# Patient Record
Sex: FEMALE | Race: WHITE | NOT HISPANIC OR LATINO | Employment: OTHER | URBAN - METROPOLITAN AREA
[De-identification: names, ages, dates, MRNs, and addresses within clinical notes are randomized per-mention and may not be internally consistent; named-entity substitution may affect disease eponyms.]

---

## 2017-01-04 DIAGNOSIS — F41.9 CHRONIC ANXIETY: ICD-10-CM

## 2017-01-04 RX ORDER — DIAZEPAM 10 MG/1
TABLET ORAL
Qty: 90 TAB | Refills: 1 | Status: SHIPPED
Start: 2017-01-04 | End: 2017-05-12 | Stop reason: SDUPTHER

## 2017-01-13 DIAGNOSIS — I10 ESSENTIAL HYPERTENSION: ICD-10-CM

## 2017-01-13 RX ORDER — METOPROLOL SUCCINATE 100 MG/1
TABLET, EXTENDED RELEASE ORAL
Qty: 60 TAB | Refills: 6 | Status: SHIPPED | OUTPATIENT
Start: 2017-01-13 | End: 2017-04-26 | Stop reason: SDUPTHER

## 2017-01-19 ENCOUNTER — HOSPITAL ENCOUNTER (OUTPATIENT)
Dept: LAB | Facility: MEDICAL CENTER | Age: 77
End: 2017-01-19
Attending: INTERNAL MEDICINE
Payer: MEDICARE

## 2017-01-19 DIAGNOSIS — E78.5 DYSLIPIDEMIA: ICD-10-CM

## 2017-01-19 DIAGNOSIS — I10 ESSENTIAL HYPERTENSION: ICD-10-CM

## 2017-01-19 DIAGNOSIS — I48.19 PERSISTENT ATRIAL FIBRILLATION (HCC): ICD-10-CM

## 2017-01-19 LAB
ALBUMIN SERPL BCP-MCNC: 4.4 G/DL (ref 3.2–4.9)
ALBUMIN/GLOB SERPL: 1.4 G/DL
ALP SERPL-CCNC: 72 U/L (ref 30–99)
ALT SERPL-CCNC: 15 U/L (ref 2–50)
ANION GAP SERPL CALC-SCNC: 8 MMOL/L (ref 0–11.9)
AST SERPL-CCNC: 19 U/L (ref 12–45)
BILIRUB SERPL-MCNC: 0.8 MG/DL (ref 0.1–1.5)
BUN SERPL-MCNC: 26 MG/DL (ref 8–22)
CALCIUM SERPL-MCNC: 9.6 MG/DL (ref 8.5–10.5)
CHLORIDE SERPL-SCNC: 104 MMOL/L (ref 96–112)
CHOLEST SERPL-MCNC: 168 MG/DL (ref 100–199)
CO2 SERPL-SCNC: 28 MMOL/L (ref 20–33)
CREAT SERPL-MCNC: 1.16 MG/DL (ref 0.5–1.4)
GLOBULIN SER CALC-MCNC: 3.1 G/DL (ref 1.9–3.5)
GLUCOSE SERPL-MCNC: 126 MG/DL (ref 65–99)
HDLC SERPL-MCNC: 49 MG/DL
LDLC SERPL CALC-MCNC: 96 MG/DL
POTASSIUM SERPL-SCNC: 4.2 MMOL/L (ref 3.6–5.5)
PROT SERPL-MCNC: 7.5 G/DL (ref 6–8.2)
SODIUM SERPL-SCNC: 140 MMOL/L (ref 135–145)
TRIGL SERPL-MCNC: 113 MG/DL (ref 0–149)

## 2017-01-19 PROCEDURE — 80061 LIPID PANEL: CPT

## 2017-01-19 PROCEDURE — 36415 COLL VENOUS BLD VENIPUNCTURE: CPT

## 2017-01-19 PROCEDURE — 80053 COMPREHEN METABOLIC PANEL: CPT

## 2017-03-08 ENCOUNTER — HOSPITAL ENCOUNTER (OUTPATIENT)
Dept: RADIOLOGY | Facility: MEDICAL CENTER | Age: 77
End: 2017-03-08
Attending: FAMILY MEDICINE
Payer: MEDICARE

## 2017-03-08 DIAGNOSIS — M85.80 OSTEOPENIA: ICD-10-CM

## 2017-03-08 DIAGNOSIS — Z78.0 POSTMENOPAUSAL: ICD-10-CM

## 2017-03-08 DIAGNOSIS — Z12.31 SCREENING MAMMOGRAM, ENCOUNTER FOR: ICD-10-CM

## 2017-03-08 PROCEDURE — 77063 BREAST TOMOSYNTHESIS BI: CPT

## 2017-03-08 PROCEDURE — 77080 DXA BONE DENSITY AXIAL: CPT

## 2017-03-21 ENCOUNTER — TELEPHONE (OUTPATIENT)
Dept: CARDIOLOGY | Facility: MEDICAL CENTER | Age: 77
End: 2017-03-21

## 2017-03-21 NOTE — TELEPHONE ENCOUNTER
Pt notified clortrimetrine plain, Coicidine HPN.  Nothing with pseudoephrine.  She has cold and congestions.

## 2017-03-21 NOTE — TELEPHONE ENCOUNTER
----- Message from Thais Villanueva sent at 3/21/2017 11:34 AM PDT -----  Regarding: Question about what OTC medication to take for cold or flu  JAMIE/Mariana    Patient has a cold or the flu and wants to find out what OTC medication she can take. She can be reached at [phone].

## 2017-03-31 ENCOUNTER — PATIENT OUTREACH (OUTPATIENT)
Dept: HEALTH INFORMATION MANAGEMENT | Facility: OTHER | Age: 77
End: 2017-03-31

## 2017-03-31 NOTE — PROGRESS NOTES
3/31/17  -   Attempt #:1    Verify PCP: yes    Communication Preference Obtained: yes     Review Care Team: yes    Annual Wellness Visit Scheduling  1. Scheduling Status:Not Scheduled. Patient states they have too many other visits        Care Gap Scheduling (Attempt to Schedule EACH Overdue Care Gap!)     Health Maintenance Due   Topic Date Due   • DIABETES MONOFILAMENT / LE EXAM  Unable to discuss care gaps with pt. She states that she will not schedule more appts for now   • IMM DTaP/Tdap/Td Vaccine (1 - Tdap)    • URINE ACR / MICROALBUMIN  Pt has labs pending         MyChart Activation: declined

## 2017-04-20 ENCOUNTER — HOSPITAL ENCOUNTER (OUTPATIENT)
Dept: LAB | Facility: MEDICAL CENTER | Age: 77
End: 2017-04-20
Attending: FAMILY MEDICINE
Payer: MEDICARE

## 2017-04-20 DIAGNOSIS — I10 ESSENTIAL HYPERTENSION: Chronic | ICD-10-CM

## 2017-04-20 DIAGNOSIS — E11.9 DIABETES MELLITUS TYPE II, NON INSULIN DEPENDENT (HCC): ICD-10-CM

## 2017-04-20 DIAGNOSIS — I50.32 CHRONIC DIASTOLIC HEART FAILURE (HCC): ICD-10-CM

## 2017-04-20 DIAGNOSIS — E78.5 DYSLIPIDEMIA: Chronic | ICD-10-CM

## 2017-04-20 DIAGNOSIS — I48.91 ATRIAL FIBRILLATION WITH RVR (HCC): ICD-10-CM

## 2017-04-20 LAB
ALBUMIN SERPL BCP-MCNC: 4.5 G/DL (ref 3.2–4.9)
ALBUMIN/GLOB SERPL: 1.5 G/DL
ALP SERPL-CCNC: 88 U/L (ref 30–99)
ALT SERPL-CCNC: 20 U/L (ref 2–50)
ANION GAP SERPL CALC-SCNC: 10 MMOL/L (ref 0–11.9)
AST SERPL-CCNC: 21 U/L (ref 12–45)
BILIRUB SERPL-MCNC: 1 MG/DL (ref 0.1–1.5)
BUN SERPL-MCNC: 25 MG/DL (ref 8–22)
CALCIUM SERPL-MCNC: 10.2 MG/DL (ref 8.5–10.5)
CHLORIDE SERPL-SCNC: 100 MMOL/L (ref 96–112)
CHOLEST SERPL-MCNC: 192 MG/DL (ref 100–199)
CO2 SERPL-SCNC: 30 MMOL/L (ref 20–33)
CREAT SERPL-MCNC: 1.2 MG/DL (ref 0.5–1.4)
CREAT UR-MCNC: 109.5 MG/DL
ERYTHROCYTE [DISTWIDTH] IN BLOOD BY AUTOMATED COUNT: 45.1 FL (ref 35.9–50)
EST. AVERAGE GLUCOSE BLD GHB EST-MCNC: 140 MG/DL
GFR SERPL CREATININE-BSD FRML MDRD: 44 ML/MIN/1.73 M 2
GLOBULIN SER CALC-MCNC: 3.1 G/DL (ref 1.9–3.5)
GLUCOSE SERPL-MCNC: 124 MG/DL (ref 65–99)
HBA1C MFR BLD: 6.5 % (ref 0–5.6)
HCT VFR BLD AUTO: 47.3 % (ref 37–47)
HDLC SERPL-MCNC: 52 MG/DL
HGB BLD-MCNC: 15.2 G/DL (ref 12–16)
LDLC SERPL CALC-MCNC: 115 MG/DL
MCH RBC QN AUTO: 29.5 PG (ref 27–33)
MCHC RBC AUTO-ENTMCNC: 32.1 G/DL (ref 33.6–35)
MCV RBC AUTO: 91.8 FL (ref 81.4–97.8)
MICROALBUMIN UR-MCNC: 1.5 MG/DL
MICROALBUMIN/CREAT UR: 14 MG/G (ref 0–30)
PLATELET # BLD AUTO: 162 K/UL (ref 164–446)
PMV BLD AUTO: 10.3 FL (ref 9–12.9)
POTASSIUM SERPL-SCNC: 4.3 MMOL/L (ref 3.6–5.5)
PROT SERPL-MCNC: 7.6 G/DL (ref 6–8.2)
RBC # BLD AUTO: 5.15 M/UL (ref 4.2–5.4)
SODIUM SERPL-SCNC: 140 MMOL/L (ref 135–145)
TRIGL SERPL-MCNC: 127 MG/DL (ref 0–149)
WBC # BLD AUTO: 6.7 K/UL (ref 4.8–10.8)

## 2017-04-20 PROCEDURE — 82570 ASSAY OF URINE CREATININE: CPT

## 2017-04-20 PROCEDURE — 83036 HEMOGLOBIN GLYCOSYLATED A1C: CPT

## 2017-04-20 PROCEDURE — 85027 COMPLETE CBC AUTOMATED: CPT

## 2017-04-20 PROCEDURE — 82043 UR ALBUMIN QUANTITATIVE: CPT

## 2017-04-20 PROCEDURE — 36415 COLL VENOUS BLD VENIPUNCTURE: CPT

## 2017-04-20 PROCEDURE — 80053 COMPREHEN METABOLIC PANEL: CPT

## 2017-04-20 PROCEDURE — 80061 LIPID PANEL: CPT

## 2017-04-25 DIAGNOSIS — K21.9 GASTROESOPHAGEAL REFLUX DISEASE WITHOUT ESOPHAGITIS: Chronic | ICD-10-CM

## 2017-04-25 DIAGNOSIS — E78.5 DYSLIPIDEMIA: Chronic | ICD-10-CM

## 2017-04-25 RX ORDER — OMEPRAZOLE 20 MG/1
CAPSULE, DELAYED RELEASE ORAL
Qty: 90 CAP | Refills: 6 | Status: SHIPPED | OUTPATIENT
Start: 2017-04-25 | End: 2018-05-11 | Stop reason: SDUPTHER

## 2017-04-25 RX ORDER — EZETIMIBE 10 MG/1
TABLET ORAL
Qty: 30 TAB | Refills: 6 | Status: SHIPPED | OUTPATIENT
Start: 2017-04-25 | End: 2017-08-29 | Stop reason: SDUPTHER

## 2017-04-26 DIAGNOSIS — I48.91 ATRIAL FIBRILLATION WITH RVR (HCC): ICD-10-CM

## 2017-04-26 DIAGNOSIS — I10 ESSENTIAL HYPERTENSION: ICD-10-CM

## 2017-04-26 RX ORDER — METOPROLOL SUCCINATE 100 MG/1
100 TABLET, EXTENDED RELEASE ORAL 2 TIMES DAILY
Qty: 180 TAB | Refills: 3 | OUTPATIENT
Start: 2017-04-26 | End: 2018-05-11 | Stop reason: SDUPTHER

## 2017-04-27 RX ORDER — POTASSIUM CHLORIDE 20 MEQ/1
20 TABLET, EXTENDED RELEASE ORAL DAILY
Qty: 30 TAB | Refills: 11 | OUTPATIENT
Start: 2017-04-27 | End: 2017-09-05 | Stop reason: SDUPTHER

## 2017-05-12 DIAGNOSIS — F41.9 CHRONIC ANXIETY: ICD-10-CM

## 2017-05-12 RX ORDER — DIAZEPAM 10 MG/1
TABLET ORAL
Qty: 90 TAB | Refills: 3 | Status: SHIPPED
Start: 2017-05-12 | End: 2018-03-17 | Stop reason: SDUPTHER

## 2017-06-02 ENCOUNTER — OFFICE VISIT (OUTPATIENT)
Dept: CARDIOLOGY | Facility: MEDICAL CENTER | Age: 77
End: 2017-06-02
Payer: MEDICARE

## 2017-06-02 VITALS
DIASTOLIC BLOOD PRESSURE: 78 MMHG | HEIGHT: 62 IN | BODY MASS INDEX: 27.9 KG/M2 | HEART RATE: 80 BPM | OXYGEN SATURATION: 95 % | WEIGHT: 151.6 LBS | SYSTOLIC BLOOD PRESSURE: 126 MMHG

## 2017-06-02 DIAGNOSIS — E78.5 DYSLIPIDEMIA: Chronic | ICD-10-CM

## 2017-06-02 DIAGNOSIS — Z79.01 CHRONIC ANTICOAGULATION: ICD-10-CM

## 2017-06-02 DIAGNOSIS — I48.19 PERSISTENT ATRIAL FIBRILLATION (HCC): ICD-10-CM

## 2017-06-02 DIAGNOSIS — I10 ESSENTIAL HYPERTENSION: Chronic | ICD-10-CM

## 2017-06-02 PROCEDURE — 4040F PNEUMOC VAC/ADMIN/RCVD: CPT | Performed by: INTERNAL MEDICINE

## 2017-06-02 PROCEDURE — 3288F FALL RISK ASSESSMENT DOCD: CPT | Mod: 8P | Performed by: INTERNAL MEDICINE

## 2017-06-02 PROCEDURE — 0518F FALL PLAN OF CARE DOCD: CPT | Mod: 8P | Performed by: INTERNAL MEDICINE

## 2017-06-02 PROCEDURE — 99214 OFFICE O/P EST MOD 30 MIN: CPT | Performed by: INTERNAL MEDICINE

## 2017-06-02 PROCEDURE — 1036F TOBACCO NON-USER: CPT | Performed by: INTERNAL MEDICINE

## 2017-06-02 PROCEDURE — 1100F PTFALLS ASSESS-DOCD GE2>/YR: CPT | Performed by: INTERNAL MEDICINE

## 2017-06-02 PROCEDURE — G8419 CALC BMI OUT NRM PARAM NOF/U: HCPCS | Performed by: INTERNAL MEDICINE

## 2017-06-02 PROCEDURE — G8432 DEP SCR NOT DOC, RNG: HCPCS | Performed by: INTERNAL MEDICINE

## 2017-06-02 ASSESSMENT — ENCOUNTER SYMPTOMS
COUGH: 0
NAUSEA: 0
FEVER: 0
CLAUDICATION: 0
DIZZINESS: 0
ORTHOPNEA: 0
WEIGHT LOSS: 0
SENSORY CHANGE: 0
DEPRESSION: 0
BLOOD IN STOOL: 0
EYE DISCHARGE: 0
BRUISES/BLEEDS EASILY: 0
CHILLS: 0
LOSS OF CONSCIOUSNESS: 0
DOUBLE VISION: 0
SHORTNESS OF BREATH: 0
PALPITATIONS: 0
BLURRED VISION: 0
PND: 0
HEADACHES: 0
SPEECH CHANGE: 0
MYALGIAS: 0
EYE PAIN: 0
VOMITING: 0
HALLUCINATIONS: 0
ABDOMINAL PAIN: 0
FALLS: 0

## 2017-06-02 NOTE — MR AVS SNAPSHOT
"        Ivory Cantu Aunm   2017 9:45 AM   Office Visit   MRN: [id number]    Department:  [facility]   Dept Phone:  [phone]    Description:  Female : 1940   Provider:  Shen Washington M.D.           Reason for Visit     Follow-Up           Allergies as of 2017     Allergen Noted Reactions    Atorvastatin 2015       myalgias    Simvastatin 2010       myalgias      You were diagnosed with     Chronic atrial fibrillation (CMS-[facility])   [182650]       Essential hypertension   [7253585]       Dyslipidemia   [928112]       Chronic anticoagulation   [432076]         Vital Signs     Blood Pressure Pulse Height Weight Body Mass Index Oxygen Saturation    126/78 mmHg 80 1.575 m (5' 2.01\") 68.765 kg (151 lb 9.6 oz) 27.72 kg/m2 95%    Last Menstrual Period Smoking Status                1991 Former Smoker          Basic Information     Date Of Birth Sex Race Ethnicity Preferred Language    1940 Female White Non- English      Your appointments     2017 10:00 AM   Established Patient with Cecelia Tesfaye M.D.   [address]  [facility] [address] [phone]           You will be receiving a confirmation call a few days before your appointment from our automated call confirmation system.              Problem List              ICD-10-CM Priority Class Noted - Resolved    Sinusitis, chronic J32.9 Low  Unknown - Present    Bilateral cataracts H26.9 Low  Unknown - Present    Dyslipidemia (Chronic) E78.5 Medium  2009 - Present    Osteopenia M85.80 Low  2009 - Present    Essential hypertension (Chronic) I10 High  2010 - Present    GERD (gastroesophageal reflux disease) (Chronic) K21.9 Low  2010 - Present    Vitamin D deficiency (Chronic) E55.9 Low  2011 - Present    Vertigo R42 Low  Unknown - Present    IBS (irritable bowel syndrome) (Chronic) K58.9 Low  Unknown - Present    Basal cell carcinoma of " skin of nose C44.311 Low  Unknown - Present    Perennial allergic rhinitis with seasonal variation (Chronic) J30.89, J30.2 Low  Unknown - Present    History of adenomatous polyp of colon Z86.010 Low  6/2/2015 - Present    SBE (subacute bacterial endocarditis) prophylaxis candidate Z29.8 Low  5/3/2016 - Present    Noncompliance with medication regimen Z91.14 Low  5/21/2016 - Present    Atrial fibrillation with RVR (CMS-HCC) I48.91 High  5/21/2016 - Present    Diastolic heart failure (CMS-HCC) I50.30 High  5/22/2016 - Present    Diabetes mellitus type II, non insulin dependent (CMS-HCC) E11.9   11/29/2016 - Present    Mitral valve regurgitation I34.0   Unknown - Present    Financial difficulties Z59.8   11/29/2016 - Present    Chronic anxiety F41.9   1/4/2017 - Present    Type 2 diabetes mellitus, controlled (CMS-HCC) E11.9   Unknown - Present      Health Maintenance        Date Due Completion Dates    DIABETES MONOFILAMENT / LE EXAM 1940 ---    IMM DTaP/Tdap/Td Vaccine (1 - Tdap) 4/23/1960 ---    RETINAL SCREENING 8/1/2017 8/1/2016 (Prv Comp), 2/20/2013 (Prv Comp), 12/20/2011 (Done)    Override on 8/1/2016: Previously completed (Dr. Corey)    Override on 2/20/2013: Previously completed (Dr. Mims, normal)    Override on 12/20/2011: Done (Dr. Aaron)    A1C SCREENING 10/20/2017 4/20/2017, 11/2/2016, 4/3/2013, 8/8/2012, 2/9/2012, 12/7/2011, 3/9/2011, 11/30/2010, 8/5/2010, 4/19/2010, 1/14/2010, 6/29/2009    FASTING LIPID PROFILE 4/20/2018 4/20/2017, 1/19/2017, 11/2/2016, 10/23/2015, 3/21/2015, 10/21/2014, 5/14/2014, 11/1/2013, 4/3/2013, 8/8/2012, 2/9/2012, 12/7/2011, 3/9/2011, 11/30/2010, 8/5/2010, 4/19/2010, 1/14/2010, 6/29/2009    URINE ACR / MICROALBUMIN 4/20/2018 4/20/2017, 12/11/2012 (Postponed), 12/7/2011, 8/5/2010, 6/29/2009    Override on 12/11/2012: Postponed    SERUM CREATININE 4/20/2018 4/20/2017, 1/19/2017, 11/2/2016, 7/8/2016, 6/6/2016, 5/27/2016, 5/22/2016, 5/21/2016, 4/30/2016, 10/23/2015,  3/21/2015, 10/21/2014, 5/14/2014, 11/1/2013, 4/3/2013, 8/8/2012, 2/9/2012, 12/7/2011, 4/11/2011, 3/9/2011, 11/30/2010, 8/5/2010, 4/19/2010, 1/14/2010, 6/29/2009    COLONOSCOPY 8/1/2019 8/1/2009 (Done)    Override on 8/1/2009: Done    BONE DENSITY 3/8/2022 3/8/2017, 6/8/2011, 6/29/2009            Current Immunizations     13-VALENT PCV PREVNAR 11/29/2016    Influenza TIV (IM) 12/11/2012    Pneumococcal polysaccharide vaccine (PPSV-23) 1/18/2010    SHINGLES VACCINE 11/23/2011      Below and/or attached are the medications your provider expects you to take. Review all of your home medications and newly ordered medications with your provider and/or pharmacist. Follow medication instructions as directed by your provider and/or pharmacist. Please keep your medication list with you and share with your provider. Update the information when medications are discontinued, doses are changed, or new medications (including over-the-counter products) are added; and carry medication information at all times in the event of emergency situations     Allergies:  ATORVASTATIN - (reactions not documented)     SIMVASTATIN - (reactions not documented)               Medications  Valid as of: June 02, 2017 - 10:20 AM    Generic Name Brand Name Tablet Size Instructions for use    Apixaban (Tab) ELIQUIS 5 MG TAKE 1 TAB BY MOUTH 2 TIMES A DAY.        DiazePAM (Tab) VALIUM 10 MG TAKE 1 TABLET BY MOUTH EVERY 8 HOURS AS NEEDED FOR ANXIETY        Digoxin (Tab) LANOXIN 125 MCG Take 125 mcg by mouth. AT 6 PM        Ezetimibe (Tab) ZETIA 10 MG TAKE 1 TABLET BY MOUTH EVERY DAY        Furosemide (Tab) LASIX 20 MG Take 1 Tab by mouth every day.        Metoprolol Succinate (TABLET SR 24 HR) TOPROL  MG Take 1 Tab by mouth 2 Times a Day.        Omeprazole (CAPSULE DELAYED RELEASE) PRILOSEC 20 MG TAKE ONE CAPSULE BY MOUTH EVERY DAY        Potassium Chloride Rose Mary CR (Tab CR) Kdur 20 MEQ Take 1 Tab by mouth every day.        .                 Medicines  prescribed today were sent to:     [id number] - [place], [place] - [address]    [address] [place] [address]    Phone: [phone] Fax: [phone]    Open 24 Hours?: No      Medication refill instructions:       If your prescription bottle indicates you have medication refills left, it is not necessary to call your provider’s office. Please contact your pharmacy and they will refill your medication.    If your prescription bottle indicates you do not have any refills left, you may request refills at any time through one of the following ways: The online Xceedium system (except Urgent Care), by calling your provider’s office, or by asking your pharmacy to contact your provider’s office with a refill request. Medication refills are processed only during regular business hours and may not be available until the next business day. Your provider may request additional information or to have a follow-up visit with you prior to refilling your medication.   *Please Note: Medication refills are assigned a new Rx number when refilled electronically. Your pharmacy may indicate that no refills were authorized even though a new prescription for the same medication is available at the pharmacy. Please request the medicine by name with the pharmacy before contacting your provider for a refill.        Your To Do List     Future Labs/Procedures Complete By Expires    COMP METABOLIC PANEL  As directed 6/3/2018         Xceedium Access Code: [email or web address]  Expires: 7/2/2017 10:20 AM    Xceedium  A secure, online tool to manage your health information     [email or web address] Xceedium® is a secure, online tool that connects you to your personalized health information from the privacy of your home -- day or night - making it very easy for you to manage your healthcare. Once the activation process is completed, you can even access your medical information using the Xceedium christa, which is available for free in the Apple Christa store  or Google Play store.     [employer] provides the following levels of access (as shown below):   My Chart Features   [facility] Doctor [facility]  Specialists [facility]  Urgent  Care Non-[facility]  Primary Care  Doctor   Email your healthcare team securely and privately 24/7 X X X    Manage appointments: schedule your next appointment; view details of past/upcoming appointments X      Request prescription refills. X      View recent personal medical records, including lab and immunizations X X X X   View health record, including health history, allergies, medications X X X X   Read reports about your outpatient visits, procedures, consult and ER notes X X X X   See your discharge summary, which is a recap of your hospital and/or ER visit that includes your diagnosis, lab results, and care plan. X X       How to register for [employer]:  1. Go to  [email or web address].  2. Click on the Sign Up Now box, which takes you to the New Member Sign Up page. You will need to provide the following information:  a. Enter your ETARGET Access Code exactly as it appears at the top of this page. (You will not need to use this code after you’ve completed the sign-up process. If you do not sign up before the expiration date, you must request a new code.)   b. Enter your date of birth.   c. Enter your home email address.   d. Click Submit, and follow the next screen’s instructions.  3. Create a ETARGET ID. This will be your ETARGET login ID and cannot be changed, so think of one that is secure and easy to remember.  4. Create a ETARGET password. You can change your password at any time.  5. Enter your Password Reset Question and Answer. This can be used at a later time if you forget your password.   6. Enter your e-mail address. This allows you to receive e-mail notifications when new information is available in ETARGET.  7. Click Sign Up. You can now view your health information.    For assistance activating your [employer] account, call  [phone]

## 2017-06-02 NOTE — PROGRESS NOTES
"Subjective:   Ivory Rowan is a 77 y.o. female who presents today for cardiac care for chronic atrial fibrillation now has progressed into persistent after prior failed cardioversion. She was in sinus for brief amount of time. Patient was diagnosed with atrial fibrillation earlier in 2016. Patient has had multiple hospitalizations due to A. fib with rapid ventricular rate leading to heart failure exacerbation. Her left ventricular systolic function is documented at about 45-50% on her most recent transthoracic echocardiogram. Patient is anticoagulated. Patient is taken Toprol- mg by mouth twice a day.    Patient cannot afford Tikosyn therapy and she did not go into the hospital for that.    She is feeling fine. No palpitations. No dyspnea.    Past Medical History   Diagnosis Date   • Hypertension    • Vertigo    • Colon polyp      tubular adenomas   • Perennial allergic rhinitis with seasonal variation    • Basal cell carcinoma of skin of nose    • Osteopenia 6/09   • Dyslipidemia    • IBS (irritable bowel syndrome)    • MEDICAL HOME 10/23/12   • Type 2 diabetes mellitus, controlled (CMS-[facility])    • Atrial fibrillation (CMS-[facility])    • CHF (congestive heart failure) (CMS-[facility])    • Valvular heart disease      mitral insufficiency   • CATARACT      Dr. Aaron, Dr. Orellana   • Mitral valve regurgitation      Past Surgical History   Procedure Laterality Date   • Appendectomy     • Breast biopsy       left, reports wire report broke off during procedure   • Cholecystectomy     • Colonoscopy  8/2009     Dr. Lopez, 9 polyps   • Recovery  7/8/2016     Procedure: CATH LAB-ZAFAR GUIDED CV-TO-LARGE GROUP;  Surgeon: [facility];  [place] [facility]  Service:    • Us-needle core bx-breast panel       Family History   Problem Relation Age of Onset   • Diabetes Mother    • Hypertension Mother    • Stroke Mother    • Cancer Father      lung/larynx   • Cancer Sister      \"female\"   • Cancer " Paternal Aunt      breast   • Genetic Sister      osteoporosis   • Cancer Maternal Aunt    • Heart Disease Brother      CABG x 3     History   Smoking status   • Former Smoker -- 0.50 packs/day for 40 years   • Types: Cigarettes   • Quit date: 03/01/1996   Smokeless tobacco   • Never Used     Allergies   Allergen Reactions   • Atorvastatin      myalgias   • Simvastatin      myalgias     Outpatient Encounter Prescriptions as of 6/2/2017   Medication Sig Dispense Refill   • diazepam (VALIUM) 10 MG tablet TAKE 1 TABLET BY MOUTH EVERY 8 HOURS AS NEEDED FOR ANXIETY 90 Tab 3   • potassium chloride SA (KDUR) 20 MEQ Tab CR Take 1 Tab by mouth every day. 30 Tab 11   • metoprolol SR (TOPROL XL) 100 MG TABLET SR 24 HR Take 1 Tab by mouth 2 Times a Day. 180 Tab 3   • ezetimibe (ZETIA) 10 MG Tab TAKE 1 TABLET BY MOUTH EVERY DAY 30 Tab 6   • omeprazole (PRILOSEC) 20 MG delayed-release capsule TAKE ONE CAPSULE BY MOUTH EVERY DAY 90 Cap 6   • digoxin (LANOXIN) 125 MCG Tab Take 125 mcg by mouth. AT 6 PM  3   • ELIQUIS 5 MG Tab TAKE 1 TAB BY MOUTH 2 TIMES A DAY. 180 Tab 3   • furosemide (LASIX) 20 MG Tab Take 1 Tab by mouth every day. 90 Tab 3     No facility-administered encounter medications on file as of 6/2/2017.     Review of Systems   Constitutional: Negative for fever, chills, weight loss and malaise/fatigue.   HENT: Negative for ear discharge, ear pain, hearing loss and nosebleeds.    Eyes: Negative for blurred vision, double vision, pain and discharge.   Respiratory: Negative for cough and shortness of breath.    Cardiovascular: Negative for chest pain, palpitations, orthopnea, claudication, leg swelling and PND.   Gastrointestinal: Negative for nausea, vomiting, abdominal pain, blood in stool and melena.   Genitourinary: Negative for dysuria and hematuria.   Musculoskeletal: Negative for myalgias, joint pain and falls.   Skin: Negative for itching and rash.   Neurological: Negative for dizziness, sensory change, speech  "change, loss of consciousness and headaches.   Endo/Heme/Allergies: Negative for environmental allergies. Does not bruise/bleed easily.   Psychiatric/Behavioral: Negative for depression, suicidal ideas and hallucinations.        Objective:   /78 mmHg  Pulse 80  Ht 1.575 m (5' 2.01\")  Wt 68.765 kg (151 lb 9.6 oz)  BMI 27.72 kg/m2  SpO2 95%  LMP 05/01/1991    Physical Exam   Constitutional: She is oriented to person, place, and time.   HENT:   Head: Normocephalic and atraumatic.   Eyes: EOM are normal.   Neck: No JVD present.   Cardiovascular: Normal rate, normal heart sounds and intact distal pulses.  Exam reveals no gallop and no friction rub.    No murmur heard.  There is presence of an irregularly irregular heartbeats.     Pulmonary/Chest: No respiratory distress. She has no wheezes. She has no rales. She exhibits no tenderness.   Abdominal: She exhibits no distension. There is no tenderness. There is no rebound and no guarding.   Musculoskeletal: She exhibits no edema or tenderness.   Lymphadenopathy:     She has no cervical adenopathy.   Neurological: She is alert and oriented to person, place, and time.   Skin: Skin is dry.   Psychiatric: She has a normal mood and affect.   Nursing note and vitals reviewed.      Assessment:     1. Essential hypertension  COMP METABOLIC PANEL    LIPID PANEL   2. Dyslipidemia  COMP METABOLIC PANEL    LIPID PANEL   3. Chronic anticoagulation  COMP METABOLIC PANEL    LIPID PANEL   4. Persistent atrial fibrillation (CMS-[facility])         Medical Decision Making:  Today's Assessment / Status / Plan:     At this time patient is clinically stable in terms of her cardiac standpoint.  Blood pressure is well controlled.  Cont current medications at current dose.   Her LDL increased again but she did not take her Zetia for a while. She just restarted the medicine now. She was not able to tolerate multiple statins in the past.    I will see patient back in clinic with lab tests and " studies results in 12 months.    I thank you Dr. Tesfaye for referring patient to our Cardiology Clinic today.

## 2017-06-02 NOTE — Clinical Note
[facility]   [address]  [place] [address]  Phone: [phone]  Fax: [phone]              Ivory Rowan  1940    Encounter Date: 6/2/2017    Shen Washington M.D.          PROGRESS NOTE:  Subjective:   Ivory Rowan is a 77 y.o. female who presents today for cardiac care for chronic atrial fibrillation now has progressed into persistent after prior failed cardioversion. She was in sinus for brief amount of time. Patient was diagnosed with atrial fibrillation earlier in 2016. Patient has had multiple hospitalizations due to A. fib with rapid ventricular rate leading to heart failure exacerbation. Her left ventricular systolic function is documented at about 45-50% on her most recent transthoracic echocardiogram. Patient is anticoagulated. Patient is taken Toprol- mg by mouth twice a day.    Patient cannot afford Tikosyn therapy and she did not go into the hospital for that.    She is feeling fine. No palpitations. No dyspnea.    Past Medical History   Diagnosis Date   • Hypertension    • Vertigo    • Colon polyp      tubular adenomas   • Perennial allergic rhinitis with seasonal variation    • Basal cell carcinoma of skin of nose    • Osteopenia 6/09   • Dyslipidemia    • IBS (irritable bowel syndrome)    • MEDICAL HOME 10/23/12   • Type 2 diabetes mellitus, controlled (CMS-[facility])    • Atrial fibrillation (CMS-[facility])    • CHF (congestive heart failure) (CMS-[facility])    • Valvular heart disease      mitral insufficiency   • CATARACT      Dr. Aaron,  Orellana   • Mitral valve regurgitation      Past Surgical History   Procedure Laterality Date   • Appendectomy     • Breast biopsy       left, reports wire report broke off during procedure   • Cholecystectomy     • Colonoscopy  8/2009     Dr. Lopez, 9 polyps   • Recovery  7/8/2016     Procedure: CATH LAB-ZAFAR GUIDED CV-TO-LARGE GROUP;  Surgeon: [facility];  [place]  [place] [facility] [place]  Service:    • Us-needle core bx-breast panel       Family History   Problem Relation Age of Onset   • Diabetes Mother    • Hypertension Mother    • Stroke Mother    • Cancer Father      lung/larynx   • Cancer Sister      \"female\"   • Cancer Paternal Aunt      breast   • Genetic Sister      osteoporosis   • Cancer Maternal Aunt    • Heart Disease Brother      CABG x 3     History   Smoking status   • Former Smoker -- 0.50 packs/day for 40 years   • Types: Cigarettes   • Quit date: 03/01/1996   Smokeless tobacco   • Never Used     Allergies   Allergen Reactions   • Atorvastatin      myalgias   • Simvastatin      myalgias     Outpatient Encounter Prescriptions as of 6/2/2017   Medication Sig Dispense Refill   • diazepam (VALIUM) 10 MG tablet TAKE 1 TABLET BY MOUTH EVERY 8 HOURS AS NEEDED FOR ANXIETY 90 Tab 3   • potassium chloride SA (KDUR) 20 MEQ Tab CR Take 1 Tab by mouth every day. 30 Tab 11   • metoprolol SR (TOPROL XL) 100 MG TABLET SR 24 HR Take 1 Tab by mouth 2 Times a Day. 180 Tab 3   • ezetimibe (ZETIA) 10 MG Tab TAKE 1 TABLET BY MOUTH EVERY DAY 30 Tab 6   • omeprazole (PRILOSEC) 20 MG delayed-release capsule TAKE ONE CAPSULE BY MOUTH EVERY DAY 90 Cap 6   • digoxin (LANOXIN) 125 MCG Tab Take 125 mcg by mouth. AT 6 PM  3   • ELIQUIS 5 MG Tab TAKE 1 TAB BY MOUTH 2 TIMES A DAY. 180 Tab 3   • furosemide (LASIX) 20 MG Tab Take 1 Tab by mouth every day. 90 Tab 3     No facility-administered encounter medications on file as of 6/2/2017.     Review of Systems   Constitutional: Negative for fever, chills, weight loss and malaise/fatigue.   HENT: Negative for ear discharge, ear pain, hearing loss and nosebleeds.    Eyes: Negative for blurred vision, double vision, pain and discharge.   Respiratory: Negative for cough and shortness of breath.    Cardiovascular: Negative for chest pain, palpitations, orthopnea, claudication, leg swelling and PND.   Gastrointestinal: Negative for nausea, vomiting, " "abdominal pain, blood in stool and melena.   Genitourinary: Negative for dysuria and hematuria.   Musculoskeletal: Negative for myalgias, joint pain and falls.   Skin: Negative for itching and rash.   Neurological: Negative for dizziness, sensory change, speech change, loss of consciousness and headaches.   Endo/Heme/Allergies: Negative for environmental allergies. Does not bruise/bleed easily.   Psychiatric/Behavioral: Negative for depression, suicidal ideas and hallucinations.        Objective:   /78 mmHg  Pulse 80  Ht 1.575 m (5' 2.01\")  Wt 68.765 kg (151 lb 9.6 oz)  BMI 27.72 kg/m2  SpO2 95%  LMP 05/01/1991    Physical Exam   Constitutional: She is oriented to person, place, and time.   HENT:   Head: Normocephalic and atraumatic.   Eyes: EOM are normal.   Neck: No JVD present.   Cardiovascular: Normal rate, normal heart sounds and intact distal pulses.  Exam reveals no gallop and no friction rub.    No murmur heard.  There is presence of an irregularly irregular heartbeats.     Pulmonary/Chest: No respiratory distress. She has no wheezes. She has no rales. She exhibits no tenderness.   Abdominal: She exhibits no distension. There is no tenderness. There is no rebound and no guarding.   Musculoskeletal: She exhibits no edema or tenderness.   Lymphadenopathy:     She has no cervical adenopathy.   Neurological: She is alert and oriented to person, place, and time.   Skin: Skin is dry.   Psychiatric: She has a normal mood and affect.   Nursing note and vitals reviewed.      Assessment:     1. Essential hypertension  COMP METABOLIC PANEL    LIPID PANEL   2. Dyslipidemia  COMP METABOLIC PANEL    LIPID PANEL   3. Chronic anticoagulation  COMP METABOLIC PANEL    LIPID PANEL   4. Persistent atrial fibrillation (CMS-[facility])         Medical Decision Making:  Today's Assessment / Status / Plan:     At this time patient is clinically stable in terms of her cardiac standpoint.  Blood pressure is well " controlled.  Cont current medications at current dose.   Her LDL increased again but she did not take her Zetia for a while. She just restarted the medicine now. She was not able to tolerate multiple statins in the past.    I will see patient back in clinic with lab tests and studies results in 12 months.    I thank you Dr. Tesfaye for referring patient to our Cardiology Clinic today.        Cecelia Tesfaye M.D.  [address] [address]  [address] [address]  VIA In Basket

## 2017-06-22 ENCOUNTER — OFFICE VISIT (OUTPATIENT)
Dept: MEDICAL GROUP | Facility: CLINIC | Age: 77
End: 2017-06-22
Payer: MEDICARE

## 2017-06-22 VITALS
TEMPERATURE: 97.2 F | BODY MASS INDEX: 27.79 KG/M2 | OXYGEN SATURATION: 97 % | SYSTOLIC BLOOD PRESSURE: 120 MMHG | WEIGHT: 151 LBS | RESPIRATION RATE: 16 BRPM | HEART RATE: 80 BPM | DIASTOLIC BLOOD PRESSURE: 70 MMHG | HEIGHT: 62 IN

## 2017-06-22 DIAGNOSIS — I10 ESSENTIAL HYPERTENSION: Chronic | ICD-10-CM

## 2017-06-22 DIAGNOSIS — E11.9 DIABETES MELLITUS TYPE II, NON INSULIN DEPENDENT (HCC): ICD-10-CM

## 2017-06-22 DIAGNOSIS — R10.2 HYPOGASTRIC PAIN: ICD-10-CM

## 2017-06-22 DIAGNOSIS — I50.32 CHRONIC DIASTOLIC HEART FAILURE (HCC): ICD-10-CM

## 2017-06-22 DIAGNOSIS — N18.30 CKD (CHRONIC KIDNEY DISEASE) STAGE 3, GFR 30-59 ML/MIN (HCC): ICD-10-CM

## 2017-06-22 DIAGNOSIS — E11.21 CONTROLLED TYPE 2 DIABETES MELLITUS WITH DIABETIC NEPHROPATHY, WITHOUT LONG-TERM CURRENT USE OF INSULIN (HCC): ICD-10-CM

## 2017-06-22 PROCEDURE — 99214 OFFICE O/P EST MOD 30 MIN: CPT | Performed by: FAMILY MEDICINE

## 2017-06-22 NOTE — PROGRESS NOTES
Diabetes Focused Exam:    Chief Complaint   Patient presents with   • Diabetes Mellitus      Subjective:   HPI  Ivory Rowan is a 77 y.o. female who presents for follow up of chronic conditions of diabetes mellitus, hypertension and hyperlipidemia. She indicates that she is feeling well and denies any symptoms referable to the above diagnoses. Specifically denies chest pain, palpitations, dyspnea, orthopnea, PND or peripheral edema. Also denies polyuria, polydipsia, urinary complaints, abdominal complaints, myalgias, numbness, weakness or other related symptoms.  Pains in low stomach in the middle of the night.   Getting some hand numbness bilaterally when sits in certain positions.    The patient is taking Eliquis 5 mg twice daily and taking all other medications as prescribed.  She cannot add aspirin. Patient denies any side effects of medication.  DM: A1c goal <7  Glucose monitoring frequency: not checking  Hypoglycemic episodes none noted  Diabetic complications: nephropathy  ACR Albumin/Creatinine Ratio goal <30   HTN: Blood pressure goal <140/<80. Currently Rx ACE/ARB: Not Indicated  Hyperlipidemia:Cholesterol goal LDL <100, total/HDL <5. Currently Rx Statin: No  Zetia  Last eye exam 8/2016  Denies visual blurring, double vision, eye pain and floaters  Last monofilament foot exam: today   Denies foot pain, numbness, calluses, ulcers    See medications and orders placed in encounter report.  Past medical history, family history, social history reviewed and updated as documented in medical record.  Current medications including changes today:  Current Outpatient Prescriptions   Medication Sig Dispense Refill   • diazepam (VALIUM) 10 MG tablet TAKE 1 TABLET BY MOUTH EVERY 8 HOURS AS NEEDED FOR ANXIETY 90 Tab 3   • potassium chloride SA (KDUR) 20 MEQ Tab CR Take 1 Tab by mouth every day. 30 Tab 11   • metoprolol SR (TOPROL XL) 100 MG TABLET SR 24 HR Take 1 Tab by mouth 2 Times a Day. 180 Tab 3   • ezetimibe  "(ZETIA) 10 MG Tab TAKE 1 TABLET BY MOUTH EVERY DAY 30 Tab 6   • omeprazole (PRILOSEC) 20 MG delayed-release capsule TAKE ONE CAPSULE BY MOUTH EVERY DAY 90 Cap 6   • digoxin (LANOXIN) 125 MCG Tab Take 125 mcg by mouth. AT 6 PM  3   • ELIQUIS 5 MG Tab TAKE 1 TAB BY MOUTH 2 TIMES A DAY. 180 Tab 3   • furosemide (LASIX) 20 MG Tab Take 1 Tab by mouth every day. 90 Tab 3     No current facility-administered medications for this visit.     Allergies:   Allergies   Allergen Reactions   • Atorvastatin      myalgias   • Simvastatin      myalgias      Social History   Substance Use Topics   • Smoking status: Former Smoker -- 0.50 packs/day for 40 years     Types: Cigarettes     Quit date: 03/01/1996   • Smokeless tobacco: Never Used   • Alcohol Use: No      Comment: 1-2 drinks once a month     Exercise: she is trying to walk more  Health Maintenance/Immunizations: discussed    ROS  Pertinent  ROS findings as above. All other systems reviewed and are negative.      Objective:     OBJECTIVE:  /70 mmHg  Pulse 80  Temp(Src) 36.2 °C (97.2 °F)  Resp 16  Ht 1.575 m (5' 2.01\")  Wt 68.493 kg (151 lb)  BMI 27.61 kg/m2  SpO2 97%  LMP 05/01/1991 Body mass index is 27.61 kg/(m^2). BMI: not obese  General: No apparent distress, conversant, cooperative and pleasant with the examination.  Psych: Alert and oriented x4, judgment and insight normal  Neck: No JVD or bruits, no adenopathy, supple  Thyroid: normal to inspection and palpation  Lungs: negative findings: normal respiratory rate and rhythm, lungs clear to auscultation  Heart: negative findings: irregularly irregular rate and rhythm, S1 normal, S2 normal, no murmurs, clicks, or gallops  Abdomen: Soft, nontender, no hepatosplenomegaly or masses, normal bowel sounds.  non-tender to examination.  Skin: No rashes, no cyanosis, no lesions or ulcers  Extremities: No cyanosis clubbing or edema.   Feet are examined no ulcerations or fissures, sensation normal.  DP pulses are " normal    POC labs today: No results found for: POCGLUCOSE       Last labs    Lab Results   Component Value Date/Time    CHOLESTEROL, 04/20/2017 08:13 AM    * 04/20/2017 08:13 AM    HDL 52 04/20/2017 08:13 AM    TRIGLYCERIDES 127 04/20/2017 08:13 AM       Lab Results   Component Value Date/Time    SODIUM 140 04/20/2017 08:13 AM    POTASSIUM 4.3 04/20/2017 08:13 AM    GLUCOSE 124* 04/20/2017 08:13 AM    BUN-CREATININE RATIO 26 04/11/2011 12:00 AM    GLOM FILT RATE, EST 56* 03/09/2011 07:55 AM     Lab Results   Component Value Date/Time    GLYCOHEMOGLOBIN 6.5* 04/20/2017 08:13 AM    GLYCOHEMOGLOBIN 6.7* 11/02/2016 08:15 AM    GLYCOHEMOGLOBIN 5.9 04/03/2013 08:04 AM     Lab Results   Component Value Date/Time    MICRO ALB CREAT RATIO 14 04/20/2017 08:13 AM    MICROALBUMIN, URINE RANDOM 1.5 04/20/2017 08:13 AM          Assessment/Plan:   Medications, refills, and referrals per orders.   1. Controlled type 2 diabetes mellitus with diabetic nephropathy, without long-term current use of insulin (CMS-HCC)     2. Diabetes mellitus type II, non insulin dependent (CMS-HCC)     3. Essential hypertension     4. CKD (chronic kidney disease) stage 3, GFR 30-59 ml/min       DM2 A1c is at goal   Patient to monitor sugars: not necessary    Discussed diet, exercise, disease management goals.   Education and advise provided today:All medications, side effects and compliance (discussed carefully)  Annual eye examinations at Ophthalmology  Diabetic Sick Day rules reviewed, handout given  Glycohemoglobin and other lab monitoring  Labs immediately prior to next visit  Long term diabetic complications  Low cholesterol diet, weight control and daily exercise    Followup:   Do labs and RTC 5 months, sooner should new symptoms or problems arise.

## 2017-06-22 NOTE — MR AVS SNAPSHOT
"        Ivory Cantu Anum   2017 10:00 AM   Office Visit   MRN: [id number]    Department:  [facility]   Dept Phone:  [phone]    Description:  Female : 1940   Provider:  Cecelia Tesfaye M.D.           Reason for Visit     Diabetes Mellitus           Allergies as of 2017     Allergen Noted Reactions    Atorvastatin 2015       myalgias    Simvastatin 2010       myalgias      You were diagnosed with     Controlled type 2 diabetes mellitus with diabetic nephropathy, without long-term current use of insulin (CMS-HCC)   [9578303]       Diabetes mellitus type II, non insulin dependent (CMS-HCC)   [326236]       Essential hypertension   [4091254]       CKD (chronic kidney disease) stage 3, GFR 30-59 ml/min   [329519]       Chronic diastolic heart failure (CMS-HCC)   [428.32.ICD-9-CM]       Hypogastric pain   [687406]         Vital Signs     Blood Pressure Pulse Temperature Respirations Height Weight    120/70 mmHg 80 36.2 °C (97.2 °F) 16 1.575 m (5' 2.01\") 68.493 kg (151 lb)    Body Mass Index Oxygen Saturation Last Menstrual Period Smoking Status          27.61 kg/m2 97% 1991 Former Smoker        Basic Information     Date Of Birth Sex Race Ethnicity Preferred Language    1940 Female White Non- English      Problem List              ICD-10-CM Priority Class Noted - Resolved    Sinusitis, chronic J32.9 Low  Unknown - Present    Bilateral cataracts H26.9 Low  Unknown - Present    Dyslipidemia (Chronic) E78.5 Medium  2009 - Present    Osteopenia M85.80 Low  2009 - Present    Essential hypertension (Chronic) I10 High  2010 - Present    GERD (gastroesophageal reflux disease) (Chronic) K21.9 Low  2010 - Present    Vitamin D deficiency (Chronic) E55.9 Low  2011 - Present    Vertigo R42 Low  Unknown - Present    IBS (irritable bowel syndrome) (Chronic) K58.9 Low  Unknown - Present    Basal cell carcinoma of skin of nose C44.311 Low  Unknown - " Present    Perennial allergic rhinitis with seasonal variation (Chronic) J30.89, J30.2 Low  Unknown - Present    History of adenomatous polyp of colon Z86.010 Low  6/2/2015 - Present    SBE (subacute bacterial endocarditis) prophylaxis candidate Z29.8 Low  5/3/2016 - Present    Atrial fibrillation with RVR (CMS-HCC) I48.91 High  5/21/2016 - Present    Diastolic heart failure (CMS-HCC) I50.30 High  5/22/2016 - Present    Diabetes mellitus type II, non insulin dependent (CMS-HCC) E11.9   11/29/2016 - Present    Mitral valve regurgitation I34.0   Unknown - Present    Financial difficulties Z59.8   11/29/2016 - Present    Chronic anxiety F41.9   1/4/2017 - Present    Type 2 diabetes mellitus, controlled (CMS-HCC) E11.9   Unknown - Present    CKD (chronic kidney disease) stage 3, GFR 30-59 ml/min N18.3   6/22/2017 - Present    Chronic diastolic heart failure (CMS-HCC) I50.32   6/22/2017 - Present      Health Maintenance        Date Due Completion Dates    IMM DTaP/Tdap/Td Vaccine (1 - Tdap) 7/1/2018 (Originally 4/23/1959) ---    RETINAL SCREENING 8/1/2017 8/1/2016 (Prv Comp), 2/20/2013 (Prv Comp), 12/20/2011 (Done)    Override on 8/1/2016: Previously completed (Dr. Corey)    Override on 2/20/2013: Previously completed (Dr. Mims, normal)    Override on 12/20/2011: Done (Dr. Aaron)    A1C SCREENING 10/20/2017 4/20/2017, 11/2/2016, 4/3/2013, 8/8/2012, 2/9/2012, 12/7/2011, 3/9/2011, 11/30/2010, 8/5/2010, 4/19/2010, 1/14/2010, 6/29/2009    FASTING LIPID PROFILE 4/20/2018 4/20/2017, 1/19/2017, 11/2/2016, 10/23/2015, 3/21/2015, 10/21/2014, 5/14/2014, 11/1/2013, 4/3/2013, 8/8/2012, 2/9/2012, 12/7/2011, 3/9/2011, 11/30/2010, 8/5/2010, 4/19/2010, 1/14/2010, 6/29/2009    URINE ACR / MICROALBUMIN 4/20/2018 4/20/2017, 12/11/2012 (Postponed), 12/7/2011, 8/5/2010, 6/29/2009    Override on 12/11/2012: Postponed    SERUM CREATININE 4/20/2018 4/20/2017, 1/19/2017, 11/2/2016, 7/8/2016, 6/6/2016, 5/27/2016, 5/22/2016, 5/21/2016,  4/30/2016, 10/23/2015, 3/21/2015, 10/21/2014, 5/14/2014, 11/1/2013, 4/3/2013, 8/8/2012, 2/9/2012, 12/7/2011, 4/11/2011, 3/9/2011, 11/30/2010, 8/5/2010, 4/19/2010, 1/14/2010, 6/29/2009    DIABETES MONOFILAMENT / LE EXAM 6/22/2018 6/22/2017    COLONOSCOPY 8/1/2019 8/1/2009 (Done)    Override on 8/1/2009: Done    BONE DENSITY 3/8/2022 3/8/2017, 6/8/2011, 6/29/2009            Current Immunizations     13-VALENT PCV PREVNAR 11/29/2016    Influenza TIV (IM) 12/11/2012    Pneumococcal polysaccharide vaccine (PPSV-23) 1/18/2010    SHINGLES VACCINE 11/23/2011      Below and/or attached are the medications your provider expects you to take. Review all of your home medications and newly ordered medications with your provider and/or pharmacist. Follow medication instructions as directed by your provider and/or pharmacist. Please keep your medication list with you and share with your provider. Update the information when medications are discontinued, doses are changed, or new medications (including over-the-counter products) are added; and carry medication information at all times in the event of emergency situations     Allergies:  ATORVASTATIN - (reactions not documented)     SIMVASTATIN - (reactions not documented)               Medications  Valid as of: June 22, 2017 - 12:59 PM    Generic Name Brand Name Tablet Size Instructions for use    Apixaban (Tab) ELIQUIS 5 MG TAKE 1 TAB BY MOUTH 2 TIMES A DAY.        DiazePAM (Tab) VALIUM 10 MG TAKE 1 TABLET BY MOUTH EVERY 8 HOURS AS NEEDED FOR ANXIETY        Digoxin (Tab) LANOXIN 125 MCG Take 125 mcg by mouth. AT 6 PM        Ezetimibe (Tab) ZETIA 10 MG TAKE 1 TABLET BY MOUTH EVERY DAY        Furosemide (Tab) LASIX 20 MG Take 1 Tab by mouth every day.        Metoprolol Succinate (TABLET SR 24 HR) TOPROL  MG Take 1 Tab by mouth 2 Times a Day.        Omeprazole (CAPSULE DELAYED RELEASE) PRILOSEC 20 MG TAKE ONE CAPSULE BY MOUTH EVERY DAY        Potassium Chloride Rose Mary CR (Tab CR)  Kdur 20 MEQ Take 1 Tab by mouth every day.        .                 Medicines prescribed today were sent to:     [id number] - [place], [place] - [address]    [address] [place] [address]    Phone: [phone] Fax: [phone]    Open 24 Hours?: No      Medication refill instructions:       If your prescription bottle indicates you have medication refills left, it is not necessary to call your provider’s office. Please contact your pharmacy and they will refill your medication.    If your prescription bottle indicates you do not have any refills left, you may request refills at any time through one of the following ways: The online [employer] system (except Urgent Care), by calling your provider’s office, or by asking your pharmacy to contact your provider’s office with a refill request. Medication refills are processed only during regular business hours and may not be available until the next business day. Your provider may request additional information or to have a follow-up visit with you prior to refilling your medication.   *Please Note: Medication refills are assigned a new Rx number when refilled electronically. Your pharmacy may indicate that no refills were authorized even though a new prescription for the same medication is available at the pharmacy. Please request the medicine by name with the pharmacy before contacting your provider for a refill.        Your To Do List     Future Labs/Procedures Complete By Expires    COMP METABOLIC PANEL  As directed 6/23/2018    HEMOGLOBIN A1C  As directed 6/23/2018    LIPID PROFILE  As directed 6/23/2018    TSH  As directed 6/23/2018    US-GYN-PELVIS TRANSVAGINAL  As directed 6/22/2018         K[employer]a Access Code: [email or web address]  Expires: 7/2/2017 10:20 AM    [employer]a  A secure, online tool to manage your health information     [employer]’s [employer]a® is a secure, online tool that connects you to your personalized health information from the privacy of  your home -- day or night - making it very easy for you to manage your healthcare. Once the activation process is completed, you can even access your medical information using the [employer] christa, which is available for free in the Apple Christa store or Google Play store.     We Cluster provides the following levels of access (as shown below):   My Chart Features   [facility] Doctor [facility]  Specialists [facility]  Urgent  Care Non-[facility]  Primary Care  Doctor   Email your healthcare team securely and privately 24/7 X X X    Manage appointments: schedule your next appointment; view details of past/upcoming appointments X      Request prescription refills. X      View recent personal medical records, including lab and immunizations X X X X   View health record, including health history, allergies, medications X X X X   Read reports about your outpatient visits, procedures, consult and ER notes X X X X   See your discharge summary, which is a recap of your hospital and/or ER visit that includes your diagnosis, lab results, and care plan. X X       How to register for We [employer]:  1. Go to  [email or web address].  2. Click on the Sign Up Now box, which takes you to the New Member Sign Up page. You will need to provide the following information:  a. Enter your We [employer] Access Code exactly as it appears at the top of this page. (You will not need to use this code after you’ve completed the sign-up process. If you do not sign up before the expiration date, you must request a new code.)   b. Enter your date of birth.   c. Enter your home email address.   d. Click Submit, and follow the next screen’s instructions.  3. Create a We Cluster ID. This will be your We [employer] login ID and cannot be changed, so think of one that is secure and easy to remember.  4. Create a [employer] password. You can change your password at any time.  5. Enter your Password Reset Question and Answer. This can be used at a later time if you forget your  password.   6. Enter your e-mail address. This allows you to receive e-mail notifications when new information is available in [facility].  7. Click Sign Up. You can now view your health information.    For assistance activating your [facility] account, call [phone]

## 2017-08-19 DIAGNOSIS — I48.91 ATRIAL FIBRILLATION WITH RVR (HCC): ICD-10-CM

## 2017-08-22 RX ORDER — APIXABAN 5 MG/1
TABLET, FILM COATED ORAL
Qty: 180 TAB | Refills: 3 | Status: SHIPPED | OUTPATIENT
Start: 2017-08-22 | End: 2018-06-20 | Stop reason: SDUPTHER

## 2017-08-25 DIAGNOSIS — I48.19 PERSISTENT ATRIAL FIBRILLATION (HCC): ICD-10-CM

## 2017-08-28 RX ORDER — DIGOXIN 125 MCG
TABLET ORAL
Qty: 90 TAB | Refills: 3 | Status: SHIPPED | OUTPATIENT
Start: 2017-08-28 | End: 2018-05-11 | Stop reason: SDUPTHER

## 2017-08-29 DIAGNOSIS — E78.5 DYSLIPIDEMIA: Chronic | ICD-10-CM

## 2017-08-29 RX ORDER — EZETIMIBE 10 MG/1
10 TABLET ORAL
Qty: 30 TAB | Refills: 0 | Status: SHIPPED | OUTPATIENT
Start: 2017-08-29 | End: 2017-12-23 | Stop reason: SDUPTHER

## 2017-09-05 DIAGNOSIS — I48.91 ATRIAL FIBRILLATION WITH RVR (HCC): ICD-10-CM

## 2017-09-06 RX ORDER — POTASSIUM CHLORIDE 20 MEQ/1
20 TABLET, EXTENDED RELEASE ORAL DAILY
Qty: 90 TAB | Refills: 3 | Status: SHIPPED | OUTPATIENT
Start: 2017-09-06 | End: 2018-06-20 | Stop reason: SDUPTHER

## 2017-09-12 DIAGNOSIS — I48.91 ATRIAL FIBRILLATION WITH RVR (HCC): ICD-10-CM

## 2017-09-12 DIAGNOSIS — I50.32 CHRONIC DIASTOLIC HEART FAILURE (HCC): ICD-10-CM

## 2017-09-12 RX ORDER — FUROSEMIDE 20 MG/1
20 TABLET ORAL DAILY
Qty: 90 TAB | Refills: 3 | Status: SHIPPED | OUTPATIENT
Start: 2017-09-12 | End: 2018-06-20 | Stop reason: SDUPTHER

## 2017-09-21 ENCOUNTER — TELEPHONE (OUTPATIENT)
Dept: CARDIOLOGY | Facility: MEDICAL CENTER | Age: 77
End: 2017-09-21

## 2017-09-21 NOTE — TELEPHONE ENCOUNTER
wants to talk to you about cut on her finger   Received: Today   Message Contents   Haven Conklin R.N.             TT/Breana     Patient says she is in A-fib and cut her finger and wants to know what to do. She can be reached at [phone].      Returned patient call. Pt cut her finger while cooking and bleeding is controlled at this time. Pt just wanted to know if there is anything else she should know or do. Emphasized cleanliness and bleeding controlled. Pt states she has been in Afib for 8 months and that is not new. Pt will call back if bleeding continues.

## 2017-10-02 ENCOUNTER — TELEPHONE (OUTPATIENT)
Dept: CARDIOLOGY | Facility: MEDICAL CENTER | Age: 77
End: 2017-10-02

## 2017-10-02 NOTE — TELEPHONE ENCOUNTER
"Question about taking antibiotics   Received: Today   Message Contents   Thais Conklin R.N.             TT/Breana     Patient wants to find out if she can take antibiotics when she's \"A Fib\" and can be reached at [phone].      Returned patient call. Pt is fearful she has a sinus infection. Pt feels right sided nasal congestion. Pt has right eye pressure as well. Pt encouraged to try to see PCP and or UC. Pt encouraged to make sure provider is aware of other medications she takes. Pt states understanding.   "

## 2017-11-01 ENCOUNTER — HOSPITAL ENCOUNTER (OUTPATIENT)
Dept: LAB | Facility: MEDICAL CENTER | Age: 77
End: 2017-11-01
Attending: FAMILY MEDICINE
Payer: MEDICARE

## 2017-11-01 DIAGNOSIS — E11.21 CONTROLLED TYPE 2 DIABETES MELLITUS WITH DIABETIC NEPHROPATHY, WITHOUT LONG-TERM CURRENT USE OF INSULIN (HCC): ICD-10-CM

## 2017-11-01 DIAGNOSIS — E11.9 DIABETES MELLITUS TYPE II, NON INSULIN DEPENDENT (HCC): ICD-10-CM

## 2017-11-01 DIAGNOSIS — N18.30 CKD (CHRONIC KIDNEY DISEASE) STAGE 3, GFR 30-59 ML/MIN (HCC): ICD-10-CM

## 2017-11-01 DIAGNOSIS — I10 ESSENTIAL HYPERTENSION: Chronic | ICD-10-CM

## 2017-11-01 DIAGNOSIS — I50.32 CHRONIC DIASTOLIC HEART FAILURE (HCC): ICD-10-CM

## 2017-11-01 LAB
ALBUMIN SERPL BCP-MCNC: 4.1 G/DL (ref 3.2–4.9)
ALBUMIN/GLOB SERPL: 1.4 G/DL
ALP SERPL-CCNC: 70 U/L (ref 30–99)
ALT SERPL-CCNC: 20 U/L (ref 2–50)
ANION GAP SERPL CALC-SCNC: 9 MMOL/L (ref 0–11.9)
AST SERPL-CCNC: 21 U/L (ref 12–45)
BILIRUB SERPL-MCNC: 0.7 MG/DL (ref 0.1–1.5)
BUN SERPL-MCNC: 21 MG/DL (ref 8–22)
CALCIUM SERPL-MCNC: 9.8 MG/DL (ref 8.5–10.5)
CHLORIDE SERPL-SCNC: 104 MMOL/L (ref 96–112)
CHOLEST SERPL-MCNC: 169 MG/DL (ref 100–199)
CO2 SERPL-SCNC: 28 MMOL/L (ref 20–33)
CREAT SERPL-MCNC: 1.08 MG/DL (ref 0.5–1.4)
EST. AVERAGE GLUCOSE BLD GHB EST-MCNC: 131 MG/DL
GFR SERPL CREATININE-BSD FRML MDRD: 49 ML/MIN/1.73 M 2
GLOBULIN SER CALC-MCNC: 3 G/DL (ref 1.9–3.5)
GLUCOSE SERPL-MCNC: 120 MG/DL (ref 65–99)
HBA1C MFR BLD: 6.2 % (ref 0–5.6)
HDLC SERPL-MCNC: 54 MG/DL
LDLC SERPL CALC-MCNC: 91 MG/DL
POTASSIUM SERPL-SCNC: 4 MMOL/L (ref 3.6–5.5)
PROT SERPL-MCNC: 7.1 G/DL (ref 6–8.2)
SODIUM SERPL-SCNC: 141 MMOL/L (ref 135–145)
TRIGL SERPL-MCNC: 122 MG/DL (ref 0–149)
TSH SERPL DL<=0.005 MIU/L-ACNC: 3.63 UIU/ML (ref 0.3–3.7)

## 2017-11-01 PROCEDURE — 36415 COLL VENOUS BLD VENIPUNCTURE: CPT

## 2017-11-01 PROCEDURE — 80061 LIPID PANEL: CPT

## 2017-11-01 PROCEDURE — 80053 COMPREHEN METABOLIC PANEL: CPT

## 2017-11-01 PROCEDURE — 83036 HEMOGLOBIN GLYCOSYLATED A1C: CPT

## 2017-11-01 PROCEDURE — 84443 ASSAY THYROID STIM HORMONE: CPT

## 2017-11-09 ENCOUNTER — OFFICE VISIT (OUTPATIENT)
Dept: MEDICAL GROUP | Facility: CLINIC | Age: 77
End: 2017-11-09
Payer: MEDICARE

## 2017-11-09 VITALS
HEART RATE: 70 BPM | DIASTOLIC BLOOD PRESSURE: 70 MMHG | WEIGHT: 146.5 LBS | SYSTOLIC BLOOD PRESSURE: 112 MMHG | HEIGHT: 62 IN | TEMPERATURE: 98.6 F | RESPIRATION RATE: 14 BRPM | OXYGEN SATURATION: 95 % | BODY MASS INDEX: 26.96 KG/M2

## 2017-11-09 DIAGNOSIS — N18.30 CKD (CHRONIC KIDNEY DISEASE) STAGE 3, GFR 30-59 ML/MIN (HCC): ICD-10-CM

## 2017-11-09 DIAGNOSIS — C44.311 BASAL CELL CARCINOMA OF SKIN OF NOSE: ICD-10-CM

## 2017-11-09 DIAGNOSIS — E11.9 DIABETES MELLITUS TYPE II, NON INSULIN DEPENDENT (HCC): ICD-10-CM

## 2017-11-09 DIAGNOSIS — I10 ESSENTIAL HYPERTENSION: Chronic | ICD-10-CM

## 2017-11-09 DIAGNOSIS — J34.89 NASAL LESION: ICD-10-CM

## 2017-11-09 DIAGNOSIS — E78.5 DYSLIPIDEMIA: Chronic | ICD-10-CM

## 2017-11-09 DIAGNOSIS — I34.0 NON-RHEUMATIC MITRAL REGURGITATION: ICD-10-CM

## 2017-11-09 PROCEDURE — 99214 OFFICE O/P EST MOD 30 MIN: CPT | Performed by: FAMILY MEDICINE

## 2017-11-09 ASSESSMENT — PATIENT HEALTH QUESTIONNAIRE - PHQ9
5. POOR APPETITE OR OVEREATING: 0 - NOT AT ALL
SUM OF ALL RESPONSES TO PHQ QUESTIONS 1-9: 3
CLINICAL INTERPRETATION OF PHQ2 SCORE: 1

## 2017-11-09 NOTE — PROGRESS NOTES
Diabetes Focused Exam:    Chief Complaint   Patient presents with   • Diabetes Mellitus   • Tremors   • Hypertension      Subjective:   HPI  Ivory Rowan is a 77 y.o. female who presents for follow up of chronic conditions of diabetes mellitus, hypertension and hyperlipidemia. She indicates that she is feeling well and denies any symptoms referable to the above diagnoses. Specifically denies chest pain, palpitations, dyspnea, orthopnea, PND or peripheral edema. Also denies polyuria, polydipsia, urinary complaints, abdominal complaints, myalgias, numbness, weakness or other related symptoms.  She has heart disease and sees her cardiologist.      The patient is not taking ASA every day she is taking eliquis daily and taking all other medications as prescribed. Patient denies any side effects of medication.  DM: A1c goal <7  Glucose monitoring frequency: does not and will not  Hypoglycemic episodes none  Diabetic complications: nephropathy and cardiovascular disease  ACR Albumin/Creatinine Ratio goal <30   HTN: Blood pressure goal <140/<80. Currently Rx ACE/ARB: Not Indicated due to her heart disease  Hyperlipidemia:Cholesterol goal LDL <100, total/HDL <5. Currently Rx Statin: Not Indicated, intolerant, she is on zetia instead  Last eye exam 8/2017  Denies visual blurring, double vision, eye pain and floaters  Last monofilament foot exam: 6/2017 Denies foot pain, numbness, calluses, ulcers      See medications and orders placed in encounter report.  Past medical history, family history, social history reviewed and updated as documented in medical record.  Current medications including changes today:  Current Outpatient Prescriptions   Medication Sig Dispense Refill   • furosemide (LASIX) 20 MG Tab Take 1 Tab by mouth every day. 90 Tab 3   • potassium chloride SA (KDUR) 20 MEQ Tab CR Take 1 Tab by mouth every day. 90 Tab 3   • ezetimibe (ZETIA) 10 MG Tab Take 1 Tab by mouth every day. 30 Tab 0   • digoxin (LANOXIN)  "125 MCG Tab TAKE 1 TAB BY MOUTH EVERY DAY AT 6 PM. 90 Tab 3   • ELIQUIS 5 MG Tab TAKE 1 TABLET BY MOUTH TWICE A  Tab 3   • metoprolol SR (TOPROL XL) 100 MG TABLET SR 24 HR Take 1 Tab by mouth 2 Times a Day. 180 Tab 3   • omeprazole (PRILOSEC) 20 MG delayed-release capsule TAKE ONE CAPSULE BY MOUTH EVERY DAY 90 Cap 6   • diazepam (VALIUM) 10 MG tablet TAKE 1 TABLET BY MOUTH EVERY 8 HOURS AS NEEDED FOR ANXIETY 90 Tab 3     No current facility-administered medications for this visit.      Allergies:   Allergies   Allergen Reactions   • Atorvastatin      myalgias   • Simvastatin      myalgias      Social History   Substance Use Topics   • Smoking status: Former Smoker     Packs/day: 0.50     Years: 40.00     Types: Cigarettes     Quit date: 3/1/1996   • Smokeless tobacco: Never Used   • Alcohol use No      Comment: 1-2 drinks once a month     Exercise: walking   Health Maintenance/Immunizations: up to date,     ROS  Pertinent  ROS findings as above. All other systems reviewed and are negative.      Objective:     OBJECTIVE:  /70   Pulse 70   Temp 37 °C (98.6 °F)   Resp 14   Ht 1.575 m (5' 2\")   Wt 66.5 kg (146 lb 8 oz)   LMP 05/01/1991   SpO2 95%   BMI 26.80 kg/m²  Body mass index is 26.8 kg/m². BMI: not obese  General: No apparent distress, conversant, cooperative and pleasant with the examination.  Psych: Alert and oriented x4, judgment and insight normal  Neck: No JVD or bruits, no adenopathy, supple  Thyroid: normal to inspection and palpation  Lungs: negative findings: normal respiratory rate and rhythm, lungs clear to auscultation  Heart: negative findings: no lift, heave, or thrill, S1 normal, S2 normal, no murmurs, clicks, or gallops, positive findings: irregular rhythm  Abdomen: Soft, nontender, no hepatosplenomegaly or masses, normal bowel sounds  Skin: No rashes, no cyanosis, no lesions or ulcers  Extremities: No cyanosis clubbing or edema.   Feet are examined     POC labs today: No " results found for: POCGLUCOSE       Last labs    Lab Results   Component Value Date/Time    CHOLSTRLTOT 169 11/01/2017 06:40 AM    LDL 91 11/01/2017 06:40 AM    HDL 54 11/01/2017 06:40 AM    TRIGLYCERIDE 122 11/01/2017 06:40 AM       Lab Results   Component Value Date/Time    SODIUM 141 11/01/2017 06:40 AM    POTASSIUM 4.0 11/01/2017 06:40 AM    GLUCOSE 120 (H) 11/01/2017 06:40 AM    BUNCREATRAT 26 04/11/2011 12:00 AM    GLOMRATE 56 (L) 03/09/2011 07:55 AM     Lab Results   Component Value Date/Time    HBA1C 6.2 (H) 11/01/2017 06:40 AM    HBA1C 6.5 (H) 04/20/2017 08:13 AM    HBA1C 6.7 (H) 11/02/2016 08:15 AM     Lab Results   Component Value Date/Time    MALBCRT 14 04/20/2017 08:13 AM    MICROALBUR 1.5 04/20/2017 08:13 AM          Assessment/Plan:   Medications, refills, and referrals per orders.   1. Diabetes mellitus type II, non insulin dependent (CMS-[facility])  HEMOGLOBIN A1C    COMP METABOLIC PANEL   2. Dyslipidemia  COMP METABOLIC PANEL    LIPID PROFILE   3. CKD (chronic kidney disease) stage 3, GFR 30-59 ml/min  COMP METABOLIC PANEL    CBC WITHOUT DIFFERENTIAL   4. Essential hypertension  COMP METABOLIC PANEL   5. Non-rheumatic mitral regurgitation     6. Nasal lesion  REFERRAL TO DERMATOLOGY   7. Basal cell carcinoma of skin of nose  REFERRAL TO DERMATOLOGY     DM2 A1c is at goal   Patient to monitor sugars: declines   Discussed diet, exercise, disease management discussed.   Education and advise provided today:All medications, side effects and compliance (discussed carefully)  Annual eye examinations at Ophthalmology  Diabetic diet discussed in detail, written exchange diet given  Foot care discussed and Podiatry visits  Labs immediately prior to next visit  Low cholesterol diet, weight control and daily exercise    Followup:   Do labs and RTC 6 months, sooner should new symptoms or problems arise.

## 2017-12-08 ENCOUNTER — TELEPHONE (OUTPATIENT)
Dept: CARDIOLOGY | Facility: MEDICAL CENTER | Age: 77
End: 2017-12-08

## 2017-12-08 NOTE — TELEPHONE ENCOUNTER
question about taking fish oil   Received: Yesterday   Message Contents   LUIS ANTONIO Subramanian/Breana       Patient needs to take fish oil because of her dry eyes. She wants to know if it is safe for her to take the fish oil. She can be reached at [phone].      Returned patient call.

## 2017-12-08 NOTE — TELEPHONE ENCOUNTER
"question about taking fish oil   Received: Yesterday   Message Contents   Haven Conklin R.N.              TT/Breana       Patient needs to take fish oil because of her dry eyes. She wants to know if it is safe for her to take the fish oil. She can be reached at [phone].      Discussion with TT.    Returned patient call. Pt informed there is no restrictions on her taking this medication. Pt states she is very stressed about this because her eye doctor told her there is a contraindication with one of her medications and she is afraid to start taking it.     Pt advised fish oils are OTC products. Pt wants to see Dr. Washington in office to discuss, 12/14 appt made at 10 am for patient per her request.     Pt also advised to call her pharmacist to seek advisement. She states she did and they told her to call her insurance company.   Pt states she is \"almost blind\" and needs this addressed immediately.  "

## 2017-12-23 DIAGNOSIS — E78.5 DYSLIPIDEMIA: Chronic | ICD-10-CM

## 2017-12-24 RX ORDER — EZETIMIBE 10 MG/1
10 TABLET ORAL
Qty: 90 TAB | Refills: 0 | Status: SHIPPED | OUTPATIENT
Start: 2017-12-24 | End: 2018-01-12 | Stop reason: SDUPTHER

## 2017-12-29 ENCOUNTER — HOSPITAL ENCOUNTER (EMERGENCY)
Facility: MEDICAL CENTER | Age: 77
End: 2017-12-29
Payer: MEDICARE

## 2017-12-29 VITALS
BODY MASS INDEX: 26.17 KG/M2 | DIASTOLIC BLOOD PRESSURE: 97 MMHG | OXYGEN SATURATION: 95 % | TEMPERATURE: 100.2 F | RESPIRATION RATE: 18 BRPM | WEIGHT: 142.2 LBS | SYSTOLIC BLOOD PRESSURE: 138 MMHG | HEIGHT: 62 IN | HEART RATE: 90 BPM

## 2017-12-29 PROCEDURE — 302449 STATCHG TRIAGE ONLY (STATISTIC)

## 2017-12-29 NOTE — ED NOTES
"Triage Notes    Pt to triage via w/c from EMS, states she has had shaking X3 weeks that worsens when she lays down and a headache for \"many months\". Pt states she was worried and called EMS because her mother had a stroke; pt currently showing/reporting no symptoms of stroke. NAD observed in triage. Pt to senior [facility]; advised to notify staff of worsening symptoms.     .  Chief Complaint   Patient presents with   • Shaking     worsens when pt lays down, has had symptom X3 weeks   • Headache     \"for many months\"     ./97   Pulse 90   Temp 37.9 °C (100.2 °F)   Resp 18   Ht 1.575 m (5' 2\")   Wt 64.5 kg (142 lb 3.2 oz)   LMP 05/01/1991   SpO2 95%   BMI 26.01 kg/m²     "

## 2018-01-03 ENCOUNTER — TELEPHONE (OUTPATIENT)
Dept: CARDIOLOGY | Facility: MEDICAL CENTER | Age: 78
End: 2018-01-03

## 2018-01-03 NOTE — TELEPHONE ENCOUNTER
"Question about what to take for fever   Received: Today   Message Contents   Thais Garsiaise QUIRINO Conklin R.N.             TT/Breana     Patient has the flu and a fever and wants to find out what she can take for the fever. She can be reached at [phone].      Returned patient call. Pt states she called \"the truck\" but not an ambulance a week ago because she feels so lousy, when clarified she states she called 911. Pt states she sat in the ER for 4 hours and then called a cab and left. Pt states she just wants to know what to take for her fever. Pt refuses to be seen. Pt refuses all recommendation to be seen and suggestions of that nature.  Pt states she has no idea what her fever is because she does not have a thermometer. Pt denies flu shot secondary to previous reaction but states she has had a pneumovax. Pt just wants advice on what to take for a fever and repeats this. I desperately try to explain to patient that if she has been sick at that extended length and is so sick she doesn't want to leave her bed she needs to be seen. Pt ends call with she will have her daughter \"maybe\" take her to  to be seen.  "

## 2018-01-04 ENCOUNTER — HOSPITAL ENCOUNTER (OUTPATIENT)
Dept: RADIOLOGY | Facility: MEDICAL CENTER | Age: 78
End: 2018-01-04
Attending: PHYSICIAN ASSISTANT
Payer: MEDICARE

## 2018-01-04 ENCOUNTER — OFFICE VISIT (OUTPATIENT)
Dept: URGENT CARE | Facility: PHYSICIAN GROUP | Age: 78
End: 2018-01-04
Payer: MEDICARE

## 2018-01-04 VITALS
TEMPERATURE: 98.2 F | HEIGHT: 62 IN | DIASTOLIC BLOOD PRESSURE: 70 MMHG | WEIGHT: 150 LBS | BODY MASS INDEX: 27.6 KG/M2 | RESPIRATION RATE: 16 BRPM | SYSTOLIC BLOOD PRESSURE: 138 MMHG | OXYGEN SATURATION: 95 % | HEART RATE: 82 BPM

## 2018-01-04 DIAGNOSIS — J22 LOWER RESP. TRACT INFECTION: ICD-10-CM

## 2018-01-04 DIAGNOSIS — R05.9 COUGH: ICD-10-CM

## 2018-01-04 DIAGNOSIS — T36.95XA ANTIBIOTIC-INDUCED YEAST INFECTION: ICD-10-CM

## 2018-01-04 DIAGNOSIS — B37.9 ANTIBIOTIC-INDUCED YEAST INFECTION: ICD-10-CM

## 2018-01-04 PROCEDURE — 71046 X-RAY EXAM CHEST 2 VIEWS: CPT

## 2018-01-04 PROCEDURE — 99214 OFFICE O/P EST MOD 30 MIN: CPT | Performed by: PHYSICIAN ASSISTANT

## 2018-01-04 RX ORDER — FLUCONAZOLE 100 MG/1
100 TABLET ORAL DAILY
Qty: 1 TAB | Refills: 0 | Status: SHIPPED | OUTPATIENT
Start: 2018-01-04 | End: 2018-05-11

## 2018-01-04 RX ORDER — DOXYCYCLINE HYCLATE 100 MG
100 TABLET ORAL 2 TIMES DAILY
Qty: 10 TAB | Refills: 0 | Status: SHIPPED | OUTPATIENT
Start: 2018-01-04 | End: 2018-01-09

## 2018-01-06 ASSESSMENT — ENCOUNTER SYMPTOMS
RHINORRHEA: 1
DIARRHEA: 0
MYALGIAS: 1
ABDOMINAL PAIN: 0
TINGLING: 0
SHORTNESS OF BREATH: 0
SORE THROAT: 0
DIZZINESS: 0
EYE REDNESS: 0
WHEEZING: 0
NECK PAIN: 0
VOMITING: 0
COUGH: 1
HEADACHES: 0
SPUTUM PRODUCTION: 1
EYE DISCHARGE: 0
CHILLS: 1

## 2018-01-06 NOTE — PROGRESS NOTES
"Subjective:      Ivory Rowan is a 77 y.o. female who presents with Cough (fever, cold,  since aleta )            Cough   This is a new problem. The current episode started 1 to 4 weeks ago. The problem has been waxing and waning. The problem occurs every few minutes. The cough is non-productive (Rare sputum production). Associated symptoms include chills, ear congestion, myalgias, nasal congestion and rhinorrhea. Pertinent negatives include no chest pain, ear pain, eye redness, headaches, rash, sore throat, shortness of breath or wheezing. Associated symptoms comments: Subjective fevers  . Nothing aggravates the symptoms. She has tried OTC cough suppressant for the symptoms. The treatment provided mild relief. Her past medical history is significant for bronchitis. There is no history of asthma or pneumonia.       Review of Systems   Constitutional: Positive for chills and malaise/fatigue.        Subjective fevers     HENT: Positive for congestion and rhinorrhea. Negative for ear discharge, ear pain and sore throat.    Eyes: Negative for discharge and redness.   Respiratory: Positive for cough and sputum production. Negative for shortness of breath and wheezing.    Cardiovascular: Negative for chest pain and leg swelling.   Gastrointestinal: Negative for abdominal pain, diarrhea and vomiting.   Genitourinary: Negative for dysuria and urgency.   Musculoskeletal: Positive for myalgias. Negative for neck pain.   Skin: Negative for itching and rash.   Neurological: Negative for dizziness, tingling and headaches.          Objective:     /70   Pulse 82   Temp 36.8 °C (98.2 °F)   Resp 16   Ht 1.575 m (5' 2\")   Wt 68 kg (150 lb)   LMP 05/01/1991   SpO2 95%   BMI 27.44 kg/m²    PMH:  has a past medical history of Atrial fibrillation (CMS-HCC); Basal cell carcinoma of skin of nose; CATARACT; CHF (congestive heart failure) (CMS-HCC); Colon polyp; Dyslipidemia; Hypertension; IBS (irritable bowel " syndrome); MEDICAL HOME (10/23/12); Mitral valve regurgitation; Osteopenia (6/09); Perennial allergic rhinitis with seasonal variation; Type 2 diabetes mellitus, controlled (CMS-[facility]); Valvular heart disease; and Vertigo.  MEDS:   Current Outpatient Prescriptions:   •  doxycycline (VIBRAMYCIN) 100 MG Tab, Take 1 Tab by mouth 2 times a day for 5 days., Disp: 10 Tab, Rfl: 0  •  fluconazole (DIFLUCAN) 100 MG Tab, Take 1 Tab by mouth every day., Disp: 1 Tab, Rfl: 0  •  furosemide (LASIX) 20 MG Tab, Take 1 Tab by mouth every day., Disp: 90 Tab, Rfl: 3  •  potassium chloride SA (KDUR) 20 MEQ Tab CR, Take 1 Tab by mouth every day., Disp: 90 Tab, Rfl: 3  •  digoxin (LANOXIN) 125 MCG Tab, TAKE 1 TAB BY MOUTH EVERY DAY AT 6 PM., Disp: 90 Tab, Rfl: 3  •  ELIQUIS 5 MG Tab, TAKE 1 TABLET BY MOUTH TWICE A DAY, Disp: 180 Tab, Rfl: 3  •  metoprolol SR (TOPROL XL) 100 MG TABLET SR 24 HR, Take 1 Tab by mouth 2 Times a Day., Disp: 180 Tab, Rfl: 3  •  omeprazole (PRILOSEC) 20 MG delayed-release capsule, TAKE ONE CAPSULE BY MOUTH EVERY DAY, Disp: 90 Cap, Rfl: 6  •  ezetimibe (ZETIA) 10 MG Tab, TAKE 1 TAB BY MOUTH EVERY DAY., Disp: 90 Tab, Rfl: 0  •  diazepam (VALIUM) 10 MG tablet, TAKE 1 TABLET BY MOUTH EVERY 8 HOURS AS NEEDED FOR ANXIETY, Disp: 90 Tab, Rfl: 3  ALLERGIES:   Allergies   Allergen Reactions   • Atorvastatin      myalgias   • Simvastatin      myalgias     SURGHX:   Past Surgical History:   Procedure Laterality Date   • RECOVERY  7/8/2016    Procedure: CATH LAB-ZAFAR GUIDED CV-TO-LARGE GROUP;  Surgeon: Recoveryonly Surgery;  Location: SURGERY PRE-POST PROC UNIT [facility] [place];  Service:    • COLONOSCOPY  8/2009    Dr. Lopez, 9 polyps   • APPENDECTOMY     • BREAST BIOPSY      left, reports wire report broke off during procedure   • CHOLECYSTECTOMY     • US-NEEDLE CORE BX-BREAST PANEL       SOCHX:  reports that she quit smoking about 21 years ago. Her smoking use included Cigarettes. She has a 20.00 pack-year smoking history. She has  never used smokeless tobacco. She reports that she does not drink alcohol or use drugs.  FH: Family history was reviewed, no pertinent findings to report    Physical Exam   Constitutional: She is oriented to person, place, and time. She appears well-developed and well-nourished.   HENT:   Head: Normocephalic and atraumatic.   Mouth/Throat: No oropharyngeal exudate.   Ears- Canals clear- TM- with clear fluid effusions bilaterally.   Pos. PND, with slight erythema- without tonsillar edema or exudate.   Mild discharge noted bilaterally- to nares.      Eyes: EOM are normal. Pupils are equal, round, and reactive to light.   Neck: Normal range of motion. Neck supple.   Cardiovascular: Normal rate.  An irregularly irregular rhythm present.   No murmur heard.  Pulmonary/Chest: Effort normal.   Diminished at the based, without egophony. Slight upper lung field wheezing with forced exp.      Musculoskeletal: Normal range of motion. She exhibits no tenderness.   Lymphadenopathy:     She has no cervical adenopathy.   Neurological: She is alert and oriented to person, place, and time.   Skin: Skin is warm. No rash noted.   Psychiatric: She has a normal mood and affect. Her behavior is normal.   Vitals reviewed.            Influenza negative.   CXR:  No acute cardiopulmonary abnormality identified.  I have reviewed the xray and agree with the official read.     Assessment/Plan:     1. Lower resp. tract infection  - doxycycline (VIBRAMYCIN) 100 MG Tab; Take 1 Tab by mouth 2 times a day for 5 days.  Dispense: 10 Tab; Refill: 0    2. Cough  - DX-CHEST-2 VIEWS; Future  - doxycycline (VIBRAMYCIN) 100 MG Tab; Take 1 Tab by mouth 2 times a day for 5 days.  Dispense: 10 Tab; Refill: 0    3. Antibiotic-induced yeast infection  - fluconazole (DIFLUCAN) 100 MG Tab; Take 1 Tab by mouth every day.  Dispense: 1 Tab; Refill: 0    Neg. Flu, and neg. For PNE on CXR- will tx. For bacterial bronchitis at this time. Discussed side effects of ABX  therapy.    Encouraged OTC supportive meds PRN. Humidification, increase fluids, avoid night time dairy.   Patient given precautionary s/sx that mandate immediate follow up and evaluation in the ED. Advised of risks of not doing so.    DDX, Supportive care, and indications for immediate follow-up discussed with patient.    Instructed to return to clinic or nearest emergency department if we are not available for any change in condition, further concerns, or worsening of symptoms.    The patient demonstrated a good understanding and agreed with the treatment plan.

## 2018-01-12 DIAGNOSIS — E78.5 DYSLIPIDEMIA: Chronic | ICD-10-CM

## 2018-01-12 RX ORDER — EZETIMIBE 10 MG/1
10 TABLET ORAL
Qty: 90 TAB | Refills: 0 | Status: SHIPPED | OUTPATIENT
Start: 2018-01-12 | End: 2018-05-11 | Stop reason: SDUPTHER

## 2018-01-19 ENCOUNTER — HOSPITAL ENCOUNTER (OUTPATIENT)
Dept: RADIOLOGY | Facility: MEDICAL CENTER | Age: 78
End: 2018-01-19
Attending: FAMILY MEDICINE
Payer: MEDICARE

## 2018-01-19 ENCOUNTER — OFFICE VISIT (OUTPATIENT)
Dept: URGENT CARE | Facility: PHYSICIAN GROUP | Age: 78
End: 2018-01-19
Payer: MEDICARE

## 2018-01-19 VITALS
HEART RATE: 70 BPM | HEIGHT: 62 IN | BODY MASS INDEX: 27.6 KG/M2 | WEIGHT: 150 LBS | DIASTOLIC BLOOD PRESSURE: 72 MMHG | OXYGEN SATURATION: 98 % | SYSTOLIC BLOOD PRESSURE: 132 MMHG | TEMPERATURE: 98.4 F

## 2018-01-19 DIAGNOSIS — S49.92XA INJURY OF LEFT UPPER EXTREMITY, INITIAL ENCOUNTER: ICD-10-CM

## 2018-01-19 PROCEDURE — 73110 X-RAY EXAM OF WRIST: CPT | Mod: LT

## 2018-01-19 PROCEDURE — 99213 OFFICE O/P EST LOW 20 MIN: CPT | Performed by: FAMILY MEDICINE

## 2018-01-19 PROCEDURE — 73090 X-RAY EXAM OF FOREARM: CPT | Mod: LT

## 2018-01-19 RX ORDER — TRAMADOL HYDROCHLORIDE 50 MG/1
50 TABLET ORAL EVERY 4 HOURS PRN
Qty: 10 TAB | Refills: 0 | Status: SHIPPED | OUTPATIENT
Start: 2018-01-19 | End: 2018-01-22

## 2018-01-19 ASSESSMENT — PAIN SCALES - GENERAL: PAINLEVEL: 10=SEVERE PAIN

## 2018-01-19 ASSESSMENT — ENCOUNTER SYMPTOMS
NUMBNESS: 0
NAUSEA: 0
JOINT SWELLING: 0
FEVER: 0
WEAKNESS: 0

## 2018-01-19 NOTE — PROGRESS NOTES
"Subjective:   Ivory Rowan is a 77 y.o. female who presents for Arm Injury (L Arm Injury severe swelling. Pt has Heart Disease No NSAIDS)        Arm Injury   This is a new problem. The current episode started yesterday. The problem occurs constantly. The problem has been unchanged. Pertinent negatives include no fever, joint swelling, nausea, numbness or weakness.     Review of Systems   Constitutional: Negative for fever.   Gastrointestinal: Negative for nausea.   Musculoskeletal: Negative for joint swelling.   Neurological: Negative for weakness and numbness.     Allergies   Allergen Reactions   • Atorvastatin      myalgias   • Simvastatin      myalgias      Objective:   /72   Pulse 70   Temp 36.9 °C (98.4 °F)   Ht 1.575 m (5' 2\")   Wt 68 kg (150 lb)   LMP 05/01/1991   SpO2 98%   Breastfeeding? No   BMI 27.44 kg/m²   Physical Exam   Constitutional: She is oriented to person, place, and time. She appears well-developed and well-nourished. No distress.   HENT:   Head: Normocephalic and atraumatic.   Eyes: Conjunctivae and EOM are normal. Pupils are equal, round, and reactive to light.   Cardiovascular: Normal rate and regular rhythm.    No murmur heard.  Pulmonary/Chest: Effort normal and breath sounds normal. No respiratory distress.   Abdominal: Soft. She exhibits no distension. There is no tenderness.   Musculoskeletal:        Left forearm: She exhibits tenderness, bony tenderness and swelling.   Neurological: She is alert and oriented to person, place, and time. She has normal reflexes. No sensory deficit.   Skin: Skin is warm and dry.   Psychiatric: She has a normal mood and affect.         Assessment/Plan:   Assessment    1. Injury of left upper extremity, initial encounter  - DX-WRIST-COMPLETE 3+ LEFT; Future  - DX-FOREARM LEFT; Future  - REFERRAL TO SPORTS MEDICINE  - tramadol (ULTRAM) 50 MG Tab; Take 1 Tab by mouth every four hours as needed for up to 3 days.  Dispense: 10 Tab; Refill: " 0  - CONSENT FOR OPIATE PRESCRIPTION  Differential diagnosis, natural history, supportive care, and indications for immediate follow-up discussed.

## 2018-01-24 ENCOUNTER — APPOINTMENT (RX ONLY)
Dept: URBAN - METROPOLITAN AREA CLINIC 4 | Facility: CLINIC | Age: 78
Setting detail: DERMATOLOGY
End: 2018-01-24

## 2018-01-24 DIAGNOSIS — L90.5 SCAR CONDITIONS AND FIBROSIS OF SKIN: ICD-10-CM

## 2018-01-24 PROBLEM — D23.39 OTHER BENIGN NEOPLASM OF SKIN OF OTHER PARTS OF FACE: Status: ACTIVE | Noted: 2018-01-24

## 2018-01-24 PROBLEM — I48.91 UNSPECIFIED ATRIAL FIBRILLATION: Status: ACTIVE | Noted: 2018-01-24

## 2018-01-24 PROBLEM — Z85.828 PERSONAL HISTORY OF OTHER MALIGNANT NEOPLASM OF SKIN: Status: ACTIVE | Noted: 2018-01-24

## 2018-01-24 PROBLEM — E13.9 OTHER SPECIFIED DIABETES MELLITUS WITHOUT COMPLICATIONS: Status: ACTIVE | Noted: 2018-01-24

## 2018-01-24 PROCEDURE — ? COUNSELING

## 2018-01-24 PROCEDURE — 99202 OFFICE O/P NEW SF 15 MIN: CPT

## 2018-01-24 PROCEDURE — ? ADDITIONAL NOTES

## 2018-01-24 PROCEDURE — ? DIAGNOSIS COMMENT

## 2018-01-24 ASSESSMENT — LOCATION SIMPLE DESCRIPTION DERM: LOCATION SIMPLE: NOSE

## 2018-01-24 ASSESSMENT — LOCATION ZONE DERM: LOCATION ZONE: NOSE

## 2018-01-24 ASSESSMENT — LOCATION DETAILED DESCRIPTION DERM: LOCATION DETAILED: NASAL DORSUM

## 2018-01-24 NOTE — HPI: SKIN LESION
Is This A New Presentation, Or A Follow-Up?: Growth
Has Your Skin Lesion Been Treated?: not been treated
Additional History: The patient states that the lesion is like a blackhead and she has had a mohs surgery in the same area around 2002 with Dr. Bradley.

## 2018-01-26 ENCOUNTER — OFFICE VISIT (OUTPATIENT)
Dept: MEDICAL GROUP | Facility: CLINIC | Age: 78
End: 2018-01-26
Payer: MEDICARE

## 2018-01-26 VITALS
HEIGHT: 62 IN | RESPIRATION RATE: 16 BRPM | TEMPERATURE: 97.2 F | HEART RATE: 61 BPM | OXYGEN SATURATION: 96 % | WEIGHT: 150 LBS | SYSTOLIC BLOOD PRESSURE: 116 MMHG | DIASTOLIC BLOOD PRESSURE: 72 MMHG | BODY MASS INDEX: 27.6 KG/M2

## 2018-01-26 DIAGNOSIS — M25.522 LEFT ELBOW PAIN: ICD-10-CM

## 2018-01-26 DIAGNOSIS — S52.552A OTHER CLOSED EXTRA-ARTICULAR FRACTURE OF DISTAL END OF LEFT RADIUS, INITIAL ENCOUNTER: ICD-10-CM

## 2018-01-26 PROCEDURE — 25600 CLTX DST RDL FX/EPHYS SEP WO: CPT | Mod: LT | Performed by: FAMILY MEDICINE

## 2018-01-26 PROCEDURE — 99999 PR NO CHARGE: CPT | Performed by: FAMILY MEDICINE

## 2018-01-26 ASSESSMENT — ENCOUNTER SYMPTOMS
VOMITING: 0
NAUSEA: 0
DIZZINESS: 1
SHORTNESS OF BREATH: 0
FEVER: 0
CHILLS: 1

## 2018-01-26 NOTE — PROGRESS NOTES
Subjective:      Ivory Rowan is a 77 y.o. female who presents with Arm Injury (Referral from / L arm injury )      Referred by Magnus Carbajal M.D. for evaluation of LEFT wrist pain    HPI   LEFT wrist pain  Status post acute injury  DATE of injury January 18, 2018  Getting out of bed, stumbled on her sheets and fell on an outstretched LEFT hand  Sudden sharp pain in the wrist of the forearm and the arm  Pain is worse at the radial side of the distal third of the LEFT wrist  Worse with movement  Improved with immobilization  Not currently taking medication  Was prescribed medication which was not helping  POSITIVE night symptoms  Denies any prior known injuries to the wrist  Seen at urgent care, had x-rays, placed in a long-arm immobilization device    POSITIVE history of recurrent falls (6 falls in the past 6 yrs)    Review of Systems   Constitutional: Positive for chills. Negative for fever.   Respiratory: Negative for shortness of breath.    Cardiovascular: Negative for chest pain.   Gastrointestinal: Negative for nausea and vomiting.   Neurological: Positive for dizziness.     PMH:  has a past medical history of Atrial fibrillation (CMS-HCC); Basal cell carcinoma of skin of nose; CATARACT; CHF (congestive heart failure) (CMS-HCC); Colon polyp; Dyslipidemia; Hypertension; IBS (irritable bowel syndrome); MEDICAL HOME (10/23/12); Mitral valve regurgitation; Osteopenia (6/09); Perennial allergic rhinitis with seasonal variation; Type 2 diabetes mellitus, controlled (CMS-HCC); Valvular heart disease; and Vertigo.  MEDS:   Current Outpatient Prescriptions:   •  ezetimibe (ZETIA) 10 MG Tab, Take 1 Tab by mouth every day., Disp: 90 Tab, Rfl: 0  •  fluconazole (DIFLUCAN) 100 MG Tab, Take 1 Tab by mouth every day., Disp: 1 Tab, Rfl: 0  •  furosemide (LASIX) 20 MG Tab, Take 1 Tab by mouth every day., Disp: 90 Tab, Rfl: 3  •  potassium chloride SA (KDUR) 20 MEQ Tab CR, Take 1 Tab by mouth every day., Disp: 90 Tab,  "Rfl: 3  •  digoxin (LANOXIN) 125 MCG Tab, TAKE 1 TAB BY MOUTH EVERY DAY AT 6 PM., Disp: 90 Tab, Rfl: 3  •  ELIQUIS 5 MG Tab, TAKE 1 TABLET BY MOUTH TWICE A DAY, Disp: 180 Tab, Rfl: 3  •  diazepam (VALIUM) 10 MG tablet, TAKE 1 TABLET BY MOUTH EVERY 8 HOURS AS NEEDED FOR ANXIETY, Disp: 90 Tab, Rfl: 3  •  metoprolol SR (TOPROL XL) 100 MG TABLET SR 24 HR, Take 1 Tab by mouth 2 Times a Day., Disp: 180 Tab, Rfl: 3  •  omeprazole (PRILOSEC) 20 MG delayed-release capsule, TAKE ONE CAPSULE BY MOUTH EVERY DAY, Disp: 90 Cap, Rfl: 6  ALLERGIES:   Allergies   Allergen Reactions   • Atorvastatin      myalgias   • Simvastatin      myalgias     SURGHX:   Past Surgical History:   Procedure Laterality Date   • RECOVERY  7/8/2016    Procedure: CATH LAB-ZAFAR GUIDED CV-TO-LARGE GROUP;  Surgeon: Recoveryonly Surgery;  Location: SURGERY PRE-POST PROC UNIT [facility] [place];  Service:    • COLONOSCOPY  8/2009    Dr. Lopez, 9 polyps   • APPENDECTOMY     • BREAST BIOPSY      left, reports wire report broke off during procedure   • CHOLECYSTECTOMY     • US-NEEDLE CORE BX-BREAST PANEL       SOCHX:  reports that she quit smoking about 21 years ago. Her smoking use included Cigarettes. She has a 20.00 pack-year smoking history. She has never used smokeless tobacco. She reports that she does not drink alcohol or use drugs.  FH: Family history was reviewed, no pertinent findings to report       Objective:     /72   Pulse 61   Temp 36.2 °C (97.2 °F)   Resp 16   Ht 1.575 m (5' 2\")   Wt 68 kg (150 lb)   LMP 05/01/1991   SpO2 96%   BMI 27.44 kg/m²       Physical Exam      No acute distress  Alert and oriented ×3    BILATERAL ELBOW exam  Range of motion intact  No swelling  POSITIVE mild tenderness at the radial head on the LEFT compared to the right without tenderness of the medial or lateral epicondyle  Rao test NEGATIVE    BILATERAL WRIST exam  Range of motion intact  POSITIVE tenderness along the LEFT scaphoid, and LEFT distal radius without " tenderness of the TFCC insertion or distal ulna  The hand is otherwise neurovascularly intact    BILATERAL hand exam  POSITIVE mild swelling of the LEFT hand compared to the right  NO deformity  Range of motion of all MCP, DIP and PIP joints NORMAL  Pinky opposition NORMAL       Assessment/Plan:     1. Other closed extra-articular fracture of distal end of left radius, initial encounter     2. Left elbow pain       Fracture stabilized in a long-arm thumb spica cast in the office today  Long-arm thumb spica immobilization was chosen since she has POSITIVE snuffbox tenderness and POSITIVE radial head tenderness    The plan is to continue long-arm cast for a total of 2-3 weeks (between February 9, 2018 and February 16, 2018)  Then, transfer to short arm cast for an additional 2 weeks (between February 23, 2018 and March 3, 2018)    Return in about 1 week (around 2/2/2018). FOR CAST CHECK    Results for orders placed during the hospital encounter of 01/19/18   DX-FOREARM LEFT    Impression 1.  Impacted acute fracture of the distal radial metaphysis.    2.  Small fracture fragment adjacent to distal ulna could represent new versus old ulnar styloid fracture.                                                  Results for orders placed in visit on 09/29/14   DX-SHOULDER 2+    Impression Negative shoulder series.              Results for orders placed during the hospital encounter of 01/19/18   DX-WRIST-COMPLETE 3+ LEFT    Impression 1.  Mildly impacted acute fracture of the distal radial metaphysis.    2.  Possible new versus old fracture of the distal ulna.    3.  Soft tissue swelling is noted.        Interpreted in the office today with the patient    Thank you Magnus Carbajal M.D. for allowing me to participate in caring for your patient.

## 2018-02-02 ENCOUNTER — OFFICE VISIT (OUTPATIENT)
Dept: MEDICAL GROUP | Facility: CLINIC | Age: 78
End: 2018-02-02
Payer: MEDICARE

## 2018-02-02 VITALS
TEMPERATURE: 97.6 F | OXYGEN SATURATION: 96 % | HEART RATE: 76 BPM | SYSTOLIC BLOOD PRESSURE: 128 MMHG | BODY MASS INDEX: 27.6 KG/M2 | DIASTOLIC BLOOD PRESSURE: 88 MMHG | HEIGHT: 62 IN | WEIGHT: 150 LBS | RESPIRATION RATE: 18 BRPM

## 2018-02-02 DIAGNOSIS — S52.552D OTHER CLOSED EXTRA-ARTICULAR FRACTURE OF DISTAL END OF LEFT RADIUS WITH ROUTINE HEALING, SUBSEQUENT ENCOUNTER: ICD-10-CM

## 2018-02-02 PROCEDURE — 29065 APPL CST SHO TO HAND LNG ARM: CPT | Performed by: FAMILY MEDICINE

## 2018-02-02 PROCEDURE — 99024 POSTOP FOLLOW-UP VISIT: CPT | Mod: 25,24 | Performed by: FAMILY MEDICINE

## 2018-02-02 ASSESSMENT — ENCOUNTER SYMPTOMS
CHILLS: 1
SHORTNESS OF BREATH: 0
VOMITING: 0
NAUSEA: 0
FEVER: 0
DIZZINESS: 1

## 2018-02-02 NOTE — PROGRESS NOTES
Subjective:      Ivory Rowan is a 77 y.o. female who presents with Arm Injury (F/V Cast check )      Follow up for LEFT wrist distal radius fracture     Arm Injury   Associated symptoms include chills. Pertinent negatives include no chest pain, fever, nausea or vomiting.     LEFT wrist distal radius fracture  Status post acute injury  DATE of injury January 18, 2018  Getting out of bed, stumbled on her sheets and fell on an outstretched LEFT hand  Pain is improved, still present at LEFT radial wrist  Cast loose fitting    POSITIVE history of recurrent falls (6 falls in the past 6 yrs)    Review of Systems   Constitutional: Positive for chills. Negative for fever.   Respiratory: Negative for shortness of breath.    Cardiovascular: Negative for chest pain.   Gastrointestinal: Negative for nausea and vomiting.   Neurological: Positive for dizziness.     PMH:  has a past medical history of Atrial fibrillation (CMS-HCC); Basal cell carcinoma of skin of nose; CATARACT; CHF (congestive heart failure) (CMS-HCC); Colon polyp; Dyslipidemia; Hypertension; IBS (irritable bowel syndrome); MEDICAL HOME (10/23/12); Mitral valve regurgitation; Osteopenia (6/09); Perennial allergic rhinitis with seasonal variation; Type 2 diabetes mellitus, controlled (CMS-HCC); Valvular heart disease; and Vertigo.  MEDS:   Current Outpatient Prescriptions:   •  ezetimibe (ZETIA) 10 MG Tab, Take 1 Tab by mouth every day., Disp: 90 Tab, Rfl: 0  •  fluconazole (DIFLUCAN) 100 MG Tab, Take 1 Tab by mouth every day., Disp: 1 Tab, Rfl: 0  •  furosemide (LASIX) 20 MG Tab, Take 1 Tab by mouth every day., Disp: 90 Tab, Rfl: 3  •  potassium chloride SA (KDUR) 20 MEQ Tab CR, Take 1 Tab by mouth every day., Disp: 90 Tab, Rfl: 3  •  digoxin (LANOXIN) 125 MCG Tab, TAKE 1 TAB BY MOUTH EVERY DAY AT 6 PM., Disp: 90 Tab, Rfl: 3  •  ELIQUIS 5 MG Tab, TAKE 1 TABLET BY MOUTH TWICE A DAY, Disp: 180 Tab, Rfl: 3  •  diazepam (VALIUM) 10 MG tablet, TAKE 1 TABLET BY  "MOUTH EVERY 8 HOURS AS NEEDED FOR ANXIETY, Disp: 90 Tab, Rfl: 3  •  metoprolol SR (TOPROL XL) 100 MG TABLET SR 24 HR, Take 1 Tab by mouth 2 Times a Day., Disp: 180 Tab, Rfl: 3  •  omeprazole (PRILOSEC) 20 MG delayed-release capsule, TAKE ONE CAPSULE BY MOUTH EVERY DAY, Disp: 90 Cap, Rfl: 6  ALLERGIES:   Allergies   Allergen Reactions   • Atorvastatin      myalgias   • Simvastatin      myalgias     SURGHX:   Past Surgical History:   Procedure Laterality Date   • RECOVERY  7/8/2016    Procedure: CATH LAB-ZAFAR GUIDED CV-TO-LARGE GROUP;  Surgeon: Recoveryonly Surgery;  Location: SURGERY PRE-POST PROC UNIT [facility] [place];  Service:    • COLONOSCOPY  8/2009    Dr. Lopez, 9 polyps   • APPENDECTOMY     • BREAST BIOPSY      left, reports wire report broke off during procedure   • CHOLECYSTECTOMY     • US-NEEDLE CORE BX-BREAST PANEL       SOCHX:  reports that she quit smoking about 21 years ago. Her smoking use included Cigarettes. She has a 20.00 pack-year smoking history. She has never used smokeless tobacco. She reports that she does not drink alcohol or use drugs.  FH: Family history was reviewed, no pertinent findings to report       Objective:     /88   Pulse 76   Temp 36.4 °C (97.6 °F)   Resp 18   Ht 1.575 m (5' 2\")   Wt 68 kg (150 lb)   LMP 05/01/1991   SpO2 96%   BMI 27.44 kg/m²      Physical Exam      No acute distress  Alert and oriented ×3    RIGHT WRIST exam  Range of motion intact  POSITIVE tenderness along the LEFT scaphoid, and LEFT distal radius without tenderness of the TFCC insertion or distal ulna  The hand is otherwise neurovascularly intact    BILATERAL hand exam  NO swelling of the LEFT hand compared to the right       Assessment/Plan:     1. Other closed extra-articular fracture of distal end of left radius with routine healing, subsequent encounter       Fracture stabilized in a NEW long-arm thumb spica cast in the office today  Long-arm thumb spica immobilization was continued since she has MILD " snuffbox tenderness and POSITIVE radial head tenderness    The plan is to continue long-arm cast for a total of 2-3 weeks (between February 9, 2018 and February 16, 2018)  Then, transfer to short arm cast for an additional 2 weeks (between February 23, 2018 and March 3, 2018)    No Follow-up on file. FOR CAST REMOVAL and change to short arm cast (re-check scaphoid tenderness)    Results for orders placed during the hospital encounter of 01/19/18   DX-FOREARM LEFT    Impression 1.  Impacted acute fracture of the distal radial metaphysis.    2.  Small fracture fragment adjacent to distal ulna could represent new versus old ulnar styloid fracture.                                                  Results for orders placed in visit on 09/29/14   DX-SHOULDER 2+    Impression Negative shoulder series.              Results for orders placed during the hospital encounter of 01/19/18   DX-WRIST-COMPLETE 3+ LEFT    Impression 1.  Mildly impacted acute fracture of the distal radial metaphysis.    2.  Possible new versus old fracture of the distal ulna.    3.  Soft tissue swelling is noted.        Thank you Magnus Carbajal M.D. for allowing me to participate in caring for your patient.

## 2018-02-09 ENCOUNTER — OFFICE VISIT (OUTPATIENT)
Dept: MEDICAL GROUP | Facility: CLINIC | Age: 78
End: 2018-02-09
Payer: MEDICARE

## 2018-02-09 VITALS
RESPIRATION RATE: 18 BRPM | OXYGEN SATURATION: 95 % | TEMPERATURE: 97.8 F | DIASTOLIC BLOOD PRESSURE: 76 MMHG | HEART RATE: 80 BPM | WEIGHT: 150 LBS | HEIGHT: 62 IN | BODY MASS INDEX: 27.6 KG/M2 | SYSTOLIC BLOOD PRESSURE: 116 MMHG

## 2018-02-09 DIAGNOSIS — S52.552D OTHER CLOSED EXTRA-ARTICULAR FRACTURE OF DISTAL END OF LEFT RADIUS WITH ROUTINE HEALING, SUBSEQUENT ENCOUNTER: ICD-10-CM

## 2018-02-09 PROCEDURE — 99024 POSTOP FOLLOW-UP VISIT: CPT | Mod: 24 | Performed by: FAMILY MEDICINE

## 2018-02-09 PROCEDURE — 29075 APPL CST ELBW FNGR SHORT ARM: CPT | Performed by: FAMILY MEDICINE

## 2018-02-09 ASSESSMENT — ENCOUNTER SYMPTOMS
NAUSEA: 0
CHILLS: 1
VOMITING: 0
DIZZINESS: 1
FEVER: 0
SHORTNESS OF BREATH: 0

## 2018-02-09 NOTE — PROGRESS NOTES
Subjective:      Ivory Rowan is a 77 y.o. female who presents with Arm Injury (F/V Cast removal )      Follow up for LEFT wrist distal radius fracture     Arm Injury   Associated symptoms include chills. Pertinent negatives include no chest pain, fever, nausea or vomiting.     LEFT wrist distal radius fracture  Status post acute injury  DATE of injury January 18, 2018  Getting out of bed, stumbled on her sheets and fell on an outstretched LEFT hand  NO Pain   POSITIVE history of recurrent falls (6 falls in the past 6 yrs)    Review of Systems   Constitutional: Positive for chills. Negative for fever.   Respiratory: Negative for shortness of breath.    Cardiovascular: Negative for chest pain.   Gastrointestinal: Negative for nausea and vomiting.   Neurological: Positive for dizziness.     PMH:  has a past medical history of Atrial fibrillation (CMS-HCC); Basal cell carcinoma of skin of nose; CATARACT; CHF (congestive heart failure) (CMS-HCC); Colon polyp; Dyslipidemia; Hypertension; IBS (irritable bowel syndrome); MEDICAL HOME (10/23/12); Mitral valve regurgitation; Osteopenia (6/09); Perennial allergic rhinitis with seasonal variation; Type 2 diabetes mellitus, controlled (CMS-HCC); Valvular heart disease; and Vertigo.  MEDS:   Current Outpatient Prescriptions:   •  ezetimibe (ZETIA) 10 MG Tab, Take 1 Tab by mouth every day., Disp: 90 Tab, Rfl: 0  •  fluconazole (DIFLUCAN) 100 MG Tab, Take 1 Tab by mouth every day., Disp: 1 Tab, Rfl: 0  •  furosemide (LASIX) 20 MG Tab, Take 1 Tab by mouth every day., Disp: 90 Tab, Rfl: 3  •  potassium chloride SA (KDUR) 20 MEQ Tab CR, Take 1 Tab by mouth every day., Disp: 90 Tab, Rfl: 3  •  digoxin (LANOXIN) 125 MCG Tab, TAKE 1 TAB BY MOUTH EVERY DAY AT 6 PM., Disp: 90 Tab, Rfl: 3  •  ELIQUIS 5 MG Tab, TAKE 1 TABLET BY MOUTH TWICE A DAY, Disp: 180 Tab, Rfl: 3  •  diazepam (VALIUM) 10 MG tablet, TAKE 1 TABLET BY MOUTH EVERY 8 HOURS AS NEEDED FOR ANXIETY, Disp: 90 Tab, Rfl: 3  •  " metoprolol SR (TOPROL XL) 100 MG TABLET SR 24 HR, Take 1 Tab by mouth 2 Times a Day., Disp: 180 Tab, Rfl: 3  •  omeprazole (PRILOSEC) 20 MG delayed-release capsule, TAKE ONE CAPSULE BY MOUTH EVERY DAY, Disp: 90 Cap, Rfl: 6  ALLERGIES:   Allergies   Allergen Reactions   • Atorvastatin      myalgias   • Simvastatin      myalgias     SURGHX:   Past Surgical History:   Procedure Laterality Date   • RECOVERY  7/8/2016    Procedure: CATH LAB-ZAFAR GUIDED CV-TO-LARGE GROUP;  Surgeon: Recoveryonly Surgery;  Location[place] SURG[place] PRE-[place] PROC [place] [facility] [place];  Service:    • COLONOSCOPY  8/2009    Dr. Lopez, 9 polyps   • APPENDECTOMY     • BREAST BIOPSY      left, reports wire report broke off during procedure   • CHOLECYSTECTOMY     • US-NEEDLE CORE BX-BREAST PANEL       SOCHX:  reports that she quit smoking about 21 years ago. Her smoking use included Cigarettes. She has a 20.00 pack-year smoking history. She has never used smokeless tobacco. She reports that she does not drink alcohol or use drugs.  FH: Family history was reviewed, no pertinent findings to report       Objective:     /76   Pulse 80   Temp 36.6 °C (97.8 °F)   Resp 18   Ht 1.575 m (5' 2\")   Wt 68 kg (150 lb)   LMP 05/01/1991   SpO2 95%   BMI 27.44 kg/m²      Physical Exam      No acute distress  Alert and oriented ×3    RIGHT WRIST exam  Range of motion intact  MINIMAL tenderness along the LEFT scaphoid and LEFT distal radius without tenderness of the TFCC insertion or distal ulna  The hand is otherwise neurovascularly intact    BILATERAL hand exam  NO swelling of the LEFT hand compared to the right       Assessment/Plan:     1. Other closed extra-articular fracture of distal end of left radius with routine healing, subsequent encounter       Fracture stabilized in a NEW SHORT-arm thumb spica cast in the office today    Continue short arm cast for an additional 2 weeks (between February 23, 2018 and March 3, 2018)    Return in about 2 weeks (around " 2/23/2018). FOR CAST REMOVAL at that time    Results for orders placed during the hospital encounter of 01/19/18   DX-FOREARM LEFT    Impression 1.  Impacted acute fracture of the distal radial metaphysis.    2.  Small fracture fragment adjacent to distal ulna could represent new versus old ulnar styloid fracture.                                                  Results for orders placed in visit on 09/29/14   DX-SHOULDER 2+    Impression Negative shoulder series.              Results for orders placed during the hospital encounter of 01/19/18   DX-WRIST-COMPLETE 3+ LEFT    Impression 1.  Mildly impacted acute fracture of the distal radial metaphysis.    2.  Possible new versus old fracture of the distal ulna.    3.  Soft tissue swelling is noted.        Thank you Magnus Carbajal M.D. for allowing me to participate in caring for your patient.

## 2018-02-23 ENCOUNTER — OFFICE VISIT (OUTPATIENT)
Dept: MEDICAL GROUP | Facility: CLINIC | Age: 78
End: 2018-02-23

## 2018-02-23 ENCOUNTER — APPOINTMENT (OUTPATIENT)
Dept: RADIOLOGY | Facility: IMAGING CENTER | Age: 78
End: 2018-02-23
Attending: FAMILY MEDICINE
Payer: MEDICARE

## 2018-02-23 VITALS
RESPIRATION RATE: 18 BRPM | HEIGHT: 62 IN | WEIGHT: 150 LBS | DIASTOLIC BLOOD PRESSURE: 80 MMHG | SYSTOLIC BLOOD PRESSURE: 120 MMHG | HEART RATE: 78 BPM | OXYGEN SATURATION: 96 % | BODY MASS INDEX: 27.6 KG/M2 | TEMPERATURE: 98 F

## 2018-02-23 DIAGNOSIS — S52.552D OTHER CLOSED EXTRA-ARTICULAR FRACTURE OF DISTAL END OF LEFT RADIUS WITH ROUTINE HEALING, SUBSEQUENT ENCOUNTER: ICD-10-CM

## 2018-02-23 PROCEDURE — 99024 POSTOP FOLLOW-UP VISIT: CPT | Performed by: FAMILY MEDICINE

## 2018-02-23 PROCEDURE — 73110 X-RAY EXAM OF WRIST: CPT | Mod: TC,LT | Performed by: FAMILY MEDICINE

## 2018-02-23 ASSESSMENT — ENCOUNTER SYMPTOMS
NAUSEA: 0
CHILLS: 0
SHORTNESS OF BREATH: 0
FEVER: 0
DIZZINESS: 1
VOMITING: 0

## 2018-02-23 NOTE — PROGRESS NOTES
Subjective:      Ivory Rowan is a 77 y.o. female who presents with Wrist Injury (F/V Cast removal )      Follow up for LEFT wrist distal radius fracture     Arm Injury   Pertinent negatives include no chest pain, chills, fever, nausea or vomiting.   Wrist Injury    Pertinent negatives include no chest pain.     LEFT wrist distal radius fracture  Status post acute injury  DATE of injury January 18, 2018  Getting out of bed, stumbled on her sheets and fell on an outstretched LEFT hand    Repeat fall in cast 1 week ago with some pain since    Review of Systems   Constitutional: Negative for chills and fever.   Respiratory: Negative for shortness of breath.    Cardiovascular: Negative for chest pain.   Gastrointestinal: Negative for nausea and vomiting.   Neurological: Positive for dizziness.     PMH:  has a past medical history of Atrial fibrillation (CMS-HCC); Basal cell carcinoma of skin of nose; CATARACT; CHF (congestive heart failure) (CMS-HCC); Colon polyp; Dyslipidemia; Hypertension; IBS (irritable bowel syndrome); MEDICAL HOME (10/23/12); Mitral valve regurgitation; Osteopenia (6/09); Perennial allergic rhinitis with seasonal variation; Type 2 diabetes mellitus, controlled (CMS-HCC); Valvular heart disease; and Vertigo.  MEDS:   Current Outpatient Prescriptions:   •  ezetimibe (ZETIA) 10 MG Tab, Take 1 Tab by mouth every day., Disp: 90 Tab, Rfl: 0  •  fluconazole (DIFLUCAN) 100 MG Tab, Take 1 Tab by mouth every day., Disp: 1 Tab, Rfl: 0  •  furosemide (LASIX) 20 MG Tab, Take 1 Tab by mouth every day., Disp: 90 Tab, Rfl: 3  •  potassium chloride SA (KDUR) 20 MEQ Tab CR, Take 1 Tab by mouth every day., Disp: 90 Tab, Rfl: 3  •  digoxin (LANOXIN) 125 MCG Tab, TAKE 1 TAB BY MOUTH EVERY DAY AT 6 PM., Disp: 90 Tab, Rfl: 3  •  ELIQUIS 5 MG Tab, TAKE 1 TABLET BY MOUTH TWICE A DAY, Disp: 180 Tab, Rfl: 3  •  diazepam (VALIUM) 10 MG tablet, TAKE 1 TABLET BY MOUTH EVERY 8 HOURS AS NEEDED FOR ANXIETY, Disp: 90 Tab, Rfl:  "3  •  metoprolol SR (TOPROL XL) 100 MG TABLET SR 24 HR, Take 1 Tab by mouth 2 Times a Day., Disp: 180 Tab, Rfl: 3  •  omeprazole (PRILOSEC) 20 MG delayed-release capsule, TAKE ONE CAPSULE BY MOUTH EVERY DAY, Disp: 90 Cap, Rfl: 6  ALLERGIES:   Allergies   Allergen Reactions   • Atorvastatin      myalgias   • Simvastatin      myalgias     SURGHX:   Past Surgical History:   Procedure Laterality Date   • RECOVERY  7/8/2016    Procedure: CATH LAB-ZAFAR GUIDED CV-TO-LARGE GROUP;  Surgeon: Recoveryonly Surgery;  [place] [facility] [place]:    • COLONOSCOPY  8/2009    Dr. Lopez, 9 polyps   • APPENDECTOMY     • BREAST BIOPSY      left, reports wire report broke off during procedure   • CHOLECYSTECTOMY     • US-NEEDLE CORE BX-BREAST PANEL       SOCHX:  reports that she quit smoking about 21 years ago. Her smoking use included Cigarettes. She has a 20.00 pack-year smoking history. She has never used smokeless tobacco. She reports that she does not drink alcohol or use drugs.  FH: Family history was reviewed, no pertinent findings to report       Objective:     /80   Pulse 78   Temp 36.7 °C (98 °F)   Resp 18   Ht 1.575 m (5' 2\")   Wt 68 kg (150 lb)   LMP 05/01/1991   SpO2 96%   BMI 27.44 kg/m²      Physical Exam      No acute distress  Alert and oriented ×3    Range of motion of LEFT wrist about 60% of normal compared to her right  NO tenderness along the LEFT scaphoid and MILD tenderness along LEFT distal radius without tenderness of the TFCC insertion or distal ulna  The hand is otherwise neurovascularly intact    BILATERAL hand exam  NO swelling of the LEFT hand compared to the right       Assessment/Plan:     1. Other closed extra-articular fracture of distal end of left radius with routine healing, subsequent encounter  DX-WRIST-COMPLETE 3+ LEFT     D/c short arm cast TODAY and switch to wrist splint for comfort for the next 1-2 weeks    No Follow-up on file.    Results for orders " placed during the hospital encounter of 01/19/18   DX-FOREARM LEFT    Impression 1.  Impacted acute fracture of the distal radial metaphysis.    2.  Small fracture fragment adjacent to distal ulna could represent new versus old ulnar styloid fracture.                                                  Results for orders placed in visit on 09/29/14   DX-SHOULDER 2+    Impression Negative shoulder series.              Results for orders placed during the hospital encounter of 01/19/18   DX-WRIST-COMPLETE 3+ LEFT    Impression 1.  Mildly impacted acute fracture of the distal radial metaphysis.    2.  Possible new versus old fracture of the distal ulna.    3.  Soft tissue swelling is noted.        Thank you Magnus Carbajal M.D. for allowing me to participate in caring for your patient.

## 2018-03-09 ENCOUNTER — OFFICE VISIT (OUTPATIENT)
Dept: MEDICAL GROUP | Facility: CLINIC | Age: 78
End: 2018-03-09

## 2018-03-09 VITALS
TEMPERATURE: 98.2 F | SYSTOLIC BLOOD PRESSURE: 118 MMHG | BODY MASS INDEX: 27.6 KG/M2 | HEIGHT: 62 IN | WEIGHT: 150 LBS | OXYGEN SATURATION: 97 % | RESPIRATION RATE: 18 BRPM | DIASTOLIC BLOOD PRESSURE: 76 MMHG | HEART RATE: 78 BPM

## 2018-03-09 DIAGNOSIS — S52.552D OTHER CLOSED EXTRA-ARTICULAR FRACTURE OF DISTAL END OF LEFT RADIUS WITH ROUTINE HEALING, SUBSEQUENT ENCOUNTER: ICD-10-CM

## 2018-03-09 DIAGNOSIS — M25.512 ACUTE PAIN OF LEFT SHOULDER: ICD-10-CM

## 2018-03-09 PROCEDURE — 99212 OFFICE O/P EST SF 10 MIN: CPT | Mod: 24 | Performed by: FAMILY MEDICINE

## 2018-03-09 ASSESSMENT — ENCOUNTER SYMPTOMS
NAUSEA: 0
DIZZINESS: 1
VOMITING: 0
SHORTNESS OF BREATH: 0
CHILLS: 0
FEVER: 0

## 2018-03-09 NOTE — PROGRESS NOTES
Subjective:      Ivory Rowan is a 77 y.o. female who presents with Wrist Injury (F/V L wrist injury )      Follow up for LEFT wrist distal radius fracture     Wrist Injury    Pertinent negatives include no chest pain.   Arm Injury   Pertinent negatives include no chest pain, chills, fever, nausea or vomiting.     LEFT wrist distal radius fracture  Status post acute injury  DATE of injury January 18, 2018  Getting out of bed, stumbled on her sheets and fell on an outstretched LEFT hand    Repeat fall in cast 1 week ago with some pain since    Review of Systems   Constitutional: Negative for chills and fever.   Respiratory: Negative for shortness of breath.    Cardiovascular: Negative for chest pain.   Gastrointestinal: Negative for nausea and vomiting.   Neurological: Positive for dizziness.     PMH:  has a past medical history of Atrial fibrillation (CMS-HCC); Basal cell carcinoma of skin of nose; CATARACT; CHF (congestive heart failure) (CMS-HCC); Colon polyp; Dyslipidemia; Hypertension; IBS (irritable bowel syndrome); MEDICAL HOME (10/23/12); Mitral valve regurgitation; Osteopenia (6/09); Perennial allergic rhinitis with seasonal variation; Type 2 diabetes mellitus, controlled (CMS-HCC); Valvular heart disease; and Vertigo.  MEDS:   Current Outpatient Prescriptions:   •  ezetimibe (ZETIA) 10 MG Tab, Take 1 Tab by mouth every day., Disp: 90 Tab, Rfl: 0  •  fluconazole (DIFLUCAN) 100 MG Tab, Take 1 Tab by mouth every day., Disp: 1 Tab, Rfl: 0  •  furosemide (LASIX) 20 MG Tab, Take 1 Tab by mouth every day., Disp: 90 Tab, Rfl: 3  •  potassium chloride SA (KDUR) 20 MEQ Tab CR, Take 1 Tab by mouth every day., Disp: 90 Tab, Rfl: 3  •  digoxin (LANOXIN) 125 MCG Tab, TAKE 1 TAB BY MOUTH EVERY DAY AT 6 PM., Disp: 90 Tab, Rfl: 3  •  ELIQUIS 5 MG Tab, TAKE 1 TABLET BY MOUTH TWICE A DAY, Disp: 180 Tab, Rfl: 3  •  diazepam (VALIUM) 10 MG tablet, TAKE 1 TABLET BY MOUTH EVERY 8 HOURS AS NEEDED FOR ANXIETY, Disp: 90 Tab,  "Rfl: 3  •  metoprolol SR (TOPROL XL) 100 MG TABLET SR 24 HR, Take 1 Tab by mouth 2 Times a Day., Disp: 180 Tab, Rfl: 3  •  omeprazole (PRILOSEC) 20 MG delayed-release capsule, TAKE ONE CAPSULE BY MOUTH EVERY DAY, Disp: 90 Cap, Rfl: 6  ALLERGIES:   Allergies   Allergen Reactions   • Atorvastatin      myalgias   • Simvastatin      myalgias     SURGHX:   Past Surgical History:   Procedure Laterality Date   • RECOVERY  7/8/2016    Procedure: CATH LAB-ZAFAR GUIDED CV-TO-LARGE GROUP;  Surgeon: Recoveryonly Surgery;  [place] [facility] [place]:    • COLONOSCOPY  8/2009    Dr. Lopez, 9 polyps   • APPENDECTOMY     • BREAST BIOPSY      left, reports wire report broke off during procedure   • CHOLECYSTECTOMY     • US-NEEDLE CORE BX-BREAST PANEL       SOCHX:  reports that she quit smoking about 22 years ago. Her smoking use included Cigarettes. She has a 20.00 pack-year smoking history. She has never used smokeless tobacco. She reports that she does not drink alcohol or use drugs.  FH: Family history was reviewed, no pertinent findings to report       Objective:     /76   Pulse 78   Temp 36.8 °C (98.2 °F)   Resp 18   Ht 1.575 m (5' 2\")   Wt 68 kg (150 lb)   LMP 05/01/1991   SpO2 97%   BMI 27.44 kg/m²      Physical Exam      No acute distress  Alert and oriented ×3    Range of motion of LEFT wrist about 60% of normal compared to her right  NO tenderness along the LEFT scaphoid and MILD tenderness along LEFT distal radius without tenderness of the TFCC insertion or distal ulna  The hand is otherwise neurovascularly intact    BILATERAL hand exam  NO swelling of the LEFT hand compared to the right    BILATERAL shoulder   Rotator cuff strength testing was 5 out of 5 BILATERALLY  Range of motion was INTACT BILATERALLY       Assessment/Plan:     1. Other closed extra-articular fracture of distal end of left radius with routine healing, subsequent encounter     2. Acute pain of left shoulder      "     Still wearing wrist splint  Advised to d/c  Recommended OT, but patient adamantly denied  Provided and demonstrated ROM stretches and hand exercises    Also complaining of LEFT shoulder pain since the injury  Rotator cuff strength testing in the office today was NORMAL (5 out of 5) without any notable impingement signs    Return in about 3 weeks (around 3/30/2018). to see how she is doing with her home wrist stretching program and see how her LEFT shoulder is doing  She DOES NOT want to attend PT or OT    Results for orders placed during the hospital encounter of 01/19/18   DX-FOREARM LEFT    Impression 1.  Impacted acute fracture of the distal radial metaphysis.    2.  Small fracture fragment adjacent to distal ulna could represent new versus old ulnar styloid fracture.                                                  Results for orders placed in visit on 09/29/14   DX-SHOULDER 2+    Impression Negative shoulder series.              Results for orders placed during the hospital encounter of 01/19/18   DX-WRIST-COMPLETE 3+ LEFT    Impression 1.  Mildly impacted acute fracture of the distal radial metaphysis.    2.  Possible new versus old fracture of the distal ulna.    3.  Soft tissue swelling is noted.        Thank you Magnus Carbajal M.D. for allowing me to participate in caring for your patient.

## 2018-03-17 DIAGNOSIS — F41.9 CHRONIC ANXIETY: ICD-10-CM

## 2018-03-17 RX ORDER — DIAZEPAM 10 MG/1
10 TABLET ORAL EVERY 8 HOURS PRN
Qty: 90 TAB | Refills: 2 | Status: SHIPPED
Start: 2018-03-17 | End: 2018-05-11 | Stop reason: SDUPTHER

## 2018-03-30 ENCOUNTER — OFFICE VISIT (OUTPATIENT)
Dept: MEDICAL GROUP | Facility: CLINIC | Age: 78
End: 2018-03-30
Payer: MEDICARE

## 2018-03-30 VITALS
TEMPERATURE: 97.9 F | DIASTOLIC BLOOD PRESSURE: 80 MMHG | WEIGHT: 150 LBS | BODY MASS INDEX: 27.6 KG/M2 | OXYGEN SATURATION: 96 % | SYSTOLIC BLOOD PRESSURE: 118 MMHG | RESPIRATION RATE: 16 BRPM | HEART RATE: 76 BPM | HEIGHT: 62 IN

## 2018-03-30 DIAGNOSIS — S52.552D OTHER CLOSED EXTRA-ARTICULAR FRACTURE OF DISTAL END OF LEFT RADIUS WITH ROUTINE HEALING, SUBSEQUENT ENCOUNTER: ICD-10-CM

## 2018-03-30 PROCEDURE — 99024 POSTOP FOLLOW-UP VISIT: CPT | Performed by: FAMILY MEDICINE

## 2018-03-30 ASSESSMENT — ENCOUNTER SYMPTOMS
VOMITING: 0
SHORTNESS OF BREATH: 0
FEVER: 0
DIZZINESS: 1
NAUSEA: 0
CHILLS: 0

## 2018-03-31 NOTE — PROGRESS NOTES
Subjective:      Ivory Rowan is a 77 y.o. female who presents with Wrist Injury (F/V L wrist injury )      Follow up for LEFT wrist distal radius fracture     Wrist Injury    Pertinent negatives include no chest pain.   Arm Injury   Pertinent negatives include no chest pain, chills, fever, nausea or vomiting.     LEFT wrist distal radius fracture  Status post acute injury  DATE of injury January 18, 2018  Getting out of bed, stumbled on her sheets and fell on an outstretched LEFT hand    Repeat fall in cast 1 week ago with some pain since    Review of Systems   Constitutional: Negative for chills and fever.   Respiratory: Negative for shortness of breath.    Cardiovascular: Negative for chest pain.   Gastrointestinal: Negative for nausea and vomiting.   Neurological: Positive for dizziness.     PMH:  has a past medical history of Atrial fibrillation (CMS-HCC); Basal cell carcinoma of skin of nose; CATARACT; CHF (congestive heart failure) (CMS-HCC); Colon polyp; Dyslipidemia; Hypertension; IBS (irritable bowel syndrome); MEDICAL HOME (10/23/12); Mitral valve regurgitation; Osteopenia (6/09); Perennial allergic rhinitis with seasonal variation; Type 2 diabetes mellitus, controlled (CMS-HCC); Valvular heart disease; and Vertigo.  MEDS:   Current Outpatient Prescriptions:   •  diazepam (VALIUM) 10 MG tablet, Take 1 Tab by mouth every 8 hours as needed for Anxiety for up to 90 days., Disp: 90 Tab, Rfl: 2  •  ezetimibe (ZETIA) 10 MG Tab, Take 1 Tab by mouth every day., Disp: 90 Tab, Rfl: 0  •  fluconazole (DIFLUCAN) 100 MG Tab, Take 1 Tab by mouth every day., Disp: 1 Tab, Rfl: 0  •  furosemide (LASIX) 20 MG Tab, Take 1 Tab by mouth every day., Disp: 90 Tab, Rfl: 3  •  potassium chloride SA (KDUR) 20 MEQ Tab CR, Take 1 Tab by mouth every day., Disp: 90 Tab, Rfl: 3  •  digoxin (LANOXIN) 125 MCG Tab, TAKE 1 TAB BY MOUTH EVERY DAY AT 6 PM., Disp: 90 Tab, Rfl: 3  •  ELIQUIS 5 MG Tab, TAKE 1 TABLET BY MOUTH TWICE A DAY,  "Disp: 180 Tab, Rfl: 3  •  metoprolol SR (TOPROL XL) 100 MG TABLET SR 24 HR, Take 1 Tab by mouth 2 Times a Day., Disp: 180 Tab, Rfl: 3  •  omeprazole (PRILOSEC) 20 MG delayed-release capsule, TAKE ONE CAPSULE BY MOUTH EVERY DAY, Disp: 90 Cap, Rfl: 6  ALLERGIES:   Allergies   Allergen Reactions   • Atorvastatin      myalgias   • Simvastatin      myalgias     SURGHX:   Past Surgical History:   Procedure Laterality Date   • RECOVERY  7/8/2016    Procedure: CATH LAB-ZAFAR GUIDED CV-TO-LARGE GROUP;  Surgeon: Recoveryonly Surgery;  Location: SURGERY PRE-POST PROC [place] [facility] [place];  Service:    • COLONOSCOPY  8/2009    Dr. Lopez, 9 polyps   • APPENDECTOMY     • BREAST BIOPSY      left, reports wire report broke off during procedure   • CHOLECYSTECTOMY     • US-NEEDLE CORE BX-BREAST PANEL       SOCHX:  reports that she quit smoking about 22 years ago. Her smoking use included Cigarettes. She has a 20.00 pack-year smoking history. She has never used smokeless tobacco. She reports that she does not drink alcohol or use drugs.  FH: Family history was reviewed, no pertinent findings to report       Objective:     /80   Pulse 76   Temp 36.6 °C (97.9 °F)   Resp 16   Ht 1.575 m (5' 2\")   Wt 68 kg (150 lb)   LMP 05/01/1991   SpO2 96%   BMI 27.44 kg/m²      Physical Exam      No acute distress  Alert and oriented ×3    Range of motion of LEFT wrist about 60% of normal compared to her right  NO tenderness along the LEFT scaphoid and MILD tenderness along LEFT distal radius without tenderness of the TFCC insertion or distal ulna  The hand is otherwise neurovascularly intact    BILATERAL hand exam  NO swelling of the LEFT hand compared to the right    BILATERAL shoulder   Rotator cuff strength testing was 5 out of 5 BILATERALLY  Range of motion was INTACT BILATERALLY       Assessment/Plan:     1. Other closed extra-articular fracture of distal end of left radius with routine healing, subsequent encounter        Still wearing " wrist splint  Recommended OT, but patient AGAIN adamantly denied  Provided and demonstrated ROM stretches and hand exercises     LEFT shoulder is doing better    Return if symptoms worsen or fail to improve.    She DOES NOT want to attend PT or OT    Results for orders placed during the hospital encounter of 01/19/18   DX-FOREARM LEFT    Impression 1.  Impacted acute fracture of the distal radial metaphysis.    2.  Small fracture fragment adjacent to distal ulna could represent new versus old ulnar styloid fracture.                                                  Results for orders placed in visit on 09/29/14   DX-SHOULDER 2+    Impression Negative shoulder series.              Results for orders placed during the hospital encounter of 01/19/18   DX-WRIST-COMPLETE 3+ LEFT    Impression 1.  Mildly impacted acute fracture of the distal radial metaphysis.    2.  Possible new versus old fracture of the distal ulna.    3.  Soft tissue swelling is noted.        Thank you Magnus Carbajal M.D. for allowing me to participate in caring for your patient.

## 2018-05-02 ENCOUNTER — HOSPITAL ENCOUNTER (OUTPATIENT)
Dept: LAB | Facility: MEDICAL CENTER | Age: 78
End: 2018-05-02
Attending: INTERNAL MEDICINE
Payer: MEDICARE

## 2018-05-02 ENCOUNTER — HOSPITAL ENCOUNTER (OUTPATIENT)
Dept: LAB | Facility: MEDICAL CENTER | Age: 78
End: 2018-05-02
Attending: FAMILY MEDICINE
Payer: MEDICARE

## 2018-05-02 DIAGNOSIS — Z79.01 CHRONIC ANTICOAGULATION: ICD-10-CM

## 2018-05-02 DIAGNOSIS — I10 ESSENTIAL HYPERTENSION: Chronic | ICD-10-CM

## 2018-05-02 DIAGNOSIS — E11.9 DIABETES MELLITUS TYPE II, NON INSULIN DEPENDENT (HCC): ICD-10-CM

## 2018-05-02 DIAGNOSIS — E78.5 DYSLIPIDEMIA: Chronic | ICD-10-CM

## 2018-05-02 DIAGNOSIS — N18.30 CKD (CHRONIC KIDNEY DISEASE) STAGE 3, GFR 30-59 ML/MIN (HCC): ICD-10-CM

## 2018-05-02 LAB
ALBUMIN SERPL BCP-MCNC: 4.1 G/DL (ref 3.2–4.9)
ALBUMIN SERPL BCP-MCNC: 4.1 G/DL (ref 3.2–4.9)
ALBUMIN/GLOB SERPL: 1.3 G/DL
ALBUMIN/GLOB SERPL: 1.3 G/DL
ALP SERPL-CCNC: 62 U/L (ref 30–99)
ALP SERPL-CCNC: 65 U/L (ref 30–99)
ALT SERPL-CCNC: 19 U/L (ref 2–50)
ALT SERPL-CCNC: 19 U/L (ref 2–50)
ANION GAP SERPL CALC-SCNC: 10 MMOL/L (ref 0–11.9)
ANION GAP SERPL CALC-SCNC: 11 MMOL/L (ref 0–11.9)
AST SERPL-CCNC: 20 U/L (ref 12–45)
AST SERPL-CCNC: 21 U/L (ref 12–45)
BILIRUB SERPL-MCNC: 1.1 MG/DL (ref 0.1–1.5)
BILIRUB SERPL-MCNC: 1.1 MG/DL (ref 0.1–1.5)
BUN SERPL-MCNC: 27 MG/DL (ref 8–22)
BUN SERPL-MCNC: 29 MG/DL (ref 8–22)
CALCIUM SERPL-MCNC: 9.6 MG/DL (ref 8.5–10.5)
CALCIUM SERPL-MCNC: 9.7 MG/DL (ref 8.5–10.5)
CHLORIDE SERPL-SCNC: 102 MMOL/L (ref 96–112)
CHLORIDE SERPL-SCNC: 103 MMOL/L (ref 96–112)
CHOLEST SERPL-MCNC: 152 MG/DL (ref 100–199)
CHOLEST SERPL-MCNC: 161 MG/DL (ref 100–199)
CO2 SERPL-SCNC: 27 MMOL/L (ref 20–33)
CO2 SERPL-SCNC: 27 MMOL/L (ref 20–33)
CREAT SERPL-MCNC: 0.95 MG/DL (ref 0.5–1.4)
CREAT SERPL-MCNC: 1.13 MG/DL (ref 0.5–1.4)
ERYTHROCYTE [DISTWIDTH] IN BLOOD BY AUTOMATED COUNT: 46.1 FL (ref 35.9–50)
EST. AVERAGE GLUCOSE BLD GHB EST-MCNC: 134 MG/DL
GLOBULIN SER CALC-MCNC: 3.2 G/DL (ref 1.9–3.5)
GLOBULIN SER CALC-MCNC: 3.2 G/DL (ref 1.9–3.5)
GLUCOSE SERPL-MCNC: 103 MG/DL (ref 65–99)
GLUCOSE SERPL-MCNC: 105 MG/DL (ref 65–99)
HBA1C MFR BLD: 6.3 % (ref 0–5.6)
HCT VFR BLD AUTO: 42.8 % (ref 37–47)
HDLC SERPL-MCNC: 50 MG/DL
HDLC SERPL-MCNC: 51 MG/DL
HGB BLD-MCNC: 13.8 G/DL (ref 12–16)
LDLC SERPL CALC-MCNC: 82 MG/DL
LDLC SERPL CALC-MCNC: 91 MG/DL
MCH RBC QN AUTO: 30.5 PG (ref 27–33)
MCHC RBC AUTO-ENTMCNC: 32.2 G/DL (ref 33.6–35)
MCV RBC AUTO: 94.7 FL (ref 81.4–97.8)
PLATELET # BLD AUTO: 225 K/UL (ref 164–446)
PMV BLD AUTO: 10.3 FL (ref 9–12.9)
POTASSIUM SERPL-SCNC: 3.9 MMOL/L (ref 3.6–5.5)
POTASSIUM SERPL-SCNC: 4 MMOL/L (ref 3.6–5.5)
PROT SERPL-MCNC: 7.3 G/DL (ref 6–8.2)
PROT SERPL-MCNC: 7.3 G/DL (ref 6–8.2)
RBC # BLD AUTO: 4.52 M/UL (ref 4.2–5.4)
SODIUM SERPL-SCNC: 140 MMOL/L (ref 135–145)
SODIUM SERPL-SCNC: 140 MMOL/L (ref 135–145)
TRIGL SERPL-MCNC: 94 MG/DL (ref 0–149)
TRIGL SERPL-MCNC: 98 MG/DL (ref 0–149)
WBC # BLD AUTO: 5.4 K/UL (ref 4.8–10.8)

## 2018-05-02 PROCEDURE — 80053 COMPREHEN METABOLIC PANEL: CPT | Mod: 91

## 2018-05-02 PROCEDURE — 83036 HEMOGLOBIN GLYCOSYLATED A1C: CPT

## 2018-05-02 PROCEDURE — 80061 LIPID PANEL: CPT | Mod: 91

## 2018-05-02 PROCEDURE — 80061 LIPID PANEL: CPT

## 2018-05-02 PROCEDURE — 85027 COMPLETE CBC AUTOMATED: CPT

## 2018-05-02 PROCEDURE — 36415 COLL VENOUS BLD VENIPUNCTURE: CPT

## 2018-05-02 PROCEDURE — 80053 COMPREHEN METABOLIC PANEL: CPT

## 2018-05-08 ENCOUNTER — TELEPHONE (OUTPATIENT)
Dept: MEDICAL GROUP | Facility: CLINIC | Age: 78
End: 2018-05-08

## 2018-05-11 ENCOUNTER — OFFICE VISIT (OUTPATIENT)
Dept: MEDICAL GROUP | Facility: CLINIC | Age: 78
End: 2018-05-11
Payer: MEDICARE

## 2018-05-11 VITALS
BODY MASS INDEX: 25.4 KG/M2 | SYSTOLIC BLOOD PRESSURE: 104 MMHG | TEMPERATURE: 97.9 F | OXYGEN SATURATION: 95 % | RESPIRATION RATE: 18 BRPM | HEART RATE: 82 BPM | DIASTOLIC BLOOD PRESSURE: 68 MMHG | HEIGHT: 62 IN | WEIGHT: 138 LBS

## 2018-05-11 DIAGNOSIS — E11.21 CONTROLLED TYPE 2 DIABETES MELLITUS WITH DIABETIC NEPHROPATHY, WITHOUT LONG-TERM CURRENT USE OF INSULIN (HCC): ICD-10-CM

## 2018-05-11 DIAGNOSIS — K21.9 GASTROESOPHAGEAL REFLUX DISEASE WITHOUT ESOPHAGITIS: Chronic | ICD-10-CM

## 2018-05-11 DIAGNOSIS — N18.30 CKD (CHRONIC KIDNEY DISEASE) STAGE 3, GFR 30-59 ML/MIN (HCC): ICD-10-CM

## 2018-05-11 DIAGNOSIS — I10 ESSENTIAL HYPERTENSION: ICD-10-CM

## 2018-05-11 DIAGNOSIS — F41.9 CHRONIC ANXIETY: ICD-10-CM

## 2018-05-11 DIAGNOSIS — H40.10X3 OPEN-ANGLE GLAUCOMA OF RIGHT EYE, SEVERE STAGE, UNSPECIFIED OPEN-ANGLE GLAUCOMA TYPE: ICD-10-CM

## 2018-05-11 DIAGNOSIS — Z86.010 HISTORY OF ADENOMATOUS POLYP OF COLON: ICD-10-CM

## 2018-05-11 DIAGNOSIS — Z12.11 COLON CANCER SCREENING: ICD-10-CM

## 2018-05-11 DIAGNOSIS — E78.5 DYSLIPIDEMIA: Chronic | ICD-10-CM

## 2018-05-11 DIAGNOSIS — I48.19 PERSISTENT ATRIAL FIBRILLATION (HCC): ICD-10-CM

## 2018-05-11 DIAGNOSIS — I50.32 CHRONIC DIASTOLIC HEART FAILURE (HCC): ICD-10-CM

## 2018-05-11 DIAGNOSIS — E11.9 DIABETES MELLITUS TYPE II, NON INSULIN DEPENDENT (HCC): ICD-10-CM

## 2018-05-11 PROCEDURE — 99214 OFFICE O/P EST MOD 30 MIN: CPT | Performed by: FAMILY MEDICINE

## 2018-05-11 RX ORDER — DIGOXIN 125 MCG
125 TABLET ORAL DAILY
Qty: 90 TAB | Refills: 3 | Status: SHIPPED | OUTPATIENT
Start: 2018-05-11 | End: 2018-06-20 | Stop reason: SDUPTHER

## 2018-05-11 RX ORDER — METOPROLOL SUCCINATE 100 MG/1
100 TABLET, EXTENDED RELEASE ORAL 2 TIMES DAILY
Qty: 180 TAB | Refills: 3 | Status: SHIPPED | OUTPATIENT
Start: 2018-05-11 | End: 2018-06-20 | Stop reason: SDUPTHER

## 2018-05-11 RX ORDER — OMEPRAZOLE 20 MG/1
20 CAPSULE, DELAYED RELEASE ORAL
Qty: 90 CAP | Refills: 3 | Status: SHIPPED | OUTPATIENT
Start: 2018-05-11 | End: 2019-05-29 | Stop reason: SDUPTHER

## 2018-05-11 RX ORDER — EZETIMIBE 10 MG/1
10 TABLET ORAL
Qty: 90 TAB | Refills: 3 | Status: SHIPPED | OUTPATIENT
Start: 2018-05-11 | End: 2018-12-21

## 2018-05-11 RX ORDER — DIAZEPAM 10 MG/1
10 TABLET ORAL EVERY 8 HOURS PRN
Qty: 90 TAB | Refills: 2 | Status: SHIPPED | OUTPATIENT
Start: 2018-05-11 | End: 2018-08-09

## 2018-05-11 ASSESSMENT — PAIN SCALES - GENERAL: PAINLEVEL: 3=SLIGHT PAIN

## 2018-05-11 NOTE — PROGRESS NOTES
Diabetes Focused Exam:    Chief Complaint   Patient presents with   • Diabetes Mellitus   • Chronic Kidney Disease   • Hypertension   • Hyperlipidemia      Subjective:   HPI  Ivory Rowan is a 78 y.o. female who presents for follow up of chronic conditions of diabetes mellitus, hypertension and hyperlipidemia. She indicates that she is feeling well and denies any symptoms referable to the above diagnoses. Specifically denies chest pain, palpitations, dyspnea, orthopnea, PND or peripheral edema. Also denies polyuria, polydipsia, urinary complaints, abdominal complaints, myalgias, numbness, weakness or other related symptoms.     She follows with cardiology for her atrial fibrillation. Medications are about to run out and I renewed today but doses are not changed. Metoprolol and digitalis etc. doses are per cardiology.    The patient is taking no ASA every day since she is on chronic anticoagulation and taking all other medications as prescribed. Patient denies any side effects of medication.    DM: A1c goal <7  Glucose monitoring frequency: Is not testing  Hypoglycemic episodes denies any clinically  Diabetic complications: nephropathy  ACR Albumin/Creatinine Ratio goal <30   HTN: Blood pressure goal <140/<80. Currently Rx ACE/ARB: No  Hyperlipidemia:Cholesterol goal LDL <100, total/HDL <5. Currently Rx Statin: No, on generic Zetia instead  Last eye exam August 2017 Denies visual blurring, double vision, eye pain and floaters  Last monofilament foot exam: June 2017Denies foot pain, numbness, calluses, ulcers      See medications and orders placed in encounter report.  Past medical history, family history, social history reviewed and updated as documented in medical record.    Current medications including changes today:  Current Outpatient Prescriptions   Medication Sig Dispense Refill   • metoprolol SR (TOPROL XL) 100 MG TABLET SR 24 HR Take 1 Tab by mouth 2 Times a Day. 180 Tab 3   • ezetimibe (ZETIA) 10 MG  "Tab Take 1 Tab by mouth every day. 90 Tab 3   • digoxin (LANOXIN) 125 MCG Tab Take 1 Tab by mouth every day. 90 Tab 3   • omeprazole (PRILOSEC) 20 MG delayed-release capsule Take 1 Cap by mouth every day. 90 Cap 3   • diazepam (VALIUM) 10 MG tablet Take 1 Tab by mouth every 8 hours as needed for Anxiety for up to 90 days. 90 Tab 2   • furosemide (LASIX) 20 MG Tab Take 1 Tab by mouth every day. 90 Tab 3   • potassium chloride SA (KDUR) 20 MEQ Tab CR Take 1 Tab by mouth every day. 90 Tab 3   • ELIQUIS 5 MG Tab TAKE 1 TABLET BY MOUTH TWICE A  Tab 3     No current facility-administered medications for this visit.      Allergies:   Allergies   Allergen Reactions   • Atorvastatin      myalgias   • Simvastatin      myalgias      Social History   Substance Use Topics   • Smoking status: Former Smoker     Packs/day: 0.50     Years: 40.00     Types: Cigarettes     Quit date: 3/1/1996   • Smokeless tobacco: Never Used   • Alcohol use No      Comment: 1-2 drinks once a month     Exercise: She has been walking some in and around her new housing area. It is much better than where she was before. She states she is not walking a great deal because of the weather but will try to do so now that the weather is better.  Health Maintenance/Immunizations: Discussed, declines TD Vaccine due to cost.    ROS  Pertinent  ROS findings as above. All other systems reviewed and are negative.      Objective:     OBJECTIVE:  /68   Pulse 82   Temp 36.6 °C (97.9 °F)   Resp 18   Ht 1.575 m (5' 2\")   Wt 62.6 kg (138 lb)   LMP 05/01/1991   SpO2 95%   Breastfeeding? No   BMI 25.24 kg/m²  Body mass index is 25.24 kg/m². BMI: not obese  General: No apparent distress, conversant, cooperative and pleasant with the examination.  Psych: Alert and oriented x4, judgment and insight normal  Neck: No JVD or bruits, no adenopathy, supple  Thyroid: normal to inspection and palpation  Lungs: negative findings: normal respiratory rate and " rhythm, lungs clear to auscultation  Heart: Irregularly irregular heart rhythm. Normal S1 and S2 with 2/6 systolic murmur appreciated.  Abdomen: Soft, nontender, no hepatosplenomegaly or masses, normal bowel sounds  Skin: No rashes, no cyanosis, no lesions or ulcers  Extremities: No cyanosis clubbing or edema.   Feet are examined     POC labs today: No results found for: POCGLUCOSE       Last labs    Lab Results   Component Value Date/Time    CHOLSTRLTOT 152 05/02/2018 06:13 AM    CHOLSTRLTOT 161 05/02/2018 06:13 AM    LDL 82 05/02/2018 06:13 AM    LDL 91 05/02/2018 06:13 AM    HDL 51 05/02/2018 06:13 AM    HDL 50 05/02/2018 06:13 AM    TRIGLYCERIDE 94 05/02/2018 06:13 AM    TRIGLYCERIDE 98 05/02/2018 06:13 AM       Lab Results   Component Value Date/Time    SODIUM 140 05/02/2018 06:13 AM    SODIUM 140 05/02/2018 06:13 AM    POTASSIUM 3.9 05/02/2018 06:13 AM    POTASSIUM 4.0 05/02/2018 06:13 AM    GLUCOSE 103 (H) 05/02/2018 06:13 AM    GLUCOSE 105 (H) 05/02/2018 06:13 AM    BUNCREATRAT 26 04/11/2011 12:00 AM    GLOMRATE 56 (L) 03/09/2011 07:55 AM     Lab Results   Component Value Date/Time    HBA1C 6.3 (H) 05/02/2018 06:13 AM    HBA1C 6.2 (H) 11/01/2017 06:40 AM    HBA1C 6.5 (H) 04/20/2017 08:13 AM     Lab Results   Component Value Date/Time    MALBCRT 14 04/20/2017 08:13 AM    MICROALBUR 1.5 04/20/2017 08:13 AM          Assessment/Plan:   Medications, refills, and referrals per orders.   1. Controlled type 2 diabetes mellitus with diabetic nephropathy, without long-term current use of insulin (HCC)  COMP METABOLIC PANEL    HEMOGLOBIN A1C    MICROALBUMIN CREAT RATIO URINE   2. Diabetes mellitus type II, non insulin dependent (HCC)  COMP METABOLIC PANEL    HEMOGLOBIN A1C    MICROALBUMIN CREAT RATIO URINE   3. Essential hypertension  metoprolol SR (TOPROL XL) 100 MG TABLET SR 24 HR    COMP METABOLIC PANEL   4. CKD (chronic kidney disease) stage 3, GFR 30-59 ml/min  COMP METABOLIC PANEL   5. Chronic diastolic heart  failure (HCC)     6. Persistent atrial fibrillation (HCC)  metoprolol SR (TOPROL XL) 100 MG TABLET SR 24 HR    digoxin (LANOXIN) 125 MCG Tab   7. Dyslipidemia  ezetimibe (ZETIA) 10 MG Tab    COMP METABOLIC PANEL    LIPID PROFILE   8. Gastroesophageal reflux disease without esophagitis  omeprazole (PRILOSEC) 20 MG delayed-release capsule    CBC WITHOUT DIFFERENTIAL   9. Open-angle glaucoma of right eye, severe stage, unspecified open-angle glaucoma type     10. History of adenomatous polyp of colon  REFERRAL TO GASTROENTEROLOGY   11. Colon cancer screening  REFERRAL TO GASTROENTEROLOGY   12. Chronic anxiety  diazepam (VALIUM) 10 MG tablet     DM2 A1c is at goal   Patient to monitor sugars: Patient declines   Discussed diet, exercise, disease management and nutritional goals.   Education and advise provided today:All medications, side effects and compliance (discussed carefully)  Annual eye examinations at Ophthalmology  Foot care discussed and Podiatry visits  Glycohemoglobin and other lab monitoring  Labs immediately prior to next visit  Long term diabetic complications  Low cholesterol diet, weight control and daily exercise    Followup:   Do labs and RTC 6 months, sooner should new symptoms or problems arise.

## 2018-05-14 DIAGNOSIS — I48.91 ATRIAL FIBRILLATION WITH RVR (HCC): ICD-10-CM

## 2018-05-17 NOTE — PROGRESS NOTES
1. Attempt #: 1    2. [facility] Verified: yes    3. Verify PCP: yes    5.  Reviewed/Updated the following with patient:       •   Communication Preference Obtained? YES    6. MyChart Activation: declined    7. MyChart Christa: no    8. Annual Wellness Visit Scheduling  Scheduling Status:Not Scheduled. Patient states they are not interested      9. Care Gap Scheduling (Attempt to Schedule EACH Overdue Care Gap!)     Health Maintenance Due   Topic Date Due   • Annual Wellness Visit  11/30/2017   • URINE ACR / MICROALBUMIN  04/20/2018        Patient declined awv

## 2018-06-20 ENCOUNTER — TELEPHONE (OUTPATIENT)
Dept: CARDIOLOGY | Facility: MEDICAL CENTER | Age: 78
End: 2018-06-20

## 2018-06-20 ENCOUNTER — OFFICE VISIT (OUTPATIENT)
Dept: CARDIOLOGY | Facility: MEDICAL CENTER | Age: 78
End: 2018-06-20
Payer: MEDICARE

## 2018-06-20 VITALS
SYSTOLIC BLOOD PRESSURE: 124 MMHG | HEIGHT: 62 IN | WEIGHT: 134 LBS | OXYGEN SATURATION: 97 % | DIASTOLIC BLOOD PRESSURE: 66 MMHG | HEART RATE: 94 BPM | BODY MASS INDEX: 24.66 KG/M2

## 2018-06-20 DIAGNOSIS — I48.91 ATRIAL FIBRILLATION WITH RVR (HCC): ICD-10-CM

## 2018-06-20 DIAGNOSIS — E78.5 DYSLIPIDEMIA: ICD-10-CM

## 2018-06-20 DIAGNOSIS — I10 ESSENTIAL HYPERTENSION: ICD-10-CM

## 2018-06-20 DIAGNOSIS — I48.19 PERSISTENT ATRIAL FIBRILLATION (HCC): ICD-10-CM

## 2018-06-20 DIAGNOSIS — G47.19 EXCESSIVE DAYTIME SLEEPINESS: ICD-10-CM

## 2018-06-20 DIAGNOSIS — I50.32 CHRONIC DIASTOLIC HEART FAILURE (HCC): ICD-10-CM

## 2018-06-20 LAB — EKG IMPRESSION: NORMAL

## 2018-06-20 PROCEDURE — 93000 ELECTROCARDIOGRAM COMPLETE: CPT | Performed by: INTERNAL MEDICINE

## 2018-06-20 PROCEDURE — 99215 OFFICE O/P EST HI 40 MIN: CPT | Performed by: INTERNAL MEDICINE

## 2018-06-20 RX ORDER — DIGOXIN 125 MCG
125 TABLET ORAL DAILY
Qty: 90 TAB | Refills: 3 | Status: SHIPPED | OUTPATIENT
Start: 2018-06-20 | End: 2018-12-21 | Stop reason: SDUPTHER

## 2018-06-20 RX ORDER — POTASSIUM CHLORIDE 20 MEQ/1
20 TABLET, EXTENDED RELEASE ORAL DAILY
Qty: 90 TAB | Refills: 3 | Status: SHIPPED | OUTPATIENT
Start: 2018-06-20 | End: 2018-12-21 | Stop reason: SDUPTHER

## 2018-06-20 RX ORDER — METOPROLOL SUCCINATE 100 MG/1
100 TABLET, EXTENDED RELEASE ORAL 2 TIMES DAILY
Qty: 180 TAB | Refills: 3 | Status: SHIPPED | OUTPATIENT
Start: 2018-06-20 | End: 2018-12-21 | Stop reason: SDUPTHER

## 2018-06-20 RX ORDER — FUROSEMIDE 20 MG/1
20 TABLET ORAL DAILY
Qty: 90 TAB | Refills: 3 | Status: SHIPPED | OUTPATIENT
Start: 2018-06-20 | End: 2018-12-21 | Stop reason: SDUPTHER

## 2018-06-20 ASSESSMENT — ENCOUNTER SYMPTOMS
DOUBLE VISION: 0
DIZZINESS: 0
BLOOD IN STOOL: 0
DEPRESSION: 0
ORTHOPNEA: 0
ABDOMINAL PAIN: 0
PALPITATIONS: 0
COUGH: 0
WEIGHT LOSS: 0
HALLUCINATIONS: 0
FALLS: 0
CHILLS: 0
SHORTNESS OF BREATH: 0
MYALGIAS: 0
SENSORY CHANGE: 0
SPEECH CHANGE: 0
BLURRED VISION: 0
PND: 0
BRUISES/BLEEDS EASILY: 0
VOMITING: 0
FEVER: 0
NAUSEA: 0
LOSS OF CONSCIOUSNESS: 0
EYE DISCHARGE: 0
EYE PAIN: 0
CLAUDICATION: 0
HEADACHES: 0

## 2018-06-20 NOTE — TELEPHONE ENCOUNTER
----- Message -----   From: Maryuri Sarkar   Sent: 6/20/2018  11:45 AM   To: Breana Conklin R.N.   Subject: REFERRAL TO SLEEP STUDIES                          The referral to Sleep Studies will be sent to [facility]. The contact number is [phone].           LV in regards to above information.

## 2018-06-20 NOTE — LETTER
[facility]   [address]  [place] [address]  Phone: [phone]  Fax: [phone]              Ivory Rowan  1940    Encounter Date: 6/20/2018    Shen Washington M.D.          PROGRESS NOTE:  Chief Complaint   Patient presents with   • HTN (Uncontrolled)     F/V: 1 YR       Subjective:   Ivory Rowan is a 78 y.o. female who presents today for cardiac care for chronic atrial fibrillation now has progressed into persistent after prior failed cardioversion. She was in sinus for brief amount of time. Patient was diagnosed with atrial fibrillation earlier in 2016. Patient has had multiple hospitalizations due to A. fib with rapid ventricular rate leading to heart failure exacerbation. Her left ventricular systolic function is documented at about 45-50% on her most recent transthoracic echocardiogram. Patient is anticoagulated. Patient is taking Toprol- mg by mouth twice a day.     Patient cannot afford Tikosyn therapy and she did not go into the hospital for that.     She is feeling fine. No palpitations. No dyspnea    Past Medical History:   Diagnosis Date   • Atrial fibrillation (HCC)    • Basal cell carcinoma of skin of nose    • CATARACT     Dr. Aaron, Dr. Orellana   • CHF (congestive heart failure) (HCC)    • Colon polyp     tubular adenomas   • Dyslipidemia    • Glaucoma, right eye     Dr. Orellana   • Hypertension    • IBS (irritable bowel syndrome)    • MEDICAL HOME 10/23/12   • Mitral valve regurgitation    • Osteopenia 6/09   • Perennial allergic rhinitis with seasonal variation    • Type 2 diabetes mellitus, controlled (HCC)    • Valvular heart disease     mitral insufficiency   • Vertigo      Past Surgical History:   Procedure Laterality Date   • RECOVERY  7/8/2016    Procedure: CATH LAB-ZAFAR GUIDED CV-TO-LARGE GROUP;  Surgeon: Recoveryonly Surgery;  Location: SURGERY PRE-POST PROC UNIT [facility];  Service:    • COLONOSCOPY  8/2009      "Matteoni, 9 polyps   • APPENDECTOMY     • BREAST BIOPSY      left, reports wire report broke off during procedure   • CHOLECYSTECTOMY     • US-NEEDLE CORE BX-BREAST PANEL       Family History   Problem Relation Age of Onset   • Diabetes Mother    • Hypertension Mother    • Stroke Mother    • Cancer Father      lung/larynx   • Cancer Sister      \"female\"   • Genetic Sister      osteoporosis   • Cancer Paternal Aunt      breast   • Cancer Maternal Aunt    • Heart Disease Brother      CABG x 3     Social History     Social History   • Marital status: Single     Spouse name: N/A   • Number of children: 2   • Years of education: N/A     Occupational History   • Retired 2009 - [employer]     Social History Main Topics   • Smoking status: Former Smoker     Packs/day: 0.50     Years: 40.00     Types: Cigarettes     Quit date: 3/1/1996   • Smokeless tobacco: Never Used   • Alcohol use No      Comment: 1-2 drinks once a month   • Drug use: No   • Sexual activity: Not Currently     Partners: Male     Other Topics Concern   • Not on file     Social History Narrative   • No narrative on file     Allergies   Allergen Reactions   • Atorvastatin      myalgias   • Simvastatin      myalgias     Outpatient Encounter Prescriptions as of 6/20/2018   Medication Sig Dispense Refill   • digoxin (LANOXIN) 125 MCG Tab Take 1 Tab by mouth every day. 90 Tab 3   • apixaban (ELIQUIS) 5mg Tab Take 1 Tab by mouth 2 Times a Day. TAKE 1 TABLET BY MOUTH TWICE A  Tab 3   • furosemide (LASIX) 20 MG Tab Take 1 Tab by mouth every day. 90 Tab 3   • metoprolol SR (TOPROL XL) 100 MG TABLET SR 24 HR Take 1 Tab by mouth 2 Times a Day. 180 Tab 3   • potassium chloride SA (KDUR) 20 MEQ Tab CR Take 1 Tab by mouth every day. 90 Tab 3   • ezetimibe (ZETIA) 10 MG Tab Take 1 Tab by mouth every day. (Patient taking differently: Take 10 mg by mouth.) 90 Tab 3   • omeprazole (PRILOSEC) 20 MG delayed-release capsule Take 1 Cap by mouth every day. 90 " "Cap 3   • diazepam (VALIUM) 10 MG tablet Take 1 Tab by mouth every 8 hours as needed for Anxiety for up to 90 days. 90 Tab 2   • [DISCONTINUED] metoprolol SR (TOPROL XL) 100 MG TABLET SR 24 HR Take 1 Tab by mouth 2 Times a Day. 180 Tab 3   • [DISCONTINUED] digoxin (LANOXIN) 125 MCG Tab Take 1 Tab by mouth every day. 90 Tab 3   • [DISCONTINUED] furosemide (LASIX) 20 MG Tab Take 1 Tab by mouth every day. 90 Tab 3   • [DISCONTINUED] potassium chloride SA (KDUR) 20 MEQ Tab CR Take 1 Tab by mouth every day. 90 Tab 3   • [DISCONTINUED] ELIQUIS 5 MG Tab TAKE 1 TABLET BY MOUTH TWICE A  Tab 3     No facility-administered encounter medications on file as of 6/20/2018.      Review of Systems   Constitutional: Negative for chills, fever, malaise/fatigue and weight loss.   HENT: Negative for ear discharge, ear pain, hearing loss and nosebleeds.    Eyes: Negative for blurred vision, double vision, pain and discharge.   Respiratory: Negative for cough and shortness of breath.    Cardiovascular: Negative for chest pain, palpitations, orthopnea, claudication, leg swelling and PND.   Gastrointestinal: Negative for abdominal pain, blood in stool, melena, nausea and vomiting.   Genitourinary: Negative for dysuria and hematuria.   Musculoskeletal: Negative for falls, joint pain and myalgias.   Skin: Negative for itching and rash.   Neurological: Negative for dizziness, sensory change, speech change, loss of consciousness and headaches.   Endo/Heme/Allergies: Negative for environmental allergies. Does not bruise/bleed easily.   Psychiatric/Behavioral: Negative for depression, hallucinations and suicidal ideas.        Objective:   /66   Pulse 94   Ht 1.575 m (5' 2\")   Wt 60.8 kg (134 lb)   LMP 05/01/1991   SpO2 97%   BMI 24.51 kg/m²      Physical Exam   Constitutional: She is oriented to person, place, and time. No distress.   HENT:   Head: Normocephalic and atraumatic.   Right Ear: External ear normal.   Left Ear: " External ear normal.   Eyes: Right eye exhibits no discharge. Left eye exhibits no discharge.   Neck: No JVD present. No thyromegaly present.   Cardiovascular: Normal rate, normal heart sounds and intact distal pulses.  Exam reveals no gallop and no friction rub.    No murmur heard.  There is presence of an irregularly irregular heartbeats.     Pulmonary/Chest: Breath sounds normal. No respiratory distress.   Abdominal: Bowel sounds are normal. She exhibits no distension. There is no tenderness.   Musculoskeletal: She exhibits no edema or tenderness.   Neurological: She is alert and oriented to person, place, and time. No cranial nerve deficit.   Skin: Skin is warm and dry. She is not diaphoretic.   Psychiatric: She has a normal mood and affect. Her behavior is normal.   Nursing note and vitals reviewed.      Assessment:     1. Persistent atrial fibrillation (HCC)  digoxin (LANOXIN) 125 MCG Tab    metoprolol SR (TOPROL XL) 100 MG TABLET SR 24 HR    REFERRAL TO SLEEP STUDIES    DIGOXIN    COMP METABOLIC PANEL    LIPID PANEL    [facility] EKG (Clinic Performed)   2. Atrial fibrillation with RVR (HCC)  apixaban (ELIQUIS) 5mg Tab    furosemide (LASIX) 20 MG Tab    potassium chloride SA (KDUR) 20 MEQ Tab CR    REFERRAL TO SLEEP STUDIES    DIGOXIN    COMP METABOLIC PANEL    LIPID PANEL    [facility] EKG (Clinic Performed)   3. Chronic diastolic heart failure (HCC)  furosemide (LASIX) 20 MG Tab   4. Essential hypertension  metoprolol SR (TOPROL XL) 100 MG TABLET SR 24 HR   5. Excessive daytime sleepiness  REFERRAL TO SLEEP STUDIES   6. Dyslipidemia         Medical Decision Making:  Today's Assessment / Status / Plan:   Today, based on physical examination findings, patient is euvolemic. No JVD, lungs are clear to auscultation, no pitting edema in bilateral lower extremities, no ascites.    Dry weight is 134 lbs.    Blood pressure is well controlled.    I have independently reviewed blood tests results with patient in  clinic which shows normal LDL level, triglycerides, renal and liver function.    Cont current medications at current dose.     Anticoagulation with Eliquis.    Rate control strategy.    I will see patient back in clinic with lab tests and studies results in 6 months.    I thank you Dr. Tesfaye for referring patient to our Cardiology Clinic today.            Checo Tesfaye M.D.  [address] [place] [address] [id number]  [address]  [facility] [id number]  VIA In Basket

## 2018-06-20 NOTE — PROGRESS NOTES
Chief Complaint   Patient presents with   • HTN (Uncontrolled)     F/V: 1 YR       Subjective:   Ivory Rowan is a 78 y.o. female who presents today for cardiac care for chronic atrial fibrillation now has progressed into persistent after prior failed cardioversion. She was in sinus for brief amount of time. Patient was diagnosed with atrial fibrillation earlier in 2016. Patient has had multiple hospitalizations due to A. fib with rapid ventricular rate leading to heart failure exacerbation. Her left ventricular systolic function is documented at about 45-50% on her most recent transthoracic echocardiogram. Patient is anticoagulated. Patient is taking Toprol- mg by mouth twice a day.     Patient cannot afford Tikosyn therapy and she did not go into the hospital for that.     She is feeling fine. No palpitations. No dyspnea    Past Medical History:   Diagnosis Date   • Atrial fibrillation (HCC)    • Basal cell carcinoma of skin of nose    • CATARACT     Dr. Aaron, Dr. Orellana   • CHF (congestive heart failure) ([facility])    • Colon polyp     tubular adenomas   • Dyslipidemia    • Glaucoma, right eye     Dr. Orellana   • Hypertension    • IBS (irritable bowel syndrome)    • MEDICAL HOME 10/23/12   • Mitral valve regurgitation    • Osteopenia 6/09   • Perennial allergic rhinitis with seasonal variation    • Type 2 diabetes mellitus, controlled ([facility])    • Valvular heart disease     mitral insufficiency   • Vertigo      Past Surgical History:   Procedure Laterality Date   • RECOVERY  7/8/2016    Procedure: CATH LAB-ZAFAR GUIDED CV-TO-LARGE GROUP;  Surgeon: Recoveryonly Surgery;  [place] [facility] [place]  [place]:    • COLONOSCOPY  8/2009    Dr. Lopez, 9 polyps   • APPENDECTOMY     • BREAST BIOPSY      left, reports wire report broke off during procedure   • CHOLECYSTECTOMY     • US-NEEDLE CORE BX-BREAST PANEL       Family History   Problem Relation Age of Onset   • Diabetes Mother    • Hypertension  "Mother    • Stroke Mother    • Cancer Father      lung/larynx   • Cancer Sister      \"female\"   • Genetic Sister      osteoporosis   • Cancer Paternal Aunt      breast   • Cancer Maternal Aunt    • Heart Disease Brother      CABG x 3     Social History     Social History   • Marital status: Single     Spouse name: N/A   • Number of children: 2   • Years of education: N/A     Occupational History   • Retired 2009 - [employer]     Social History Main Topics   • Smoking status: Former Smoker     Packs/day: 0.50     Years: 40.00     Types: Cigarettes     Quit date: 3/1/1996   • Smokeless tobacco: Never Used   • Alcohol use No      Comment: 1-2 drinks once a month   • Drug use: No   • Sexual activity: Not Currently     Partners: Male     Other Topics Concern   • Not on file     Social History Narrative   • No narrative on file     Allergies   Allergen Reactions   • Atorvastatin      myalgias   • Simvastatin      myalgias     Outpatient Encounter Prescriptions as of 6/20/2018   Medication Sig Dispense Refill   • digoxin (LANOXIN) 125 MCG Tab Take 1 Tab by mouth every day. 90 Tab 3   • apixaban (ELIQUIS) 5mg Tab Take 1 Tab by mouth 2 Times a Day. TAKE 1 TABLET BY MOUTH TWICE A  Tab 3   • furosemide (LASIX) 20 MG Tab Take 1 Tab by mouth every day. 90 Tab 3   • metoprolol SR (TOPROL XL) 100 MG TABLET SR 24 HR Take 1 Tab by mouth 2 Times a Day. 180 Tab 3   • potassium chloride SA (KDUR) 20 MEQ Tab CR Take 1 Tab by mouth every day. 90 Tab 3   • ezetimibe (ZETIA) 10 MG Tab Take 1 Tab by mouth every day. (Patient taking differently: Take 10 mg by mouth.) 90 Tab 3   • omeprazole (PRILOSEC) 20 MG delayed-release capsule Take 1 Cap by mouth every day. 90 Cap 3   • diazepam (VALIUM) 10 MG tablet Take 1 Tab by mouth every 8 hours as needed for Anxiety for up to 90 days. 90 Tab 2   • [DISCONTINUED] metoprolol SR (TOPROL XL) 100 MG TABLET SR 24 HR Take 1 Tab by mouth 2 Times a Day. 180 Tab 3   • [DISCONTINUED] " "digoxin (LANOXIN) 125 MCG Tab Take 1 Tab by mouth every day. 90 Tab 3   • [DISCONTINUED] furosemide (LASIX) 20 MG Tab Take 1 Tab by mouth every day. 90 Tab 3   • [DISCONTINUED] potassium chloride SA (KDUR) 20 MEQ Tab CR Take 1 Tab by mouth every day. 90 Tab 3   • [DISCONTINUED] ELIQUIS 5 MG Tab TAKE 1 TABLET BY MOUTH TWICE A  Tab 3     No facility-administered encounter medications on file as of 6/20/2018.      Review of Systems   Constitutional: Negative for chills, fever, malaise/fatigue and weight loss.   HENT: Negative for ear discharge, ear pain, hearing loss and nosebleeds.    Eyes: Negative for blurred vision, double vision, pain and discharge.   Respiratory: Negative for cough and shortness of breath.    Cardiovascular: Negative for chest pain, palpitations, orthopnea, claudication, leg swelling and PND.   Gastrointestinal: Negative for abdominal pain, blood in stool, melena, nausea and vomiting.   Genitourinary: Negative for dysuria and hematuria.   Musculoskeletal: Negative for falls, joint pain and myalgias.   Skin: Negative for itching and rash.   Neurological: Negative for dizziness, sensory change, speech change, loss of consciousness and headaches.   Endo/Heme/Allergies: Negative for environmental allergies. Does not bruise/bleed easily.   Psychiatric/Behavioral: Negative for depression, hallucinations and suicidal ideas.        Objective:   /66   Pulse 94   Ht 1.575 m (5' 2\")   Wt 60.8 kg (134 lb)   LMP 05/01/1991   SpO2 97%   BMI 24.51 kg/m²     Physical Exam   Constitutional: She is oriented to person, place, and time. No distress.   HENT:   Head: Normocephalic and atraumatic.   Right Ear: External ear normal.   Left Ear: External ear normal.   Eyes: Right eye exhibits no discharge. Left eye exhibits no discharge.   Neck: No JVD present. No thyromegaly present.   Cardiovascular: Normal rate, normal heart sounds and intact distal pulses.  Exam reveals no gallop and no friction " rub.    No murmur heard.  There is presence of an irregularly irregular heartbeats.     Pulmonary/Chest: Breath sounds normal. No respiratory distress.   Abdominal: Bowel sounds are normal. She exhibits no distension. There is no tenderness.   Musculoskeletal: She exhibits no edema or tenderness.   Neurological: She is alert and oriented to person, place, and time. No cranial nerve deficit.   Skin: Skin is warm and dry. She is not diaphoretic.   Psychiatric: She has a normal mood and affect. Her behavior is normal.   Nursing note and vitals reviewed.      Assessment:     1. Persistent atrial fibrillation (HCC)  digoxin (LANOXIN) 125 MCG Tab    metoprolol SR (TOPROL XL) 100 MG TABLET SR 24 HR    REFERRAL TO SLEEP STUDIES    DIGOXIN    COMP METABOLIC PANEL    LIPID PANEL    [facility]   2. Atrial fibrillation with RVR (HCC)  apixaban (ELIQUIS) 5mg Tab    furosemide (LASIX) 20 MG Tab    potassium chloride SA (KDUR) 20 MEQ Tab CR    REFERRAL TO SLEEP STUDIES    DIGOXIN    COMP METABOLIC PANEL    LIPID PANEL    Count includes the [facility] EKG (Clinic Performed)   3. Chronic diastolic heart failure (HCC)  furosemide (LASIX) 20 MG Tab   4. Essential hypertension  metoprolol SR (TOPROL XL) 100 MG TABLET SR 24 HR   5. Excessive daytime sleepiness  REFERRAL TO SLEEP STUDIES   6. Dyslipidemia         Medical Decision Making:  Today's Assessment / Status / Plan:   Today, based on physical examination findings, patient is euvolemic. No JVD, lungs are clear to auscultation, no pitting edema in bilateral lower extremities, no ascites.    Dry weight is 134 lbs.    Blood pressure is well controlled.    I have independently reviewed blood tests results with patient in clinic which shows normal LDL level, triglycerides, renal and liver function.    Cont current medications at current dose.     Anticoagulation with Eliquis.    Rate control strategy.    I will see patient back in clinic with lab tests and studies results in 6  months.    I thank you Dr. Tesfaye for referring patient to our Cardiology Clinic today.

## 2018-06-22 ENCOUNTER — TELEPHONE (OUTPATIENT)
Dept: CARDIOLOGY | Facility: MEDICAL CENTER | Age: 78
End: 2018-06-22

## 2018-06-22 NOTE — TELEPHONE ENCOUNTER
Patient has questions about referral to Pulmonary   Received: Today   Message Contents   Thais Conklin R.N.             JAMIE/Breana     Patient has received a referral for Pulmonary and wants to find out what this is for. She wants a call back at [phone].      Returned call. Pt has questions. Pt educated that the referral was for sleep study. Pt would like to know where it is, Pt reluctant. Pt advised to call and schedule and ask to be put on wait list also. Pt also given Radha Ferrara's contact information for assistance with transportation to all these upcoming appointments. Pt ecstatic by this assistance and call back.

## 2018-09-10 ENCOUNTER — OFFICE VISIT (OUTPATIENT)
Dept: MEDICAL GROUP | Facility: MEDICAL CENTER | Age: 78
End: 2018-09-10
Payer: MEDICARE

## 2018-09-10 VITALS
BODY MASS INDEX: 24.11 KG/M2 | RESPIRATION RATE: 16 BRPM | DIASTOLIC BLOOD PRESSURE: 80 MMHG | HEIGHT: 62 IN | SYSTOLIC BLOOD PRESSURE: 108 MMHG | TEMPERATURE: 98 F | HEART RATE: 59 BPM | WEIGHT: 131 LBS | OXYGEN SATURATION: 96 %

## 2018-09-10 DIAGNOSIS — H40.1130 PRIMARY OPEN ANGLE GLAUCOMA OF BOTH EYES, UNSPECIFIED GLAUCOMA STAGE: ICD-10-CM

## 2018-09-10 DIAGNOSIS — E11.9 DIABETES MELLITUS TYPE II, NON INSULIN DEPENDENT (HCC): ICD-10-CM

## 2018-09-10 DIAGNOSIS — E11.21 CONTROLLED TYPE 2 DIABETES MELLITUS WITH DIABETIC NEPHROPATHY, WITHOUT LONG-TERM CURRENT USE OF INSULIN (HCC): ICD-10-CM

## 2018-09-10 DIAGNOSIS — H25.9 AGE-RELATED CATARACT OF BOTH EYES, UNSPECIFIED AGE-RELATED CATARACT TYPE: ICD-10-CM

## 2018-09-10 DIAGNOSIS — I50.32 CHRONIC DIASTOLIC HEART FAILURE (HCC): ICD-10-CM

## 2018-09-10 DIAGNOSIS — I48.19 PERSISTENT ATRIAL FIBRILLATION (HCC): ICD-10-CM

## 2018-09-10 DIAGNOSIS — E78.5 DYSLIPIDEMIA: Chronic | ICD-10-CM

## 2018-09-10 PROCEDURE — 99213 OFFICE O/P EST LOW 20 MIN: CPT | Performed by: FAMILY MEDICINE

## 2018-09-10 RX ORDER — DIAZEPAM 10 MG/1
TABLET ORAL
Refills: 2 | COMMUNITY
Start: 2018-09-04 | End: 2018-12-16 | Stop reason: SDUPTHER

## 2018-09-10 RX ORDER — TRAVOPROST 0.04 MG/ML
SOLUTION/ DROPS OPHTHALMIC
Refills: 6 | COMMUNITY
Start: 2018-09-05 | End: 2018-12-21

## 2018-09-10 NOTE — PROGRESS NOTES
Chief Complaint   Patient presents with   • Referral Needed     Eye Doctor   • Cataract   • Glaucoma   • Diabetes Mellitus       Subjective:     HPI:   Ivory Rowan presents today with the followin. Age-related cataract of both eyes, unspecified age-related cataract type  Patient would like to see Dr. Aaron again.  Referral is discussed and placed.    2. Primary open angle glaucoma of both eyes, unspecified glaucoma stage  Patient has been seeing ophthalmology but had recently a problem with the eyedrops that seems to be more pharmacy and insurance confusion than anything else.  She would like to see Dr. Aaron who she saw in the past.  States that the Travatan has been very helpful, bringing her eye pressure down significantly.    3. Diabetes mellitus type II, non insulin dependent (HCC)/Controlled type 2 diabetes mellitus with diabetic nephropathy, without long-term current use of insulin (HCC)  States she is generally watching her diet well.  Her weight has been quite stable.  Her last EGFR was improved at a calculation of 57.  Patient seems to have overall stable chronic kidney disease stage III associated with her diabetes.  Patient's glycohemoglobin to me waswithin the target range, very good.  M  4. Dyslipidemia  She is taking the zetia 3 times per week and the muscle aches are less.  She will be rechecking her cholesterol levels in around 6 weeks from now.  Denies chest pain, pressure, palpitations or exertional SOB.    5. Chronic diastolic heart failure (HCC)/Persistent atrial fibrillation (HCC)  Patient has chronic cardiac problems, follows with cardiology.  Has been clinically stable.  Denies increased shortness of breath, chest pain or difficulty breathing when lying down.  She has a cardiology follow-up scheduled for December.        Patient Active Problem List    Diagnosis Date Noted   • Essential hypertension 2010     Priority: High   • Dyslipidemia 2009     Priority:  Medium   • History of adenomatous polyp of colon 06/02/2015     Priority: Low   • Perennial allergic rhinitis with seasonal variation      Priority: Low   • Vertigo      Priority: Low   • IBS (irritable bowel syndrome)      Priority: Low   • Basal cell carcinoma of skin of nose      Priority: Low   • Vitamin D deficiency 12/08/2011     Priority: Low   • GERD (gastroesophageal reflux disease) 12/06/2010     Priority: Low   • Osteopenia 07/23/2009     Priority: Low   • Sinusitis, chronic      Priority: Low   • Bilateral cataracts      Priority: Low   • Primary open angle glaucoma (POAG) of both eyes 09/10/2018   • Persistent atrial fibrillation (HCC)    • Glaucoma, right eye    • CKD (chronic kidney disease) stage 3, GFR 30-59 ml/min 06/22/2017   • Chronic diastolic heart failure (HCC) 06/22/2017   • Type 2 diabetes mellitus, controlled ([facility])    • Chronic anxiety 01/04/2017   • Diabetes mellitus type II, non insulin dependent ([facility]) 11/29/2016   • Financial difficulties 11/29/2016   • Mitral valve regurgitation        Current medicines (including changes today)  Current Outpatient Prescriptions   Medication Sig Dispense Refill   • travoprost (TRAVATAN Z) 0.004 % Solution Place 1 Drop in both eyes every evening.     • digoxin (LANOXIN) 125 MCG Tab Take 1 Tab by mouth every day. 90 Tab 3   • apixaban (ELIQUIS) 5mg Tab Take 1 Tab by mouth 2 Times a Day. TAKE 1 TABLET BY MOUTH TWICE A  Tab 3   • furosemide (LASIX) 20 MG Tab Take 1 Tab by mouth every day. 90 Tab 3   • metoprolol SR (TOPROL XL) 100 MG TABLET SR 24 HR Take 1 Tab by mouth 2 Times a Day. 180 Tab 3   • potassium chloride SA (KDUR) 20 MEQ Tab CR Take 1 Tab by mouth every day. 90 Tab 3   • ezetimibe (ZETIA) 10 MG Tab Take 1 Tab by mouth every day. (Patient taking differently: Take 10 mg by mouth.) 90 Tab 3   • omeprazole (PRILOSEC) 20 MG delayed-release capsule Take 1 Cap by mouth every day. 90 Cap 3   • diazepam (VALIUM) 10 MG tablet TAKE 1 TABLET BY MOUTH  "EVERY 8 HOURS AS NEEDED FOR ANXIETY FOR UP TO 90 DAYS  2   • TRAVATAN Z 0.004 % Solution INSTILL 1 DROP IN BOTH EYES EVERY NIGHT 1 HOUR BEFORE BEDTIME  6     No current facility-administered medications for this visit.        Allergies   Allergen Reactions   • Atorvastatin      myalgias   • Simvastatin      myalgias       ROS: As per HPI       Objective:     Blood pressure 108/80, pulse (!) 59, temperature 36.7 °C (98 °F), resp. rate 16, height 1.575 m (5' 2\"), weight 59.4 kg (131 lb), last menstrual period 05/01/1991, SpO2 96 %. Body mass index is 23.96 kg/m².    Physical Exam:  Constitutional: Well-developed and well-nourished. Not diaphoretic. No distress. Lucid and fluent.  Skin: Skin is warm and dry. No rash noted.  Head: Atraumatic without lesions.  Eyes: Conjunctivae and extraocular motions are normal. Pupils are equal, round, and reactive to light. No scleral icterus.   Mouth/Throat: Tongue normal. Oropharynx is clear and moist. Posterior pharynx without erythema or exudates.  Neck: Supple, trachea midline. No thyromegaly present. No cervical or supraclavicular lymphadenopathy. No JVD or carotid bruits appreciated  Cardiovascular: Regular rate and rhythm today.  Normal S1, S2 without murmur appreciated.  Chest: Effort normal. Clear to auscultation throughout. No adventitious sounds.   Extremities: No cyanosis, clubbing, erythema, nor edema.   Neurological: Alert and oriented x 3. No tremor.    Psychiatric:  Behavior, mood, and affect are appropriate.       Assessment and Plan:     78 y.o. female with the following issues:    1. Age-related cataract of both eyes, unspecified age-related cataract type  REFERRAL TO OPHTHALMOLOGY   2. Primary open angle glaucoma of both eyes, unspecified glaucoma stage  REFERRAL TO OPHTHALMOLOGY   3. Diabetes mellitus type II, non insulin dependent (HCC)  Diabetic Monofilament Lower Extremity Exam   4. Controlled type 2 diabetes mellitus with diabetic nephropathy, without " long-term current use of insulin (HCC)  Diabetic Monofilament Lower Extremity Exam   5. Dyslipidemia     6. Chronic diastolic heart failure (HCC)     7. Persistent atrial fibrillation (HCC)           Followup: Return in about 3 months (around 12/10/2018), or if symptoms worsen or fail to improve.

## 2018-11-01 ENCOUNTER — TELEPHONE (OUTPATIENT)
Dept: CARDIOLOGY | Facility: MEDICAL CENTER | Age: 78
End: 2018-11-01

## 2018-11-01 NOTE — TELEPHONE ENCOUNTER
wants to know if omeprazole would interfere with heart medication   Received: Today   Message Contents   Petrona Conklin R.N.   Phone Number: [phone]             TT/Breana     Pt wants to know if omeprazole would interfere with her heart medication. Please call pt to advise at [phone]      Returned patient call. Pt advised to call pharmacist for this information. Pt discusses she will be having her labs done soon. Pt will call her pharmacist for advice as well. Pt very talkative and chatty.

## 2018-11-08 ENCOUNTER — HOSPITAL ENCOUNTER (OUTPATIENT)
Dept: LAB | Facility: MEDICAL CENTER | Age: 78
End: 2018-11-08
Attending: INTERNAL MEDICINE
Payer: MEDICARE

## 2018-11-08 ENCOUNTER — HOSPITAL ENCOUNTER (OUTPATIENT)
Dept: LAB | Facility: MEDICAL CENTER | Age: 78
End: 2018-11-08
Attending: FAMILY MEDICINE
Payer: MEDICARE

## 2018-11-08 DIAGNOSIS — E11.9 DIABETES MELLITUS TYPE II, NON INSULIN DEPENDENT (HCC): ICD-10-CM

## 2018-11-08 DIAGNOSIS — E11.21 CONTROLLED TYPE 2 DIABETES MELLITUS WITH DIABETIC NEPHROPATHY, WITHOUT LONG-TERM CURRENT USE OF INSULIN (HCC): ICD-10-CM

## 2018-11-08 DIAGNOSIS — I48.19 PERSISTENT ATRIAL FIBRILLATION (HCC): ICD-10-CM

## 2018-11-08 DIAGNOSIS — I48.91 ATRIAL FIBRILLATION WITH RVR (HCC): ICD-10-CM

## 2018-11-08 DIAGNOSIS — K21.9 GASTROESOPHAGEAL REFLUX DISEASE WITHOUT ESOPHAGITIS: Chronic | ICD-10-CM

## 2018-11-08 DIAGNOSIS — E78.5 DYSLIPIDEMIA: Chronic | ICD-10-CM

## 2018-11-08 DIAGNOSIS — I10 ESSENTIAL HYPERTENSION: ICD-10-CM

## 2018-11-08 DIAGNOSIS — N18.30 CKD (CHRONIC KIDNEY DISEASE) STAGE 3, GFR 30-59 ML/MIN (HCC): ICD-10-CM

## 2018-11-08 LAB
ALBUMIN SERPL BCP-MCNC: 4.6 G/DL (ref 3.2–4.9)
ALBUMIN SERPL BCP-MCNC: 4.7 G/DL (ref 3.2–4.9)
ALBUMIN/GLOB SERPL: 1.5 G/DL
ALBUMIN/GLOB SERPL: 1.6 G/DL
ALP SERPL-CCNC: 68 U/L (ref 30–99)
ALP SERPL-CCNC: 70 U/L (ref 30–99)
ALT SERPL-CCNC: 14 U/L (ref 2–50)
ALT SERPL-CCNC: 14 U/L (ref 2–50)
ANION GAP SERPL CALC-SCNC: 10 MMOL/L (ref 0–11.9)
ANION GAP SERPL CALC-SCNC: 9 MMOL/L (ref 0–11.9)
AST SERPL-CCNC: 18 U/L (ref 12–45)
AST SERPL-CCNC: 19 U/L (ref 12–45)
BILIRUB SERPL-MCNC: 0.9 MG/DL (ref 0.1–1.5)
BILIRUB SERPL-MCNC: 0.9 MG/DL (ref 0.1–1.5)
BUN SERPL-MCNC: 25 MG/DL (ref 8–22)
BUN SERPL-MCNC: 26 MG/DL (ref 8–22)
CALCIUM SERPL-MCNC: 10.3 MG/DL (ref 8.5–10.5)
CALCIUM SERPL-MCNC: 10.4 MG/DL (ref 8.5–10.5)
CHLORIDE SERPL-SCNC: 101 MMOL/L (ref 96–112)
CHLORIDE SERPL-SCNC: 101 MMOL/L (ref 96–112)
CHOLEST SERPL-MCNC: 190 MG/DL (ref 100–199)
CO2 SERPL-SCNC: 27 MMOL/L (ref 20–33)
CO2 SERPL-SCNC: 28 MMOL/L (ref 20–33)
CREAT SERPL-MCNC: 1.07 MG/DL (ref 0.5–1.4)
CREAT SERPL-MCNC: 1.1 MG/DL (ref 0.5–1.4)
CREAT UR-MCNC: 165.6 MG/DL
DIGOXIN SERPL-MCNC: 1 NG/ML (ref 0.8–2)
ERYTHROCYTE [DISTWIDTH] IN BLOOD BY AUTOMATED COUNT: 46.4 FL (ref 35.9–50)
EST. AVERAGE GLUCOSE BLD GHB EST-MCNC: 137 MG/DL
GLOBULIN SER CALC-MCNC: 3 G/DL (ref 1.9–3.5)
GLOBULIN SER CALC-MCNC: 3 G/DL (ref 1.9–3.5)
GLUCOSE SERPL-MCNC: 115 MG/DL (ref 65–99)
GLUCOSE SERPL-MCNC: 117 MG/DL (ref 65–99)
HBA1C MFR BLD: 6.4 % (ref 0–5.6)
HCT VFR BLD AUTO: 47.7 % (ref 37–47)
HDLC SERPL-MCNC: 63 MG/DL
HGB BLD-MCNC: 15.3 G/DL (ref 12–16)
LDLC SERPL CALC-MCNC: 103 MG/DL
MCH RBC QN AUTO: 30.1 PG (ref 27–33)
MCHC RBC AUTO-ENTMCNC: 32.1 G/DL (ref 33.6–35)
MCV RBC AUTO: 93.7 FL (ref 81.4–97.8)
MICROALBUMIN UR-MCNC: 1.9 MG/DL
MICROALBUMIN/CREAT UR: 11 MG/G (ref 0–30)
PLATELET # BLD AUTO: 169 K/UL (ref 164–446)
PMV BLD AUTO: 10.1 FL (ref 9–12.9)
POTASSIUM SERPL-SCNC: 4.2 MMOL/L (ref 3.6–5.5)
POTASSIUM SERPL-SCNC: 4.4 MMOL/L (ref 3.6–5.5)
PROT SERPL-MCNC: 7.6 G/DL (ref 6–8.2)
PROT SERPL-MCNC: 7.7 G/DL (ref 6–8.2)
RBC # BLD AUTO: 5.09 M/UL (ref 4.2–5.4)
SODIUM SERPL-SCNC: 138 MMOL/L (ref 135–145)
SODIUM SERPL-SCNC: 138 MMOL/L (ref 135–145)
TRIGL SERPL-MCNC: 118 MG/DL (ref 0–149)
WBC # BLD AUTO: 5.9 K/UL (ref 4.8–10.8)

## 2018-11-08 PROCEDURE — 80053 COMPREHEN METABOLIC PANEL: CPT | Mod: 91

## 2018-11-08 PROCEDURE — 83036 HEMOGLOBIN GLYCOSYLATED A1C: CPT

## 2018-11-08 PROCEDURE — 82570 ASSAY OF URINE CREATININE: CPT

## 2018-11-08 PROCEDURE — 82043 UR ALBUMIN QUANTITATIVE: CPT

## 2018-11-08 PROCEDURE — 80053 COMPREHEN METABOLIC PANEL: CPT

## 2018-11-08 PROCEDURE — 85027 COMPLETE CBC AUTOMATED: CPT

## 2018-11-08 PROCEDURE — 36415 COLL VENOUS BLD VENIPUNCTURE: CPT

## 2018-11-08 PROCEDURE — 80061 LIPID PANEL: CPT

## 2018-11-08 PROCEDURE — 80162 ASSAY OF DIGOXIN TOTAL: CPT

## 2018-12-16 DIAGNOSIS — F41.9 ANXIETY: ICD-10-CM

## 2018-12-17 RX ORDER — DIAZEPAM 10 MG/1
TABLET ORAL
Qty: 90 TAB | Refills: 0 | Status: SHIPPED
Start: 2018-12-17 | End: 2019-01-16

## 2018-12-21 ENCOUNTER — OFFICE VISIT (OUTPATIENT)
Dept: CARDIOLOGY | Facility: MEDICAL CENTER | Age: 78
End: 2018-12-21
Payer: MEDICARE

## 2018-12-21 VITALS
SYSTOLIC BLOOD PRESSURE: 120 MMHG | WEIGHT: 136 LBS | DIASTOLIC BLOOD PRESSURE: 66 MMHG | HEART RATE: 62 BPM | OXYGEN SATURATION: 97 % | BODY MASS INDEX: 25.03 KG/M2 | HEIGHT: 62 IN

## 2018-12-21 DIAGNOSIS — I48.91 ATRIAL FIBRILLATION WITH RVR (HCC): ICD-10-CM

## 2018-12-21 DIAGNOSIS — I48.19 PERSISTENT ATRIAL FIBRILLATION (HCC): ICD-10-CM

## 2018-12-21 DIAGNOSIS — I10 HTN (HYPERTENSION), MALIGNANT: ICD-10-CM

## 2018-12-21 DIAGNOSIS — E78.2 MIXED HYPERLIPIDEMIA: ICD-10-CM

## 2018-12-21 DIAGNOSIS — Z79.899 HIGH RISK MEDICATION USE: ICD-10-CM

## 2018-12-21 DIAGNOSIS — F32.9 MAJOR DEPRESSIVE DISORDER WITH CURRENT ACTIVE EPISODE, UNSPECIFIED DEPRESSION EPISODE SEVERITY, UNSPECIFIED WHETHER RECURRENT: ICD-10-CM

## 2018-12-21 DIAGNOSIS — Z79.01 CHRONIC ANTICOAGULATION: ICD-10-CM

## 2018-12-21 DIAGNOSIS — I50.32 CHRONIC DIASTOLIC HEART FAILURE (HCC): ICD-10-CM

## 2018-12-21 DIAGNOSIS — G47.19 EXCESSIVE DAYTIME SLEEPINESS: ICD-10-CM

## 2018-12-21 PROCEDURE — 99214 OFFICE O/P EST MOD 30 MIN: CPT | Performed by: INTERNAL MEDICINE

## 2018-12-21 RX ORDER — METOPROLOL SUCCINATE 100 MG/1
100 TABLET, EXTENDED RELEASE ORAL 2 TIMES DAILY
Qty: 180 TAB | Refills: 3 | Status: SHIPPED | OUTPATIENT
Start: 2018-12-21 | End: 2019-07-10 | Stop reason: SDUPTHER

## 2018-12-21 RX ORDER — DIGOXIN 125 MCG
125 TABLET ORAL DAILY
Qty: 90 TAB | Refills: 3 | Status: SHIPPED | OUTPATIENT
Start: 2018-12-21 | End: 2019-07-10 | Stop reason: SDUPTHER

## 2018-12-21 RX ORDER — POTASSIUM CHLORIDE 20 MEQ/1
20 TABLET, EXTENDED RELEASE ORAL DAILY
Qty: 90 TAB | Refills: 3 | Status: SHIPPED | OUTPATIENT
Start: 2018-12-21 | End: 2019-12-23

## 2018-12-21 RX ORDER — FUROSEMIDE 20 MG/1
20 TABLET ORAL DAILY
Qty: 90 TAB | Refills: 3 | Status: SHIPPED | OUTPATIENT
Start: 2018-12-21 | End: 2019-06-24 | Stop reason: SDUPTHER

## 2018-12-21 ASSESSMENT — ENCOUNTER SYMPTOMS
HALLUCINATIONS: 0
FEVER: 0
EYE DISCHARGE: 0
BRUISES/BLEEDS EASILY: 0
ORTHOPNEA: 0
FALLS: 0
BLURRED VISION: 0
WEIGHT LOSS: 0
PALPITATIONS: 0
BLOOD IN STOOL: 0
SHORTNESS OF BREATH: 1
VOMITING: 0
EYE PAIN: 0
SPEECH CHANGE: 0
DEPRESSION: 1
CHILLS: 0
SENSORY CHANGE: 0
COUGH: 0
NAUSEA: 0
CLAUDICATION: 0
HEADACHES: 0
DOUBLE VISION: 0
MYALGIAS: 0
PND: 0
ABDOMINAL PAIN: 0
LOSS OF CONSCIOUSNESS: 0
DIZZINESS: 0

## 2018-12-21 NOTE — LETTER
[facility]   [address]  [place] [address]  Phone: [phone]  Fax: [phone]              Ivory Rowan  1940    Encounter Date: 12/21/2018    Shen Washington M.D.          PROGRESS NOTE:  Chief Complaint   Patient presents with   • Atrial Fibrillation     FV       Subjective:   Ivory Rowan is a 78 y.o. female who presents today for cardiac care for chronic atrial fibrillation now has progressed into persistent after prior failed cardioversion. She was in sinus for brief amount of time. Patient was diagnosed with atrial fibrillation earlier in 2016. Patient has had multiple hospitalizations due to A. fib with rapid ventricular rate leading to heart failure exacerbation. Her left ventricular systolic function is documented at about 45-50% on her most recent transthoracic echocardiogram. Patient is anticoagulated. Patient is taking Toprol- mg by mouth twice a day.     Patient cannot afford Tikosyn therapy and she did not go into the hospital for that.     She is feeling fine. No palpitations. No dyspnea.    I have independently interpreted and reviewed blood tests results with patient in clinic which shows normal LDL level, triglycerides, renal and liver function.    At this time, patient also reports of feeling depressed.  This is more pronounced especially around holidays.  She has been seen by a psychiatrist.  She is not suicidal or homicidal.    Past Medical History:   Diagnosis Date   • Atrial fibrillation (HCC)    • Basal cell carcinoma of skin of nose    • CATARACT     Dr. Aaron, Dr. Orellana   • CHF (congestive heart failure) (HCC)    • Colon polyp     tubular adenomas   • Dyslipidemia    • Glaucoma, right eye     Dr. Orellana   • Hypertension    • IBS (irritable bowel syndrome)    • MEDICAL HOME 10/23/12   • Mitral valve regurgitation    • Osteopenia 6/09   • Perennial allergic rhinitis with seasonal variation    • Persistent  "atrial fibrillation (HCC)    • Type 2 diabetes mellitus, controlled (HCC)    • Valvular heart disease     mitral insufficiency   • Vertigo      Past Surgical History:   Procedure Laterality Date   • RECOVERY  7/8/2016    Procedure: CATH LAB-ZAFAR GUIDED CV-TO-LARGE GROUP;  Surgeon: Recoveryonly Surgery;  [place] [facility] [place]:    • COLONOSCOPY  8/2009    Dr. Lopez, 9 polyps   • APPENDECTOMY     • BREAST BIOPSY      left, reports wire report broke off during procedure   • CHOLECYSTECTOMY     • US-NEEDLE CORE BX-BREAST PANEL       Family History   Problem Relation Age of Onset   • Diabetes Mother    • Hypertension Mother    • Stroke Mother    • Cancer Father         lung/larynx   • Cancer Sister         \"female\"   • Genetic Sister         osteoporosis   • Cancer Paternal Aunt         breast   • Cancer Maternal Aunt    • Heart Disease Brother         CABG x 3     Social History     Social History   • Marital status: Single     Spouse name: N/A   • Number of children: 2   • Years of education: N/A     Occupational History   • Retired 2009 [employer]  Retired     Social History Main Topics   • Smoking status: Former Smoker     Packs/day: 0.50     Years: 40.00     Types: Cigarettes     Quit date: 3/1/1996   • Smokeless tobacco: Never Used   • Alcohol use No      Comment: 1-2 drinks once a month   • Drug use: No   • Sexual activity: Not Currently     Partners: Male     Other Topics Concern   • Not on file     Social History Narrative   • No narrative on file     Allergies   Allergen Reactions   • Atorvastatin      myalgias   • Simvastatin      myalgias     Outpatient Encounter Prescriptions as of 12/21/2018   Medication Sig Dispense Refill   • diazepam (VALIUM) 10 MG tablet TAKE 1 TABLET BY MOUTH EVERY 8 HOURS AS NEEDED FOR ANXIETY FOR UP TO 90 DAYS 90 Tab 0   • travoprost (TRAVATAN Z) 0.004 % Solution Place 1 Drop in both eyes every evening.     • digoxin (LANOXIN) 125 MCG Tab Take 1 " "Tab by mouth every day. 90 Tab 3   • apixaban (ELIQUIS) 5mg Tab Take 1 Tab by mouth 2 Times a Day. TAKE 1 TABLET BY MOUTH TWICE A  Tab 3   • furosemide (LASIX) 20 MG Tab Take 1 Tab by mouth every day. 90 Tab 3   • metoprolol SR (TOPROL XL) 100 MG TABLET SR 24 HR Take 1 Tab by mouth 2 Times a Day. 180 Tab 3   • potassium chloride SA (KDUR) 20 MEQ Tab CR Take 1 Tab by mouth every day. 90 Tab 3   • omeprazole (PRILOSEC) 20 MG delayed-release capsule Take 1 Cap by mouth every day. 90 Cap 3   • [DISCONTINUED] TRAVATAN Z 0.004 % Solution INSTILL 1 DROP IN BOTH EYES EVERY NIGHT 1 HOUR BEFORE BEDTIME  6   • [DISCONTINUED] ezetimibe (ZETIA) 10 MG Tab Take 1 Tab by mouth every day. (Patient not taking: Reported on 12/21/2018) 90 Tab 3     No facility-administered encounter medications on file as of 12/21/2018.      Review of Systems   Constitutional: Positive for malaise/fatigue. Negative for chills, fever and weight loss.   HENT: Negative for ear discharge, ear pain, hearing loss and nosebleeds.    Eyes: Negative for blurred vision, double vision, pain and discharge.   Respiratory: Positive for shortness of breath. Negative for cough.    Cardiovascular: Negative for chest pain, palpitations, orthopnea, claudication, leg swelling and PND.   Gastrointestinal: Negative for abdominal pain, blood in stool, melena, nausea and vomiting.   Genitourinary: Negative for dysuria and hematuria.   Musculoskeletal: Negative for falls, joint pain and myalgias.   Skin: Negative for itching and rash.   Neurological: Negative for dizziness, sensory change, speech change, loss of consciousness and headaches.   Endo/Heme/Allergies: Negative for environmental allergies. Does not bruise/bleed easily.   Psychiatric/Behavioral: Positive for depression. Negative for hallucinations and suicidal ideas.        Objective:   /66 (BP Location: Left arm, Patient Position: Sitting, BP Cuff Size: Adult)   Pulse 62   Ht 1.575 m (5' 2\")   Wt " 61.7 kg (136 lb)   LMP 05/01/1991   SpO2 97%   BMI 24.87 kg/m²      Physical Exam   Constitutional: She is oriented to person, place, and time. No distress.   HENT:   Head: Normocephalic and atraumatic.   Right Ear: External ear normal.   Left Ear: External ear normal.   Eyes: Right eye exhibits no discharge. Left eye exhibits no discharge.   Neck: No JVD present. No thyromegaly present.   Cardiovascular: Normal rate, regular rhythm, normal heart sounds and intact distal pulses.  Exam reveals no gallop and no friction rub.    No murmur heard.  Pulmonary/Chest: Breath sounds normal. No respiratory distress.   Abdominal: Bowel sounds are normal. She exhibits no distension. There is no tenderness.   Musculoskeletal: She exhibits no edema or tenderness.   Neurological: She is alert and oriented to person, place, and time. No cranial nerve deficit.   Skin: Skin is warm and dry. She is not diaphoretic.   Psychiatric: She has a normal mood and affect. Her behavior is normal.   Nursing note and vitals reviewed.      Assessment:     1. Persistent atrial fibrillation (HCC)  REFERRAL TO SLEEP STUDIES   2. HTN (hypertension), malignant  REFERRAL TO SLEEP STUDIES   3. Mixed hyperlipidemia     4. Chronic anticoagulation     5. Excessive daytime sleepiness  REFERRAL TO SLEEP STUDIES   6. High risk medication use     7. Major depressive disorder with current active episode, unspecified depression episode severity, unspecified whether recurrent  REFERRAL TO PSYCHIATRY       Medical Decision Making:  Today's Assessment / Status / Plan:   At this time patient is clinically stable in terms of her cardiac standpoint.  Rate controlled.   Continue anticoagulation with Eiquis 5 mg bid, Digoxin 125 mcg po daily.  Will refer to psychiatry.  I will refer patient to have sleep studies for obstructive sleep apnea.    I will see patient back in clinic with lab tests and studies results in 6 months.    I thank you Dr. Tesfaye for referring  patient to our Cardiology Clinic today.    atry.      Checo Tesfaye M.D.  [address] [place] [id number]  VIA In Basket

## 2018-12-21 NOTE — PROGRESS NOTES
Chief Complaint   Patient presents with   • Atrial Fibrillation     FV       Subjective:   Iovry Rowan is a 78 y.o. female who presents today for cardiac care for chronic atrial fibrillation now has progressed into persistent after prior failed cardioversion. She was in sinus for brief amount of time. Patient was diagnosed with atrial fibrillation earlier in 2016. Patient has had multiple hospitalizations due to A. fib with rapid ventricular rate leading to heart failure exacerbation. Her left ventricular systolic function is documented at about 45-50% on her most recent transthoracic echocardiogram. Patient is anticoagulated. Patient is taking Toprol- mg by mouth twice a day.     Patient cannot afford Tikosyn therapy and she did not go into the hospital for that.     She is feeling fine. No palpitations. No dyspnea.    I have independently interpreted and reviewed blood tests results with patient in clinic which shows normal LDL level, triglycerides, renal and liver function.    At this time, patient also reports of feeling depressed.  This is more pronounced especially around holidays.  She has been seen by a psychiatrist.  She is not suicidal or homicidal.    Past Medical History:   Diagnosis Date   • Atrial fibrillation (HCC)    • Basal cell carcinoma of skin of nose    • CATARACT     Dr. Aaron, Dr. Orellana   • CHF (congestive heart failure) (Formerly [facility])    • Colon polyp     tubular adenomas   • Dyslipidemia    • Glaucoma, right eye     Dr. Orellana   • Hypertension    • IBS (irritable bowel syndrome)    • MEDICAL HOME 10/23/12   • Mitral valve regurgitation    • Osteopenia 6/09   • Perennial allergic rhinitis with seasonal variation    • Persistent atrial fibrillation (HCC)    • Type 2 diabetes mellitus, controlled (Formerly [facility])    • Valvular heart disease     mitral insufficiency   • Vertigo      Past Surgical History:   Procedure Laterality Date   • RECOVERY  7/8/2016    Procedure: CATH LAB-ZAFAR GUIDED CV-TO-LARGE  "GROUP;  Surgeon: [facility] Surgery;  [place] [facility] [place]  Service:    • COLONOSCOPY  8/2009    Dr. Lopez, 9 polyps   • APPENDECTOMY     • BREAST BIOPSY      left, reports wire report broke off during procedure   • CHOLECYSTECTOMY     • US-NEEDLE CORE BX-BREAST PANEL       Family History   Problem Relation Age of Onset   • Diabetes Mother    • Hypertension Mother    • Stroke Mother    • Cancer Father         lung/larynx   • Cancer Sister         \"female\"   • Genetic Sister         osteoporosis   • Cancer Paternal Aunt         breast   • Cancer Maternal Aunt    • Heart Disease Brother         CABG x 3     Social History     Social History   • Marital status: Single     Spouse name: N/A   • Number of children: 2   • Years of education: N/A     Occupational History   • Retired 2009 - [employer]     Social History Main Topics   • Smoking status: Former Smoker     Packs/day: 0.50     Years: 40.00     Types: Cigarettes     Quit date: 3/1/1996   • Smokeless tobacco: Never Used   • Alcohol use No      Comment: 1-2 drinks once a month   • Drug use: No   • Sexual activity: Not Currently     Partners: Male     Other Topics Concern   • Not on file     Social History Narrative   • No narrative on file     Allergies   Allergen Reactions   • Atorvastatin      myalgias   • Simvastatin      myalgias     Outpatient Encounter Prescriptions as of 12/21/2018   Medication Sig Dispense Refill   • diazepam (VALIUM) 10 MG tablet TAKE 1 TABLET BY MOUTH EVERY 8 HOURS AS NEEDED FOR ANXIETY FOR UP TO 90 DAYS 90 Tab 0   • travoprost (TRAVATAN Z) 0.004 % Solution Place 1 Drop in both eyes every evening.     • digoxin (LANOXIN) 125 MCG Tab Take 1 Tab by mouth every day. 90 Tab 3   • apixaban (ELIQUIS) 5mg Tab Take 1 Tab by mouth 2 Times a Day. TAKE 1 TABLET BY MOUTH TWICE A  Tab 3   • furosemide (LASIX) 20 MG Tab Take 1 Tab by mouth every day. 90 Tab 3   • metoprolol SR (TOPROL XL) 100 MG TABLET SR 24 " "HR Take 1 Tab by mouth 2 Times a Day. 180 Tab 3   • potassium chloride SA (KDUR) 20 MEQ Tab CR Take 1 Tab by mouth every day. 90 Tab 3   • omeprazole (PRILOSEC) 20 MG delayed-release capsule Take 1 Cap by mouth every day. 90 Cap 3   • [DISCONTINUED] TRAVATAN Z 0.004 % Solution INSTILL 1 DROP IN BOTH EYES EVERY NIGHT 1 HOUR BEFORE BEDTIME  6   • [DISCONTINUED] ezetimibe (ZETIA) 10 MG Tab Take 1 Tab by mouth every day. (Patient not taking: Reported on 12/21/2018) 90 Tab 3     No facility-administered encounter medications on file as of 12/21/2018.      Review of Systems   Constitutional: Positive for malaise/fatigue. Negative for chills, fever and weight loss.   HENT: Negative for ear discharge, ear pain, hearing loss and nosebleeds.    Eyes: Negative for blurred vision, double vision, pain and discharge.   Respiratory: Positive for shortness of breath. Negative for cough.    Cardiovascular: Negative for chest pain, palpitations, orthopnea, claudication, leg swelling and PND.   Gastrointestinal: Negative for abdominal pain, blood in stool, melena, nausea and vomiting.   Genitourinary: Negative for dysuria and hematuria.   Musculoskeletal: Negative for falls, joint pain and myalgias.   Skin: Negative for itching and rash.   Neurological: Negative for dizziness, sensory change, speech change, loss of consciousness and headaches.   Endo/Heme/Allergies: Negative for environmental allergies. Does not bruise/bleed easily.   Psychiatric/Behavioral: Positive for depression. Negative for hallucinations and suicidal ideas.        Objective:   /66 (BP Location: Left arm, Patient Position: Sitting, BP Cuff Size: Adult)   Pulse 62   Ht 1.575 m (5' 2\")   Wt 61.7 kg (136 lb)   LMP 05/01/1991   SpO2 97%   BMI 24.87 kg/m²     Physical Exam   Constitutional: She is oriented to person, place, and time. No distress.   HENT:   Head: Normocephalic and atraumatic.   Right Ear: External ear normal.   Left Ear: External ear " normal.   Eyes: Right eye exhibits no discharge. Left eye exhibits no discharge.   Neck: No JVD present. No thyromegaly present.   Cardiovascular: Normal rate, regular rhythm, normal heart sounds and intact distal pulses.  Exam reveals no gallop and no friction rub.    No murmur heard.  Pulmonary/Chest: Breath sounds normal. No respiratory distress.   Abdominal: Bowel sounds are normal. She exhibits no distension. There is no tenderness.   Musculoskeletal: She exhibits no edema or tenderness.   Neurological: She is alert and oriented to person, place, and time. No cranial nerve deficit.   Skin: Skin is warm and dry. She is not diaphoretic.   Psychiatric: She has a normal mood and affect. Her behavior is normal.   Nursing note and vitals reviewed.      Assessment:     1. Persistent atrial fibrillation (HCC)  REFERRAL TO SLEEP STUDIES   2. HTN (hypertension), malignant  REFERRAL TO SLEEP STUDIES   3. Mixed hyperlipidemia     4. Chronic anticoagulation     5. Excessive daytime sleepiness  REFERRAL TO SLEEP STUDIES   6. High risk medication use     7. Major depressive disorder with current active episode, unspecified depression episode severity, unspecified whether recurrent  REFERRAL TO PSYCHIATRY       Medical Decision Making:  Today's Assessment / Status / Plan:   At this time patient is clinically stable in terms of her cardiac standpoint.  Rate controlled.   Continue anticoagulation with Eiquis 5 mg bid, Digoxin 125 mcg po daily.  Will refer to psychiatry.  I will refer patient to have sleep studies for obstructive sleep apnea.    I will see patient back in clinic with lab tests and studies results in 6 months.    I thank you Dr. Tesfaye for referring patient to our Cardiology Clinic today.

## 2019-02-06 ENCOUNTER — OFFICE VISIT (OUTPATIENT)
Dept: MEDICAL GROUP | Facility: MEDICAL CENTER | Age: 79
End: 2019-02-06
Payer: MEDICARE

## 2019-02-06 VITALS
RESPIRATION RATE: 12 BRPM | HEART RATE: 68 BPM | HEIGHT: 62 IN | SYSTOLIC BLOOD PRESSURE: 108 MMHG | WEIGHT: 136 LBS | OXYGEN SATURATION: 95 % | BODY MASS INDEX: 25.03 KG/M2 | DIASTOLIC BLOOD PRESSURE: 80 MMHG | TEMPERATURE: 97.4 F

## 2019-02-06 DIAGNOSIS — E78.5 DYSLIPIDEMIA: Chronic | ICD-10-CM

## 2019-02-06 DIAGNOSIS — K21.9 GASTROESOPHAGEAL REFLUX DISEASE WITHOUT ESOPHAGITIS: Chronic | ICD-10-CM

## 2019-02-06 DIAGNOSIS — E11.21 CONTROLLED TYPE 2 DIABETES MELLITUS WITH DIABETIC NEPHROPATHY, WITHOUT LONG-TERM CURRENT USE OF INSULIN (HCC): ICD-10-CM

## 2019-02-06 DIAGNOSIS — I50.32 CHRONIC DIASTOLIC HEART FAILURE (HCC): ICD-10-CM

## 2019-02-06 DIAGNOSIS — Z86.010 HISTORY OF ADENOMATOUS POLYP OF COLON: ICD-10-CM

## 2019-02-06 DIAGNOSIS — E55.9 VITAMIN D DEFICIENCY: Chronic | ICD-10-CM

## 2019-02-06 DIAGNOSIS — R05.3 PERSISTENT COUGH FOR 3 WEEKS OR LONGER: ICD-10-CM

## 2019-02-06 DIAGNOSIS — I10 ESSENTIAL HYPERTENSION: Chronic | ICD-10-CM

## 2019-02-06 DIAGNOSIS — I48.19 PERSISTENT ATRIAL FIBRILLATION (HCC): ICD-10-CM

## 2019-02-06 DIAGNOSIS — N18.30 CKD (CHRONIC KIDNEY DISEASE) STAGE 3, GFR 30-59 ML/MIN (HCC): ICD-10-CM

## 2019-02-06 DIAGNOSIS — J34.89 NASAL DRAINAGE: ICD-10-CM

## 2019-02-06 DIAGNOSIS — E11.9 DIABETES MELLITUS TYPE II, NON INSULIN DEPENDENT (HCC): ICD-10-CM

## 2019-02-06 LAB — GLUCOSE BLD-MCNC: 111 MG/DL (ref 70–100)

## 2019-02-06 PROCEDURE — 99214 OFFICE O/P EST MOD 30 MIN: CPT | Performed by: FAMILY MEDICINE

## 2019-02-06 PROCEDURE — 82962 GLUCOSE BLOOD TEST: CPT | Performed by: FAMILY MEDICINE

## 2019-02-06 RX ORDER — EZETIMIBE 10 MG/1
10 TABLET ORAL
Qty: 90 TAB | Refills: 3 | Status: SHIPPED | OUTPATIENT
Start: 2019-02-06 | End: 2019-05-29 | Stop reason: SDUPTHER

## 2019-02-06 RX ORDER — TRAVOPROST 0.04 MG/ML
SOLUTION/ DROPS OPHTHALMIC
Refills: 5 | COMMUNITY
Start: 2019-01-23 | End: 2019-08-06

## 2019-02-06 NOTE — PROGRESS NOTES
Diabetes Focused Exam:    Chief Complaint   Patient presents with   • Diabetes Mellitus   • Hypertension   • Chronic Kidney Disease   • Hyperlipidemia      Subjective:   HPI  Ivory Rowan is a 78 y.o. female who presents for follow up of chronic conditions of diabetes mellitus, hypertension and hyperlipidemia. She indicates that she is feeling well and denies any symptoms referable to the above diagnoses. Specifically denies chest pain, palpitations, dyspnea, orthopnea, PND or peripheral edema. Also denies polyuria, polydipsia, urinary complaints, abdominal complaints, myalgias, numbness, weakness or other related symptoms.     She gets cough and drainage when she eats.  The drainage is clear.  Denies fever or chills.  She worries there may be a tumor.  She is very concerned.  CT ordered after discussion.    She has CKD, has been stable.  E-GFR 48.  Is stable.     The patient is not taking ASA every day she is on Eliquis.  She is taking all other medications as prescribed. Patient denies any side effects of medication.  DM: A1c goal <7  Glucose monitoring frequency: declines, not needed  Hypoglycemic episodes none  Diabetic complications: none  ACR Albumin/Creatinine Ratio goal <30   HTN: Blood pressure goal <140/<80. Currently Rx ACE/ARB: No  Hyperlipidemia:Cholesterol goal LDL <100, total/HDL <5. Currently Rx Statin: Not Indicated.  Does not tolerate.  Is on zetia.  Last eye exam 8/2018  Denies visual blurring, double vision, eye pain and floaters  Last monofilament foot exam: 9/2018  Denies foot pain, numbness, calluses, ulcers    See medications and orders placed in encounter report.  Past medical history, family history, social history reviewed and updated as documented in medical record.    Current medications including changes today:  Current Outpatient Prescriptions   Medication Sig Dispense Refill   • ezetimibe (ZETIA) 10 MG Tab Take 1 Tab by mouth every day. 90 Tab 3   • TRAVATAN Z 0.004 % Solution  "INSTILL 1 DROP IN BOTH EYES EVERY NIGHT 1 HOUR BEFORE BEDTIME  5   • digoxin (LANOXIN) 125 MCG Tab Take 1 Tab by mouth every day. 90 Tab 3   • furosemide (LASIX) 20 MG Tab Take 1 Tab by mouth every day. 90 Tab 3   • metoprolol SR (TOPROL XL) 100 MG TABLET SR 24 HR Take 1 Tab by mouth 2 Times a Day. 180 Tab 3   • potassium chloride SA (KDUR) 20 MEQ Tab CR Take 1 Tab by mouth every day. 90 Tab 3   • apixaban (ELIQUIS) 5mg Tab Take 1 Tab by mouth 2 Times a Day. TAKE 1 TABLET BY MOUTH TWICE A  Tab 3   • travoprost (TRAVATAN Z) 0.004 % Solution Place 1 Drop in both eyes every evening.     • omeprazole (PRILOSEC) 20 MG delayed-release capsule Take 1 Cap by mouth every day. 90 Cap 3     No current facility-administered medications for this visit.      Allergies:   Allergies   Allergen Reactions   • Atorvastatin      myalgias   • Simvastatin      myalgias      Social History   Substance Use Topics   • Smoking status: Former Smoker     Packs/day: 0.50     Years: 40.00     Types: Cigarettes     Quit date: 3/1/1996   • Smokeless tobacco: Never Used   • Alcohol use No      Comment: 1-2 drinks once a month     Exercise: she is walking on a regular basis  Health Maintenance/Immunizations: discussed, declines immunization today    ROS  Pertinent  ROS findings as above.     Annual Health Assessment Questions:    1.  Are you currently engaging in any exercise or physical activity? No    2.  How would you describe your mood or emotional well-being today? unhappy    3.  Have you had any falls in the last year? No    4.  Have you noticed any problems with your balance or had difficulty walking? Yes    5.  In the last six months have you experienced any leakage of urine? No    6. DPA/Advanced Directive: Patient does not have an Advanced Directive.  A packet and workshop information was given on Advanced Directives.       Objective:     OBJECTIVE:  /80   Pulse 68   Temp 36.3 °C (97.4 °F)   Resp 12   Ht 1.575 m (5' 2\")   " Wt 61.7 kg (136 lb)   LMP 05/01/1991   SpO2 95%   BMI 24.87 kg/m²  Body mass index is 24.87 kg/m². BMI: not obese  General: No apparent distress, conversant, cooperative and pleasant with the examination.  Psych: Alert and oriented x4, judgment and insight normal  Neck: No JVD or bruits, no adenopathy, supple  Thyroid: normal to inspection and palpation  Lungs: negative findings: normal respiratory rate and rhythm, lungs clear to auscultation  Heart: negative findings: regular rate and rhythm, S1 normal, S2 normal, no murmurs, clicks, or gallops  Abdomen: Soft, nontender, no hepatosplenomegaly or masses, normal bowel sounds  Skin: No rashes, no cyanosis, no lesions or ulcers  Extremities: No cyanosis clubbing or edema.   Feet are examined     POC labs:   Lab Results   Component Value Date/Time    POCGLUCOSE 111 (A) 02/06/2019 12:04 PM          Last labs    Lab Results   Component Value Date/Time    CHOLSTRLTOT 190 11/08/2018 07:17 AM     (H) 11/08/2018 07:17 AM    HDL 63 11/08/2018 07:17 AM    TRIGLYCERIDE 118 11/08/2018 07:17 AM       Lab Results   Component Value Date/Time    SODIUM 138 11/08/2018 07:17 AM    POTASSIUM 4.2 11/08/2018 07:17 AM    GLUCOSE 115 (H) 11/08/2018 07:17 AM    BUNCREATRAT 26 04/11/2011 12:00 AM    GLOMRATE 56 (L) 03/09/2011 07:55 AM     Lab Results   Component Value Date/Time    HBA1C 6.4 (H) 11/08/2018 07:17 AM    HBA1C 6.3 (H) 05/02/2018 06:13 AM    HBA1C 6.2 (H) 11/01/2017 06:40 AM     Lab Results   Component Value Date/Time    MALBCRT 11 11/08/2018 07:16 AM    MICROALBUR 1.9 11/08/2018 07:16 AM          Assessment/Plan:   Medications, refills, and referrals per orders.   1. Diabetes mellitus type II, non insulin dependent (HCC)  HEMOGLOBIN A1C    MICROALBUMIN CREAT RATIO URINE    COMP METABOLIC PANEL    Lipid Profile    POCT Glucose  Stable, continue medication regimen   2. Controlled type 2 diabetes mellitus with diabetic nephropathy, without long-term current use of insulin  (HCC)  HEMOGLOBIN A1C    Stable, continue medication regimen.  Lab orders discussed and placed.    COMP METABOLIC PANEL    POCT Glucose   3. Essential hypertension  COMP METABOLIC PANEL stable, continue medication regimen, see notation above   4. CKD (chronic kidney disease) stage 3, GFR 30-59 ml/min (HCC)  COMP METABOLIC PANEL  Stable, continue current medication regimen    CBC WITHOUT DIFFERENTIAL   5. Dyslipidemia  COMP METABOLIC PANEL    Lipid Profile    ezetimibe (ZETIA) 10 MG Tab lab orders are discussed and placed.  Medication is renewed.  Stable, continue regimen.   6. Gastroesophageal reflux disease without esophagitis  CBC WITHOUT DIFFERENTIAL  Patient tolerates her current dietary regimen well.  She continues the omeprazole which is successful.  Stable, continue regimen.   7. Vitamin D deficiency  VITAMIN D,25 HYDROXY stable, continue regimen, follow-up lab orders discussed and placed.   8. History of adenomatous polyp of colon   patient plans on colonoscopy repeat in August this year if she is still stable enough to do so.  Clinically the problem is currently stable.   9. Persistent atrial fibrillation (HCC)   stable, continue current regimen.  She will continue cardiology assessment and treatment.   10. Chronic diastolic heart failure (HCC)   stable, continue regimen with cardiology.   11. Nasal drainage  CT-LOCALIZATION LIMITED SINUSES persistent and unfortunately stable.  CT discussed for further evaluation.   12. Persistent cough for 3 weeks or longer  CT-LOCALIZATION LIMITED SINUSES generally stable though CT discussed and order placed.     DM2 A1c is at goal   Patient to monitor sugars: declines   Discussed diet, exercise, disease management.   Education and advise provided today:All medications, side effects and compliance (discussed carefully)  Annual eye examinations at Ophthalmology  Diabetic diet discussed in detail, written exchange diet given  Foot care discussed and Podiatry  visits  Glycohemoglobin and other lab monitoring  Home glucose monitoring emphasized  Labs immediately prior to next visit  Low cholesterol diet, weight control and daily exercise    Followup:   Do labs and RTC 6 months, sooner should new symptoms or problems arise.

## 2019-02-14 ENCOUNTER — HOSPITAL ENCOUNTER (OUTPATIENT)
Dept: RADIOLOGY | Facility: MEDICAL CENTER | Age: 79
End: 2019-02-14
Attending: FAMILY MEDICINE
Payer: MEDICARE

## 2019-02-14 DIAGNOSIS — R05.3 PERSISTENT COUGH FOR 3 WEEKS OR LONGER: ICD-10-CM

## 2019-02-14 DIAGNOSIS — J34.89 NASAL DRAINAGE: ICD-10-CM

## 2019-02-14 DIAGNOSIS — R05.9 COUGH: ICD-10-CM

## 2019-02-14 PROCEDURE — 70486 CT MAXILLOFACIAL W/O DYE: CPT

## 2019-02-22 ENCOUNTER — TELEPHONE (OUTPATIENT)
Dept: MEDICAL GROUP | Facility: MEDICAL CENTER | Age: 79
End: 2019-02-22

## 2019-02-22 DIAGNOSIS — J32.0 CHRONIC MAXILLARY SINUSITIS: ICD-10-CM

## 2019-02-22 RX ORDER — AMOXICILLIN 875 MG/1
875 TABLET, COATED ORAL 2 TIMES DAILY
Qty: 14 TAB | Refills: 0 | Status: SHIPPED | OUTPATIENT
Start: 2019-02-22 | End: 2019-03-01

## 2019-02-22 NOTE — TELEPHONE ENCOUNTER
Thank you for the clarification.  I have prescribed amoxicillin and have sent that to [facility] pharmacy.  I have run an interaction program and there are no interactions with her medications.  This will not increase her risk of stroke.

## 2019-04-01 DIAGNOSIS — F41.9 CHRONIC ANXIETY: ICD-10-CM

## 2019-04-02 RX ORDER — DIAZEPAM 10 MG/1
10 TABLET ORAL EVERY 12 HOURS PRN
Qty: 90 TAB | Refills: 0 | Status: SHIPPED
Start: 2019-04-02 | End: 2019-07-01

## 2019-04-03 ENCOUNTER — HOSPITAL ENCOUNTER (OUTPATIENT)
Dept: LAB | Facility: MEDICAL CENTER | Age: 79
End: 2019-04-03
Attending: FAMILY MEDICINE
Payer: MEDICARE

## 2019-04-03 DIAGNOSIS — K21.9 GASTROESOPHAGEAL REFLUX DISEASE WITHOUT ESOPHAGITIS: Chronic | ICD-10-CM

## 2019-04-03 DIAGNOSIS — E78.5 DYSLIPIDEMIA: Chronic | ICD-10-CM

## 2019-04-03 DIAGNOSIS — E11.9 DIABETES MELLITUS TYPE II, NON INSULIN DEPENDENT (HCC): ICD-10-CM

## 2019-04-03 DIAGNOSIS — E55.9 VITAMIN D DEFICIENCY: Chronic | ICD-10-CM

## 2019-04-03 DIAGNOSIS — I10 ESSENTIAL HYPERTENSION: Chronic | ICD-10-CM

## 2019-04-03 DIAGNOSIS — E11.21 CONTROLLED TYPE 2 DIABETES MELLITUS WITH DIABETIC NEPHROPATHY, WITHOUT LONG-TERM CURRENT USE OF INSULIN (HCC): ICD-10-CM

## 2019-04-03 DIAGNOSIS — N18.30 CKD (CHRONIC KIDNEY DISEASE) STAGE 3, GFR 30-59 ML/MIN (HCC): ICD-10-CM

## 2019-04-03 LAB
25(OH)D3 SERPL-MCNC: 30 NG/ML (ref 30–100)
ALBUMIN SERPL BCP-MCNC: 4.4 G/DL (ref 3.2–4.9)
ALBUMIN/GLOB SERPL: 1.6 G/DL
ALP SERPL-CCNC: 71 U/L (ref 30–99)
ALT SERPL-CCNC: 24 U/L (ref 2–50)
ANION GAP SERPL CALC-SCNC: 9 MMOL/L (ref 0–11.9)
AST SERPL-CCNC: 30 U/L (ref 12–45)
BILIRUB SERPL-MCNC: 0.6 MG/DL (ref 0.1–1.5)
BUN SERPL-MCNC: 25 MG/DL (ref 8–22)
CALCIUM SERPL-MCNC: 9.5 MG/DL (ref 8.5–10.5)
CHLORIDE SERPL-SCNC: 104 MMOL/L (ref 96–112)
CHOLEST SERPL-MCNC: 174 MG/DL (ref 100–199)
CO2 SERPL-SCNC: 27 MMOL/L (ref 20–33)
CREAT SERPL-MCNC: 1.01 MG/DL (ref 0.5–1.4)
CREAT UR-MCNC: 54.5 MG/DL
ERYTHROCYTE [DISTWIDTH] IN BLOOD BY AUTOMATED COUNT: 47.5 FL (ref 35.9–50)
EST. AVERAGE GLUCOSE BLD GHB EST-MCNC: 140 MG/DL
GLOBULIN SER CALC-MCNC: 2.7 G/DL (ref 1.9–3.5)
GLUCOSE SERPL-MCNC: 117 MG/DL (ref 65–99)
HBA1C MFR BLD: 6.5 % (ref 0–5.6)
HCT VFR BLD AUTO: 47.5 % (ref 37–47)
HDLC SERPL-MCNC: 60 MG/DL
HGB BLD-MCNC: 14.9 G/DL (ref 12–16)
LDLC SERPL CALC-MCNC: 97 MG/DL
MCH RBC QN AUTO: 30.3 PG (ref 27–33)
MCHC RBC AUTO-ENTMCNC: 31.4 G/DL (ref 33.6–35)
MCV RBC AUTO: 96.5 FL (ref 81.4–97.8)
MICROALBUMIN UR-MCNC: 3 MG/DL
MICROALBUMIN/CREAT UR: 55 MG/G (ref 0–30)
PLATELET # BLD AUTO: 151 K/UL (ref 164–446)
PMV BLD AUTO: 10.3 FL (ref 9–12.9)
POTASSIUM SERPL-SCNC: 4.2 MMOL/L (ref 3.6–5.5)
PROT SERPL-MCNC: 7.1 G/DL (ref 6–8.2)
RBC # BLD AUTO: 4.92 M/UL (ref 4.2–5.4)
SODIUM SERPL-SCNC: 140 MMOL/L (ref 135–145)
TRIGL SERPL-MCNC: 85 MG/DL (ref 0–149)
WBC # BLD AUTO: 6.9 K/UL (ref 4.8–10.8)

## 2019-04-03 PROCEDURE — 36415 COLL VENOUS BLD VENIPUNCTURE: CPT

## 2019-04-03 PROCEDURE — 82570 ASSAY OF URINE CREATININE: CPT

## 2019-04-03 PROCEDURE — 80053 COMPREHEN METABOLIC PANEL: CPT

## 2019-04-03 PROCEDURE — 82043 UR ALBUMIN QUANTITATIVE: CPT

## 2019-04-03 PROCEDURE — 83036 HEMOGLOBIN GLYCOSYLATED A1C: CPT

## 2019-04-03 PROCEDURE — 80061 LIPID PANEL: CPT

## 2019-04-03 PROCEDURE — 82306 VITAMIN D 25 HYDROXY: CPT

## 2019-04-03 PROCEDURE — 85027 COMPLETE CBC AUTOMATED: CPT

## 2019-04-05 ENCOUNTER — DOCUMENTATION (OUTPATIENT)
Dept: MEDICAL GROUP | Facility: MEDICAL CENTER | Age: 79
End: 2019-04-05

## 2019-04-05 NOTE — PROGRESS NOTES
Called pharmacy for patient and gave them the Valium Rx over the phone. Called Ivory to notify her as well.

## 2019-05-29 ENCOUNTER — OFFICE VISIT (OUTPATIENT)
Dept: MEDICAL GROUP | Facility: MEDICAL CENTER | Age: 79
End: 2019-05-29
Payer: MEDICARE

## 2019-05-29 VITALS
HEIGHT: 62 IN | SYSTOLIC BLOOD PRESSURE: 104 MMHG | BODY MASS INDEX: 25.58 KG/M2 | HEART RATE: 64 BPM | DIASTOLIC BLOOD PRESSURE: 64 MMHG | TEMPERATURE: 98.8 F | WEIGHT: 139 LBS | OXYGEN SATURATION: 94 % | RESPIRATION RATE: 12 BRPM
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
## 2019-05-29 DIAGNOSIS — E78.5 DYSLIPIDEMIA: Chronic | ICD-10-CM

## 2019-05-29 DIAGNOSIS — S00.83XA CONTUSION OF CHIN, INITIAL ENCOUNTER: ICD-10-CM

## 2019-05-29 DIAGNOSIS — I10 ESSENTIAL HYPERTENSION: Chronic | ICD-10-CM

## 2019-05-29 DIAGNOSIS — E11.9 DIABETES MELLITUS TYPE II, NON INSULIN DEPENDENT (HCC): ICD-10-CM

## 2019-05-29 DIAGNOSIS — I48.19 PERSISTENT ATRIAL FIBRILLATION (HCC): ICD-10-CM

## 2019-05-29 DIAGNOSIS — I50.32 CHRONIC DIASTOLIC HEART FAILURE (HCC): ICD-10-CM

## 2019-05-29 DIAGNOSIS — E11.21 CONTROLLED TYPE 2 DIABETES MELLITUS WITH DIABETIC NEPHROPATHY, WITHOUT LONG-TERM CURRENT USE OF INSULIN (HCC): ICD-10-CM

## 2019-05-29 DIAGNOSIS — K21.9 GASTROESOPHAGEAL REFLUX DISEASE WITHOUT ESOPHAGITIS: Chronic | ICD-10-CM

## 2019-05-29 DIAGNOSIS — N18.30 CKD (CHRONIC KIDNEY DISEASE) STAGE 3, GFR 30-59 ML/MIN (HCC): ICD-10-CM

## 2019-05-29 PROCEDURE — 8041 PR SCP AHA: Performed by: FAMILY MEDICINE

## 2019-05-29 PROCEDURE — 99214 OFFICE O/P EST MOD 30 MIN: CPT | Performed by: FAMILY MEDICINE

## 2019-05-29 RX ORDER — EZETIMIBE 10 MG/1
10 TABLET ORAL
Qty: 45 TAB | Refills: 3 | Status: SHIPPED | OUTPATIENT
Start: 2019-05-29 | End: 2019-07-10 | Stop reason: SDUPTHER

## 2019-05-29 RX ORDER — OMEPRAZOLE 20 MG/1
20 CAPSULE, DELAYED RELEASE ORAL
Qty: 90 CAP | Refills: 3 | Status: SHIPPED | OUTPATIENT
Start: 2019-05-29 | End: 2020-04-19

## 2019-05-29 ASSESSMENT — PATIENT HEALTH QUESTIONNAIRE - PHQ9: CLINICAL INTERPRETATION OF PHQ2 SCORE: 0

## 2019-05-29 NOTE — PROGRESS NOTES
Diabetes Focused Exam:    Chief Complaint   Patient presents with   • Diabetes Mellitus   • Hypertension   • Hyperlipidemia   • Congestive Heart Failure   • Chronic Kidney Disease      Subjective:   HPI  Ivory Rowan is a 79 y.o. female who presents for follow up of chronic conditions of diabetes mellitus, hypertension and hyperlipidemia. She indicates that she is feeling well and denies any symptoms referable to the above diagnoses. Specifically denies chest pain, palpitations, dyspnea, orthopnea, PND or peripheral edema. Also denies polyuria, polydipsia, urinary complaints, abdominal complaints, myalgias, numbness, weakness or other related symptoms.     She sees cardiology for her congestive heart failure.  She has been stable on her medication regimen.  Per Dr. ny notes she has chronic congestive heart failure and persistent atrial fibrillation.  Clinically these are stable with no increased shortness of breath or increased fatigue.  She will continue to follow with cardiology.    History of adenomatous polyps.  Last colonoscopy 8/2009.  Has problems with the GI office.  Asked her if she wants to get this done.  She is trying to make this happen.    Her chronic kidney disease is stable, doing very well.  We will continue to check periodically.    She continues to see Dr. Aaron.  She had eye stents. Feels she is seeing well overall.  States she is taking her eyedrops for her glaucoma.    Patient had a fall a little over 4 days ago.  Bruise right lower face/chin, bruising left thumb.  Bruises on her knees as well.  Patient says she tripped because her slippers are too big.  She fell quite hard.  Patient notes worsened balance.  Daughter confirms.    She has GERD, omeprazole is helpful.  States she has occasional abdominal pain.  Denies blood in the stool.  Denies vomiting.    The patient is not taking ASA every day as she is on elequis and she is taking all other medications as prescribed. Patient denies  any side effects of medication.  DM: A1c goal <7  Glucose monitoring frequency: declines  Hypoglycemic episodes none  Diabetic complications: none  ACR Albumin/Creatinine Ratio goal <30   HTN: Blood pressure goal <140/<80. Currently Rx ACE/ARB: No  Hyperlipidemia:Cholesterol goal LDL <100, total/HDL <5 . Currently Rx Statin: No, on Zetia  Last eye exam 8/2018  Denies visual blurring, double vision, eye pain and floaters  Last monofilament foot exam: 9/2018  Denies foot pain, numbness, calluses, ulcers    See medications and orders placed in encounter report.  Past medical history, family history, social history reviewed and updated as documented in medical record.    Current medications including changes today:  Current Outpatient Prescriptions   Medication Sig Dispense Refill   • ezetimibe (ZETIA) 10 MG Tab Take 1 Tab by mouth every Monday, Wednesday, and Friday. 45 Tab 3   • omeprazole (PRILOSEC) 20 MG delayed-release capsule Take 1 Cap by mouth every day. 90 Cap 3   • diazepam (VALIUM) 10 MG tablet Take 1 Tab by mouth every 12 hours as needed for Anxiety for up to 90 days. 90 Tab 0   • TRAVATAN Z 0.004 % Solution INSTILL 1 DROP IN BOTH EYES EVERY NIGHT 1 HOUR BEFORE BEDTIME  5   • digoxin (LANOXIN) 125 MCG Tab Take 1 Tab by mouth every day. 90 Tab 3   • furosemide (LASIX) 20 MG Tab Take 1 Tab by mouth every day. 90 Tab 3   • metoprolol SR (TOPROL XL) 100 MG TABLET SR 24 HR Take 1 Tab by mouth 2 Times a Day. 180 Tab 3   • potassium chloride SA (KDUR) 20 MEQ Tab CR Take 1 Tab by mouth every day. 90 Tab 3   • apixaban (ELIQUIS) 5mg Tab Take 1 Tab by mouth 2 Times a Day. TAKE 1 TABLET BY MOUTH TWICE A  Tab 3   • travoprost (TRAVATAN Z) 0.004 % Solution Place 1 Drop in both eyes every evening.       No current facility-administered medications for this visit.      Allergies:   Allergies   Allergen Reactions   • Atorvastatin      myalgias   • Simvastatin      myalgias      Social History   Substance Use Topics  "  • Smoking status: Former Smoker     Packs/day: 0.50     Years: 40.00     Types: Cigarettes     Quit date: 3/1/1996   • Smokeless tobacco: Never Used   • Alcohol use No      Comment: 1-2 drinks once a month     Exercise: she is walking as much as she can  Health Maintenance/Immunizations: discussed, prefers to avoid    ROS  Pertinent  ROS findings as above.      Annual Health Assessment Questions:    1.  Are you currently engaging in any exercise or physical activity? Yes    2.  How would you describe your mood or emotional well-being today? sad    3.  Have you had any falls in the last year? Yes    4.  Have you noticed any problems with your balance or had difficulty walking? Yes    5.  In the last six months have you experienced any leakage of urine? Yes    6. DPA/Advanced Directive: Patient does not have an Advanced Directive.  A packet and workshop information was given on Advanced Directives.       Objective:     OBJECTIVE:  /64   Pulse 64   Temp 37.1 °C (98.8 °F)   Resp 12   Ht 1.575 m (5' 2\")   Wt 63 kg (139 lb)   LMP 05/01/1991   SpO2 94%   BMI 25.42 kg/m²  Body mass index is 25.42 kg/m². BMI: not obese  General: No apparent distress, conversant, cooperative and pleasant with the examination.  Psych: Alert and oriented x4, judgment and insight normal  Neck: No JVD or bruits, no adenopathy, supple  Thyroid: normal to inspection and palpation  Lungs: negative findings: normal respiratory rate and rhythm, lungs clear to auscultation  Heart: negative findings: regular rate and rhythm, S1 normal, S2 normal, no murmurs, clicks, or gallops  Abdomen: Soft, nontender, no hepatosplenomegaly or masses, normal bowel sounds  Skin: No rashes, no cyanosis, no lesions or ulcers  Extremities: No cyanosis clubbing or edema.   Feet are examined, sensation is normal.  DP pulses are normal bilaterally.  No ulcers or fissures seen.      Depression Screening    Little interest or pleasure in doing things?  0 - not " at all  Feeling down, depressed , or hopeless? 0 - not at all  Patient Health Questionnaire Score: 0    If depressive symptoms identified deferred to follow up visit unless specifically addressed in assesment and plan.      Interpretation of PHQ-9 Total Score   Score Severity   1-4 Minimal Depression   5-9 Mild Depression   10-14 Moderate Depression   15-19 Moderately Severe Depression   20-27 Severe Depression      POC labs:   Lab Results   Component Value Date/Time    POCGLUCOSE 111 (A) 02/06/2019 12:04 PM          Last labs    Lab Results   Component Value Date/Time    CHOLSTRLTOT 174 04/03/2019 07:56 AM    LDL 97 04/03/2019 07:56 AM    HDL 60 04/03/2019 07:56 AM    TRIGLYCERIDE 85 04/03/2019 07:56 AM       Lab Results   Component Value Date/Time    SODIUM 140 04/03/2019 07:56 AM    POTASSIUM 4.2 04/03/2019 07:56 AM    GLUCOSE 117 (H) 04/03/2019 07:56 AM    BUNCREATRAT 26 04/11/2011 12:00 AM    GLOMRATE 56 (L) 03/09/2011 07:55 AM     Lab Results   Component Value Date/Time    HBA1C 6.5 (H) 04/03/2019 07:56 AM    HBA1C 6.4 (H) 11/08/2018 07:17 AM    HBA1C 6.3 (H) 05/02/2018 06:13 AM     Lab Results   Component Value Date/Time    MALBCRT 55 (H) 04/03/2019 07:56 AM    MICROALBUR 3.0 04/03/2019 07:56 AM          Assessment/Plan:   Medications, refills, and referrals per orders.   1. Diabetes mellitus type II, non insulin dependent (HCC)  HEMOGLOBIN A1C    Comp Metabolic Panel    Lipid Profile    CBC WITHOUT DIFFERENTIAL   2. Controlled type 2 diabetes mellitus with diabetic nephropathy, without long-term current use of insulin ([facility])  HEMOGLOBIN A1C    Comp Metabolic Panel    CBC WITHOUT DIFFERENTIAL   3. Essential hypertension  Comp Metabolic Panel    CBC WITHOUT DIFFERENTIAL   4. Dyslipidemia  ezetimibe (ZETIA) 10 MG Tab    Comp Metabolic Panel    Lipid Profile   5. CKD (chronic kidney disease) stage 3, GFR 30-59 ml/min ([facility])  Comp Metabolic Panel    CBC WITHOUT DIFFERENTIAL   6. Persistent atrial fibrillation ([facility])   CBC WITHOUT DIFFERENTIAL   7. Chronic diastolic heart failure (HCC)     8. Fall, initial encounter     9. Contusion of chin, initial encounter     10. Gastroesophageal reflux disease without esophagitis  omeprazole (PRILOSEC) 20 MG delayed-release capsule     DM2 A1c is at goal   Patient to monitor sugars: declines, has been stable.    Discussed diet, exercise, disease management goals..   Education and advise provided today:All medications, side effects and compliance (discussed carefully)  Annual eye examinations at Ophthalmology  Diabetic diet discussed in detail, written exchange diet given  Foot care discussed and Podiatry visits  Glycohemoglobin and other lab monitoring  Home glucose monitoring emphasized  Labs immediately prior to next visit  Long term diabetic complications  Low cholesterol diet, weight control and daily exercise    Followup:   Do labs and RTC 6 months, sooner should new symptoms or problems arise.

## 2019-06-23 DIAGNOSIS — I50.32 CHRONIC DIASTOLIC HEART FAILURE (HCC): ICD-10-CM

## 2019-06-23 DIAGNOSIS — I48.91 ATRIAL FIBRILLATION WITH RVR (HCC): ICD-10-CM

## 2019-06-24 DIAGNOSIS — I48.91 ATRIAL FIBRILLATION WITH RVR (HCC): ICD-10-CM

## 2019-06-24 DIAGNOSIS — I50.32 CHRONIC DIASTOLIC HEART FAILURE (HCC): ICD-10-CM

## 2019-06-24 RX ORDER — FUROSEMIDE 20 MG/1
TABLET ORAL
Qty: 100 TAB | Refills: 2 | Status: SHIPPED | OUTPATIENT
Start: 2019-06-24 | End: 2019-06-24

## 2019-06-24 RX ORDER — FUROSEMIDE 20 MG/1
TABLET ORAL
Qty: 100 TAB | Refills: 2 | Status: SHIPPED | OUTPATIENT
Start: 2019-06-24 | End: 2019-07-10 | Stop reason: SDUPTHER

## 2019-07-10 ENCOUNTER — OFFICE VISIT (OUTPATIENT)
Dept: CARDIOLOGY | Facility: MEDICAL CENTER | Age: 79
End: 2019-07-10
Payer: MEDICARE

## 2019-07-10 VITALS
OXYGEN SATURATION: 95 % | DIASTOLIC BLOOD PRESSURE: 54 MMHG | HEART RATE: 80 BPM | WEIGHT: 142 LBS | SYSTOLIC BLOOD PRESSURE: 112 MMHG | HEIGHT: 62 IN | BODY MASS INDEX: 26.13 KG/M2

## 2019-07-10 DIAGNOSIS — I10 HTN (HYPERTENSION), MALIGNANT: ICD-10-CM

## 2019-07-10 DIAGNOSIS — E78.5 DYSLIPIDEMIA: Chronic | ICD-10-CM

## 2019-07-10 DIAGNOSIS — Z79.899 HIGH RISK MEDICATION USE: ICD-10-CM

## 2019-07-10 DIAGNOSIS — Z79.01 CHRONIC ANTICOAGULATION: ICD-10-CM

## 2019-07-10 DIAGNOSIS — E78.2 MIXED HYPERLIPIDEMIA: ICD-10-CM

## 2019-07-10 DIAGNOSIS — G47.19 EXCESSIVE DAYTIME SLEEPINESS: ICD-10-CM

## 2019-07-10 DIAGNOSIS — I48.19 PERSISTENT ATRIAL FIBRILLATION (HCC): ICD-10-CM

## 2019-07-10 PROCEDURE — 99214 OFFICE O/P EST MOD 30 MIN: CPT | Performed by: INTERNAL MEDICINE

## 2019-07-10 RX ORDER — DIGOXIN 125 MCG
125 TABLET ORAL DAILY
Qty: 90 TAB | Refills: 3 | Status: SHIPPED | OUTPATIENT
Start: 2019-07-10 | End: 2019-07-10

## 2019-07-10 RX ORDER — METOPROLOL SUCCINATE 100 MG/1
100 TABLET, EXTENDED RELEASE ORAL 2 TIMES DAILY
Qty: 180 TAB | Refills: 3 | Status: SHIPPED | OUTPATIENT
Start: 2019-07-10 | End: 2020-06-22 | Stop reason: SDUPTHER

## 2019-07-10 RX ORDER — FUROSEMIDE 20 MG/1
20 TABLET ORAL
Qty: 100 TAB | Refills: 3 | Status: SHIPPED | OUTPATIENT
Start: 2019-07-10 | End: 2020-06-22 | Stop reason: SDUPTHER

## 2019-07-10 RX ORDER — EZETIMIBE 10 MG/1
10 TABLET ORAL
Qty: 45 TAB | Refills: 3 | Status: SHIPPED | OUTPATIENT
Start: 2019-07-10 | End: 2019-08-28

## 2019-07-10 ASSESSMENT — ENCOUNTER SYMPTOMS
PALPITATIONS: 0
SENSORY CHANGE: 0
HALLUCINATIONS: 0
EYE PAIN: 0
SPEECH CHANGE: 0
MYALGIAS: 0
COUGH: 0
SHORTNESS OF BREATH: 0
WEIGHT LOSS: 0
ABDOMINAL PAIN: 0
CLAUDICATION: 0
VOMITING: 0
HEADACHES: 0
DOUBLE VISION: 0
ORTHOPNEA: 0
EYE DISCHARGE: 0
CHILLS: 0
DEPRESSION: 0
NAUSEA: 0
DIZZINESS: 0
MEMORY LOSS: 1
BLURRED VISION: 0
BLOOD IN STOOL: 0
PND: 0
FEVER: 0
BRUISES/BLEEDS EASILY: 0
FALLS: 0
LOSS OF CONSCIOUSNESS: 0

## 2019-07-10 NOTE — LETTER
[facility]   [address], [address]  [place] [address]  Phone: [phone]  Fax: [phone]              Ivory Rowan  1940    Encounter Date: 7/10/2019    Shen Washington M.D.          PROGRESS NOTE:  Chief Complaint   Patient presents with   • Atrial Fibrillation   • Hypertension       Subjective:   Ivory Rowan is a 79 y.o. female who presents today for cardiac care for atrial fibrillation now has progressed into persistent after prior failed cardioversion. She was in sinus for brief amount of time. Patient was diagnosed with atrial fibrillation earlier in 2016. Patient has had multiple hospitalizations due to A. fib with rapid ventricular rate leading to heart failure exacerbation. Her left ventricular systolic function is documented at about 45-50% on her most recent transthoracic echocardiogram. Patient is anticoagulated. Patient is taking Toprol- mg by mouth twice a day.     Patient cannot afford Tikosyn therapy and she did not go into the hospital for that.     She is feeling fine. No palpitations. No dyspnea.     I have independently interpreted and reviewed blood tests results with patient in clinic which shows normal LDL level 97, triglycerides, renal and liver function. GFR of 53.     At this time, patient also reports of feeling depressed.  This is more pronounced especially around holidays.  She has been seen by a psychiatrist.  She is not suicidal or homicidal.    Past Medical History:   Diagnosis Date   • Atrial fibrillation (HCC)    • Basal cell carcinoma of skin of nose    • CATARACT     Dr. Aaron, Dr. Orellana   • CHF (congestive heart failure) (HCC)    • Colon polyp     tubular adenomas   • Dyslipidemia    • Glaucoma, right eye     Dr. Orellana   • Hypertension    • IBS (irritable bowel syndrome)    • MEDICAL HOME 10/23/12   • Mitral valve regurgitation    • Osteopenia 6/09   • Perennial allergic rhinitis with seasonal variation   "  • Persistent atrial fibrillation (HCC)    • Type 2 diabetes mellitus, controlled (HCC)    • Valvular heart disease     mitral insufficiency   • Vertigo      Past Surgical History:   Procedure Laterality Date   • RECOVERY  7/8/2016    Procedure: CATH LAB-ZAFAR GUIDED CV-TO-LARGE GROUP;  Surgeon: Recovery[facility] Surgery;  Location: SURGERY PRE-POST PROC UNIT [facility] [place] [facility];  Service:    • COLONOSCOPY  8/2009    Dr. Lopez, 9 polyps   • APPENDECTOMY     • BREAST BIOPSY      left, reports wire report broke off during procedure   • CHOLECYSTECTOMY     • US-NEEDLE CORE BX-BREAST PANEL       Family History   Problem Relation Age of Onset   • Diabetes Mother    • Hypertension Mother    • Stroke Mother    • Cancer Father         lung/larynx   • Cancer Sister         \"female\"   • Genetic Sister         osteoporosis   • Cancer Paternal Aunt         breast   • Cancer Maternal Aunt    • Heart Disease Brother         CABG x 3     Social History     Social History   • Marital status: Single     Spouse name: N/A   • Number of children: 2   • Years of education: N/A     Occupational History   • Retired 2009 - [email or web address] Retired     Social History Main Topics   • Smoking status: Former Smoker     Packs/day: 0.50     Years: 40.00     Types: Cigarettes     Quit date: 3/1/1996   • Smokeless tobacco: Never Used   • Alcohol use No      Comment: 1-2 drinks once a month   • Drug use: No   • Sexual activity: Not Currently     Partners: Male     Other Topics Concern   • Not on file     Social History Narrative   • No narrative on file     Allergies   Allergen Reactions   • Atorvastatin      myalgias   • Simvastatin      myalgias     Outpatient Encounter Prescriptions as of 7/10/2019   Medication Sig Dispense Refill   • ezetimibe (ZETIA) 10 MG Tab Take 1 Tab by mouth every Monday, Wednesday, and Friday. 45 Tab 3   • metoprolol SR (TOPROL XL) 100 MG TABLET SR 24 HR Take 1 Tab by mouth 2 Times a Day. 180 Tab 3   • digoxin (LANOXIN) 125 MCG Tab Take 1 " Tab by mouth every day. 90 Tab 3   • apixaban (ELIQUIS) 5mg Tab Take 1 Tab by mouth 2 Times a Day. TAKE 1 TABLET BY MOUTH TWICE A  Tab 3   • furosemide (LASIX) 20 MG Tab Take 1 Tab by mouth every day. 100 Tab 3   • omeprazole (PRILOSEC) 20 MG delayed-release capsule Take 1 Cap by mouth every day. 90 Cap 3   • TRAVATAN Z 0.004 % Solution INSTILL 1 DROP IN BOTH EYES EVERY NIGHT 1 HOUR BEFORE BEDTIME  5   • potassium chloride SA (KDUR) 20 MEQ Tab CR Take 1 Tab by mouth every day. 90 Tab 3   • travoprost (TRAVATAN Z) 0.004 % Solution Place 1 Drop in both eyes every evening.     • [DISCONTINUED] furosemide (LASIX) 20 MG Tab TAKE 1 TABLET BY MOUTH EVERY  Tab 2   • [DISCONTINUED] ezetimibe (ZETIA) 10 MG Tab Take 1 Tab by mouth every Monday, Wednesday, and Friday. 45 Tab 3   • [DISCONTINUED] digoxin (LANOXIN) 125 MCG Tab Take 1 Tab by mouth every day. 90 Tab 3   • [DISCONTINUED] metoprolol SR (TOPROL XL) 100 MG TABLET SR 24 HR Take 1 Tab by mouth 2 Times a Day. 180 Tab 3   • [DISCONTINUED] apixaban (ELIQUIS) 5mg Tab Take 1 Tab by mouth 2 Times a Day. TAKE 1 TABLET BY MOUTH TWICE A  Tab 3     No facility-administered encounter medications on file as of 7/10/2019.      Review of Systems   Constitutional: Negative for chills, fever, malaise/fatigue and weight loss.   HENT: Negative for ear discharge, ear pain, hearing loss and nosebleeds.    Eyes: Negative for blurred vision, double vision, pain and discharge.   Respiratory: Negative for cough and shortness of breath.    Cardiovascular: Negative for chest pain, palpitations, orthopnea, claudication, leg swelling and PND.   Gastrointestinal: Negative for abdominal pain, blood in stool, melena, nausea and vomiting.   Genitourinary: Negative for dysuria and hematuria.   Musculoskeletal: Negative for falls, joint pain and myalgias.   Skin: Negative for itching and rash.   Neurological: Negative for dizziness, sensory change, speech change, loss of  "consciousness and headaches.   Endo/Heme/Allergies: Negative for environmental allergies. Does not bruise/bleed easily.   Psychiatric/Behavioral: Negative for depression, hallucinations and suicidal ideas.        Objective:   /54 (BP Location: Left arm, Patient Position: Sitting, BP Cuff Size: Adult)   Pulse 80   Ht 1.575 m (5' 2\")   Wt 64.4 kg (142 lb)   LMP 05/01/1991   SpO2 95%   BMI 25.97 kg/m²      Physical Exam   Constitutional: She is oriented to person, place, and time. No distress.   HENT:   Head: Normocephalic and atraumatic.   Right Ear: External ear normal.   Left Ear: External ear normal.   Eyes: Right eye exhibits no discharge. Left eye exhibits no discharge.   Neck: No JVD present. No thyromegaly present.   Cardiovascular: Normal rate, normal heart sounds and intact distal pulses.  Exam reveals no gallop and no friction rub.    No murmur heard.  There is presence of an irregularly irregular heartbeats.     Pulmonary/Chest: Breath sounds normal. No respiratory distress.   Abdominal: Bowel sounds are normal. She exhibits no distension. There is no tenderness.   Musculoskeletal: She exhibits no edema or tenderness.   Neurological: She is alert and oriented to person, place, and time. No cranial nerve deficit.   Skin: Skin is warm and dry. She is not diaphoretic.   Psychiatric: She has a normal mood and affect. Her behavior is normal.   Nursing note and vitals reviewed.      Assessment:     1. Persistent atrial fibrillation (HCC)  metoprolol SR (TOPROL XL) 100 MG TABLET SR 24 HR    digoxin (LANOXIN) 125 MCG Tab    apixaban (ELIQUIS) 5mg Tab   2. HTN (hypertension), malignant  metoprolol SR (TOPROL XL) 100 MG TABLET SR 24 HR    furosemide (LASIX) 20 MG Tab   3. Mixed hyperlipidemia  LIPID PANEL    Comp Metabolic Panel    ezetimibe (ZETIA) 10 MG Tab   4. Chronic anticoagulation     5. Excessive daytime sleepiness     6. High risk medication use  Comp Metabolic Panel    DIGOXIN, RANDOM, SERUM   "   7. Dyslipidemia         Medical Decision Making:  Today's Assessment / Status / Plan:   Persistent atrial fibrillation:  Rate controlled.  Continue anticoagulation with Eiquis 5 mg bid, Digoxin 125 mcg po daily. Dig level was not drawn.  Continue Toprol 100 mg bid.    Hypertension:  Blood pressure is well controlled.  Continue Toprol 100 mg bid.    Dyslipidemia:  Statin intolerance.  Continue Zetia 10 mg qhs.    Ok to stop blood thinner Eliquis 2 days before dental procedure if needed.    I will see patient back in clinic with lab tests and studies results in 12 months.    I thank you Dr. Tesfaye for referring patient to our Cardiology Clinic today.        Checo Tesfaye M.D.  [address] [place] [id number]  VIA In Basket

## 2019-07-10 NOTE — PROGRESS NOTES
Chief Complaint   Patient presents with   • Atrial Fibrillation   • Hypertension       Subjective:   Ivory Rowan is a 79 y.o. female who presents today for cardiac care for atrial fibrillation now has progressed into persistent after prior failed cardioversion. She was in sinus for brief amount of time. Patient was diagnosed with atrial fibrillation earlier in 2016. Patient has had multiple hospitalizations due to A. fib with rapid ventricular rate leading to heart failure exacerbation. Her left ventricular systolic function is documented at about 45-50% on her most recent transthoracic echocardiogram. Patient is anticoagulated. Patient is taking Toprol- mg by mouth twice a day.     Patient cannot afford Tikosyn therapy and she did not go into the hospital for that.     She is feeling fine. No palpitations. No dyspnea.     I have independently interpreted and reviewed blood tests results with patient in clinic which shows normal LDL level 97, triglycerides, renal and liver function. GFR of 53.     At this time, patient also reports of feeling depressed.  This is more pronounced especially around holidays.  She has been seen by a psychiatrist.  She is not suicidal or homicidal.    Past Medical History:   Diagnosis Date   • Atrial fibrillation (HCC)    • Basal cell carcinoma of skin of nose    • CATARACT     Dr. Aaron, Dr. Orellana   • CHF (congestive heart failure) ([facility])    • Colon polyp     tubular adenomas   • Dyslipidemia    • Glaucoma, right eye     Dr. Orellana   • Hypertension    • IBS (irritable bowel syndrome)    • MEDICAL HOME 10/23/12   • Mitral valve regurgitation    • Osteopenia 6/09   • Perennial allergic rhinitis with seasonal variation    • Persistent atrial fibrillation (HCC)    • Type 2 diabetes mellitus, controlled ([facility])    • Valvular heart disease     mitral insufficiency   • Vertigo      Past Surgical History:   Procedure Laterality Date   • RECOVERY  7/8/2016    Procedure: CATH LAB-ZAFAR GUIDED  "CV-TO-LARGE GROUP;  Surgeon: [facility] Surgery;  Location: SURGERY PRE-POST PROC UNIT [facility] [place];  Service:    • COLONOSCOPY  8/2009    Dr. Lopez, 9 polyps   • APPENDECTOMY     • BREAST BIOPSY      left, reports wire report broke off during procedure   • CHOLECYSTECTOMY     • US-NEEDLE CORE BX-BREAST PANEL       Family History   Problem Relation Age of Onset   • Diabetes Mother    • Hypertension Mother    • Stroke Mother    • Cancer Father         lung/larynx   • Cancer Sister         \"female\"   • Genetic Sister         osteoporosis   • Cancer Paternal Aunt         breast   • Cancer Maternal Aunt    • Heart Disease Brother         CABG x 3     Social History     Social History   • Marital status: Single     Spouse name: N/A   • Number of children: 2   • Years of education: N/A     Occupational History   • Retired 2009 - [employer]     Social History Main Topics   • Smoking status: Former Smoker     Packs/day: 0.50     Years: 40.00     Types: Cigarettes     Quit date: 3/1/1996   • Smokeless tobacco: Never Used   • Alcohol use No      Comment: 1-2 drinks once a month   • Drug use: No   • Sexual activity: Not Currently     Partners: Male     Other Topics Concern   • Not on file     Social History Narrative   • No narrative on file     Allergies   Allergen Reactions   • Atorvastatin      myalgias   • Simvastatin      myalgias     Outpatient Encounter Prescriptions as of 7/10/2019   Medication Sig Dispense Refill   • ezetimibe (ZETIA) 10 MG Tab Take 1 Tab by mouth every Monday, Wednesday, and Friday. 45 Tab 3   • metoprolol SR (TOPROL XL) 100 MG TABLET SR 24 HR Take 1 Tab by mouth 2 Times a Day. 180 Tab 3   • digoxin (LANOXIN) 125 MCG Tab Take 1 Tab by mouth every day. 90 Tab 3   • apixaban (ELIQUIS) 5mg Tab Take 1 Tab by mouth 2 Times a Day. TAKE 1 TABLET BY MOUTH TWICE A  Tab 3   • furosemide (LASIX) 20 MG Tab Take 1 Tab by mouth every day. 100 Tab 3   • omeprazole (PRILOSEC) 20 MG delayed-release " capsule Take 1 Cap by mouth every day. 90 Cap 3   • TRAVATAN Z 0.004 % Solution INSTILL 1 DROP IN BOTH EYES EVERY NIGHT 1 HOUR BEFORE BEDTIME  5   • potassium chloride SA (KDUR) 20 MEQ Tab CR Take 1 Tab by mouth every day. 90 Tab 3   • travoprost (TRAVATAN Z) 0.004 % Solution Place 1 Drop in both eyes every evening.     • [DISCONTINUED] furosemide (LASIX) 20 MG Tab TAKE 1 TABLET BY MOUTH EVERY  Tab 2   • [DISCONTINUED] ezetimibe (ZETIA) 10 MG Tab Take 1 Tab by mouth every Monday, Wednesday, and Friday. 45 Tab 3   • [DISCONTINUED] digoxin (LANOXIN) 125 MCG Tab Take 1 Tab by mouth every day. 90 Tab 3   • [DISCONTINUED] metoprolol SR (TOPROL XL) 100 MG TABLET SR 24 HR Take 1 Tab by mouth 2 Times a Day. 180 Tab 3   • [DISCONTINUED] apixaban (ELIQUIS) 5mg Tab Take 1 Tab by mouth 2 Times a Day. TAKE 1 TABLET BY MOUTH TWICE A  Tab 3     No facility-administered encounter medications on file as of 7/10/2019.      Review of Systems   Constitutional: Negative for chills, fever, malaise/fatigue and weight loss.   HENT: Negative for ear discharge, ear pain, hearing loss and nosebleeds.    Eyes: Negative for blurred vision, double vision, pain and discharge.   Respiratory: Negative for cough and shortness of breath.    Cardiovascular: Negative for chest pain, palpitations, orthopnea, claudication, leg swelling and PND.   Gastrointestinal: Negative for abdominal pain, blood in stool, melena, nausea and vomiting.   Genitourinary: Negative for dysuria and hematuria.   Musculoskeletal: Negative for falls, joint pain and myalgias.   Skin: Negative for itching and rash.   Neurological: Negative for dizziness, sensory change, speech change, loss of consciousness and headaches.   Endo/Heme/Allergies: Negative for environmental allergies. Does not bruise/bleed easily.   Psychiatric/Behavioral: Positive for memory loss. Negative for depression, hallucinations and suicidal ideas.        Objective:   /54 (BP Location:  "Left arm, Patient Position: Sitting, BP Cuff Size: Adult)   Pulse 80   Ht 1.575 m (5' 2\")   Wt 64.4 kg (142 lb)   LMP 05/01/1991   SpO2 95%   BMI 25.97 kg/m²     Physical Exam   Constitutional: She is oriented to person, place, and time. No distress.   HENT:   Head: Normocephalic and atraumatic.   Right Ear: External ear normal.   Left Ear: External ear normal.   Eyes: Right eye exhibits no discharge. Left eye exhibits no discharge.   Neck: No JVD present. No thyromegaly present.   Cardiovascular: Normal rate, normal heart sounds and intact distal pulses.  Exam reveals no gallop and no friction rub.    No murmur heard.  There is presence of an irregularly irregular heartbeats.     Pulmonary/Chest: Breath sounds normal. No respiratory distress.   Abdominal: Bowel sounds are normal. She exhibits no distension. There is no tenderness.   Musculoskeletal: She exhibits no edema or tenderness.   Neurological: She is alert and oriented to person, place, and time. No cranial nerve deficit.   Skin: Skin is warm and dry. She is not diaphoretic.   Psychiatric: She has a normal mood and affect. Her behavior is normal.   Nursing note and vitals reviewed.      Assessment:     1. Persistent atrial fibrillation (HCC)  metoprolol SR (TOPROL XL) 100 MG TABLET SR 24 HR    digoxin (LANOXIN) 125 MCG Tab    apixaban (ELIQUIS) 5mg Tab   2. HTN (hypertension), malignant  metoprolol SR (TOPROL XL) 100 MG TABLET SR 24 HR    furosemide (LASIX) 20 MG Tab   3. Mixed hyperlipidemia  LIPID PANEL    Comp Metabolic Panel    ezetimibe (ZETIA) 10 MG Tab   4. Chronic anticoagulation     5. Excessive daytime sleepiness     6. High risk medication use  Comp Metabolic Panel    DIGOXIN, RANDOM, SERUM   7. Dyslipidemia         Medical Decision Making:  Today's Assessment / Status / Plan:   Persistent atrial fibrillation:  Rate controlled.  Continue anticoagulation with Eiquis 5 mg bid  Due to her baseline dementia, will stop Digoxin 125 mcg po daily " to avoid toxicity.  Continue Toprol 100 mg bid.    Hypertension:  Blood pressure is well controlled.  Continue Toprol 100 mg bid.    Dyslipidemia:  Statin intolerance.  Continue Zetia 10 mg qhs.    Ok to stop blood thinner Eliquis 2 days before dental procedure if needed.    I will see patient back in clinic with lab tests and studies results in 12 months.    I thank you Dr. Tesfaye for referring patient to our Cardiology Clinic today.

## 2019-08-05 ENCOUNTER — TELEPHONE (OUTPATIENT)
Dept: CARDIOLOGY | Facility: MEDICAL CENTER | Age: 79
End: 2019-08-05

## 2019-08-05 NOTE — TELEPHONE ENCOUNTER
TT/Breana    Pt's daughter, Conchita reports since digoxin was discontinued on 7/10 her shortness of breath has gotten worse. She is concerned and would please like a call back at [phone].

## 2019-08-05 NOTE — TELEPHONE ENCOUNTER
Conchita calls back and she is not with her Mom now. She has not seen her today. She saw her yesterday, when she took her to [facility]. She states her weight was a stable 134 and now her weight is up to 140. Gums started bleeding. Took partial out. Couldn't get back in. Took to her dentist and they are making a new partial. Conchita does not believe the weight gain could be responsible for her SOB. I attempt to explain that a 6 lb weight gain could absolutely create SOB. Pt has been eating high sodium easy to eat foods without her teeth, but Conchita is not sure this has any correlation to her symptoms and feels it was the stopping of her medication. I schedule her for a f/u appt on 8/7 and encourage her to attend to ask those questions directly to MD. Encourage her to reinforce teaching with Mom. Encourage hydration and to call for weight gain of 3 lb in one day and 5 lb in one week. She states understanding and is markedly concerned but it appreciative of taking her call.

## 2019-08-06 ENCOUNTER — HOSPITAL ENCOUNTER (INPATIENT)
Facility: MEDICAL CENTER | Age: 79
LOS: 1 days | DRG: 309 | End: 2019-08-08
Attending: EMERGENCY MEDICINE | Admitting: INTERNAL MEDICINE
Payer: MEDICARE

## 2019-08-06 ENCOUNTER — APPOINTMENT (OUTPATIENT)
Dept: RADIOLOGY | Facility: MEDICAL CENTER | Age: 79
DRG: 309 | End: 2019-08-06
Attending: EMERGENCY MEDICINE
Payer: MEDICARE

## 2019-08-06 PROBLEM — R06.02 SHORTNESS OF BREATH: Status: ACTIVE | Noted: 2019-08-06

## 2019-08-06 LAB
ALBUMIN SERPL BCP-MCNC: 3.9 G/DL (ref 3.2–4.9)
ALBUMIN/GLOB SERPL: 1.4 G/DL
ALP SERPL-CCNC: 68 U/L (ref 30–99)
ALT SERPL-CCNC: 13 U/L (ref 2–50)
ANION GAP SERPL CALC-SCNC: 9 MMOL/L (ref 0–11.9)
AST SERPL-CCNC: 16 U/L (ref 12–45)
BASOPHILS # BLD AUTO: 0.5 % (ref 0–1.8)
BASOPHILS # BLD: 0.04 K/UL (ref 0–0.12)
BILIRUB SERPL-MCNC: 2.1 MG/DL (ref 0.1–1.5)
BUN SERPL-MCNC: 36 MG/DL (ref 8–22)
CALCIUM SERPL-MCNC: 9.3 MG/DL (ref 8.5–10.5)
CHLORIDE SERPL-SCNC: 105 MMOL/L (ref 96–112)
CO2 SERPL-SCNC: 27 MMOL/L (ref 20–33)
CREAT SERPL-MCNC: 1.07 MG/DL (ref 0.5–1.4)
EKG IMPRESSION: NORMAL
EOSINOPHIL # BLD AUTO: 0.07 K/UL (ref 0–0.51)
EOSINOPHIL NFR BLD: 0.8 % (ref 0–6.9)
ERYTHROCYTE [DISTWIDTH] IN BLOOD BY AUTOMATED COUNT: 48.8 FL (ref 35.9–50)
GLOBULIN SER CALC-MCNC: 2.7 G/DL (ref 1.9–3.5)
GLUCOSE SERPL-MCNC: 132 MG/DL (ref 65–99)
HCT VFR BLD AUTO: 42.2 % (ref 37–47)
HGB BLD-MCNC: 13.6 G/DL (ref 12–16)
IMM GRANULOCYTES # BLD AUTO: 0.03 K/UL (ref 0–0.11)
IMM GRANULOCYTES NFR BLD AUTO: 0.4 % (ref 0–0.9)
INR PPP: 1.65 (ref 0.87–1.13)
LYMPHOCYTES # BLD AUTO: 0.87 K/UL (ref 1–4.8)
LYMPHOCYTES NFR BLD: 10.2 % (ref 22–41)
MCH RBC QN AUTO: 30 PG (ref 27–33)
MCHC RBC AUTO-ENTMCNC: 32.2 G/DL (ref 33.6–35)
MCV RBC AUTO: 93.2 FL (ref 81.4–97.8)
MONOCYTES # BLD AUTO: 0.81 K/UL (ref 0–0.85)
MONOCYTES NFR BLD AUTO: 9.5 % (ref 0–13.4)
NEUTROPHILS # BLD AUTO: 6.7 K/UL (ref 2–7.15)
NEUTROPHILS NFR BLD: 78.6 % (ref 44–72)
NRBC # BLD AUTO: 0 K/UL
NRBC BLD-RTO: 0 /100 WBC
PLATELET # BLD AUTO: 189 K/UL (ref 164–446)
PMV BLD AUTO: 10.2 FL (ref 9–12.9)
POTASSIUM SERPL-SCNC: 4.2 MMOL/L (ref 3.6–5.5)
PROT SERPL-MCNC: 6.6 G/DL (ref 6–8.2)
PROTHROMBIN TIME: 20 SEC (ref 12–14.6)
RBC # BLD AUTO: 4.53 M/UL (ref 4.2–5.4)
SODIUM SERPL-SCNC: 141 MMOL/L (ref 135–145)
TROPONIN T SERPL-MCNC: 21 NG/L (ref 6–19)
TROPONIN T SERPL-MCNC: 24 NG/L (ref 6–19)
WBC # BLD AUTO: 8.5 K/UL (ref 4.8–10.8)

## 2019-08-06 PROCEDURE — 85610 PROTHROMBIN TIME: CPT

## 2019-08-06 PROCEDURE — G0378 HOSPITAL OBSERVATION PER HR: HCPCS

## 2019-08-06 PROCEDURE — 94760 N-INVAS EAR/PLS OXIMETRY 1: CPT

## 2019-08-06 PROCEDURE — 36415 COLL VENOUS BLD VENIPUNCTURE: CPT

## 2019-08-06 PROCEDURE — 71045 X-RAY EXAM CHEST 1 VIEW: CPT

## 2019-08-06 PROCEDURE — 700102 HCHG RX REV CODE 250 W/ 637 OVERRIDE(OP): Performed by: INTERNAL MEDICINE

## 2019-08-06 PROCEDURE — 84484 ASSAY OF TROPONIN QUANT: CPT

## 2019-08-06 PROCEDURE — 700105 HCHG RX REV CODE 258: Performed by: INTERNAL MEDICINE

## 2019-08-06 PROCEDURE — 80053 COMPREHEN METABOLIC PANEL: CPT

## 2019-08-06 PROCEDURE — 99285 EMERGENCY DEPT VISIT HI MDM: CPT

## 2019-08-06 PROCEDURE — 700102 HCHG RX REV CODE 250 W/ 637 OVERRIDE(OP): Performed by: HOSPITALIST

## 2019-08-06 PROCEDURE — A9270 NON-COVERED ITEM OR SERVICE: HCPCS | Performed by: HOSPITALIST

## 2019-08-06 PROCEDURE — 96375 TX/PRO/DX INJ NEW DRUG ADDON: CPT

## 2019-08-06 PROCEDURE — 700111 HCHG RX REV CODE 636 W/ 250 OVERRIDE (IP): Performed by: INTERNAL MEDICINE

## 2019-08-06 PROCEDURE — 700111 HCHG RX REV CODE 636 W/ 250 OVERRIDE (IP): Performed by: HOSPITALIST

## 2019-08-06 PROCEDURE — 99220 PR INITIAL OBSERVATION CARE,LEVL III: CPT | Performed by: HOSPITALIST

## 2019-08-06 PROCEDURE — 700111 HCHG RX REV CODE 636 W/ 250 OVERRIDE (IP): Performed by: EMERGENCY MEDICINE

## 2019-08-06 PROCEDURE — 93005 ELECTROCARDIOGRAM TRACING: CPT | Performed by: EMERGENCY MEDICINE

## 2019-08-06 PROCEDURE — 85025 COMPLETE CBC W/AUTO DIFF WBC: CPT

## 2019-08-06 PROCEDURE — 99215 OFFICE O/P EST HI 40 MIN: CPT | Performed by: INTERNAL MEDICINE

## 2019-08-06 PROCEDURE — 93005 ELECTROCARDIOGRAM TRACING: CPT

## 2019-08-06 PROCEDURE — A9270 NON-COVERED ITEM OR SERVICE: HCPCS | Performed by: INTERNAL MEDICINE

## 2019-08-06 PROCEDURE — A9270 NON-COVERED ITEM OR SERVICE: HCPCS | Performed by: EMERGENCY MEDICINE

## 2019-08-06 PROCEDURE — 96374 THER/PROPH/DIAG INJ IV PUSH: CPT

## 2019-08-06 PROCEDURE — 700102 HCHG RX REV CODE 250 W/ 637 OVERRIDE(OP): Performed by: EMERGENCY MEDICINE

## 2019-08-06 RX ORDER — DILTIAZEM HYDROCHLORIDE 5 MG/ML
10 INJECTION INTRAVENOUS EVERY 4 HOURS PRN
Status: DISCONTINUED | OUTPATIENT
Start: 2019-08-06 | End: 2019-08-08 | Stop reason: HOSPADM

## 2019-08-06 RX ORDER — OMEPRAZOLE 20 MG/1
20 CAPSULE, DELAYED RELEASE ORAL DAILY
Status: DISCONTINUED | OUTPATIENT
Start: 2019-08-07 | End: 2019-08-08 | Stop reason: HOSPADM

## 2019-08-06 RX ORDER — LATANOPROST 50 UG/ML
1 SOLUTION/ DROPS OPHTHALMIC EVERY EVENING
Status: DISCONTINUED | OUTPATIENT
Start: 2019-08-06 | End: 2019-08-08 | Stop reason: HOSPADM

## 2019-08-06 RX ORDER — CALCIUM CARBONATE 500 MG/1
500 TABLET, CHEWABLE ORAL 4 TIMES DAILY PRN
Status: DISCONTINUED | OUTPATIENT
Start: 2019-08-06 | End: 2019-08-08 | Stop reason: HOSPADM

## 2019-08-06 RX ORDER — AMOXICILLIN 250 MG
2 CAPSULE ORAL 2 TIMES DAILY
Status: DISCONTINUED | OUTPATIENT
Start: 2019-08-06 | End: 2019-08-08 | Stop reason: HOSPADM

## 2019-08-06 RX ORDER — ASPIRIN 300 MG/1
300 SUPPOSITORY RECTAL ONCE
Status: DISCONTINUED | OUTPATIENT
Start: 2019-08-06 | End: 2019-08-06

## 2019-08-06 RX ORDER — POLYETHYLENE GLYCOL 3350 17 G/17G
1 POWDER, FOR SOLUTION ORAL
Status: DISCONTINUED | OUTPATIENT
Start: 2019-08-06 | End: 2019-08-08 | Stop reason: HOSPADM

## 2019-08-06 RX ORDER — POTASSIUM CHLORIDE 20 MEQ/1
20 TABLET, EXTENDED RELEASE ORAL DAILY
Status: DISCONTINUED | OUTPATIENT
Start: 2019-08-07 | End: 2019-08-08 | Stop reason: HOSPADM

## 2019-08-06 RX ORDER — FUROSEMIDE 20 MG/1
20 TABLET ORAL DAILY
Status: DISCONTINUED | OUTPATIENT
Start: 2019-08-07 | End: 2019-08-06

## 2019-08-06 RX ORDER — ACETAMINOPHEN 325 MG/1
650 TABLET ORAL EVERY 6 HOURS PRN
Status: DISCONTINUED | OUTPATIENT
Start: 2019-08-06 | End: 2019-08-08 | Stop reason: HOSPADM

## 2019-08-06 RX ORDER — FUROSEMIDE 10 MG/ML
40 INJECTION INTRAMUSCULAR; INTRAVENOUS
Status: DISCONTINUED | OUTPATIENT
Start: 2019-08-07 | End: 2019-08-08

## 2019-08-06 RX ORDER — EZETIMIBE 10 MG/1
10 TABLET ORAL
Status: DISCONTINUED | OUTPATIENT
Start: 2019-08-07 | End: 2019-08-08 | Stop reason: HOSPADM

## 2019-08-06 RX ORDER — METOPROLOL SUCCINATE 50 MG/1
100 TABLET, EXTENDED RELEASE ORAL 2 TIMES DAILY
Status: DISCONTINUED | OUTPATIENT
Start: 2019-08-06 | End: 2019-08-08 | Stop reason: HOSPADM

## 2019-08-06 RX ORDER — ASPIRIN 81 MG/1
324 TABLET, CHEWABLE ORAL ONCE
Status: DISCONTINUED | OUTPATIENT
Start: 2019-08-06 | End: 2019-08-06

## 2019-08-06 RX ORDER — DILTIAZEM HYDROCHLORIDE 5 MG/ML
10 INJECTION INTRAVENOUS ONCE
Status: COMPLETED | OUTPATIENT
Start: 2019-08-06 | End: 2019-08-06

## 2019-08-06 RX ORDER — BISACODYL 10 MG
10 SUPPOSITORY, RECTAL RECTAL
Status: DISCONTINUED | OUTPATIENT
Start: 2019-08-06 | End: 2019-08-08 | Stop reason: HOSPADM

## 2019-08-06 RX ADMIN — DILTIAZEM HYDROCHLORIDE 10 MG: 5 INJECTION INTRAVENOUS at 10:12

## 2019-08-06 RX ADMIN — METOPROLOL SUCCINATE 100 MG: 50 TABLET, EXTENDED RELEASE ORAL at 18:00

## 2019-08-06 RX ADMIN — ANTACID TABLETS 500 MG: 500 TABLET, CHEWABLE ORAL at 22:06

## 2019-08-06 RX ADMIN — DILTIAZEM HYDROCHLORIDE 5 MG/HR: 5 INJECTION INTRAVENOUS at 18:06

## 2019-08-06 RX ADMIN — DILTIAZEM HYDROCHLORIDE 10 MG: 5 INJECTION INTRAVENOUS at 16:53

## 2019-08-06 RX ADMIN — APIXABAN 5 MG: 5 TABLET, FILM COATED ORAL at 18:01

## 2019-08-06 ASSESSMENT — COGNITIVE AND FUNCTIONAL STATUS - GENERAL
MOBILITY SCORE: 24
SUGGESTED CMS G CODE MODIFIER DAILY ACTIVITY: CH
DAILY ACTIVITIY SCORE: 24
SUGGESTED CMS G CODE MODIFIER MOBILITY: CH

## 2019-08-06 ASSESSMENT — ENCOUNTER SYMPTOMS
SHORTNESS OF BREATH: 1
NAUSEA: 0
DIZZINESS: 0
ORTHOPNEA: 0
SPUTUM PRODUCTION: 0
PALPITATIONS: 1
HEMOPTYSIS: 0
VOMITING: 0
CHILLS: 0
COUGH: 0

## 2019-08-06 ASSESSMENT — CHA2DS2 SCORE
PRIOR STROKE OR TIA OR THROMBOEMBOLISM: NO
SEX: FEMALE
CHF OR LEFT VENTRICULAR DYSFUNCTION: YES
AGE 75 OR GREATER: YES
VASCULAR DISEASE: NO
DIABETES: YES
AGE 65 TO 74: NO
CHA2DS2 VASC SCORE: 6
HYPERTENSION: YES

## 2019-08-06 ASSESSMENT — LIFESTYLE VARIABLES
TOTAL SCORE: 0
EVER FELT BAD OR GUILTY ABOUT YOUR DRINKING: NO
EVER HAD A DRINK FIRST THING IN THE MORNING TO STEADY YOUR NERVES TO GET RID OF A HANGOVER: NO
CONSUMPTION TOTAL: NEGATIVE
ON A TYPICAL DAY WHEN YOU DRINK ALCOHOL HOW MANY DRINKS DO YOU HAVE: 0
AVERAGE NUMBER OF DAYS PER WEEK YOU HAVE A DRINK CONTAINING ALCOHOL: 0
HAVE PEOPLE ANNOYED YOU BY CRITICIZING YOUR DRINKING: NO
ALCOHOL_USE: NO
ALCOHOL_USE: NO
TOTAL SCORE: 0
DOES PATIENT WANT TO STOP DRINKING: CANNOT ASSESS
HAVE YOU EVER FELT YOU SHOULD CUT DOWN ON YOUR DRINKING: NO
TOTAL SCORE: 0
EVER_SMOKED: NEVER
HOW MANY TIMES IN THE PAST YEAR HAVE YOU HAD 5 OR MORE DRINKS IN A DAY: 0

## 2019-08-06 ASSESSMENT — PATIENT HEALTH QUESTIONNAIRE - PHQ9
SUM OF ALL RESPONSES TO PHQ9 QUESTIONS 1 AND 2: 0
1. LITTLE INTEREST OR PLEASURE IN DOING THINGS: NOT AT ALL
2. FEELING DOWN, DEPRESSED, IRRITABLE, OR HOPELESS: NOT AT ALL

## 2019-08-06 NOTE — H&P
Hospital Medicine History & Physical Note    Date of Service  8/6/2019    Primary Care Physician  Checo Tesfaye M.D.    Consultants  Cardiology    Code Status  Full code    Chief Complaint  Palpitation    History of Presenting Illness  79 y.o. female who presented 8/6/2019 with shortness of breath    I have reviewed some of the recent provider documentation available to me in the patient's medical chart.  Records are briefly summarized: Ms. Rowan has a history of dementia and persistent atrial fibrillation, she was seen by her cardiologist Dr. Washington on 7/10/2019.  During that visit her rate was controlled, she was encouraged to continue her anticoagulation with apixaban.  Her digoxin was stopped due to concerns for possible toxicity with her concurrent dementia.    Patient presents to the emergency room today with palpitations.  This morning the patient developed symptoms of a racing heart, she felt it beating irregularly, this was associated with shortness of breath, no chest pain, no nausea vomiting.  Her symptoms are improved after she received IV diltiazem in the emergency room.  She denies recent illness, she reports compliance with medications, she has not been taking digoxin.    Review of Systems  Review of Systems   Constitutional: Negative for chills and malaise/fatigue.   Respiratory: Positive for shortness of breath. Negative for cough, hemoptysis and sputum production.    Cardiovascular: Positive for palpitations. Negative for chest pain and orthopnea.   Gastrointestinal: Negative for nausea and vomiting.   Skin: Negative for itching and rash.   Neurological: Negative for dizziness.   All other systems reviewed and are negative.      Past Medical History   has a past medical history of Atrial fibrillation (HCC), Basal cell carcinoma of skin of nose, CATARACT, CHF (congestive heart failure) (HCC), Colon polyp, Dyslipidemia, Glaucoma, right eye, Hypertension, IBS (irritable bowel syndrome), MEDICAL HOME  (10/23/12), Mitral valve regurgitation, Osteopenia (6/09), Perennial allergic rhinitis with seasonal variation, Persistent atrial fibrillation (HCC), Type 2 diabetes mellitus, controlled (HCC), Valvular heart disease, and Vertigo.    Surgical History   has a past surgical history that includes appendectomy; breast biopsy; cholecystectomy; colonoscopy (8/2009); recovery (7/8/2016); and us-needle core bx-breast panel.     Family History  family history includes Cancer in her father, maternal aunt, paternal aunt, and sister; Diabetes in her mother; Genetic Disorder in her sister; Heart Disease in her brother; Hypertension in her mother; Stroke in her mother.     Social History   reports that she quit smoking about 23 years ago. Her smoking use included cigarettes. She has a 20.00 pack-year smoking history. She has never used smokeless tobacco. She reports that she does not drink alcohol or use drugs.    Allergies  Allergies   Allergen Reactions   • Atorvastatin      myalgias   • Simvastatin      myalgias       Medications  Prior to Admission Medications   Prescriptions Last Dose Informant Patient Reported? Taking?   apixaban (ELIQUIS) 5mg Tab 8/6/2019 at 0700 Patient No No   Sig: Take 1 Tab by mouth 2 Times a Day. TAKE 1 TABLET BY MOUTH TWICE A DAY   ezetimibe (ZETIA) 10 MG Tab 8/6/2019 at 0700 Patient No No   Sig: Take 1 Tab by mouth every Monday, Wednesday, and Friday.   furosemide (LASIX) 20 MG Tab 8/6/2019 at 0700 Patient No No   Sig: Take 1 Tab by mouth every day.   metoprolol SR (TOPROL XL) 100 MG TABLET SR 24 HR 8/6/2019 at 0700 Patient No No   Sig: Take 1 Tab by mouth 2 Times a Day.   omeprazole (PRILOSEC) 20 MG delayed-release capsule 8/6/2019 at 0700 Patient No No   Sig: Take 1 Cap by mouth every day.   potassium chloride SA (KDUR) 20 MEQ Tab CR 8/6/2019 at 0700 Patient No No   Sig: Take 1 Tab by mouth every day.   travoprost (TRAVATAN Z) 0.004 % Solution 8/5/2019 at PM Patient Yes No   Sig: Place 1 Drop in  both eyes every evening.      Facility-Administered Medications: None       Physical Exam  Temp:  [36.4 °C (97.5 °F)] 36.4 °C (97.5 °F)  Pulse:  [123-132] 123  Resp:  [19-29] 29  BP: (112-124)/(74-78) 112/77  SpO2:  [91 %-98 %] 91 %    Physical Exam   Constitutional: She is oriented to person, place, and time. She appears well-developed and well-nourished. No distress.   HENT:   Head: Normocephalic and atraumatic.   Eyes: Conjunctivae are normal. Right eye exhibits no discharge. Left eye exhibits no discharge.   Neck: Normal range of motion. Neck supple.   Cardiovascular: Intact distal pulses.   No murmur heard.  Tachycardic and irregular   Pulmonary/Chest: Effort normal and breath sounds normal. No respiratory distress. She has no wheezes. She has no rales.   Abdominal: Soft. Bowel sounds are normal. She exhibits no distension. There is no tenderness. There is no rebound.   Musculoskeletal: Normal range of motion. She exhibits edema.   Neurological: She is alert and oriented to person, place, and time. No cranial nerve deficit.   Skin: She is not diaphoretic.   Psychiatric: She has a normal mood and affect. Her behavior is normal.       Laboratory:  Recent Labs     08/06/19 0913   WBC 8.5   RBC 4.53   HEMOGLOBIN 13.6   HEMATOCRIT 42.2   MCV 93.2   MCH 30.0   MCHC 32.2*   RDW 48.8   PLATELETCT 189   MPV 10.2     Recent Labs     08/06/19 0913   SODIUM 141   POTASSIUM 4.2   CHLORIDE 105   CO2 27   GLUCOSE 132*   BUN 36*   CREATININE 1.07   CALCIUM 9.3     Recent Labs     08/06/19  0913   ALTSGPT 13   ASTSGOT 16   ALKPHOSPHAT 68   TBILIRUBIN 2.1*   GLUCOSE 132*     Recent Labs     08/06/19  0913   INR 1.65*     No results for input(s): NTPROBNP in the last 72 hours.      Recent Labs     08/06/19  0913   TROPONINT 24*       Urinalysis:    No results found     Imaging:  DX-CHEST-PORTABLE (1 VIEW)   Final Result      Cardiomegaly and mild interstitial edema, suggestive of CHF.      EC-ECHOCARDIOGRAM COMPLETE W/O CONT     (Results Pending)         Assessment/Plan:  I anticipate this patient is appropriate for observation status at this time.    * Persistent atrial fibrillation (HCC)- (present on admission)  Assessment & Plan  Patient has a history of persistent atrial fibrillation  Recently taken off digoxin due to concerns for possible toxicity with her dementia  She presents with A. Fib/RVR  She is symptomatic with shortness of breath and chest discomfort  Admit to observation status, monitor on telemetry  Continue metoprolol XL, continue apixaban  The patient's heart rate is suboptimally controlled, I have ordered IV diltiazem to be given for sustained heart rate greater than 120s, this will be administered with continuous hemodynamic monitoring  Cardiology consulted, long-term plan for rate control to be determined    Shortness of breath- (present on admission)  Assessment & Plan  I suspect this is related to her tachycardia  Troponin slightly elevated, will follow serial cardiac enzymes    Chronic diastolic heart failure (HCC)- (present on admission)  Assessment & Plan  Continue home dose of lasix      VTE prophylaxis: Patient is on home apixaban, there is no need for additional DVT prophylaxis

## 2019-08-06 NOTE — PROGRESS NOTES
2 RN skin check complete with ROSIE Rausch.   Devices in place NA.  Skin assessed under devices NA.  Confirmed pressure ulcers found on NA.  New potential pressure ulcers noted on NA. Wound consult placed NA.  The following interventions in place patient turns self from side-to-side with ease.     Skin intact throughout

## 2019-08-06 NOTE — ED PROVIDER NOTES
ED Provider Note    Scribed for Haven Mcdaniel M.D. by Danilo Acosta. 8/6/2019, 9:49 AM.    Primary care provider: Checo Tesfaye M.D.  Means of arrival: EMS  History obtained from: patient  History limited by: none    CHIEF COMPLAINT  Chief Complaint   Patient presents with   • Shortness of Breath   • Chest Pain   • Anxiety       HPI  Ivory Rowan is a 79 y.o. female with a history of atrial fibrillation who presents to the Emergency Department complaining of sudden tachycardia starting this morning. Patient reports associated chest pain, mild shortness of breath, anxiety. Dr. Washington is her cardiologist and she reports that she was recently taken off her prescribed Digoxin. Patient states that she developed tachycardia this morning and is concerned for stroke based on her family history of stroke, stating that she is afraid of being at home alone with these symptoms. She contacted EMS for transport to the ED for evaluation and observation. She also has a history of valvular heart disease, CHF. Patient denies fever, vomiting.      REVIEW OF SYSTEMS  Pertinent positives include tachycardia, shortness of breath, anxiety. Pertinent negatives include no fever, vomiting. All other systems reviewed and negative.     PAST MEDICAL HISTORY   has a past medical history of Atrial fibrillation (HCC), Basal cell carcinoma of skin of nose, CATARACT, CHF (congestive heart failure) (HCC), Colon polyp, Dyslipidemia, Glaucoma, right eye, Hypertension, IBS (irritable bowel syndrome), MEDICAL HOME (10/23/12), Mitral valve regurgitation, Osteopenia (6/09), Perennial allergic rhinitis with seasonal variation, Persistent atrial fibrillation (HCC), Type 2 diabetes mellitus, controlled (HCC), Valvular heart disease, and Vertigo.    SURGICAL HISTORY   has a past surgical history that includes appendectomy; breast biopsy; cholecystectomy; colonoscopy (8/2009); recovery (7/8/2016); and us-needle core bx-breast panel.    SOCIAL  "HISTORY  Social History     Tobacco Use   • Smoking status: Former Smoker     Packs/day: 0.50     Years: 40.00     Pack years: 20.00     Types: Cigarettes     Last attempt to quit: 3/1/1996     Years since quittin.4   • Smokeless tobacco: Never Used   Substance Use Topics   • Alcohol use: No     Alcohol/week: 0.0 oz     Comment: 1-2 drinks once a month   • Drug use: No      Social History     Substance and Sexual Activity   Drug Use No       FAMILY HISTORY  Family History   Problem Relation Age of Onset   • Diabetes Mother    • Hypertension Mother    • Stroke Mother    • Cancer Father         lung/larynx   • Cancer Sister         \"female\"   • Genetic Disorder Sister         osteoporosis   • Cancer Paternal Aunt         breast   • Cancer Maternal Aunt    • Heart Disease Brother         CABG x 3       CURRENT MEDICATIONS  Current Outpatient Medications:   •  ezetimibe (ZETIA) 10 MG Tab, Take 1 Tab by mouth every Monday, Wednesday, and Friday., Disp: 45 Tab, Rfl: 3  •  metoprolol SR (TOPROL XL) 100 MG TABLET SR 24 HR, Take 1 Tab by mouth 2 Times a Day., Disp: 180 Tab, Rfl: 3  •  apixaban (ELIQUIS) 5mg Tab, Take 1 Tab by mouth 2 Times a Day. TAKE 1 TABLET BY MOUTH TWICE A DAY, Disp: 180 Tab, Rfl: 3  •  furosemide (LASIX) 20 MG Tab, Take 1 Tab by mouth every day., Disp: 100 Tab, Rfl: 3  •  omeprazole (PRILOSEC) 20 MG delayed-release capsule, Take 1 Cap by mouth every day., Disp: 90 Cap, Rfl: 3  •  TRAVATAN Z 0.004 % Solution, INSTILL 1 DROP IN BOTH EYES EVERY NIGHT 1 HOUR BEFORE BEDTIME, Disp: , Rfl: 5  •  potassium chloride SA (KDUR) 20 MEQ Tab CR, Take 1 Tab by mouth every day., Disp: 90 Tab, Rfl: 3  •  travoprost (TRAVATAN Z) 0.004 % Solution, Place 1 Drop in both eyes every evening., Disp: , Rfl:     ALLERGIES  Allergies   Allergen Reactions   • Atorvastatin      myalgias   • Simvastatin      myalgias       PHYSICAL EXAM  VITAL SIGNS: /78   Pulse (!) 127   Temp 36.4 °C (97.5 °F) (Temporal)   Resp (!) 26 " "  Ht 1.575 m (5' 2\")   Wt 64.4 kg (142 lb)   LMP 05/01/1991   SpO2 92%   BMI 25.97 kg/m²     Constitutional: Well developed, Mild distress, Non-toxic appearance.   HENT: Normocephalic, Atraumatic, Bilateral external ears normal, Oropharynx moist, No oral exudates, Nose normal.   Eyes: PERRL, EOMI, Conjunctiva normal,   Neck: Normal range of motion, No tenderness, Supple  Cardiovascular: Tachycardic heart rate, irregularly irregular   thorax & Lungs: Normal breath sounds, No respiratory distress, No chest tenderness.   Abdomen: Benign abdominal exam, No guarding no rebound, no pulsatile mass, Diffuse tenderness, no distention  Skin: Warm, Dry, No erythema, No rash.   Back: No tenderness, No CVA tenderness.   Extremities: Intact distal pulses, No edema, No tenderness   Neurologic: Alert & oriented x 3, Normal motor function, Normal sensory function, No focal deficits noted.   Psychiatric: Appropriate                                                     DIAGNOSTIC STUDIES / PROCEDURES\    LABS  Results for orders placed or performed during the hospital encounter of 08/06/19   CBC with Differential   Result Value Ref Range    WBC 8.5 4.8 - 10.8 K/uL    RBC 4.53 4.20 - 5.40 M/uL    Hemoglobin 13.6 12.0 - 16.0 g/dL    Hematocrit 42.2 37.0 - 47.0 %    MCV 93.2 81.4 - 97.8 fL    MCH 30.0 27.0 - 33.0 pg    MCHC 32.2 (L) 33.6 - 35.0 g/dL    RDW 48.8 35.9 - 50.0 fL    Platelet Count 189 164 - 446 K/uL    MPV 10.2 9.0 - 12.9 fL    Neutrophils-Polys 78.60 (H) 44.00 - 72.00 %    Lymphocytes 10.20 (L) 22.00 - 41.00 %    Monocytes 9.50 0.00 - 13.40 %    Eosinophils 0.80 0.00 - 6.90 %    Basophils 0.50 0.00 - 1.80 %    Immature Granulocytes 0.40 0.00 - 0.90 %    Nucleated RBC 0.00 /100 WBC    Neutrophils (Absolute) 6.70 2.00 - 7.15 K/uL    Lymphs (Absolute) 0.87 (L) 1.00 - 4.80 K/uL    Monos (Absolute) 0.81 0.00 - 0.85 K/uL    Eos (Absolute) 0.07 0.00 - 0.51 K/uL    Baso (Absolute) 0.04 0.00 - 0.12 K/uL    Immature Granulocytes " (abs) 0.03 0.00 - 0.11 K/uL    NRBC (Absolute) 0.00 K/uL   Complete Metabolic Panel (CMP)   Result Value Ref Range    Sodium 141 135 - 145 mmol/L    Potassium 4.2 3.6 - 5.5 mmol/L    Chloride 105 96 - 112 mmol/L    Co2 27 20 - 33 mmol/L    Anion Gap 9.0 0.0 - 11.9    Glucose 132 (H) 65 - 99 mg/dL    Bun 36 (H) 8 - 22 mg/dL    Creatinine 1.07 0.50 - 1.40 mg/dL    Calcium 9.3 8.5 - 10.5 mg/dL    AST(SGOT) 16 12 - 45 U/L    ALT(SGPT) 13 2 - 50 U/L    Alkaline Phosphatase 68 30 - 99 U/L    Total Bilirubin 2.1 (H) 0.1 - 1.5 mg/dL    Albumin 3.9 3.2 - 4.9 g/dL    Total Protein 6.6 6.0 - 8.2 g/dL    Globulin 2.7 1.9 - 3.5 g/dL    A-G Ratio 1.4 g/dL   Troponin   Result Value Ref Range    Troponin T 24 (H) 6 - 19 ng/L   ESTIMATED GFR   Result Value Ref Range    GFR If African American 60 >60 mL/min/1.73 m 2    GFR If Non African American 50 (A) >60 mL/min/1.73 m 2   PT/INR   Result Value Ref Range    PT 20.0 (H) 12.0 - 14.6 sec    INR 1.65 (H) 0.87 - 1.13   EKG   Result Value Ref Range    Report       [facility] Emergency Dept.    Test Date:  2019  Pt Name:    KYLE WISEMAN                  Department: ER  MRN:        [id number]                      Room:       [facility]  Gender:     Female                       Technician: [id number]  :        [id number]                   Requested By:ER TRIAGE PROTOCOL  Order #:    [id number]                    Reading MD: Haven Dalal    Measurements  Intervals                                Axis  Rate:       143                          P:  ND:                                      QRS:        84  QRSD:       84                           T:          -30  QT:         301  QTc:        464    Interpretive Statements  Atrial fibrillation with rapid V-rate  Borderline right axis deviation  Low voltage, extremity leads  Minimal ST depression, inferior leads  Borderline T abnormalities, inferior leads  Compared to ECG 2018 11:19:47  Low QRS voltage now present  ST (T  wave) deviation now present  T-wave abnormality now present       Electronically Signed On 8-6-2019 10:11:24 PDT by Haven Dalal     All labs reviewed by me.    EKG  See above for interpretation.      RADIOLOGY  DX-CHEST-PORTABLE (1 VIEW)   Final Result      Cardiomegaly and mild interstitial edema, suggestive of CHF.      The radiologist's interpretation of all radiological studies have been reviewed by me.    COURSE & MEDICAL DECISION MAKING  Nursing notes, VS, PMSFHx reviewed in chart.     Patient presents to emergency department with A. fib with RVR.  She is having some mild chest pain as well but it has currently resolved.  She was recently taken off her digoxin due to dementia problems.  She is here today complaining of palpitations and not feeling well.  Patient is not hypoxic or any acute respiratory distress.  She denies any ripping or tearing back pain to suggest dissection.  Patient has not had any syncope.    9:49 AM - Patient seen and examined at bedside. The patient presents with tachycardia and the differential diagnosis includes but is not limited to arrhythmia, anxiety. Ordered for DX chest, PT/INR, CMP, Troponin to evaluate. Patient was treated with ASA 34 mg, Cardizem 10 mg for her symptoms.    Patient was given Cardizem with a lowering of her heart rate.  Patient's labs show a minimally elevated troponin.  She was also given an aspirin.  Patient be admitted to the hospital for further management evaluation of her A. fib with RVR.    10:31 AM - I discussed the patient's case and the above findings with Dr. Izaguirre (hospitalist) who agrees to admit the patient. He requests consult with Dr. Washington.     10:48 AM - I discussed the patient's case and the above findings with Dr. Strickland (cardiology) who agrees to consult. She will likely recommend Amiodarone for rate control.          DISPOSITION:  Patient will be admitted to hospital in guarded condition.      FINAL IMPRESSION  1. Afib with RVR  2.  Chest pain     I, Danilo Acosta (Scribe), am scribing for, and in the presence of, Haven Mcdaniel M.D..    Electronically signed by: Danilo Acosta (Scribe), 8/6/2019    IHaven M.D. personally performed the services described in this documentation, as scribed by Danilo Acosta in my presence, and it is both accurate and complete. C    The note accurately reflects work and decisions made by me.  Haven Mcdaniel  8/6/2019  4:43 PM

## 2019-08-06 NOTE — ASSESSMENT & PLAN NOTE
Recently taken off digoxin   Rate controlled  Continue metoprolol XL, continue apixaban  On IV diltiazem infusion, I am actively managing it, tirtrating rates as needed  Cardiology on  Echo: pending(to adjust medication depending on echo result with LVEF)  Continue to monitor on tele

## 2019-08-06 NOTE — CONSULTS
Cardiology Initial Consultation    Date of Service  8/6/2019    Referring Physician  Haven Mcdaniel M.D.    Reason for Consultation  Afib with RVR    History of Presenting Illness  Ivory Rowan is a 79 y.o. female who presented 8/6/2019 with  afib with RVR.  She is followed longitudinally by Dr. Washington for atrial fibrillation and previously mildly reduced EF in 2016.  1 month ago, her digoxin was discontinued in clinic and she was maintained on metoprolol.  She has been in atrial fibrillation for a few years now.    She reports at home she was becoming more short of breath.  Felt her heart racing at times.  No dizziness or lightheadedness.  No syncope.  No chest pains.  The emergency room she was found to be in atrial fibrillation with RVR.  She was also noted to have mild pulmonary edema on chest x-ray.    Also previously noted to have moderate to severe mitral regurgitation which may have progressed.  She does take low-dose Lasix at home daily.    Creatinine is 1.07  Hemoglobin 13.6  Troponin at 24 and 21    Review of Systems  Review of Systems   All other systems reviewed and are negative.      Past Medical History   has a past medical history of Atrial fibrillation (HCC), Basal cell carcinoma of skin of nose, CATARACT, CHF (congestive heart failure) (HCC), Colon polyp, Dyslipidemia, Glaucoma, right eye, Hypertension, IBS (irritable bowel syndrome), MEDICAL HOME (10/23/12), Mitral valve regurgitation, Osteopenia (6/09), Perennial allergic rhinitis with seasonal variation, Persistent atrial fibrillation (HCC), Type 2 diabetes mellitus, controlled (HCC), Valvular heart disease, and Vertigo.    Surgical History   has a past surgical history that includes appendectomy; breast biopsy; cholecystectomy; colonoscopy (8/2009); recovery (7/8/2016); and us-needle core bx-breast panel.    Family History  family history includes Cancer in her father, maternal aunt, paternal aunt, and sister; Diabetes in her mother; Genetic  Disorder in her sister; Heart Disease in her brother; Hypertension in her mother; Stroke in her mother.    Social History   reports that she quit smoking about 23 years ago. Her smoking use included cigarettes. She has a 20.00 pack-year smoking history. She has never used smokeless tobacco. She reports that she does not drink alcohol or use drugs.    Medications  Prior to Admission Medications   Prescriptions Last Dose Informant Patient Reported? Taking?   apixaban (ELIQUIS) 5mg Tab 8/6/2019 at 0700 Patient No No   Sig: Take 1 Tab by mouth 2 Times a Day. TAKE 1 TABLET BY MOUTH TWICE A DAY   ezetimibe (ZETIA) 10 MG Tab 8/6/2019 at 0700 Patient No No   Sig: Take 1 Tab by mouth every Monday, Wednesday, and Friday.   furosemide (LASIX) 20 MG Tab 8/6/2019 at 0700 Patient No No   Sig: Take 1 Tab by mouth every day.   metoprolol SR (TOPROL XL) 100 MG TABLET SR 24 HR 8/6/2019 at 0700 Patient No No   Sig: Take 1 Tab by mouth 2 Times a Day.   omeprazole (PRILOSEC) 20 MG delayed-release capsule 8/6/2019 at 0700 Patient No No   Sig: Take 1 Cap by mouth every day.   potassium chloride SA (KDUR) 20 MEQ Tab CR 8/6/2019 at 0700 Patient No No   Sig: Take 1 Tab by mouth every day.   travoprost (TRAVATAN Z) 0.004 % Solution 8/5/2019 at PM Patient Yes No   Sig: Place 1 Drop in both eyes every evening.      Facility-Administered Medications: None       Allergies  Allergies   Allergen Reactions   • Atorvastatin      myalgias   • Simvastatin      myalgias       Vital signs in last 24 hours  Temp:  [36.4 °C (97.5 °F)] 36.4 °C (97.5 °F)  Pulse:  [123-132] 123  Resp:  [19-29] 29  BP: (112-124)/(74-78) 112/77  SpO2:  [91 %-98 %] 91 %    Physical Exam  Physical Exam     General: No acute distress. Well nourished.  HEENT: EOM grossly intact, no scleral icterus, no pharyngeal erythema.   Neck:  No JVD appreciated, no bruits, trachea midline  CVS: Irreg irreg. Normal S1, S2.  2 out of 6 holosystolic murmur at the apex. No LE edema.  2+ radial  pulses, 2+ PT pulses  Resp: Mildly reduced breath sounds at the bases bilaterally normal respiratory effort.  Abdomen: Soft, NT, no ivy hepatomegaly.  MSK/Ext: No clubbing or cyanosis.  Skin: Warm and dry, no rashes.  Neurological: CN III-XII grossly intact. No focal deficits.   Psych: A&O x 3, appropriate affect, good judgement      Lab Review  Lab Results   Component Value Date/Time    WBC 8.5 08/06/2019 09:13 AM    RBC 4.53 08/06/2019 09:13 AM    HEMOGLOBIN 13.6 08/06/2019 09:13 AM    HEMATOCRIT 42.2 08/06/2019 09:13 AM    MCV 93.2 08/06/2019 09:13 AM    MCH 30.0 08/06/2019 09:13 AM    MCHC 32.2 (L) 08/06/2019 09:13 AM    MPV 10.2 08/06/2019 09:13 AM      Lab Results   Component Value Date/Time    SODIUM 141 08/06/2019 09:13 AM    POTASSIUM 4.2 08/06/2019 09:13 AM    CHLORIDE 105 08/06/2019 09:13 AM    CO2 27 08/06/2019 09:13 AM    GLUCOSE 132 (H) 08/06/2019 09:13 AM    BUN 36 (H) 08/06/2019 09:13 AM    CREATININE 1.07 08/06/2019 09:13 AM    CREATININE 0.99 04/11/2011 12:00 AM    BUNCREATRAT 26 04/11/2011 12:00 AM    GLOMRATE 56 (L) 03/09/2011 07:55 AM      Lab Results   Component Value Date/Time    ASTSGOT 16 08/06/2019 09:13 AM    ALTSGPT 13 08/06/2019 09:13 AM     Lab Results   Component Value Date/Time    CHOLSTRLTOT 174 04/03/2019 07:56 AM    LDL 97 04/03/2019 07:56 AM    HDL 60 04/03/2019 07:56 AM    TRIGLYCERIDE 85 04/03/2019 07:56 AM    TROPONINT 24 (H) 08/06/2019 09:13 AM       No results for input(s): NTPROBNP in the last 72 hours.    Cardiac Imaging and Procedures Review  EKG:  My personal interpretation of the EKG dated 8/6/2019 is atrial for ablation, rate 143, nonspecific T wave changes inferior leads    Echocardiogram: 5/22/2016  Atrial fibrillations with rates  bpm.  Left ventricular ejection fraction is visually estimated to be 45%.  Diastolic function is difficult to assess with atrial fibrillation.  Moderate to severe mitral regurgitation, eccentric and laterally   directed.  Right  ventricular systolic pressure is estimated to be 60   mmHg.  No prior study is available for comparison.     ZAFAR 7/8/2016  CONCLUSIONS  No thrombus detected in the left atrial appendage.   No mitral stenosis. Moderate mitral regurgitation.   No evidence of reversal flow in pulmonary veins.      Imaging  Chest X-Ray: 8/6/2019  Cardiomegaly and mild interstitial edema, suggestive of CHF.      Assessment/Plan  -Afib with RVR, recently stopped dig  -Persistent atrial fibrillation  -Mild acute on chronic heart failure preserved ejection fraction  -Chronic anticoagulation for chads 2 vascular score of 4  -Hyperlipidemia  -Hypertension  -Statin intolerance  -Previously reported dementia  -Moderate to severe mitral regurgitation    Plan:  -Cardizem drip initially, probably amiodarone versus long-acting calcium channel blocker depending on echo, if we have to use amiodarone, she has not missed any doses of her blood thinner  -Echocardiogram to reevaluate prior mildly reduced EF and look at mitral regurgitation  -Continue metoprolol  -IV Lasix for 1 to 2 days  -Aspirin not needed, apixaban alone    Discussed with Haven Mcdaniel M.D.  Appreciate hospitalist service.    Thank you for allowing me to participate in the care of this patient.    I will continue to follow this patient    Please contact me with any questions.    Silvana Strickland M.D.   Cardiologist, [facility]  [phone]

## 2019-08-06 NOTE — PROGRESS NOTES
Dr Izaguirre paged regarding HR sustaining 120s-130s. Orders received for Dilt. Dr Izaguirre to be paged again if HR can not be maintained at 110.

## 2019-08-06 NOTE — ED NOTES
Med Rec complete per Pt at bedside  Allergies Reviewed  No ABX in the last 14 days    Pt takes ELIQUIS

## 2019-08-06 NOTE — CARE PLAN
Problem: Safety  Goal: Will remain free from injury  Outcome: PROGRESSING AS EXPECTED  Note:   Educated patient on use of call light, no slip socks on, bed lowest position. All needs attended to. Patient verbalized understanding.       Problem: Infection  Goal: Will remain free from infection  Outcome: PROGRESSING AS EXPECTED  Note:   Patient demonstrated proper hand hygiene after toileting. Hand sanitizing wipes provided to patient for bedside use. Patient educated on the importance of hand hygiene among staff and patients in preventing the spread of infection. Patient verbalized understanding.

## 2019-08-06 NOTE — PROGRESS NOTES
Patient transferred to T7 on zoll with Flex RN. Tele on, monitor room updated. VSS, AOx4, patient toileted. Safety and fall precautions in place, call light within reach, patient verbalized understanding of the need to call for assistance with activity.

## 2019-08-07 PROBLEM — R73.9 HYPERGLYCEMIA: Status: ACTIVE | Noted: 2019-08-07

## 2019-08-07 LAB
ANION GAP SERPL CALC-SCNC: 9 MMOL/L (ref 0–11.9)
BASOPHILS # BLD AUTO: 0.5 % (ref 0–1.8)
BASOPHILS # BLD: 0.04 K/UL (ref 0–0.12)
BUN SERPL-MCNC: 30 MG/DL (ref 8–22)
CALCIUM SERPL-MCNC: 9 MG/DL (ref 8.5–10.5)
CHLORIDE SERPL-SCNC: 105 MMOL/L (ref 96–112)
CO2 SERPL-SCNC: 27 MMOL/L (ref 20–33)
CREAT SERPL-MCNC: 1.06 MG/DL (ref 0.5–1.4)
EOSINOPHIL # BLD AUTO: 0.1 K/UL (ref 0–0.51)
EOSINOPHIL NFR BLD: 1.4 % (ref 0–6.9)
ERYTHROCYTE [DISTWIDTH] IN BLOOD BY AUTOMATED COUNT: 49.1 FL (ref 35.9–50)
GLUCOSE SERPL-MCNC: 107 MG/DL (ref 65–99)
HCT VFR BLD AUTO: 38 % (ref 37–47)
HGB BLD-MCNC: 12 G/DL (ref 12–16)
IMM GRANULOCYTES # BLD AUTO: 0.03 K/UL (ref 0–0.11)
IMM GRANULOCYTES NFR BLD AUTO: 0.4 % (ref 0–0.9)
LYMPHOCYTES # BLD AUTO: 1.14 K/UL (ref 1–4.8)
LYMPHOCYTES NFR BLD: 15.7 % (ref 22–41)
MCH RBC QN AUTO: 29.3 PG (ref 27–33)
MCHC RBC AUTO-ENTMCNC: 31.6 G/DL (ref 33.6–35)
MCV RBC AUTO: 92.9 FL (ref 81.4–97.8)
MONOCYTES # BLD AUTO: 0.71 K/UL (ref 0–0.85)
MONOCYTES NFR BLD AUTO: 9.8 % (ref 0–13.4)
NEUTROPHILS # BLD AUTO: 5.26 K/UL (ref 2–7.15)
NEUTROPHILS NFR BLD: 72.2 % (ref 44–72)
NRBC # BLD AUTO: 0 K/UL
NRBC BLD-RTO: 0 /100 WBC
PLATELET # BLD AUTO: 174 K/UL (ref 164–446)
PMV BLD AUTO: 9.6 FL (ref 9–12.9)
POTASSIUM SERPL-SCNC: 3.9 MMOL/L (ref 3.6–5.5)
RBC # BLD AUTO: 4.09 M/UL (ref 4.2–5.4)
SODIUM SERPL-SCNC: 141 MMOL/L (ref 135–145)
WBC # BLD AUTO: 7.3 K/UL (ref 4.8–10.8)

## 2019-08-07 PROCEDURE — 700102 HCHG RX REV CODE 250 W/ 637 OVERRIDE(OP): Performed by: INTERNAL MEDICINE

## 2019-08-07 PROCEDURE — A9270 NON-COVERED ITEM OR SERVICE: HCPCS | Performed by: HOSPITALIST

## 2019-08-07 PROCEDURE — 700102 HCHG RX REV CODE 250 W/ 637 OVERRIDE(OP): Performed by: HOSPITALIST

## 2019-08-07 PROCEDURE — 99232 SBSQ HOSP IP/OBS MODERATE 35: CPT | Performed by: INTERNAL MEDICINE

## 2019-08-07 PROCEDURE — 85025 COMPLETE CBC W/AUTO DIFF WBC: CPT

## 2019-08-07 PROCEDURE — 770020 HCHG ROOM/CARE - TELE (206)

## 2019-08-07 PROCEDURE — 36415 COLL VENOUS BLD VENIPUNCTURE: CPT

## 2019-08-07 PROCEDURE — 700111 HCHG RX REV CODE 636 W/ 250 OVERRIDE (IP): Performed by: INTERNAL MEDICINE

## 2019-08-07 PROCEDURE — 80048 BASIC METABOLIC PNL TOTAL CA: CPT

## 2019-08-07 PROCEDURE — A9270 NON-COVERED ITEM OR SERVICE: HCPCS | Performed by: INTERNAL MEDICINE

## 2019-08-07 PROCEDURE — 700105 HCHG RX REV CODE 258: Performed by: INTERNAL MEDICINE

## 2019-08-07 PROCEDURE — 99233 SBSQ HOSP IP/OBS HIGH 50: CPT | Performed by: INTERNAL MEDICINE

## 2019-08-07 PROCEDURE — 96366 THER/PROPH/DIAG IV INF ADDON: CPT

## 2019-08-07 PROCEDURE — 96375 TX/PRO/DX INJ NEW DRUG ADDON: CPT

## 2019-08-07 PROCEDURE — 96365 THER/PROPH/DIAG IV INF INIT: CPT

## 2019-08-07 RX ORDER — LORAZEPAM 0.5 MG/1
0.25 TABLET ORAL EVERY 8 HOURS PRN
Status: DISCONTINUED | OUTPATIENT
Start: 2019-08-07 | End: 2019-08-08 | Stop reason: HOSPADM

## 2019-08-07 RX ADMIN — SENNOSIDES, DOCUSATE SODIUM 2 TABLET: 50; 8.6 TABLET, FILM COATED ORAL at 17:57

## 2019-08-07 RX ADMIN — OMEPRAZOLE 20 MG: 20 CAPSULE, DELAYED RELEASE ORAL at 05:34

## 2019-08-07 RX ADMIN — APIXABAN 5 MG: 5 TABLET, FILM COATED ORAL at 17:57

## 2019-08-07 RX ADMIN — EZETIMIBE 10 MG: 10 TABLET ORAL at 09:26

## 2019-08-07 RX ADMIN — LATANOPROST 1 DROP: 50 SOLUTION OPHTHALMIC at 20:24

## 2019-08-07 RX ADMIN — SENNOSIDES, DOCUSATE SODIUM 2 TABLET: 50; 8.6 TABLET, FILM COATED ORAL at 05:35

## 2019-08-07 RX ADMIN — METOPROLOL SUCCINATE 100 MG: 50 TABLET, EXTENDED RELEASE ORAL at 05:34

## 2019-08-07 RX ADMIN — LORAZEPAM 0.25 MG: 0.5 TABLET ORAL at 15:47

## 2019-08-07 RX ADMIN — FUROSEMIDE 40 MG: 10 INJECTION, SOLUTION INTRAMUSCULAR; INTRAVENOUS at 05:36

## 2019-08-07 RX ADMIN — POTASSIUM CHLORIDE 20 MEQ: 20 TABLET, EXTENDED RELEASE ORAL at 05:35

## 2019-08-07 RX ADMIN — DILTIAZEM HYDROCHLORIDE 5 MG/HR: 5 INJECTION INTRAVENOUS at 14:40

## 2019-08-07 RX ADMIN — METOPROLOL SUCCINATE 100 MG: 50 TABLET, EXTENDED RELEASE ORAL at 17:57

## 2019-08-07 RX ADMIN — APIXABAN 5 MG: 5 TABLET, FILM COATED ORAL at 05:35

## 2019-08-07 ASSESSMENT — ENCOUNTER SYMPTOMS
DEPRESSION: 0
SPUTUM PRODUCTION: 0
CHEST TIGHTNESS: 0
EYE PAIN: 0
VOMITING: 0
FATIGUE: 1
DIARRHEA: 0
HEARTBURN: 0
EYE REDNESS: 0
BACK PAIN: 0
ABDOMINAL PAIN: 0
BLURRED VISION: 0
SEIZURES: 0
FEVER: 0
PALPITATIONS: 1
ARTHRALGIAS: 1
ORTHOPNEA: 0
CHILLS: 0
DIZZINESS: 1
WHEEZING: 0
SHORTNESS OF BREATH: 1
DIZZINESS: 0
STRIDOR: 0
SHORTNESS OF BREATH: 0
NERVOUS/ANXIOUS: 0
NERVOUS/ANXIOUS: 1
INSOMNIA: 0
LIGHT-HEADEDNESS: 0
COUGH: 0
MYALGIAS: 0
EYE DISCHARGE: 0
CONFUSION: 0
NAUSEA: 0
NECK PAIN: 0
HEADACHES: 0
FOCAL WEAKNESS: 0
WEIGHT LOSS: 0

## 2019-08-07 NOTE — PROGRESS NOTES
Hospital Medicine Daily Progress Note    Date of Service  8/7/2019    Chief Complaint  79 y.o. female admitted 8/6/2019 with palpitation    Hospital Course    78 y/o F with PMH of afib, dementia came in with above and found to have afib with RVR      Interval Problem Update  Feeling better. HR is controlled. Denies chest pain. Patient was seen and examined by me today. Case was discussed with RN and multidisplinary team during rounds. Denies nausea, vomiting, diarrhea.       Consultants/Specialty  card    Code Status  full    Disposition  Remain on the floor    Review of Systems  Review of Systems   Constitutional: Negative for chills, fever and weight loss.   HENT: Negative for congestion and nosebleeds.    Eyes: Negative for blurred vision, pain, discharge and redness.   Respiratory: Negative for cough, sputum production, shortness of breath and stridor.    Cardiovascular: Positive for palpitations. Negative for chest pain and orthopnea.   Gastrointestinal: Negative for abdominal pain, diarrhea, heartburn, nausea and vomiting.   Genitourinary: Negative for dysuria, frequency and urgency.   Musculoskeletal: Negative for back pain, myalgias and neck pain.   Skin: Negative for itching and rash.   Neurological: Negative for dizziness, focal weakness, seizures and headaches.   Psychiatric/Behavioral: Negative for depression. The patient is not nervous/anxious and does not have insomnia.         Physical Exam  Temp:  [36.2 °C (97.1 °F)-37.3 °C (99.2 °F)] 36.5 °C (97.7 °F)  Pulse:  [] 60  Resp:  [16-18] 16  BP: ()/(57-81) 99/57  SpO2:  [90 %-96 %] 93 %    Physical Exam   Constitutional: She is oriented to person, place, and time. No distress.   HENT:   Head: Normocephalic and atraumatic.   Mouth/Throat: Oropharynx is clear and moist.   Eyes: Pupils are equal, round, and reactive to light. Conjunctivae and EOM are normal.   Neck: Normal range of motion. Neck supple. No tracheal deviation present. No  thyromegaly present.   Cardiovascular: Normal rate and regular rhythm.   No murmur heard.  Pulmonary/Chest: Effort normal and breath sounds normal. No respiratory distress. She has no wheezes.   Abdominal: Soft. Bowel sounds are normal. She exhibits no distension. There is no tenderness.   Musculoskeletal: She exhibits no edema or tenderness.   Neurological: She is alert and oriented to person, place, and time. No cranial nerve deficit.   Skin: Skin is warm and dry. She is not diaphoretic. No erythema.   Psychiatric: She has a normal mood and affect. Her behavior is normal. Thought content normal.   Nursing note and vitals reviewed.      Fluids    Intake/Output Summary (Last 24 hours) at 8/7/2019 1256  Last data filed at 8/7/2019 1200  Gross per 24 hour   Intake 300 ml   Output 1700 ml   Net -1400 ml       Laboratory  Recent Labs     08/06/19  0913 08/07/19  0309   WBC 8.5 7.3   RBC 4.53 4.09*   HEMOGLOBIN 13.6 12.0   HEMATOCRIT 42.2 38.0   MCV 93.2 92.9   MCH 30.0 29.3   MCHC 32.2* 31.6*   RDW 48.8 49.1   PLATELETCT 189 174   MPV 10.2 9.6     Recent Labs     08/06/19  0913 08/07/19  0309   SODIUM 141 141   POTASSIUM 4.2 3.9   CHLORIDE 105 105   CO2 27 27   GLUCOSE 132* 107*   BUN 36* 30*   CREATININE 1.07 1.06   CALCIUM 9.3 9.0     Recent Labs     08/06/19  0913   INR 1.65*               Imaging  DX-CHEST-PORTABLE (1 VIEW)   Final Result      Cardiomegaly and mild interstitial edema, suggestive of CHF.      EC-ECHOCARDIOGRAM COMPLETE W/O CONT    (Results Pending)        Assessment/Plan  * Persistent atrial fibrillation (HCC)- (present on admission)  Assessment & Plan  Recently taken off digoxin   She presents with A. Fib/RVR but now rate controlled  Continue metoprolol XL, continue apixaban, diltiazen IV  Cardiology on  Echo: pending  Continue to monitor on tele    Hyperglycemia- (present on admission)  Assessment & Plan  No history of DM    Shortness of breath- (present on admission)  Assessment & Plan  Related to  afib    Chronic diastolic heart failure (HCC)- (present on admission)  Assessment & Plan  Continue home dose of lasix    Stage 3 chronic kidney disease (HCC)- (present on admission)  Assessment & Plan  Cr stable       VTE prophylaxis: eliquis

## 2019-08-07 NOTE — PROGRESS NOTES
"Cardiology Follow Up Progress Note    Date of Service  8/7/2019    Attending Physician  Alexy Bocanegra M.D.    Chief Complaint   Short of breath    HPI  Ivory Rowan is a 79 y.o. female admitted 8/6/2019 with atrial fibrillation with rapid ventricular response. She has persistent atrial fibrillation, previously treated with digoxin, this was discontinued 1 month ago for malaise/fatigue. She had a mildly reduced EF, 45% in 2016 while in atrial fibrillation. She is on chronic anticoagulation with eliquis 5mg bid.     Interim Events  She is anxious this afternoon, doesn't like to be in the hospital. She feels better, denies palpitations or shortness of breath at rest. When she was up she did feel a little dyspneic.     She isn't sure why digoxin was stopped, she does not feel any less fatigued being off of it for a month.     Overnight rates were between , today rates have been 70-90s  Review of Systems  Review of Systems   Constitutional: Positive for fatigue. Negative for chills and fever.   Respiratory: Positive for shortness of breath (with activity). Negative for chest tightness and wheezing.    Cardiovascular: Positive for palpitations. Negative for chest pain and leg swelling.   Genitourinary: Negative for difficulty urinating.   Musculoskeletal: Positive for arthralgias.   Neurological: Positive for dizziness (\"off balance sometimes\"). Negative for light-headedness.   Psychiatric/Behavioral: Negative for confusion. The patient is nervous/anxious.        Vital signs in last 24 hours  Temp:  [36.2 °C (97.1 °F)-37.3 °C (99.2 °F)] 36.5 °C (97.7 °F)  Pulse:  [] 75  Resp:  [16-18] 16  BP: ()/(57-81) 99/57  SpO2:  [90 %-96 %] 93 %    Physical Exam  Physical Exam   Constitutional: She is oriented to person, place, and time. She appears well-developed and well-nourished. No distress.   HENT:   Head: Normocephalic and atraumatic.   Mouth/Throat: Oropharynx is clear and moist.   Eyes: No scleral " icterus.   Neck: Neck supple. No JVD present.   Cardiovascular: Normal rate. An irregularly irregular rhythm present.   Murmur heard.   Systolic (at apex) murmur is present.  Pulses:       Radial pulses are 2+ on the right side, and 2+ on the left side.        Dorsalis pedis pulses are 2+ on the right side, and 2+ on the left side.   Pulmonary/Chest: Effort normal and breath sounds normal. No respiratory distress. She has no rales.   Abdominal: Soft. She exhibits no distension. There is no tenderness.   Musculoskeletal: She exhibits no edema.   Neurological: She is alert and oriented to person, place, and time.   Skin: Skin is warm and dry. She is not diaphoretic.   Psychiatric: Judgment normal.   Nursing note and vitals reviewed.      Lab Review  Lab Results   Component Value Date/Time    WBC 7.3 08/07/2019 03:09 AM    RBC 4.09 (L) 08/07/2019 03:09 AM    HEMOGLOBIN 12.0 08/07/2019 03:09 AM    HEMATOCRIT 38.0 08/07/2019 03:09 AM    MCV 92.9 08/07/2019 03:09 AM    MCH 29.3 08/07/2019 03:09 AM    MCHC 31.6 (L) 08/07/2019 03:09 AM    MPV 9.6 08/07/2019 03:09 AM      Lab Results   Component Value Date/Time    SODIUM 141 08/07/2019 03:09 AM    POTASSIUM 3.9 08/07/2019 03:09 AM    CHLORIDE 105 08/07/2019 03:09 AM    CO2 27 08/07/2019 03:09 AM    GLUCOSE 107 (H) 08/07/2019 03:09 AM    BUN 30 (H) 08/07/2019 03:09 AM    CREATININE 1.06 08/07/2019 03:09 AM    CREATININE 0.99 04/11/2011 12:00 AM    BUNCREATRAT 26 04/11/2011 12:00 AM    GLOMRATE 56 (L) 03/09/2011 07:55 AM      Lab Results   Component Value Date/Time    ASTSGOT 16 08/06/2019 09:13 AM    ALTSGPT 13 08/06/2019 09:13 AM     Lab Results   Component Value Date/Time    CHOLSTRLTOT 174 04/03/2019 07:56 AM    LDL 97 04/03/2019 07:56 AM    HDL 60 04/03/2019 07:56 AM    TRIGLYCERIDE 85 04/03/2019 07:56 AM    TROPONINT 21 (H) 08/06/2019 04:49 PM       No results for input(s): NTPROBNP in the last 72 hours.    Cardiac Imaging and Procedures Review    Echocardiogram:   5/22/16  Atrial fibrillations with rates  bpm.  Left ventricular ejection fraction is visually estimated to be 45%.  Diastolic function is difficult to assess with atrial fibrillation.  Moderate to severe mitral regurgitation, eccentric and laterally   directed.  Right ventricular systolic pressure is estimated to be 60   mmHg.  No prior study is available for comparison.       Assessment/Plan  Atrial Fibrillation with RVR, symptomatic, on persistent AF  Mitral Regurgitation, moderate  HRmrEF  - her symptoms have definitely improved with rate control. Will wait for echo to evaluate mitral regurgitation and EF. If EF is low would  restart Digoxin or amio. If EF is better can transition to diltiazem which she seems to be doing well on.   - continue eliquis 5mg bid    Thank you for allowing me to participate in the care of this patient.  Cardiology will continue to follow.   Staffed with Dr. Strickland

## 2019-08-07 NOTE — PROGRESS NOTES
Cardiology Follow Up Progress Note    Date of Service  8/7/2019    Attending Physician  Alexy Bocanegra M.D.    Chief Complaint   Afib    SOO Rowan is a 79 y.o. female admitted 8/6/2019 with afib with RVR.  She is followed longitudinally by Dr. Washington for atrial fibrillation and previously mildly reduced EF in 2016.  1 month ago, her digoxin was discontinued in clinic and she was maintained on metoprolol.  She has been in atrial fibrillation for a few years now.     She reports at home she was becoming more short of breath.  Felt her heart racing at times.  No dizziness or lightheadedness.  No syncope.  No chest pains.  The emergency room she was found to be in atrial fibrillation with RVR.  She was also noted to have mild pulmonary edema on chest x-ray.     Also previously noted to have moderate to severe mitral regurgitation which may have progressed.  She does take low-dose Lasix at home daily.    Interim Events  Tele-afib, rate controlled  Still on diltiazem drip while we await the echo  Hemoglobin 12  Potassium 3.9  Creatinine 1.06  Given Lasix and urinating, not sure if she feels better but appears more comfortable  She would like to go home as soon as she is able  No chest pain, no more palpitations    Review of Systems  Review of Systems   Constitutional: Negative for chills and fever.   Gastrointestinal: Negative for nausea and vomiting.       Vital signs in last 24 hours  Temp:  [36.2 °C (97.1 °F)-37.3 °C (99.2 °F)] 36.5 °C (97.7 °F)  Pulse:  [] 75  Resp:  [16-18] 16  BP: ()/(57-81) 99/57  SpO2:  [90 %-96 %] 93 %    Physical Exam  Physical Exam     General: No acute distress. Well nourished.  HEENT: EOM grossly intact, no scleral icterus, no pharyngeal erythema.   Neck:  No JVD, no bruits, trachea midline  CVS: Irregularly irregular normal S1, S2.  3 out of 6 holosystolic murmur at the apex no LE edema.  2+ radial pulses, 2+ PT pulses  Resp: CTAB. No wheezing or crackles/rhonchi. Normal  respiratory effort.  Abdomen: Soft, NT, no ivy hepatomegaly.  MSK/Ext: No clubbing or cyanosis.  Skin: Warm and dry, no rashes.  Neurological: CN III-XII grossly intact. No focal deficits.   Psych: A&O x 3, appropriate affect, adequate judgement, appears mildly forgetful      Lab Review  Lab Results   Component Value Date/Time    WBC 7.3 08/07/2019 03:09 AM    RBC 4.09 (L) 08/07/2019 03:09 AM    HEMOGLOBIN 12.0 08/07/2019 03:09 AM    HEMATOCRIT 38.0 08/07/2019 03:09 AM    MCV 92.9 08/07/2019 03:09 AM    MCH 29.3 08/07/2019 03:09 AM    MCHC 31.6 (L) 08/07/2019 03:09 AM    MPV 9.6 08/07/2019 03:09 AM      Lab Results   Component Value Date/Time    SODIUM 141 08/07/2019 03:09 AM    POTASSIUM 3.9 08/07/2019 03:09 AM    CHLORIDE 105 08/07/2019 03:09 AM    CO2 27 08/07/2019 03:09 AM    GLUCOSE 107 (H) 08/07/2019 03:09 AM    BUN 30 (H) 08/07/2019 03:09 AM    CREATININE 1.06 08/07/2019 03:09 AM    CREATININE 0.99 04/11/2011 12:00 AM    BUNCREATRAT 26 04/11/2011 12:00 AM    GLOMRATE 56 (L) 03/09/2011 07:55 AM      Lab Results   Component Value Date/Time    ASTSGOT 16 08/06/2019 09:13 AM    ALTSGPT 13 08/06/2019 09:13 AM     Lab Results   Component Value Date/Time    CHOLSTRLTOT 174 04/03/2019 07:56 AM    LDL 97 04/03/2019 07:56 AM    HDL 60 04/03/2019 07:56 AM    TRIGLYCERIDE 85 04/03/2019 07:56 AM    TROPONINT 21 (H) 08/06/2019 04:49 PM       No results for input(s): NTPROBNP in the last 72 hours.    Cardiac Imaging and Procedures Review  EKG:  My personal interpretation of the EKG dated 8/6/2019 is atrial for ablation, rate 143, nonspecific T wave changes inferior leads     Echocardiogram: 5/22/2016  Atrial fibrillations with rates  bpm.  Left ventricular ejection fraction is visually estimated to be 45%.  Diastolic function is difficult to assess with atrial fibrillation.  Moderate to severe mitral regurgitation, eccentric and laterally   directed.  Right ventricular systolic pressure is estimated to be 60    mmHg.  No prior study is available for comparison.      ZAFAR 7/8/2016  CONCLUSIONS  No thrombus detected in the left atrial appendage.   No mitral stenosis. Moderate mitral regurgitation.   No evidence of reversal flow in pulmonary veins.        Imaging  Chest X-Ray: 8/6/2019  Cardiomegaly and mild interstitial edema, suggestive of CHF.        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  If her EF is above 40%, we can give her long-acting diltiazem, if her EF is below 40% then amiodarone will be needed  -We also need to establish if her mitral regurgitation has progressed to severe  -Continue metoprolol in addition to other medications  -IV Lasix for 1 to 2 days  -Aspirin not needed, apixaban alone     Discussed with  Dr. Izaguirre    Thank you for allowing me to participate in the care of this patient.  I will continue to follow this patient    Please contact me with any questions.    Silvana Strickland M.D.   Cardiologist, [facility]  [phone]

## 2019-08-07 NOTE — CARE PLAN
Problem: Safety  Goal: Will remain free from injury  Outcome: PROGRESSING AS EXPECTED  Note:   Reinforced education on safety and fall precautions. Bed alarm on. Fall precautions are in place with bed locked and in lowest position, treaded socks on, belongings/call light within reach. Patient verbalized understanding.      Problem: Bowel/Gastric:  Goal: Normal bowel function is maintained or improved  Outcome: PROGRESSING AS EXPECTED  Note:   Patient is toileted frequently and uses call light appropriately. Reinforced use of call light. Patient also is receiving senna docusate. Pt verbalized understanding.

## 2019-08-07 NOTE — PROGRESS NOTES
Bedside report received and patient care assumed. Patient resting in bed- states she didn't sleep well last night. No other concerns or questions. Patient updated on POC. Tele box on. Fall precautions in place with bed locked and in lowest position, treaded socks on, and belongings/call light within reach.

## 2019-08-08 ENCOUNTER — PATIENT OUTREACH (OUTPATIENT)
Dept: HEALTH INFORMATION MANAGEMENT | Facility: OTHER | Age: 79
End: 2019-08-08

## 2019-08-08 ENCOUNTER — APPOINTMENT (OUTPATIENT)
Dept: CARDIOLOGY | Facility: MEDICAL CENTER | Age: 79
DRG: 309 | End: 2019-08-08
Attending: INTERNAL MEDICINE
Payer: MEDICARE

## 2019-08-08 VITALS
RESPIRATION RATE: 18 BRPM | HEIGHT: 62 IN | BODY MASS INDEX: 25.27 KG/M2 | HEART RATE: 63 BPM | DIASTOLIC BLOOD PRESSURE: 87 MMHG | TEMPERATURE: 96.8 F | SYSTOLIC BLOOD PRESSURE: 128 MMHG | OXYGEN SATURATION: 92 % | WEIGHT: 137.35 LBS

## 2019-08-08 LAB
LV EJECT FRACT  99904: 55
LV EJECT FRACT MOD 2C 99903: 82.18
LV EJECT FRACT MOD 4C 99902: 65.12
LV EJECT FRACT MOD BP 99901: 69.88

## 2019-08-08 PROCEDURE — 700102 HCHG RX REV CODE 250 W/ 637 OVERRIDE(OP): Performed by: INTERNAL MEDICINE

## 2019-08-08 PROCEDURE — 700102 HCHG RX REV CODE 250 W/ 637 OVERRIDE(OP): Performed by: NURSE PRACTITIONER

## 2019-08-08 PROCEDURE — A9270 NON-COVERED ITEM OR SERVICE: HCPCS | Performed by: HOSPITALIST

## 2019-08-08 PROCEDURE — 700111 HCHG RX REV CODE 636 W/ 250 OVERRIDE (IP): Performed by: INTERNAL MEDICINE

## 2019-08-08 PROCEDURE — 96376 TX/PRO/DX INJ SAME DRUG ADON: CPT

## 2019-08-08 PROCEDURE — A9270 NON-COVERED ITEM OR SERVICE: HCPCS | Performed by: NURSE PRACTITIONER

## 2019-08-08 PROCEDURE — 93306 TTE W/DOPPLER COMPLETE: CPT | Mod: 26 | Performed by: INTERNAL MEDICINE

## 2019-08-08 PROCEDURE — 93306 TTE W/DOPPLER COMPLETE: CPT

## 2019-08-08 PROCEDURE — 99239 HOSP IP/OBS DSCHRG MGMT >30: CPT | Performed by: INTERNAL MEDICINE

## 2019-08-08 PROCEDURE — A9270 NON-COVERED ITEM OR SERVICE: HCPCS | Performed by: INTERNAL MEDICINE

## 2019-08-08 PROCEDURE — 700102 HCHG RX REV CODE 250 W/ 637 OVERRIDE(OP): Performed by: HOSPITALIST

## 2019-08-08 RX ORDER — DILTIAZEM HYDROCHLORIDE 120 MG/1
120 CAPSULE, COATED, EXTENDED RELEASE ORAL
Status: DISCONTINUED | OUTPATIENT
Start: 2019-08-08 | End: 2019-08-08 | Stop reason: HOSPADM

## 2019-08-08 RX ORDER — DILTIAZEM HYDROCHLORIDE 120 MG/1
120 CAPSULE, COATED, EXTENDED RELEASE ORAL DAILY
Qty: 30 CAP | Refills: 0 | Status: SHIPPED | OUTPATIENT
Start: 2019-08-09 | End: 2019-08-21 | Stop reason: SDUPTHER

## 2019-08-08 RX ORDER — FUROSEMIDE 20 MG/1
20 TABLET ORAL
Status: DISCONTINUED | OUTPATIENT
Start: 2019-08-09 | End: 2019-08-08 | Stop reason: HOSPADM

## 2019-08-08 RX ADMIN — APIXABAN 5 MG: 5 TABLET, FILM COATED ORAL at 06:15

## 2019-08-08 RX ADMIN — OMEPRAZOLE 20 MG: 20 CAPSULE, DELAYED RELEASE ORAL at 06:14

## 2019-08-08 RX ADMIN — POTASSIUM CHLORIDE 20 MEQ: 20 TABLET, EXTENDED RELEASE ORAL at 06:13

## 2019-08-08 RX ADMIN — FUROSEMIDE 40 MG: 10 INJECTION, SOLUTION INTRAMUSCULAR; INTRAVENOUS at 06:15

## 2019-08-08 RX ADMIN — LORAZEPAM 0.25 MG: 0.5 TABLET ORAL at 08:03

## 2019-08-08 RX ADMIN — DILTIAZEM HYDROCHLORIDE 120 MG: 120 CAPSULE, COATED, EXTENDED RELEASE ORAL at 12:20

## 2019-08-08 RX ADMIN — METOPROLOL SUCCINATE 100 MG: 50 TABLET, EXTENDED RELEASE ORAL at 06:14

## 2019-08-08 ASSESSMENT — ENCOUNTER SYMPTOMS
SEIZURES: 0
WHEEZING: 0
SHORTNESS OF BREATH: 0
ORTHOPNEA: 0
SPUTUM PRODUCTION: 0
EYE REDNESS: 0
PALPITATIONS: 1
DIARRHEA: 0
CHILLS: 0
CHOKING: 0
APNEA: 0
HEADACHES: 0
WEIGHT LOSS: 0
NERVOUS/ANXIOUS: 0
ABDOMINAL PAIN: 0
BACK PAIN: 0
VOMITING: 0
FOCAL WEAKNESS: 0
NAUSEA: 0
EYE DISCHARGE: 0
MYALGIAS: 0
COUGH: 0
CHEST TIGHTNESS: 0
NECK PAIN: 0
INSOMNIA: 0
EYE PAIN: 0
STRIDOR: 0

## 2019-08-08 NOTE — DISCHARGE PLANNING
"Anticipated Disposition:  Home with Help    Action:   Bedside RN Pura reported, Pt has no ride home, Pt's daughter is unable to pick Pt up. This CM called Cystal daughter, stated \" Your mother is discharged from [facility] today, would you please come over and pick her up?\" Daughter stated \" I am unable to pick her up until 3pm\" This CM informed the daughter \" Its okay for you to come over at 3pm to  your mother, we are going to take her to the discharge lounge to wait for you\" daughter voiced understanding. Also stated\" my mother told me someone in the hospital will take her home\" This CM informed daughter, We are not taking Pt home. Daughter is agreeable to  Pt at 3pm at discharge lounge.           Barriers to Discharge:   None      Plan:  DC home today.     "

## 2019-08-08 NOTE — DISCHARGE INSTRUCTIONS
Discharge Instructions    Discharged to home by car with relative. Discharged via wheelchair, hospital escort: Yes.  Special equipment needed: Not Applicable    Be sure to schedule a follow-up appointment with your primary care doctor or any specialists as instructed.     Discharge Plan:   Diet Plan: Discussed  Activity Level: Discussed  Confirmed Follow up Appointment: Appointment Scheduled  Confirmed Symptoms Management: Discussed  Medication Reconciliation Updated: Yes  Influenza Vaccine Indication: Not indicated: History of severe allergic reaction to influenza vaccine    I understand that a diet low in cholesterol, fat, and sodium is recommended for good health. Unless I have been given specific instructions below for another diet, I accept this instruction as my diet prescription.   Other diet: Cardiac     Special Instructions: None    · Is patient discharged on Warfarin / Coumadin?   No     Diltiazem extended-release capsules or tablets  What is this medicine?  DILTIAZEM (dil NATALYA a zem) is a calcium-channel blocker. It affects the amount of calcium found in your heart and muscle cells. This relaxes your blood vessels, which can reduce the amount of work the heart has to do. This medicine is used to treat high blood pressure and chest pain caused by angina.  This medicine may be used for other purposes; ask your health care provider or pharmacist if you have questions.  COMMON BRAND NAME(S): Cardizem CD, Cardizem LA, Cardizem SR, Cartia XT, Dilacor XR, Dilt-CD, Diltia XT, Diltzac, Matzim LA, Taztia XT, Tiamate, Tiazac  What should I tell my health care provider before I take this medicine?  They need to know if you have any of these conditions:  -heart problems, low blood pressure, irregular heartbeat  -liver disease  -previous heart attack  -an unusual or allergic reaction to diltiazem, other medicines, foods, dyes, or preservatives  -pregnant or trying to get pregnant  -breast-feeding  How should I use this  medicine?  Take this medicine by mouth with a glass of water. Follow the directions on the prescription label. Swallow whole, do not crush or chew. Ask your doctor or pharmacist if your should take this medicine with food. Take your doses at regular intervals. Do not take your medicine more often then directed. Do not stop taking except on the advice of your doctor or health care professional. Ask your doctor or health care professional how to gradually reduce the dose.  Talk to your pediatrician regarding the use of this medicine in children. Special care may be needed.  Overdosage: If you think you have taken too much of this medicine contact a poison control center or emergency room at once.  NOTE: This medicine is only for you. Do not share this medicine with others.  What if I miss a dose?  If you miss a dose, take it as soon as you can. If it is almost time for your next dose, take only that dose. Do not take double or extra doses.  What may interact with this medicine?  Do not take this medicine with any of the following medications:  -cisapride  -hawthorn  -pimozide  -ranolazine  -red yeast rice  This medicine may also interact with the following medications:  -buspirone  -carbamazepine  -cimetidine  -cyclosporine  -digoxin  -local anesthetics or general anesthetics  -lovastatin  -medicines for anxiety or difficulty sleeping like midazolam and triazolam  -medicines for high blood pressure or heart problems  -quinidine  -rifampin, rifabutin, or rifapentine  This list may not describe all possible interactions. Give your health care provider a list of all the medicines, herbs, non-prescription drugs, or dietary supplements you use. Also tell them if you smoke, drink alcohol, or use illegal drugs. Some items may interact with your medicine.  What should I watch for while using this medicine?  Check your blood pressure and pulse rate regularly. Ask your doctor or health care professional what your blood pressure  and pulse rate should be and when you should contact him or her.  You may feel dizzy or lightheaded. Do not drive, use machinery, or do anything that needs mental alertness until you know how this medicine affects you. To reduce the risk of dizzy or fainting spells, do not sit or stand up quickly, especially if you are an older patient. Alcohol can make you more dizzy or increase flushing and rapid heartbeats. Avoid alcoholic drinks.  What side effects may I notice from receiving this medicine?  Side effects that you should report to your doctor or health care professional as soon as possible:  -allergic reactions like skin rash, itching or hives, swelling of the face, lips, or tongue  -confusion, mental depression  -feeling faint or lightheaded, falls  -redness, blistering, peeling or loosening of the skin, including inside the mouth  -slow, irregular heartbeat  -swelling of the feet and ankles  -unusual bleeding or bruising, pinpoint red spots on the skin  Side effects that usually do not require medical attention (report to your doctor or health care professional if they continue or are bothersome):  -constipation or diarrhea  -difficulty sleeping  -facial flushing  -headache  -nausea, vomiting  -sexual dysfunction  -weak or tired  This list may not describe all possible side effects. Call your doctor for medical advice about side effects. You may report side effects to FDA at 6-667-FDA-1900.  Where should I keep my medicine?  Keep out of the reach of children.  Store at room temperature between 15 and 30 degrees C (59 and 86 degrees F). Protect from humidity. Throw away any unused medicine after the expiration date.  NOTE: This sheet is a summary. It may not cover all possible information. If you have questions about this medicine, talk to your doctor, pharmacist, or health care provider.  © 2018 Elsevier/Gold Standard (2009-04-09 14:35:47)      Atrial Fibrillation  Introduction  Atrial fibrillation is a type of  heartbeat that is irregular or fast (rapid). If you have this condition, your heart keeps quivering in a weird (chaotic) way. This condition can make it so your heart cannot pump blood normally. Having this condition gives a person more risk for stroke, heart failure, and other heart problems. There are different types of atrial fibrillation. Talk with your doctor to learn about the type that you have.  Follow these instructions at home:  · Take over-the-counter and prescription medicines only as told by your doctor.  · If your doctor prescribed a blood-thinning medicine, take it exactly as told. Taking too much of it can cause bleeding. If you do not take enough of it, you will not have the protection that you need against stroke and other problems.  · Do not use any tobacco products. These include cigarettes, chewing tobacco, and e-cigarettes. If you need help quitting, ask your doctor.  · If you have apnea (obstructive sleep apnea), manage it as told by your doctor.  · Do not drink alcohol.  · Do not drink beverages that have caffeine. These include coffee, soda, and tea.  · Maintain a healthy weight. Do not use diet pills unless your doctor says they are safe for you. Diet pills may make heart problems worse.  · Follow diet instructions as told by your doctor.  · Exercise regularly as told by your doctor.  · Keep all follow-up visits as told by your doctor. This is important.  Contact a doctor if:  · You notice a change in the speed, rhythm, or strength of your heartbeat.  · You are taking a blood-thinning medicine and you notice more bruising.  · You get tired more easily when you move or exercise.  Get help right away if:  · You have pain in your chest or your belly (abdomen).  · You have sweating or weakness.  · You feel sick to your stomach (nauseous).  · You notice blood in your throw up (vomit), poop (stool), or pee (urine).  · You are short of breath.  · You suddenly have swollen feet and ankles.  · You  feel dizzy.  · Your suddenly get weak or numb in your face, arms, or legs, especially if it happens on one side of your body.  · You have trouble talking, trouble understanding, or both.  · Your face or your eyelid droops on one side.  These symptoms may be an emergency. Do not wait to see if the symptoms will go away. Get medical help right away. Call your local emergency services (911 in the [place] Do not drive yourself to the hospital.   This information is not intended to replace advice given to you by your health care provider. Make sure you discuss any questions you have with your health care provider.  Document Released: 09/26/2009 Document Revised: 05/25/2017 Document Reviewed: 04/13/2016  © 2017 [employer]      Depression / Suicide Risk    As you are discharged from this [facility] facility, it is important to learn how to keep safe from harming yourself.    Recognize the warning signs:  · Abrupt changes in personality, positive or negative- including increase in energy   · Giving away possessions  · Change in eating patterns- significant weight changes-  positive or negative  · Change in sleeping patterns- unable to sleep or sleeping all the time   · Unwillingness or inability to communicate  · Depression  · Unusual sadness, discouragement and loneliness  · Talk of wanting to die  · Neglect of personal appearance   · Rebelliousness- reckless behavior  · Withdrawal from people/activities they love  · Confusion- inability to concentrate     If you or a loved one observes any of these behaviors or has concerns about self-harm, here's what you can do:  · Talk about it- your feelings and reasons for harming yourself  · Remove any means that you might use to hurt yourself (examples: pills, rope, extension cords, firearm)  · Get professional help from the community (Mental Health, Substance Abuse, psychological counseling)  · Do not be alone:Call your Safe Contact- someone whom you trust who will be there for  you.  · Call your local CRISIS HOTLINE 377-5560 or 342-229-5944  · Call your local Children's Mobile Crisis Response Team [place] (223) 063-4608 or www.[employer]  · Call the toll free National Suicide Prevention Hotlines   · National Suicide Prevention Lifeline 114-696-EVZM (8682)  · National [employer] Network 800-SUICIDE (426-7997)

## 2019-08-08 NOTE — PROGRESS NOTES
Cardiology Follow Up Progress Note    Date of Service  8/8/2019    Attending Physician  Alexy Bocanegra M.D.    Reason for consultation     A. fib RVR ( 's ) worsening dyspnea, , history of persistent A. fib, recent stop of digoxin     History of     Persistent A. fib, mildly reduced EF 45% 2016, statin intolerance, hypertension, moderate MR, glucose intolerance, on Eliquis for A. fib    Admitted for     Worsening dyspnea , in ER found to be in afib RVR, rate 140,  with pulmonary edema on CXR    Interim Events    8/8/19 overnight no cardiac issues, remains in A. fib with controlled ventricular rate on diltiazem drip 5 mg/hour, denies palpitations, no dyspnea, echo at bedside    Review of Systems  Review of Systems   Respiratory: Negative for apnea, cough, choking, chest tightness, shortness of breath, wheezing and stridor.    Cardiovascular: Negative for chest pain and leg swelling.       Vital signs in last 24 hours  Temp:  [36.2 °C (97.1 °F)-37.1 °C (98.7 °F)] 36.2 °C (97.2 °F)  Pulse:  [60-94] 89  Resp:  [16-20] 18  BP: ()/(54-70) 112/61  SpO2:  [90 %-93 %] 91 %    Physical Exam  Physical Exam   Constitutional: She is oriented to person, place, and time. She appears well-developed.   HENT:   Head: Normocephalic.   Eyes: Conjunctivae are normal.   Neck: No JVD present. No thyromegaly present.   Cardiovascular: Normal rate. An irregularly irregular rhythm present.   Murmur heard.   Systolic murmur is present.  Pulses:       Carotid pulses are 2+ on the right side, and 2+ on the left side.       Radial pulses are 2+ on the right side, and 2+ on the left side.   Pulmonary/Chest: She has no wheezes.   Abdominal: Soft.   Musculoskeletal: She exhibits no edema.   Neurological: She is alert and oriented to person, place, and time.   Skin: Skin is warm and dry.       Lab Review  Lab Results   Component Value Date/Time    WBC 7.3 08/07/2019 03:09 AM    RBC 4.09 (L) 08/07/2019 03:09 AM    HEMOGLOBIN 12.0 08/07/2019  03:09 AM    HEMATOCRIT 38.0 08/07/2019 03:09 AM    MCV 92.9 08/07/2019 03:09 AM    MCH 29.3 08/07/2019 03:09 AM    MCHC 31.6 (L) 08/07/2019 03:09 AM    MPV 9.6 08/07/2019 03:09 AM      Lab Results   Component Value Date/Time    SODIUM 141 08/07/2019 03:09 AM    POTASSIUM 3.9 08/07/2019 03:09 AM    CHLORIDE 105 08/07/2019 03:09 AM    CO2 27 08/07/2019 03:09 AM    GLUCOSE 107 (H) 08/07/2019 03:09 AM    BUN 30 (H) 08/07/2019 03:09 AM    CREATININE 1.06 08/07/2019 03:09 AM    CREATININE 0.99 04/11/2011 12:00 AM    BUNCREATRAT 26 04/11/2011 12:00 AM    GLOMRATE 56 (L) 03/09/2011 07:55 AM      Lab Results   Component Value Date/Time    ASTSGOT 16 08/06/2019 09:13 AM    ALTSGPT 13 08/06/2019 09:13 AM     Lab Results   Component Value Date/Time    CHOLSTRLTOT 174 04/03/2019 07:56 AM    LDL 97 04/03/2019 07:56 AM    HDL 60 04/03/2019 07:56 AM    TRIGLYCERIDE 85 04/03/2019 07:56 AM    TROPONINI 0.02 06/06/2016 03:10 PM               Assessment/Plan    Atrial fib RVR ( hx of persistent afib ,recently off of Digoxin )  -Continue with OAC  Eliquis 5 mg BID   -Toprol  mg   -Rate controlled on Diltiazem drip  awaiting echo to adjust meds further    Pulmonary edema on CXR   -on 40 mg IV lasix  -Switch to p.o. on discharge  -Denies dyspnea    Hypertension  -well controlled on current meds      Reported dementia  -supportive care  -Lives alone at home and states she can take care of her ADLs, will discuss with  prior to discharge      Moderate MR by ZAFAR in 2016  -awaiting echo to evaluate LV function and severity of MR      Likely home after echo  Rubina from echo at bedside

## 2019-08-08 NOTE — PROGRESS NOTES
Assumed care of pt. Bedside report completed with night shift RN. Pt alert and oriented, but forgetful at times. Pt denies pain for this RN. Pt ambulated to bathroom with CNA and now resting comfortably in bed. Call light within reach. Bed low and locked. Bed alarm on. Offered needs.

## 2019-08-08 NOTE — DISCHARGE SUMMARY
Discharge Summary    CHIEF COMPLAINT ON ADMISSION  Chief Complaint   Patient presents with   • Shortness of Breath   • Chest Pain   • Anxiety       Reason for Admission  EMS     Admission Date  8/6/2019    CODE STATUS  Full Code    Echo  Normal left ventricular size and systolic function.  Left ventricular ejection fraction is visually estimated to be 55%.  Normal right ventricular size and systolic function.  Aortic sclerosis without stenosis.  Mild aortic insufficiency.  Moderate to severe mitral regurgitation.  Moderate tricuspid regurgitation.  Right atrial pressure is estimated to be 3 mmHg.  Estimated right ventricular systolic pressure  is 55 mmHg.  Estimated pulmonary artery diastolic pressure of 10.  HPI & HOSPITAL COURSE     80 y/o F with PMH of afib, dementia came in with palpitation and found to have afib with RVR.  Please refer to H&P for detail.  She was initially started on Cardizem as an infusion.  Cardiology was also consulted.  The patient has echocardiogram done and showed normal LVEF.  Cardiology he recommended to discontinue Cardizem infusion and started on oral Cardizem 120 mg once a day.  According to cardiology she can be discharged home today and follow-up with them as an outpatient.  I saw and examined the patient upon discharge.  Please call [phone] to schedule PCP appointment for patient.    Required specialty appointments include: Dr. Washington        Therefore, she is discharged in fair and stable condition to home with close outpatient follow-up.    The patient met 2-midnight criteria for an inpatient stay at the time of discharge.    Discharge Date  8/8/19    FOLLOW UP ITEMS POST DISCHARGE      DISCHARGE DIAGNOSES  Principal Problem:    Persistent atrial fibrillation (HCC) POA: Yes  Active Problems:    Stage 3 chronic kidney disease (HCC) POA: Yes    Chronic diastolic heart failure (HCC) POA: Yes    Shortness of breath POA: Yes    Hyperglycemia POA: Yes  Resolved Problems:    * No  resolved hospital problems. *      FOLLOW UP  No future appointments.  Checo Tesfaye M.D.  [address]  [address]  [place] [id number] [phone]    In 1 week      Shen Washington M.D.  [address]  [address]  [place] [address]  [phone]    In 1 week        MEDICATIONS ON DISCHARGE     Medication List      START taking these medications      Instructions   DILTIAZem  MG Cp24  Start taking on:  8/9/2019  Commonly known as:  CARDIZEM CD   Take 1 Cap by mouth every day.  Dose:  120 mg        CONTINUE taking these medications      Instructions   apixaban 5mg Tabs  Commonly known as:  ELIQUIS   Take 1 Tab by mouth 2 Times a Day. TAKE 1 TABLET BY MOUTH TWICE A DAY  Dose:  5 mg     ezetimibe 10 MG Tabs  Commonly known as:  ZETIA   Doctor's comments:  Note quantity change  Take 1 Tab by mouth every Monday, Wednesday, and Friday.  Dose:  10 mg     furosemide 20 MG Tabs  Commonly known as:  LASIX   Take 1 Tab by mouth every day.  Dose:  20 mg     metoprolol  MG Tb24  Commonly known as:  TOPROL XL   Take 1 Tab by mouth 2 Times a Day.  Dose:  100 mg     omeprazole 20 MG delayed-release capsule  Commonly known as:  PRILOSEC   Take 1 Cap by mouth every day.  Dose:  20 mg     potassium chloride SA 20 MEQ Tbcr  Commonly known as:  Kdur   Doctor's comments:  Requesting a 90 day supply  Take 1 Tab by mouth every day.  Dose:  20 mEq     travoprost 0.004 % Soln  Commonly known as:  TRAVATAN Z   Place 1 Drop in both eyes every evening.  Dose:  1 Drop            Allergies  Allergies   Allergen Reactions   • Atorvastatin      myalgias   • Simvastatin      myalgias       DIET  Orders Placed This Encounter   Procedures   • Diet Order Diabetic     Standing Status:   Standing     Number of Occurrences:   1     Order Specific Question:   Diet:     Answer:   Diabetic [3]     Order Specific Question:   Texture/Fiber modifications:     Answer:   Chopped Meat [5]       ACTIVITY  As tolerated.  Weight bearing as  tolerated    CONSULTATIONS  cardiology    PROCEDURES  none    LABORATORY  Lab Results   Component Value Date    SODIUM 141 08/07/2019    POTASSIUM 3.9 08/07/2019    CHLORIDE 105 08/07/2019    CO2 27 08/07/2019    GLUCOSE 107 (H) 08/07/2019    BUN 30 (H) 08/07/2019    CREATININE 1.06 08/07/2019    CREATININE 0.99 04/11/2011    GLOMRATE 56 (L) 03/09/2011        Lab Results   Component Value Date    WBC 7.3 08/07/2019    HEMOGLOBIN 12.0 08/07/2019    HEMATOCRIT 38.0 08/07/2019    PLATELETCT 174 08/07/2019        Total time of the discharge process exceeds 38 minutes.

## 2019-08-08 NOTE — CARE PLAN
Problem: Communication  Goal: The ability to communicate needs accurately and effectively will improve  Outcome: PROGRESSING AS EXPECTED     Problem: Safety  Goal: Will remain free from injury  Outcome: PROGRESSING AS EXPECTED  Goal: Will remain free from falls  Outcome: PROGRESSING AS EXPECTED     Problem: Infection  Goal: Will remain free from infection  Outcome: PROGRESSING AS EXPECTED     Problem: Venous Thromboembolism (VTW)/Deep Vein Thrombosis (DVT) Prevention:  Goal: Patient will participate in Venous Thrombosis (VTE)/Deep Vein Thrombosis (DVT)Prevention Measures  Outcome: PROGRESSING AS EXPECTED     Problem: Bowel/Gastric:  Goal: Normal bowel function is maintained or improved  Outcome: PROGRESSING AS EXPECTED  Goal: Will not experience complications related to bowel motility  Outcome: PROGRESSING AS EXPECTED     Problem: Knowledge Deficit  Goal: Knowledge of disease process/condition, treatment plan, diagnostic tests, and medications will improve  Outcome: PROGRESSING AS EXPECTED  Goal: Knowledge of the prescribed therapeutic regimen will improve  Outcome: PROGRESSING AS EXPECTED     Problem: Discharge Barriers/Planning  Goal: Patient's continuum of care needs will be met  Outcome: PROGRESSING AS EXPECTED     Problem: Respiratory:  Goal: Respiratory status will improve  Outcome: PROGRESSING AS EXPECTED     Problem: Psychosocial Needs:  Goal: Level of anxiety will decrease  Outcome: PROGRESSING AS EXPECTED

## 2019-08-08 NOTE — DOCUMENTATION QUERY
[facility]                                                                       Query Response Note      PATIENT:               KYLE WISEMAN  ACCT #:                  [id number]  MRN:                     [id number]  :                      1940  ADMIT DATE:       2019 9:09 AM  DISCH DATE:        2019 3:32 PM  RESPONDING  PROVIDER #:        [id number]           QUERY TEXT:    There is conflicting documentation in the medical record.    Chronic diastolic CHF is documented in H&P, PN and DC summary.      Mild acute on chronic heart failure preserved EF  is documented in Cardiology consult.    Based on treatment, clinical findings and risk factors, can this documentation be further clarified?    The patient's Clinical Indicators include:  Clinical indicators - atrial fibrillation w/RVR; pulmonary edema on CXR    Treatment - IV Lasix; eliquis, IV and PO diltiazem, metoprolol    Risks - atrial fibrillation RVR;  moderate to severe mitral regurgitation; CKD 3  Options provided:   -- Chronic diastolic CHF   -- Acute on chronic diastolic CHF   -- Unable to determine      Query created by: Annie Zepeda on 2019 1:48 PM    RESPONSE TEXT:    Chronic diastolic CHF          Electronically signed by:  MELLISSA FITZGERALD 2019 4:04 PM

## 2019-08-08 NOTE — PROGRESS NOTES
Pt ready for discharge. IV removed. AVS reviewed with pt and all questions answered. Pt has all belongings. Pt leaving with daughter to go home. Pt escorted off unit in wheelchair with CNA.

## 2019-08-09 ENCOUNTER — PATIENT OUTREACH (OUTPATIENT)
Dept: HEALTH INFORMATION MANAGEMENT | Facility: OTHER | Age: 79
End: 2019-08-09

## 2019-08-14 ENCOUNTER — OFFICE VISIT (OUTPATIENT)
Dept: CARDIOLOGY | Facility: MEDICAL CENTER | Age: 79
End: 2019-08-14
Payer: MEDICARE

## 2019-08-14 VITALS
WEIGHT: 138.4 LBS | HEART RATE: 75 BPM | SYSTOLIC BLOOD PRESSURE: 122 MMHG | DIASTOLIC BLOOD PRESSURE: 68 MMHG | HEIGHT: 62 IN | OXYGEN SATURATION: 93 % | BODY MASS INDEX: 25.47 KG/M2

## 2019-08-14 DIAGNOSIS — I10 ESSENTIAL HYPERTENSION: Chronic | ICD-10-CM

## 2019-08-14 DIAGNOSIS — I34.0 NON-RHEUMATIC MITRAL REGURGITATION: ICD-10-CM

## 2019-08-14 DIAGNOSIS — I50.32 CHRONIC DIASTOLIC HEART FAILURE (HCC): ICD-10-CM

## 2019-08-14 DIAGNOSIS — I48.19 PERSISTENT ATRIAL FIBRILLATION (HCC): ICD-10-CM

## 2019-08-14 DIAGNOSIS — Z79.01 CHRONIC ANTICOAGULATION: ICD-10-CM

## 2019-08-14 DIAGNOSIS — F41.9 CHRONIC ANXIETY: ICD-10-CM

## 2019-08-14 PROBLEM — R06.02 SHORTNESS OF BREATH: Status: RESOLVED | Noted: 2019-08-06 | Resolved: 2019-08-14

## 2019-08-14 PROCEDURE — 99214 OFFICE O/P EST MOD 30 MIN: CPT | Performed by: NURSE PRACTITIONER

## 2019-08-14 ASSESSMENT — ENCOUNTER SYMPTOMS
SHORTNESS OF BREATH: 0
CLAUDICATION: 0
ORTHOPNEA: 0
NERVOUS/ANXIOUS: 1
DIZZINESS: 0
PND: 0
WEIGHT LOSS: 0
PALPITATIONS: 0
WEAKNESS: 0

## 2019-08-14 NOTE — PROGRESS NOTES
Chief Complaint   Patient presents with   • Atrial Fibrillation       Subjective:   Ivory Rowan is a very pleasant 79 y.o. female who presents today for hospital follow-up.    Patient was admitted on 8/6/2019 with complaints of shortness of breath and was found to be in atrial fibrillation with rapid ventricular response.  Repeat echocardiogram showed recovered LV systolic function (LVEF 55%) and MR remained ventricular rate was controlled initially with diltiazem drip and was transitioned to oral diltiazem 120 mg daily and discharged on 8/8/2019.    Past medical history significant for persistent atrial fibrillation, mildly reduced LV systolic function (LVEF 45% in 2016), statin intolerance, hypertension, moderate mitral regurgitation, glucose intolerance and chronic anticoagulation with Eliquis.      Today patient states that she is feeling well overall.  Denies any shortness of breath or palpitations occurring since discharge.  Her only complaint is generalized anxiety which she reports she has had all her life.  Denies chest pain, edema, dizziness or syncope.    Past Medical History:   Diagnosis Date   • Atrial fibrillation (HCC)    • Basal cell carcinoma of skin of nose    • CATARACT     Dr. Reyna Aguiar   • CHF (congestive heart failure) ([facility])    • Colon polyp     tubular adenomas   • Dyslipidemia    • Glaucoma, right eye     Dr. Orellana   • Hypertension    • IBS (irritable bowel syndrome)    • MEDICAL HOME 10/23/12   • Mitral valve regurgitation    • Osteopenia 6/09   • Perennial allergic rhinitis with seasonal variation    • Persistent atrial fibrillation (HCC)    • Type 2 diabetes mellitus, controlled ([facility])    • Valvular heart disease     mitral insufficiency   • Vertigo      Past Surgical History:   Procedure Laterality Date   • RECOVERY  7/8/2016    Procedure: CATH LAB-AZFAR GUIDED CV-TO-LARGE GROUP;  Surgeon: [facility] Surgery;  Location: SURGERY PRE-POST PROC UNIT [facility] [place];  Service:    • COLONOSCOPY   "2009    Dr. Lopez, 9 polyps   • APPENDECTOMY     • BREAST BIOPSY      left, reports wire report broke off during procedure   • CHOLECYSTECTOMY     • US-NEEDLE CORE BX-BREAST PANEL       Family History   Problem Relation Age of Onset   • Diabetes Mother    • Hypertension Mother    • Stroke Mother    • Cancer Father         lung/larynx   • Cancer Sister         \"female\"   • Genetic Disorder Sister         osteoporosis   • Cancer Paternal Aunt         breast   • Cancer Maternal Aunt    • Heart Disease Brother         CABG x 3     Social History     Socioeconomic History   • Marital status: Single     Spouse name: Not on file   • Number of children: 2   • Years of education: Not on file   • Highest education level: Not on file   Occupational History   • Occupation: Retired 2009 [employer]     Employer: retired   Social Needs   • Financial resource strain: Not on file   • Food insecurity:     Worry: Not on file     Inability: Not on file   • Transportation needs:     Medical: Not on file     Non-medical: Not on file   Tobacco Use   • Smoking status: Former Smoker     Packs/day: 0.50     Years: 40.00     Pack years: 20.00     Types: Cigarettes     Last attempt to quit: 3/1/1996     Years since quittin.4   • Smokeless tobacco: Never Used   Substance and Sexual Activity   • Alcohol use: No     Alcohol/week: 0.0 oz     Comment: 1-2 drinks once a month   • Drug use: No   • Sexual activity: Not Currently     Partners: Male   Lifestyle   • Physical activity:     Days per week: Not on file     Minutes per session: Not on file   • Stress: Not on file   Relationships   • Social connections:     Talks on phone: Not on file     Gets together: Not on file     Attends Synagogue service: Not on file     Active member of club or organization: Not on file     Attends meetings of clubs or organizations: Not on file     Relationship status: Not on file   • Intimate partner violence:     Fear of current or ex partner: Not " "on file     Emotionally abused: Not on file     Physically abused: Not on file     Forced sexual activity: Not on file   Other Topics Concern   • Not on file   Social History Narrative   • Not on file     Allergies   Allergen Reactions   • Atorvastatin      myalgias   • Simvastatin      myalgias     Outpatient Encounter Medications as of 8/14/2019   Medication Sig Dispense Refill   • DILTIAZem CD (CARDIZEM CD) 120 MG CAPSULE SR 24 HR Take 1 Cap by mouth every day. 30 Cap 0   • ezetimibe (ZETIA) 10 MG Tab Take 1 Tab by mouth every Monday, Wednesday, and Friday. 45 Tab 3   • metoprolol SR (TOPROL XL) 100 MG TABLET SR 24 HR Take 1 Tab by mouth 2 Times a Day. 180 Tab 3   • apixaban (ELIQUIS) 5mg Tab Take 1 Tab by mouth 2 Times a Day. TAKE 1 TABLET BY MOUTH TWICE A  Tab 3   • furosemide (LASIX) 20 MG Tab Take 1 Tab by mouth every day. 100 Tab 3   • omeprazole (PRILOSEC) 20 MG delayed-release capsule Take 1 Cap by mouth every day. 90 Cap 3   • potassium chloride SA (KDUR) 20 MEQ Tab CR Take 1 Tab by mouth every day. 90 Tab 3   • travoprost (TRAVATAN Z) 0.004 % Solution Place 1 Drop in both eyes every evening.       No facility-administered encounter medications on file as of 8/14/2019.      Review of Systems   Constitutional: Negative for malaise/fatigue and weight loss.   Respiratory: Negative for shortness of breath.    Cardiovascular: Negative for chest pain, palpitations, orthopnea, claudication, leg swelling and PND.   Neurological: Negative for dizziness and weakness.   Psychiatric/Behavioral: The patient is nervous/anxious.    All other systems reviewed and are negative.       Objective:   /68 (BP Location: Left arm, Patient Position: Sitting, BP Cuff Size: Adult)   Pulse 75   Ht 1.575 m (5' 2\")   Wt 62.8 kg (138 lb 6.4 oz)   LMP 05/01/1991   SpO2 93%   BMI 25.31 kg/m²     Physical Exam   Constitutional: She is oriented to person, place, and time. She appears well-developed and well-nourished. No " distress.   HENT:   Head: Normocephalic.   Eyes: EOM are normal.   Neck: No JVD present.   Cardiovascular: Normal rate and intact distal pulses. An irregularly irregular rhythm present.   Murmur heard.   Systolic murmur is present.  Pulmonary/Chest: Breath sounds normal. No respiratory distress.   Abdominal: Soft. There is no tenderness.   Musculoskeletal: She exhibits no edema.   Neurological: She is alert and oriented to person, place, and time.   Skin: Skin is warm and dry.   Psychiatric: She has a normal mood and affect. Her behavior is normal.        TTE 8/8/19:  CONCLUSIONS  Normal left ventricular size and systolic function.  Left ventricular ejection fraction is visually estimated to be 55%.  Normal right ventricular size and systolic function.  Aortic sclerosis without stenosis.  Mild aortic insufficiency.  Moderate to severe mitral regurgitation.  Moderate tricuspid regurgitation.  Right atrial pressure is estimated to be 3 mmHg.  Estimated right ventricular systolic pressure  is 55 mmHg.  Estimated pulmonary artery diastolic pressure of 10.    Compared to the images of the prior study done on 05/02/16: Unable to retrieve previous study due to technical difficulties. Compared to the report, there has been no change to the MR. The LVEF has improved from 45% to 55%.      Assessment:     1. Persistent atrial fibrillation (HCC)     2. Chronic anticoagulation     3. Non-rheumatic mitral regurgitation     4. Essential hypertension     5. Chronic diastolic heart failure (HCC)     6. Chronic anxiety         Medical Decision Making:  Today's Assessment / Status / Plan:   Persistent atrial fibrillation:  -Rate controlled in office today (70's-80's per pulse oximeter).   -Continue diltiazem 120 mg daily.  -OAC with Eliquis 5 mg twice daily.    Diastolic Heart Failure:  -RVSP per TTE on 8/8/2019 was 35 mmHg with moderate TR.   -Patient appears euvolemic.  -Has not been able to follow up with pulmonary medicine for MEGAN  workup due to financial constraints, she is hoping  -Continue Lasix 20 mg daily with KCl 20 M EQ daily.    Mitral Regurgitation:  -Per echo report, no significant change in MR (still moderate to severe) from previous study in 2016.   -Continue Lasix as above.    Hypertension:  -Stable.  -Continue diuretic, Toprol and Diltiazem as above.     Patient would like to follow up with Danyell Esteban PA-C whom she saw in the hospital in 3-4 months or earlier if needed. Encouraged patient to contact office should any questions or concerns arise in the mean time. Patient and daughter understand and agree with the plan of care.     Future Appointments   Date Time Provider Department Center   12/17/2019  1:40 PM Danyell Esteban P.A.-C. [facility] None     Collaborating Provider: Dr. Angie Pollard       Please note that this dictation was created using voice recognition software. I have made every reasonable attempt to correct obvious errors, but I expect that there are errors of grammar and possibly content I did not discover before finalizing the note.

## 2019-08-20 NOTE — DOCUMENTATION QUERY
[facility]                                                                       Query Response Note      PATIENT:               KYLE WISEMAN  ACCT #:                  [id number]  MRN:                     [id number]  :                      1940  ADMIT DATE:       2019 9:09 AM  DISCH DATE:        2019 3:32 PM  RESPONDING  PROVIDER #:        [id number]           QUERY TEXT:    The History and Physical documents the patient has a history of Type 2 diabetes. The remainder of the chart documents the patient is hyperglycemic with no history of diabetes.    The patient's glucose was only checked once per labs and doesn't appear to have been treated or monitored. There were no A1C labs drawn.    Based on treatment, clinical findings and risk factors, can this documentation be further clarified?    The patient's Clinical Indicators include:  HP-Type 2 diabetes mellitus, controlled    PN, DS-hyperglycemia no history of DM    cataract, glaucoma, CKD  Glucose 107  No A1C pulled  Options provided:   -- Patient has type 2 diabetes mellitus.   -- Patient is hyperglycemic without a diagnosis of diabetes.   -- Hyperglycemia is irrelevant to this admission.   -- Unable to determine      Query created by: Stephie Lee on 2019 3:28 AM    RESPONSE TEXT:    Patient has diabetes mellitus, controlled with diet and exercise. Not on DM medications as outpatient.          Electronically signed by:  MELLISSA FITZGERALD 2019 7:46 AM

## 2019-08-21 DIAGNOSIS — I48.19 PERSISTENT ATRIAL FIBRILLATION (HCC): ICD-10-CM

## 2019-08-21 RX ORDER — DILTIAZEM HYDROCHLORIDE 120 MG/1
CAPSULE, COATED, EXTENDED RELEASE ORAL
Qty: 30 CAP | Refills: 6 | Status: SHIPPED | OUTPATIENT
Start: 2019-08-21 | End: 2020-02-27

## 2019-08-28 ENCOUNTER — OFFICE VISIT (OUTPATIENT)
Dept: MEDICAL GROUP | Facility: MEDICAL CENTER | Age: 79
End: 2019-08-28
Payer: MEDICARE

## 2019-08-28 ENCOUNTER — TELEPHONE (OUTPATIENT)
Dept: MEDICAL GROUP | Facility: MEDICAL CENTER | Age: 79
End: 2019-08-28

## 2019-08-28 ENCOUNTER — DOCUMENTATION (OUTPATIENT)
Dept: MEDICAL GROUP | Facility: MEDICAL CENTER | Age: 79
End: 2019-08-28

## 2019-08-28 VITALS
RESPIRATION RATE: 12 BRPM | HEIGHT: 62 IN | OXYGEN SATURATION: 93 % | HEART RATE: 132 BPM | BODY MASS INDEX: 25.4 KG/M2 | SYSTOLIC BLOOD PRESSURE: 102 MMHG | WEIGHT: 138 LBS | TEMPERATURE: 99.4 F | DIASTOLIC BLOOD PRESSURE: 66 MMHG

## 2019-08-28 DIAGNOSIS — I48.91 ATRIAL FIBRILLATION, RAPID (HCC): ICD-10-CM

## 2019-08-28 DIAGNOSIS — I49.9 IRREGULAR HEART RATE: ICD-10-CM

## 2019-08-28 DIAGNOSIS — N18.30 CKD (CHRONIC KIDNEY DISEASE) STAGE 3, GFR 30-59 ML/MIN (HCC): ICD-10-CM

## 2019-08-28 PROCEDURE — 99214 OFFICE O/P EST MOD 30 MIN: CPT | Performed by: FAMILY MEDICINE

## 2019-08-28 RX ORDER — DIGOXIN 125 MCG
125 TABLET ORAL DAILY
Qty: 30 TAB | Refills: 3 | Status: SHIPPED | OUTPATIENT
Start: 2019-08-28 | End: 2019-10-23

## 2019-08-28 NOTE — PROGRESS NOTES
Chief Complaint   Patient presents with   • Atrial Fibrillation   • Palpitations   • Chronic Kidney Disease       Subjective:     HPI:   Ivory Rowan presents today with the followin. Irregular heart rate  Patient has atrial fib and has been seen in the hospital with rapid response now several times in this last year.  Today at one point the pulse ox machine measured her pulse at 78.  However, when I felt her pulse several times during the visit she varied between the mid 80s all the way up to 145.  She felt short of breath at that time.  No rales appreciated.  No peripheral edema appreciated today.    2. Atrial fibrillation, rapid (HCC)  She was admitted to the hospital with rapid A. fib and diltiazem was added to her metoprolol regimen.  She states she has been taking this daily in the last 2-1/2 weeks since her hospitalization.  She feels her pulse rate is no better.  She is upset that the digoxin was discontinued which she felt was very helpful for her.  This is discussed at length with her and her daughter.  She will  resume the digoxin at the lowest dose.    3. CKD (chronic kidney disease) stage 3, GFR 30-59 ml/min ([facility])  Her chronic kidney disease has been stable.  It has greatly improved and stabilized over the last few years.  I feel her kidney function is adequate to be able to use digitalis.    Still chronic anxiety and PTSD from childhood and loss of her daughter 40 years ago      Patient Active Problem List    Diagnosis Date Noted   • Essential hypertension 2010     Priority: High   • Dyslipidemia 2009     Priority: Medium   • History of adenomatous polyp of colon 2015     Priority: Low   • Perennial allergic rhinitis with seasonal variation      Priority: Low   • Vertigo      Priority: Low   • IBS (irritable bowel syndrome)      Priority: Low   • Basal cell carcinoma of skin of nose      Priority: Low   • Vitamin D deficiency 2011     Priority: Low   • GERD  "(gastroesophageal reflux disease) 12/06/2010     Priority: Low   • Osteopenia 07/23/2009     Priority: Low   • Sinusitis, chronic      Priority: Low   • Bilateral cataracts      Priority: Low   • Chronic anticoagulation 08/14/2019   • Hyperglycemia 08/07/2019   • Primary open angle glaucoma (POAG) of both eyes 09/10/2018   • Persistent atrial fibrillation (HCC)    • Glaucoma, right eye    • Stage 3 chronic kidney disease (HCC) 06/22/2017   • Chronic diastolic heart failure ([facility]) 06/22/2017   • Type 2 diabetes mellitus, controlled ([facility])    • Chronic anxiety 01/04/2017   • Diabetes mellitus type II, non insulin dependent ([facility]) 11/29/2016   • Financial difficulties 11/29/2016   • Mitral valve regurgitation        Current medicines (including changes today)  Current Outpatient Medications   Medication Sig Dispense Refill   • digoxin (LANOXIN) 125 MCG Tab Take 1 Tab by mouth every day. 30 Tab 3   • DILTIAZem CD (CARDIZEM CD) 120 MG CAPSULE SR 24 HR TAKE 1 CAPSULE BY MOUTH EVERY DAY 30 Cap 6   • metoprolol SR (TOPROL XL) 100 MG TABLET SR 24 HR Take 1 Tab by mouth 2 Times a Day. 180 Tab 3   • apixaban (ELIQUIS) 5mg Tab Take 1 Tab by mouth 2 Times a Day. TAKE 1 TABLET BY MOUTH TWICE A  Tab 3   • furosemide (LASIX) 20 MG Tab Take 1 Tab by mouth every day. 100 Tab 3   • omeprazole (PRILOSEC) 20 MG delayed-release capsule Take 1 Cap by mouth every day. 90 Cap 3   • potassium chloride SA (KDUR) 20 MEQ Tab CR Take 1 Tab by mouth every day. 90 Tab 3   • travoprost (TRAVATAN Z) 0.004 % Solution Place 1 Drop in both eyes every evening.       No current facility-administered medications for this visit.        Allergies   Allergen Reactions   • Atorvastatin      myalgias   • Simvastatin      myalgias   • Zetia [Ezetimibe] Myalgia     Muscle pain       ROS: As per HPI       Objective:     /66   Pulse (!) 132   Temp 37.4 °C (99.4 °F)   Resp 12   Ht 1.575 m (5' 2\")   Wt 62.6 kg (138 lb)   SpO2 93%  Body mass index is " 25.24 kg/m².    Physical Exam:  Constitutional: Well-developed and well-nourished. Not diaphoretic. No distress. Lucid and fluent.  Skin: Skin is warm and dry. No rash noted.  Head: Atraumatic without lesions.  Eyes: Conjunctivae and extraocular motions are normal. Pupils are equal, round, and reactive to light. No scleral icterus.   Mouth/Throat: Tongue normal. Oropharynx is clear and moist. Posterior pharynx without erythema or exudates.  Neck: Supple, trachea midline. No thyromegaly present. No cervical or supraclavicular lymphadenopathy. No JVD or carotid bruits appreciated  Cardiovascular: Irregularly irregular rate and rhythm.  Rate varies between mid 80s up to 145 here in the office today.  Normal S1, S2 without murmur appreciated.  Chest: Effort normal. Clear to auscultation throughout. No adventitious sounds.  No rales appreciated.  Abdomen: Soft, non tender, and without distention. Active bowel sounds in all four quadrants. No rebound, guarding, masses or hepatosplenomegaly.  Extremities: No cyanosis, clubbing, erythema, no peripheral edema.   Neurological: Alert and oriented x 3. Fine tremor in hands.    Psychiatric:  Behavior, mood, and affect are appropriate.    Last TSH was normal.       Assessment and Plan:     79 y.o. female with the following issues:    1. Irregular heart rate  digoxin (LANOXIN) 125 MCG Tab   2. Atrial fibrillation, rapid (HCC)  digoxin (LANOXIN) 125 MCG Tab   3. CKD (chronic kidney disease) stage 3, GFR 30-59 ml/min (HCC)  Basic Metabolic Panel         Followup: Return in about 2 months (around 10/28/2019), or if symptoms worsen or fail to improve.

## 2019-08-29 NOTE — TELEPHONE ENCOUNTER
This is actually the very lowest dosage.  I asked that she take that for a few days and lets see if that internal shaky feeling and the high pulse rate at night it improves.

## 2019-08-29 NOTE — PROGRESS NOTES
Spoke with daughter, Conchita and let her know that her mom was in fact on the lowest dose of her current meds.

## 2019-09-07 ENCOUNTER — PATIENT OUTREACH (OUTPATIENT)
Dept: HEALTH INFORMATION MANAGEMENT | Facility: OTHER | Age: 79
End: 2019-09-07

## 2019-09-10 ENCOUNTER — PATIENT OUTREACH (OUTPATIENT)
Dept: HEALTH INFORMATION MANAGEMENT | Facility: OTHER | Age: 79
End: 2019-09-10

## 2019-09-10 NOTE — PROGRESS NOTES
A 79-year-old female was an emergent admission to [facility] from 8/6/2019 to 8/8/2019 to treat Persistent Atrial Fibrillation. IHD visited the patient bedside. The patient was discharged Home.    The patient was ordered to start/continue to take the following medication: Diltiazem Hydrochloride (Cardizem). The patient successfully filled all medications.    The patient was ordered to follow-up with Specialist and PCP. The patient had the following appointments:     1) 8/14/2019 @ 9:00 Danyell Ray, certified nurse practitioner - CONFIRMED AS KEPT     2) 8/28/2019 @ 2:00 Checo Tesfaye, general/family practice - CONFIRMED AS KEPT    The patient has a future appointment scheduled for:  10/23/2019 @ 10:40 Checo Tesfaye general/family practice - SCHEDULED.     [facility] followed the patient for a total of 34 days and patient was receptive to services. [facility] provided patient with a RTC Access application. PPS screening was conducted at 70%

## 2019-09-10 NOTE — PROGRESS NOTES
Outcome: Left Message  Called twice. First time the person that answered the phone said Ivory was busy and hung up. The second time I LVM     Please transfer to Patient Outreach Team at [phone] when patient returns call.      [facility] Verified: yes    Attempt # 1

## 2019-09-12 DIAGNOSIS — F41.9 CHRONIC ANXIETY: ICD-10-CM

## 2019-09-12 NOTE — PROGRESS NOTES
Pt called in and stated she received a voicemail and at first she didn't want to verify her   Then she felt more comfortable and verified her  she stated she didn't need to schedule anything at the moment but will hang on to our number and she might in the future need to use ub[employer] transportation because her daughter doesn't always want to take her to her appointments. I was unable to fully verify all demographics in pts chart.   Pt did stated she feels she is at the beginning stages of Dementia and she also stated she lost one of her daughter about 20 years ago the day before aleta and its hard for her to be happy around the holidays.

## 2019-09-13 RX ORDER — DIAZEPAM 10 MG/1
TABLET ORAL
Qty: 90 TAB | Refills: 0 | Status: SHIPPED
Start: 2019-09-13 | End: 2020-02-27 | Stop reason: SDUPTHER

## 2019-09-24 ENCOUNTER — TELEPHONE (OUTPATIENT)
Dept: CARDIOLOGY | Facility: MEDICAL CENTER | Age: 79
End: 2019-09-24

## 2019-09-24 NOTE — TELEPHONE ENCOUNTER
TT    This patient is concerned about the lab work that the . in the ER because she has done her labs a few months ago and the Dr has requested that this patient to be taking the medication that TT took her off of. Please call the patient back at [phone].    Thank you,  Lissa COLLINS

## 2019-09-24 NOTE — TELEPHONE ENCOUNTER
Returned pt's call. She was very confused because she has lab slips from Dr. Washington but doesn't remember when she received them. She also has lab orders from her PCP, and she is wondering if she can have them done at the same time. Explained that TT ordered labs during her 7/10/19 appt, and this is probably the lab slip she is referring to. Also advised that it would be fine for her to have this drawn at the same time as the labs ordered by PCP. Also explained that it would be a good idea to complete labs since it includes a digoxin level, and her PCP put her back on digoxin. All questions answered and she verbalizes understanding.

## 2019-09-25 ENCOUNTER — HOSPITAL ENCOUNTER (OUTPATIENT)
Dept: LAB | Facility: MEDICAL CENTER | Age: 79
End: 2019-09-25
Attending: FAMILY MEDICINE
Payer: MEDICARE

## 2019-09-25 DIAGNOSIS — E11.21 CONTROLLED TYPE 2 DIABETES MELLITUS WITH DIABETIC NEPHROPATHY, WITHOUT LONG-TERM CURRENT USE OF INSULIN (HCC): ICD-10-CM

## 2019-09-25 DIAGNOSIS — I48.19 PERSISTENT ATRIAL FIBRILLATION (HCC): ICD-10-CM

## 2019-09-25 DIAGNOSIS — N18.30 CKD (CHRONIC KIDNEY DISEASE) STAGE 3, GFR 30-59 ML/MIN (HCC): ICD-10-CM

## 2019-09-25 DIAGNOSIS — E11.9 DIABETES MELLITUS TYPE II, NON INSULIN DEPENDENT (HCC): ICD-10-CM

## 2019-09-25 DIAGNOSIS — E78.5 DYSLIPIDEMIA: Chronic | ICD-10-CM

## 2019-09-25 DIAGNOSIS — I10 ESSENTIAL HYPERTENSION: Chronic | ICD-10-CM

## 2019-09-25 LAB
ALBUMIN SERPL BCP-MCNC: 4.5 G/DL (ref 3.2–4.9)
ALBUMIN/GLOB SERPL: 1.7 G/DL
ALP SERPL-CCNC: 86 U/L (ref 30–99)
ALT SERPL-CCNC: 22 U/L (ref 2–50)
ANION GAP SERPL CALC-SCNC: 9 MMOL/L (ref 0–11.9)
AST SERPL-CCNC: 21 U/L (ref 12–45)
BILIRUB SERPL-MCNC: 0.7 MG/DL (ref 0.1–1.5)
BUN SERPL-MCNC: 26 MG/DL (ref 8–22)
CALCIUM SERPL-MCNC: 10.3 MG/DL (ref 8.5–10.5)
CHLORIDE SERPL-SCNC: 102 MMOL/L (ref 96–112)
CHOLEST SERPL-MCNC: 182 MG/DL (ref 100–199)
CO2 SERPL-SCNC: 27 MMOL/L (ref 20–33)
CREAT SERPL-MCNC: 1.15 MG/DL (ref 0.5–1.4)
ERYTHROCYTE [DISTWIDTH] IN BLOOD BY AUTOMATED COUNT: 45.3 FL (ref 35.9–50)
EST. AVERAGE GLUCOSE BLD GHB EST-MCNC: 123 MG/DL
FASTING STATUS PATIENT QL REPORTED: NORMAL
GLOBULIN SER CALC-MCNC: 2.7 G/DL (ref 1.9–3.5)
GLUCOSE SERPL-MCNC: 110 MG/DL (ref 65–99)
HBA1C MFR BLD: 5.9 % (ref 0–5.6)
HCT VFR BLD AUTO: 41.4 % (ref 37–47)
HDLC SERPL-MCNC: 57 MG/DL
HGB BLD-MCNC: 12.8 G/DL (ref 12–16)
LDLC SERPL CALC-MCNC: 103 MG/DL
MCH RBC QN AUTO: 28.5 PG (ref 27–33)
MCHC RBC AUTO-ENTMCNC: 30.9 G/DL (ref 33.6–35)
MCV RBC AUTO: 92.2 FL (ref 81.4–97.8)
PLATELET # BLD AUTO: 200 K/UL (ref 164–446)
PMV BLD AUTO: 10.3 FL (ref 9–12.9)
POTASSIUM SERPL-SCNC: 4.3 MMOL/L (ref 3.6–5.5)
PROT SERPL-MCNC: 7.2 G/DL (ref 6–8.2)
RBC # BLD AUTO: 4.49 M/UL (ref 4.2–5.4)
SODIUM SERPL-SCNC: 138 MMOL/L (ref 135–145)
TRIGL SERPL-MCNC: 108 MG/DL (ref 0–149)
WBC # BLD AUTO: 5.3 K/UL (ref 4.8–10.8)

## 2019-09-25 PROCEDURE — 80061 LIPID PANEL: CPT

## 2019-09-25 PROCEDURE — 83036 HEMOGLOBIN GLYCOSYLATED A1C: CPT

## 2019-09-25 PROCEDURE — 85027 COMPLETE CBC AUTOMATED: CPT

## 2019-09-25 PROCEDURE — 36415 COLL VENOUS BLD VENIPUNCTURE: CPT

## 2019-09-25 PROCEDURE — 80053 COMPREHEN METABOLIC PANEL: CPT

## 2019-10-01 ENCOUNTER — TELEPHONE (OUTPATIENT)
Dept: MEDICAL GROUP | Facility: MEDICAL CENTER | Age: 79
End: 2019-10-01

## 2019-10-01 RX ORDER — TRAVOPROST 0.04 MG/ML
SOLUTION/ DROPS OPHTHALMIC
Refills: 5 | COMMUNITY
Start: 2019-09-24 | End: 2021-08-12

## 2019-10-01 RX ORDER — EZETIMIBE 10 MG/1
10 TABLET ORAL
Refills: 3 | COMMUNITY
Start: 2019-09-03 | End: 2019-10-23

## 2019-10-01 NOTE — TELEPHONE ENCOUNTER
She should be sure to take this at bedtime.  It should give her less symptoms if she does that.  I think it is important that she stay on medication to control her heart rate.  If after switching to bedtime she finds she cannot tolerate it we will try something else.

## 2019-10-03 ENCOUNTER — DOCUMENTATION (OUTPATIENT)
Dept: MEDICAL GROUP | Facility: MEDICAL CENTER | Age: 79
End: 2019-10-03

## 2019-10-04 NOTE — TELEPHONE ENCOUNTER
Okay.  Unfortunately I don't think she would do that anyway.  I will discuss it with her at her visit.

## 2019-10-23 ENCOUNTER — OFFICE VISIT (OUTPATIENT)
Dept: MEDICAL GROUP | Facility: MEDICAL CENTER | Age: 79
End: 2019-10-23
Payer: MEDICARE

## 2019-10-23 VITALS
TEMPERATURE: 98.9 F | WEIGHT: 133 LBS | RESPIRATION RATE: 14 BRPM | HEIGHT: 62 IN | DIASTOLIC BLOOD PRESSURE: 60 MMHG | SYSTOLIC BLOOD PRESSURE: 124 MMHG | BODY MASS INDEX: 24.48 KG/M2 | OXYGEN SATURATION: 96 % | HEART RATE: 79 BPM

## 2019-10-23 DIAGNOSIS — E78.5 DYSLIPIDEMIA: ICD-10-CM

## 2019-10-23 DIAGNOSIS — Z23 NEED FOR DIPHTHERIA-TETANUS-PERTUSSIS (TDAP) VACCINE: ICD-10-CM

## 2019-10-23 DIAGNOSIS — R73.03 PREDIABETES: ICD-10-CM

## 2019-10-23 PROCEDURE — 90471 IMMUNIZATION ADMIN: CPT | Performed by: FAMILY MEDICINE

## 2019-10-23 PROCEDURE — 99213 OFFICE O/P EST LOW 20 MIN: CPT | Mod: 25 | Performed by: FAMILY MEDICINE

## 2019-10-23 PROCEDURE — 90715 TDAP VACCINE 7 YRS/> IM: CPT | Performed by: FAMILY MEDICINE

## 2019-10-23 RX ORDER — EZETIMIBE 10 MG/1
10 TABLET ORAL DAILY
Qty: 100 TAB | Refills: 3 | Status: SHIPPED | OUTPATIENT
Start: 2019-10-23 | End: 2020-06-22 | Stop reason: SDUPTHER

## 2019-10-23 NOTE — PROGRESS NOTES
Chief Complaint   Patient presents with   • Elevated Glucose       Subjective:     HPI:   Ivory Rowan presents today with the followin. Prediabetes  Patient's glycohemoglobin is very good at 5.9%.  She has glucose intolerance and possible prediabetes.  She is not on any glucose lowering medication.  Patient states she feels good on her current regimen.  Her labs are certainly favorable.    2. Need for diphtheria-tetanus-pertussis (Tdap) vaccine  There is a pertussis outbreak currently in the community.  TDaP vaccine administered today    3. Dyslipidemia  Patient would like her Zetia renewed as a 90-day prescription.  This is prescribed by cardiology.  She was insistent that I needed to do this.  I have sent that prescription in.  Patient denies any current chest pain or chest pressure.  Denies increased palpitations.  She seems to be tolerating her current regimen well.        Patient Active Problem List    Diagnosis Date Noted   • Essential hypertension 2010     Priority: High   • Dyslipidemia 2009     Priority: Medium   • History of adenomatous polyp of colon 2015     Priority: Low   • Perennial allergic rhinitis with seasonal variation      Priority: Low   • Vertigo      Priority: Low   • IBS (irritable bowel syndrome)      Priority: Low   • Basal cell carcinoma of skin of nose      Priority: Low   • Vitamin D deficiency 2011     Priority: Low   • GERD (gastroesophageal reflux disease) 2010     Priority: Low   • Osteopenia 2009     Priority: Low   • Sinusitis, chronic      Priority: Low   • Bilateral cataracts      Priority: Low   • Chronic anticoagulation 2019   • Hyperglycemia 2019   • Primary open angle glaucoma (POAG) of both eyes 09/10/2018   • Persistent atrial fibrillation    • Glaucoma, right eye    • Stage 3 chronic kidney disease (HCC) 2017   • Chronic diastolic heart failure (HCC) 2017   • Type 2 diabetes mellitus, controlled  "([facility])    • Chronic anxiety 01/04/2017   • Diabetes mellitus type II, non insulin dependent ([facility]) 11/29/2016   • Financial difficulties 11/29/2016   • Mitral valve regurgitation        Current medicines (including changes today)  Current Outpatient Medications   Medication Sig Dispense Refill   • ezetimibe (ZETIA) 10 MG Tab Take 1 Tab by mouth every day. 100 Tab 3   • TRAVATAN Z 0.004 % Solution INSTILL 1 DROP IN BOTH EYES EVERY NIGHT 1 HOUR BEFORE BEDTIME  5   • diazepam (VALIUM) 10 MG tablet TAKE 1 TABLET ORALLY EVERY 12 HOURS AS NEEDED FOR ANXIETY FOR UP TO 90 DAYS (F41.9) 90 Tab 0   • DILTIAZem CD (CARDIZEM CD) 120 MG CAPSULE SR 24 HR TAKE 1 CAPSULE BY MOUTH EVERY DAY 30 Cap 6   • metoprolol SR (TOPROL XL) 100 MG TABLET SR 24 HR Take 1 Tab by mouth 2 Times a Day. 180 Tab 3   • apixaban (ELIQUIS) 5mg Tab Take 1 Tab by mouth 2 Times a Day. TAKE 1 TABLET BY MOUTH TWICE A  Tab 3   • furosemide (LASIX) 20 MG Tab Take 1 Tab by mouth every day. 100 Tab 3   • omeprazole (PRILOSEC) 20 MG delayed-release capsule Take 1 Cap by mouth every day. 90 Cap 3   • potassium chloride SA (KDUR) 20 MEQ Tab CR Take 1 Tab by mouth every day. 90 Tab 3   • travoprost (TRAVATAN Z) 0.004 % Solution Place 1 Drop in both eyes every evening.       No current facility-administered medications for this visit.        Allergies   Allergen Reactions   • Atorvastatin      myalgias   • Simvastatin      myalgias   • Zetia [Ezetimibe] Myalgia     Muscle pain       ROS: As per HPI       Objective:     /60   Pulse 79   Temp 37.2 °C (98.9 °F)   Resp 14   Ht 1.575 m (5' 2\")   Wt 60.3 kg (133 lb)   SpO2 96%  Body mass index is 24.33 kg/m².    Physical Exam:  Constitutional: Well-developed and well-nourished. Not diaphoretic. No distress. Lucid and fluent.  Skin: Skin is warm and dry. No rash noted.  Head: Atraumatic without lesions.  Eyes: Conjunctivae and extraocular motions are normal. Pupils are equal, round, and reactive to light. No " scleral icterus.   Ears:  External ears unremarkable. Tympanic membranes clear and intact.  Nose: Nares patent. Mucosa without edema or erythema. No discharge. No facial tenderness.  Mouth/Throat: Tongue normal. Oropharynx is clear and moist. Posterior pharynx without erythema or exudates.  Neck: Supple, trachea midline. No thyromegaly present. No cervical or supraclavicular lymphadenopathy. No JVD or carotid bruits appreciated  Cardiovascular: Regular rate and rhythm.  Normal S1, S2 without murmur appreciated.  Chest: Effort normal. Clear to auscultation throughout. No adventitious sounds.   Extremities: No cyanosis, clubbing, erythema, nor edema.   Neurological: Alert and oriented x 3.  No tremor appreciated.  Psychiatric:  Behavior, mood, and affect are appropriate.       Assessment and Plan:     79 y.o. female with the following issues:    1. Prediabetes     2. Need for diphtheria-tetanus-pertussis (Tdap) vaccine  Tdap Vaccine =>6YO IM   3. Dyslipidemia  ezetimibe (ZETIA) 10 MG Tab         Followup: Return in about 4 months (around 2/23/2020), or if symptoms worsen or fail to improve.

## 2019-11-18 ENCOUNTER — OFFICE VISIT (OUTPATIENT)
Dept: URGENT CARE | Facility: PHYSICIAN GROUP | Age: 79
End: 2019-11-18
Payer: MEDICARE

## 2019-11-18 VITALS
HEIGHT: 62 IN | DIASTOLIC BLOOD PRESSURE: 62 MMHG | OXYGEN SATURATION: 98 % | HEART RATE: 74 BPM | SYSTOLIC BLOOD PRESSURE: 118 MMHG | TEMPERATURE: 97.8 F | BODY MASS INDEX: 25.76 KG/M2 | WEIGHT: 140 LBS

## 2019-11-18 DIAGNOSIS — J34.89 SINUS PAIN: ICD-10-CM

## 2019-11-18 PROCEDURE — 99214 OFFICE O/P EST MOD 30 MIN: CPT | Performed by: EMERGENCY MEDICINE

## 2019-11-18 RX ORDER — AMOXICILLIN AND CLAVULANATE POTASSIUM 875; 125 MG/1; MG/1
1 TABLET, FILM COATED ORAL 2 TIMES DAILY
Qty: 14 TAB | Refills: 0 | Status: SHIPPED | OUTPATIENT
Start: 2019-11-18 | End: 2019-11-25

## 2019-11-18 ASSESSMENT — ENCOUNTER SYMPTOMS
DIZZINESS: 0
SINUS PAIN: 1
FEVER: 0
SORE THROAT: 0
COUGH: 0
SWOLLEN GLANDS: 0

## 2019-11-18 ASSESSMENT — PAIN SCALES - GENERAL: PAINLEVEL: 5=MODERATE PAIN

## 2019-11-18 NOTE — PROGRESS NOTES
Subjective:      Ivory Rowan is a 79 y.o. female who presents with Sinus Pain            Sinus Pain   This is a recurrent problem. Episode onset: over one week. The problem occurs daily. The problem has been unchanged. Associated symptoms include congestion. Pertinent negatives include no coughing, fever, sore throat or swollen glands. Nothing aggravates the symptoms. She has tried nothing for the symptoms.   Patient and family notes similar episodes in the past successfully treated with antibiotics.  Notes onset associated with dental cleaning procedure.  Notes recent episode of epistaxis, atraumatic.    Review of Systems   Constitutional: Negative for fever.   HENT: Positive for congestion, ear pain, nosebleeds and sinus pain. Negative for ear discharge, hearing loss and sore throat.    Respiratory: Negative for cough.    Neurological: Negative for dizziness.     PMH:  has a past medical history of Atrial fibrillation (HCC), Basal cell carcinoma of skin of nose, CATARACT, CHF (congestive heart failure) (HCC), Colon polyp, Dyslipidemia, Glaucoma, right eye, Hypertension, IBS (irritable bowel syndrome), MEDICAL HOME (10/23/12), Mitral valve regurgitation, Osteopenia (6/09), Perennial allergic rhinitis with seasonal variation, Persistent atrial fibrillation, Type 2 diabetes mellitus, controlled (HCC), Valvular heart disease, and Vertigo.  MEDS:   Current Outpatient Medications:   •  amoxicillin-clavulanate (AUGMENTIN) 875-125 MG Tab, Take 1 Tab by mouth 2 times a day for 7 days., Disp: 14 Tab, Rfl: 0  •  ezetimibe (ZETIA) 10 MG Tab, Take 1 Tab by mouth every day., Disp: 100 Tab, Rfl: 3  •  TRAVATAN Z 0.004 % Solution, INSTILL 1 DROP IN BOTH EYES EVERY NIGHT 1 HOUR BEFORE BEDTIME, Disp: , Rfl: 5  •  diazepam (VALIUM) 10 MG tablet, TAKE 1 TABLET ORALLY EVERY 12 HOURS AS NEEDED FOR ANXIETY FOR UP TO 90 DAYS (F41.9), Disp: 90 Tab, Rfl: 0  •  DILTIAZem CD (CARDIZEM CD) 120 MG CAPSULE SR 24 HR, TAKE 1 CAPSULE BY  "MOUTH EVERY DAY, Disp: 30 Cap, Rfl: 6  •  metoprolol SR (TOPROL XL) 100 MG TABLET SR 24 HR, Take 1 Tab by mouth 2 Times a Day., Disp: 180 Tab, Rfl: 3  •  apixaban (ELIQUIS) 5mg Tab, Take 1 Tab by mouth 2 Times a Day. TAKE 1 TABLET BY MOUTH TWICE A DAY, Disp: 180 Tab, Rfl: 3  •  furosemide (LASIX) 20 MG Tab, Take 1 Tab by mouth every day., Disp: 100 Tab, Rfl: 3  •  omeprazole (PRILOSEC) 20 MG delayed-release capsule, Take 1 Cap by mouth every day., Disp: 90 Cap, Rfl: 3  •  potassium chloride SA (KDUR) 20 MEQ Tab CR, Take 1 Tab by mouth every day., Disp: 90 Tab, Rfl: 3  •  travoprost (TRAVATAN Z) 0.004 % Solution, Place 1 Drop in both eyes every evening., Disp: , Rfl:   ALLERGIES:   Allergies   Allergen Reactions   • Atorvastatin      myalgias   • Simvastatin      myalgias   • Zetia [Ezetimibe] Myalgia     Muscle pain     SURGHX:   Past Surgical History:   Procedure Laterality Date   • RECOVERY  7/8/2016    Procedure: CATH LAB-ZAFAR GUIDED CV-TO-LARGE GROUP;  Surgeon: [facility] Surgery;  [place] [facility]  Service:    • COLONOSCOPY  8/2009    Dr. Lopez, 9 polyps   • APPENDECTOMY     • BREAST BIOPSY      left, reports wire report broke off during procedure   • CHOLECYSTECTOMY     • US-NEEDLE CORE BX-BREAST PANEL       SOCHX:  reports that she quit smoking about 23 years ago. Her smoking use included cigarettes. She has a 20.00 pack-year smoking history. She has never used smokeless tobacco. She reports that she does not drink alcohol or use drugs.  FH: family history includes Cancer in her father, maternal aunt, paternal aunt, and sister; Diabetes in her mother; Genetic Disorder in her sister; Heart Disease in her brother; Hypertension in her mother; Stroke in her mother.     Objective:     /62 (BP Location: Left arm, Patient Position: Sitting, BP Cuff Size: Adult)   Pulse 74   Temp 36.6 °C (97.8 °F)   Ht 1.575 m (5' 2\")   Wt 63.5 kg (140 lb)   LMP 05/01/1991   SpO2 98%   BMI " 25.61 kg/m²      Physical Exam  Constitutional:       General: She is not in acute distress.     Appearance: She is well-developed. She is not ill-appearing.   HENT:      Head: Normocephalic and atraumatic.      Jaw: There is normal jaw occlusion. No tenderness or pain on movement.      Salivary Glands: Left salivary gland is not diffusely enlarged or tender.      Right Ear: Tympanic membrane and ear canal normal.      Left Ear: Tympanic membrane, ear canal and external ear normal.      Nose: Mucosal edema present. No rhinorrhea.      Right Nostril: No epistaxis.      Left Nostril: No epistaxis.      Right Turbinates: Enlarged and swollen.      Left Turbinates: Enlarged and swollen.      Left Sinus: Maxillary sinus tenderness present. No frontal sinus tenderness.      Mouth/Throat:      Pharynx: Uvula midline.   Eyes:      Conjunctiva/sclera: Conjunctivae normal.   Neck:      Musculoskeletal: Neck supple.      Trachea: Trachea normal.   Cardiovascular:      Rate and Rhythm: Normal rate and regular rhythm.      Heart sounds: Normal heart sounds.   Pulmonary:      Effort: Pulmonary effort is normal.      Breath sounds: No decreased breath sounds, wheezing, rhonchi or rales.   Lymphadenopathy:      Cervical: No cervical adenopathy.   Skin:     General: Skin is warm and dry.   Neurological:      Mental Status: She is alert.   Psychiatric:         Behavior: Behavior is cooperative.                 Assessment/Plan:       1. Sinus pain  Recommended supportive care measures, including rest, increasing oral fluid intake and use of over-the-counter medications for relief of symptoms.  Based on history, duration:  - amoxicillin-clavulanate (AUGMENTIN) 875-125 MG Tab; Take 1 Tab by mouth 2 times a day for 7 days.  Dispense: 14 Tab; Refill: 0

## 2019-12-23 DIAGNOSIS — I48.91 ATRIAL FIBRILLATION WITH RVR (HCC): ICD-10-CM

## 2019-12-26 RX ORDER — POTASSIUM CHLORIDE 20 MEQ/1
TABLET, EXTENDED RELEASE ORAL
Qty: 100 TAB | Refills: 3 | Status: SHIPPED | OUTPATIENT
Start: 2019-12-26 | End: 2020-06-22 | Stop reason: SDUPTHER

## 2020-02-27 ENCOUNTER — OFFICE VISIT (OUTPATIENT)
Dept: MEDICAL GROUP | Facility: MEDICAL CENTER | Age: 80
End: 2020-02-27
Payer: MEDICARE

## 2020-02-27 VITALS
BODY MASS INDEX: 24.29 KG/M2 | TEMPERATURE: 98 F | HEART RATE: 67 BPM | SYSTOLIC BLOOD PRESSURE: 102 MMHG | WEIGHT: 132 LBS | DIASTOLIC BLOOD PRESSURE: 66 MMHG | OXYGEN SATURATION: 97 % | RESPIRATION RATE: 14 BRPM | HEIGHT: 62 IN

## 2020-02-27 DIAGNOSIS — I50.32 CHRONIC DIASTOLIC HEART FAILURE (HCC): ICD-10-CM

## 2020-02-27 DIAGNOSIS — N18.30 CKD (CHRONIC KIDNEY DISEASE) STAGE 3, GFR 30-59 ML/MIN: ICD-10-CM

## 2020-02-27 DIAGNOSIS — E11.21 CONTROLLED TYPE 2 DIABETES MELLITUS WITH DIABETIC NEPHROPATHY, WITHOUT LONG-TERM CURRENT USE OF INSULIN (HCC): ICD-10-CM

## 2020-02-27 DIAGNOSIS — I48.91 ATRIAL FIBRILLATION WITH RVR (HCC): ICD-10-CM

## 2020-02-27 DIAGNOSIS — F41.9 CHRONIC ANXIETY: ICD-10-CM

## 2020-02-27 DIAGNOSIS — R06.00 PAROXYSMAL NOCTURNAL DYSPNEA: ICD-10-CM

## 2020-02-27 DIAGNOSIS — R07.9 EXERTIONAL CHEST PAIN: ICD-10-CM

## 2020-02-27 PROCEDURE — 99214 OFFICE O/P EST MOD 30 MIN: CPT | Performed by: FAMILY MEDICINE

## 2020-02-27 RX ORDER — DIAZEPAM 10 MG/1
10 TABLET ORAL EVERY 12 HOURS PRN
Qty: 90 TAB | Refills: 0 | Status: SHIPPED | OUTPATIENT
Start: 2020-02-27 | End: 2020-05-27

## 2020-02-27 RX ORDER — DIGOXIN 125 MCG
125 TABLET ORAL DAILY
COMMUNITY
Start: 2019-12-18 | End: 2020-03-10 | Stop reason: SDUPTHER

## 2020-02-27 NOTE — PROGRESS NOTES
Chief Complaint   Patient presents with   • Chest Pain   • Shortness of Breath   • Diabetes Mellitus       Subjective:     HPI:   Ivory Rowan presents today with the followin. Exertional chest pain  Left chest and left arm pain with exertion.  This has been around 3 months, seems to be getting worse.  This does seem to be escalating.  This may be escalating angina.  Patient wants to avoid any hospitalization.  Referral to cardiology for evaluation and treatment discussed and placed.    2. Paroxysmal nocturnal dyspnea  Patient is getting dyspnea at nighttime intermittently.  This is interfering with her sleep.  This is much worse in the last few months.  Cardiology referral discussed and placed.    3. Chronic diastolic heart failure (HCC)/Atrial fibrillation with RVR ([facility])  Patient is noting episodes of rapid heartbeat.  She is relatively rapid and irregular today.  Denies shortness of breath or severe palpitations but is having orthopnea and exertional dyspnea.  Still rapid at times despite the digitalis.  She does follow with cardiology.  However her follow-up appointment is not until .  I have placed an urgent appointment today.    4. Controlled type 2 diabetes mellitus with diabetic nephropathy, without long-term current use of insulin ([facility])  Her last A1c showed excellent control.  Denies thirst or polyuria.  Denies nocturia.  She has been eating less at home.  She would like lab orders that she can complete before her next visit.  Orders are discussed and placed.    5. CKD (chronic kidney disease) stage 3, GFR 30-59 ml/min ([facility])  She is due for follow-up lab tests.  She has been relatively stable.  Follow-up lab orders discussed and placed.    6. Chronic anxiety  Patient has chronic longstanding anxiety.  She has tried SSRIs in the past with no benefit.  She uses Valium as needed as a rescue.  Generally she is using spiritual exercises.  No adverse events have been reported or observed.    review shows no inconsistencies.  This is renewed.      Patient Active Problem List    Diagnosis Date Noted   • Essential hypertension 01/18/2010     Priority: High   • Dyslipidemia 07/23/2009     Priority: Medium   • History of adenomatous polyp of colon 06/02/2015     Priority: Low   • Perennial allergic rhinitis with seasonal variation      Priority: Low   • Vertigo      Priority: Low   • IBS (irritable bowel syndrome)      Priority: Low   • Basal cell carcinoma of skin of nose      Priority: Low   • Vitamin D deficiency 12/08/2011     Priority: Low   • GERD (gastroesophageal reflux disease) 12/06/2010     Priority: Low   • Osteopenia 07/23/2009     Priority: Low   • Sinusitis, chronic      Priority: Low   • Bilateral cataracts      Priority: Low   • Chronic anticoagulation 08/14/2019   • Hyperglycemia 08/07/2019   • Primary open angle glaucoma (POAG) of both eyes 09/10/2018   • Persistent atrial fibrillation    • Glaucoma, right eye    • Stage 3 chronic kidney disease (HCC) 06/22/2017   • Chronic diastolic heart failure ([facility]) 06/22/2017   • Type 2 diabetes mellitus, controlled ([facility])    • Chronic anxiety 01/04/2017   • Diabetes mellitus type II, non insulin dependent ([facility]) 11/29/2016   • Financial difficulties 11/29/2016   • Mitral valve regurgitation        Current medicines (including changes today)  Current Outpatient Medications   Medication Sig Dispense Refill   • diazepam (VALIUM) 10 MG tablet Take 1 Tab by mouth every 12 hours as needed for Anxiety for up to 90 days. 90 Tab 0   • digoxin (LANOXIN) 125 MCG Tab      • potassium chloride SA (KDUR) 20 MEQ Tab CR TAKE 1 TABLET BY MOUTH EVERY  Tab 3   • ezetimibe (ZETIA) 10 MG Tab Take 1 Tab by mouth every day. 100 Tab 3   • TRAVATAN Z 0.004 % Solution INSTILL 1 DROP IN BOTH EYES EVERY NIGHT 1 HOUR BEFORE BEDTIME  5   • metoprolol SR (TOPROL XL) 100 MG TABLET SR 24 HR Take 1 Tab by mouth 2 Times a Day. 180 Tab 3   • apixaban (ELIQUIS) 5mg Tab Take 1 Tab  "by mouth 2 Times a Day. TAKE 1 TABLET BY MOUTH TWICE A  Tab 3   • furosemide (LASIX) 20 MG Tab Take 1 Tab by mouth every day. 100 Tab 3   • omeprazole (PRILOSEC) 20 MG delayed-release capsule Take 1 Cap by mouth every day. 90 Cap 3   • travoprost (TRAVATAN Z) 0.004 % Solution Place 1 Drop in both eyes every evening.       No current facility-administered medications for this visit.        Allergies   Allergen Reactions   • Atorvastatin      myalgias   • Simvastatin      myalgias   • Zetia [Ezetimibe] Myalgia     Muscle pain       ROS: As per HPI       Objective:     /66 (BP Location: Right arm, Patient Position: Sitting, BP Cuff Size: Adult)   Pulse 67   Temp 36.7 °C (98 °F) (Temporal)   Resp 14   Ht 1.575 m (5' 2\")   Wt 59.9 kg (132 lb)   SpO2 97%  Body mass index is 24.14 kg/m².    Physical Exam:  Constitutional: Well-developed and well-nourished. Not diaphoretic. No distress. Lucid and fluent.  Skin: Skin is warm and dry. No rash noted.  Head: Atraumatic without lesions.  Eyes: Conjunctivae and extraocular motions are normal. Pupils are equal, round, and reactive to light. No scleral icterus.   Neck: Supple, trachea midline. No thyromegaly present. No cervical or supraclavicular lymphadenopathy. No JVD or carotid bruits appreciated  Cardiovascular: irregularly irregular rate and rhythm.  Normal S1, S2 without murmur appreciated.  Chest: Effort normal. Clear to auscultation throughout. No adventitious sounds.  No rales appreciated.  No retractions appreciated.  Abdomen: Soft, non tender, and without distention. Active bowel sounds in all four quadrants. No rebound, guarding, masses or hepatosplenomegaly.  Extremities: No cyanosis, clubbing, erythema, nor edema.  No pitting edema appreciated.  Neurological: Alert and oriented x 3. No tremor appreciated.  Psychiatric:  Behavior, mood, and affect are appropriate.       Assessment and Plan:     79 y.o. female with the following issues:    1. " Exertional chest pain  REFERRAL TO CARDIOLOGY   2. Paroxysmal nocturnal dyspnea  REFERRAL TO CARDIOLOGY    TSH    CBC WITHOUT DIFFERENTIAL   3. Chronic diastolic heart failure (HCC)  REFERRAL TO CARDIOLOGY   4. Controlled type 2 diabetes mellitus with diabetic nephropathy, without long-term current use of insulin ([facility])  HEMOGLOBIN A1C    Comp Metabolic Panel    Lipid Profile    TSH    CBC WITHOUT DIFFERENTIAL   5. Atrial fibrillation with RVR ([facility])  REFERRAL TO CARDIOLOGY    Comp Metabolic Panel    Lipid Profile    TSH    CBC WITHOUT DIFFERENTIAL   6. CKD (chronic kidney disease) stage 3, GFR 30-59 ml/min ([facility])  Comp Metabolic Panel    TSH    CBC WITHOUT DIFFERENTIAL   7. Chronic anxiety  diazepam (VALIUM) 10 MG tablet         Followup: Return in about 4 months (around 6/27/2020), or if symptoms worsen or fail to improve.

## 2020-03-09 ENCOUNTER — TELEPHONE (OUTPATIENT)
Dept: CARDIOLOGY | Facility: MEDICAL CENTER | Age: 80
End: 2020-03-09

## 2020-03-09 NOTE — TELEPHONE ENCOUNTER
"JS    Pt called re: refused digoxin refill. I advised her of JS refusal note, pt is upset & says TT is the one prescribing this & \"says she can't breathe without it, she needs this to breathe.\" Pls call pt to discuss at [phone]  "

## 2020-03-09 NOTE — TELEPHONE ENCOUNTER
TT took pt off digoxin on 7/10/19, but pt's PCP Dr. Tesfaye started digoxin again on 8/28/19. Returned pt's call. She is very anxious and upset that her digoxin refill request was denied and is certain that if she isn't on digoxin she will end up in the hospital. She currently has about one week's worth of digoxin left. Per PCP's last note on 2/27/20, she did want her to be seen by cardiology soon to f/u for Afib. Scheduled with pt tomorrow 3/10/20 at 10:45am with TT.

## 2020-03-10 ENCOUNTER — OFFICE VISIT (OUTPATIENT)
Dept: CARDIOLOGY | Facility: MEDICAL CENTER | Age: 80
End: 2020-03-10
Payer: MEDICARE

## 2020-03-10 VITALS
BODY MASS INDEX: 24.29 KG/M2 | WEIGHT: 132 LBS | HEIGHT: 62 IN | HEART RATE: 76 BPM | SYSTOLIC BLOOD PRESSURE: 124 MMHG | DIASTOLIC BLOOD PRESSURE: 64 MMHG | OXYGEN SATURATION: 97 % | RESPIRATION RATE: 16 BRPM

## 2020-03-10 DIAGNOSIS — I48.19 PERSISTENT ATRIAL FIBRILLATION (HCC): ICD-10-CM

## 2020-03-10 DIAGNOSIS — E78.2 MIXED HYPERLIPIDEMIA: ICD-10-CM

## 2020-03-10 DIAGNOSIS — I10 HTN (HYPERTENSION), MALIGNANT: ICD-10-CM

## 2020-03-10 DIAGNOSIS — I20.89 ANGINA OF EFFORT (HCC): ICD-10-CM

## 2020-03-10 DIAGNOSIS — Z79.899 HIGH RISK MEDICATION USE: ICD-10-CM

## 2020-03-10 DIAGNOSIS — I34.0 NONRHEUMATIC MITRAL VALVE REGURGITATION: ICD-10-CM

## 2020-03-10 DIAGNOSIS — Z79.01 CHRONIC ANTICOAGULATION: ICD-10-CM

## 2020-03-10 DIAGNOSIS — I50.32 CHRONIC DIASTOLIC HEART FAILURE (HCC): ICD-10-CM

## 2020-03-10 PROCEDURE — 99215 OFFICE O/P EST HI 40 MIN: CPT | Performed by: INTERNAL MEDICINE

## 2020-03-10 RX ORDER — CYCLOSPORINE 0.5 MG/ML
1 EMULSION OPHTHALMIC 2 TIMES DAILY
COMMUNITY
Start: 2020-03-04 | End: 2021-08-12

## 2020-03-10 RX ORDER — DIGOXIN 125 MCG
125 TABLET ORAL DAILY
Qty: 30 TAB | Refills: 11 | Status: SHIPPED | OUTPATIENT
Start: 2020-03-10 | End: 2020-06-22 | Stop reason: SDUPTHER

## 2020-03-10 RX ORDER — ERYTHROMYCIN 5 MG/G
OINTMENT OPHTHALMIC
COMMUNITY
Start: 2020-03-02 | End: 2021-08-12

## 2020-03-10 ASSESSMENT — ENCOUNTER SYMPTOMS
FALLS: 0
BLURRED VISION: 0
BRUISES/BLEEDS EASILY: 0
DIZZINESS: 0
EYE PAIN: 0
MYALGIAS: 0
HALLUCINATIONS: 0
SHORTNESS OF BREATH: 0
NAUSEA: 0
DOUBLE VISION: 0
BLOOD IN STOOL: 0
ABDOMINAL PAIN: 0
SENSORY CHANGE: 0
ORTHOPNEA: 0
VOMITING: 0
CLAUDICATION: 0
SPEECH CHANGE: 0
PALPITATIONS: 0
FEVER: 0
WEIGHT LOSS: 0
EYE DISCHARGE: 0
LOSS OF CONSCIOUSNESS: 0
CHILLS: 0
DEPRESSION: 0
COUGH: 0
PND: 0
HEADACHES: 0

## 2020-03-10 ASSESSMENT — FIBROSIS 4 INDEX: FIB4 SCORE: 1.75

## 2020-03-10 NOTE — PROGRESS NOTES
Chief Complaint   Patient presents with   • Hypertension     F/V: 6 MO       Subjective:   Ivory Rowan is a 79 y.o. female who presents today for cardiac care for persistent atrial fibrillation after prior failed cardioversion. She was in sinus for brief amount of time. Patient was diagnosed with atrial fibrillation earlier in 2016. Patient has had multiple hospitalizations due to A. fib with rapid ventricular rate leading to heart failure exacerbation. Her left ventricular systolic function is documented at about 45-50% on her most recent transthoracic echocardiogram. Patient is anticoagulated. Patient is taking Toprol- mg by mouth twice a day.     Patient cannot afford Tikosyn therapy and she did not go into the hospital for that.     She is feeling fine. No palpitations. No dyspnea.     I have independently interpreted and reviewed blood tests results with patient in clinic which shows normal LDL level 103, triglycerides 108, renal and liver function.    Patient would like to be put back on Digoxin.    Patient also now reports of exertional chest pain. Worsened with walking, relived with rest.       Past Medical History:   Diagnosis Date   • Atrial fibrillation (HCC)    • Basal cell carcinoma of skin of nose    • CATARACT     Dr. Aaron, Dr. Orellana   • CHF (congestive heart failure) (HCC)    • Colon polyp     tubular adenomas   • Dyslipidemia    • Glaucoma, right eye     Dr. Orellana   • Hypertension    • IBS (irritable bowel syndrome)    • MEDICAL HOME 10/23/12   • Mitral valve regurgitation    • Osteopenia 6/09   • Perennial allergic rhinitis with seasonal variation    • Persistent atrial fibrillation    • Type 2 diabetes mellitus, controlled ([facility])    • Valvular heart disease     mitral insufficiency   • Vertigo      Past Surgical History:   Procedure Laterality Date   • RECOVERY  7/8/2016    Procedure: CATH LAB-ZAFAR GUIDED CV-TO-LARGE GROUP;  Surgeon: Recoveryonly Surgery;  [place] [facility] [place]:    • COLONOSCOPY  2009    Dr. Lopez, 9 polyps   • APPENDECTOMY     • BREAST BIOPSY      left, reports wire report broke off during procedure   • CHOLECYSTECTOMY     • US-NEEDLE CORE BX-BREAST PANEL       Family History   Problem Relation Age of Onset   • Diabetes Mother    • Hypertension Mother    • Stroke Mother    • Cancer Father         lung/larynx   • Cancer Sister         \"female\"   • Genetic Disorder Sister         osteoporosis   • Cancer Paternal Aunt         breast   • Cancer Maternal Aunt    • Heart Disease Brother         CABG x 3     Social History     Socioeconomic History   • Marital status: Single     Spouse name: Not on file   • Number of children: 2   • Years of education: Not on file   • Highest education level: Not on file   Occupational History   • Occupation: Retired 2009 [employer]     Employer: retired   Social Needs   • Financial resource strain: Not on file   • Food insecurity     Worry: Not on file     Inability: Not on file   • Transportation needs     Medical: Not on file     Non-medical: Not on file   Tobacco Use   • Smoking status: Former Smoker     Packs/day: 0.50     Years: 40.00     Pack years: 20.00     Types: Cigarettes     Last attempt to quit: 3/1/1996     Years since quittin.0   • Smokeless tobacco: Never Used   Substance and Sexual Activity   • Alcohol use: No     Alcohol/week: 0.0 oz     Comment: 1-2 drinks once a month   • Drug use: No   • Sexual activity: Not Currently     Partners: Male   Lifestyle   • Physical activity     Days per week: Not on file     Minutes per session: Not on file   • Stress: Not on file   Relationships   • Social connections     Talks on phone: Not on file     Gets together: Not on file     Attends [employer] service: Not on file     Active member of club or organization: Not on file     Attends meetings of clubs or organizations: Not on file     Relationship status: Not on file   • Intimate partner violence     " Fear of current or ex partner: Not on file     Emotionally abused: Not on file     Physically abused: Not on file     Forced sexual activity: Not on file   Other Topics Concern   • Not on file   Social History Narrative   • Not on file     Allergies   Allergen Reactions   • Atorvastatin      myalgias   • Simvastatin      myalgias   • Zetia [Ezetimibe] Myalgia     Muscle pain     Outpatient Encounter Medications as of 3/10/2020   Medication Sig Dispense Refill   • RESTASIS 0.05 % ophthalmic emulsion Place 1 Drop in both eyes 2 times a day.     • erythromycin 5 MG/GM Ointment      • digoxin (LANOXIN) 125 MCG Tab Take 1 Tab by mouth every day. 30 Tab 11   • diazepam (VALIUM) 10 MG tablet Take 1 Tab by mouth every 12 hours as needed for Anxiety for up to 90 days. 90 Tab 0   • potassium chloride SA (KDUR) 20 MEQ Tab CR TAKE 1 TABLET BY MOUTH EVERY  Tab 3   • ezetimibe (ZETIA) 10 MG Tab Take 1 Tab by mouth every day. (Patient taking differently: Take 10 mg by mouth. MON, WED, FRI) 100 Tab 3   • TRAVATAN Z 0.004 % Solution INSTILL 1 DROP IN BOTH EYES EVERY NIGHT 1 HOUR BEFORE BEDTIME  5   • metoprolol SR (TOPROL XL) 100 MG TABLET SR 24 HR Take 1 Tab by mouth 2 Times a Day. 180 Tab 3   • apixaban (ELIQUIS) 5mg Tab Take 1 Tab by mouth 2 Times a Day. TAKE 1 TABLET BY MOUTH TWICE A  Tab 3   • furosemide (LASIX) 20 MG Tab Take 1 Tab by mouth every day. 100 Tab 3   • omeprazole (PRILOSEC) 20 MG delayed-release capsule Take 1 Cap by mouth every day. 90 Cap 3   • travoprost (TRAVATAN Z) 0.004 % Solution Place 1 Drop in both eyes every evening.     • [DISCONTINUED] digoxin (LANOXIN) 125 MCG Tab Take 125 mcg by mouth every day.       No facility-administered encounter medications on file as of 3/10/2020.      Review of Systems   Constitutional: Negative for chills, fever, malaise/fatigue and weight loss.   HENT: Negative for ear discharge, ear pain, hearing loss and nosebleeds.    Eyes: Negative for blurred vision,  "double vision, pain and discharge.   Respiratory: Negative for cough and shortness of breath.    Cardiovascular: Negative for chest pain, palpitations, orthopnea, claudication, leg swelling and PND.   Gastrointestinal: Negative for abdominal pain, blood in stool, melena, nausea and vomiting.   Genitourinary: Negative for dysuria and hematuria.   Musculoskeletal: Negative for falls, joint pain and myalgias.   Skin: Negative for itching and rash.   Neurological: Negative for dizziness, sensory change, speech change, loss of consciousness and headaches.   Endo/Heme/Allergies: Negative for environmental allergies. Does not bruise/bleed easily.   Psychiatric/Behavioral: Negative for depression, hallucinations and suicidal ideas.        Objective:   /64 (BP Location: Right arm, Patient Position: Sitting, BP Cuff Size: Adult)   Pulse 76   Resp 16   Ht 1.575 m (5' 2\")   Wt 59.9 kg (132 lb)   LMP 05/01/1991   SpO2 97%   BMI 24.14 kg/m²     Physical Exam   Constitutional: She is oriented to person, place, and time. No distress.   HENT:   Head: Normocephalic and atraumatic.   Right Ear: External ear normal.   Left Ear: External ear normal.   Eyes: Right eye exhibits no discharge. Left eye exhibits no discharge.   Neck: No JVD present. No thyromegaly present.   Cardiovascular: Normal rate and intact distal pulses. Exam reveals no gallop and no friction rub.   No murmur heard.  There is presence of an irregularly irregular heartbeats.     Pulmonary/Chest: Breath sounds normal. No respiratory distress.   Abdominal: Bowel sounds are normal. She exhibits no distension. There is no abdominal tenderness.   Musculoskeletal:         General: No tenderness or edema.   Neurological: She is alert and oriented to person, place, and time. No cranial nerve deficit.   Skin: Skin is warm and dry. She is not diaphoretic.   Psychiatric: She has a normal mood and affect. Her behavior is normal.   Nursing note and vitals " reviewed.      Assessment:     1. Persistent atrial fibrillation  NM-CARDIAC PET   2. Chronic anticoagulation     3. Chronic diastolic heart failure (HCC)     4. HTN (hypertension), malignant  NM-CARDIAC PET   5. Mixed hyperlipidemia  NM-CARDIAC PET   6. High risk medication use     7. Angina of effort (HCC)  NM-CARDIAC PET   8. Nonrheumatic mitral valve regurgitation  REFERRAL TO CARDIOLOGY       Medical Decision Making:  Today's Assessment / Status / Plan:   Persistent atrial fibrillation:  Rate controlled.  Continue anticoagulation with Eiquis 5 mg bid  Will restart Digoxin 125 mcg po daily at patient's reqiest/  Continue Toprol 100 mg bid.     Hypertension:  Blood pressure is well controlled.  Continue Toprol 100 mg bid.     Dyslipidemia:  Statin intolerance.  Continue Zetia 10 mg qhs.    Mitral regurgitation (severe):  Will send to valve clinic for further assessment of intervention.    In terms of her exertional chest pain, will obtain nuclear stress test.     I will see patient back in clinic with lab tests and studies results in 3 months.     I thank you Dr. Tesfaye for referring patient to our Cardiology Clinic today.

## 2020-03-13 ENCOUNTER — TELEPHONE (OUTPATIENT)
Dept: CARDIOLOGY | Facility: MEDICAL CENTER | Age: 80
End: 2020-03-13

## 2020-03-13 NOTE — TELEPHONE ENCOUNTER
"Spoke with patient regarding referral to valve clinic placed by her cardiologist Dr. Washington.    Patient verbalizes and repeats multiple times that she is \"very anxious and hysteric over this pandemic going on in the world\". She states that she does not currently wish to schedule for consultation until \"the pandemic has cooled down a bit\".     Provided direct contact information and encouraged return call to arrange consultation when/if she becomes agreeable to such in the future. Patient states understanding and agrees with the plan.   "

## 2020-03-18 ENCOUNTER — APPOINTMENT (OUTPATIENT)
Dept: RADIOLOGY | Facility: MEDICAL CENTER | Age: 80
End: 2020-03-18
Attending: INTERNAL MEDICINE
Payer: MEDICARE

## 2020-03-19 ENCOUNTER — TELEPHONE (OUTPATIENT)
Dept: VASCULAR LAB | Facility: MEDICAL CENTER | Age: 80
End: 2020-03-19

## 2020-03-19 DIAGNOSIS — Z79.01 CHRONIC ANTICOAGULATION: ICD-10-CM

## 2020-04-18 DIAGNOSIS — K21.9 GASTROESOPHAGEAL REFLUX DISEASE WITHOUT ESOPHAGITIS: Chronic | ICD-10-CM

## 2020-04-19 RX ORDER — OMEPRAZOLE 20 MG/1
CAPSULE, DELAYED RELEASE ORAL
Qty: 90 CAP | Refills: 3 | Status: SHIPPED | OUTPATIENT
Start: 2020-04-19 | End: 2021-02-22

## 2020-05-27 ENCOUNTER — TELEPHONE (OUTPATIENT)
Dept: CARDIOLOGY | Facility: MEDICAL CENTER | Age: 80
End: 2020-05-27

## 2020-05-27 NOTE — TELEPHONE ENCOUNTER
Patient calling to report that she previously declined consultation back in March due to the COVID-19 pandemic.     She reports that she is currently ready to schedule for such after she completes her NM stress test scheduled 6/10/20.     Reviewed that patient will be arranged for CTS consult and our team will await CTS recommendations before proceeding with any additional appointments.    Assured patient that she can anticipate CTS staff contacting her to arrange such consultation within next 7-10 business days.    Patient states understanding, agrees with the plan, and very thankful for assistance today.     *update sent to CTS staff*

## 2020-06-09 ENCOUNTER — HOSPITAL ENCOUNTER (OUTPATIENT)
Dept: LAB | Facility: MEDICAL CENTER | Age: 80
End: 2020-06-09
Attending: FAMILY MEDICINE
Payer: MEDICARE

## 2020-06-09 DIAGNOSIS — N18.30 CKD (CHRONIC KIDNEY DISEASE) STAGE 3, GFR 30-59 ML/MIN: ICD-10-CM

## 2020-06-09 DIAGNOSIS — E11.21 CONTROLLED TYPE 2 DIABETES MELLITUS WITH DIABETIC NEPHROPATHY, WITHOUT LONG-TERM CURRENT USE OF INSULIN (HCC): ICD-10-CM

## 2020-06-09 DIAGNOSIS — R06.00 PAROXYSMAL NOCTURNAL DYSPNEA: ICD-10-CM

## 2020-06-09 DIAGNOSIS — I48.91 ATRIAL FIBRILLATION WITH RVR (HCC): ICD-10-CM

## 2020-06-09 LAB
ALBUMIN SERPL BCP-MCNC: 4.5 G/DL (ref 3.2–4.9)
ALBUMIN/GLOB SERPL: 1.5 G/DL
ALP SERPL-CCNC: 86 U/L (ref 30–99)
ALT SERPL-CCNC: 20 U/L (ref 2–50)
ANION GAP SERPL CALC-SCNC: 15 MMOL/L (ref 7–16)
AST SERPL-CCNC: 21 U/L (ref 12–45)
BILIRUB SERPL-MCNC: 0.7 MG/DL (ref 0.1–1.5)
BUN SERPL-MCNC: 28 MG/DL (ref 8–22)
CALCIUM SERPL-MCNC: 9.8 MG/DL (ref 8.5–10.5)
CHLORIDE SERPL-SCNC: 99 MMOL/L (ref 96–112)
CHOLEST SERPL-MCNC: 180 MG/DL (ref 100–199)
CO2 SERPL-SCNC: 25 MMOL/L (ref 20–33)
CREAT SERPL-MCNC: 1.06 MG/DL (ref 0.5–1.4)
ERYTHROCYTE [DISTWIDTH] IN BLOOD BY AUTOMATED COUNT: 46.5 FL (ref 35.9–50)
EST. AVERAGE GLUCOSE BLD GHB EST-MCNC: 137 MG/DL
GLOBULIN SER CALC-MCNC: 3 G/DL (ref 1.9–3.5)
GLUCOSE SERPL-MCNC: 109 MG/DL (ref 65–99)
HBA1C MFR BLD: 6.4 % (ref 0–5.6)
HCT VFR BLD AUTO: 41.7 % (ref 37–47)
HDLC SERPL-MCNC: 57 MG/DL
HGB BLD-MCNC: 12.6 G/DL (ref 12–16)
LDLC SERPL CALC-MCNC: 100 MG/DL
MCH RBC QN AUTO: 25.7 PG (ref 27–33)
MCHC RBC AUTO-ENTMCNC: 30.2 G/DL (ref 33.6–35)
MCV RBC AUTO: 84.9 FL (ref 81.4–97.8)
PLATELET # BLD AUTO: 190 K/UL (ref 164–446)
PMV BLD AUTO: 10.4 FL (ref 9–12.9)
POTASSIUM SERPL-SCNC: 4.8 MMOL/L (ref 3.6–5.5)
PROT SERPL-MCNC: 7.5 G/DL (ref 6–8.2)
RBC # BLD AUTO: 4.91 M/UL (ref 4.2–5.4)
SODIUM SERPL-SCNC: 139 MMOL/L (ref 135–145)
TRIGL SERPL-MCNC: 116 MG/DL (ref 0–149)
TSH SERPL DL<=0.005 MIU/L-ACNC: 5.19 UIU/ML (ref 0.38–5.33)
WBC # BLD AUTO: 5.3 K/UL (ref 4.8–10.8)

## 2020-06-09 PROCEDURE — 83036 HEMOGLOBIN GLYCOSYLATED A1C: CPT

## 2020-06-09 PROCEDURE — 85027 COMPLETE CBC AUTOMATED: CPT

## 2020-06-09 PROCEDURE — 84443 ASSAY THYROID STIM HORMONE: CPT

## 2020-06-09 PROCEDURE — 36415 COLL VENOUS BLD VENIPUNCTURE: CPT

## 2020-06-09 PROCEDURE — 80061 LIPID PANEL: CPT

## 2020-06-09 PROCEDURE — 80053 COMPREHEN METABOLIC PANEL: CPT

## 2020-06-10 ENCOUNTER — HOSPITAL ENCOUNTER (OUTPATIENT)
Dept: RADIOLOGY | Facility: MEDICAL CENTER | Age: 80
End: 2020-06-10
Attending: INTERNAL MEDICINE
Payer: MEDICARE

## 2020-06-10 DIAGNOSIS — I10 HTN (HYPERTENSION), MALIGNANT: ICD-10-CM

## 2020-06-10 DIAGNOSIS — I20.89 ANGINA OF EFFORT (HCC): ICD-10-CM

## 2020-06-10 DIAGNOSIS — E78.2 MIXED HYPERLIPIDEMIA: ICD-10-CM

## 2020-06-10 DIAGNOSIS — I48.19 PERSISTENT ATRIAL FIBRILLATION (HCC): ICD-10-CM

## 2020-06-10 PROCEDURE — 700111 HCHG RX REV CODE 636 W/ 250 OVERRIDE (IP)

## 2020-06-10 PROCEDURE — 93018 CV STRESS TEST I&R ONLY: CPT | Performed by: INTERNAL MEDICINE

## 2020-06-10 PROCEDURE — 78492 MYOCRD IMG PET MLT RST&STRS: CPT | Mod: 26 | Performed by: INTERNAL MEDICINE

## 2020-06-11 ENCOUNTER — TELEPHONE (OUTPATIENT)
Dept: CARDIOLOGY | Facility: MEDICAL CENTER | Age: 80
End: 2020-06-11

## 2020-06-11 NOTE — PROCEDURES
REFERRING PHYSICIAN:  Roque Washington MD    AGE:  80     GENDER:  Female     HEIGHT:  62 inches     WEIGHT:  132 pounds      BMI:      INDICATIONS:  Persistent atrial fibrillation, angina on effort, hypertension.    MEDICATIONS:      PROCEDURE:  The patient reviewed and signed the acknowledgement for testing   form.  The patient was in a fasting state and was properly prepared for   testing.  An intravenous line was inserted and a flush of normal saline   followed to insure line patency.    A transmission scan was acquired for attenuation correction using the internal   Germanium sources.  The patient was then administered 20.1 mCi of   Rubidium-82.  Approximately 90 seconds after the infusion, resting imagine   were obtained with ECG-gating.  Following the resting series, the patient   administered 34 mg of dipyridamole over four minutes.  The blood pressure,   heart rate and ECG were monitored and recorded.  After the dipyridamole   infusion was completed, another transmission scan for attenuation correction   was obtained.  The patient was then administered 20.1 mCi of Rubidium-82.    Approximately 90 seconds after the infusion, Peak stress images were obtained   with ECG-gating.    CLINICAL RESPONSE:  Resting blood pressure was 121/53 with a heart rate of 81.    Immediately post-dipyridamole infusion the blood pressure was 103/58 with a   heart rate of 83.  After a recovery period the blood pressure was 94/50 with a   heart rate of 72.    The patient experienced shortness of breath, flushed and chest pain during   testing.    Aminophylline 100 mg was administered following the scan.    ELECTROCARDIOGRAPHIC FINDINGS:  At rest, the patient has atrial fibrillation.    With dipyridamole infusion, there were ischemic EKG changes in the lateral   leads with 2-3 mm ST depressions.    SCINTOGRAPHIC FINDINGS:  There is a large defect of mildly reduced uptake in   the entire inferior wall, mid and basal inferolateral wall,  mid and basal   inferoseptal wall.  Present in stress imaging only.  Rest imaging is normal.    This is suggestive of a large area of ischemia with no evidence of scar.    These findings are reduced in specificity due to adjacent GI uptake.    GATED WALL MOTION FINDINGS:  By gated PET, the wall motion is normal at rest   with a measured ejection fraction of 54%.  With stress, the measured ejection   fraction is 33%, which appears visually true with global hypokinesis, most   prominent in the inferior wall.    CONCLUSIONS AND IMPRESSIONS:  Abnormal PET imaging demonstrating a large area   of ISCHEMIA in the inferior, inferolateral, and inferoseptal walls.  No scar   identified.  With stress, there were ischemic EKG changes and there was a   reduction in the ejection fraction, suggestive of a high risk stress test.    These findings were communicated to the ordering physician.       ____________________________________     MD IDRIS Lockett / AMBERLY    DD:  06/10/2020 17:45:23  DT:  06/10/2020 20:17:52    D#:  [id number]  Job#:  [id number]    cc: NICCI DWYER MD, Roque Washington MD

## 2020-06-11 NOTE — TELEPHONE ENCOUNTER
Received Tiger Text from TT about pt's abnormal PET stress. He requests that she be scheduled for virtual appt tomorrow morning. Called pt and discussed abnormal results and scheduled for telephone appt at 9am tomorrow. She doesn't have smart phone or computer. Telephone appt ok'd by TT.

## 2020-06-11 NOTE — RESULT ENCOUNTER NOTE
Dear Anni,    Can you please let Ivory Rowan know that result is NOT ok and I will need to see patient tomorrow (virtual is ok) to discuss about cath?    Thank you,  Roque.

## 2020-06-12 ENCOUNTER — TELEMEDICINE (OUTPATIENT)
Dept: CARDIOLOGY | Facility: MEDICAL CENTER | Age: 80
End: 2020-06-12
Payer: MEDICARE

## 2020-06-12 ENCOUNTER — TELEPHONE (OUTPATIENT)
Dept: CARDIOLOGY | Facility: MEDICAL CENTER | Age: 80
End: 2020-06-12

## 2020-06-12 VITALS
HEIGHT: 62 IN | SYSTOLIC BLOOD PRESSURE: 124 MMHG | WEIGHT: 135 LBS | BODY MASS INDEX: 24.84 KG/M2 | DIASTOLIC BLOOD PRESSURE: 64 MMHG

## 2020-06-12 DIAGNOSIS — I10 HTN (HYPERTENSION), MALIGNANT: ICD-10-CM

## 2020-06-12 DIAGNOSIS — R94.39 ABNORMAL STRESS TEST: ICD-10-CM

## 2020-06-12 DIAGNOSIS — I20.89 ANGINA OF EFFORT (HCC): ICD-10-CM

## 2020-06-12 DIAGNOSIS — I48.19 PERSISTENT ATRIAL FIBRILLATION (HCC): ICD-10-CM

## 2020-06-12 DIAGNOSIS — Z79.899 HIGH RISK MEDICATION USE: ICD-10-CM

## 2020-06-12 DIAGNOSIS — Z79.01 CHRONIC ANTICOAGULATION: ICD-10-CM

## 2020-06-12 DIAGNOSIS — E78.2 MIXED HYPERLIPIDEMIA: ICD-10-CM

## 2020-06-12 PROCEDURE — 99441 PR PHYSICIAN TELEPHONE EVALUATION 5-10 MIN: CPT | Mod: CR | Performed by: INTERNAL MEDICINE

## 2020-06-12 ASSESSMENT — FIBROSIS 4 INDEX: FIB4 SCORE: 1.95

## 2020-06-12 NOTE — PROGRESS NOTES
Telephone Appointment Visit   As a means of avoiding spread of COVID-19, this visit is being conducted by telephone. This telephone visit was initiated by the patient and they verbally consented.    Time at start of call: 9:15 AM    Reason for Call:  Symptom Follow-up    HPI:    Ivory Rowan is a 80 y.o. female who presents today for cardiac care for persistent atrial fibrillation after prior failed cardioversion. She was in sinus for brief amount of time. Patient was diagnosed with atrial fibrillation earlier in 2016. Patient has had multiple hospitalizations due to A. fib with rapid ventricular rate leading to heart failure exacerbation. Her left ventricular systolic function is documented at about 45-50% on her most recent transthoracic echocardiogram. Patient is anticoagulated. Patient is taking Toprol- mg by mouth twice a day.     Patient cannot afford Tikosyn therapy and she did not go into the hospital for that.     At the last visit, patient was sent for nuclear stress PET test which came back positive for ischemia.     Patient continue to eport of exertional chest pain. Worsened with walking, relived with rest.     Labs / Images Reviewed:   PET scan stress test showed large area of ischemia in the inferior, inferolateral and septal wall of the LV.     Assessment and Plan:     1. Abnormal stress test    2. Persistent atrial fibrillation (HCC)    3. HTN (hypertension), malignant    4. Mixed hyperlipidemia    5. Chronic anticoagulation    6. Angina of effort (HCC)    7. High risk medication use    I explained to patient about the possibility of cardiac catherization and coronary angiogram, she has agreed to proceed given the risks and benefits.    In the meantime, will continue Toprol 100 mg daily, zetia. Will add more guidelines directed medications once angiogram is done.    Follow-up: 6 weeks.    Time at end of call: 9: 23 AM  Total Time Spent: 5-10 minutes    Shen Washington M.D.

## 2020-06-12 NOTE — TELEPHONE ENCOUNTER
Patient is scheduled with Dr. Garza for a C w/poss on 6/18/2020.  PT to check in at 6:30am for 8:30am procedure.  PT instructed to stop Eliquis 2 days before and to hold lasix the morning of the procedure.  Updated H&P done by TT on 6/12/2020.  PRe-admit to call pt.

## 2020-06-16 ENCOUNTER — TELEPHONE (OUTPATIENT)
Dept: MEDICAL GROUP | Facility: MEDICAL CENTER | Age: 80
End: 2020-06-16

## 2020-06-16 ENCOUNTER — APPOINTMENT (OUTPATIENT)
Dept: ADMISSIONS | Facility: MEDICAL CENTER | Age: 80
End: 2020-06-16
Payer: MEDICARE

## 2020-06-16 RX ORDER — DIAZEPAM 10 MG/1
10 TABLET ORAL PRN
COMMUNITY
End: 2021-03-19

## 2020-06-17 NOTE — OR NURSING
MYA zelaya done with patient on phone. She states she has some memory issues but was able to articulate medications and pre op instructions. She asked me to leave msg for her daughter on instructions on where to bring pt day of procedure. I left voicemail for daughter Conchita per the number the patient provided me.

## 2020-06-17 NOTE — TELEPHONE ENCOUNTER
Telephone call to patient.  Abnormal stress test, will have a cardiac catheterization tomorrow.  Discussed, she has been having major chest pain with even mild to moderate exertion.  Clearly the stress test is abnormal and I think this is a very logical next step.

## 2020-06-18 ENCOUNTER — HOSPITAL ENCOUNTER (OUTPATIENT)
Facility: MEDICAL CENTER | Age: 80
End: 2020-06-18
Attending: INTERNAL MEDICINE | Admitting: INTERNAL MEDICINE
Payer: MEDICARE

## 2020-06-18 ENCOUNTER — APPOINTMENT (OUTPATIENT)
Dept: CARDIOLOGY | Facility: MEDICAL CENTER | Age: 80
End: 2020-06-18
Attending: INTERNAL MEDICINE
Payer: MEDICARE

## 2020-06-18 VITALS
BODY MASS INDEX: 24.3 KG/M2 | HEIGHT: 62 IN | OXYGEN SATURATION: 93 % | HEART RATE: 75 BPM | SYSTOLIC BLOOD PRESSURE: 143 MMHG | WEIGHT: 132.06 LBS | TEMPERATURE: 97.6 F | DIASTOLIC BLOOD PRESSURE: 72 MMHG | RESPIRATION RATE: 20 BRPM

## 2020-06-18 DIAGNOSIS — E78.2 MIXED HYPERLIPIDEMIA: ICD-10-CM

## 2020-06-18 DIAGNOSIS — R94.39 ABNORMAL STRESS TEST: ICD-10-CM

## 2020-06-18 DIAGNOSIS — I48.19 PERSISTENT ATRIAL FIBRILLATION (HCC): ICD-10-CM

## 2020-06-18 DIAGNOSIS — I10 HTN (HYPERTENSION), MALIGNANT: ICD-10-CM

## 2020-06-18 DIAGNOSIS — I20.89 ANGINA OF EFFORT (HCC): ICD-10-CM

## 2020-06-18 DIAGNOSIS — Z79.01 CHRONIC ANTICOAGULATION: ICD-10-CM

## 2020-06-18 LAB
ACT BLD: 230 SEC (ref 74–137)
ACT BLD: 252 SEC (ref 74–137)
ALBUMIN SERPL BCP-MCNC: 4.3 G/DL (ref 3.2–4.9)
ALBUMIN/GLOB SERPL: 1.4 G/DL
ALP SERPL-CCNC: 81 U/L (ref 30–99)
ALT SERPL-CCNC: 10 U/L (ref 2–50)
ANION GAP SERPL CALC-SCNC: 10 MMOL/L (ref 7–16)
APTT PPP: 30.4 SEC (ref 24.7–36)
AST SERPL-CCNC: 19 U/L (ref 12–45)
BILIRUB SERPL-MCNC: 0.5 MG/DL (ref 0.1–1.5)
BUN SERPL-MCNC: 28 MG/DL (ref 8–22)
CALCIUM SERPL-MCNC: 9.8 MG/DL (ref 8.5–10.5)
CHLORIDE SERPL-SCNC: 100 MMOL/L (ref 96–112)
CO2 SERPL-SCNC: 22 MMOL/L (ref 20–33)
CREAT SERPL-MCNC: 1 MG/DL (ref 0.5–1.4)
EKG IMPRESSION: NORMAL
EKG IMPRESSION: NORMAL
ERYTHROCYTE [DISTWIDTH] IN BLOOD BY AUTOMATED COUNT: 44.8 FL (ref 35.9–50)
GLOBULIN SER CALC-MCNC: 3 G/DL (ref 1.9–3.5)
GLUCOSE SERPL-MCNC: 124 MG/DL (ref 65–99)
HCT VFR BLD AUTO: 38.4 % (ref 37–47)
HGB BLD-MCNC: 11.9 G/DL (ref 12–16)
INR PPP: 1.04 (ref 0.87–1.13)
MCH RBC QN AUTO: 25.8 PG (ref 27–33)
MCHC RBC AUTO-ENTMCNC: 31 G/DL (ref 33.6–35)
MCV RBC AUTO: 83.3 FL (ref 81.4–97.8)
PLATELET # BLD AUTO: 177 K/UL (ref 164–446)
PMV BLD AUTO: 9.9 FL (ref 9–12.9)
POTASSIUM SERPL-SCNC: 4.7 MMOL/L (ref 3.6–5.5)
PROT SERPL-MCNC: 7.3 G/DL (ref 6–8.2)
PROTHROMBIN TIME: 14 SEC (ref 12–14.6)
RBC # BLD AUTO: 4.61 M/UL (ref 4.2–5.4)
SODIUM SERPL-SCNC: 132 MMOL/L (ref 135–145)
WBC # BLD AUTO: 5.6 K/UL (ref 4.8–10.8)

## 2020-06-18 PROCEDURE — 700102 HCHG RX REV CODE 250 W/ 637 OVERRIDE(OP)

## 2020-06-18 PROCEDURE — 93010 ELECTROCARDIOGRAM REPORT: CPT | Mod: 59,76 | Performed by: INTERNAL MEDICINE

## 2020-06-18 PROCEDURE — 80053 COMPREHEN METABOLIC PANEL: CPT

## 2020-06-18 PROCEDURE — 85027 COMPLETE CBC AUTOMATED: CPT

## 2020-06-18 PROCEDURE — 85347 COAGULATION TIME ACTIVATED: CPT | Mod: 91

## 2020-06-18 PROCEDURE — 93005 ELECTROCARDIOGRAM TRACING: CPT | Performed by: INTERNAL MEDICINE

## 2020-06-18 PROCEDURE — 99153 MOD SED SAME PHYS/QHP EA: CPT

## 2020-06-18 PROCEDURE — 93010 ELECTROCARDIOGRAM REPORT: CPT | Mod: 59 | Performed by: INTERNAL MEDICINE

## 2020-06-18 PROCEDURE — 85730 THROMBOPLASTIN TIME PARTIAL: CPT

## 2020-06-18 PROCEDURE — 700105 HCHG RX REV CODE 258: Performed by: INTERNAL MEDICINE

## 2020-06-18 PROCEDURE — 700111 HCHG RX REV CODE 636 W/ 250 OVERRIDE (IP)

## 2020-06-18 PROCEDURE — 85610 PROTHROMBIN TIME: CPT

## 2020-06-18 PROCEDURE — 700101 HCHG RX REV CODE 250

## 2020-06-18 PROCEDURE — A9270 NON-COVERED ITEM OR SERVICE: HCPCS

## 2020-06-18 PROCEDURE — 160002 HCHG RECOVERY MINUTES (STAT)

## 2020-06-18 RX ORDER — HEPARIN SODIUM,PORCINE 1000/ML
VIAL (ML) INJECTION
Status: COMPLETED
Start: 2020-06-18 | End: 2020-06-18

## 2020-06-18 RX ORDER — VERAPAMIL HYDROCHLORIDE 2.5 MG/ML
INJECTION, SOLUTION INTRAVENOUS
Status: COMPLETED
Start: 2020-06-18 | End: 2020-06-18

## 2020-06-18 RX ORDER — HEPARIN SODIUM 200 [USP'U]/100ML
INJECTION, SOLUTION INTRAVENOUS
Status: COMPLETED
Start: 2020-06-18 | End: 2020-06-18

## 2020-06-18 RX ORDER — LIDOCAINE HYDROCHLORIDE 20 MG/ML
INJECTION, SOLUTION INFILTRATION; PERINEURAL
Status: COMPLETED
Start: 2020-06-18 | End: 2020-06-18

## 2020-06-18 RX ORDER — CLOPIDOGREL BISULFATE 75 MG/1
75 TABLET ORAL DAILY
Qty: 30 TAB | Refills: 11 | Status: SHIPPED | OUTPATIENT
Start: 2020-06-18 | End: 2020-06-22 | Stop reason: SDUPTHER

## 2020-06-18 RX ORDER — CLOPIDOGREL 300 MG/1
TABLET, FILM COATED ORAL
Status: COMPLETED
Start: 2020-06-18 | End: 2020-06-18

## 2020-06-18 RX ORDER — ASPIRIN 81 MG/1
TABLET, CHEWABLE ORAL
Status: COMPLETED
Start: 2020-06-18 | End: 2020-06-18

## 2020-06-18 RX ORDER — SODIUM CHLORIDE 9 MG/ML
INJECTION, SOLUTION INTRAVENOUS CONTINUOUS
Status: DISCONTINUED | OUTPATIENT
Start: 2020-06-18 | End: 2020-06-18 | Stop reason: HOSPADM

## 2020-06-18 RX ORDER — MIDAZOLAM HYDROCHLORIDE 1 MG/ML
INJECTION INTRAMUSCULAR; INTRAVENOUS
Status: COMPLETED
Start: 2020-06-18 | End: 2020-06-18

## 2020-06-18 RX ORDER — CLOPIDOGREL 300 MG/1
600 TABLET, FILM COATED ORAL ONCE
Status: DISCONTINUED | OUTPATIENT
Start: 2020-06-18 | End: 2020-06-18

## 2020-06-18 RX ADMIN — HEPARIN SODIUM: 1000 INJECTION, SOLUTION INTRAVENOUS; SUBCUTANEOUS at 09:17

## 2020-06-18 RX ADMIN — LIDOCAINE HYDROCHLORIDE: 20 INJECTION, SOLUTION INFILTRATION; PERINEURAL at 09:17

## 2020-06-18 RX ADMIN — MIDAZOLAM HYDROCHLORIDE 1.5 MG: 1 INJECTION, SOLUTION INTRAMUSCULAR; INTRAVENOUS at 10:06

## 2020-06-18 RX ADMIN — NITROGLYCERIN 10 ML: 20 INJECTION INTRAVENOUS at 09:17

## 2020-06-18 RX ADMIN — ASPIRIN 81 MG 324 MG: 81 TABLET ORAL at 09:16

## 2020-06-18 RX ADMIN — FENTANYL CITRATE 25 MCG: 50 INJECTION INTRAMUSCULAR; INTRAVENOUS at 10:06

## 2020-06-18 RX ADMIN — SODIUM CHLORIDE: 9 INJECTION, SOLUTION INTRAVENOUS at 07:50

## 2020-06-18 RX ADMIN — HEPARIN SODIUM 2000 UNITS: 200 INJECTION, SOLUTION INTRAVENOUS at 09:18

## 2020-06-18 RX ADMIN — HEPARIN SODIUM: 1000 INJECTION, SOLUTION INTRAVENOUS; SUBCUTANEOUS at 09:47

## 2020-06-18 RX ADMIN — CLOPIDOGREL BISULFATE 600 MG: 300 TABLET, FILM COATED ORAL at 10:09

## 2020-06-18 RX ADMIN — VERAPAMIL HYDROCHLORIDE 5 MG: 5 INJECTION INTRAVENOUS at 09:17

## 2020-06-18 ASSESSMENT — FIBROSIS 4 INDEX: FIB4 SCORE: 1.95

## 2020-06-18 NOTE — DISCHARGE INSTRUCTIONS
Decrease eliquis dose to 2.5 mg twice daily (you will be taking half a pill twice  A day).  Start taking aspirin 81 mg daily for 1 month  Start taking plavix (clopidogrel) 75 mg daily    ACTIVITY: Rest and take it easy for the first 24 hours.  A responsible adult is recommended to remain with you during that time.  It is normal to feel sleepy.  We encourage you to not do anything that requires balance, judgment or coordination.    MILD FLU-LIKE SYMPTOMS ARE NORMAL. YOU MAY EXPERIENCE GENERALIZED MUSCLE ACHES, THROAT IRRITATION, HEADACHE AND/OR SOME NAUSEA.    FOR 24 HOURS DO NOT:  Drive, operate machinery or run household appliances.  Drink beer or alcoholic beverages.   Make important decisions or sign legal documents.    SPECIAL INSTRUCTIONS: follow up with your cardiologist call find out when to follow up.  Resume your home medications.   If you experience chest pain, shortness of breath call 911 return to ER  Take prescribed medications as ordered.    DIET: To avoid nausea, slowly advance diet as tolerated, avoiding spicy or greasy foods for the first day.  Add more substantial food to your diet according to your physician's instructions.  Babies can be fed formula or breast milk as soon as they are hungry.  INCREASE FLUIDS AND FIBER TO AVOID CONSTIPATION.    SURGICAL DRESSING/BATHING: Keep dressing clean dry intact for 24 hours, remove dressing after 24 hours.     FOLLOW-UP APPOINTMENT:  A follow-up appointment should be arranged with your doctor in [phone]; call to schedule.    You should CALL YOUR PHYSICIAN if you develop:  Fever greater than 101 degrees F.  Pain not relieved by medication, or persistent nausea or vomiting.  Excessive bleeding (blood soaking through dressing) or unexpected drainage from the wound.  Extreme redness or swelling around the incision site, drainage of pus or foul smelling drainage.  Inability to urinate or empty your bladder within 8 hours.  Problems with breathing or chest  pain.    You should call 911 if you develop problems with breathing or chest pain.  If you are unable to contact your doctor or surgical center, you should go to the nearest emergency room or urgent care center.  Physician's telephone #: [phone]    If any questions arise, call your doctor.  If your doctor is not available, please feel free to call the Surgical Center at [phone]  The Center is open Monday through Friday from 7AM to 7PM.  You can also call the HEALTH HOTLINE open 24 hours/day, 7 days/week and speak to a nurse at [phone], or toll free at [phone].    A registered nurse may call you a few days after your surgery to see how you are doing after your procedure.    MEDICATIONS: Resume taking daily medication.  Take prescribed pain medication with food.  If no medication is prescribed, you may take non-aspirin pain medication if needed.  PAIN MEDICATION CAN BE VERY CONSTIPATING.  Take a stool softener or laxative such as senokot, pericolace, or milk of magnesia if needed.  POST ANGIOGRAM  General Care Instructions  • Maintain a bandage over the incision site for 24 hours.  It's normal to find a small bruise or dime-sized lump at the insertion site. This should disappear within a few weeks.  • Do not apply lotions or powders to the site.  Do not immerse the catheter insertion site in water (bathtub/swimming) for five days. It is ok to shower 24 hours after the procedure.  • You may resume your normal diet immediately; on the day of your procedure, drink 6-10 glasses of water to help flush the contrast liquid out of your system.  • If the doctor inserted the catheter in through your groin:  o Walking short distances on a flat surface is OK. Limit going up/down stairs for the first 2 days.  o DO NOT do yard work, drive, squat, lift heavy objects, or play sports for 2 days; or until your health care provider tells you it is OK.  • If the doctor inserted the catheter in your arm:  o For 24  hours, DO NOT lift anything heavier than 10 pounds (approximately a gallon of milk). DO NOT do any heavy pushing, pulling, or twisting.    Medications  • If your current medications need to be changed, you will be provided with an updated list of your medications prior to discharge.  • If you take warfarin (Coumadin), resume taking your usual dose the evening after the procedure.  • DO NOT STOP taking prescribed blood thinning (anti-platelet) medications unless instructed by your cardiologist.  These medications include:  o Aspirin, Clopidogrel (Plavix), Ticagrelor (Brilinta), or Prasugrel (Effient)   • If you take one of the following anticoagulants, RESUME 24 HOURS after your procedure:  o Apixiban (Eliquis), Rivaroxaban (Xarelto), Dabigatran (Pradaxa), Edoxaban (Savaysa)  • If you take metformin (Glucophage), RESUME 48 HOURS after your procedure.    When to call your healthcare provider  Call your cardiologist right away at [phone] if you have any of the following:  ?  Problems/Concerns taking any of your prescribed heart medicines.  ?  The insertion site has increasing pain, swelling, redness, bleeding, or drainage.  ?  Your arm or leg below where the insertion site changes color, is cool, or is numb.  ?  You have chest pain or shortness of breath that does not go away with rest.  ?  Your pulse feels irregular -- very slow (less than 60 beats/minute) or very fast (over 100 beats/minute).  ?  You have dizziness, fainting, or you are very tired.  ?  You are coughing up blood or yellow or green mucus.  ?  You have chills or a fever over 101°F (38.3°C).   ?  If there is bleeding at the catheter insertion site, apply pressure for 10 minutes.  If bleeding persists, call 911, and continue to hold pressure until advanced medical support arrives.        Exercising Safely After Percutaneous Coronary Intervention (PCI)  After percutaneous coronary intervention (PCI), which involves angioplasty and often stenting, it's  important to focus on your heart health. Exercise can help strengthen your heart. It can also help you feel good and improve your overall health. Talk with your health care provider or cardiac rehab team member about good options for you.  • Start slowly. Work up to more vigorous exercise as you get stronger. Aim for at least 150 minutes of exercise each week.  • Include aerobic activities. These make the heart beat faster. They work the heart and lungs, and improve the body's ability to use oxygen. Good choices include walking, swimming, and biking .  Always follow your doctor's recommendation for exercise.   You have been referred to cardiac rehabilitation, which is important for your recovery.  You may contact [facility] at [phone] to learn more and schedule a visit.        Lifestyle Management After Percutaneous Coronary Intervention (PCI)  Percutaneous coronary intervention (PCI)  involves angioplasty and often stenting. This procedure can open arteries and relieve symptoms. But, it doesn't cure coronary artery disease. New blockages can still form. You need to take steps to prevent this by managing risk factors. Doing so will help make your heart and arteries healthier. Your doctor may prescribe cardiac rehabilitation to help with this lifelong process.  Understanding risk factors  Some risk factors for coronary artery disease can be controlled. These include smoking, high blood pressure, cholesterol, diabetes, and obesity. They can be managed with medication, diet, and exercise. Support and counseling can also play a role. The effort will pay off! Managing risk factors can help you be more active, feel better, and reduce the risk of heart attack.    If you smoke, quit!  If your doctor has been urging you to quit smoking, it's for good reasons. Smoking damages your heart, blood vessels, and lungs. The good news is that quitting can halt or even reverse the damage of smoking. To  quit now:  • Get medical help. Ask your doctor for advice on stop-smoking programs. Also ask about medications or nicotine replacement therapy products that may help you quit smoking.  • Get support. Join a support group. Ask for help from your family and friends.  • Don't give up. It often takes several tries to succeed in quitting smoking.  • Avoid secondhand smoke. Ask family and friends not to smoke around you.    If your physician has prescribed pain medication that includes Acetaminophen (Tylenol), do not take additional Acetaminophen (Tylenol) while taking the prescribed medication.    Depression / Suicide Risk    As you are discharged from this [facility] facility, it is important to learn how to keep safe from harming yourself.    Recognize the warning signs:  · Abrupt changes in personality, positive or negative- including increase in energy   · Giving away possessions  · Change in eating patterns- significant weight changes-  positive or negative  · Change in sleeping patterns- unable to sleep or sleeping all the time   · Unwillingness or inability to communicate  · Depression  · Unusual sadness, discouragement and loneliness  · Talk of wanting to die  · Neglect of personal appearance   · Rebelliousness- reckless behavior  · Withdrawal from people/activities they love  · Confusion- inability to concentrate     If you or a loved one observes any of these behaviors or has concerns about self-harm, here's what you can do:  · Talk about it- your feelings and reasons for harming yourself  · Remove any means that you might use to hurt yourself (examples: pills, rope, extension cords, firearm)  · Get professional help from the community (Mental Health, Substance Abuse, psychological counseling)  · Do not be alone:Call your Safe Contact- someone whom you trust who will be there for you.  · Call your local CRISIS HOTLINE 730-2348 or 761-015-6452  · Call your local Children's Mobile Crisis Response Team [place] (787) 111-5439 or www.[employer]  · Call the toll free National Suicide Prevention Hotlines   · National Suicide Prevention Lifeline 446-246-VEKU (2176)  [facility] 800-SUICIDE (510-6536)  Radial Catherization Discharge Instructions      · Do not subject hand/arm to any forceful movements for 24 hours    i.e. supporting weight when rising from the chair or bed.   · Do not drive a car for 24 hours  · You may remove the dressing tomorrow  · You may shower on the day following your procedure.  Do not take a tub bath or submerge the puncture site in water for 3 days following the procedure.  · No Lifting more than 3-5 pounds with affected wrist for 5 days  · Follow up with your cardiologist call find out when to follow up.  · Increase fluids for 2 days post procedure.  · Continue all previous medications unless otherwise instructed.    If bleeding should occur following discharge:  · Sit down and apply firm pressure to site with your fingers for 10 minutes  · If the bleeding stops, continue to sit quietly, keeping your wrist straight for 2 hours.  Notify physician as soon as possible ( [phone])  If bleeding does not stop after 10 minutes, or if there is a large amount of bleeding or spurting, call 911 immediately.  Do not drive yourself to the hospital.

## 2020-06-18 NOTE — PROCEDURES
Cardiac Catheterization and Percutaneous Intervention Procedure Report    2020    Referring MD: To    Primary Care Provider: Checo Tesfaye M.D.    Indication for procedure: Positive stress test moderate or high risk    Procedures:  · Insertion of 5/6 Fr sheath in the right radial artery  · right and left coronary arteriograms  · Left heart catheterization  · Angioplasty and placement of a 3.5 by 8mm Anthony drug-eluting stent in ostial  right coronary artery.  · Angioplasty and placement of a 2.75 by 22 mm Anthony drug-eluting  stent in mid  right coronary artery.    Final diagnosis:   single vessel coronary artery disease.  Successful percutaneous intervention of right coronary artery.    Recommendations: Guideline directed medical therapy and risk factor management      Coronary arteriograms:  Left main: 20-30% distal left main disease.  Left anterior descendin-40% mid LAD disease, diagonals are small  Left circumflex: luminal irregularities. Three small marginal branches are noted. No significant disease is noted in marginal branches.  Right coronary: 80% ostial stenosis, mid RCA has tandem 90% lesions, dominant    Left Heart Catheterization:  Left Ventriculogram: Not performed  Left Ventricular EDP: 10 mm Hg   Aortic Valve Gradient: No significant AV gradient noted    Procedure details:  Ivory Rowan was brought to the cardiac catheterization lab where the right wrist was prepped and draped in the usual manner for cardiac catheterization.  The area was anesthetized with lidocaine and a 5/6 FR sheath was inserted into the right radial artery without difficulty. A #3.5 left Indu catheter was advanced to the ostia of the Left coronary artery and arteriograms were recorded.   A #4 right Indu catheter was advanced to the ostia of the right coronary artery and arteriograms were recorded. Aortic valve was crossed using #4 right Indu catheter left heart catheterization was performed.  Patient  "underwent percutaneous coronary revascularization as outlined below.  At the completion of the case the sheath was removed and hemostasis achieved utilizing a radial compression band .  Patient was pain-free and hemodynamically stable at the completion of the case.  There were no apparent complications.    Interventional Procedure RCA:     Given the patient's clinical presentation and coronary anatomy, PCI was indicated and we proceeded with the intervention as detailed below.    Indication for PCI:  Stable Angina    Ostial   Pre: 80 %, 5 mm length, ROGE 3 flow  Post: 0%, ROGE 3 flow    Lesion complexity  Non-High  Severe calcification No  Bifurcation  No    Mid RCA:  Pre: 90 %, 20 mm length, ROGE 3 flow  Post: 0%, ROGE 3 flow    Lesion complexity  Non-High  Severe calcification No  Bifurcation  No    Guide catheter: AR1 was advanced to the ostia of the right coronary artery.    Guide wire: A 0.014\" mm  Runthrough was advanced into the artery and crossed the lesion.    Balloon pre-dilatation: 2.0 by 12 mm Emerge inflated to 10 PEYTON to pre-dilate the lesion.    Stent: A 3.5 by 8 mm Del Valle drug-eluting  stent was deployed in ostial  right coronary artery at 14 PEYTON.    Post dilatation is achieved using  3.5 by 8 mm NC Emergeinflated to 22 PEYTON.    Additional stent: A 2.75 by 22 mm Del Valle drug-eluting  stent was deployed in mid  right coronary artery at 12 PEYTON.    Anticoagulant: Heparin  Antiplatelet: Clopidogrel  EBL <25 cc  Complications: none  Specimens: none  Contrast: 88  Fluorotime : see cath lab flowsheet      Sedation: I supervised moderate sedation over a trained independent observer.    Sedation start time: 09:13  End time: 09:55      Electronically signed by   Tello Garza M.D., THERESA  Interventional cardiologist  6/18/2020  10:04 AM            "

## 2020-06-18 NOTE — OR NURSING
1020 Patient arrived from cath lab s/p [facility]. Right radial approach TR band clean. Patient awake, denies pain, denies nausea. Vss.   1121 patient tolerating oral fluids. 3 cc removed from TR band site clean.   1135  3 cc removed from TR band no bleeding noted  1151 3 cc removed from TR band site clean  1215 3 cc removed from TR band site clean   1300 Tr band removed bleeding noted mp applied for 8 minutes bleeding stopped. Applied gauze and tegaderm and co-band.   1520 patient ambulated to bathroom independently tolerated well. No c/o pain. Surgical site clean.   1546 updated Dr mary eddy to discharge patient.   1550 Criteria met to discharge patient home.  1600 patient escorted via w/c with all his personal belongings.   1605 discharge instruction given to patient and family daughter. Both verbalize understanding of the orders, reviewed activity, worsening symptoms, follow up, medications, dressing care.

## 2020-06-19 ENCOUNTER — TELEPHONE (OUTPATIENT)
Dept: CARDIOLOGY | Facility: MEDICAL CENTER | Age: 80
End: 2020-06-19

## 2020-06-19 NOTE — TELEPHONE ENCOUNTER
Returned call. Pt's right wrist site was wrapped in coband before she left the hospital yesterday, and she wants to know if she needs to replace this once she takes it off. D/w her that it should be fine to remove this bandaging by now, and that if she replaces it with anything, a regular bandaid would be sufficient. Did discuss s/s of bleeding to watch our for, and instructed her to notify us if she sees this.

## 2020-06-22 ENCOUNTER — TELEPHONE (OUTPATIENT)
Dept: CARDIOLOGY | Facility: MEDICAL CENTER | Age: 80
End: 2020-06-22

## 2020-06-22 DIAGNOSIS — I48.91 ATRIAL FIBRILLATION WITH RVR (HCC): ICD-10-CM

## 2020-06-22 DIAGNOSIS — I48.19 PERSISTENT ATRIAL FIBRILLATION (HCC): ICD-10-CM

## 2020-06-22 DIAGNOSIS — E78.5 DYSLIPIDEMIA: ICD-10-CM

## 2020-06-22 DIAGNOSIS — I10 HTN (HYPERTENSION), MALIGNANT: ICD-10-CM

## 2020-06-22 RX ORDER — EZETIMIBE 10 MG/1
10 TABLET ORAL DAILY
Qty: 100 TAB | Refills: 3 | Status: SHIPPED | OUTPATIENT
Start: 2020-06-22 | End: 2021-03-28

## 2020-06-22 RX ORDER — METOPROLOL SUCCINATE 100 MG/1
100 TABLET, EXTENDED RELEASE ORAL 2 TIMES DAILY
Qty: 200 TAB | Refills: 3 | Status: SHIPPED | OUTPATIENT
Start: 2020-06-22 | End: 2020-11-12 | Stop reason: SDUPTHER

## 2020-06-22 RX ORDER — CLOPIDOGREL BISULFATE 75 MG/1
75 TABLET ORAL DAILY
Qty: 90 TAB | Refills: 3 | Status: SHIPPED | OUTPATIENT
Start: 2020-06-22 | End: 2020-11-12 | Stop reason: SDUPTHER

## 2020-06-22 RX ORDER — FUROSEMIDE 20 MG/1
20 TABLET ORAL
Qty: 100 TAB | Refills: 3 | Status: SHIPPED | OUTPATIENT
Start: 2020-06-22 | End: 2020-11-12 | Stop reason: SDUPTHER

## 2020-06-22 RX ORDER — DIGOXIN 125 MCG
125 TABLET ORAL DAILY
Qty: 90 TAB | Refills: 3 | Status: SHIPPED | OUTPATIENT
Start: 2020-06-22 | End: 2020-11-12 | Stop reason: SDUPTHER

## 2020-06-22 RX ORDER — POTASSIUM CHLORIDE 20 MEQ/1
20 TABLET, EXTENDED RELEASE ORAL
Qty: 100 TAB | Refills: 3 | Status: SHIPPED | OUTPATIENT
Start: 2020-06-22 | End: 2021-01-28

## 2020-06-22 NOTE — TELEPHONE ENCOUNTER
Denise      Pt calling to have ALL of her scripts filled at [facility] on [address].  At this point, some are going to a different [facility].  Please make sure she ONLY gets scripts from [facility].    Please call Ivory if questions

## 2020-07-10 DIAGNOSIS — I50.32 CHRONIC DIASTOLIC HEART FAILURE (HCC): ICD-10-CM

## 2020-07-10 RX ORDER — ASPIRIN 81 MG/1
TABLET, COATED ORAL
Qty: 90 TAB | Refills: 3 | Status: SHIPPED | OUTPATIENT
Start: 2020-07-10 | End: 2021-03-28

## 2020-07-16 ENCOUNTER — ANTICOAGULATION MONITORING (OUTPATIENT)
Dept: VASCULAR LAB | Facility: MEDICAL CENTER | Age: 80
End: 2020-07-16

## 2020-07-16 NOTE — PROGRESS NOTES
CrclCrockroft-Gault 43.1 mL/min  Pt is taking  Eliquis 5 mg BID due to at fib  for indef  duration.    Pt is being monitored by card. And as such ac clinic is just seeing that labs are ordered and crtcl is wnl. No bleeding issues noted in last visit. Next lab due in 6 months Jan 202. Continue to defer to card. OSCAR Roland.

## 2020-08-06 ENCOUNTER — OFFICE VISIT (OUTPATIENT)
Dept: CARDIOLOGY | Facility: MEDICAL CENTER | Age: 80
End: 2020-08-06
Payer: MEDICARE

## 2020-08-06 VITALS
BODY MASS INDEX: 24.48 KG/M2 | HEART RATE: 78 BPM | SYSTOLIC BLOOD PRESSURE: 120 MMHG | DIASTOLIC BLOOD PRESSURE: 60 MMHG | HEIGHT: 62 IN | WEIGHT: 133 LBS | OXYGEN SATURATION: 96 %

## 2020-08-06 DIAGNOSIS — I50.32 CHRONIC DIASTOLIC HEART FAILURE (HCC): ICD-10-CM

## 2020-08-06 DIAGNOSIS — I48.19 PERSISTENT ATRIAL FIBRILLATION (HCC): ICD-10-CM

## 2020-08-06 DIAGNOSIS — Z95.5 STENTED CORONARY ARTERY: ICD-10-CM

## 2020-08-06 DIAGNOSIS — Z79.01 CHRONIC ANTICOAGULATION: ICD-10-CM

## 2020-08-06 DIAGNOSIS — I10 HTN (HYPERTENSION), MALIGNANT: ICD-10-CM

## 2020-08-06 DIAGNOSIS — Z79.899 HIGH RISK MEDICATION USE: ICD-10-CM

## 2020-08-06 DIAGNOSIS — E78.2 MIXED HYPERLIPIDEMIA: ICD-10-CM

## 2020-08-06 PROCEDURE — 99214 OFFICE O/P EST MOD 30 MIN: CPT | Performed by: INTERNAL MEDICINE

## 2020-08-06 RX ORDER — LISINOPRIL 5 MG/1
5 TABLET ORAL DAILY
Qty: 30 TAB | Refills: 11 | Status: SHIPPED | OUTPATIENT
Start: 2020-08-06 | End: 2020-11-12 | Stop reason: SDUPTHER

## 2020-08-06 ASSESSMENT — ENCOUNTER SYMPTOMS
NAUSEA: 0
BLURRED VISION: 0
SPEECH CHANGE: 0
FEVER: 0
MYALGIAS: 0
ORTHOPNEA: 0
HALLUCINATIONS: 0
LOSS OF CONSCIOUSNESS: 0
WEIGHT LOSS: 0
PND: 0
HEADACHES: 0
SENSORY CHANGE: 0
CLAUDICATION: 0
EYE DISCHARGE: 0
DEPRESSION: 0
COUGH: 0
SHORTNESS OF BREATH: 0
BRUISES/BLEEDS EASILY: 0
VOMITING: 0
CHILLS: 0
BLOOD IN STOOL: 0
EYE PAIN: 0
DIZZINESS: 0
ABDOMINAL PAIN: 0
DOUBLE VISION: 0
FALLS: 0
PALPITATIONS: 0

## 2020-08-06 ASSESSMENT — FIBROSIS 4 INDEX: FIB4 SCORE: 2.68

## 2020-08-06 NOTE — PROGRESS NOTES
Chief Complaint   Patient presents with   • Atrial Fibrillation     Follow up       Subjective:   Ivory Rowan is a 80 y.o. female who presents today for cardiac care for persistent atrial fibrillation after prior failed cardioversion. She was in sinus for brief amount of time. Patient was diagnosed with atrial fibrillation earlier in 2016. Patient has had multiple hospitalizations due to A. fib with rapid ventricular rate leading to heart failure exacerbation. Her left ventricular systolic function is documented at about 45-50% on her most recent transthoracic echocardiogram. Patient is anticoagulated. Patient is taking Toprol- mg by mouth twice a day.     Patient cannot afford Tikosyn therapy and she did not go into the hospital for that.     I have independently interpreted and reviewed blood tests results with patient in clinic which shows normal LDL level 100, triglycerides 116, renal and liver function.    In the interim, patient has been doing well without having any symptoms. Patient denies having chest pain, dyspnea, palpitation, presyncope, syncope episodes.      06/2020 Due to abnormal stress test, patient underwent coronary angigoram and found to have RCA disease s/p PCI and stent.    Past Medical History:   Diagnosis Date   • Atrial fibrillation (HCC)    • Basal cell carcinoma of skin of nose    • CATARACT     Dr. Araon, Dr. Orellana   • CHF (congestive heart failure) (HCC)    • Colon polyp     tubular adenomas   • Dental disorder     upper partial   • Dyslipidemia    • Glaucoma, right eye     Dr. Orellana   • Heart burn    • Hemorrhagic disorder (HCC)     eliquis   • Hypertension     pt states well controlled on meds   • IBS (irritable bowel syndrome)    • Indigestion    • MEDICAL HOME 10/23/12   • Mitral valve regurgitation    • Osteopenia 6/09   • Perennial allergic rhinitis with seasonal variation    • Persistent atrial fibrillation (HCC)    • Psychiatric problem     anxiety   • Type 2 diabetes  "mellitus, controlled (HCC)     diet controlled   • Valvular heart disease     mitral insufficiency   • Vertigo      Past Surgical History:   Procedure Laterality Date   • RECOVERY  2016    Procedure: CATH LAB-ZAFAR GUIDED CV-TO-LARGE GROUP;  Surgeon: [facility];  [place] [facility] [place]:    • COLONOSCOPY  2009    Dr. Lopez, 9 polyps   • APPENDECTOMY     • BREAST BIOPSY      left, reports wire report broke off during procedure   • CHOLECYSTECTOMY     • US-NEEDLE CORE BX-BREAST PANEL       Family History   Problem Relation Age of Onset   • Diabetes Mother    • Hypertension Mother    • Stroke Mother    • Cancer Father         lung/larynx   • Cancer Sister         \"female\"   • Genetic Disorder Sister         osteoporosis   • Cancer Paternal Aunt         breast   • Cancer Maternal Aunt    • Heart Disease Brother         CABG x 3     Social History     Socioeconomic History   • Marital status:      Spouse name: Not on file   • Number of children: 2   • Years of education: Not on file   • Highest education level: Not on file   Occupational History   • Occupation: Retired 2009 [employer]     Employer: retired   Social Needs   • Financial resource strain: Not on file   • Food insecurity     Worry: Not on file     Inability: Not on file   • Transportation needs     Medical: Not on file     Non-medical: Not on file   Tobacco Use   • Smoking status: Former Smoker     Packs/day: 0.50     Years: 40.00     Pack years: 20.00     Types: Cigarettes     Quit date: 3/1/1996     Years since quittin.4   • Smokeless tobacco: Never Used   Substance and Sexual Activity   • Alcohol use: Not Currently     Alcohol/week: 0.0 oz     Comment: 1-2 drinks once a month   • Drug use: No   • Sexual activity: Not Currently     Partners: Male   Lifestyle   • Physical activity     Days per week: Not on file     Minutes per session: Not on file   • Stress: Not on file   Relationships   • " Social connections     Talks on phone: Not on file     Gets together: Not on file     Attends Rastafari service: Not on file     Active member of club or organization: Not on file     Attends meetings of clubs or organizations: Not on file     Relationship status: Not on file   • Intimate partner violence     Fear of current or ex partner: Not on file     Emotionally abused: Not on file     Physically abused: Not on file     Forced sexual activity: Not on file   Other Topics Concern   • Not on file   Social History Narrative   • Not on file     Allergies   Allergen Reactions   • Atorvastatin      myalgias   • Simvastatin      myalgias   • Zetia [Ezetimibe] Myalgia     Muscle pain     Outpatient Encounter Medications as of 8/6/2020   Medication Sig Dispense Refill   • clopidogrel (PLAVIX) 75 MG Tab Take 1 Tab by mouth every day. 90 Tab 3   • digoxin (LANOXIN) 125 MCG Tab Take 1 Tab by mouth every day. 90 Tab 3   • ezetimibe (ZETIA) 10 MG Tab Take 1 Tab by mouth every day. (Patient taking differently: Take 10 mg by mouth every day. Indications: 3 times a week) 100 Tab 3   • furosemide (LASIX) 20 MG Tab Take 1 Tab by mouth every day. 100 Tab 3   • metoprolol SR (TOPROL XL) 100 MG TABLET SR 24 HR Take 1 Tab by mouth 2 Times a Day. 200 Tab 3   • potassium chloride SA (KDUR) 20 MEQ Tab CR Take 1 Tab by mouth every day. 100 Tab 3   • apixaban (ELIQUIS) 5mg Tab Take 0.5 Tabs by mouth 2 Times a Day. 180 Tab 3   • diazepam (VALIUM) 10 MG tablet Take 10 mg by mouth as needed for Anxiety.     • omeprazole (PRILOSEC) 20 MG delayed-release capsule TAKE 1 CAPSULE BY MOUTH EVERY DAY 90 Cap 3   • RESTASIS 0.05 % ophthalmic emulsion Place 1 Drop in both eyes 2 times a day.     • erythromycin 5 MG/GM Ointment      • TRAVATAN Z 0.004 % Solution INSTILL 1 DROP IN BOTH EYES EVERY NIGHT 1 HOUR BEFORE BEDTIME  5   • ASPIRIN LOW DOSE 81 MG EC tablet TAKE 1 TABLET BY MOUTH EVERY DAY (Patient not taking: Reported on 8/6/2020) 90 Tab 3   •  "travoprost (TRAVATAN Z) 0.004 % Solution Place 1 Drop in both eyes every evening.       No facility-administered encounter medications on file as of 8/6/2020.      Review of Systems   Constitutional: Negative for chills, fever, malaise/fatigue and weight loss.   HENT: Negative for ear discharge, ear pain, hearing loss and nosebleeds.    Eyes: Negative for blurred vision, double vision, pain and discharge.   Respiratory: Negative for cough and shortness of breath.    Cardiovascular: Negative for chest pain, palpitations, orthopnea, claudication, leg swelling and PND.   Gastrointestinal: Negative for abdominal pain, blood in stool, melena, nausea and vomiting.   Genitourinary: Negative for dysuria and hematuria.   Musculoskeletal: Negative for falls, joint pain and myalgias.   Skin: Negative for itching and rash.   Neurological: Negative for dizziness, sensory change, speech change, loss of consciousness and headaches.   Endo/Heme/Allergies: Negative for environmental allergies. Does not bruise/bleed easily.   Psychiatric/Behavioral: Negative for depression, hallucinations and suicidal ideas.        Objective:   /60 (BP Location: Left arm, Patient Position: Sitting, BP Cuff Size: Adult)   Pulse 78   Ht 1.575 m (5' 2\")   Wt 60.3 kg (133 lb)   LMP 05/01/1991   SpO2 96%   BMI 24.33 kg/m²     Physical Exam   Constitutional: She is oriented to person, place, and time. No distress.   HENT:   Head: Normocephalic and atraumatic.   Right Ear: External ear normal.   Left Ear: External ear normal.   Eyes: Right eye exhibits no discharge. Left eye exhibits no discharge.   Neck: No JVD present. No thyromegaly present.   Cardiovascular: Normal rate and intact distal pulses. Exam reveals no gallop and no friction rub.   No murmur heard.  There is presence of an irregularly irregular heartbeats.     Pulmonary/Chest: Breath sounds normal. No respiratory distress.   Abdominal: Bowel sounds are normal. She exhibits no " distension. There is no abdominal tenderness.   Musculoskeletal:         General: No tenderness or edema.   Neurological: She is alert and oriented to person, place, and time. No cranial nerve deficit.   Skin: Skin is warm and dry. She is not diaphoretic.   Psychiatric: She has a normal mood and affect. Her behavior is normal.   Nursing note and vitals reviewed.      Assessment:     1. Persistent atrial fibrillation (HCC)     2. HTN (hypertension), malignant     3. Chronic diastolic heart failure (HCC)     4. Chronic anticoagulation     5. Mixed hyperlipidemia     6. High risk medication use     7. Stented coronary artery         Medical Decision Making:  Today's Assessment / Status / Plan:   CAD s/p RCA stent 06/2020:  Toprol  mg bid.  Continue plavix, BB, Zetia at current dose.  Will stop ASA now.  Will start Lisinopril 5 mg po daily.  Statin allergies.    Persistent atrial fibrillation:  Rate controlled.  Continue anticoagulation with Eiquis 2.5 mg bid, had nose bleed in the past with full dose.  Will continue Digoxin 125 mcg po daily.  Continue Toprol 100 mg bid.     Hypertension:  Blood pressure is well controlled.  Continue Toprol 100 mg bid.     Dyslipidemia:  Statin intolerance.  Continue Zetia 10 mg qhs.    Mitral regurgitation (severe):  Will send to valve clinic for further assessment of intervention.    In terms of her exertional chest pain, will obtain nuclear stress test.     I will see patient back in clinic with lab tests and studies results in 3 months.     I thank you Dr. Tesfaye for referring patient to our Cardiology Clinic today.

## 2020-08-19 ENCOUNTER — TELEPHONE (OUTPATIENT)
Dept: CARDIOLOGY | Facility: MEDICAL CENTER | Age: 80
End: 2020-08-19

## 2020-08-20 NOTE — TELEPHONE ENCOUNTER
Called pt. She seems to be worried that she won't be able to  her medications from the pharmacy when she needs them. The information she provides is somewhat scattered, so it is difficult to follow precisely what the problem is, but it seems that she doesn't think her insurance will cover her Lasix for the 100 day supply, which was sent in June. Called [facility] pharmacy at [phone]. They aren't sure what day supply her insurance will or won't cover, but they have an active Rx for 90 day supply of Lasix with several refills, and it will cost pt around $3 a refill. Called pt and informed her of this and encouraged her to let us know if she runs into further issues.

## 2020-09-03 ENCOUNTER — ANTICOAGULATION MONITORING (OUTPATIENT)
Dept: VASCULAR LAB | Facility: MEDICAL CENTER | Age: 80
End: 2020-09-03

## 2020-09-03 NOTE — PROGRESS NOTES
Pt is taking Eliquis 2.5 mg BID due to at fib for indef duration.   crcl is 43.42 mL/min.   Pt is being monitored by card. And as such ac clinic is just seeing that labs are ordered and crtcl is wnl. No bleeding issues noted in last visit. Next lab due in12 months  months Sept 2021  Continue to defer to card. OSCAR Roland.

## 2020-09-10 DIAGNOSIS — I48.19 PERSISTENT ATRIAL FIBRILLATION (HCC): ICD-10-CM

## 2020-09-14 RX ORDER — APIXABAN 5 MG/1
TABLET, FILM COATED ORAL
Qty: 180 TAB | Refills: 3 | Status: SHIPPED | OUTPATIENT
Start: 2020-09-14 | End: 2020-11-12

## 2020-10-01 ENCOUNTER — PATIENT OUTREACH (OUTPATIENT)
Dept: HEALTH INFORMATION MANAGEMENT | Facility: OTHER | Age: 80
End: 2020-10-01

## 2020-10-01 NOTE — PROGRESS NOTES
MBR verified Hippa. MBR requesting provider and pharmacy packet to be sent. Requested information regarding if she is still able to see her current PCP with new plans. Informed MBR, her PCP is still in network and she would have a $0 copay for PCP visits. MBR will reach out if she has any other questions. No access to computer and unable to come to any meetings.

## 2020-10-06 ENCOUNTER — TELEPHONE (OUTPATIENT)
Dept: MEDICAL GROUP | Facility: MEDICAL CENTER | Age: 80
End: 2020-10-06

## 2020-10-06 DIAGNOSIS — I50.32 CHRONIC DIASTOLIC HEART FAILURE (HCC): ICD-10-CM

## 2020-10-06 DIAGNOSIS — K21.9 GASTROESOPHAGEAL REFLUX DISEASE WITHOUT ESOPHAGITIS: ICD-10-CM

## 2020-10-06 DIAGNOSIS — M85.89 OSTEOPENIA OF MULTIPLE SITES: ICD-10-CM

## 2020-10-06 DIAGNOSIS — E78.5 DYSLIPIDEMIA: ICD-10-CM

## 2020-10-06 DIAGNOSIS — E55.9 VITAMIN D DEFICIENCY: ICD-10-CM

## 2020-10-06 DIAGNOSIS — E11.21 CONTROLLED TYPE 2 DIABETES MELLITUS WITH DIABETIC NEPHROPATHY, WITHOUT LONG-TERM CURRENT USE OF INSULIN (HCC): ICD-10-CM

## 2020-10-06 DIAGNOSIS — I10 ESSENTIAL HYPERTENSION: ICD-10-CM

## 2020-10-06 DIAGNOSIS — N18.31 STAGE 3A CHRONIC KIDNEY DISEASE: ICD-10-CM

## 2020-10-27 ENCOUNTER — HOSPITAL ENCOUNTER (OUTPATIENT)
Dept: LAB | Facility: MEDICAL CENTER | Age: 80
End: 2020-10-27
Attending: FAMILY MEDICINE
Payer: MEDICARE

## 2020-10-27 ENCOUNTER — HOSPITAL ENCOUNTER (OUTPATIENT)
Dept: LAB | Facility: MEDICAL CENTER | Age: 80
End: 2020-10-27
Attending: INTERNAL MEDICINE
Payer: MEDICARE

## 2020-10-27 DIAGNOSIS — I10 ESSENTIAL HYPERTENSION: ICD-10-CM

## 2020-10-27 DIAGNOSIS — Z79.899 HIGH RISK MEDICATION USE: ICD-10-CM

## 2020-10-27 DIAGNOSIS — E78.5 DYSLIPIDEMIA: ICD-10-CM

## 2020-10-27 DIAGNOSIS — M85.89 OSTEOPENIA OF MULTIPLE SITES: ICD-10-CM

## 2020-10-27 DIAGNOSIS — E11.21 CONTROLLED TYPE 2 DIABETES MELLITUS WITH DIABETIC NEPHROPATHY, WITHOUT LONG-TERM CURRENT USE OF INSULIN (HCC): ICD-10-CM

## 2020-10-27 DIAGNOSIS — N18.31 STAGE 3A CHRONIC KIDNEY DISEASE: ICD-10-CM

## 2020-10-27 DIAGNOSIS — K21.9 GASTROESOPHAGEAL REFLUX DISEASE WITHOUT ESOPHAGITIS: ICD-10-CM

## 2020-10-27 DIAGNOSIS — I50.32 CHRONIC DIASTOLIC HEART FAILURE (HCC): ICD-10-CM

## 2020-10-27 DIAGNOSIS — E55.9 VITAMIN D DEFICIENCY: ICD-10-CM

## 2020-10-27 DIAGNOSIS — I10 HTN (HYPERTENSION), MALIGNANT: ICD-10-CM

## 2020-10-27 DIAGNOSIS — I48.19 PERSISTENT ATRIAL FIBRILLATION (HCC): ICD-10-CM

## 2020-10-27 DIAGNOSIS — Z95.5 STENTED CORONARY ARTERY: ICD-10-CM

## 2020-10-27 LAB
25(OH)D3 SERPL-MCNC: 27 NG/ML (ref 30–100)
ALBUMIN SERPL BCP-MCNC: 4.6 G/DL (ref 3.2–4.9)
ALBUMIN/GLOB SERPL: 1.6 G/DL
ALP SERPL-CCNC: 89 U/L (ref 30–99)
ALT SERPL-CCNC: 15 U/L (ref 2–50)
ANION GAP SERPL CALC-SCNC: 13 MMOL/L (ref 7–16)
ANION GAP SERPL CALC-SCNC: 14 MMOL/L (ref 7–16)
AST SERPL-CCNC: 19 U/L (ref 12–45)
BILIRUB SERPL-MCNC: 0.5 MG/DL (ref 0.1–1.5)
BUN SERPL-MCNC: 27 MG/DL (ref 8–22)
BUN SERPL-MCNC: 27 MG/DL (ref 8–22)
CALCIUM SERPL-MCNC: 9.7 MG/DL (ref 8.5–10.5)
CALCIUM SERPL-MCNC: 9.8 MG/DL (ref 8.5–10.5)
CHLORIDE SERPL-SCNC: 100 MMOL/L (ref 96–112)
CHLORIDE SERPL-SCNC: 101 MMOL/L (ref 96–112)
CHOLEST SERPL-MCNC: 171 MG/DL (ref 100–199)
CO2 SERPL-SCNC: 23 MMOL/L (ref 20–33)
CO2 SERPL-SCNC: 25 MMOL/L (ref 20–33)
CREAT SERPL-MCNC: 1.11 MG/DL (ref 0.5–1.4)
CREAT SERPL-MCNC: 1.13 MG/DL (ref 0.5–1.4)
CREAT UR-MCNC: 87.49 MG/DL
ERYTHROCYTE [DISTWIDTH] IN BLOOD BY AUTOMATED COUNT: 48.8 FL (ref 35.9–50)
EST. AVERAGE GLUCOSE BLD GHB EST-MCNC: 143 MG/DL
FASTING STATUS PATIENT QL REPORTED: NORMAL
GLOBULIN SER CALC-MCNC: 2.9 G/DL (ref 1.9–3.5)
GLUCOSE SERPL-MCNC: 114 MG/DL (ref 65–99)
GLUCOSE SERPL-MCNC: 115 MG/DL (ref 65–99)
HBA1C MFR BLD: 6.6 % (ref 0–5.6)
HCT VFR BLD AUTO: 40 % (ref 37–47)
HDLC SERPL-MCNC: 60 MG/DL
HGB BLD-MCNC: 12 G/DL (ref 12–16)
LDLC SERPL CALC-MCNC: 89 MG/DL
MCH RBC QN AUTO: 25 PG (ref 27–33)
MCHC RBC AUTO-ENTMCNC: 30 G/DL (ref 33.6–35)
MCV RBC AUTO: 83.3 FL (ref 81.4–97.8)
MICROALBUMIN UR-MCNC: 2.6 MG/DL
MICROALBUMIN/CREAT UR: 30 MG/G (ref 0–30)
PLATELET # BLD AUTO: 204 K/UL (ref 164–446)
PMV BLD AUTO: 10.7 FL (ref 9–12.9)
POTASSIUM SERPL-SCNC: 4.2 MMOL/L (ref 3.6–5.5)
POTASSIUM SERPL-SCNC: 4.6 MMOL/L (ref 3.6–5.5)
PROT SERPL-MCNC: 7.5 G/DL (ref 6–8.2)
RBC # BLD AUTO: 4.8 M/UL (ref 4.2–5.4)
SODIUM SERPL-SCNC: 137 MMOL/L (ref 135–145)
SODIUM SERPL-SCNC: 139 MMOL/L (ref 135–145)
TRIGL SERPL-MCNC: 111 MG/DL (ref 0–149)
WBC # BLD AUTO: 6 K/UL (ref 4.8–10.8)

## 2020-10-27 PROCEDURE — 82306 VITAMIN D 25 HYDROXY: CPT

## 2020-10-27 PROCEDURE — 83036 HEMOGLOBIN GLYCOSYLATED A1C: CPT

## 2020-10-27 PROCEDURE — 80053 COMPREHEN METABOLIC PANEL: CPT

## 2020-10-27 PROCEDURE — 82043 UR ALBUMIN QUANTITATIVE: CPT

## 2020-10-27 PROCEDURE — 82570 ASSAY OF URINE CREATININE: CPT

## 2020-10-27 PROCEDURE — 80048 BASIC METABOLIC PNL TOTAL CA: CPT

## 2020-10-27 PROCEDURE — 85027 COMPLETE CBC AUTOMATED: CPT

## 2020-10-27 PROCEDURE — 80061 LIPID PANEL: CPT

## 2020-10-27 PROCEDURE — 36415 COLL VENOUS BLD VENIPUNCTURE: CPT

## 2020-11-12 ENCOUNTER — OFFICE VISIT (OUTPATIENT)
Dept: CARDIOLOGY | Facility: MEDICAL CENTER | Age: 80
End: 2020-11-12
Payer: MEDICARE

## 2020-11-12 VITALS
HEIGHT: 62 IN | SYSTOLIC BLOOD PRESSURE: 122 MMHG | BODY MASS INDEX: 25.21 KG/M2 | WEIGHT: 137 LBS | HEART RATE: 86 BPM | DIASTOLIC BLOOD PRESSURE: 72 MMHG | OXYGEN SATURATION: 93 %

## 2020-11-12 DIAGNOSIS — Z79.01 CHRONIC ANTICOAGULATION: ICD-10-CM

## 2020-11-12 DIAGNOSIS — I48.19 PERSISTENT ATRIAL FIBRILLATION (HCC): ICD-10-CM

## 2020-11-12 DIAGNOSIS — E78.2 MIXED HYPERLIPIDEMIA: ICD-10-CM

## 2020-11-12 DIAGNOSIS — I50.32 CHRONIC DIASTOLIC HEART FAILURE (HCC): ICD-10-CM

## 2020-11-12 DIAGNOSIS — Z95.5 STENTED CORONARY ARTERY: ICD-10-CM

## 2020-11-12 DIAGNOSIS — Z79.899 HIGH RISK MEDICATION USE: ICD-10-CM

## 2020-11-12 DIAGNOSIS — I10 HTN (HYPERTENSION), MALIGNANT: ICD-10-CM

## 2020-11-12 PROCEDURE — 99214 OFFICE O/P EST MOD 30 MIN: CPT | Performed by: INTERNAL MEDICINE

## 2020-11-12 RX ORDER — CLOPIDOGREL BISULFATE 75 MG/1
75 TABLET ORAL DAILY
Qty: 90 TAB | Refills: 3 | Status: SHIPPED | OUTPATIENT
Start: 2020-11-12 | End: 2021-07-27 | Stop reason: SDUPTHER

## 2020-11-12 RX ORDER — METOPROLOL SUCCINATE 100 MG/1
100 TABLET, EXTENDED RELEASE ORAL 2 TIMES DAILY
Qty: 200 TAB | Refills: 3 | Status: SHIPPED | OUTPATIENT
Start: 2020-11-12 | End: 2021-07-27 | Stop reason: SDUPTHER

## 2020-11-12 RX ORDER — FUROSEMIDE 20 MG/1
20 TABLET ORAL
Qty: 100 TAB | Refills: 3 | Status: SHIPPED | OUTPATIENT
Start: 2020-11-12 | End: 2021-07-27

## 2020-11-12 RX ORDER — DIGOXIN 125 MCG
125 TABLET ORAL DAILY
Qty: 90 TAB | Refills: 3 | Status: SHIPPED | OUTPATIENT
Start: 2020-11-12 | End: 2021-07-27

## 2020-11-12 RX ORDER — LISINOPRIL 5 MG/1
5 TABLET ORAL DAILY
Qty: 30 TAB | Refills: 11 | Status: SHIPPED | OUTPATIENT
Start: 2020-11-12 | End: 2021-07-27 | Stop reason: SDUPTHER

## 2020-11-12 ASSESSMENT — ENCOUNTER SYMPTOMS
WEIGHT LOSS: 0
LOSS OF CONSCIOUSNESS: 0
CHILLS: 0
ORTHOPNEA: 0
VOMITING: 0
NAUSEA: 0
SPEECH CHANGE: 0
EYE DISCHARGE: 0
HALLUCINATIONS: 0
BLOOD IN STOOL: 0
FALLS: 0
BLURRED VISION: 0
COUGH: 0
BRUISES/BLEEDS EASILY: 0
SENSORY CHANGE: 0
CLAUDICATION: 0
ABDOMINAL PAIN: 0
DOUBLE VISION: 0
PALPITATIONS: 0
FEVER: 0
PND: 0
MYALGIAS: 0
SHORTNESS OF BREATH: 0
EYE PAIN: 0
DIZZINESS: 0
DEPRESSION: 0
HEADACHES: 0

## 2020-11-12 ASSESSMENT — MINNESOTA LIVING WITH HEART FAILURE QUESTIONNAIRE (MLHF)
MAKING YOU SHORT OF BREATH: 0
FEELING LIKE A BURDEN TO FAMILY AND FRIENDS: 5
SWELLING IN ANKLES OR LEGS: 5
DIFFICULTY SOCIALIZING WITH FAMILY OR FRIENDS: 5
COSTING YOU MONEY FOR MEDICAL CARE: 4
DIFFICULTY GOING AWAY FROM HOME: 0
MAKING YOU STAY IN A HOSPITAL: 0
DIFFICULTY WITH SEXUAL ACTIVITIES: 0
GIVING YOU SIDE EFFECTS FROM TREATMENTS: 0
MAKING YOU WORRY: 5
DIFFICULTY TO CONCENTRATE OR REMEMBERING THINGS: 3
EATING LESS FOODS YOU LIKE: 0
LOSS OF SELF CONTROL IN YOUR LIFE: 5
WORKING AROUND THE HOUSE OR YARD DIFFICULT: 0
DIFFICULTY SLEEPING WELL AT NIGHT: 0
DIFFICULTY WORKING TO EARN A LIVING: 0
TOTAL_SCORE: 41
MAKING YOU FEEL DEPRESSED: 5
TIRED, FATIGUED OR LOW ON ENERGY: 3
WALKING ABOUT OR CLIMBING STAIRS DIFFICULT: 0
HAVING TO SIT OR LIE DOWN DURING THE DAY: 1
DIFFICULTY WITH RECREATIONAL PASTIMES, SPORTS, HOBBIES: 0

## 2020-11-12 ASSESSMENT — FIBROSIS 4 INDEX: FIB4 SCORE: 1.9

## 2020-11-12 NOTE — PROGRESS NOTES
Chief Complaint   Patient presents with   • Atrial Fibrillation     F/V DX:PERSISTENT AFIB/HTN       Subjective:   Ivory Rowan is a 80 y.o. female who presents today for cardiac care for persistent atrial fibrillation after prior failed cardioversion. She was in sinus for brief amount of time. Patient was diagnosed with atrial fibrillation earlier in 2016. Patient has had multiple hospitalizations due to A. fib with rapid ventricular rate leading to heart failure exacerbation. Her left ventricular systolic function is documented at about 45-50% on her most recent transthoracic echocardiogram. Patient is anticoagulated. Patient is taking Toprol- mg by mouth twice a day.     In the pat, patient was not able to afford Tikosyn therapy and she did not go into the hospital for that.     I have independently interpreted and reviewed blood tests results with patient in clinic which shows normal LDL level 89, triglycerides 111. GFR of 46.    In the interim, patient has been doing well without having any symptoms. Patient denies having chest pain, dyspnea, palpitation, presyncope, syncope episodes.      06/2020 Due to abnormal stress test, patient underwent coronary angigoram and found to have RCA disease s/p PCI and stent.    Past Medical History:   Diagnosis Date   • Atrial fibrillation (HCC)    • Basal cell carcinoma of skin of nose    • CATARACT     Dr. Aaron, Dr. Orellana   • CHF (congestive heart failure) (HCC)    • Colon polyp     tubular adenomas   • Dental disorder     upper partial   • Dyslipidemia    • Glaucoma, right eye     Dr. Orellana   • Heart burn    • Hemorrhagic disorder (HCC)     eliquis   • Hypertension     pt states well controlled on meds   • IBS (irritable bowel syndrome)    • Indigestion    • MEDICAL HOME 10/23/12   • Mitral valve regurgitation    • Osteopenia 6/09   • Perennial allergic rhinitis with seasonal variation    • Persistent atrial fibrillation (HCC)    • Psychiatric problem     anxiety  "  • Type 2 diabetes mellitus, controlled (HCC)     diet controlled   • Valvular heart disease     mitral insufficiency   • Vertigo      Past Surgical History:   Procedure Laterality Date   • RECOVERY  2016    Procedure: CATH LAB-ZAFAR GUIDED CV-TO-LARGE GROUP;  Surgeon: [facility] Surgery;  [place] [facility] [place]:    • COLONOSCOPY  2009    Dr. Lopez, 9 polyps   • APPENDECTOMY     • BREAST BIOPSY      left, reports wire report broke off during procedure   • CHOLECYSTECTOMY     • US-NEEDLE CORE BX-BREAST PANEL       Family History   Problem Relation Age of Onset   • Diabetes Mother    • Hypertension Mother    • Stroke Mother    • Cancer Father         lung/larynx   • Cancer Sister         \"female\"   • Genetic Disorder Sister         osteoporosis   • Cancer Paternal Aunt         breast   • Cancer Maternal Aunt    • Heart Disease Brother         CABG x 3     Social History     Socioeconomic History   • Marital status:      Spouse name: Not on file   • Number of children: 2   • Years of education: Not on file   • Highest education level: Not on file   Occupational History   • Occupation: Retired 2009 [employer]     Employer: retired   Social Needs   • Financial resource strain: Not on file   • Food insecurity     Worry: Not on file     Inability: Not on file   • Transportation needs     Medical: Not on file     Non-medical: Not on file   Tobacco Use   • Smoking status: Former Smoker     Packs/day: 0.50     Years: 40.00     Pack years: 20.00     Types: Cigarettes     Quit date: 3/1/1996     Years since quittin.7   • Smokeless tobacco: Never Used   Substance and Sexual Activity   • Alcohol use: Not Currently     Alcohol/week: 0.0 oz     Comment: 1-2 drinks once a month   • Drug use: No   • Sexual activity: Not Currently     Partners: Male   Lifestyle   • Physical activity     Days per week: Not on file     Minutes per session: Not on file   • Stress: Not on file "   Relationships   • Social connections     Talks on phone: Not on file     Gets together: Not on file     Attends Latter-day service: Not on file     Active member of club or organization: Not on file     Attends meetings of clubs or organizations: Not on file     Relationship status: Not on file   • Intimate partner violence     Fear of current or ex partner: Not on file     Emotionally abused: Not on file     Physically abused: Not on file     Forced sexual activity: Not on file   Other Topics Concern   • Not on file   Social History Narrative   • Not on file     Allergies   Allergen Reactions   • Atorvastatin      myalgias   • Simvastatin      myalgias   • Zetia [Ezetimibe] Myalgia     Muscle pain     Outpatient Encounter Medications as of 11/12/2020   Medication Sig Dispense Refill   • lisinopril (PRINIVIL) 5 MG Tab Take 1 Tab by mouth every day. 30 Tab 11   • apixaban (ELIQUIS) 2.5mg Tab Take 1 Tab by mouth 2 Times a Day. 60 Tab 11   • clopidogrel (PLAVIX) 75 MG Tab Take 1 Tab by mouth every day. 90 Tab 3   • digoxin (LANOXIN) 125 MCG Tab Take 1 Tab by mouth every day. 90 Tab 3   • ezetimibe (ZETIA) 10 MG Tab Take 1 Tab by mouth every day. (Patient taking differently: Take 10 mg by mouth every day. Indications: 3 times a week) 100 Tab 3   • furosemide (LASIX) 20 MG Tab Take 1 Tab by mouth every day. 100 Tab 3   • metoprolol SR (TOPROL XL) 100 MG TABLET SR 24 HR Take 1 Tab by mouth 2 Times a Day. 200 Tab 3   • potassium chloride SA (KDUR) 20 MEQ Tab CR Take 1 Tab by mouth every day. 100 Tab 3   • diazepam (VALIUM) 10 MG tablet Take 10 mg by mouth as needed for Anxiety.     • omeprazole (PRILOSEC) 20 MG delayed-release capsule TAKE 1 CAPSULE BY MOUTH EVERY DAY 90 Cap 3   • RESTASIS 0.05 % ophthalmic emulsion Place 1 Drop in both eyes 2 times a day.     • TRAVATAN Z 0.004 % Solution INSTILL 1 DROP IN BOTH EYES EVERY NIGHT 1 HOUR BEFORE BEDTIME  5   • travoprost (TRAVATAN Z) 0.004 % Solution Place 1 Drop in both  "eyes every evening.     • ELIQUIS 5 MG Tab TAKE 1 TAB BY MOUTH 2 TIMES A DAY (Patient not taking: Reported on 11/12/2020) 180 Tab 3   • ASPIRIN LOW DOSE 81 MG EC tablet TAKE 1 TABLET BY MOUTH EVERY DAY (Patient not taking: Reported on 8/6/2020) 90 Tab 3   • erythromycin 5 MG/GM Ointment        No facility-administered encounter medications on file as of 11/12/2020.      Review of Systems   Constitutional: Negative for chills, fever, malaise/fatigue and weight loss.   HENT: Negative for ear discharge, ear pain, hearing loss and nosebleeds.    Eyes: Negative for blurred vision, double vision, pain and discharge.   Respiratory: Negative for cough and shortness of breath.    Cardiovascular: Negative for chest pain, palpitations, orthopnea, claudication, leg swelling and PND.   Gastrointestinal: Negative for abdominal pain, blood in stool, melena, nausea and vomiting.   Genitourinary: Negative for dysuria and hematuria.   Musculoskeletal: Negative for falls, joint pain and myalgias.   Skin: Negative for itching and rash.   Neurological: Negative for dizziness, sensory change, speech change, loss of consciousness and headaches.   Endo/Heme/Allergies: Negative for environmental allergies. Does not bruise/bleed easily.   Psychiatric/Behavioral: Negative for depression, hallucinations and suicidal ideas.        Objective:   /72 (BP Location: Left arm, Patient Position: Sitting, BP Cuff Size: Adult)   Pulse 86   Ht 1.575 m (5' 2\")   Wt 62.1 kg (137 lb)   LMP 05/01/1991   SpO2 93%   BMI 25.06 kg/m²     Physical Exam   Constitutional: She is oriented to person, place, and time. No distress.   HENT:   Head: Normocephalic and atraumatic.   Right Ear: External ear normal.   Left Ear: External ear normal.   Eyes: Right eye exhibits no discharge. Left eye exhibits no discharge.   Neck: No JVD present. No thyromegaly present.   Cardiovascular: Normal rate and intact distal pulses. Exam reveals no gallop and no friction " rub.   No murmur heard.  There is presence of an irregularly irregular heartbeats.     Pulmonary/Chest: Breath sounds normal. No respiratory distress.   Abdominal: Bowel sounds are normal. She exhibits no distension. There is no abdominal tenderness.   Musculoskeletal:         General: No tenderness or edema.   Neurological: She is alert and oriented to person, place, and time. No cranial nerve deficit.   Skin: Skin is warm and dry. She is not diaphoretic.   Psychiatric: She has a normal mood and affect. Her behavior is normal.   Nursing note and vitals reviewed.      Assessment:     1. Persistent atrial fibrillation (HCC)     2. HTN (hypertension), malignant     3. Chronic diastolic heart failure (HCC)     4. Chronic anticoagulation     5. Mixed hyperlipidemia     6. Stented coronary artery     7. High risk medication use         Medical Decision Making:  Today's Assessment / Status / Plan:   CAD s/p RCA stent 06/2020:  Toprol  mg bid.  Continue plavix, BB, Zetia at current dose.  No ASA now to reduce risk of bleeding.  Will continue Lisinopril 5 mg po daily.  Statin allergies.    Persistent atrial fibrillation:  Rate controlled.  Continue anticoagulation with Eiquis 2.5 mg bid, had nose bleed in the past with full dose.  Will continue Digoxin 125 mcg po daily.  Continue Toprol 100 mg bid.     Hypertension:  Blood pressure is well controlled.  Continue Toprol 100 mg bid.     Dyslipidemia:  Statin intolerance.  Continue Zetia 10 mg qhs.    Mitral regurgitation (severe):  Patient does not want to get evaluated for intervention at this time.    In terms of her exertional chest pain, will obtain nuclear stress test.     I will see patient back in clinic with lab tests and studies results in 6 months.     I thank you Dr. Tesfaye for referring patient to our Cardiology Clinic today.

## 2021-01-11 DIAGNOSIS — Z23 NEED FOR VACCINATION: ICD-10-CM

## 2021-01-28 DIAGNOSIS — I48.91 ATRIAL FIBRILLATION WITH RVR (HCC): ICD-10-CM

## 2021-01-28 RX ORDER — POTASSIUM CHLORIDE 20 MEQ/1
TABLET, EXTENDED RELEASE ORAL
Qty: 90 TAB | Refills: 1 | Status: SHIPPED | OUTPATIENT
Start: 2021-01-28 | End: 2021-07-27

## 2021-01-28 NOTE — TELEPHONE ENCOUNTER
TT    Patient called stating the pharmacy told her to contact the office to get refills. Pt has a bottle that states in October 2020 90 tablets w/ 1 remaining refill. Pt did not  the bottle and is close to running out. I spoke with patient earlier and noticed we refused it and explained to them why. I advised them to call their pharmacy to speak with a live person to see what is going on. They said they will send a new request in. The patient is concerned that she is going to run out of their medications in the meantime. Pt can be reached at [phone].    Thank you,  Lissa COLLINS

## 2021-02-22 DIAGNOSIS — K21.9 GASTROESOPHAGEAL REFLUX DISEASE WITHOUT ESOPHAGITIS: Chronic | ICD-10-CM

## 2021-02-22 RX ORDER — OMEPRAZOLE 20 MG/1
CAPSULE, DELAYED RELEASE ORAL
Qty: 90 CAPSULE | Refills: 3 | Status: SHIPPED | OUTPATIENT
Start: 2021-02-22 | End: 2021-03-19

## 2021-03-19 ENCOUNTER — OFFICE VISIT (OUTPATIENT)
Dept: MEDICAL GROUP | Facility: MEDICAL CENTER | Age: 81
End: 2021-03-19
Payer: MEDICARE

## 2021-03-19 VITALS
OXYGEN SATURATION: 97 % | SYSTOLIC BLOOD PRESSURE: 106 MMHG | RESPIRATION RATE: 14 BRPM | BODY MASS INDEX: 25.58 KG/M2 | WEIGHT: 139 LBS | TEMPERATURE: 98.9 F | HEIGHT: 62 IN | HEART RATE: 86 BPM | DIASTOLIC BLOOD PRESSURE: 60 MMHG

## 2021-03-19 DIAGNOSIS — N18.31 STAGE 3A CHRONIC KIDNEY DISEASE: ICD-10-CM

## 2021-03-19 DIAGNOSIS — I50.32 CHRONIC DIASTOLIC HEART FAILURE (HCC): ICD-10-CM

## 2021-03-19 DIAGNOSIS — F43.21 SITUATIONAL DEPRESSION: ICD-10-CM

## 2021-03-19 DIAGNOSIS — E78.5 DYSLIPIDEMIA: ICD-10-CM

## 2021-03-19 DIAGNOSIS — I48.91 ATRIAL FIBRILLATION WITH RVR (HCC): ICD-10-CM

## 2021-03-19 DIAGNOSIS — K21.9 GASTROESOPHAGEAL REFLUX DISEASE WITHOUT ESOPHAGITIS: ICD-10-CM

## 2021-03-19 DIAGNOSIS — I10 ESSENTIAL HYPERTENSION: ICD-10-CM

## 2021-03-19 DIAGNOSIS — F41.9 CHRONIC ANXIETY: ICD-10-CM

## 2021-03-19 DIAGNOSIS — E11.21 CONTROLLED TYPE 2 DIABETES MELLITUS WITH DIABETIC NEPHROPATHY, WITHOUT LONG-TERM CURRENT USE OF INSULIN (HCC): ICD-10-CM

## 2021-03-19 PROCEDURE — 99214 OFFICE O/P EST MOD 30 MIN: CPT | Performed by: FAMILY MEDICINE

## 2021-03-19 RX ORDER — PANTOPRAZOLE SODIUM 40 MG/1
40 TABLET, DELAYED RELEASE ORAL DAILY
Qty: 90 TABLET | Refills: 3 | Status: SHIPPED | OUTPATIENT
Start: 2021-03-19 | End: 2021-03-28

## 2021-03-19 RX ORDER — DIAZEPAM 10 MG/1
10 TABLET ORAL EVERY 12 HOURS PRN
Qty: 90 TABLET | Refills: 0 | Status: SHIPPED | OUTPATIENT
Start: 2021-03-19 | End: 2021-08-30 | Stop reason: SDUPTHER

## 2021-03-19 ASSESSMENT — FIBROSIS 4 INDEX: FIB4 SCORE: 1.9

## 2021-03-19 ASSESSMENT — PATIENT HEALTH QUESTIONNAIRE - PHQ9
5. POOR APPETITE OR OVEREATING: 0 - NOT AT ALL
CLINICAL INTERPRETATION OF PHQ2 SCORE: 6
SUM OF ALL RESPONSES TO PHQ QUESTIONS 1-9: 18

## 2021-03-19 NOTE — NON-PROVIDER
1.  Are you currently engaging in any exercise or physical activity? No    2.  How would you describe your mood or emotional well-being today? nervous    3.  Have you had any falls in the last year? No    4.  Have you noticed any problems with your balance or had difficulty walking? Yes    5.  In the last six months have you experienced any leakage of urine? No    6. DPA/Advanced Directive: Patient has Advanced Directive on file.

## 2021-03-19 NOTE — PROGRESS NOTES
Chief Complaint   Patient presents with   • Gastrophageal Reflux   • Diabetes Mellitus   • Chronic Kidney Disease   • Hyperlipidemia   • Hypertension   • Atrial Fibrillation   • Anxiety       Subjective:     HPI:   Ivory Rowan presents today with the followin. Gastroesophageal reflux disease without esophagitis  Patient is somewhat upset as the clopidogrel does not do well with the omeprazole.  She has read that on the medication.  She has also read that on information from her pharmacy but wanted to be sure she did not have to change.  I recommend that she change to pantoprazole which is less likely to have that issue from what I understand.  Patient does need to stay on a proton pump inhibitor.    2. Controlled type 2 diabetes mellitus with diabetic nephropathy, without long-term current use of insulin (HCC)  Patient's diabetes has generally been well controlled.  Her last glycohemoglobin in the fall was 6.6%.  Patient does not check blood sugars.  Follow-up lab testing discussed and placed.      3. Stage 3a chronic kidney disease  Patient continues to have stable moderate stage IIIa chronic kidney disease.  Follow-up lab orders discussed and placed.    4. Dyslipidemia  Patient denies chest pain, chest pressure, palpitations or exertional shortness of breath. Patient denies muscle aches or muscle weakness from the Zetia medication. Patient is a former smoker. Patient takes 81 mg aspirin daily.  She now also takes clopidogrel.  Patient has no history of myocardial infarction, however she has atrial fibrillation with history of rapid ventricular response.  No documented history of stroke or PVD.  Follow-up lab orders discussed and placed.    5. Essential hypertension  HTN - Chronic condition stable. Currently taking all meds as directed.   She is taking baby aspirin daily.  She is now taking clopidogrel in addition (Plavix)  She is not monitoring BP at home.   Denies symptoms low BP: light-headed,  tunnel-vision, unusual fatigue.   Denies symptoms high BP:pounding headache, visual changes, palpitations, flushed face.   Denies medicine side effects: unusual fatigue, slow heartbeat, foot/leg swelling, cough.    6. Atrial fibrillation with RVR (HCC)/chronic diastolic heart failure.  Patient is now on Plavix for anticoagulation.  She is not very happy having to do this.  However, she does not want to stroke.  Patient does follow carefully with cardiology.    7. Chronic anxiety  Patient has chronic severe anxiety.  From what she says today and also what she is said in the past this stems from childhood.  I have renewed her diazepam which she uses as needed only.  PDMP review shows no inconsistencies and shows that she does not fill the medication very often.  No observed depression or aberrant reactions to the diazepam.  Patient rarely drinks alcohol.    8. Situational depression  Patient seems to have some situational depression.  She does tend to feel her loneliness a great deal.  Her daughter is here with her today and it does appear that her daughter interacts but the patient may just be wanting more than what anyone else could provide.    Discussed the Covid vaccines.  I have strongly suggested that she proceed with that immunization.  She says she will think about it.      Patient Active Problem List    Diagnosis Date Noted   • Essential hypertension 01/18/2010   • Dyslipidemia 07/23/2009   • History of adenomatous polyp of colon 06/02/2015   • Perennial allergic rhinitis with seasonal variation    • Vertigo    • IBS (irritable bowel syndrome)    • Basal cell carcinoma of skin of nose    • Vitamin D deficiency 12/08/2011   • GERD (gastroesophageal reflux disease) 12/06/2010   • Osteopenia 07/23/2009   • Sinusitis, chronic    • Bilateral cataracts    • Chronic anticoagulation 08/14/2019   • Hyperglycemia 08/07/2019   • Primary open angle glaucoma (POAG) of both eyes 09/10/2018   • Persistent atrial  fibrillation ([facility])    • Glaucoma, right eye    • Stage 3 chronic kidney disease ([facility]) 06/22/2017   • Type 2 diabetes mellitus, controlled ([facility])    • Chronic anxiety 01/04/2017   • Diabetes mellitus type II, non insulin dependent ([facility]) 11/29/2016   • Financial difficulties 11/29/2016   • Mitral valve regurgitation        Current medicines (including changes today)  Current Outpatient Medications   Medication Sig Dispense Refill   • pantoprazole (PROTONIX) 40 MG Tablet Delayed Response Take 1 tablet by mouth every day. 90 tablet 3   • diazepam (VALIUM) 10 MG tablet Take 1 tablet by mouth every 12 hours as needed for Anxiety for up to 90 days. 90 tablet 0   • potassium chloride SA (KDUR) 20 MEQ Tab CR TAKE 1 TABLET BY MOUTH EVERY DAY 90 Tab 1   • furosemide (LASIX) 20 MG Tab Take 1 Tab by mouth every day. 100 Tab 3   • metoprolol SR (TOPROL XL) 100 MG TABLET SR 24 HR Take 1 Tab by mouth 2 Times a Day. 200 Tab 3   • digoxin (LANOXIN) 125 MCG Tab Take 1 Tab by mouth every day. 90 Tab 3   • lisinopril (PRINIVIL) 5 MG Tab Take 1 Tab by mouth every day. 30 Tab 11   • apixaban (ELIQUIS) 2.5mg Tab Take 1 Tab by mouth 2 Times a Day. 60 Tab 11   • clopidogrel (PLAVIX) 75 MG Tab Take 1 Tab by mouth every day. 90 Tab 3   • ASPIRIN LOW DOSE 81 MG EC tablet TAKE 1 TABLET BY MOUTH EVERY DAY (Patient not taking: Reported on 8/6/2020) 90 Tab 3   • ezetimibe (ZETIA) 10 MG Tab Take 1 Tab by mouth every day. (Patient taking differently: Take 10 mg by mouth every day. Indications: 3 times a week) 100 Tab 3   • RESTASIS 0.05 % ophthalmic emulsion Place 1 Drop in both eyes 2 times a day.     • erythromycin 5 MG/GM Ointment      • TRAVATAN Z 0.004 % Solution INSTILL 1 DROP IN BOTH EYES EVERY NIGHT 1 HOUR BEFORE BEDTIME  5   • travoprost (TRAVATAN Z) 0.004 % Solution Place 1 Drop in both eyes every evening.       No current facility-administered medications for this visit.       Allergies   Allergen Reactions   • Atorvastatin      myalgias  "  • Simvastatin      myalgias   • Zetia [Ezetimibe] Myalgia     Muscle pain       ROS: As per HPI       Objective:     /60   Pulse 86   Temp 37.2 °C (98.9 °F)   Resp 14   Ht 1.575 m (5' 2\")   Wt 63 kg (139 lb)   SpO2 97%  Body mass index is 25.42 kg/m².    Physical Exam:  Constitutional: Well-developed and well-nourished. Not diaphoretic. No distress. Lucid and fluent.  Patient, daughter, physician and staff all wearing masks.  Skin: Skin is warm and dry. No rash noted.  Head: Atraumatic without lesions.  Eyes: Conjunctivae and extraocular motions are normal. Pupils are equal, round, and reactive to light. No scleral icterus.   Ears:  External ears unremarkable.  Neck: Supple, trachea midline. No thyromegaly present. No cervical or supraclavicular lymphadenopathy. No JVD or carotid bruits appreciated  Cardiovascular: Regular rate and rhythm.  Normal S1, S2 without murmur appreciated.  Chest: Effort normal. Clear to auscultation throughout. No adventitious sounds.   Abdomen: Soft, non tender, and without distention. Active bowel sounds in all four quadrants. No rebound, guarding, masses or hepatosplenomegaly.  Extremities: No cyanosis, clubbing, erythema, nor edema.   Neurological: Alert and oriented x 3.  Mild amount of tremor.  Psychiatric:  Behavior, mood, and affect are appropriate.    On her most recent echocardiogram her ejection fraction has improved.  She still does have significant elevated right systolic pressure     Assessment and Plan:     80 y.o. female with the following issues:    1. Gastroesophageal reflux disease without esophagitis  pantoprazole (PROTONIX) 40 MG Tablet Delayed Response    Comp Metabolic Panel    CBC WITHOUT DIFFERENTIAL   2. Controlled type 2 diabetes mellitus with diabetic nephropathy, without long-term current use of insulin (HCC)  HEMOGLOBIN A1C    MICROALBUMIN CREAT RATIO URINE    Comp Metabolic Panel    Lipid Profile    TSH    Diabetic Monofilament Lower Extremity " Exam   3. Stage 3a chronic kidney disease  Comp Metabolic Panel    TSH   4. Dyslipidemia  Comp Metabolic Panel    Lipid Profile   5. Essential hypertension  Comp Metabolic Panel   6. Atrial fibrillation with RVR (HCC)  Comp Metabolic Panel   7. Chronic anxiety  Patient has been identified as having a positive depression screening. Appropriate orders and counseling have been given.    diazepam (VALIUM) 10 MG tablet   8. Situational depression  Patient has been identified as having a positive depression screening. Appropriate orders and counseling have been given.         Followup: Return in about 4 months (around 7/19/2021), or if symptoms worsen or fail to improve.

## 2021-03-28 ENCOUNTER — OFFICE VISIT (OUTPATIENT)
Dept: URGENT CARE | Facility: PHYSICIAN GROUP | Age: 81
End: 2021-03-28
Payer: MEDICARE

## 2021-03-28 ENCOUNTER — HOSPITAL ENCOUNTER (OUTPATIENT)
Dept: RADIOLOGY | Facility: MEDICAL CENTER | Age: 81
End: 2021-03-28
Attending: NURSE PRACTITIONER
Payer: MEDICARE

## 2021-03-28 VITALS
TEMPERATURE: 97.3 F | HEIGHT: 62 IN | WEIGHT: 140 LBS | HEART RATE: 93 BPM | SYSTOLIC BLOOD PRESSURE: 138 MMHG | OXYGEN SATURATION: 93 % | DIASTOLIC BLOOD PRESSURE: 78 MMHG | BODY MASS INDEX: 25.76 KG/M2 | RESPIRATION RATE: 12 BRPM

## 2021-03-28 DIAGNOSIS — M79.672 LEFT FOOT PAIN: ICD-10-CM

## 2021-03-28 PROCEDURE — 73630 X-RAY EXAM OF FOOT: CPT | Mod: LT

## 2021-03-28 PROCEDURE — 99214 OFFICE O/P EST MOD 30 MIN: CPT | Performed by: NURSE PRACTITIONER

## 2021-03-28 ASSESSMENT — ENCOUNTER SYMPTOMS
TINGLING: 0
FALLS: 0
CHILLS: 0
NAUSEA: 0
SENSORY CHANGE: 0
BACK PAIN: 0

## 2021-03-28 ASSESSMENT — FIBROSIS 4 INDEX: FIB4 SCORE: 1.9

## 2021-03-28 ASSESSMENT — LIFESTYLE VARIABLES: SUBSTANCE_ABUSE: 0

## 2021-03-28 NOTE — PROGRESS NOTES
Ivory Rowan is a 80 y.o. female who presents for Foot Pain (L foot pain,[id number] )      HPI This is a new problem.  Mikayla is a 81 y/o female with c/o left foot pain with mild redness. She was walking 9 days ago and her shoes were uncomfortable and too tight. Then she noticed that her foot was sore afterward. She has been trying to use ice packs on it but it still hurts. She cannot take iuprofen but does take tylenol sometimes. She is able to walk without difficutly. She is here today with her daughter who is partial historian with the patient.   No other aggravating or alleviating factors.   Denies other unusual activities. Denies trauma or history of gout.     Review of Systems   Constitutional: Negative for chills.   Gastrointestinal: Negative for nausea.   Musculoskeletal: Positive for joint pain. Negative for back pain and falls.   Neurological: Negative for tingling and sensory change.   Psychiatric/Behavioral: Negative for substance abuse.       Allergies   Allergen Reactions   • Atorvastatin      myalgias   • Simvastatin      myalgias   • Zetia [Ezetimibe] Myalgia     Muscle pain     Past Medical History:   Diagnosis Date   • Atrial fibrillation ([facility])    • Basal cell carcinoma of skin of nose    • CATARACT     Dr. Aaron, Dr. Orellana   • CHF (congestive heart failure) ([facility])    • Colon polyp     tubular adenomas   • Dental disorder     upper partial   • Dyslipidemia    • Glaucoma, right eye     Dr. Orellana   • Heart burn    • Hemorrhagic disorder ([facility])     eliquis   • Hypertension     pt states well controlled on meds   • IBS (irritable bowel syndrome)    • Indigestion    • MEDICAL HOME 10/23/12   • Mitral valve regurgitation    • Osteopenia 6/09   • Perennial allergic rhinitis with seasonal variation    • Persistent atrial fibrillation ([facility])    • Psychiatric problem     anxiety   • Type 2 diabetes mellitus, controlled ([facility])     diet controlled   • Valvular heart disease     mitral insufficiency   •  "Vertigo      Past Surgical History:   Procedure Laterality Date   • RECOVERY  2016    Procedure: CATH LAB-ZAFAR GUIDED CV-TO-LARGE GROUP;  Surgeon: Recovery[facility] Surgery;  Location: SURGERY PRE-POST PROC UNIT [facility];  Service:    • COLONOSCOPY  2009    Dr. Lopez, 9 polyps   • APPENDECTOMY     • BREAST BIOPSY      left, reports wire report broke off during procedure   • CHOLECYSTECTOMY     • US-NEEDLE CORE BX-BREAST PANEL       Family History   Problem Relation Age of Onset   • Diabetes Mother    • Hypertension Mother    • Stroke Mother    • Cancer Father         lung/larynx   • Cancer Sister         \"female\"   • Genetic Disorder Sister         osteoporosis   • Cancer Paternal Aunt         breast   • Cancer Maternal Aunt    • Heart Disease Brother         CABG x 3     Social History     Tobacco Use   • Smoking status: Former Smoker     Packs/day: 0.50     Years: 40.00     Pack years: 20.00     Types: Cigarettes     Quit date: 3/1/1996     Years since quittin.0   • Smokeless tobacco: Never Used   Substance Use Topics   • Alcohol use: Not Currently     Alcohol/week: 0.0 oz     Comment: now rare     Patient Active Problem List   Diagnosis   • Sinusitis, chronic   • Bilateral cataracts   • Dyslipidemia   • Osteopenia   • Essential hypertension   • GERD (gastroesophageal reflux disease)   • Vitamin D deficiency   • Vertigo   • IBS (irritable bowel syndrome)   • Basal cell carcinoma of skin of nose   • Perennial allergic rhinitis with seasonal variation   • History of adenomatous polyp of colon   • Diabetes mellitus type II, non insulin dependent (HCC)   • Mitral valve regurgitation   • Financial difficulties   • Chronic anxiety   • Type 2 diabetes mellitus, controlled (HCC)   • Stage 3 chronic kidney disease (HCC)   • Chronic diastolic heart failure (HCC)   • Glaucoma, right eye   • Primary open angle glaucoma (POAG) of both eyes   • Persistent atrial fibrillation (HCC)   • Hyperglycemia   • Chronic " "anticoagulation     Current Outpatient Medications on File Prior to Visit   Medication Sig Dispense Refill   • diazepam (VALIUM) 10 MG tablet Take 1 tablet by mouth every 12 hours as needed for Anxiety for up to 90 days. 90 tablet 0   • potassium chloride SA (KDUR) 20 MEQ Tab CR TAKE 1 TABLET BY MOUTH EVERY DAY 90 Tab 1   • furosemide (LASIX) 20 MG Tab Take 1 Tab by mouth every day. 100 Tab 3   • metoprolol SR (TOPROL XL) 100 MG TABLET SR 24 HR Take 1 Tab by mouth 2 Times a Day. 200 Tab 3   • digoxin (LANOXIN) 125 MCG Tab Take 1 Tab by mouth every day. 90 Tab 3   • lisinopril (PRINIVIL) 5 MG Tab Take 1 Tab by mouth every day. 30 Tab 11   • apixaban (ELIQUIS) 2.5mg Tab Take 1 Tab by mouth 2 Times a Day. 60 Tab 11   • clopidogrel (PLAVIX) 75 MG Tab Take 1 Tab by mouth every day. 90 Tab 3   • travoprost (TRAVATAN Z) 0.004 % Solution Place 1 Drop in both eyes every evening.     • RESTASIS 0.05 % ophthalmic emulsion Place 1 Drop in both eyes 2 times a day.     • erythromycin 5 MG/GM Ointment      • TRAVATAN Z 0.004 % Solution INSTILL 1 DROP IN BOTH EYES EVERY NIGHT 1 HOUR BEFORE BEDTIME  5     No current facility-administered medications on file prior to visit.          Objective:     /78   Pulse 93   Temp 36.3 °C (97.3 °F) (Temporal)   Resp 12   Ht 1.575 m (5' 2\")   Wt 63.5 kg (140 lb)   LMP 05/01/1991   SpO2 93%   BMI 25.61 kg/m²     Physical Exam  Vitals reviewed.   Constitutional:       Appearance: Normal appearance. She is normal weight.   HENT:      Nose: No congestion.      Mouth/Throat:      Mouth: Mucous membranes are moist.   Cardiovascular:      Rate and Rhythm: Normal rate and regular rhythm.      Pulses: Normal pulses.      Heart sounds: Normal heart sounds.   Musculoskeletal:      Cervical back: Normal range of motion.   Skin:     General: Skin is warm.      Capillary Refill: Capillary refill takes less than 2 seconds.      Findings: Rash (mild on top of left prox MCP joint. ) present. "   Neurological:      Mental Status: She is alert and oriented to person, place, and time.      Sensory: Sensation is intact.      Motor: Motor function is intact.      Coordination: Coordination is intact.      Gait: Gait is intact.   Psychiatric:         Mood and Affect: Mood normal.         Behavior: Behavior normal.         Thought Content: Thought content normal.       3/28/2021 2:25 PM     HISTORY/REASON FOR EXAM:  Right foot pain     TECHNIQUE/EXAM DESCRIPTION AND NUMBER OF VIEWS:  3 views of the LEFT foot.     COMPARISON:  None.     FINDINGS:     BONE MINERALIZATION: Decreased.  JOINTS: Mild multifocal osteoarthrosis. No erosions.  FRACTURE: None.  DISLOCATION: None.  SOFT TISSUES: No mass.  Tiny plantar calcaneal spur.     IMPRESSION:     Mild multifocal osteoarthrosis. No acute osseous abnormality.             Last Resulted: 03/28/21  2:28 PM        Xray: Reviewed and interpreted independently by me. I agree with the radiologist's findings.       Assessment /Associated Orders:      1. Left foot pain  DX-FOOT-COMPLETE 3+ LEFT       Medical Decision Making:    Pt is clinically stable at today's acute urgent care visit.  No acute distress noted. Appropriate for outpatient management at this time.     Epsom salt soaks BID in warm ( not hot) water x 15 min for 2-3 days then prn     Wear well fitted shoes ( she uses a shoe horn here today to get her shoes on her feet.     Ice/   Elevation   Rest  OTC acetaminophen for breakthrough pain. Dosage and directions per . Do not exceed 3000 mg in 24 hours.         Advised the patient to follow-up with the primary care provider for recheck, reevaluation, and consideration of further management if necessary.   Discussed management options (risks,benefits, and alternatives to treatment). Pt expresses understanding and the treatment plan was agreed upon. Questions were encouraged and answered to pt's satisfaction.       Return to urgent care prn if new or  worsening sx or if there is no improvement in condition prn . Red flags discussed and indications to immediately call 911 or present to the Emergency Department.     I personally reviewed prior external notes and test results pertinent to today's visit.  I have independently reviewed and interpreted all diagnostics ordered during this urgent care acute visit.   Time spent evaluating this patient was at least 30 minutes and includes preparing for visit, counseling/education, exam and evaluation, obtaining history, independent interpretation, ordering lab/test/procedures and medication management.

## 2021-04-12 ENCOUNTER — PATIENT OUTREACH (OUTPATIENT)
Dept: HEALTH INFORMATION MANAGEMENT | Facility: OTHER | Age: 81
End: 2021-04-12

## 2021-04-12 NOTE — PROGRESS NOTES
Outcome: is not interstead on the  Comprehensive Geriatric Assessment    Please transfer to Patient Outreach Team at [phone] when patient returns call.    Attempt #1

## 2021-05-19 ENCOUNTER — TELEPHONE (OUTPATIENT)
Dept: CARDIOLOGY | Facility: MEDICAL CENTER | Age: 81
End: 2021-05-19

## 2021-05-19 DIAGNOSIS — E78.5 DYSLIPIDEMIA: ICD-10-CM

## 2021-05-19 DIAGNOSIS — I10 ESSENTIAL HYPERTENSION: ICD-10-CM

## 2021-05-19 NOTE — TELEPHONE ENCOUNTER
TT    Pt called asking if TT wants her to have lab work done. Pt states she gets her labs done at [facility]. Pt states she was supposed to see TT in May but believes it was just to check lab work. Pt has an appt with TT scheduled for 7/27. Please call Pt back at [phone].    Thank you

## 2021-05-25 ENCOUNTER — TELEPHONE (OUTPATIENT)
Dept: MEDICAL GROUP | Facility: MEDICAL CENTER | Age: 81
End: 2021-05-25

## 2021-06-10 ENCOUNTER — HOSPITAL ENCOUNTER (OUTPATIENT)
Dept: LAB | Facility: MEDICAL CENTER | Age: 81
End: 2021-06-10
Attending: FAMILY MEDICINE
Payer: MEDICARE

## 2021-06-10 ENCOUNTER — HOSPITAL ENCOUNTER (OUTPATIENT)
Dept: LAB | Facility: MEDICAL CENTER | Age: 81
End: 2021-06-10
Attending: INTERNAL MEDICINE
Payer: MEDICARE

## 2021-06-10 DIAGNOSIS — I10 ESSENTIAL HYPERTENSION: ICD-10-CM

## 2021-06-10 DIAGNOSIS — K21.9 GASTROESOPHAGEAL REFLUX DISEASE WITHOUT ESOPHAGITIS: ICD-10-CM

## 2021-06-10 DIAGNOSIS — E78.5 DYSLIPIDEMIA: ICD-10-CM

## 2021-06-10 DIAGNOSIS — E11.21 CONTROLLED TYPE 2 DIABETES MELLITUS WITH DIABETIC NEPHROPATHY, WITHOUT LONG-TERM CURRENT USE OF INSULIN (HCC): ICD-10-CM

## 2021-06-10 DIAGNOSIS — N18.31 STAGE 3A CHRONIC KIDNEY DISEASE: ICD-10-CM

## 2021-06-10 DIAGNOSIS — I48.91 ATRIAL FIBRILLATION WITH RVR (HCC): ICD-10-CM

## 2021-06-10 LAB
ALBUMIN SERPL BCP-MCNC: 4.1 G/DL (ref 3.2–4.9)
ALBUMIN/GLOB SERPL: 1.4 G/DL
ALP SERPL-CCNC: 83 U/L (ref 30–99)
ALT SERPL-CCNC: 23 U/L (ref 2–50)
ANION GAP SERPL CALC-SCNC: 12 MMOL/L (ref 7–16)
AST SERPL-CCNC: 27 U/L (ref 12–45)
BILIRUB SERPL-MCNC: 0.6 MG/DL (ref 0.1–1.5)
BUN SERPL-MCNC: 29 MG/DL (ref 8–22)
CALCIUM SERPL-MCNC: 9.1 MG/DL (ref 8.5–10.5)
CHLORIDE SERPL-SCNC: 104 MMOL/L (ref 96–112)
CHOLEST SERPL-MCNC: 144 MG/DL (ref 100–199)
CO2 SERPL-SCNC: 23 MMOL/L (ref 20–33)
CREAT SERPL-MCNC: 1.15 MG/DL (ref 0.5–1.4)
CREAT UR-MCNC: 92.25 MG/DL
ERYTHROCYTE [DISTWIDTH] IN BLOOD BY AUTOMATED COUNT: 49.7 FL (ref 35.9–50)
EST. AVERAGE GLUCOSE BLD GHB EST-MCNC: 146 MG/DL
FASTING STATUS PATIENT QL REPORTED: NORMAL
GLOBULIN SER CALC-MCNC: 3 G/DL (ref 1.9–3.5)
GLUCOSE SERPL-MCNC: 121 MG/DL (ref 65–99)
HBA1C MFR BLD: 6.7 % (ref 4–5.6)
HCT VFR BLD AUTO: 37.2 % (ref 37–47)
HDLC SERPL-MCNC: 50 MG/DL
HGB BLD-MCNC: 11.3 G/DL (ref 12–16)
LDLC SERPL CALC-MCNC: 77 MG/DL
MCH RBC QN AUTO: 25.1 PG (ref 27–33)
MCHC RBC AUTO-ENTMCNC: 30.4 G/DL (ref 33.6–35)
MCV RBC AUTO: 82.5 FL (ref 81.4–97.8)
MICROALBUMIN UR-MCNC: 2.5 MG/DL
MICROALBUMIN/CREAT UR: 27 MG/G (ref 0–30)
PLATELET # BLD AUTO: 178 K/UL (ref 164–446)
PMV BLD AUTO: 10.1 FL (ref 9–12.9)
POTASSIUM SERPL-SCNC: 4.7 MMOL/L (ref 3.6–5.5)
PROT SERPL-MCNC: 7.1 G/DL (ref 6–8.2)
RBC # BLD AUTO: 4.51 M/UL (ref 4.2–5.4)
SODIUM SERPL-SCNC: 139 MMOL/L (ref 135–145)
TRIGL SERPL-MCNC: 86 MG/DL (ref 0–149)
TSH SERPL DL<=0.005 MIU/L-ACNC: 2.9 UIU/ML (ref 0.38–5.33)
WBC # BLD AUTO: 5.5 K/UL (ref 4.8–10.8)

## 2021-06-10 PROCEDURE — 36415 COLL VENOUS BLD VENIPUNCTURE: CPT

## 2021-06-10 PROCEDURE — 80053 COMPREHEN METABOLIC PANEL: CPT

## 2021-06-10 PROCEDURE — 82043 UR ALBUMIN QUANTITATIVE: CPT

## 2021-06-10 PROCEDURE — 85027 COMPLETE CBC AUTOMATED: CPT

## 2021-06-10 PROCEDURE — 82570 ASSAY OF URINE CREATININE: CPT

## 2021-06-10 PROCEDURE — 80061 LIPID PANEL: CPT

## 2021-06-10 PROCEDURE — 84443 ASSAY THYROID STIM HORMONE: CPT

## 2021-06-10 PROCEDURE — 83036 HEMOGLOBIN GLYCOSYLATED A1C: CPT

## 2021-06-18 ENCOUNTER — DOCUMENTATION (OUTPATIENT)
Dept: MEDICAL GROUP | Facility: MEDICAL CENTER | Age: 81
End: 2021-06-18

## 2021-06-28 ENCOUNTER — TELEPHONE (OUTPATIENT)
Dept: MEDICAL GROUP | Facility: MEDICAL CENTER | Age: 81
End: 2021-06-28

## 2021-06-28 ENCOUNTER — DOCUMENTATION (OUTPATIENT)
Dept: MEDICAL GROUP | Facility: MEDICAL CENTER | Age: 81
End: 2021-06-28

## 2021-06-28 DIAGNOSIS — K21.9 GASTROESOPHAGEAL REFLUX DISEASE WITHOUT ESOPHAGITIS: ICD-10-CM

## 2021-06-28 RX ORDER — LATANOPROST 50 UG/ML
SOLUTION/ DROPS OPHTHALMIC
COMMUNITY
Start: 2021-05-19 | End: 2021-08-12

## 2021-06-28 RX ORDER — PANTOPRAZOLE SODIUM 40 MG/1
40 TABLET, DELAYED RELEASE ORAL DAILY
Qty: 90 TABLET | Refills: 3 | Status: SHIPPED | OUTPATIENT
Start: 2021-06-28 | End: 2022-06-23

## 2021-06-28 RX ORDER — EZETIMIBE 10 MG/1
10 TABLET ORAL
COMMUNITY
Start: 2021-04-25 | End: 2021-07-27 | Stop reason: SDUPTHER

## 2021-06-28 NOTE — TELEPHONE ENCOUNTER
It appears the omeprazole was discontinued when she made the change to pantoprazole.  Which one would she prefer to take?

## 2021-06-29 NOTE — TELEPHONE ENCOUNTER
I have rewritten the pantoprazole for her GERD.  She is completely correct, she cannot take the omeprazole with clopidogrel.  Pantoprazole should be fine.  I have sent that to [facility].

## 2021-07-27 ENCOUNTER — OFFICE VISIT (OUTPATIENT)
Dept: CARDIOLOGY | Facility: MEDICAL CENTER | Age: 81
End: 2021-07-27
Payer: MEDICARE

## 2021-07-27 VITALS
SYSTOLIC BLOOD PRESSURE: 110 MMHG | OXYGEN SATURATION: 93 % | HEIGHT: 62 IN | HEART RATE: 66 BPM | BODY MASS INDEX: 24.29 KG/M2 | WEIGHT: 132 LBS | DIASTOLIC BLOOD PRESSURE: 70 MMHG

## 2021-07-27 DIAGNOSIS — Z79.899 HIGH RISK MEDICATION USE: ICD-10-CM

## 2021-07-27 DIAGNOSIS — E78.2 MIXED HYPERLIPIDEMIA: ICD-10-CM

## 2021-07-27 DIAGNOSIS — I10 HTN (HYPERTENSION), MALIGNANT: ICD-10-CM

## 2021-07-27 DIAGNOSIS — Z95.5 STENTED CORONARY ARTERY: ICD-10-CM

## 2021-07-27 DIAGNOSIS — I48.19 PERSISTENT ATRIAL FIBRILLATION (HCC): ICD-10-CM

## 2021-07-27 LAB — EKG IMPRESSION: NORMAL

## 2021-07-27 PROCEDURE — 99214 OFFICE O/P EST MOD 30 MIN: CPT | Performed by: INTERNAL MEDICINE

## 2021-07-27 PROCEDURE — 93000 ELECTROCARDIOGRAM COMPLETE: CPT | Performed by: INTERNAL MEDICINE

## 2021-07-27 RX ORDER — LISINOPRIL 5 MG/1
5 TABLET ORAL DAILY
Qty: 90 TABLET | Refills: 4 | Status: ON HOLD | OUTPATIENT
Start: 2021-07-27 | End: 2021-08-15

## 2021-07-27 RX ORDER — CLOPIDOGREL BISULFATE 75 MG/1
75 TABLET ORAL DAILY
Qty: 90 TABLET | Refills: 4 | Status: SHIPPED | OUTPATIENT
Start: 2021-07-27 | End: 2021-12-14 | Stop reason: SDUPTHER

## 2021-07-27 RX ORDER — EZETIMIBE 10 MG/1
10 TABLET ORAL
Qty: 90 TABLET | Refills: 4 | Status: ON HOLD | OUTPATIENT
Start: 2021-07-27 | End: 2021-08-15

## 2021-07-27 RX ORDER — METOPROLOL SUCCINATE 100 MG/1
100 TABLET, EXTENDED RELEASE ORAL 2 TIMES DAILY
Qty: 200 TABLET | Refills: 3 | Status: SHIPPED | OUTPATIENT
Start: 2021-07-27 | End: 2021-12-14 | Stop reason: SDUPTHER

## 2021-07-27 ASSESSMENT — ENCOUNTER SYMPTOMS
LOSS OF CONSCIOUSNESS: 0
DOUBLE VISION: 0
DEPRESSION: 0
FALLS: 0
VOMITING: 0
SHORTNESS OF BREATH: 0
HALLUCINATIONS: 0
BRUISES/BLEEDS EASILY: 0
MYALGIAS: 0
EYE DISCHARGE: 0
EYE PAIN: 0
SENSORY CHANGE: 0
FEVER: 0
HEADACHES: 0
WEIGHT LOSS: 0
DIZZINESS: 0
CHILLS: 0
BLOOD IN STOOL: 0
BLURRED VISION: 0
COUGH: 0
NAUSEA: 0
PND: 0
SPEECH CHANGE: 0
PALPITATIONS: 0
ABDOMINAL PAIN: 0
CLAUDICATION: 0
ORTHOPNEA: 0

## 2021-07-27 ASSESSMENT — MINNESOTA LIVING WITH HEART FAILURE QUESTIONNAIRE (MLHF)
DIFFICULTY TO CONCENTRATE OR REMEMBERING THINGS: 3
SWELLING IN ANKLES OR LEGS: 1
COSTING YOU MONEY FOR MEDICAL CARE: 0
WALKING ABOUT OR CLIMBING STAIRS DIFFICULT: 0
LOSS OF SELF CONTROL IN YOUR LIFE: 5
MAKING YOU SHORT OF BREATH: 1
TIRED, FATIGUED OR LOW ON ENERGY: 5
DIFFICULTY GOING AWAY FROM HOME: 1
DIFFICULTY WITH RECREATIONAL PASTIMES, SPORTS, HOBBIES: 0
DIFFICULTY SLEEPING WELL AT NIGHT: 5
HAVING TO SIT OR LIE DOWN DURING THE DAY: 3
MAKING YOU STAY IN A HOSPITAL: 0
FEELING LIKE A BURDEN TO FAMILY AND FRIENDS: 5
EATING LESS FOODS YOU LIKE: 0
TOTAL_SCORE: 34
MAKING YOU FEEL DEPRESSED: 5
MAKING YOU WORRY: 0
DIFFICULTY WITH SEXUAL ACTIVITIES: 0
GIVING YOU SIDE EFFECTS FROM TREATMENTS: 0
DIFFICULTY SOCIALIZING WITH FAMILY OR FRIENDS: 0
DIFFICULTY WORKING TO EARN A LIVING: 0
WORKING AROUND THE HOUSE OR YARD DIFFICULT: 0

## 2021-07-27 ASSESSMENT — FIBROSIS 4 INDEX: FIB4 SCORE: 2.53

## 2021-08-12 ENCOUNTER — APPOINTMENT (OUTPATIENT)
Dept: RADIOLOGY | Facility: MEDICAL CENTER | Age: 81
DRG: 308 | End: 2021-08-12
Attending: EMERGENCY MEDICINE
Payer: MEDICARE

## 2021-08-12 ENCOUNTER — HOSPITAL ENCOUNTER (INPATIENT)
Facility: MEDICAL CENTER | Age: 81
LOS: 3 days | DRG: 308 | End: 2021-08-15
Attending: EMERGENCY MEDICINE | Admitting: HOSPITALIST
Payer: MEDICARE

## 2021-08-12 ENCOUNTER — APPOINTMENT (OUTPATIENT)
Dept: RADIOLOGY | Facility: MEDICAL CENTER | Age: 81
DRG: 308 | End: 2021-08-12
Attending: HOSPITALIST
Payer: MEDICARE

## 2021-08-12 DIAGNOSIS — N17.9 ACUTE RENAL FAILURE, UNSPECIFIED ACUTE RENAL FAILURE TYPE (HCC): ICD-10-CM

## 2021-08-12 DIAGNOSIS — I48.19 PERSISTENT ATRIAL FIBRILLATION WITH RVR (HCC): ICD-10-CM

## 2021-08-12 DIAGNOSIS — E87.70 HYPERVOLEMIA, UNSPECIFIED HYPERVOLEMIA TYPE: ICD-10-CM

## 2021-08-12 PROBLEM — R10.9 ABDOMINAL PAIN: Status: ACTIVE | Noted: 2021-08-12

## 2021-08-12 PROBLEM — I50.20 HFREF (HEART FAILURE WITH REDUCED EJECTION FRACTION) (HCC): Status: ACTIVE | Noted: 2021-08-12

## 2021-08-12 LAB
ABO + RH BLD: NORMAL
ABO GROUP BLD: NORMAL
ALBUMIN SERPL BCP-MCNC: 3.7 G/DL (ref 3.2–4.9)
ALBUMIN/GLOB SERPL: 1.3 G/DL
ALP SERPL-CCNC: 88 U/L (ref 30–99)
ALT SERPL-CCNC: 31 U/L (ref 2–50)
ANION GAP SERPL CALC-SCNC: 11 MMOL/L (ref 7–16)
APTT PPP: 32.1 SEC (ref 24.7–36)
AST SERPL-CCNC: 23 U/L (ref 12–45)
BASOPHILS # BLD AUTO: 0.4 % (ref 0–1.8)
BASOPHILS # BLD: 0.03 K/UL (ref 0–0.12)
BILIRUB SERPL-MCNC: 1.2 MG/DL (ref 0.1–1.5)
BLD GP AB SCN SERPL QL: NORMAL
BUN SERPL-MCNC: 26 MG/DL (ref 8–22)
CALCIUM SERPL-MCNC: 9.2 MG/DL (ref 8.5–10.5)
CHLORIDE SERPL-SCNC: 105 MMOL/L (ref 96–112)
CO2 SERPL-SCNC: 21 MMOL/L (ref 20–33)
CREAT SERPL-MCNC: 1.02 MG/DL (ref 0.5–1.4)
DIGOXIN SERPL-MCNC: <0.3 NG/ML (ref 0.8–2)
EKG IMPRESSION: NORMAL
EOSINOPHIL # BLD AUTO: 0.04 K/UL (ref 0–0.51)
EOSINOPHIL NFR BLD: 0.5 % (ref 0–6.9)
ERYTHROCYTE [DISTWIDTH] IN BLOOD BY AUTOMATED COUNT: 51.3 FL (ref 35.9–50)
FLUAV RNA SPEC QL NAA+PROBE: NEGATIVE
FLUBV RNA SPEC QL NAA+PROBE: NEGATIVE
GLOBULIN SER CALC-MCNC: 2.8 G/DL (ref 1.9–3.5)
GLUCOSE SERPL-MCNC: 161 MG/DL (ref 65–99)
HCT VFR BLD AUTO: 33.3 % (ref 37–47)
HGB BLD-MCNC: 10.5 G/DL (ref 12–16)
IMM GRANULOCYTES # BLD AUTO: 0.03 K/UL (ref 0–0.11)
IMM GRANULOCYTES NFR BLD AUTO: 0.4 % (ref 0–0.9)
INR PPP: 1.31 (ref 0.87–1.13)
LIPASE SERPL-CCNC: 32 U/L (ref 11–82)
LYMPHOCYTES # BLD AUTO: 0.53 K/UL (ref 1–4.8)
LYMPHOCYTES NFR BLD: 7 % (ref 22–41)
MCH RBC QN AUTO: 26.3 PG (ref 27–33)
MCHC RBC AUTO-ENTMCNC: 31.5 G/DL (ref 33.6–35)
MCV RBC AUTO: 83.3 FL (ref 81.4–97.8)
MONOCYTES # BLD AUTO: 0.83 K/UL (ref 0–0.85)
MONOCYTES NFR BLD AUTO: 10.9 % (ref 0–13.4)
NEUTROPHILS # BLD AUTO: 6.15 K/UL (ref 2–7.15)
NEUTROPHILS NFR BLD: 80.8 % (ref 44–72)
NRBC # BLD AUTO: 0 K/UL
NRBC BLD-RTO: 0 /100 WBC
NT-PROBNP SERPL IA-MCNC: 5543 PG/ML (ref 0–125)
PLATELET # BLD AUTO: 170 K/UL (ref 164–446)
PMV BLD AUTO: 9.6 FL (ref 9–12.9)
POTASSIUM SERPL-SCNC: 4.4 MMOL/L (ref 3.6–5.5)
PROT SERPL-MCNC: 6.5 G/DL (ref 6–8.2)
PROTHROMBIN TIME: 15.9 SEC (ref 12–14.6)
RBC # BLD AUTO: 4 M/UL (ref 4.2–5.4)
RH BLD: NORMAL
RSV RNA SPEC QL NAA+PROBE: NEGATIVE
SARS-COV-2 RNA RESP QL NAA+PROBE: NOTDETECTED
SODIUM SERPL-SCNC: 137 MMOL/L (ref 135–145)
SPECIMEN SOURCE: NORMAL
TROPONIN T SERPL-MCNC: 30 NG/L (ref 6–19)
WBC # BLD AUTO: 7.6 K/UL (ref 4.8–10.8)

## 2021-08-12 PROCEDURE — 0241U HCHG SARS-COV-2 COVID-19 NFCT DS RESP RNA 4 TRGT MIC: CPT

## 2021-08-12 PROCEDURE — 700101 HCHG RX REV CODE 250: Performed by: EMERGENCY MEDICINE

## 2021-08-12 PROCEDURE — 93005 ELECTROCARDIOGRAM TRACING: CPT | Performed by: EMERGENCY MEDICINE

## 2021-08-12 PROCEDURE — 86900 BLOOD TYPING SEROLOGIC ABO: CPT

## 2021-08-12 PROCEDURE — 99223 1ST HOSP IP/OBS HIGH 75: CPT | Performed by: STUDENT IN AN ORGANIZED HEALTH CARE EDUCATION/TRAINING PROGRAM

## 2021-08-12 PROCEDURE — 71045 X-RAY EXAM CHEST 1 VIEW: CPT

## 2021-08-12 PROCEDURE — 700102 HCHG RX REV CODE 250 W/ 637 OVERRIDE(OP): Performed by: HOSPITALIST

## 2021-08-12 PROCEDURE — C9803 HOPD COVID-19 SPEC COLLECT: HCPCS | Performed by: EMERGENCY MEDICINE

## 2021-08-12 PROCEDURE — 700111 HCHG RX REV CODE 636 W/ 250 OVERRIDE (IP): Performed by: EMERGENCY MEDICINE

## 2021-08-12 PROCEDURE — A9270 NON-COVERED ITEM OR SERVICE: HCPCS | Performed by: HOSPITALIST

## 2021-08-12 PROCEDURE — 96375 TX/PRO/DX INJ NEW DRUG ADDON: CPT

## 2021-08-12 PROCEDURE — 99223 1ST HOSP IP/OBS HIGH 75: CPT | Mod: AI | Performed by: HOSPITALIST

## 2021-08-12 PROCEDURE — 36415 COLL VENOUS BLD VENIPUNCTURE: CPT

## 2021-08-12 PROCEDURE — A9270 NON-COVERED ITEM OR SERVICE: HCPCS | Performed by: STUDENT IN AN ORGANIZED HEALTH CARE EDUCATION/TRAINING PROGRAM

## 2021-08-12 PROCEDURE — 83690 ASSAY OF LIPASE: CPT

## 2021-08-12 PROCEDURE — 85610 PROTHROMBIN TIME: CPT

## 2021-08-12 PROCEDURE — 700102 HCHG RX REV CODE 250 W/ 637 OVERRIDE(OP): Performed by: STUDENT IN AN ORGANIZED HEALTH CARE EDUCATION/TRAINING PROGRAM

## 2021-08-12 PROCEDURE — 84484 ASSAY OF TROPONIN QUANT: CPT

## 2021-08-12 PROCEDURE — 700117 HCHG RX CONTRAST REV CODE 255: Performed by: HOSPITALIST

## 2021-08-12 PROCEDURE — 86901 BLOOD TYPING SEROLOGIC RH(D): CPT

## 2021-08-12 PROCEDURE — 85730 THROMBOPLASTIN TIME PARTIAL: CPT

## 2021-08-12 PROCEDURE — 93005 ELECTROCARDIOGRAM TRACING: CPT

## 2021-08-12 PROCEDURE — 96376 TX/PRO/DX INJ SAME DRUG ADON: CPT

## 2021-08-12 PROCEDURE — 80162 ASSAY OF DIGOXIN TOTAL: CPT

## 2021-08-12 PROCEDURE — 80053 COMPREHEN METABOLIC PANEL: CPT

## 2021-08-12 PROCEDURE — 74177 CT ABD & PELVIS W/CONTRAST: CPT | Mod: MC

## 2021-08-12 PROCEDURE — 96374 THER/PROPH/DIAG INJ IV PUSH: CPT

## 2021-08-12 PROCEDURE — 770020 HCHG ROOM/CARE - TELE (206)

## 2021-08-12 PROCEDURE — 700101 HCHG RX REV CODE 250: Performed by: HOSPITALIST

## 2021-08-12 PROCEDURE — 86850 RBC ANTIBODY SCREEN: CPT

## 2021-08-12 PROCEDURE — 700111 HCHG RX REV CODE 636 W/ 250 OVERRIDE (IP): Performed by: STUDENT IN AN ORGANIZED HEALTH CARE EDUCATION/TRAINING PROGRAM

## 2021-08-12 PROCEDURE — 83880 ASSAY OF NATRIURETIC PEPTIDE: CPT

## 2021-08-12 PROCEDURE — 85025 COMPLETE CBC W/AUTO DIFF WBC: CPT

## 2021-08-12 PROCEDURE — 99285 EMERGENCY DEPT VISIT HI MDM: CPT

## 2021-08-12 RX ORDER — METOPROLOL TARTRATE 1 MG/ML
5 INJECTION, SOLUTION INTRAVENOUS ONCE
Status: COMPLETED | OUTPATIENT
Start: 2021-08-12 | End: 2021-08-12

## 2021-08-12 RX ORDER — CLOPIDOGREL BISULFATE 75 MG/1
75 TABLET ORAL DAILY
Status: DISCONTINUED | OUTPATIENT
Start: 2021-08-12 | End: 2021-08-15 | Stop reason: HOSPADM

## 2021-08-12 RX ORDER — ACETAMINOPHEN 325 MG/1
650 TABLET ORAL EVERY 6 HOURS PRN
Status: DISCONTINUED | OUTPATIENT
Start: 2021-08-12 | End: 2021-08-15 | Stop reason: HOSPADM

## 2021-08-12 RX ORDER — MORPHINE SULFATE 4 MG/ML
2 INJECTION, SOLUTION INTRAMUSCULAR; INTRAVENOUS
Status: DISCONTINUED | OUTPATIENT
Start: 2021-08-12 | End: 2021-08-15 | Stop reason: HOSPADM

## 2021-08-12 RX ORDER — ONDANSETRON 2 MG/ML
4 INJECTION INTRAMUSCULAR; INTRAVENOUS EVERY 4 HOURS PRN
Status: DISCONTINUED | OUTPATIENT
Start: 2021-08-12 | End: 2021-08-15 | Stop reason: HOSPADM

## 2021-08-12 RX ORDER — EZETIMIBE 10 MG/1
10 TABLET ORAL
Status: DISCONTINUED | OUTPATIENT
Start: 2021-08-12 | End: 2021-08-13

## 2021-08-12 RX ORDER — DIGOXIN 125 MCG
125 TABLET ORAL
Status: DISCONTINUED | OUTPATIENT
Start: 2021-08-12 | End: 2021-08-13

## 2021-08-12 RX ORDER — OXYCODONE HYDROCHLORIDE 5 MG/1
5 TABLET ORAL
Status: DISCONTINUED | OUTPATIENT
Start: 2021-08-12 | End: 2021-08-15 | Stop reason: HOSPADM

## 2021-08-12 RX ORDER — OXYCODONE HYDROCHLORIDE 5 MG/1
2.5 TABLET ORAL
Status: DISCONTINUED | OUTPATIENT
Start: 2021-08-12 | End: 2021-08-15 | Stop reason: HOSPADM

## 2021-08-12 RX ORDER — LISINOPRIL 10 MG/1
10 TABLET ORAL
Status: DISCONTINUED | OUTPATIENT
Start: 2021-08-12 | End: 2021-08-15 | Stop reason: HOSPADM

## 2021-08-12 RX ORDER — PANTOPRAZOLE SODIUM 40 MG/1
40 TABLET, DELAYED RELEASE ORAL DAILY
Status: DISCONTINUED | OUTPATIENT
Start: 2021-08-12 | End: 2021-08-12

## 2021-08-12 RX ORDER — METOPROLOL SUCCINATE 50 MG/1
100 TABLET, EXTENDED RELEASE ORAL 2 TIMES DAILY
Status: DISCONTINUED | OUTPATIENT
Start: 2021-08-12 | End: 2021-08-15 | Stop reason: HOSPADM

## 2021-08-12 RX ORDER — POLYETHYLENE GLYCOL 3350 17 G/17G
1 POWDER, FOR SOLUTION ORAL
Status: DISCONTINUED | OUTPATIENT
Start: 2021-08-12 | End: 2021-08-15 | Stop reason: HOSPADM

## 2021-08-12 RX ORDER — METOPROLOL TARTRATE 1 MG/ML
5 INJECTION, SOLUTION INTRAVENOUS
Status: DISCONTINUED | OUTPATIENT
Start: 2021-08-12 | End: 2021-08-15 | Stop reason: HOSPADM

## 2021-08-12 RX ORDER — DIAZEPAM 2 MG/1
2 TABLET ORAL ONCE
COMMUNITY
End: 2021-08-30

## 2021-08-12 RX ORDER — OMEPRAZOLE 20 MG/1
20 CAPSULE, DELAYED RELEASE ORAL DAILY
Status: DISCONTINUED | OUTPATIENT
Start: 2021-08-12 | End: 2021-08-15 | Stop reason: HOSPADM

## 2021-08-12 RX ORDER — FUROSEMIDE 10 MG/ML
40 INJECTION INTRAMUSCULAR; INTRAVENOUS
Status: DISCONTINUED | OUTPATIENT
Start: 2021-08-12 | End: 2021-08-14

## 2021-08-12 RX ORDER — ONDANSETRON 4 MG/1
4 TABLET, ORALLY DISINTEGRATING ORAL EVERY 4 HOURS PRN
Status: DISCONTINUED | OUTPATIENT
Start: 2021-08-12 | End: 2021-08-15 | Stop reason: HOSPADM

## 2021-08-12 RX ORDER — FUROSEMIDE 10 MG/ML
20 INJECTION INTRAMUSCULAR; INTRAVENOUS ONCE
Status: COMPLETED | OUTPATIENT
Start: 2021-08-12 | End: 2021-08-12

## 2021-08-12 RX ORDER — BISACODYL 10 MG
10 SUPPOSITORY, RECTAL RECTAL
Status: DISCONTINUED | OUTPATIENT
Start: 2021-08-12 | End: 2021-08-15 | Stop reason: HOSPADM

## 2021-08-12 RX ORDER — AMOXICILLIN 250 MG
2 CAPSULE ORAL 2 TIMES DAILY
Status: DISCONTINUED | OUTPATIENT
Start: 2021-08-12 | End: 2021-08-12 | Stop reason: ALTCHOICE

## 2021-08-12 RX ADMIN — METOPROLOL TARTRATE 5 MG: 1 INJECTION, SOLUTION INTRAVENOUS at 09:14

## 2021-08-12 RX ADMIN — METOPROLOL TARTRATE 5 MG: 1 INJECTION, SOLUTION INTRAVENOUS at 12:01

## 2021-08-12 RX ADMIN — APIXABAN 2.5 MG: 2.5 TABLET, FILM COATED ORAL at 17:08

## 2021-08-12 RX ADMIN — METOPROLOL TARTRATE 5 MG: 5 INJECTION, SOLUTION INTRAVENOUS at 16:36

## 2021-08-12 RX ADMIN — METOPROLOL TARTRATE 5 MG: 1 INJECTION, SOLUTION INTRAVENOUS at 13:45

## 2021-08-12 RX ADMIN — OMEPRAZOLE 20 MG: 20 CAPSULE, DELAYED RELEASE ORAL at 13:51

## 2021-08-12 RX ADMIN — FUROSEMIDE 40 MG: 10 INJECTION, SOLUTION INTRAMUSCULAR; INTRAVENOUS at 15:57

## 2021-08-12 RX ADMIN — MINERAL OIL, PETROLATUM 1 APPLICATION: 425; 573 OINTMENT OPHTHALMIC at 17:40

## 2021-08-12 RX ADMIN — CLOPIDOGREL BISULFATE 75 MG: 75 TABLET ORAL at 13:46

## 2021-08-12 RX ADMIN — METOPROLOL TARTRATE 5 MG: 5 INJECTION, SOLUTION INTRAVENOUS at 18:39

## 2021-08-12 RX ADMIN — EZETIMIBE 10 MG: 10 TABLET ORAL at 13:46

## 2021-08-12 RX ADMIN — IOHEXOL 100 ML: 350 INJECTION, SOLUTION INTRAVENOUS at 14:58

## 2021-08-12 RX ADMIN — DIGOXIN 125 MCG: 250 TABLET ORAL at 15:28

## 2021-08-12 RX ADMIN — FUROSEMIDE 20 MG: 10 INJECTION, SOLUTION INTRAMUSCULAR; INTRAVENOUS at 09:14

## 2021-08-12 RX ADMIN — METOPROLOL SUCCINATE 100 MG: 50 TABLET, EXTENDED RELEASE ORAL at 13:46

## 2021-08-12 ASSESSMENT — COGNITIVE AND FUNCTIONAL STATUS - GENERAL
TOILETING: A LITTLE
SUGGESTED CMS G CODE MODIFIER DAILY ACTIVITY: CJ
MOBILITY SCORE: 20
MOVING TO AND FROM BED TO CHAIR: A LITTLE
HELP NEEDED FOR BATHING: A LITTLE
STANDING UP FROM CHAIR USING ARMS: A LITTLE
WALKING IN HOSPITAL ROOM: A LITTLE
CLIMB 3 TO 5 STEPS WITH RAILING: A LITTLE
DRESSING REGULAR LOWER BODY CLOTHING: A LITTLE
DAILY ACTIVITIY SCORE: 21
SUGGESTED CMS G CODE MODIFIER MOBILITY: CJ

## 2021-08-12 ASSESSMENT — PAIN DESCRIPTION - PAIN TYPE
TYPE: ACUTE PAIN
TYPE: ACUTE PAIN

## 2021-08-12 ASSESSMENT — LIFESTYLE VARIABLES
ALCOHOL_USE: NO
DOES PATIENT WANT TO STOP DRINKING: NO
ON A TYPICAL DAY WHEN YOU DRINK ALCOHOL HOW MANY DRINKS DO YOU HAVE: 0
TOTAL SCORE: 0
AVERAGE NUMBER OF DAYS PER WEEK YOU HAVE A DRINK CONTAINING ALCOHOL: 0
TOTAL SCORE: 0
HOW MANY TIMES IN THE PAST YEAR HAVE YOU HAD 5 OR MORE DRINKS IN A DAY: 0
EVER HAD A DRINK FIRST THING IN THE MORNING TO STEADY YOUR NERVES TO GET RID OF A HANGOVER: NO
HAVE PEOPLE ANNOYED YOU BY CRITICIZING YOUR DRINKING: NO
HAVE YOU EVER FELT YOU SHOULD CUT DOWN ON YOUR DRINKING: NO
EVER FELT BAD OR GUILTY ABOUT YOUR DRINKING: NO
CONSUMPTION TOTAL: NEGATIVE
TOTAL SCORE: 0

## 2021-08-12 ASSESSMENT — FIBROSIS 4 INDEX
FIB4 SCORE: 1.94
FIB4 SCORE: 2.53

## 2021-08-12 ASSESSMENT — PATIENT HEALTH QUESTIONNAIRE - PHQ9
1. LITTLE INTEREST OR PLEASURE IN DOING THINGS: NOT AT ALL
SUM OF ALL RESPONSES TO PHQ9 QUESTIONS 1 AND 2: 0
2. FEELING DOWN, DEPRESSED, IRRITABLE, OR HOPELESS: NOT AT ALL

## 2021-08-12 NOTE — ED NOTES
Pt refusing to continue oral contrast, states that she cannot tolerate it.  Rate remains elevated in 130's.  hospitalist notified. Additional PIV established, for CT.

## 2021-08-12 NOTE — ED TRIAGE NOTES
Chief Complaint   Patient presents with   • Diarrhea     BIB EMS, reports black diarrhea x 9 since last night, pt taking elequis and plavix for a-fib. reports stopping digoxin 10 days ago.     • Cough   • Abdominal Pain   • Shortness of Breath     Pt pale, poor historian. States hat she started feeling poorly 10 days ago when cardiology stopped her digoxin.  Pt placed on monitor. Protocol initiated, chart up for ERP.

## 2021-08-12 NOTE — CONSULTS
Reason for Consult:  Asked by Dr Leola Merino M.D. to see this patient with AFRVR  Patient's PCP: Checo Tesfaye M.D.    CC:   Chief Complaint   Patient presents with   • Diarrhea     BIB EMS, reports black diarrhea x 9 since last night, pt taking elequis and plavix for a-fib. reports stopping digoxin 10 days ago.     • Cough   • Abdominal Pain   • Shortness of Breath       HPI:  Ivory Rowan is an 81 year old woman with history afib on Eliquis, HFrEF with recovered EF, CAD status post PCI to RCA on 6/18/2020, hypertension, and diabetes who presented with abdominal pain and shortness of breath.    The patient reports that she is having abdominal pain and diarrhea since last night. She also feels short of breath starting last night. She thinks that her abdominal pain is making her heart rate go up, and she reports palpitations. She denies any chest pain. Reports orthopnea. No PND, or leg swelling. No syncope.    Of note, the patient was recently seen in clinic by Dr. Washington on 7/27/2021, and Lasix and digoxin were discontinued during the visit. The patient had history of cardioversion (failed). When I discussed with the patient about repeat cardioversion, the patient declined another procedure, reporting that she prefers not to have a cardioversion due to personal preference and her sister having a poor experience.      Medications / Drug list prior to admission:  No current facility-administered medications on file prior to encounter.     Current Outpatient Medications on File Prior to Encounter   Medication Sig Dispense Refill   • diazePAM (VALIUM) 2 MG Tab Take 2 mg by mouth one time.     • metoprolol SR (TOPROL XL) 100 MG TABLET SR 24 HR Take 1 tablet by mouth 2 times a day. 200 tablet 3   • lisinopril (PRINIVIL) 5 MG Tab Take 1 tablet by mouth every day. 90 tablet 4   • ezetimibe (ZETIA) 10 MG Tab Take 1 tablet by mouth every day. 90 tablet 4   • clopidogrel (PLAVIX) 75 MG Tab Take 1 tablet by mouth every  day. 90 tablet 4   • apixaban (ELIQUIS) 2.5mg Tab Take 1 tablet by mouth 2 times a day. 180 tablet 4   • pantoprazole (PROTONIX) 40 MG Tablet Delayed Response Take 1 tablet by mouth every day. 90 tablet 3       Current list of administered Medications:    Current Facility-Administered Medications:   •  apixaban (ELIQUIS) tablet 2.5 mg, 2.5 mg, Oral, BID, Dell Tidwell M.D.  •  clopidogrel (PLAVIX) tablet 75 mg, 75 mg, Oral, DAILY, Dell Tidwell M.D., 75 mg at 08/12/21 1346  •  ezetimibe (ZETIA) tablet 10 mg, 10 mg, Oral, QDAY, Dell Tidwell M.D., 10 mg at 08/12/21 1346  •  metoprolol SR (TOPROL XL) tablet 100 mg, 100 mg, Oral, BID, Dell Tidwell M.D., 100 mg at 08/12/21 1346  •  [DISCONTINUED] senna-docusate (PERICOLACE or SENOKOT S) 8.6-50 MG per tablet 2 Tablet, 2 Tablet, Oral, BID **AND** polyethylene glycol/lytes (MIRALAX) PACKET 1 Packet, 1 Packet, Oral, QDAY PRN **AND** magnesium hydroxide (MILK OF MAGNESIA) suspension 30 mL, 30 mL, Oral, QDAY PRN **AND** bisacodyl (DULCOLAX) suppository 10 mg, 10 mg, Rectal, QDAY PRN, Dell Tidwell M.D.  •  acetaminophen (Tylenol) tablet 650 mg, 650 mg, Oral, Q6HRS PRN, Dell Tidwell M.D.  •  ondansetron (ZOFRAN) syringe/vial injection 4 mg, 4 mg, Intravenous, Q4HRS PRN, Dell Tidwell M.D.  •  ondansetron (ZOFRAN ODT) dispertab 4 mg, 4 mg, Oral, Q4HRS PRN, Dell Tidwell M.D.  •  Notify provider if pain remains uncontrolled, , , CONTINUOUS **AND** Use the Numeric Rating Scale (NRS), Cheung-Baker Faces (WBF), or FLACC on regular floors and Critical-Care Pain Observation Tool (CPOT) on ICUs/Trauma to assess pain, , , CONTINUOUS **AND** Pulse Ox, , , CONTINUOUS **AND** Pharmacy Consult Request ...Pain Management Review 1 Each, 1 Each, Other, PHARMACY TO DOSE **AND** If patient difficult to arouse and/or has respiratory depression (respiratory rate of 10 or less), stop any opiates that are currently infusing and call a Rapid Response., , , CONTINUOUS, Dell Tidwell,  M.D.  •  oxyCODONE immediate-release (ROXICODONE) tablet 2.5 mg, 2.5 mg, Oral, Q3HRS PRN **OR** oxyCODONE immediate-release (ROXICODONE) tablet 5 mg, 5 mg, Oral, Q3HRS PRN **OR** morphine (pf) 4 mg/mL injection 2 mg, 2 mg, Intravenous, Q3HRS PRN, Dell Tidwell M.D.  •  omeprazole (PRILOSEC) capsule 20 mg, 20 mg, Oral, DAILY, Dell Tidwell M.D., 20 mg at 08/12/21 1351  •  digoxin (LANOXIN) tablet 125 mcg, 125 mcg, Oral, Q48HRS, Mirna Rush M.D.  •  furosemide (LASIX) injection 40 mg, 40 mg, Intravenous, BID DIURETIC, Mirna Rush M.D.    Current Outpatient Medications:   •  diazePAM (VALIUM) 2 MG Tab, Take 2 mg by mouth one time., Disp: , Rfl:   •  metoprolol SR (TOPROL XL) 100 MG TABLET SR 24 HR, Take 1 tablet by mouth 2 times a day., Disp: 200 tablet, Rfl: 3  •  lisinopril (PRINIVIL) 5 MG Tab, Take 1 tablet by mouth every day., Disp: 90 tablet, Rfl: 4  •  ezetimibe (ZETIA) 10 MG Tab, Take 1 tablet by mouth every day., Disp: 90 tablet, Rfl: 4  •  clopidogrel (PLAVIX) 75 MG Tab, Take 1 tablet by mouth every day., Disp: 90 tablet, Rfl: 4  •  apixaban (ELIQUIS) 2.5mg Tab, Take 1 tablet by mouth 2 times a day., Disp: 180 tablet, Rfl: 4  •  pantoprazole (PROTONIX) 40 MG Tablet Delayed Response, Take 1 tablet by mouth every day., Disp: 90 tablet, Rfl: 3    Past Medical History:   Diagnosis Date   • Atrial fibrillation (HCC)    • Basal cell carcinoma of skin of nose    • CATARACT     Dr. Aaron, Dr. Orellana   • CHF (congestive heart failure) (HCC)    • Colon polyp     tubular adenomas   • Dental disorder     upper partial   • Dyslipidemia    • Glaucoma, right eye     Dr. Orellana   • Heart burn    • Hemorrhagic disorder (HCC)     eliquis   • Hypertension     pt states well controlled on meds   • IBS (irritable bowel syndrome)    • Indigestion    • MEDICAL HOME 10/23/12   • Mitral valve regurgitation    • Osteopenia 6/09   • Perennial allergic rhinitis with seasonal variation    • Persistent atrial fibrillation (HCC)    •  "Psychiatric problem     anxiety   • Type 2 diabetes mellitus, controlled ([facility])     diet controlled   • Valvular heart disease     mitral insufficiency   • Vertigo        Past Surgical History:   Procedure Laterality Date   • RECOVERY  2016    Procedure: CATH LAB-ZAFAR GUIDED CV-TO-LARGE GROUP;  Surgeon: Recoveryonly Surgery;  Location: SURGERY PRE-POST PROC UNIT [facility];  Service:    • COLONOSCOPY  2009    Dr. Lopez, 9 polyps   • APPENDECTOMY     • BREAST BIOPSY      left, reports wire report broke off during procedure   • CHOLECYSTECTOMY     • US-NEEDLE CORE BX-BREAST PANEL         Family History   Problem Relation Age of Onset   • Diabetes Mother    • Hypertension Mother    • Stroke Mother    • Cancer Father         lung/larynx   • Cancer Sister         \"female\"   • Genetic Disorder Sister         osteoporosis   • Cancer Paternal Aunt         breast   • Cancer Maternal Aunt    • Heart Disease Brother         CABG x 3     Patient family history was personally reviewed, no pertinent family history to current presentation    Social History     Tobacco Use   • Smoking status: Former Smoker     Packs/day: 0.50     Years: 40.00     Pack years: 20.00     Types: Cigarettes     Quit date: 3/1/1996     Years since quittin.4   • Smokeless tobacco: Never Used   Vaping Use   • Vaping Use: Never used   Substance Use Topics   • Alcohol use: Not Currently     Alcohol/week: 0.0 oz     Comment: now rare   • Drug use: No       ALLERGIES:  Allergies   Allergen Reactions   • Atorvastatin      myalgias   • Simvastatin      myalgias   • Zetia [Ezetimibe] Myalgia     Muscle pain       Review of systems:  A complete review of symptoms was reviewed with patient. This is reviewed in H&P and PMH. ALL OTHERS reviewed and negative    Physical exam:  Patient Vitals for the past 24 hrs:   BP Temp Temp src Pulse Resp SpO2 Height Weight   21 1029 159/95 -- -- (!) 123 18 96 % -- --   21 1021 -- -- -- (!) 106 -- 94 % -- -- " "  08/12/21 0959 144/92 -- -- (!) 153 17 93 % -- --   08/12/21 0929 134/80 -- -- (!) 120 17 93 % -- --   08/12/21 0900 148/75 -- -- (!) 153 18 96 % -- --   08/12/21 0807 138/90 -- -- (!) 155 18 97 % -- --   08/12/21 0734 123/92 37.4 °C (99.4 °F) Temporal (!) 137 19 97 % -- --   08/12/21 0732 -- -- -- -- -- -- 1.575 m (5' 2\") 59.9 kg (132 lb)     General: No acute distress.   EYES: no jaundice  HEENT: OP clear   Neck: No bruits No JVD.   CVS: Tachycardic rate, regular rhythm. S1 + S2. No M/R/G  Resp: Crackles at bases bilaterally  Abdomen: Soft, NT, ND,  Skin: Grossly nothing acute no obvious rashes  Neurological: Alert, Moves all extremities, no cranial nerve defects on limited exam  Extremities: Pulse 2+ in b/l LE. Trace edema. No cyanosis.       Data:  Laboratory studies personally reviewed by me:  Recent Results (from the past 24 hour(s))   COD (ADULT)    Collection Time: 08/12/21  7:29 AM   Result Value Ref Range    ABO Grouping Only O     Rh Grouping Only POS     Antibody Screen-Cod NEG    CBC WITH DIFFERENTIAL    Collection Time: 08/12/21  7:29 AM   Result Value Ref Range    WBC 7.6 4.8 - 10.8 K/uL    RBC 4.00 (L) 4.20 - 5.40 M/uL    Hemoglobin 10.5 (L) 12.0 - 16.0 g/dL    Hematocrit 33.3 (L) 37.0 - 47.0 %    MCV 83.3 81.4 - 97.8 fL    MCH 26.3 (L) 27.0 - 33.0 pg    MCHC 31.5 (L) 33.6 - 35.0 g/dL    RDW 51.3 (H) 35.9 - 50.0 fL    Platelet Count 170 164 - 446 K/uL    MPV 9.6 9.0 - 12.9 fL    Neutrophils-Polys 80.80 (H) 44.00 - 72.00 %    Lymphocytes 7.00 (L) 22.00 - 41.00 %    Monocytes 10.90 0.00 - 13.40 %    Eosinophils 0.50 0.00 - 6.90 %    Basophils 0.40 0.00 - 1.80 %    Immature Granulocytes 0.40 0.00 - 0.90 %    Nucleated RBC 0.00 /100 WBC    Neutrophils (Absolute) 6.15 2.00 - 7.15 K/uL    Lymphs (Absolute) 0.53 (L) 1.00 - 4.80 K/uL    Monos (Absolute) 0.83 0.00 - 0.85 K/uL    Eos (Absolute) 0.04 0.00 - 0.51 K/uL    Baso (Absolute) 0.03 0.00 - 0.12 K/uL    Immature Granulocytes (abs) 0.03 0.00 - 0.11 K/uL "    NRBC (Absolute) 0.00 K/uL   COMP METABOLIC PANEL    Collection Time: 21  7:29 AM   Result Value Ref Range    Sodium 137 135 - 145 mmol/L    Potassium 4.4 3.6 - 5.5 mmol/L    Chloride 105 96 - 112 mmol/L    Co2 21 20 - 33 mmol/L    Anion Gap 11.0 7.0 - 16.0    Glucose 161 (H) 65 - 99 mg/dL    Bun 26 (H) 8 - 22 mg/dL    Creatinine 1.02 0.50 - 1.40 mg/dL    Calcium 9.2 8.5 - 10.5 mg/dL    AST(SGOT) 23 12 - 45 U/L    ALT(SGPT) 31 2 - 50 U/L    Alkaline Phosphatase 88 30 - 99 U/L    Total Bilirubin 1.2 0.1 - 1.5 mg/dL    Albumin 3.7 3.2 - 4.9 g/dL    Total Protein 6.5 6.0 - 8.2 g/dL    Globulin 2.8 1.9 - 3.5 g/dL    A-G Ratio 1.3 g/dL   LIPASE    Collection Time: 21  7:29 AM   Result Value Ref Range    Lipase 32 11 - 82 U/L   PROTHROMBIN TIME    Collection Time: 21  7:29 AM   Result Value Ref Range    PT 15.9 (H) 12.0 - 14.6 sec    INR 1.31 (H) 0.87 - 1.13   APTT    Collection Time: 21  7:29 AM   Result Value Ref Range    APTT 32.1 24.7 - 36.0 sec   ESTIMATED GFR    Collection Time: 21  7:29 AM   Result Value Ref Range    GFR If African American >60 >60 mL/min/1.73 m 2    GFR If Non African American 52 (A) >60 mL/min/1.73 m 2   DIGOXIN    Collection Time: 21  7:29 AM   Result Value Ref Range    Digoxin <0.3 (L) 0.8 - 2.0 ng/mL   proBrain Natriuretic Peptide, NT    Collection Time: 21  7:29 AM   Result Value Ref Range    NT-proBNP 5543 (H) 0 - 125 pg/mL   TROPONIN    Collection Time: 21  7:29 AM   Result Value Ref Range    Troponin T 30 (H) 6 - 19 ng/L   EKG    Collection Time: 21  7:32 AM   Result Value Ref Range    Report       [facility] Emergency Dept.    Test Date:  2021  Pt Name:    KYLE WISEMAN                  Department: ER  MRN:        [id number]                      Room:       [facility]  Gender:     Female                       Technician: [id number]  :        [id number]                   Requested By:ER TRIAGE PROTOCOL  Order #:     [id number]                    Reading MD: Leola Merino MD    Measurements  Intervals                                Axis  Rate:       119                          P:  MO:                                      QRS:        90  QRSD:       76                           T:          13  QT:         304  QTc:        428    Interpretive Statements  ATRIAL FIBRILLATION, V-RATE    VENTRICULAR PREMATURE COMPLEX  BORDERLINE RIGHT AXIS DEVIATION  BORDERLINE LOW VOLTAGE IN FRONTAL LEADS  MINIMAL ST DEPRESSION, ANTEROLATERAL LEADS  ARTIFACT IN LEAD(S) II,III,aVR,aVL,aVF,V4,V5  Compared to ECG 07/27/2021 10:26:31  Increased rate, no other significant change  Electr onically Signed On 8- 8:45:07 PDT by Leola Merino MD     COV-2, FLU A/B, AND RSV BY PCR (2-4 HOURS CEPHEID): Collect NP swab in VTM    Collection Time: 08/12/21  9:20 AM    Specimen: Respirate   Result Value Ref Range    Influenza virus A RNA Negative Negative    Influenza virus B, PCR Negative Negative    RSV, PCR Negative Negative    SARS-CoV-2 by PCR NotDetected     SARS-CoV-2 Source NP Swab        Imaging:  DX-CHEST-PORTABLE (1 VIEW)   Final Result      Borderline cardiomegaly and mild interstitial edema. No focal consolidation or pleural effusions.      CT-ABDOMEN-PELVIS WITH    (Results Pending)           EKG 8/12/2021: personally reviewed by me atrial fibrillation, rate 119    Echocardiogram 8/8/2019  CONCLUSIONS  Normal left ventricular size and systolic function.  Left ventricular ejection fraction is visually estimated to be 55%.  Normal right ventricular size and systolic function.  Aortic sclerosis without stenosis.  Mild aortic insufficiency.  Moderate to severe mitral regurgitation.  Moderate tricuspid regurgitation.  Right atrial pressure is estimated to be 3 mmHg.  Estimated right ventricular systolic pressure  is 55 mmHg.  Estimated pulmonary artery diastolic pressure of 10.     Compared to the images of the prior study done on 05/02/16:  Unable to   retrieve previous study due to technical difficulties. Compared to the   report, there has been no change to the MR. The LVEF has improved from   45% to 55%.      Coronary angiogram 2020  Indication for procedure: Positive stress test moderate or high risk     Procedures:  · Insertion of 5/6 Fr sheath in the right radial artery  · right and left coronary arteriograms  · Left heart catheterization  · Angioplasty and placement of a 3.5 by 8mm [place] drug-eluting stent in ostial  right coronary artery.  · Angioplasty and placement of a 2.75 by 22 mm [place] drug-eluting  stent in mid  right coronary artery.     Final diagnosis:   single vessel coronary artery disease.  Successful percutaneous intervention of right coronary artery.     Recommendations: Guideline directed medical therapy and risk factor management        Coronary arteriograms:  Left main: 20-30% distal left main disease.  Left anterior descendin-40% mid LAD disease, diagonals are small  Left circumflex: luminal irregularities. Three small marginal branches are noted. No significant disease is noted in marginal branches.  Right coronary: 80% ostial stenosis, mid RCA has tandem 90% lesions, dominant     Left Heart Catheterization:  Left Ventriculogram: Not performed  Left Ventricular EDP: 10 mm Hg   Aortic Valve Gradient: No significant AV gradient noted    All pertinent features of laboratory and imaging reviewed including primary images where applicable      Active Problems:    Atrial fibrillation with RVR (HCC) POA: Unknown    HFrEF (heart failure with reduced ejection fraction) ([facility]) POA: Unknown  Resolved Problems:    * No resolved hospital problems. *      Assessment / Plan:    A. fib RVR  Acute on chronic CHF exacerbation  HFrEF with recovered EF (EF preserved on last echo 2019)  Patient is volume up on exam.  Trigger for AF RVR likely secondary to volume overload as well as being discontinued on Lasix and digoxin. Patient  possibly with viral infection as well given diarrhea/abdominal pain.  -Agree with IV diuretics  -[id number] at least 5 (age x2, female, CHF, CAD) -continue Eliquis (decreased dose due to age and weight)  -Continue rate control with metoprolol, uptitrate as BP tolerates. I discussed repeat cardioversion with the patient, which she declined at this time due to personal preference  -Restart digoxin  -Repeat echocardiogram to assess LVEF and any structural abnormalities.    I personally discussed her case with  Dr Leola Merino M.D.    Future Appointments   Date Time Provider Department Center   12/14/2021  9:45 AM Shen Washington M.D. [facility] None       It is my pleasure to participate in the care of Ms. Rowan.  Please do not hesitate to contact me with questions or concerns.    Mirna Rush MD   Cardiologist [facility]    8/12/2021    Please note that this dictation was created using voice recognition software. There may be errors I did not discover before finalizing the note.

## 2021-08-12 NOTE — ED PROVIDER NOTES
ED Provider Note    Scribed for Leola Merino M.D. by Tigre Schmidt. 8/12/2021  8:03 AM    Means of arrival: EMS  History obtained from: Patient  History limited by: None      CHIEF COMPLAINT  Chief Complaint   Patient presents with   • Diarrhea     BIB EMS, reports black diarrhea x 9 since last night, pt taking elequis and plavix for a-fib. reports stopping digoxin 10 days ago.     • Cough   • Abdominal Pain   • Shortness of Breath       HPI  Ivory Rowan is a 81 y.o. female with a history of Atrial Fibrillation who presents to the Emergency Department for via EMS for evaluation of shortness of breath onset last night. Patient states she has difficulty breathing while laying down. She has not been able to sleep well due to her dyspnea. Patient also endorses dyspnea on exertion. She was taken off of Digoxine ten days ago. She admits to associated symptoms of some coughing, subjective fever, chronic abdominal pain since she had stents placed in her heart, intermittent diarrhea with occasional dark blood in stool, but denies chest pain. No alleviating factors were reported. Patient has a history of hemorrhoids.     REVIEW OF SYSTEMS  Pertinent positive include shortness of breath, cough, abdominal pain, diarrhea, blood in stool, and fever. Pertinent negative include chest pain. All other systems reviewed and are negative.    PAST MEDICAL HISTORY   has a past medical history of Atrial fibrillation ([facility]), Basal cell carcinoma of skin of nose, CATARACT, CHF (congestive heart failure) ([facility]), Colon polyp, Dental disorder, Dyslipidemia, Glaucoma, right eye, Heart burn, Hemorrhagic disorder ([facility]), Hypertension, IBS (irritable bowel syndrome), Indigestion, MEDICAL HOME (10/23/12), Mitral valve regurgitation, Osteopenia (6/09), Perennial allergic rhinitis with seasonal variation, Persistent atrial fibrillation (HCC), Psychiatric problem, Type 2 diabetes mellitus, controlled ([facility]), Valvular heart disease, and  "Vertigo.    SOCIAL HISTORY  Social History     Tobacco Use   • Smoking status: Former Smoker     Packs/day: 0.50     Years: 40.00     Pack years: 20.00     Types: Cigarettes     Quit date: 3/1/1996     Years since quittin.4   • Smokeless tobacco: Never Used   Vaping Use   • Vaping Use: Never used   Substance and Sexual Activity   • Alcohol use: Not Currently     Alcohol/week: 0.0 oz     Comment: now rare   • Drug use: No   • Sexual activity: Not Currently     Partners: Male       SURGICAL HISTORY   has a past surgical history that includes breast biopsy; recovery (2016); us-needle core bx-breast panel; appendectomy; cholecystectomy; and colonoscopy (2009).    CURRENT MEDICATIONS  Home Medications     Reviewed by Manuel Valerio (Pharmacy Tech) on 21 at 0904  Med List Status: Complete   Medication Last Dose Status   apixaban (ELIQUIS) 2.5mg Tab 2021 Active   clopidogrel (PLAVIX) 75 MG Tab 2021 Active   diazePAM (VALIUM) 2 MG Tab 2021 Active   ezetimibe (ZETIA) 10 MG Tab 2021 Active   lisinopril (PRINIVIL) 5 MG Tab 2021 Active   metoprolol SR (TOPROL XL) 100 MG TABLET SR 24 HR 2021 Active   pantoprazole (PROTONIX) 40 MG Tablet Delayed Response 2021 Active                ALLERGIES  Allergies   Allergen Reactions   • Atorvastatin      myalgias   • Simvastatin      myalgias   • Zetia [Ezetimibe] Myalgia     Muscle pain       PHYSICAL EXAM   VITAL SIGNS: /92   Pulse (!) 137   Temp 37.4 °C (99.4 °F) (Temporal)   Resp 19   Ht 1.575 m (5' 2\")   Wt 59.9 kg (132 lb)   LMP 1991   SpO2 97%   BMI 24.14 kg/m²    Constitutional: Elderly-appearing female, Alert in no apparent distress.  HENT: Normocephalic, Atraumatic. Bilateral external ears normal. Nose normal.  Moist mucous membranes.  Oropharynx clear.  Eyes: Pupils are equal and reactive. Conjunctiva normal.   Neck: Supple, full range of motion  Heart: Irregularly irregular rate, Tachycardic, No murmurs. "    Lungs: No respiratory distress, normal work of breathing. Lungs clear to auscultation bilaterally.  Abdomen Soft, no distention.  No tenderness to palpation.  Musculoskeletal: Atraumatic. No obvious deformities noted.  No lower extremity edema.  GI: rectal exam shows external hemorrhoids, no bleeding, no gross blood or melena, FOBT negative  Skin: Warm, Dry.  No erythema, No rash.   Neurologic: Alert and oriented x3. Moving all extremities spontaneously without focal deficits.  Psychiatric: Affect normal, Mood normal, Appears appropriate and not intoxicated.    DIAGNOSTIC STUDIES    EKG  Results for orders placed or performed during the hospital encounter of 21   EKG   Result Value Ref Range    Report       [facility] Emergency Dept.    Test Date:  2021  Pt Name:    KYLE WISEMAN                  Department: ER  MRN:        [id number]                      Room:       [facility]  Gender:     Female                       Technician: [id number]  :        [id number]                   Requested By:ER TRIAGE PROTOCOL  Order #:    [id number]                    Reading MD: Leola Merino MD    Measurements  Intervals                                Axis  Rate:       119                          P:  CA:                                      QRS:        90  QRSD:       76                           T:          13  QT:         304  QTc:        428    Interpretive Statements  ATRIAL FIBRILLATION, V-RATE    VENTRICULAR PREMATURE COMPLEX  BORDERLINE RIGHT AXIS DEVIATION  BORDERLINE LOW VOLTAGE IN FRONTAL LEADS  MINIMAL ST DEPRESSION, ANTEROLATERAL LEADS  ARTIFACT IN LEAD(S) II,III,aVR,aVL,aVF,V4,V5  Compared to ECG 2021 10:26:31  Increased rate, no other significant change  Electr onically Signed On 2021 8:45:07 PDT by Leola Merino MD       12 Lead EKG interpreted by me as above.     LABS  Personally reviewed by me  Labs Reviewed   CBC WITH DIFFERENTIAL - Abnormal; Notable for the following  components:       Result Value    RBC 4.00 (*)     Hemoglobin 10.5 (*)     Hematocrit 33.3 (*)     MCH 26.3 (*)     MCHC 31.5 (*)     RDW 51.3 (*)     Neutrophils-Polys 80.80 (*)     Lymphocytes 7.00 (*)     Lymphs (Absolute) 0.53 (*)     All other components within normal limits    Narrative:     Indicate which anticoagulants the patient is on:->UNKNOWN   COMP METABOLIC PANEL - Abnormal; Notable for the following components:    Glucose 161 (*)     Bun 26 (*)     All other components within normal limits    Narrative:     Indicate which anticoagulants the patient is on:->UNKNOWN   PROTHROMBIN TIME - Abnormal; Notable for the following components:    PT 15.9 (*)     INR 1.31 (*)     All other components within normal limits    Narrative:     Indicate which anticoagulants the patient is on:->UNKNOWN   ESTIMATED GFR - Abnormal; Notable for the following components:    GFR If Non  52 (*)     All other components within normal limits    Narrative:     Indicate which anticoagulants the patient is on:->UNKNOWN   DIGOXIN - Abnormal; Notable for the following components:    Digoxin <0.3 (*)     All other components within normal limits   PROBRAIN NATRIURETIC PEPTIDE, NT - Abnormal; Notable for the following components:    NT-proBNP 5543 (*)     All other components within normal limits   TROPONIN - Abnormal; Notable for the following components:    Troponin T 30 (*)     All other components within normal limits   COD (ADULT)   LIPASE    Narrative:     Indicate which anticoagulants the patient is on:->UNKNOWN   APTT    Narrative:     Indicate which anticoagulants the patient is on:->UNKNOWN   COV-2, FLU A/B, AND RSV BY PCR    Narrative:     Have you been in close contact with a person who is suspected  or known to be positive for COVID-19 within the last 30 days  (e.g. last seen that person < 30 days ago)->Unknown   ABO RH CONFIRM       RADIOLOGY  Personally reviewed by me  CT-ABDOMEN-PELVIS WITH   Final  Result      1.  No evidence of bowel obstruction.   2.  4.4 x 2.9 cm left ovarian cyst worrisome for a cystic neoplasm. Further evaluation with pelvic ultrasound is recommended.   3.  Small amount of free fluid in the pelvis.   4.  Heterogeneous appearance of the liver can be seen in the setting of congestion or hepatitis.   5.  Intra and extrahepatic biliary ductal dilatation. Correlation with LFTs is recommended.   6.  Mild renal cortical scarring bilaterally. Small hypodense renal lesions are too small to characterize.   7.  Atherosclerotic plaque.   8.  Small bilateral pleural effusions.   9.  Cardiomegaly.         DX-CHEST-PORTABLE (1 VIEW)   Final Result      Borderline cardiomegaly and mild interstitial edema. No focal consolidation or pleural effusions.        ED COURSE  Vitals:    08/12/21 1229 08/12/21 1259 08/12/21 1359 08/12/21 1515   BP: (!) 161/91 (!) 171/92 158/93    Pulse: (!) 130 (!) 152 (!) 117 (!) 122   Resp:       Temp:       TempSrc:       SpO2: 95% 97% 97% 95%   Weight:       Height:         Medications administered:  Medications   apixaban (ELIQUIS) tablet 2.5 mg (has no administration in time range)   clopidogrel (PLAVIX) tablet 75 mg (75 mg Oral Given 8/12/21 1346)   ezetimibe (ZETIA) tablet 10 mg (10 mg Oral Given 8/12/21 1346)   metoprolol SR (TOPROL XL) tablet 100 mg (100 mg Oral Given 8/12/21 1346)   polyethylene glycol/lytes (MIRALAX) PACKET 1 Packet (has no administration in time range)     And   magnesium hydroxide (MILK OF MAGNESIA) suspension 30 mL (has no administration in time range)     And   bisacodyl (DULCOLAX) suppository 10 mg (has no administration in time range)   acetaminophen (Tylenol) tablet 650 mg (has no administration in time range)   ondansetron (ZOFRAN) syringe/vial injection 4 mg (has no administration in time range)   ondansetron (ZOFRAN ODT) dispertab 4 mg (has no administration in time range)   Pharmacy Consult Request ...Pain Management Review 1 Each (has no  administration in time range)   oxyCODONE immediate-release (ROXICODONE) tablet 2.5 mg (has no administration in time range)     Or   oxyCODONE immediate-release (ROXICODONE) tablet 5 mg (has no administration in time range)     Or   morphine (pf) 4 mg/mL injection 2 mg (has no administration in time range)   omeprazole (PRILOSEC) capsule 20 mg (20 mg Oral Given 8/12/21 1351)   digoxin (LANOXIN) tablet 125 mcg (125 mcg Oral Given 8/12/21 1528)   furosemide (LASIX) injection 40 mg (has no administration in time range)   Metoprolol Tartrate (LOPRESSOR) injection 5 mg (5 mg Intravenous Given 8/12/21 0914)   furosemide (LASIX) injection 20 mg (20 mg Intravenous Given 8/12/21 0914)   Metoprolol Tartrate (LOPRESSOR) injection 5 mg (5 mg Intravenous Given 8/12/21 1201)   Metoprolol Tartrate (LOPRESSOR) injection 5 mg (5 mg Intravenous Given 8/12/21 1345)   iohexol (OMNIPAQUE) 350 mg/mL (100 mL Intravenous Given 8/12/21 1458)     Old records personally reviewed:  Patient has had multiple hospitalization due to Afib and RBR . Echocardiogram was performed in 2019 showing EF of 55%. Patient had a cardiac cath in June 2020 with stent placement. Patient follows up with Dr. Washington (Cardiologist) who she saw 2 weeks ago. He stopped her from Furosamide, potassium, and digoxine. Patient is on metoprolol.    8:03 AM Patient seen and examined at bedside. I performed a rectal examination at bedside. Rectal exam shows external hemorrhoids, no bleeding, no gross blood or melena, FOBT negative I informed the patient of plan to order. The patient presents with shortness of breath. Ordered for DX-Chest, COV-2/flu/RSV, digoxin, pBNP, Troponin, COD, CBC with diff, CMP, Lipase, prothrombin time, APTT, estimated GFR, EKG, and ABO rh to evaluate.     MEDICAL DECISION MAKING  Elderly patient with history of atrial fibrillation as well as mild underlying heart failure who presents with worsening shortness of breath over the last week after being taken  off of her digoxin and furosemide.  She is afebrile, tachycardic on arrival with otherwise reassuring vital signs.  EKG demonstrates atrial fibrillation with RVR with rates of 140s to 150s.  No obvious evidence of ischemia.  The patient was complaining of black stools for the last week as well.  She has no signs of GI bleeding on rectal exam.  Labs demonstrate a mildly worsening anemia.  There is no evidence of leukocytosis.  She does have an elevated troponin as well as elevated BNP.  Her chest x-ray does show some cardiomegaly and pulmonary edema.  I suspect that her symptoms are due to worsening fluid overload now that she is not taking her digoxin or furosemide.  We will treat with IV beta-blocker as well as initiating IV diuresis here in the department and admit for further treatment.    11:31 AM - I reevaluated the patient at bedside. The patient informs me they feel improved following metoprolol and furosemide administration. I discussed the patient's diagnostic study results. I informed the patient of my plan to admit today given the patient's current presentation and diagnostic study results. Patient verbalizes understanding and support with my plan for admission.     11:32 AM I discussed the patient's case and the above findings with Dr. Rush (Cardiologist) who recommends admission to the hospital for IV diuresis.    11:20 AM I discussed the patient's case and the above findings with Dr. Tidwell (Hospitalist) who agrees to evaluate the patient.    CRITICAL CARE TIME  Upon my evaluation, this patient had a high probability of imminent or life-threatening deterioration due to atrial fibrillation with RVR requiring IV beta-blockers and IV diuresis which required my direct attention, intervention, and personal management.     I personally provided 34 minutes of total critical care time outside of time spent on separately billable/documented procedures. Time includes: review of laboratory data, review of  radiology studies, discussion with consultants, discussion with family/patient, monitoring for potential decompensation.  Interventions were performed as documented above.         DISPOSITION:  Patient will be hospitalized by Dr. Tidwell in guarded condition.    IMPRESSION  (I48.19) Persistent atrial fibrillation with RVR (HCC)  (E87.70) Hypervolemia, unspecified hypervolemia type      Results, diagnoses, and treatment options were discussed with the patient and/or family. Patient verbalized understanding of plan of care.     Tigre LARA (Binta), am scribing for, and in the presence of, Leola Merino M.D..    Electronically signed by: Tigre Schmidt (Binta), 8/12/2021    Leola LARA M.D. personally performed the services described in this documentation, as scribed by Tigre Schmidt in my presence, and it is both accurate and complete.  C    The note accurately reflects work and decisions made by me.  Leola Merino M.D.  8/12/2021  3:32 PM

## 2021-08-12 NOTE — ED NOTES
Med Rec completed per patient and med list (returned)   Allergies reviewed  No ORAL antibiotics in last 30 days    Patient states that he doctor discontinued her Digoxin 125 mcg daily on 7/27/2021

## 2021-08-12 NOTE — PROGRESS NOTES
4 Eyes Skin Assessment Completed by ROSIE Tompkins and Siri VELEZ.    Head WDL  Ears WDL  Nose WDL  Mouth WDL  Neck WDL  Breast/Chest WDL  Shoulder Blades WDL  Spine WDL  (R) Arm/Elbow/Hand WDL  (L) Arm/Elbow/Hand WDL  Abdomen WDL  Groin WDL  Scrotum/Coccyx/Buttocks WDL  (R) Leg WDL  (L) Leg WDL  (R) Heel/Foot/Toe WDL  (L) Heel/Foot/Toe WDL    Patient has scattered bruises.         Devices In Places Tele Box      Interventions In Place Gray Ear Foams, Pillows, Low Air Loss Mattress and Barrier Cream    Possible Skin Injury No    Pictures Uploaded Into Epic N/A  Wound Consult Placed N/A  RN Wound Prevention Protocol Ordered No

## 2021-08-12 NOTE — CARE PLAN
The patient is Watcher - Medium risk of patient condition declining or worsening    Shift Goals  Clinical Goals: hemodynamic stability. HR and BP have been elevated need to monitor  Patient Goals: to rest and sleep     Progress made toward(s) clinical / shift goals:     Problem: Knowledge Deficit - Standard  Goal: Patient and family/care givers will demonstrate understanding of plan of care, disease process/condition, diagnostic tests and medications  Outcome: Progressing     Problem: Fall Risk  Goal: Patient will remain free from falls  Outcome: Progressing     Problem: Pain - Standard  Goal: Alleviation of pain or a reduction in pain to the patient’s comfort goal  Outcome: Progressing       Patient is not progressing towards the following goals:

## 2021-08-13 ENCOUNTER — APPOINTMENT (OUTPATIENT)
Dept: CARDIOLOGY | Facility: MEDICAL CENTER | Age: 81
DRG: 308 | End: 2021-08-13
Attending: HOSPITALIST
Payer: MEDICARE

## 2021-08-13 PROBLEM — I50.33 ACUTE ON CHRONIC HEART FAILURE WITH PRESERVED EJECTION FRACTION (HCC): Status: ACTIVE | Noted: 2021-08-13

## 2021-08-13 PROBLEM — I50.32 CHRONIC DIASTOLIC HEART FAILURE (HCC): Status: RESOLVED | Noted: 2017-06-22 | Resolved: 2021-08-13

## 2021-08-13 PROBLEM — N83.209 OVARIAN CYST: Status: ACTIVE | Noted: 2021-08-13

## 2021-08-13 LAB
ANION GAP SERPL CALC-SCNC: 11 MMOL/L (ref 7–16)
BUN SERPL-MCNC: 25 MG/DL (ref 8–22)
CALCIUM SERPL-MCNC: 8.9 MG/DL (ref 8.5–10.5)
CHLORIDE SERPL-SCNC: 102 MMOL/L (ref 96–112)
CO2 SERPL-SCNC: 28 MMOL/L (ref 20–33)
CREAT SERPL-MCNC: 1.05 MG/DL (ref 0.5–1.4)
ERYTHROCYTE [DISTWIDTH] IN BLOOD BY AUTOMATED COUNT: 49 FL (ref 35.9–50)
EST. AVERAGE GLUCOSE BLD GHB EST-MCNC: 148 MG/DL
GLUCOSE SERPL-MCNC: 118 MG/DL (ref 65–99)
HBA1C MFR BLD: 6.8 % (ref 4–5.6)
HCT VFR BLD AUTO: 36.3 % (ref 37–47)
HGB BLD-MCNC: 11.6 G/DL (ref 12–16)
LV EJECT FRACT  99904: 55
LV EJECT FRACT MOD 2C 99903: 49.65
LV EJECT FRACT MOD 4C 99902: 44.66
LV EJECT FRACT MOD BP 99901: 41.78
MCH RBC QN AUTO: 26.1 PG (ref 27–33)
MCHC RBC AUTO-ENTMCNC: 32 G/DL (ref 33.6–35)
MCV RBC AUTO: 81.8 FL (ref 81.4–97.8)
PLATELET # BLD AUTO: 189 K/UL (ref 164–446)
PMV BLD AUTO: 9.8 FL (ref 9–12.9)
POTASSIUM SERPL-SCNC: 3.7 MMOL/L (ref 3.6–5.5)
RBC # BLD AUTO: 4.44 M/UL (ref 4.2–5.4)
SODIUM SERPL-SCNC: 141 MMOL/L (ref 135–145)
WBC # BLD AUTO: 8.2 K/UL (ref 4.8–10.8)

## 2021-08-13 PROCEDURE — 97165 OT EVAL LOW COMPLEX 30 MIN: CPT

## 2021-08-13 PROCEDURE — 700102 HCHG RX REV CODE 250 W/ 637 OVERRIDE(OP): Performed by: NURSE PRACTITIONER

## 2021-08-13 PROCEDURE — 36415 COLL VENOUS BLD VENIPUNCTURE: CPT

## 2021-08-13 PROCEDURE — 770020 HCHG ROOM/CARE - TELE (206)

## 2021-08-13 PROCEDURE — 700102 HCHG RX REV CODE 250 W/ 637 OVERRIDE(OP): Performed by: HOSPITALIST

## 2021-08-13 PROCEDURE — 93306 TTE W/DOPPLER COMPLETE: CPT | Mod: 26 | Performed by: INTERNAL MEDICINE

## 2021-08-13 PROCEDURE — 83036 HEMOGLOBIN GLYCOSYLATED A1C: CPT

## 2021-08-13 PROCEDURE — 93306 TTE W/DOPPLER COMPLETE: CPT

## 2021-08-13 PROCEDURE — 99233 SBSQ HOSP IP/OBS HIGH 50: CPT | Mod: GC | Performed by: INTERNAL MEDICINE

## 2021-08-13 PROCEDURE — 700111 HCHG RX REV CODE 636 W/ 250 OVERRIDE (IP): Performed by: STUDENT IN AN ORGANIZED HEALTH CARE EDUCATION/TRAINING PROGRAM

## 2021-08-13 PROCEDURE — A9270 NON-COVERED ITEM OR SERVICE: HCPCS | Performed by: HOSPITALIST

## 2021-08-13 PROCEDURE — A9270 NON-COVERED ITEM OR SERVICE: HCPCS | Performed by: NURSE PRACTITIONER

## 2021-08-13 PROCEDURE — 80048 BASIC METABOLIC PNL TOTAL CA: CPT

## 2021-08-13 PROCEDURE — 99232 SBSQ HOSP IP/OBS MODERATE 35: CPT | Performed by: STUDENT IN AN ORGANIZED HEALTH CARE EDUCATION/TRAINING PROGRAM

## 2021-08-13 PROCEDURE — 85027 COMPLETE CBC AUTOMATED: CPT

## 2021-08-13 PROCEDURE — A9270 NON-COVERED ITEM OR SERVICE: HCPCS | Performed by: STUDENT IN AN ORGANIZED HEALTH CARE EDUCATION/TRAINING PROGRAM

## 2021-08-13 PROCEDURE — 700102 HCHG RX REV CODE 250 W/ 637 OVERRIDE(OP): Performed by: STUDENT IN AN ORGANIZED HEALTH CARE EDUCATION/TRAINING PROGRAM

## 2021-08-13 PROCEDURE — 97162 PT EVAL MOD COMPLEX 30 MIN: CPT

## 2021-08-13 RX ORDER — DIGOXIN 125 MCG
250 TABLET ORAL ONCE
Status: COMPLETED | OUTPATIENT
Start: 2021-08-13 | End: 2021-08-13

## 2021-08-13 RX ORDER — POTASSIUM CHLORIDE 20 MEQ/1
40 TABLET, EXTENDED RELEASE ORAL DAILY
Status: DISCONTINUED | OUTPATIENT
Start: 2021-08-13 | End: 2021-08-14

## 2021-08-13 RX ORDER — DIGOXIN 125 MCG
125 TABLET ORAL DAILY
Status: DISCONTINUED | OUTPATIENT
Start: 2021-08-14 | End: 2021-08-14

## 2021-08-13 RX ORDER — DIGOXIN 125 MCG
125 TABLET ORAL ONCE
Status: COMPLETED | OUTPATIENT
Start: 2021-08-13 | End: 2021-08-13

## 2021-08-13 RX ADMIN — METOPROLOL SUCCINATE 100 MG: 50 TABLET, EXTENDED RELEASE ORAL at 06:02

## 2021-08-13 RX ADMIN — METOPROLOL SUCCINATE 100 MG: 50 TABLET, EXTENDED RELEASE ORAL at 16:52

## 2021-08-13 RX ADMIN — APIXABAN 2.5 MG: 2.5 TABLET, FILM COATED ORAL at 06:03

## 2021-08-13 RX ADMIN — FUROSEMIDE 40 MG: 10 INJECTION, SOLUTION INTRAMUSCULAR; INTRAVENOUS at 16:02

## 2021-08-13 RX ADMIN — APIXABAN 2.5 MG: 2.5 TABLET, FILM COATED ORAL at 16:51

## 2021-08-13 RX ADMIN — DIGOXIN 250 MCG: 125 TABLET ORAL at 17:03

## 2021-08-13 RX ADMIN — LISINOPRIL 10 MG: 10 TABLET ORAL at 16:51

## 2021-08-13 RX ADMIN — FUROSEMIDE 40 MG: 10 INJECTION, SOLUTION INTRAMUSCULAR; INTRAVENOUS at 06:02

## 2021-08-13 RX ADMIN — CLOPIDOGREL BISULFATE 75 MG: 75 TABLET ORAL at 06:03

## 2021-08-13 RX ADMIN — OMEPRAZOLE 20 MG: 20 CAPSULE, DELAYED RELEASE ORAL at 06:03

## 2021-08-13 RX ADMIN — DIGOXIN 125 MCG: 125 TABLET ORAL at 09:50

## 2021-08-13 ASSESSMENT — ENCOUNTER SYMPTOMS
DIARRHEA: 1
HEADACHES: 0
NAUSEA: 0
LIGHT-HEADEDNESS: 0
HEMOPTYSIS: 0
VOMITING: 0
DOUBLE VISION: 0
MYALGIAS: 0
COUGH: 0
EYE REDNESS: 0
SHORTNESS OF BREATH: 1
WHEEZING: 0
EYE DISCHARGE: 0
SPUTUM PRODUCTION: 0
CHEST TIGHTNESS: 0
DIAPHORESIS: 0
DIZZINESS: 0
BLURRED VISION: 0
PALPITATIONS: 1
SHORTNESS OF BREATH: 0
ABDOMINAL PAIN: 1
FEVER: 0
CHILLS: 0

## 2021-08-13 ASSESSMENT — COGNITIVE AND FUNCTIONAL STATUS - GENERAL
SUGGESTED CMS G CODE MODIFIER DAILY ACTIVITY: CI
MOBILITY SCORE: 23
CLIMB 3 TO 5 STEPS WITH RAILING: A LITTLE
SUGGESTED CMS G CODE MODIFIER MOBILITY: CI
DAILY ACTIVITIY SCORE: 23
HELP NEEDED FOR BATHING: A LITTLE

## 2021-08-13 ASSESSMENT — GAIT ASSESSMENTS
DISTANCE (FEET): 20
DISTANCE (FEET): 20
DEVIATION: OTHER (COMMENT)
GAIT LEVEL OF ASSIST: SUPERVISED

## 2021-08-13 ASSESSMENT — ACTIVITIES OF DAILY LIVING (ADL): TOILETING: INDEPENDENT

## 2021-08-13 NOTE — HEART FAILURE PROGRAM
Although viral infection and AF c RVR are the primary reasons for admission, these have caused an exacerbation of patient's known heart failure with preserved ejection fraction (HFpEF), improved.     Nursing: please complete the HF Discharge Checklist (pink sheet in hard chart) and have it cosigned by your charge RN before patient leaves the hospital.    Providers: below are all Guideline Directed Medical Therapy (GDMT) indicated for HFpEF. If any can not be prescribed by discharge, please note the clinical reason for each in your discharge summary.    QUICK SUMMARY OF HFpEF MEASURES    -- Anticoagulation for atrial arrhythmia, if applicable  -- Glycemic control for DM + HF, if diabetic  -- Lipid lowering medication for DM + HF, if diabetic  -- Pneumococcal vaccine if not previously received  -- Influenza vaccine if not previously received for the current flu season  -- Smoking cessation counseling documented if applicable    Daily Nurse: please begin to fill out the HF checklist (pink sheet in hard chart) and use it to guide your daily care.    Discharge Nurse: please ensure completeness of the HF checklist (pink sheet in hard chart) and have it co-signed by the charge RN before the patient leaves the hospital.    Thank you, Kathleen, Cardio RN Navigator [id number]      DETAILED EXPLANATION OF HF MEASURES:  1. Documentation of LV systolic function (echo or cath) PTA, during this hospitalization, or plan to assess post discharge or reason for not assessing documented  2. Documentation of fluid intake and urine output every nursing shift  3. 2 hour post diuretic assessment documented 2 hours after diuretic given  4. HF Patient Education using the Living Well With Heart Failure Booklet and Symptom Tracker documented every nursing shift  5. Nutrition consult for diet education  6. Daily weights (one weight documented every 24 hours) on a standing scale unless standing is contraindicated in which case bed scale can be used -  have patient write weight on symptom tracker  7. For LVEF less than or equal to 40%, ACE-I, ARNI or ARB prescribed at discharge   8. For LVEF less than or equal to 40%, an Evidence Based Beta Blocker (bisoprolol, carvedilol, toprol xl) must be prescribed at discharge  9. For LVEF less than or equal to 35% aldosterone blockade prescribed at discharge  10. The combination of hydralazine and isosorbide dinitrate is recommended to reduce morbidity and mortality for patients self-described  Americans with NYHA class III-IV HFrEF (EF 40% or less), receiving optimal therapy with ACE inhibitors and beta blockers, unless contraindicated (Class I, ARIA: A).  11. If a HF patient is diabetic or is newly diagnosed with DM: prescribed diabetes treatment at discharge in the form of glycemic control (diet or anti-hyperglycemic medication) or f/u appointment for diabetes management scheduled at discharge.  12. If a HF patient has diabetes: prescribed lipid lowering medication at discharge  13. Documented smoking cessation advice or counseling  14. If a HF patient has a-fib: anticoagulation is prescribed upon discharge or contraindication is documented  15. Screening for and administering immunizations as long as no contraindications: Pneumonia (regardless of age) and Influenza  16. Written discharge instructions include:  ? Daily weights  ? Record weight on tracker  ? Bring tracker to appointments  ? Call MD for weight gain of 3lb /day or 5lb/week  ? HF medication teaching  ? Low sodium diet  ? Follow up appointment within seven calendar days of d/c must include: date, time and location  ? Activity  ? Worsening symptoms    What if any of the above HF measures are contraindicated?  ? Request that the discharging provider document the medication/intervention and the contraindication specifically in a progress note  ? For example: “no CHF meds due to hypotension” is not enough. It needs to say: “No ACE-I, ARNI, ARB due to  hypotension”; “No Beta Blockade due to bradycardia”…

## 2021-08-13 NOTE — H&P
Hospital Medicine History & Physical Note    Date of Service  8/12/2021    Primary Care Physician  Checo Tesfaye M.D.    Consultants  Cardiology, assistance appreciated.    Code Status  Full Code    Chief Complaint  Chief Complaint   Patient presents with   • Diarrhea     BIB EMS, reports black diarrhea x 9 since last night, pt taking elequis and plavix for a-fib. reports stopping digoxin 10 days ago.     • Cough   • Abdominal Pain   • Shortness of Breath       History of Presenting Illness  Ivory Rowan is a 81 y.o. female who presented 8/12/2021 with a complaint of diarrhea and abdominal pain.  This appears to be a rather chronic condition for her.  She reports that she has had worsening palpitations since her diarrhea last night.  She was evaluated in our emergency department, found to be in A. fib with RVR.  She was also found to be in a mild to moderate exacerbation of her chronic diastolic heart failure.  She does endorse some dyspnea but this is not her primary complaint.  Primary complaint continues to be of abdominal pain and diarrhea which appear to be quite chronic and well managed by her PCP Checo Blevins.  Patient will be admitted for rate control given her A. fib with RVR, she tells me that her digoxin and Lasix were recently discontinued by her cardiologist.  She relates some frustration with this as she tells me that the digoxin is the only thing that lets her get around, and that without it she can't cross the street, can barely make it out of her living room.  Certainly seems to have a positive impact on her quality of life.      Acuity is acute, severity is moderate, timing is variable, aggravated by orthopnea, alleviated by digoxin, associated factors include abdominal pain and diarrhea, location is pulmonary, onset is occult, nature is of a breathlessness.    I discussed the plan of care with patient.    Review of Systems  All systems reviewed and negative except as noted per  above.    Past Medical History   has a past medical history of Atrial fibrillation (HCC), Basal cell carcinoma of skin of nose, CATARACT, CHF (congestive heart failure) (HCC), Colon polyp, Dental disorder, Dyslipidemia, Glaucoma, right eye, Heart burn, Hemorrhagic disorder (HCC), Hypertension, IBS (irritable bowel syndrome), Indigestion, MEDICAL HOME (10/23/12), Mitral valve regurgitation, Osteopenia (6/09), Perennial allergic rhinitis with seasonal variation, Persistent atrial fibrillation (HCC), Psychiatric problem, Type 2 diabetes mellitus, controlled (HCC), Valvular heart disease, and Vertigo.    Surgical History   has a past surgical history that includes breast biopsy; recovery (7/8/2016); us-needle core bx-breast panel; appendectomy; cholecystectomy; and colonoscopy (8/2009).     Family History  family history includes Cancer in her father, maternal aunt, paternal aunt, and sister; Diabetes in her mother; Genetic Disorder in her sister; Heart Disease in her brother; Hypertension in her mother; Stroke in her mother.       Social History   reports that she quit smoking about 25 years ago. Her smoking use included cigarettes. She has a 20.00 pack-year smoking history. She has never used smokeless tobacco. She reports previous alcohol use. She reports that she does not use drugs.    Allergies  Allergies   Allergen Reactions   • Atorvastatin      myalgias   • Simvastatin      myalgias       Medications  Prior to Admission Medications   Prescriptions Last Dose Informant Patient Reported? Taking?   apixaban (ELIQUIS) 2.5mg Tab 8/11/2021 at PM Patient No No   Sig: Take 1 tablet by mouth 2 times a day.   clopidogrel (PLAVIX) 75 MG Tab 8/11/2021 at AM Patient No No   Sig: Take 1 tablet by mouth every day.   diazePAM (VALIUM) 2 MG Tab 8/12/2021 at MIDDLE OF THE NIGHT  Patient Yes Yes   Sig: Take 2 mg by mouth one time.   ezetimibe (ZETIA) 10 MG Tab 8/11/2021 at AM Patient No No   Sig: Take 1 tablet by mouth every day.    lisinopril (PRINIVIL) 5 MG Tab 8/11/2021 at AM Patient No No   Sig: Take 1 tablet by mouth every day.   metoprolol SR (TOPROL XL) 100 MG TABLET SR 24 HR 8/11/2021 at PM Patient No No   Sig: Take 1 tablet by mouth 2 times a day.   pantoprazole (PROTONIX) 40 MG Tablet Delayed Response 8/11/2021 at AM Patient No No   Sig: Take 1 tablet by mouth every day.      Facility-Administered Medications: None       Physical Exam  Temp:  [37.2 °C (99 °F)-37.4 °C (99.4 °F)] 37.2 °C (99 °F)  Pulse:  [106-155] 120  Resp:  [17-20] 20  BP: (123-180)/() 180/94  SpO2:  [91 %-97 %] 91 %    General: No acute distress,   HEENT atraumatic, normocephalic, pupils equal round reactive to light  Neck: Mild to moderate jugular venous distention is noted.  Chest: Respirations are unlabored  Cardiac: Physiologic S1 and S2  Abdomen: Soft, nontender, nondistended  Extremities: Without clubbing, cyanosis.  1-2+ edema of the bilateral lower extremities.  Neuro: Cranial nerves II through XII are grossly intact.  Psych: Mildly anxious appearing elderly woman.         Laboratory:  Recent Labs     08/12/21  0729   WBC 7.6   RBC 4.00*   HEMOGLOBIN 10.5*   HEMATOCRIT 33.3*   MCV 83.3   MCH 26.3*   MCHC 31.5*   RDW 51.3*   PLATELETCT 170   MPV 9.6     Recent Labs     08/12/21  0729   SODIUM 137   POTASSIUM 4.4   CHLORIDE 105   CO2 21   GLUCOSE 161*   BUN 26*   CREATININE 1.02   CALCIUM 9.2     Recent Labs     08/12/21  0729   ALTSGPT 31   ASTSGOT 23   ALKPHOSPHAT 88   TBILIRUBIN 1.2   LIPASE 32   GLUCOSE 161*     Recent Labs     08/12/21  0729   APTT 32.1   INR 1.31*     Recent Labs     08/12/21  0729   NTPROBNP 5543*         Recent Labs     08/12/21  0729   TROPONINT 30*       Imaging:  CT-ABDOMEN-PELVIS WITH   Final Result      1.  No evidence of bowel obstruction.   2.  4.4 x 2.9 cm left ovarian cyst worrisome for a cystic neoplasm. Further evaluation with pelvic ultrasound is recommended.   3.  Small amount of free fluid in the pelvis.   4.   Heterogeneous appearance of the liver can be seen in the setting of congestion or hepatitis.   5.  Intra and extrahepatic biliary ductal dilatation. Correlation with LFTs is recommended.   6.  Mild renal cortical scarring bilaterally. Small hypodense renal lesions are too small to characterize.   7.  Atherosclerotic plaque.   8.  Small bilateral pleural effusions.   9.  Cardiomegaly.         DX-CHEST-PORTABLE (1 VIEW)   Final Result      Borderline cardiomegaly and mild interstitial edema. No focal consolidation or pleural effusions.      EC-ECHOCARDIOGRAM COMPLETE W/O CONT    (Results Pending)       Cardiology:  1.  Twelve-lead EKG discloses atrial fibrillation with a ventricular rate of 119 bpm, axis is normal, no ST segment elevations or deviations concerning for ischemia are seen.  VPCs confound computer read.  QTc 428 ms.  T waves are upright throughout the precordial leads.     Assessment/Plan:  I anticipate this patient will require at least two midnights for appropriate medical management, necessitating inpatient admission.    Atrial fibrillation with RVR ([facility])  Assessment & Plan  Continue home dose beta-blockade, titrate up as indicated, may need breakthrough IV beta-blocker for RVR if needed.  I do question if there is a component of medication adherence at play here patient is quite elderly and seems to have some early dementia/forgetfulness.  Continue home dose Eliquis.    Abdominal pain  Assessment & Plan  Continue home dose pantoprazole, will check a CT scan of the abdomen pelvis out of abundance of caution, monitor for diarrhea.    HFrEF (heart failure with reduced ejection fraction) ([facility])  Assessment & Plan  Assistance of cardiology greatly appreciated, continue home dose beta-blocker, afterload reduction with lisinopril will be increased from 5 to 10 mg as I believe it would be quite well-tolerated.  Continue Lasix per the direction of cardiology.  Digoxin will be resumed given the impact on her  quality of life.      VTE prophylaxis: therapeutic anticoagulation with eliquist

## 2021-08-13 NOTE — ASSESSMENT & PLAN NOTE
Continue ezetimibe.  Counseled that her diarrhea could be a possible s.e of ezetimibe.  She will f/p with her doctor to change it to another lipid lowering med.

## 2021-08-13 NOTE — THERAPY
Physical Therapy   Initial Evaluation     Patient Name: Ivory Rowan  Age:  81 y.o., Sex:  female  Medical Record #: [id number]  Today's Date: 8/13/2021     Precautions: Fall Risk (impulsive)    Assessment  After initial evaluation pt has no further acute PT needs. She is able to demonstrate ambulatory activity with no AD with no LOB and appears likely to be at her functional baseline. Primary concerns from this PT are regarding sustained and irregular elevated HR beyond max HR with limited activity (though note pt is asymptomatic). Also query pt's higher level problem solving and higher level cognition (concerns would be related to med management, etc) and would recommend formal cognitive evaluation for possible home health/assist with med management needs at time of dc. See below for details/recs.     Plan    Recommend Physical Therapy for Evaluation only     DC Equipment Recommendations: None  Discharge Recommendations: Anticipate that the patient will have no further physical therapy needs after discharge from the hospital (concerns are primarily cardiac, as well as cognitive in nature with re: dc planning concerns)        08/13/21 1011   Prior Living Situation   Prior Services None;Home-Independent   Housing / Facility 1 Story Apartment / Condo   Steps Into Home 0   Steps In Home 0   Bathroom Set up Bathtub / Shower Combination;Grab Bars;Shower Chair   Equipment Owned Tub / Shower Seat;Grab Bar(s) In Tub / Shower   Lives with - Patient's Self Care Capacity Alone and Able to Care For Self   Comments Pt denies support; states her daughter lives locally but is busy with her own family (4 step children). Pt reports being indepedent PTA.    Prior Level of Functional Mobility   Bed Mobility Independent   Transfer Status Independent   Ambulation Independent   Distance Ambulation (Feet)   (appearsd mostly household; limited community at times)   Assistive Devices Used None   Stairs Independent   Comments I with IADLs  and ADLs prior; however query pt's problem solving/possible concerns with med management   Cognition    Cognition / Consciousness X   Safety Awareness Impulsive   Comments very pleasant and cooperative; however query pt's higher level cognition/problem solving (ie pt reports believes 02 dropping 2' to NC being in nose)   Passive ROM Lower Body   Passive ROM Lower Body WDL   Active ROM Lower Body    Active ROM Lower Body  WDL   Strength Lower Body   Lower Body Strength  WDL   Sensation Lower Body   Lower Extremity Sensation   WDL   Balance Assessment   Sitting Balance (Static) Good   Sitting Balance (Dynamic) Fair +   Standing Balance (Static) Fair +   Standing Balance (Dynamic) Fair   Weight Shift Sitting Good   Weight Shift Standing Fair   Comments 1 slight LOB whens standing from toilet; appears 2' impulsivity/poor safety awareness re: unfamiliar environment   Gait Analysis   Gait Level Of Assist Supervised   Assistive Device None   Distance (Feet) 20   # of Times Distance was Traveled 2   Deviation Other (Comment)  (reciprocal gait; steady)   # of Stairs Climbed 0   Weight Bearing Status no restrictions   Comments limited 2' to concerns with sustained elevated HR beyond max HR during ambulatory/exertional activity    Bed Mobility    Supine to Sit Modified Independent   Sit to Supine Modified Independent   Comments HOB flat; pt uses rails though did not appear reliant   Functional Mobility   Sit to Stand Supervised   Bed, Chair, Wheelchair Transfer Supervised   Transfer Method Stand Step   Mobility with no AD   How much difficulty does the patient currently have...   Turning over in bed (including adjusting bedclothes, sheets and blankets)? 4   Sitting down on and standing up from a chair with arms (e.g., wheelchair, bedside commode, etc.) 4   Moving from lying on back to sitting on the side of the bed? 4   How much help from another person does the patient currently need...   Moving to and from a bed to a chair  (including a wheelchair)? 4   Need to walk in a hospital room? 4   Climbing 3-5 steps with a railing? 3   6 clicks Mobility Score 23   Activity Tolerance   Sitting in Chair at least 5 mins   Sitting Edge of Bed at least 5 mins   Standing ~3-4 mins   Comments HR essentially appears irregular throughout visit per monitor; noted HR at rest ~90s<>110s -> during ambulation elevating 130s<>160s and limited activity given concerns with pt sustaining max HR with increasing exertional activity ; noted returns to 90s<>110s when returned to rest/supine   Edema / Skin Assessment   Edema / Skin  Not Assessed   Education Group   Education Provided Role of Physical Therapist   Role of Physical Therapist Patient Response Patient;Acceptance;Explanation;Verbal Demonstration

## 2021-08-13 NOTE — ASSESSMENT & PLAN NOTE
Exacerbation.  Recently digoxin and furosemide were stopped that led to the current exacerbation.  Added back digoxin (loading plus maintenance dose) and furosemide.  Monitor     Currently requires oxygen, will continue to monitor.

## 2021-08-13 NOTE — ASSESSMENT & PLAN NOTE
Continue home dose beta-blockade, titrate up as indicated, may need breakthrough IV beta-blocker for RVR if needed.  I do question if there is a component of medication adherence at play here patient is quite elderly and seems to have some early dementia/forgetfulness.  Continue home dose Eliquis.

## 2021-08-13 NOTE — THERAPY
"Occupational Therapy   Initial Evaluation     Patient Name: Ivory Rowan  Age:  81 y.o., Sex:  female  Medical Record #: [id number]  Today's Date: 8/13/2021       Precautions: Fall Risk (slight, due to impulsivity)    Assessment    Patient is 81 y.o. female with h/o a-fib, CHF, HTN, IBS, DMII, MV regurgitation, admitted with MENDEZ, abdominal pain, diarrhea. Pt seen for OT eval. Basic cognition intact. BUE intact for AROM, strength, coordination. Pt completed LB dressing, bed mobility, transfers, short gait without AD, standing grooming. Pt moves impulsively and quickly. Has 1 LOB standing from toilet but appears due to moving too quickly and not using grab bar (has bars in place at home). Pt incontinent of urine when she stood from EOB, but felt it was due to diuretic. HR labile and elevated with OOB activity (110s - 160s). Returned pt to supine due to tachycardia and anticipated ECHO. Pt appears to be at or near baseline function from OT standpoint in this setting. Patient will not be actively followed for occupational therapy services at this time, however may be seen if requested by physician for 1 more visit within 30 days to address any discharge or equipment needs.     Plan    Recommend Occupational Therapy for Evaluation only. DC needs only.     DC Equipment Recommendations: None  Discharge Recommendations: Anticipate that the patient will have no further occupational therapy needs after discharge from the hospital     Subjective    \"I don't have any friends or family. My daughter is busy with her own family and doesn't really stay in touch with me.\"     Objective       08/13/21 0949   Prior Living Situation   Prior Services None;Home-Independent   Housing / Facility 1 Story Apartment / Condo   Steps Into Home 0   Steps In Home 0   Bathroom Set up Bathtub / Shower Combination;Grab Bars;Shower Chair   Equipment Owned Tub / Shower Seat;Grab Bar(s) In Tub / Shower;Grab Bar(s) By Toilet   Comments Pt denies " support; states her daughter lives locally but is busy with her own family (4 step children). Pt reports being indepedent PTA.    Prior Level of ADL Function    Pt was independent with BADL PTA   Prior Level of IADL Function    Pt was independent with I-ADL and functional mobility PTA   Cognition    Safety Awareness Impulsive   Balance Assessment   Sitting Balance (Static) Good   Sitting Balance (Dynamic) Fair +   Standing Balance (Static) Fair +   Standing Balance (Dynamic) Fair   Weight Shift Sitting Good   Weight Shift Standing Fair   Comments 1 small LOB standing from toilet; appeared due to moving too quickly    Bed Mobility    Supine to Sit Modified Independent   Sit to Supine Modified Independent   Scooting Supervised  (seated)   Comments flat bed, used rail but appeared able to mobilize without   ADL Assessment   Grooming Supervision;Standing  (hand hygiene at sink )   Lower Body Dressing Supervision  (doff B socks, don slippers )   Toileting Supervision  (urine incontinence with standing (on Lasix))   Functional Mobility   Sit to Stand Supervised   Bed, Chair, Wheelchair Transfer Supervised   Toilet Transfers Minimal Assist  (CGA due to impulsivity)   Transfer Method Stand Step  (no AD)   Mobility Supine > < EOB, short gait and transfers without AD

## 2021-08-13 NOTE — NON-PROVIDER
"Daily Progress Note:     Date of Service: 8/13/2021  Primary Team: orange/darshan  Attending: Alonzo Xiong M.D.   Medical student: Liu Stephen, Student      Chief Complaint:   Palpitations    ID: Patient is an 81 year old female with a PMH of afib, chf, chronic diarrhea who presents with palpitations, SOB, diarrhea and abdominal pain.      Subjective:   Patient reports she continues to have occasional SOB and palpitations today although this is improved from yesterday.  She continues to have \"very black\" loose bowel movements.  Patient is annoyed about the status of her hospitalization and would like to leave as soon as possible.     Consultants/Specialty:  Cardiology    Review of Systems:    Review of Systems   Constitutional: Negative for chills, diaphoresis, fever and malaise/fatigue.   HENT: Negative for hearing loss and tinnitus.    Eyes: Negative for blurred vision and double vision.   Respiratory: Positive for shortness of breath. Negative for cough and sputum production.    Cardiovascular: Positive for palpitations and leg swelling. Negative for chest pain.   Gastrointestinal: Positive for diarrhea (black colored). Negative for nausea and vomiting.   Genitourinary: Negative for dysuria, frequency and urgency.   Neurological: Negative for dizziness and headaches.       Objective:   Physical Exam:   Vitals:   Temp:  [36.4 °C (97.6 °F)-37.2 °C (99 °F)] 36.6 °C (97.8 °F)  Pulse:  [] 94  Resp:  [18-20] 18  BP: (112-180)/(60-94) 120/62  SpO2:  [88 %-95 %] 92 %    Physical Exam  Constitutional:       Appearance: Normal appearance.   HENT:      Mouth/Throat:      Mouth: Mucous membranes are moist.   Cardiovascular:      Rate and Rhythm: Tachycardia present. Rhythm irregular.      Pulses: Normal pulses.      Heart sounds: Normal heart sounds.   Pulmonary:      Effort: Pulmonary effort is normal.      Breath sounds: Normal breath sounds.   Musculoskeletal:      Right lower leg: Edema present.      Left " lower leg: Edema present.      Comments: 3+ edema   Skin:     General: Skin is warm.      Capillary Refill: Capillary refill takes less than 2 seconds.   Neurological:      General: No focal deficit present.      Mental Status: She is alert.           Labs:   Recent Labs     08/12/21  0729 08/13/21  0353   WBC 7.6 8.2   RBC 4.00* 4.44   HEMOGLOBIN 10.5* 11.6*   HEMATOCRIT 33.3* 36.3*   MCV 83.3 81.8   MCH 26.3* 26.1*   RDW 51.3* 49.0   PLATELETCT 170 189   MPV 9.6 9.8   NEUTSPOLYS 80.80*  --    LYMPHOCYTES 7.00*  --    MONOCYTES 10.90  --    EOSINOPHILS 0.50  --    BASOPHILS 0.40  --      Recent Labs     08/12/21  0729 08/13/21  0353   SODIUM 137 141   POTASSIUM 4.4 3.7   CHLORIDE 105 102   CO2 21 28   GLUCOSE 161* 118*   BUN 26* 25*     Recent Labs     08/12/21 0729 08/13/21  0353   ALBUMIN 3.7  --    TBILIRUBIN 1.2  --    ALKPHOSPHAT 88  --    TOTPROTEIN 6.5  --    ALTSGPT 31  --    ASTSGOT 23  --    CREATININE 1.02 1.05       Imaging:   EC-ECHOCARDIOGRAM COMPLETE W/O CONT   Final Result      CT-ABDOMEN-PELVIS WITH   Final Result      1.  No evidence of bowel obstruction.   2.  4.4 x 2.9 cm left ovarian cyst worrisome for a cystic neoplasm. Further evaluation with pelvic ultrasound is recommended.   3.  Small amount of free fluid in the pelvis.   4.  Heterogeneous appearance of the liver can be seen in the setting of congestion or hepatitis.   5.  Intra and extrahepatic biliary ductal dilatation. Correlation with LFTs is recommended.   6.  Mild renal cortical scarring bilaterally. Small hypodense renal lesions are too small to characterize.   7.  Atherosclerotic plaque.   8.  Small bilateral pleural effusions.   9.  Cardiomegaly.         DX-CHEST-PORTABLE (1 VIEW)   Final Result      Borderline cardiomegaly and mild interstitial edema. No focal consolidation or pleural effusions.          Assessment and Plan:  Atrial fibrillation with RVR: Patient has been in Afib since admission.  She was recently taken off her  digoxin medication 10 days ago and now has an acute heart failure exacerbation.  HR has been more controlled recently.  -Started loading dose of digoxin 250mg BID  -continue metoprolol xl 100mg BID  -Apixaban for anticoagulation    Acute CHF exacerbation: Patient reports more swelling, SOB, and palpitations after her furosemide, digoxin and lisinopril were discontinued since her visit to the cardiology 3 weeks ago.  Patient reports she has also been drinking more water lately.  She currently is fluid overloaded with 3+edema in her legs, no JVD.  Currently patient needs 2L of oxygen which is abnormal from baseline.  - digoxin, metoprolol   -lasix 40mg BID  -monitor volume status    Chronic Diarrhea- Patient has had chronic diarrhea approximately for 1 year since CAD episode.  Patient's home med of ezetimibe prescribed approximately same time as diarrhea began.  GI saw patient previously and is being managed outpatient.  Last colonoscopy 2009.   -PPI 20mg   -discontinued ezetimibe to see if diarrhea improves    DM2: Currently at 6.7 without any medications.  BG have been ideal.  -continue to manage with diet    Ovarian mass: Incidental ovarian mass finding on CT scan.  -follow up outpatient for US    CAD: Patient had stent placement in 6/20 after exercise stress test was positive.  -clopidogrel 75mg home dose

## 2021-08-13 NOTE — PROGRESS NOTES
Cardiology Follow Up Progress Note    Date of Service  8/13/2021    Attending Physician  Alonzo Xiong M.D.    Chief Complaint   Diarrhea, cough, abdominal pain, shortness of breath    HPI  Ivory Rowan is a 81 y.o. female admitted 8/12/2021 with A. fib on apixaban, HFrEF with recovered EF, CAD s/p PCI to RCA 6/18/2020, HTN, and DM presented with abdominal pain and shortness of breath    Interim Events  8/13/2021: A. fib 110-120 up to 150 nonsustained  Patient denies chest pain, palpitations, orthopnea, lower extremity edema  VSS, labs reviewed    Review of Systems  Review of Systems   Constitutional: Negative for chills and fever.   Respiratory: Negative for cough, chest tightness, shortness of breath and wheezing.    Cardiovascular: Positive for palpitations. Negative for chest pain and leg swelling.   Gastrointestinal: Negative for nausea and vomiting.   Neurological: Negative for dizziness and light-headedness.   All other systems reviewed and are negative.      Vital signs in last 24 hours  Temp:  [36.4 °C (97.6 °F)-37.4 °C (99.4 °F)] 36.6 °C (97.8 °F)  Pulse:  [] 95  Resp:  [17-20] 18  BP: (112-180)/() 127/67  SpO2:  [91 %-97 %] 95 %    Physical Exam  Physical Exam  Vitals and nursing note reviewed.   Constitutional:       Appearance: Normal appearance.   HENT:      Head: Normocephalic and atraumatic.      Mouth/Throat:      Mouth: Mucous membranes are moist.   Eyes:      Extraocular Movements: Extraocular movements intact.   Neck:      Comments: No JVD  Cardiovascular:      Rate and Rhythm: Tachycardia present. Rhythm irregularly irregular.      Pulses: Normal pulses.           Radial pulses are 2+ on the right side and 2+ on the left side.      Heart sounds: Normal heart sounds. No murmur heard.        Comments: No edema  Pulmonary:      Effort: Pulmonary effort is normal.      Breath sounds: Normal breath sounds.   Abdominal:      Palpations: Abdomen is soft.   Musculoskeletal:       Cervical back: Normal range of motion and neck supple.   Skin:     General: Skin is warm and dry.   Neurological:      General: No focal deficit present.      Mental Status: She is alert and oriented to person, place, and time.   Psychiatric:         Mood and Affect: Mood normal.         Behavior: Behavior normal.         Lab Review  Lab Results   Component Value Date/Time    WBC 8.2 08/13/2021 03:53 AM    RBC 4.44 08/13/2021 03:53 AM    HEMOGLOBIN 11.6 (L) 08/13/2021 03:53 AM    HEMATOCRIT 36.3 (L) 08/13/2021 03:53 AM    MCV 81.8 08/13/2021 03:53 AM    MCH 26.1 (L) 08/13/2021 03:53 AM    MCHC 32.0 (L) 08/13/2021 03:53 AM    MPV 9.8 08/13/2021 03:53 AM      Lab Results   Component Value Date/Time    SODIUM 141 08/13/2021 03:53 AM    POTASSIUM 3.7 08/13/2021 03:53 AM    CHLORIDE 102 08/13/2021 03:53 AM    CO2 28 08/13/2021 03:53 AM    GLUCOSE 118 (H) 08/13/2021 03:53 AM    BUN 25 (H) 08/13/2021 03:53 AM    CREATININE 1.05 08/13/2021 03:53 AM    CREATININE 0.99 04/11/2011 12:00 AM    BUNCREATRAT 26 04/11/2011 12:00 AM    GLOMRATE 56 (L) 03/09/2011 07:55 AM      Lab Results   Component Value Date/Time    ASTSGOT 23 08/12/2021 07:29 AM    ALTSGPT 31 08/12/2021 07:29 AM     Lab Results   Component Value Date/Time    CHOLSTRLTOT 144 06/10/2021 07:14 AM    LDL 77 06/10/2021 07:14 AM    HDL 50 06/10/2021 07:14 AM    TRIGLYCERIDE 86 06/10/2021 07:14 AM    TROPONINT 30 (H) 08/12/2021 07:29 AM       Recent Labs     08/12/21  0729   NTPROBNP 5543*       Cardiac Imaging and Procedures Review  EKG:  My personal interpretation of the EKG dated 2021 is ATremayne gannon.    Echocardiogram: 8/13/2021  CONCLUSIONS  Patient in atrial fibrillation, heart rate 120 bpm.  Left ventricular ejection fraction is visually estimated to be 55%,   [id number] variability in atrial fibrillation.  Trivial aortic sclerosis without stenosis.  Moderate eccentric mitral regurgitation.  Estimated right ventricular systolic pressure  is 40 mmHg.     Compared to  the images of the prior study done on 08/08/19 EF is   normal, MR appears moderate, RVSP is reduced.        Imaging  Chest X-Ray:  Borderline cardiomegaly and mild interstitial edema. No focal consolidation or pleural effusions.        Assessment/Plan  1. A fib  -Agree with restarting dig  -Patient declined DCCV  -Continue metoprolol  mg twice daily and apixaban 2.5 mg daily    2. H/O HFrEF  -Recovered EF now 55%  -Continue metoprolol  mg twice daily and lisinopril 10 mg daily    Thank you for allowing me to participate in the care of this patient.      Please contact me with any questions.        WYATT Coppola  [facility]  [phone]    Future Appointments   Date Time Provider Department Center   12/14/2021  9:45 AM Shen Washington M.D. [facility] None       Please note that this dictation was created using voice recognition software. I have worked with consultants from the vendor as well as technical experts from [facility] to optimize the interface. I have made every reasonable attempt to correct obvious errors, but I expect that there are errors of grammar and possibly content I did not discover before finalizing the note.

## 2021-08-13 NOTE — CARE PLAN
The patient is Stable - Low risk of patient condition declining or worsening    Shift Goals  Clinical Goals: Hemodynamically stable  Patient Goals: Rest    Progress made toward(s) clinical / shift goals:  Patient remains free from pain and free from falls.     Patient is not progressing towards the following goals:      Problem: Knowledge Deficit - Standard  Goal: Patient and family/care givers will demonstrate understanding of plan of care, disease process/condition, diagnostic tests and medications  Outcome: Not Met

## 2021-08-13 NOTE — ASSESSMENT & PLAN NOTE
She has been having abd pain and diarrhea since the last year. Discussed that it could be the possible side effect of ezetimibe,   Patient will discuss it with her PCP and decide to change it to another lipid lower med with her PCP.     Hold the ezetimibe in hosp, no stool since yesterday.  Pending stool studies.

## 2021-08-13 NOTE — ASSESSMENT & PLAN NOTE
Continue home dose pantoprazole, will check a CT scan of the abdomen pelvis out of abundance of caution, monitor for diarrhea.

## 2021-08-13 NOTE — PROGRESS NOTES
Report received from Maia Burris. Assumed pt care. Pt is on 3 L 02 NC. Pt A&O x 4. Tele box on, monitor room notified. Fall precautions in place, call light and belongings within reach, bed in lowest position. No signs of distress.

## 2021-08-13 NOTE — ASSESSMENT & PLAN NOTE
CT abd pelvis showed:  4.4 x 2.9 cm left ovarian cyst worrisome for a cystic neoplasm. Further evaluation with pelvic ultrasound is recommended. Small amount of free fluid in the pelvis.  Follow up outpatient.

## 2021-08-13 NOTE — ASSESSMENT & PLAN NOTE
Patients digoxin and furosemide were recently d/c leading to her current presentation.  Added back digoxin and furosemide to normalize her volume status.

## 2021-08-13 NOTE — ASSESSMENT & PLAN NOTE
Heart rate has been unstable, going up to the 150s.  Added digoxin first loading dose and then maintenance dose onwards.  Continue metoprolol SR and eliquis

## 2021-08-13 NOTE — ASSESSMENT & PLAN NOTE
Assistance of cardiology greatly appreciated, continue home dose beta-blocker, afterload reduction with lisinopril will be increased from 5 to 10 mg as I believe it would be quite well-tolerated.  Continue Lasix per the direction of cardiology.  Digoxin will be resumed given the impact on her quality of life.

## 2021-08-13 NOTE — PROGRESS NOTES
Daily Progress Note:     Date of Service: 8/13/2021  Primary Team: [employer] [facility] White Team   Attending: Alonzo Xiong M.D.   Senior Resident: Dr. Karissa Lawrence   Intern: Dr. Layla Stephenson   Contact:  [phone]    Chief Complaint:   80 y/o female with A.fibb on elequis and plavix p/e Shortness of breath. Digoxin had been d/c 10 days ago.   Subjective:  Denies any acute events overnight. Denies any SOB, Cough, abd pain, feber or chills.     Consultants/Specialty:    Review of Systems:   Review of Systems   Constitutional: Negative for chills and fever.   HENT: Negative for ear discharge and hearing loss.    Eyes: Negative for discharge and redness.   Respiratory: Negative for cough, hemoptysis and shortness of breath.    Cardiovascular: Negative for chest pain and leg swelling.   Gastrointestinal: Positive for abdominal pain and diarrhea. Negative for nausea and vomiting.   Genitourinary: Negative for frequency and urgency.   Musculoskeletal: Negative for myalgias.   Skin: Negative for rash.       Objective Data:   Vitals:   Temp:  [36.4 °C (97.6 °F)-36.8 °C (98.3 °F)] 36.8 °C (98.3 °F)  Pulse:  [] 90  Resp:  [18] 18  BP: (112-127)/(60-90) 118/65  SpO2:  [88 %-95 %] 93 %  Physical Exam:   Physical Exam  Constitutional:       General: She is not in acute distress.     Appearance: Normal appearance. She is normal weight. She is not ill-appearing or toxic-appearing.   HENT:      Head: Normocephalic and atraumatic.      Nose: Nose normal. No congestion.      Mouth/Throat:      Mouth: Mucous membranes are moist.   Eyes:      General:         Right eye: No discharge.         Left eye: No discharge.   Cardiovascular:      Rate and Rhythm: Normal rate and regular rhythm.   Pulmonary:      Effort: Pulmonary effort is normal.      Breath sounds: Normal breath sounds.   Abdominal:      General: Abdomen is flat. Bowel sounds are normal. There is no distension.      Palpations: Abdomen is soft.   Musculoskeletal:      Cervical  back: No rigidity.   Skin:     General: Skin is warm and dry.   Neurological:      Mental Status: She is alert and oriented to person, place, and time.         Labs:   Recent Labs     08/12/21  0729 08/13/21  0353   WBC 7.6 8.2   RBC 4.00* 4.44   HEMOGLOBIN 10.5* 11.6*   HEMATOCRIT 33.3* 36.3*   MCV 83.3 81.8   MCH 26.3* 26.1*   RDW 51.3* 49.0   PLATELETCT 170 189   MPV 9.6 9.8   NEUTSPOLYS 80.80*  --    LYMPHOCYTES 7.00*  --    MONOCYTES 10.90  --    EOSINOPHILS 0.50  --    BASOPHILS 0.40  --      Recent Labs     08/12/21  0729 08/13/21  0353   SODIUM 137 141   POTASSIUM 4.4 3.7   CHLORIDE 105 102   CO2 21 28   GLUCOSE 161* 118*   BUN 26* 25*       Imaging:   EC-ECHOCARDIOGRAM COMPLETE W/O CONT   Final Result      CT-ABDOMEN-PELVIS WITH   Final Result      1.  No evidence of bowel obstruction.   2.  4.4 x 2.9 cm left ovarian cyst worrisome for a cystic neoplasm. Further evaluation with pelvic ultrasound is recommended.   3.  Small amount of free fluid in the pelvis.   4.  Heterogeneous appearance of the liver can be seen in the setting of congestion or hepatitis.   5.  Intra and extrahepatic biliary ductal dilatation. Correlation with LFTs is recommended.   6.  Mild renal cortical scarring bilaterally. Small hypodense renal lesions are too small to characterize.   7.  Atherosclerotic plaque.   8.  Small bilateral pleural effusions.   9.  Cardiomegaly.         DX-CHEST-PORTABLE (1 VIEW)   Final Result      Borderline cardiomegaly and mild interstitial edema. No focal consolidation or pleural effusions.           Abdominal pain  Assessment & Plan  She has been having abd pain and diarrhea since the last year. Discussed that it could be the possible side effect of ezetimibe,   Patient will discuss it with her PCP and decide to change it to another lipid lower med with her PCP.     Chronic diastolic heart failure (HCC)- (present on admission)  Assessment & Plan  Exacerbation.  Recently digoxin and furosemide were  stopped that led to the current exacerbation.  Added back digoxin (loading plus maintenance dose) and furosemide.  Monitor     Currently requires oxygen, will continue to monitor.     Atrial fibrillation with RVR (HCC)  Assessment & Plan  Heart rate has been unstable, going up to the 150s.  Added digoxin first loading dose and then maintenance dose onwards.  Continue metoprolol SR and eliquis    Ovarian cyst  Assessment & Plan  CT abd pelvis showed:  4.4 x 2.9 cm left ovarian cyst worrisome for a cystic neoplasm. Further evaluation with pelvic ultrasound is recommended. Small amount of free fluid in the pelvis.  Follow up outpatient.     Hypertension  Assessment & Plan  Continue lisinopril  Added back furosemide to her meds    Hyperlipidemia  Assessment & Plan  Continue ezetimibe.  Counseled that her diarrhea could be a possible s.e of ezetimibe.  She will f/p with her doctor to change it to another lipid lowering med.

## 2021-08-14 PROBLEM — N17.9 ACUTE KIDNEY FAILURE (HCC): Status: ACTIVE | Noted: 2021-08-14

## 2021-08-14 LAB
ANION GAP SERPL CALC-SCNC: 12 MMOL/L (ref 7–16)
BASOPHILS # BLD AUTO: 0.8 % (ref 0–1.8)
BASOPHILS # BLD: 0.06 K/UL (ref 0–0.12)
BUN SERPL-MCNC: 45 MG/DL (ref 8–22)
CALCIUM SERPL-MCNC: 8.9 MG/DL (ref 8.5–10.5)
CHLORIDE SERPL-SCNC: 99 MMOL/L (ref 96–112)
CHOLEST SERPL-MCNC: 116 MG/DL (ref 100–199)
CO2 SERPL-SCNC: 28 MMOL/L (ref 20–33)
CREAT SERPL-MCNC: 1.44 MG/DL (ref 0.5–1.4)
EOSINOPHIL # BLD AUTO: 0.14 K/UL (ref 0–0.51)
EOSINOPHIL NFR BLD: 1.9 % (ref 0–6.9)
ERYTHROCYTE [DISTWIDTH] IN BLOOD BY AUTOMATED COUNT: 49.7 FL (ref 35.9–50)
GLUCOSE SERPL-MCNC: 119 MG/DL (ref 65–99)
HCT VFR BLD AUTO: 37.4 % (ref 37–47)
HDLC SERPL-MCNC: 39 MG/DL
HGB BLD-MCNC: 11.9 G/DL (ref 12–16)
IMM GRANULOCYTES # BLD AUTO: 0.04 K/UL (ref 0–0.11)
IMM GRANULOCYTES NFR BLD AUTO: 0.5 % (ref 0–0.9)
LDLC SERPL CALC-MCNC: 60 MG/DL
LYMPHOCYTES # BLD AUTO: 0.9 K/UL (ref 1–4.8)
LYMPHOCYTES NFR BLD: 12.2 % (ref 22–41)
MAGNESIUM SERPL-MCNC: 1.7 MG/DL (ref 1.5–2.5)
MCH RBC QN AUTO: 26.4 PG (ref 27–33)
MCHC RBC AUTO-ENTMCNC: 31.8 G/DL (ref 33.6–35)
MCV RBC AUTO: 82.9 FL (ref 81.4–97.8)
MONOCYTES # BLD AUTO: 0.9 K/UL (ref 0–0.85)
MONOCYTES NFR BLD AUTO: 12.2 % (ref 0–13.4)
NEUTROPHILS # BLD AUTO: 5.33 K/UL (ref 2–7.15)
NEUTROPHILS NFR BLD: 72.4 % (ref 44–72)
NRBC # BLD AUTO: 0 K/UL
NRBC BLD-RTO: 0 /100 WBC
PHOSPHATE SERPL-MCNC: 3.6 MG/DL (ref 2.5–4.5)
PLATELET # BLD AUTO: 195 K/UL (ref 164–446)
PMV BLD AUTO: 9.9 FL (ref 9–12.9)
POTASSIUM SERPL-SCNC: 3.2 MMOL/L (ref 3.6–5.5)
RBC # BLD AUTO: 4.51 M/UL (ref 4.2–5.4)
SODIUM SERPL-SCNC: 139 MMOL/L (ref 135–145)
TRIGL SERPL-MCNC: 86 MG/DL (ref 0–149)
WBC # BLD AUTO: 7.4 K/UL (ref 4.8–10.8)

## 2021-08-14 PROCEDURE — 770020 HCHG ROOM/CARE - TELE (206)

## 2021-08-14 PROCEDURE — 99232 SBSQ HOSP IP/OBS MODERATE 35: CPT | Performed by: STUDENT IN AN ORGANIZED HEALTH CARE EDUCATION/TRAINING PROGRAM

## 2021-08-14 PROCEDURE — 99232 SBSQ HOSP IP/OBS MODERATE 35: CPT | Mod: GC | Performed by: INTERNAL MEDICINE

## 2021-08-14 PROCEDURE — 700102 HCHG RX REV CODE 250 W/ 637 OVERRIDE(OP): Performed by: STUDENT IN AN ORGANIZED HEALTH CARE EDUCATION/TRAINING PROGRAM

## 2021-08-14 PROCEDURE — 85025 COMPLETE CBC W/AUTO DIFF WBC: CPT

## 2021-08-14 PROCEDURE — 700111 HCHG RX REV CODE 636 W/ 250 OVERRIDE (IP): Performed by: STUDENT IN AN ORGANIZED HEALTH CARE EDUCATION/TRAINING PROGRAM

## 2021-08-14 PROCEDURE — 83735 ASSAY OF MAGNESIUM: CPT

## 2021-08-14 PROCEDURE — A9270 NON-COVERED ITEM OR SERVICE: HCPCS | Performed by: STUDENT IN AN ORGANIZED HEALTH CARE EDUCATION/TRAINING PROGRAM

## 2021-08-14 PROCEDURE — 80061 LIPID PANEL: CPT

## 2021-08-14 PROCEDURE — 84100 ASSAY OF PHOSPHORUS: CPT

## 2021-08-14 PROCEDURE — 700102 HCHG RX REV CODE 250 W/ 637 OVERRIDE(OP): Performed by: HOSPITALIST

## 2021-08-14 PROCEDURE — 80048 BASIC METABOLIC PNL TOTAL CA: CPT

## 2021-08-14 PROCEDURE — A9270 NON-COVERED ITEM OR SERVICE: HCPCS | Performed by: HOSPITALIST

## 2021-08-14 RX ORDER — CHOLECALCIFEROL (VITAMIN D3) 125 MCG
5 CAPSULE ORAL NIGHTLY
Status: DISCONTINUED | OUTPATIENT
Start: 2021-08-15 | End: 2021-08-15 | Stop reason: HOSPADM

## 2021-08-14 RX ORDER — MAGNESIUM SULFATE 1 G/100ML
1 INJECTION INTRAVENOUS ONCE
Status: COMPLETED | OUTPATIENT
Start: 2021-08-14 | End: 2021-08-14

## 2021-08-14 RX ORDER — FUROSEMIDE 10 MG/ML
40 INJECTION INTRAMUSCULAR; INTRAVENOUS
Status: DISCONTINUED | OUTPATIENT
Start: 2021-08-14 | End: 2021-08-15 | Stop reason: HOSPADM

## 2021-08-14 RX ORDER — DIGOXIN 125 MCG
125 TABLET ORAL
Status: DISCONTINUED | OUTPATIENT
Start: 2021-08-15 | End: 2021-08-15 | Stop reason: HOSPADM

## 2021-08-14 RX ORDER — POTASSIUM CHLORIDE 20 MEQ/1
40 TABLET, EXTENDED RELEASE ORAL 2 TIMES DAILY
Status: DISCONTINUED | OUTPATIENT
Start: 2021-08-14 | End: 2021-08-15 | Stop reason: HOSPADM

## 2021-08-14 RX ADMIN — APIXABAN 2.5 MG: 2.5 TABLET, FILM COATED ORAL at 18:12

## 2021-08-14 RX ADMIN — POLYETHYLENE GLYCOL 3350 1 PACKET: 17 POWDER, FOR SOLUTION ORAL at 06:56

## 2021-08-14 RX ADMIN — OMEPRAZOLE 20 MG: 20 CAPSULE, DELAYED RELEASE ORAL at 10:04

## 2021-08-14 RX ADMIN — POTASSIUM CHLORIDE 40 MEQ: 1500 TABLET, EXTENDED RELEASE ORAL at 10:04

## 2021-08-14 RX ADMIN — CLOPIDOGREL BISULFATE 75 MG: 75 TABLET ORAL at 10:05

## 2021-08-14 RX ADMIN — POTASSIUM CHLORIDE 40 MEQ: 1500 TABLET, EXTENDED RELEASE ORAL at 18:12

## 2021-08-14 RX ADMIN — FUROSEMIDE 40 MG: 10 INJECTION, SOLUTION INTRAMUSCULAR; INTRAVENOUS at 11:12

## 2021-08-14 RX ADMIN — Medication 5 MG: at 23:58

## 2021-08-14 RX ADMIN — MAGNESIUM SULFATE IN DEXTROSE 1 G: 10 INJECTION, SOLUTION INTRAVENOUS at 06:40

## 2021-08-14 RX ADMIN — APIXABAN 2.5 MG: 2.5 TABLET, FILM COATED ORAL at 10:05

## 2021-08-14 ASSESSMENT — ENCOUNTER SYMPTOMS
BLURRED VISION: 0
EYE DISCHARGE: 0
MYALGIAS: 0
SPUTUM PRODUCTION: 0
DOUBLE VISION: 0
FEVER: 0
CHILLS: 0
CHEST TIGHTNESS: 0
DIARRHEA: 0
SHORTNESS OF BREATH: 1
COUGH: 0
SHORTNESS OF BREATH: 0
DIAPHORESIS: 0
DIZZINESS: 0
EYE REDNESS: 0
ABDOMINAL PAIN: 0
VOMITING: 0
HEADACHES: 0
LIGHT-HEADEDNESS: 0
NAUSEA: 0
WHEEZING: 0
PALPITATIONS: 0
HEMOPTYSIS: 0

## 2021-08-14 ASSESSMENT — PAIN DESCRIPTION - PAIN TYPE
TYPE: ACUTE PAIN
TYPE: ACUTE PAIN

## 2021-08-14 NOTE — PROGRESS NOTES
Report received at bedside from W[facility] Dan, pt care assumed, tele box on. Pt aaox4, no signs of distress noted at this time. Patient resting comfortably in bed. POC discussed with pt and verbalizes no questions. Pt c/o of no pain. Pt denies any additional needs at this time. Bed in lowest position, pt educated on fall risk and verbalized understanding, call light within reach, bed alarm plugged in and on.

## 2021-08-14 NOTE — PROGRESS NOTES
Daily Progress Note:     Date of Service: 8/14/2021  Primary Team: [employer] [facility] White Team   Attending: Alonzo Xiong M.D.   Senior Resident: Dr. Karissa Lawrence   Intern: Dr. Layla Stephenson   Contact:  [phone]    Chief Complaint:   80 y/o female with A.fibb on elequis and plavix p/e Shortness of breath. Digoxin had been d/c 10 days ago.   Subjective:  Denies any acute events overnight. Denies any SOB, Cough, abd pain, feber or chills.     Consultants/Specialty:    Review of Systems:   Review of Systems   Constitutional: Negative for chills and fever.   HENT: Negative for ear discharge and hearing loss.    Eyes: Negative for discharge and redness.   Respiratory: Negative for cough, hemoptysis and shortness of breath.    Cardiovascular: Negative for chest pain and leg swelling.   Gastrointestinal: Negative for abdominal pain, diarrhea, nausea and vomiting.        No stool since 2 days.    Genitourinary: Negative for frequency and urgency.   Musculoskeletal: Negative for myalgias.   Skin: Negative for rash.       Objective Data:   Vitals:   Temp:  [35.9 °C (96.7 °F)-36.8 °C (98.3 °F)] 36.3 °C (97.3 °F)  Pulse:  [88-94] 89  Resp:  [18] 18  BP: ()/(45-72) 90/45  SpO2:  [93 %-96 %] 95 %  Physical Exam:   Physical Exam  Constitutional:       General: She is not in acute distress.     Appearance: Normal appearance. She is normal weight. She is not ill-appearing or toxic-appearing.   HENT:      Head: Normocephalic and atraumatic.      Nose: Nose normal. No congestion.      Mouth/Throat:      Mouth: Mucous membranes are moist.   Eyes:      General:         Right eye: No discharge.         Left eye: No discharge.   Cardiovascular:      Rate and Rhythm: Normal rate and regular rhythm.   Pulmonary:      Effort: Pulmonary effort is normal.      Breath sounds: Normal breath sounds.   Abdominal:      General: Abdomen is flat. Bowel sounds are normal. There is no distension.      Palpations: Abdomen is soft.      Tenderness:  There is no abdominal tenderness.   Musculoskeletal:      Cervical back: No rigidity.   Skin:     General: Skin is warm and dry.      Capillary Refill: Capillary refill takes 2 to 3 seconds.   Neurological:      Mental Status: She is alert and oriented to person, place, and time.         Labs:   Recent Labs     08/12/21  0729 08/13/21  0353 08/14/21  0317   WBC 7.6 8.2 7.4   RBC 4.00* 4.44 4.51   HEMOGLOBIN 10.5* 11.6* 11.9*   HEMATOCRIT 33.3* 36.3* 37.4   MCV 83.3 81.8 82.9   MCH 26.3* 26.1* 26.4*   RDW 51.3* 49.0 49.7   PLATELETCT 170 189 195   MPV 9.6 9.8 9.9   NEUTSPOLYS 80.80*  --  72.40*   LYMPHOCYTES 7.00*  --  12.20*   MONOCYTES 10.90  --  12.20   EOSINOPHILS 0.50  --  1.90   BASOPHILS 0.40  --  0.80     Recent Labs     08/12/21  0729 08/13/21  0353 08/14/21  0317   SODIUM 137 141 139   POTASSIUM 4.4 3.7 3.2*   CHLORIDE 105 102 99   CO2 21 28 28   GLUCOSE 161* 118* 119*   BUN 26* 25* 45*       Imaging:   EC-ECHOCARDIOGRAM COMPLETE W/O CONT   Final Result      CT-ABDOMEN-PELVIS WITH   Final Result      1.  No evidence of bowel obstruction.   2.  4.4 x 2.9 cm left ovarian cyst worrisome for a cystic neoplasm. Further evaluation with pelvic ultrasound is recommended.   3.  Small amount of free fluid in the pelvis.   4.  Heterogeneous appearance of the liver can be seen in the setting of congestion or hepatitis.   5.  Intra and extrahepatic biliary ductal dilatation. Correlation with LFTs is recommended.   6.  Mild renal cortical scarring bilaterally. Small hypodense renal lesions are too small to characterize.   7.  Atherosclerotic plaque.   8.  Small bilateral pleural effusions.   9.  Cardiomegaly.         DX-CHEST-PORTABLE (1 VIEW)   Final Result      Borderline cardiomegaly and mild interstitial edema. No focal consolidation or pleural effusions.           Acute on chronic heart failure with preserved ejection fraction (HCC)  Assessment & Plan  Recently digoxin and furosemide were stopped that led to the  current exacerbation.  Added back digoxin (loading plus maintenance dose) and furosemide.  Monitor     Currently requires oxygen, will continue to monitor.     Atrial fibrillation with RVR (HCC)  Assessment & Plan  Heart rate has been unstable, going up to the 150s.  Added digoxin first loading dose and then maintenance dose onwards.  Continue metoprolol SR and eliquis    Acute kidney failure (HCC)  Assessment & Plan  Patients Cr (1.44) and BUN increased today.   Holding nephrotoxic meds.  CTM    Abdominal pain  Assessment & Plan  She has been having abd pain and diarrhea since the last year. Discussed that it could be the possible side effect of ezetimibe,   Patient will discuss it with her PCP and decide to change it to another lipid lower med with her PCP.     Hold the ezetimibe in hosp, no stool since yesterday.  Pending stool studies.      Hypertension  Assessment & Plan  Added back furosemide to her meds  Aug/14 lisinopril held, patient going into [facility]    Hyperlipidemia  Assessment & Plan  Continue ezetimibe.  Counseled that her diarrhea could be a possible s.e of ezetimibe.  She will f/p with her doctor to change it to another lipid lowering med.     Ovarian cyst  Assessment & Plan  CT abd pelvis showed:  4.4 x 2.9 cm left ovarian cyst worrisome for a cystic neoplasm. Further evaluation with pelvic ultrasound is recommended. Small amount of free fluid in the pelvis.  Follow up outpatient.

## 2021-08-14 NOTE — NON-PROVIDER
Daily Progress Note:     Date of Service: 8/14/2021  Primary Team: orange/darshan  Attending: Alonzo Xiong M.D.   Medical student: Liu Stephen, Student      Chief Complaint:   Palpitations    ID: Patient is an 81 year old female with a PMH of afib, chf, chronic diarrhea who presents with palpitations, SOB, diarrhea and abdominal pain.      Subjective:   Patient reports she no longer has palpitations today or shortness of breath, however is still requiring oxygen.  She was unable to go without oxygen while being walked yesterday.  She reports that she has not made a BM recently.  She slept well overnight. She does not know the status of her leg swelling.  She denies fever, chills, chest pain, cough, N/V or dizziness.     Consultants/Specialty:  Cardiology    Review of Systems:    Review of Systems   Constitutional: Negative for chills, diaphoresis, fever and malaise/fatigue.   HENT: Negative for hearing loss and tinnitus.    Eyes: Negative for blurred vision and double vision.   Respiratory: Negative for cough, sputum production and shortness of breath.    Cardiovascular: Negative for chest pain and palpitations.   Gastrointestinal: Negative for nausea and vomiting. Diarrhea: black colored.   Genitourinary: Negative for dysuria, frequency and urgency.   Neurological: Negative for dizziness and headaches.       Objective:   Physical Exam:   Vitals:   Temp:  [35.9 °C (96.7 °F)-36.8 °C (98.3 °F)] 36.3 °C (97.3 °F)  Pulse:  [88-94] 89  Resp:  [18] 18  BP: ()/(45-72) 90/45  SpO2:  [93 %-96 %] 95 %    Physical Exam  Constitutional:       Appearance: Normal appearance.   HENT:      Mouth/Throat:      Mouth: Mucous membranes are moist.   Cardiovascular:      Rate and Rhythm: Tachycardia present. Rhythm irregular.      Pulses: Normal pulses.      Heart sounds: Normal heart sounds.   Pulmonary:      Effort: Pulmonary effort is normal.      Breath sounds: Normal breath sounds.   Musculoskeletal:      Right lower  leg: Edema present.      Left lower leg: Edema present.      Comments: 1+ pitting edema   Skin:     General: Skin is warm.      Capillary Refill: Capillary refill takes less than 2 seconds.   Neurological:      General: No focal deficit present.      Mental Status: She is alert.           Labs:   Recent Labs     08/12/21 0729 08/13/21 0353 08/14/21 0317   WBC 7.6 8.2 7.4   RBC 4.00* 4.44 4.51   HEMOGLOBIN 10.5* 11.6* 11.9*   HEMATOCRIT 33.3* 36.3* 37.4   MCV 83.3 81.8 82.9   MCH 26.3* 26.1* 26.4*   RDW 51.3* 49.0 49.7   PLATELETCT 170 189 195   MPV 9.6 9.8 9.9   NEUTSPOLYS 80.80*  --  72.40*   LYMPHOCYTES 7.00*  --  12.20*   MONOCYTES 10.90  --  12.20   EOSINOPHILS 0.50  --  1.90   BASOPHILS 0.40  --  0.80     Recent Labs     08/12/21 0729 08/13/21 0353 08/14/21 0317   SODIUM 137 141 139   POTASSIUM 4.4 3.7 3.2*   CHLORIDE 105 102 99   CO2 21 28 28   GLUCOSE 161* 118* 119*   BUN 26* 25* 45*     Recent Labs     08/12/21 0729 08/13/21 0353 08/14/21 0317   ALBUMIN 3.7  --   --    TBILIRUBIN 1.2  --   --    ALKPHOSPHAT 88  --   --    TOTPROTEIN 6.5  --   --    ALTSGPT 31  --   --    ASTSGOT 23  --   --    CREATININE 1.02 1.05 1.44*       Imaging:   EC-ECHOCARDIOGRAM COMPLETE W/O CONT   Final Result      CT-ABDOMEN-PELVIS WITH   Final Result      1.  No evidence of bowel obstruction.   2.  4.4 x 2.9 cm left ovarian cyst worrisome for a cystic neoplasm. Further evaluation with pelvic ultrasound is recommended.   3.  Small amount of free fluid in the pelvis.   4.  Heterogeneous appearance of the liver can be seen in the setting of congestion or hepatitis.   5.  Intra and extrahepatic biliary ductal dilatation. Correlation with LFTs is recommended.   6.  Mild renal cortical scarring bilaterally. Small hypodense renal lesions are too small to characterize.   7.  Atherosclerotic plaque.   8.  Small bilateral pleural effusions.   9.  Cardiomegaly.         DX-CHEST-PORTABLE (1 VIEW)   Final Result      Borderline  cardiomegaly and mild interstitial edema. No focal consolidation or pleural effusions.          Assessment and Plan:  Atrial fibrillation with RVR: Patient has been in Afib since admission.  She was recently taken off her digoxin medication 10 days ago and now has an acute heart failure exacerbation.  HR has been more controlled recently.  Patient continued to be in AF during her echocardiogram and has random increases in her HR while in the room with her.  -digoxin 125mg every 2 days  -continue metoprolol xl 100mg BID  -Apixaban for anticoagulation    Acute CHF exacerbation: Patient reports more swelling, SOB, and palpitations after her furosemide, digoxin and lisinopril were discontinued since her visit to the cardiology 3 weeks ago.  Patient reports she has also been drinking more water lately.  She currently is improving in her volume status and appears to be at +1 edema..  Currently patient needs 2.5L of oxygen which is abnormal from baseline.  She continues to need oxygen and was unable to walk without desaturating yesterday.  Patient appears to have developed an LYNNE overnight.  CT scan showed small bilateral pleural effusions.  -Incentive spirometry and walks   -digoxin, metoprolol   -lasix 40mg held for possible LYNNE  -lisinopril held for possible LYNNE  -monitor volume status    LYNNE: Patients creatinine elevated to 1.44 from 1.05 signifying possible LYNNE.  Patient's volume status has improved.  May possibly be related to over diuresis as the BUN/CR is greater than 20 signifying a prerenal etiology.  -avoid nephrotoxic agents  -lisinopril and digoxin held    Chronic Diarrhea- Patient has had chronic diarrhea approximately for 1 year since CAD episode.  Patient's home med of ezetimibe prescribed approximately same time as diarrhea began.  GI saw patient previously and is being managed outpatient.  Last colonoscopy 2009.   -PPI 20mg   -discontinued ezetimibe to see if diarrhea improves    DM2: Currently at 6.7  without any medications.  BG have been ideal.  -continue to manage with diet    Ovarian mass: Incidental ovarian mass finding on CT scan.  -follow up outpatient for US    CAD: Patient had stent placement in 6/20 after exercise stress test was positive.  -clopidogrel 75mg home dose

## 2021-08-14 NOTE — ASSESSMENT & PLAN NOTE
Recently digoxin and furosemide were stopped that led to the current exacerbation.  Added back digoxin (loading plus maintenance dose) and furosemide.  Monitor     Currently requires oxygen, will continue to monitor.

## 2021-08-14 NOTE — CARE PLAN
The patient is Stable - Low risk of patient condition declining or worsening    Shift Goals  Clinical Goals: Hemodynamically stable   Patient Goals: Rest     Progress made toward(s) clinical / shift goals:  Patient remains free from pain and falls. Will continue to educate on plan of care.     Patient is not progressing towards the following goals:      Problem: Knowledge Deficit - Standard  Goal: Patient and family/care givers will demonstrate understanding of plan of care, disease process/condition, diagnostic tests and medications  Outcome: Not Met

## 2021-08-14 NOTE — CARE PLAN
The patient is Stable - Low risk of patient condition declining or worsening    Shift Goals  Clinical Goals: Hemodynamic stbaility  Patient Goals: Rest and relaxation    Progress made toward(s) clinical / shift goals:   Problem: Skin Integrity  Goal: Skin integrity is maintained or improved  Outcome: Progressing     Problem: Fall Risk  Goal: Patient will remain free from falls  Outcome: Progressing       Patient is not progressing towards the following goals:      Problem: Knowledge Deficit - Standard  Goal: Patient and family/care givers will demonstrate understanding of plan of care, disease process/condition, diagnostic tests and medications  Outcome: Not Progressing    Pt has a hard time with understanding why discharge is not recommended due to increased oxygen demand

## 2021-08-14 NOTE — PROGRESS NOTES
Cardiology Follow Up Progress Note    Date of Service  8/14/2021    Attending Physician  Alonzo Xiong M.D.    Chief Complaint   Diarrhea, cough, abdominal pain, shortness of breath    HPI  Ivory Rowan is a 81 y.o. female admitted 8/12/2021 with A. fib on apixaban, HFrEF with recovered EF, CAD s/p PCI to RCA 6/18/2020, HTN, and DM presented with abdominal pain and shortness of breath    Interim Events  8/13/2021: A. fib 110-120 up to 150 nonsustained  Patient denies chest pain, palpitations, orthopnea, lower extremity edema  VSS, labs reviewed    8/14/2021: A fib 70-110s  , TG 86, HDL 39, LDL 60  LYNNE, holding furosemide      Review of Systems  Review of Systems   Constitutional: Negative for chills and fever.   Respiratory: Positive for shortness of breath. Negative for cough, chest tightness and wheezing.    Cardiovascular: Negative for chest pain, palpitations and leg swelling.   Gastrointestinal: Negative for nausea and vomiting.   Neurological: Negative for dizziness and light-headedness.   All other systems reviewed and are negative.      Vital signs in last 24 hours  Temp:  [35.9 °C (96.7 °F)-36.8 °C (98.3 °F)] 36.3 °C (97.3 °F)  Pulse:  [88-94] 89  Resp:  [18] 18  BP: ()/(45-72) 90/45  SpO2:  [93 %-96 %] 95 %    Physical Exam  Physical Exam  Vitals and nursing note reviewed.   Constitutional:       Appearance: Normal appearance.   HENT:      Head: Normocephalic and atraumatic.      Mouth/Throat:      Mouth: Mucous membranes are moist.   Eyes:      Extraocular Movements: Extraocular movements intact.   Neck:      Comments: No JVD  Cardiovascular:      Rate and Rhythm: Normal rate. Rhythm irregularly irregular.      Pulses: Normal pulses.           Radial pulses are 2+ on the right side and 2+ on the left side.      Heart sounds: Normal heart sounds. No murmur heard.        Comments: No edema  Pulmonary:      Effort: Pulmonary effort is normal.      Breath sounds: Normal breath sounds.    Abdominal:      Palpations: Abdomen is soft.   Musculoskeletal:      Cervical back: Normal range of motion and neck supple.   Skin:     General: Skin is warm and dry.   Neurological:      General: No focal deficit present.      Mental Status: She is alert and oriented to person, place, and time.   Psychiatric:         Mood and Affect: Mood normal.         Behavior: Behavior normal.         Lab Review  Lab Results   Component Value Date/Time    WBC 7.4 08/14/2021 03:17 AM    RBC 4.51 08/14/2021 03:17 AM    HEMOGLOBIN 11.9 (L) 08/14/2021 03:17 AM    HEMATOCRIT 37.4 08/14/2021 03:17 AM    MCV 82.9 08/14/2021 03:17 AM    MCH 26.4 (L) 08/14/2021 03:17 AM    MCHC 31.8 (L) 08/14/2021 03:17 AM    MPV 9.9 08/14/2021 03:17 AM      Lab Results   Component Value Date/Time    SODIUM 139 08/14/2021 03:17 AM    POTASSIUM 3.2 (L) 08/14/2021 03:17 AM    CHLORIDE 99 08/14/2021 03:17 AM    CO2 28 08/14/2021 03:17 AM    GLUCOSE 119 (H) 08/14/2021 03:17 AM    BUN 45 (H) 08/14/2021 03:17 AM    CREATININE 1.44 (H) 08/14/2021 03:17 AM    CREATININE 0.99 04/11/2011 12:00 AM    BUNCREATRAT 26 04/11/2011 12:00 AM    GLOMRATE 56 (L) 03/09/2011 07:55 AM      Lab Results   Component Value Date/Time    ASTSGOT 23 08/12/2021 07:29 AM    ALTSGPT 31 08/12/2021 07:29 AM     Lab Results   Component Value Date/Time    CHOLSTRLTOT 116 08/14/2021 03:17 AM    LDL 60 08/14/2021 03:17 AM    HDL 39 (A) 08/14/2021 03:17 AM    TRIGLYCERIDE 86 08/14/2021 03:17 AM    TROPONINT 30 (H) 08/12/2021 07:29 AM       Recent Labs     08/12/21  0729   NTPROBNP 5543*       Cardiac Imaging and Procedures Review  EKG:  My personal interpretation of the EKG dated 2021 is A. fib.    Echocardiogram: 8/13/2021  CONCLUSIONS  Patient in atrial fibrillation, heart rate 120 bpm.  Left ventricular ejection fraction is visually estimated to be 55%,   [id number] variability in atrial fibrillation.  Trivial aortic sclerosis without stenosis.  Moderate eccentric mitral  regurgitation.  Estimated right ventricular systolic pressure  is 40 mmHg.     Compared to the images of the prior study done on 08/08/19 EF is   normal, MR appears moderate, RVSP is reduced.        Imaging  Chest X-Ray:  Borderline cardiomegaly and mild interstitial edema. No focal consolidation or pleural effusions.        Assessment/Plan  1. A fib  -Patient declined DCCV  -Continue metoprolol  mg twice daily and apixaban 2.5 mg daily  -Continue apixaban 2.5 mg twice daily (age, weight)  -[id number] score 5 (age x2, female, CHF, CAD)    2. H/O HFrEF  -Recovered EF now 55%  -Continue metoprolol  mg twice daily and lisinopril 10 mg daily    3.  LYNNE  -Hold furosemide  -Recommend discharge on p.o. furosemide        Thank you for allowing me to participate in the care of this patient.      Please contact me with any questions.        WYATT Coppola  [facility]  [phone]    Future Appointments   Date Time Provider Department Center   12/14/2021  9:45 AM Shen Washington M.D. [facility] None       Please note that this dictation was created using voice recognition software. I have worked with consultants from the vendor as well as technical experts from [facility] to optimize the interface. I have made every reasonable attempt to correct obvious errors, but I expect that there are errors of grammar and possibly content I did not discover before finalizing the note.

## 2021-08-15 ENCOUNTER — APPOINTMENT (OUTPATIENT)
Dept: RADIOLOGY | Facility: MEDICAL CENTER | Age: 81
DRG: 308 | End: 2021-08-15
Attending: INTERNAL MEDICINE
Payer: MEDICARE

## 2021-08-15 VITALS
BODY MASS INDEX: 24.06 KG/M2 | HEART RATE: 92 BPM | TEMPERATURE: 97.9 F | HEIGHT: 62 IN | OXYGEN SATURATION: 92 % | RESPIRATION RATE: 16 BRPM | SYSTOLIC BLOOD PRESSURE: 108 MMHG | WEIGHT: 130.73 LBS | DIASTOLIC BLOOD PRESSURE: 54 MMHG

## 2021-08-15 LAB
ALBUMIN SERPL BCP-MCNC: 3.6 G/DL (ref 3.2–4.9)
ALBUMIN/GLOB SERPL: 1.1 G/DL
ALP SERPL-CCNC: 90 U/L (ref 30–99)
ALT SERPL-CCNC: 22 U/L (ref 2–50)
ANION GAP SERPL CALC-SCNC: 9 MMOL/L (ref 7–16)
AST SERPL-CCNC: 17 U/L (ref 12–45)
BASOPHILS # BLD AUTO: 0.6 % (ref 0–1.8)
BASOPHILS # BLD: 0.04 K/UL (ref 0–0.12)
BILIRUB SERPL-MCNC: 1 MG/DL (ref 0.1–1.5)
BUN SERPL-MCNC: 42 MG/DL (ref 8–22)
CALCIUM SERPL-MCNC: 9.1 MG/DL (ref 8.5–10.5)
CHLORIDE SERPL-SCNC: 103 MMOL/L (ref 96–112)
CO2 SERPL-SCNC: 29 MMOL/L (ref 20–33)
CREAT SERPL-MCNC: 1.36 MG/DL (ref 0.5–1.4)
EOSINOPHIL # BLD AUTO: 0.17 K/UL (ref 0–0.51)
EOSINOPHIL NFR BLD: 2.7 % (ref 0–6.9)
ERYTHROCYTE [DISTWIDTH] IN BLOOD BY AUTOMATED COUNT: 51.3 FL (ref 35.9–50)
GLOBULIN SER CALC-MCNC: 3.3 G/DL (ref 1.9–3.5)
GLUCOSE SERPL-MCNC: 138 MG/DL (ref 65–99)
HCT VFR BLD AUTO: 40.2 % (ref 37–47)
HGB BLD-MCNC: 12.4 G/DL (ref 12–16)
IMM GRANULOCYTES # BLD AUTO: 0.04 K/UL (ref 0–0.11)
IMM GRANULOCYTES NFR BLD AUTO: 0.6 % (ref 0–0.9)
LYMPHOCYTES # BLD AUTO: 0.71 K/UL (ref 1–4.8)
LYMPHOCYTES NFR BLD: 11.2 % (ref 22–41)
MAGNESIUM SERPL-MCNC: 2 MG/DL (ref 1.5–2.5)
MCH RBC QN AUTO: 25.9 PG (ref 27–33)
MCHC RBC AUTO-ENTMCNC: 30.8 G/DL (ref 33.6–35)
MCV RBC AUTO: 84.1 FL (ref 81.4–97.8)
MONOCYTES # BLD AUTO: 0.78 K/UL (ref 0–0.85)
MONOCYTES NFR BLD AUTO: 12.3 % (ref 0–13.4)
NEUTROPHILS # BLD AUTO: 4.62 K/UL (ref 2–7.15)
NEUTROPHILS NFR BLD: 72.6 % (ref 44–72)
NRBC # BLD AUTO: 0 K/UL
NRBC BLD-RTO: 0 /100 WBC
PHOSPHATE SERPL-MCNC: 2.7 MG/DL (ref 2.5–4.5)
PLATELET # BLD AUTO: 219 K/UL (ref 164–446)
PMV BLD AUTO: 9.7 FL (ref 9–12.9)
POTASSIUM SERPL-SCNC: 4.4 MMOL/L (ref 3.6–5.5)
PROT SERPL-MCNC: 6.9 G/DL (ref 6–8.2)
RBC # BLD AUTO: 4.78 M/UL (ref 4.2–5.4)
SODIUM SERPL-SCNC: 141 MMOL/L (ref 135–145)
WBC # BLD AUTO: 6.4 K/UL (ref 4.8–10.8)

## 2021-08-15 PROCEDURE — 82705 FATS/LIPIDS FECES QUAL: CPT

## 2021-08-15 PROCEDURE — 99232 SBSQ HOSP IP/OBS MODERATE 35: CPT | Performed by: STUDENT IN AN ORGANIZED HEALTH CARE EDUCATION/TRAINING PROGRAM

## 2021-08-15 PROCEDURE — 84999 UNLISTED CHEMISTRY PROCEDURE: CPT

## 2021-08-15 PROCEDURE — 85025 COMPLETE CBC W/AUTO DIFF WBC: CPT

## 2021-08-15 PROCEDURE — 700111 HCHG RX REV CODE 636 W/ 250 OVERRIDE (IP): Performed by: STUDENT IN AN ORGANIZED HEALTH CARE EDUCATION/TRAINING PROGRAM

## 2021-08-15 PROCEDURE — 99239 HOSP IP/OBS DSCHRG MGMT >30: CPT | Mod: GC | Performed by: INTERNAL MEDICINE

## 2021-08-15 PROCEDURE — A9270 NON-COVERED ITEM OR SERVICE: HCPCS | Performed by: HOSPITALIST

## 2021-08-15 PROCEDURE — 700102 HCHG RX REV CODE 250 W/ 637 OVERRIDE(OP): Performed by: STUDENT IN AN ORGANIZED HEALTH CARE EDUCATION/TRAINING PROGRAM

## 2021-08-15 PROCEDURE — 82438 ASSAY OTHER FLUID CHLORIDES: CPT

## 2021-08-15 PROCEDURE — 84302 ASSAY OF SWEAT SODIUM: CPT

## 2021-08-15 PROCEDURE — A9270 NON-COVERED ITEM OR SERVICE: HCPCS | Performed by: STUDENT IN AN ORGANIZED HEALTH CARE EDUCATION/TRAINING PROGRAM

## 2021-08-15 PROCEDURE — 84100 ASSAY OF PHOSPHORUS: CPT

## 2021-08-15 PROCEDURE — 80053 COMPREHEN METABOLIC PANEL: CPT

## 2021-08-15 PROCEDURE — 83735 ASSAY OF MAGNESIUM: CPT

## 2021-08-15 PROCEDURE — 700102 HCHG RX REV CODE 250 W/ 637 OVERRIDE(OP): Performed by: HOSPITALIST

## 2021-08-15 RX ORDER — FUROSEMIDE 40 MG/1
40 TABLET ORAL DAILY
Qty: 30 TABLET | Refills: 0 | Status: SHIPPED | OUTPATIENT
Start: 2021-08-15 | End: 2021-08-30

## 2021-08-15 RX ORDER — DIGOXIN 125 MCG
125 TABLET ORAL DAILY
Qty: 30 TABLET | Refills: 0 | Status: SHIPPED | OUTPATIENT
Start: 2021-08-15 | End: 2021-08-30 | Stop reason: SDUPTHER

## 2021-08-15 RX ADMIN — FUROSEMIDE 40 MG: 10 INJECTION, SOLUTION INTRAMUSCULAR; INTRAVENOUS at 05:30

## 2021-08-15 RX ADMIN — OMEPRAZOLE 20 MG: 20 CAPSULE, DELAYED RELEASE ORAL at 10:01

## 2021-08-15 RX ADMIN — CLOPIDOGREL BISULFATE 75 MG: 75 TABLET ORAL at 10:01

## 2021-08-15 RX ADMIN — POTASSIUM CHLORIDE 40 MEQ: 1500 TABLET, EXTENDED RELEASE ORAL at 10:01

## 2021-08-15 RX ADMIN — APIXABAN 2.5 MG: 2.5 TABLET, FILM COATED ORAL at 10:01

## 2021-08-15 RX ADMIN — METOPROLOL SUCCINATE 100 MG: 50 TABLET, EXTENDED RELEASE ORAL at 11:43

## 2021-08-15 ASSESSMENT — ENCOUNTER SYMPTOMS
STRIDOR: 0
COUGH: 0
WHEEZING: 0
CHEST TIGHTNESS: 0
CHOKING: 0
SHORTNESS OF BREATH: 0
APNEA: 0

## 2021-08-15 ASSESSMENT — FIBROSIS 4 INDEX: FIB4 SCORE: 1.69

## 2021-08-15 NOTE — PROGRESS NOTES
Cardiology Follow Up Progress Note    Date of Service  8/15/2021    Attending Physician  Alonzo Xiong M.D.    Cardiology consultation for YENNIFER gannon RVR, volume overload    Presented with abdominal pain and dyspnea, reports palpitation    PMH: Persistent atrial fibrillation previously failed DCCV on Eliquis, HFrEF with recovered EF, CAD with PCI to RCA 6//20, hypertension, type 2 diabetes    Interim Events    Telemetry-A. fib, rate better controlled this morning  Continue with Toprol-XL  Continue with digoxin  Continue with low-dose Eliquis  Patient very anxious to be discharged today  We will schedule follow-up appointment with cardiology in 2 weeks.  Labs reviewed  Creatinine improving  Sodium, potassium stable  BP appropriate  Echo shows LVEF 55%      Review of Systems  Review of Systems   Respiratory: Negative for apnea, cough, choking, chest tightness, shortness of breath, wheezing and stridor.        Vital signs in last 24 hours  Temp:  [35.8 °C (96.5 °F)-36.8 °C (98.3 °F)] 35.8 °C (96.5 °F)  Pulse:  [] 72  Resp:  [18] 18  BP: (104-124)/(53-59) 113/54  SpO2:  [95 %-97 %] 97 %    Physical Exam  Physical Exam  Cardiovascular:      Rate and Rhythm: Tachycardia present. Rhythm irregular.      Pulses: Normal pulses.   Pulmonary:      Effort: Pulmonary effort is normal.   Skin:     General: Skin is warm.   Neurological:      Mental Status: She is alert. Mental status is at baseline.   Psychiatric:         Mood and Affect: Mood normal.         Lab Review  Lab Results   Component Value Date/Time    WBC 6.4 08/15/2021 07:53 AM    RBC 4.78 08/15/2021 07:53 AM    HEMOGLOBIN 12.4 08/15/2021 07:53 AM    HEMATOCRIT 40.2 08/15/2021 07:53 AM    MCV 84.1 08/15/2021 07:53 AM    MCH 25.9 (L) 08/15/2021 07:53 AM    MCHC 30.8 (L) 08/15/2021 07:53 AM    MPV 9.7 08/15/2021 07:53 AM      Lab Results   Component Value Date/Time    SODIUM 141 08/15/2021 07:53 AM    POTASSIUM 4.4 08/15/2021 07:53 AM    CHLORIDE 103 08/15/2021  07:53 AM    CO2 29 08/15/2021 07:53 AM    GLUCOSE 138 (H) 08/15/2021 07:53 AM    BUN 42 (H) 08/15/2021 07:53 AM    CREATININE 1.36 08/15/2021 07:53 AM    CREATININE 0.99 04/11/2011 12:00 AM    BUNCREATRAT 26 04/11/2011 12:00 AM    GLOMRATE 56 (L) 03/09/2011 07:55 AM      Lab Results   Component Value Date/Time    ASTSGOT 17 08/15/2021 07:53 AM    ALTSGPT 22 08/15/2021 07:53 AM     Lab Results   Component Value Date/Time    CHOLSTRLTOT 116 08/14/2021 03:17 AM    LDL 60 08/14/2021 03:17 AM    HDL 39 (A) 08/14/2021 03:17 AM    TRIGLYCERIDE 86 08/14/2021 03:17 AM    TROPONINT 30 (H) 08/12/2021 07:29 AM       No results for input(s): NTPROBNP in the last 72 hours.    Cardiac Imaging and Procedures Review  EKG: Atrial fibrillation, rate 120    Echocardiogram:   8/13/2021  LVEF 55%  Moderate MR  RVSP 40 mmHg      Cardiac Catheterization:   6/18/2020  PTCA/PCI of ostial and mid RCA    Imaging  Chest X-Ray:Borderline cardiomegaly and mild interstitial edema. No focal consolidation or pleural effusions.      Stress Test: Not applicable    Assessment/Plan    A. fib RVR  History of persistent A. fib with previous failed DCCV  -Recently off of Lasix and digoxin  -Declined DCCV this admission   -Continue with Eliquis 2.5 twice daily, age, weight, low GFR  -Digoxin 125   -Toprol-XL  -Remains in A. fib, rate better controlled      Volume overload  HFpEF  -Furosemide 40 IV  -Switch to p.o. furosemide  -Daily standing weight  -Strict intake and output    History of CAD with previous PCI to RCA, 6/2020  -On Plavix  -Allergy to statins  -Lipid panel in good order 8/14/2021, LDL 60    No further cardiac work-up  Follow-up appointment with our cardiology office in 2 weeks, will arrange.      Thank you for allowing me to participate in the care of this patient.      Please contact me with any questions.    LARRY Paredes   Cardiologist, [facility]  [phone]

## 2021-08-15 NOTE — DISCHARGE SUMMARY
Discharge Summary    Date of Admission: 8/12/2021  Date of Discharge: No discharge date for patient encounter.  Discharging Attending: Alonzo Xiong M.D.   Discharging Senior Resident: Dr. Karissa Lawrence  Discharging Intern: Dr. Layla Stephenson     CHIEF COMPLAINT ON ADMISSION  Chief Complaint   Patient presents with   • Diarrhea     BIB EMS, reports black diarrhea x 9 since last night, pt taking elequis and plavix for a-fib. reports stopping digoxin 10 days ago.     • Cough   • Abdominal Pain   • Shortness of Breath       Reason for Admission  Shortness of breath.     Admission Date  8/12/2021    CODE STATUS  Full Code    HPI & HOSPITAL COURSE  This is a 81 y.o. female here with A.fibb on elequis and plavix p/e Shortness of breath. Digoxin had been d/c 10 days ago. She reported having diarrhea the night before and had 9 bowel movements that lead to the worsening palpitations and SOB. She was evaluated in our emergency department, found to be in A. fib with RVR.  She was also found to be in a mild to moderate exacerbation of her chronic diastolic heart failure.  She was restarted on furosemide 40mg and digoxin(1st loading and then maintenance dose). She started developing LYNNE so lisinopril has been held.        Therefore, she is discharged in fair and stable condition to home with close outpatient follow-up. The patient was adamant on leaving from the hospital today without oxygen. Oxygen evaluation done at rest and slow walking, patient maintaining sats.  Advised on the possible discharge with oxygen, patient declines.     The patient met 2-midnight criteria for an inpatient stay at the time of discharge.    Discharge Date  08/15/2021    FOLLOW UP ITEMS POST DISCHARGE  PCP:   Follow up with PCP to discuss restarting ezetimibe or changing it to another anti-hyperlipidemic because patient experiencing s/e of ezetimibe.  F/U with PCP to restart lisinopril when Renal functions return back to normal/baseline.      DISCHARGE DIAGNOSES  Active Problems:    Atrial fibrillation with RVR (HCC) POA: Yes    Acute on chronic heart failure with preserved ejection fraction (HCC) POA: Yes    Hyperlipidemia POA: Yes    Hypertension POA: Yes    Abdominal pain POA: Yes    Acute kidney failure (HCC) POA: Yes    Ovarian cyst POA: Yes  Resolved Problems:    Chronic diastolic heart failure (HCC) POA: Yes      FOLLOW UP  Future Appointments   Date Time Provider Department Center   8/30/2021  1:00 PM Checo Tesfaye M.D. [facility]   12/14/2021  9:45 AM Shen Washington M.D. [facility] None     No follow-up provider specified.    MEDICATIONS ON DISCHARGE     Medication List      START taking these medications      Instructions   digoxin 125 MCG Tabs  Commonly known as: LANOXIN   Take 1 Tablet by mouth every day.  Dose: 125 mcg     furosemide 40 MG Tabs  Commonly known as: LASIX   Take 1 Tablet by mouth every day.  Dose: 40 mg        CONTINUE taking these medications      Instructions   apixaban 2.5mg Tabs  Commonly known as: ELIQUIS   Take 1 tablet by mouth 2 times a day.  Dose: 2.5 mg     clopidogrel 75 MG Tabs  Commonly known as: PLAVIX   Take 1 tablet by mouth every day.  Dose: 75 mg     diazePAM 2 MG Tabs  Commonly known as: VALIUM   Take 2 mg by mouth one time.  Dose: 2 mg     metoprolol  MG Tb24  Commonly known as: TOPROL XL   Doctor's comments: Patient's plan is requesting a 100 day supply. Thank you  Take 1 tablet by mouth 2 times a day.  Dose: 100 mg     pantoprazole 40 MG Tbec  Commonly known as: PROTONIX   Take 1 tablet by mouth every day.  Dose: 40 mg        STOP taking these medications    ezetimibe 10 MG Tabs  Commonly known as: ZETIA     lisinopril 5 MG Tabs  Commonly known as: PRINIVIL            Allergies  Allergies   Allergen Reactions   • Atorvastatin      myalgias   • Simvastatin      myalgias       DIET  Orders Placed This Encounter   Procedures   • Diet Order Diet: Cardiac (Soft diet - pt has partial only);  Second Modifier: (optional): 2 Gram Sodium; Fluid modifications: (optional): 2000 ml Fluid Restriction     Standing Status:   Standing     Number of Occurrences:   1     Order Specific Question:   Diet:     Answer:   Cardiac [6]     Comments:   Soft diet - pt has partial only     Order Specific Question:   Second Modifier: (optional)     Answer:   2 Gram Sodium [7]     Order Specific Question:   Fluid modifications: (optional)     Answer:   2000 ml Fluid Restriction [11]       ACTIVITY  As tolerated.  Weight bearing as tolerated    CONSULTATIONS  Dr. Mirna Rush with Cardiology consulted.  Treatment options were discussed and plan of care agreed upon.    PROCEDURES

## 2021-08-15 NOTE — PROGRESS NOTES
Clarified with Dr. Stephenson regarding metoprolol PO parameters, parameters say to hold SBP <120, patient's /54. Received telephone okay from Dr. Stephenson to give metoprolol.

## 2021-08-15 NOTE — DISCHARGE INSTRUCTIONS
Discharge Instructions    Discharged to home by car with relative. Discharged via wheelchair, hospital escort: Yes.  Special equipment needed: Not Applicable    Be sure to schedule a follow-up appointment with your primary care doctor or any specialists as instructed.     Discharge Plan:        I understand that a diet low in cholesterol, fat, and sodium is recommended for good health. Unless I have been given specific instructions below for another diet, I accept this instruction as my diet prescription.   Other diet: cardiac, 2 grams of sodium per day; restrict to 2,000 mL of fluids per day    Special Instructions:   HF Patient Discharge Instructions  · Monitor your weight daily, and maintain a weight chart, to track your weight changes.   · Activity as tolerated, unless your Doctor has ordered otherwise. Other activity order: daily.  · Follow a low fat, low cholesterol, low salt diet unless instructed otherwise by your Doctor. Read the labels on the back of food products and track your intake of fat, cholesterol and salt.   · Fluid Restriction Yes. If a Fluid Restriction has been ordered by your Doctor, measure fluids with a measuring cup to ensure that you are not exceeding the restriction.   · No smoking.  · Oxygen No. If your Doctor has ordered that you wear Oxygen at home, it is important to wear it as ordered.  · Did you receive an explanation from staff on the importance of taking each of your medications and why it is necessary to keep taking them unless your doctor says to stop? Yes  · Were all of your questions answered about how to manage your heart failure and what to do if you have increased signs and symptoms after you go home? Yes  · Do you feel like your heart failure care team involved you in the care treatment plan and allowed you to make decisions regarding your care while in the hospital and addressed any discharge needs you might have? Yes    See the educational handout provided at discharge  for more information on monitoring your daily weight, activity and diet. This also explains more about Heart Failure, symptoms of a flare-up and some of the tests that you have undergone.     Warning Signs of a Flare-Up include:  · Swelling in the ankles or lower legs.  · Shortness of breath, while at rest, or while doing normal activities.   · Shortness of breath at night when in bed, or coughing in bed.   · Requiring more pillows to sleep at night, or needing to sit up at night to sleep.  · Feeling weak, dizzy or fatigued.     When to call your Doctor:  · Call [facility] seven days a week from 8:00 a.m. to 8:00 p.m. for medical questions [phone].  · Call your Primary Care Physician or Cardiologist if:   1. You experience any pain radiating to your jaw or neck.  2. You have any difficulty breathing.  3. You experience weight gain of 3 lbs in a day or 5 lbs in a week.   4. You feel any palpitations or irregular heartbeats.  5. You become dizzy or lose consciousness.   If you have had an angiogram or had a pacemaker or AICD placed, and experience:  1. Bleeding, drainage or swelling at the surgical / puncture site.  2. Fever greater than 100.0 F  3. Shock from internal defibrillator.  4. Cool and / or numb extremities.      · Is patient discharged on Warfarin / Coumadin?   No     Depression / Suicide Risk    As you are discharged from this [facility] [[email or web address]], it is important to learn how to keep safe from harming yourself.    Recognize the warning signs:  · Abrupt changes in personality, positive or negative- including increase in energy   · Giving away possessions  · Change in eating patterns- significant weight changes-  positive or negative  · Change in sleeping patterns- unable to sleep or sleeping all the time   · Unwillingness or inability to communicate  · Depression  · Unusual sadness, discouragement and loneliness  · Talk of wanting to die  · Neglect of personal  appearance   · Rebelliousness- reckless behavior  · Withdrawal from people/activities they love  · Confusion- inability to concentrate     If you or a loved one observes any of these behaviors or has concerns about self-harm, here's what you can do:  · Talk about it- your feelings and reasons for harming yourself  · Remove any means that you might use to hurt yourself (examples: pills, rope, extension cords, firearm)  · Get professional help from the community (Mental Health, Substance Abuse, psychological counseling)  · Do not be alone:Call your Safe Contact- someone whom you trust who will be there for you.  · Call your local CRISIS HOTLINE 546-5089 or 477-232-0454  · Call your local Children's Mobile Crisis Response Team [place] (045) 928-2493 or www.MemberPlanet  · Call the toll free National Suicide Prevention Hotlines   · National Suicide Prevention Lifeline 869-062-PJEO (1410)  · [place] 800-SUICIDE (566-1500)                      Please follow up with your PCP within 1 week.  Resume Lisinopril when B.P and creatinine return to normal, as per your PCP.  Discuss with PCP about changing ezetimibe to some other anti hyperlipidemic medicine.        Heart Failure Action Plan  A heart failure action plan helps you understand what to do when you have symptoms of heart failure. Follow the plan that was created by you and your health care provider. Review your plan each time you visit your health care provider.  Red zone  These signs and symptoms mean you should get medical help right away:  · You have trouble breathing when resting.  · You have a dry cough that is getting worse.  · You have swelling or pain in your legs or abdomen that is getting worse.  · You suddenly gain more than 2-3 lb (0.9-1.4 kg) in a day, or more than 5 lb (2.3 kg) in one week. This amount may be more or less depending on your condition.  · You have trouble staying awake or you feel confused.  · You have chest  pain.  · You do not have an appetite.  · You pass out.  If you experience any of these symptoms:  · Call your local emergency services (911 in the [place] right away or seek help at the emergency department of the nearest hospital.  Yellow zone  These signs and symptoms mean your condition may be getting worse and you should make some changes:  · You have trouble breathing when you are active or you need to sleep with extra pillows.  · You have swelling in your legs or abdomen.  · You gain 2-3 lb (0.9-1.4 kg) in one day, or 5 lb (2.3 kg) in one week. This amount may be more or less depending on your condition.  · You get tired easily.  · You have trouble sleeping.  · You have a dry cough.  If you experience any of these symptoms:  · Contact your health care provider within the next day.  · Your health care provider may adjust your medicines.  Green zone  These signs mean you are doing well and can continue what you are doing:  · You do not have shortness of breath.  · You have very little swelling or no new swelling.  · Your weight is stable (no gain or loss).  · You have a normal activity level.  · You do not have chest pain or any other new symptoms.  Follow these instructions at home:  · Take over-the-counter and prescription medicines only as told by your health care provider.  · Weigh yourself daily. Your target weight is __________ lb (__________ kg).  ? Call your health care provider if you gain more than __________ lb (__________ kg) in a day, or more than __________ lb (__________ kg) in one week.  · Eat a heart-healthy diet. Work with a diet and nutrition specialist (dietitian) to create an eating plan that is best for you.  · Keep all follow-up visits as told by your health care provider. This is important.  Where to find more information  · American Heart Association: www.heart.org  Summary  · Follow the action plan that was created by you and your health care provider.  · Get help right away if you have  any symptoms in the Red zone.  This information is not intended to replace advice given to you by your health care provider. Make sure you discuss any questions you have with your health care provider.  Document Released: 01/27/2018 Document Revised: 11/30/2018 Document Reviewed: 01/27/2018  Elsevier Patient Education © 2020 [employer]      Atrial Fibrillation    Atrial fibrillation is a type of heartbeat that is irregular or fast (rapid). If you have this condition, your heart beats without any order. This makes it hard for your heart to pump blood in a normal way. Having this condition gives you more risk for stroke, heart failure, and other heart problems.  Atrial fibrillation may start all of a sudden and then stop on its own, or it may become a long-lasting problem.  What are the causes?  This condition may be caused by heart conditions, such as:  · High blood pressure.  · Heart failure.  · Heart valve disease.  · Heart surgery.  Other causes include:  · Pneumonia.  · Obstructive sleep apnea.  · Lung cancer.  · Thyroid disease.  · Drinking too much alcohol.  Sometimes the cause is not known.  What increases the risk?  You are more likely to develop this condition if:  · You smoke.  · You are older.  · You have diabetes.  · You are overweight.  · You have a family history of this condition.  · You exercise often and hard.  What are the signs or symptoms?  Common symptoms of this condition include:  · A feeling like your heart is beating very fast.  · Chest pain.  · Feeling short of breath.  · Feeling light-headed or weak.  · Getting tired easily.  Follow these instructions at home:  Medicines  · Take over-the-counter and prescription medicines only as told by your doctor.  · If your doctor gives you a blood-thinning medicine, take it exactly as told. Taking too much of it can cause bleeding. Taking too little of it does not protect you against clots. Clots can cause a stroke.  Lifestyle         · Do not use  "any tobacco products. These include cigarettes, chewing tobacco, and e-cigarettes. If you need help quitting, ask your doctor.  · Do not drink alcohol.  · Do not drink beverages that have caffeine. These include coffee, soda, and tea.  · Follow diet instructions as told by your doctor.  · Exercise regularly as told by your doctor.  General instructions  · If you have a condition that causes breathing to stop for a short period of time (apnea), treat it as told by your doctor.  · Keep a healthy weight. Do not use diet pills unless your doctor says they are safe for you. Diet pills may make heart problems worse.  · Keep all follow-up visits as told by your doctor. This is important.  Contact a doctor if:  · You notice a change in the speed, rhythm, or strength of your heartbeat.  · You are taking a blood-thinning medicine and you see more bruising.  · You get tired more easily when you move or exercise.  · You have a sudden change in weight.  Get help right away if:    · You have pain in your chest or your belly (abdomen).  · You have trouble breathing.  · You have blood in your vomit, poop, or pee (urine).  · You have any signs of a stroke. \"BE FAST\" is an easy way to remember the main warning signs:  ? B - Balance. Signs are dizziness, sudden trouble walking, or loss of balance.  ? E - Eyes. Signs are trouble seeing or a change in how you see.  ? F - Face. Signs are sudden weakness or loss of feeling in the face, or the face or eyelid drooping on one side.  ? A - Arms. Signs are weakness or loss of feeling in an arm. This happens suddenly and usually on one side of the body.  ? S - Speech. Signs are sudden trouble speaking, slurred speech, or trouble understanding what people say.  ? T - Time. Time to call emergency services. Write down what time symptoms started.  · You have other signs of a stroke, such as:  ? A sudden, very bad headache with no known cause.  ? Feeling sick to your stomach (nausea).  ? Throwing up " (vomiting).  ? Jerky movements you cannot control (seizure).  These symptoms may be an emergency. Do not wait to see if the symptoms will go away. Get medical help right away. Call your local emergency services (911 in the [place] Do not drive yourself to the hospital.  Summary  · Atrial fibrillation is a type of heartbeat that is irregular or fast (rapid).  · You are at higher risk of this condition if you smoke, are older, have diabetes, or are overweight.  · Follow your doctor's instructions about medicines, diet, exercise, and follow-up visits.  · Get help right away if you think that you have signs of a stroke.  This information is not intended to replace advice given to you by your health care provider. Make sure you discuss any questions you have with your health care provider.  Document Released: 09/26/2009 Document Revised: 02/21/2019 Document Reviewed: 02/08/2019  [employer] Patient Education © 2020 [employer]      Preventing Atrial Fibrillation-Related Stroke    Atrial fibrillation is a common type of irregular or rapid heartbeat (arrhythmia) that greatly increases your risk for a stroke. In atrial fibrillation, the top portions of the heart (atria) beat out of sync with the lower portions of the heart. When the muscles of the atria are tightening in an uncoordinated way (fibrillating), blood can pool in the heart and form clots. If a clot travels to the brain, it can cause a stroke. This type of stroke is preventable. Understanding atrial fibrillation and knowing how to properly manage it can prevent you from having a stroke.  What increases my risk for a stroke?  If you have atrial fibrillation, you may be at increased risk for a stroke if you also:  · Have heart failure.  · Have high blood pressure.  · Are older than age 65.  · Have diabetes.  · Have a history of vascular disease, such as heart attack or stroke.  · Are female.  If you have atrial fibrillation and you also have one or more of those risk  factors, talk with your health care provider about treatments that can prevent a stroke.  Other risk factors for a stroke include:  · Smoking.  · High cholesterol.  · Diabetes.  · Being inactive (sedentary lifestyle).  · Having a family history of stroke.  · Eating a diet that is high in fat, cholesterol, and salt.  What treatments help to manage atrial fibrillation?  The main goals of treatment for atrial fibrillation are to prevent blood clots from forming and to keep your heart beating at a normal rate and rhythm. Treatment may include:  · Blood-thinning medicine (anticoagulant) that helps to prevent clots from forming. This medicine also increases the risk of bleeding. Talk with your health care provider about the risks and benefits of taking anticoagulants.  · Medicine that slows the heart rate or brings the heart rhythm back to normal.  · Electrical cardioversion. This is a procedure that resets the heart's rhythm by delivering a controlled, low-energy shock through your skin to your heart.  · An ablation procedure, such as catheter ablation, catheter ablation with pacemaker, or surgical ablation. These procedures destroy the heart tissues that send abnormal signals so that heart rhythms can be improved or made normal. A pacemaker is a device that is placed under the skin to help the heart beat in a regular rhythm.  How can I prevent atrial fibrillation-related stroke?  Medicines  · Take over-the-counter and prescription medicines only as told by your health care provider.  · If your health care provider prescribed an anticoagulant, take it exactly as told. Taking too much blood-thinning medicine can cause bleeding. If you do not take enough blood-thinning medicine, you will not have the protection that you need against stroke and other problems.  Eating and drinking  · Eat healthy foods, including at least 5 servings of fruits and vegetables a day.  · Do not drink alcohol.  · Do not drink beverages that  contain caffeine, such as coffee, soda, and tea.  · Follow dietary instructions as told by your health care provider.  Managing other medical conditions  · Manage and be aware of your blood pressure. If you have high blood pressure, follow your treatment plan to keep it in your target range.  · Have your cholesterol checked as often as recommended by your health care provider. If you have high cholesterol, follow your treatment plan to lower it and keep it in your target range.  · Talk with your health care provider about symptoms to watch for. Some people may not have any symptoms, so it can be hard to know that they have atrial fibrillation. Talk with your health care provider if you experience:  ? A feeling that your heart is beating rapidly or irregularly.  ? An irregular pulse.  ? A feeling of discomfort or pain in your chest.  ? Shortness of breath.  ? Sudden light-headedness or weakness.  ? Tiredness (fatigue) that happens easily during exercise.  · If you have obstructive sleep apnea (MEGAN), manage your condition as told by your health care provider.  General instructions  · Maintain a healthy weight. Do not use diet pills unless your health care provider approves. Diet pills may make heart problems worse.  · Exercise regularly. Get at least 30 minutes of activity on most or all days, or as told by your health care provider.  · Do not use any products that contain nicotine or tobacco, such as cigarettes and e-cigarettes. If you need help quitting, ask your health care provider.  · Do not use drugs, such as cocaine and amphetamines.  · Keep all follow-up visits as told by your health care providers. This is important. These include visits with your heart specialist.  Where to find more information  You may find more information about preventing atrial fibrillation-related stroke from:  · National Stroke Association (AFib-Stroke Connection): www.stroke.org  Contact a health care provider if:  · You notice a  "change in the rate, rhythm, or strength of your heartbeat.  · You have dizziness.  · You are taking an anticoagulant and you have more bruises than usual.  · You tire out more easily when you exercise or do similar activities.  Get help right away if:    · You have chest pain.  · You have pain in your abdomen.  · You experience unusual sweating or weakness.  · You take anticoagulants and you:  ? Have severe headaches or confusion.  ? Have blood in your vomit, bowel movement, or urine.  ? Have bleeding that will not stop.  ? Fall or injure your head.  · You have any symptoms of a stroke. \"BE FAST\" is an easy way to remember the main warning signs of a stroke:  ? B - Balance. Signs are dizziness, trouble walking, or loss of balance.  ? E - Eyes. Signs are trouble seeing or a sudden change in vision.  ? F - Face. Signs are sudden weakness or numbness of the face, or the face or eyelid drooping on one side.  ? A - Arms. Signs are weakness or numbness in an arm. This happens suddenly and usually on one side of the body.  ? S - Speech. Signs are sudden trouble speaking, slurred speech, or trouble understanding what people say.  ? T - Time. Time to call emergency services. Write down what time symptoms started.  · You have other signs of a stroke, such as:  ? A sudden, severe headache with no known cause.  ? Nausea or vomiting.  ? Seizure.  These symptoms may represent a serious problem that is an emergency. Do not wait to see if the symptoms will go away. Get medical help right away. Call your local emergency services (911 in the [place] Do not drive yourself to the hospital.  Summary  · Having atrial fibrillation increases the risk for a stroke. Talk with your health care provider about what symptoms to watch for.  · Atrial fibrillation-related stroke is preventable. Proper management of atrial fibrillation can prevent you from having a stroke.  · Talk with your health care provider about whether anticoagulant medicine is " "right for you.  · Learn the warning signs of a stroke and remember \"BE FAST.\"          Bleeding Precautions When on Anticoagulant Therapy, Adult  Anticoagulant therapy, also called blood thinner therapy, is medicine that helps to prevent and treat blood clots. The medicine works by stopping blood clots from forming or growing. Blood clots that form in your blood vessels can be dangerous. They can break loose and travel to the heart, lungs, or brain. This increases the risk of a heart attack, stroke, or blocked lung artery (pulmonary embolism).  Anticoagulants also increase the risk of bleeding. Try to protect yourself from cuts and other injuries that can cause bleeding. It is important to take anticoagulants exactly as told by your health care provider.  Why do I need to be on anticoagulant therapy?  You may need this medicine if you are at risk of developing a blood clot. Conditions that increase your risk of a blood clot include:  · Being born with heart disease or a heart malformation (congenital heart disease).  · Developing heart disease.  · Having had surgery, such as valve replacement.  · Having had a serious accident or other type of severe injury (trauma).  · Having certain types of cancer.  · Having certain diseases that can increase blood clotting.  · Having a high risk of stroke or heart attack.  · Having atrial fibrillation (AF).  What are the common anticoagulant medicines?  There are several types of anticoagulant medicines. The most common types are:  · Medicines that you take by mouth (oral medicines), such as:  ? Warfarin.  ? Novel oral anticoagulants (NOACs), such as:  ? Direct thrombin inhibitors (dabigatran).  ? Factor Xa inhibitors (apixaban, edoxaban, and rivaroxaban).  · Injections, such as:  ? Unfractionated heparin.  ? Low molecular weight heparin.  These anticoagulants work in different ways to prevent blood clots. They also have different risks and side effects.  What do I need to " remember while on anticoagulant therapy?  Taking anticoagulants  · Take your medicine at the same time every day. If you forget to take your medicine, take it as soon as you remember. Do not double your dosage of medicine if you miss a whole day. Take your normal dose and call your health care provider.  · Do not stop taking your medicine unless your health care provider approves. Stopping the medicine can increase your risk of developing a blood clot.  Taking other medicines  · Take over-the-counter and prescriptions medicines only as told by your health care provider.  · Do not take over-the-counter NSAIDs, including aspirin and ibuprofen, while you are on anticoagulant therapy. These medicines increase your risk of dangerous bleeding.  · Get approval from your health care provider before you start taking any new medicines, vitamins, or herbal products. Some of these could interfere with your therapy.  General instructions  · Keep all follow-up visits as told by your health care provider. This is important.  · If you are pregnant or trying to get pregnant, talk with a health care provider about anticoagulants. Some of these medicines are not safe to take during pregnancy.  · Tell all health care providers, including your dentist, that you are on anticoagulant therapy. It is especially important to tell providers before you have any surgery, medical procedures, or dental work done.  What precautions should I take?    · Be very careful when using knives, scissors, or other sharp objects.  · Use an electric razor instead of a blade.  · Do not use toothpicks.  · Use a soft-bristled toothbrush. Brush your teeth gently.  · Always wear shoes outdoors and wear slippers indoors.  · Be careful when cutting your fingernails and toenails.  · Place bath mats in the bathroom. If possible, install handrails as well.  · Wear gloves while you do yard work.  · Wear your seat belt.  · Prevent falls by removing loose rugs and  extension cords from areas where you walk. Use a cane or walker if you need it.  · Avoid constipation by:  ? Drinking enough fluid to keep your urine clear or pale yellow.  ? Eating foods that are high in fiber, such as fresh fruits and vegetables, whole grains, and beans.  ? Limiting foods that are high in fat and processed sugars, such as fried and sweet foods.  · Do not play contact sports or participate in other activities that have a high risk for injury.  What other precautions are important if on warfarin therapy?  If you are taking a type of anticoagulant called warfarin, make sure you:  · Work with a diet and nutrition specialist (dietitian) to make an eating plan. Do not make any sudden changes to your diet after you have started your eating plan.  · Do not drink alcohol. It can interfere with your medicine and increase your risk of an injury that causes bleeding.  · Get regular blood tests as told by your health care provider.  What are some questions to ask my health care provider?  · Why do I need anticoagulant therapy?  · What is the best anticoagulant therapy for my condition?  · How long will I need anticoagulant therapy?  · What are the side effects of anticoagulant therapy?  · When should I take my medicine? What should I do if I forget to take it?  · Will I need to have regular blood tests?  · Do I need to change my diet? Are there foods or drinks that I should avoid?  · What activities are safe for me?  · What should I do if I want to get pregnant?  Contact a health care provider if:  · You miss a dose of medicine:  ? And you are not sure what to do.  ? For more than one day.  · You have:  ? Menstrual bleeding that is heavier than normal.  ? Bloody or brown urine.  ? Easy bruising.  ? Black and tarry stool or bright red stool.  ? Side effects from your medicine.  · You feel weak or dizzy.  · You become pregnant.  Get help right away if:  · You have bleeding that will not stop within 20 minutes  from:  ? The nose.  ? The gums.  ? A cut on the skin.  · You have a severe headache or stomachache.  · You vomit or cough up blood.  · You fall or hit your head.  Summary  · Anticoagulant therapy, also called blood thinner therapy, is medicine that helps to prevent and treat blood clots.  · Anticoagulants work in different ways to prevent blood clots. They also have different risks and side effects.  · Talk with your health care provider about any precautions that you should take while on anticoagulant therapy.  This information is not intended to replace advice given to you by your health care provider. Make sure you discuss any questions you have with your health care provider.  Document Released: 11/28/2016 Document Revised: 04/08/2020 Document Reviewed: 03/06/2018  [employer] Patient Education © 2020 [employer]    This information is not intended to replace advice given to you by your health care provider. Make sure you discuss any questions you have with your health care provider.  Document Released: 04/04/2018 Document Revised: 04/13/2020 Document Reviewed: 04/04/2018  Elsevier Patient Education © 2020 [employer]

## 2021-08-15 NOTE — CARE PLAN
Problem: Knowledge Deficit - Standard  Goal: Patient and family/care givers will demonstrate understanding of plan of care, disease process/condition, diagnostic tests and medications  Outcome: Progressing     Problem: Fall Risk  Goal: Patient will remain free from falls  Outcome: Progressing     The patient is Stable - Low risk of patient condition declining or worsening    Shift Goals  Clinical Goals: Hemodynamic stbaility  Patient Goals: Rest and relaxation    Progress made toward(s) clinical / shift goals:  Progressing. Pt verbalized importance of notifying staff before needing to ambulate. Fall precautions are in place. Call light is within reach.    Patient is not progressing towards the following goals:

## 2021-08-15 NOTE — PROGRESS NOTES
Bedside report received and patient care assumed. Pt is resting in bed, A&Ox4, with no complaints of pain, and is on 3L. Tele box on. All fall precautions are in place, belongings at bedside table.  Pt was updated on POC, no questions or concerns. Pt educated on use of call light for assistance. Will continue to monitor.

## 2021-08-16 ENCOUNTER — PATIENT OUTREACH (OUTPATIENT)
Dept: HEALTH INFORMATION MANAGEMENT | Facility: OTHER | Age: 81
End: 2021-08-16

## 2021-08-16 SDOH — ECONOMIC STABILITY: FOOD INSECURITY: WITHIN THE PAST 12 MONTHS, THE FOOD YOU BOUGHT JUST DIDN'T LAST AND YOU DIDN'T HAVE MONEY TO GET MORE.: NEVER TRUE

## 2021-08-16 SDOH — ECONOMIC STABILITY: FOOD INSECURITY: WITHIN THE PAST 12 MONTHS, YOU WORRIED THAT YOUR FOOD WOULD RUN OUT BEFORE YOU GOT MONEY TO BUY MORE.: NEVER TRUE

## 2021-08-16 SDOH — ECONOMIC STABILITY: TRANSPORTATION INSECURITY
IN THE PAST 12 MONTHS, HAS LACK OF TRANSPORTATION KEPT YOU FROM MEETINGS, WORK, OR FROM GETTING THINGS NEEDED FOR DAILY LIVING?: NO

## 2021-08-16 SDOH — ECONOMIC STABILITY: TRANSPORTATION INSECURITY
IN THE PAST 12 MONTHS, HAS THE LACK OF TRANSPORTATION KEPT YOU FROM MEDICAL APPOINTMENTS OR FROM GETTING MEDICATIONS?: NO

## 2021-08-16 NOTE — PROGRESS NOTES
Community Health Worker Intake  • Social determinates of health intake:Completed  • Identified barriers to: None  • Contact information provided to Ivory Rowan : Yes  • Has PCP appointment scheduled for : 08/30/21 @ 1 pm  • Scheduled Food Delivery/Home Visit/Outpatient Visit: No  • Outpatient assessment completed.  • Did the patient receive medications post discharge: Yes    SIERRA Drew called patient via phone and introduced Community Care Management/ Geriatric Speciality Care and patient states she lives alone and she relies on her daughter for all transportation needs. 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 Patient has no questions or concerns with medications. Patient is no need of food. SIERRA Drew discussed all information about [facility].     SIERRA Drew mailed flyer for [facility] and provided all contact information and will discharge from [facility] and remove from case load and master list as patient has no needs.

## 2021-08-17 ENCOUNTER — PATIENT OUTREACH (OUTPATIENT)
Dept: MEDICAL GROUP | Facility: MEDICAL CENTER | Age: 81
End: 2021-08-17

## 2021-08-17 NOTE — PROGRESS NOTES
I will defer to Dr. Washington.  I see that he did stop the digoxin, furosemide and potassium in July.  It sounds as if the patient was acutely ill, perhaps with food poisoning or something similar and decompensated somewhat.  It is a little unclear to me as to whether those medications should be restarted.

## 2021-08-17 NOTE — PROGRESS NOTES
"RN Transitional Care Management discharge follow up call completed. Patient reports her daughter tried to  discharge medications but was told there weren't any. TC to [facility] for information, no Rx received, able to give TO for digoxin 125 mg and furosemide 40 mg based on medication history from Dr. Stephenson. Patient has questions regarding medications as she has been told to stop and restart medications multiple times over the last several months, will route note to cardiologist for review and advice.     Patient has discharge follow up appointment scheduled with PCP on 8/30/21, patient states she is not strong enough to go in person to an appointment and will schedule other follow up appointments when she feels stronger. Patient states she is not strong enough to get in her tub but states she has a \"handicapped\" bathroom when asked if she needs to obtain a tub bench for safety.     Provided [facility] RN contact number for any additional questions/concerns. Please route chart back to RN if additional follow up is needed/requested.     Thank you!    ROSIE Hart Care Coordinator  Community Care Management [phone]  Chronic Care Management [phone]    "

## 2021-08-18 LAB
CHLORIDE STL-SCNC: NORMAL MMOL/L
FAT STL QL: NORMAL
NEUTRAL FAT STL QL: NORMAL
POTASSIUM STL-SCNC: NORMAL MMOL/L
SODIUM STL-SCNC: NORMAL MMOL/L

## 2021-08-23 ENCOUNTER — TELEPHONE (OUTPATIENT)
Dept: MEDICAL GROUP | Facility: MEDICAL CENTER | Age: 81
End: 2021-08-23

## 2021-08-23 NOTE — TELEPHONE ENCOUNTER
ESTABLISHED PATIENT PRE-VISIT PLANNING     Patient was NOT contacted to complete PVP.     Note: Patient will not be contacted if there is no indication to call.     1.  Reviewed notes from the last few office visits within the medical group: Yes    2.  If any orders were placed at last visit or intended to be done for this visit (i.e. 6 mos follow-up), do we have Results/Consult Notes?         •  Labs - Labs were not ordered at last office visit.  Note: If patient appointment is for lab review and patient did not complete labs, check with provider if OK to reschedule patient until labs completed.       •  Imaging - Imaging was not ordered at last office visit.       •  Referrals - No referrals were ordered at last office visit.    3. Is this appointment scheduled as a Hospital Follow-Up? No    4.  Immunizations were updated in Epic using Reconcile Outside Information activity? Yes    5.  Patient is due for the following Health Maintenance Topics:   Health Maintenance Due   Topic Date Due   • IMM ZOSTER VACCINES (2 of 3) 01/18/2012   • Annual Wellness Visit  11/30/2017   • RETINAL SCREENING  07/01/2020       - Patient is up-to-date on all Health Maintenance topics. No records have been requested at this time.    6.  AHA (Pulse8) form printed for Provider? Email sent to [facility] requesting form

## 2021-08-30 ENCOUNTER — OFFICE VISIT (OUTPATIENT)
Dept: MEDICAL GROUP | Facility: MEDICAL CENTER | Age: 81
End: 2021-08-30
Payer: MEDICARE

## 2021-08-30 VITALS
BODY MASS INDEX: 24.3 KG/M2 | HEIGHT: 62 IN | SYSTOLIC BLOOD PRESSURE: 120 MMHG | DIASTOLIC BLOOD PRESSURE: 50 MMHG | TEMPERATURE: 98.5 F | WEIGHT: 132.06 LBS | OXYGEN SATURATION: 95 % | HEART RATE: 91 BPM

## 2021-08-30 DIAGNOSIS — E78.2 MIXED HYPERLIPIDEMIA: ICD-10-CM

## 2021-08-30 DIAGNOSIS — I50.20 HFREF (HEART FAILURE WITH REDUCED EJECTION FRACTION) (HCC): ICD-10-CM

## 2021-08-30 DIAGNOSIS — D68.69 SECONDARY HYPERCOAGULABLE STATE (HCC): ICD-10-CM

## 2021-08-30 DIAGNOSIS — I48.91 ATRIAL FIBRILLATION WITH RVR (HCC): ICD-10-CM

## 2021-08-30 DIAGNOSIS — F41.9 CHRONIC ANXIETY: ICD-10-CM

## 2021-08-30 DIAGNOSIS — N83.299 COMPLEX OVARIAN CYST: ICD-10-CM

## 2021-08-30 PROBLEM — N17.9 ACUTE KIDNEY FAILURE (HCC): Status: RESOLVED | Noted: 2021-08-14 | Resolved: 2021-08-30

## 2021-08-30 PROBLEM — I50.33 ACUTE ON CHRONIC HEART FAILURE WITH PRESERVED EJECTION FRACTION (HCC): Status: RESOLVED | Noted: 2021-08-13 | Resolved: 2021-08-30

## 2021-08-30 PROCEDURE — 99495 TRANSJ CARE MGMT MOD F2F 14D: CPT | Performed by: FAMILY MEDICINE

## 2021-08-30 RX ORDER — POTASSIUM CHLORIDE 20 MEQ/1
20 TABLET, EXTENDED RELEASE ORAL
Qty: 36 TABLET | Refills: 3 | Status: SHIPPED | OUTPATIENT
Start: 2021-08-30 | End: 2021-12-14

## 2021-08-30 RX ORDER — FUROSEMIDE 20 MG/1
20 TABLET ORAL DAILY
Qty: 90 TABLET | Refills: 3 | Status: SHIPPED | OUTPATIENT
Start: 2021-08-30 | End: 2021-09-02

## 2021-08-30 RX ORDER — DIGOXIN 125 MCG
125 TABLET ORAL DAILY
Qty: 90 TABLET | Refills: 3 | Status: SHIPPED | OUTPATIENT
Start: 2021-08-30 | End: 2022-09-13 | Stop reason: SDUPTHER

## 2021-08-30 RX ORDER — DIAZEPAM 10 MG/1
10 TABLET ORAL EVERY 12 HOURS PRN
Qty: 90 TABLET | Refills: 0 | Status: SHIPPED | OUTPATIENT
Start: 2021-08-30 | End: 2022-01-07 | Stop reason: SDUPTHER

## 2021-08-30 RX ORDER — EZETIMIBE 10 MG/1
10 TABLET ORAL
Qty: 36 TABLET | Refills: 3 | Status: SHIPPED | OUTPATIENT
Start: 2021-08-30 | End: 2021-12-14 | Stop reason: SDUPTHER

## 2021-08-30 RX ORDER — LISINOPRIL 5 MG/1
5 TABLET ORAL DAILY
Qty: 90 TABLET | Refills: 4 | Status: SHIPPED | OUTPATIENT
Start: 2021-08-30 | End: 2021-12-14 | Stop reason: SDUPTHER

## 2021-08-30 ASSESSMENT — FIBROSIS 4 INDEX: FIB4 SCORE: 1.32

## 2021-08-30 NOTE — PROGRESS NOTES
Subjective:     Ivory Rowan is a 81 y.o. female who presents for Hospital Follow-up.      POST DISCHARGE CALL:  Discharge Date:8/15/2021   Date of Outreach Call: 8/17/2021 11:22 AM  Now that you're home, how are you doing? Fair  Comment:weakness, anxiety about medications  Do you have questions about your medications? Yes  Comment:note routed to cardiologist for clarification    Did you fill your medications? No  Comment:medications not at [facility] when daughter went to  , [facility] RN to call [facility] for details    Do you have a follow-up appointment scheduled?Yes    Discharging Department: Telemetry 7    Number of Attempts: 1  Current or previous attempts completed within two business days of discharge? Yes  Comment:[facility] outreach 8/16/21, RN outreach 8/17/21  Provided education regarding treatment plan, medication, self-management, ADLs? Yes  Has patient completed Advance Directive? If yes, advise them to bring to appointment. Yes  Care Manager phone number provided? Yes  Is there anything else I can help you with? No      HPI:   Recently hospitalized for rapid atrial fibrillation.  This occurs every time the digoxin and furosemide are discontinued.  The potassium was discontinued by the hospital but she needs to do this, 3 times per week.  The ezetemibe gives her diarrhea if she takes daily.  However, if she takes it three times a week she does fine and her liipid levels are fine.  Now since she is off this medication she has not had a stool in over 4 days and is uncomfortable.  Her daughter bought her smooth move tea but she has not been willing to take it.  She can indeed take this.  I have asked her to resume the Zetia 3 days a week.  I do not agree that she should try statins again.  If her cardiologist wants her to do so I think that is not unreasonable but she has very good lipid control on the 3 times weekly Zetia.  She has had several hospitalizations over the last year and a half when her digoxin and  furosemide are discontinued.  I think she should stay on these permanently.      Current medicines (including reconciliation performed today)  Current Outpatient Medications   Medication Sig Dispense Refill   • ezetimibe (ZETIA) 10 MG Tab Take 1 Tablet by mouth every Monday, Wednesday, and Friday. 36 Tablet 3   • furosemide (LASIX) 20 MG Tab Take 1 Tablet by mouth every day. 90 Tablet 3   • lisinopril (PRINIVIL) 5 MG Tab Take 1 Tablet by mouth every day. 90 Tablet 4   • diazepam (VALIUM) 10 MG tablet Take 1 Tablet by mouth every 12 hours as needed for Anxiety or Sleep for up to 90 days. 90 Tablet 0   • digoxin (LANOXIN) 125 MCG Tab Take 1 Tablet by mouth every day. 90 Tablet 3   • potassium chloride SA (KDUR) 20 MEQ Tab CR Take 1 Tablet by mouth every Monday, Wednesday, and Friday. 36 Tablet 3   • metoprolol SR (TOPROL XL) 100 MG TABLET SR 24 HR Take 1 tablet by mouth 2 times a day. 200 tablet 3   • clopidogrel (PLAVIX) 75 MG Tab Take 1 tablet by mouth every day. 90 tablet 4   • apixaban (ELIQUIS) 2.5mg Tab Take 1 tablet by mouth 2 times a day. 180 tablet 4   • pantoprazole (PROTONIX) 40 MG Tablet Delayed Response Take 1 tablet by mouth every day. 90 tablet 3     No current facility-administered medications for this visit.       Allergies:   Atorvastatin and Simvastatin    Social History:  Social History     Socioeconomic History   • Marital status:      Spouse name: Not on file   • Number of children: 2   • Years of education: Not on file   • Highest education level: Not on file   Occupational History   • Occupation: Retired [address] [place] [address] "[employer]     Employer: retired   Tobacco Use   • Smoking status: Former Smoker     Packs/day: 0.50     Years: 40.00     Pack years: 20.00     Types: Cigarettes     Quit date: 3/1/1996     Years since quittin.5   • Smokeless tobacco: Never Used   Vaping Use   • Vaping Use: Never used   Substance and Sexual Activity   • Alcohol use: Not Currently     Alcohol/week: 0.0 oz  "    Comment: now rare   • Drug use: No   • Sexual activity: Not Currently     Partners: Male   Other Topics Concern   • Not on file   Social History Narrative   • Not on file     Social Determinants of Health     Financial Resource Strain:    • Difficulty of Paying Living Expenses:    Food Insecurity: No Food Insecurity   • Worried About Running Out of Food in the Last Year: Never true   • Ran Out of Food in the Last Year: Never true   Transportation Needs: No Transportation Needs   • Lack of Transportation (Medical): No   • Lack of Transportation (Non-Medical): No   Physical Activity:    • Days of Exercise per Week:    • Minutes of Exercise per Session:    Stress:    • Feeling of Stress :    Social Connections:    • Frequency of Communication with Friends and Family:    • Frequency of Social Gatherings with Friends and Family:    • Attends Alevism Services:    • Active Member of Clubs or Organizations:    • Attends Club or Organization Meetings:    • Marital Status:    Intimate Partner Violence:    • Fear of Current or Ex-Partner:    • Emotionally Abused:    • Physically Abused:    • Sexually Abused:          ROS:  No fever, chest pain, shortness of breath or abdominal pain since she left the hospital.  Denies abdominal or pelvic pain.         Objective:     /50 (BP Location: Right arm, Patient Position: Sitting, BP Cuff Size: Adult)   Pulse 91   Temp 36.9 °C (98.5 °F) (Temporal)   Ht 1.575 m (5' 2\")   Wt 59.9 kg (132 lb 0.9 oz)   SpO2 95%   Body mass index is 24.15 kg/m².    Physical Exam:  Vital signs reviewed, good blood pressure.  Pulse rate is reasonable.  Patient is alert and well oriented.  Patient somewhat frustrated over changes in medications.  Patient, daughter, physician and staff all wearing masks.    Neck is supple, no JVD appreciated.  No carotid bruits appreciated.  Heart shows an irregularly irregular rhythm but a reasonable rate.  Crisp heart sounds, no murmur appreciated.  Lungs " clear to auscultation A&P with good air movement.  No rales appreciated.  Abdomen soft bowel sounds positive, benign.  Full edema appreciated.    Lipid profile August 14 is very good with a total cholesterol of 116, low triglycerides and an LDL of 60.  HDL is 39.  I see no benefit to this patient for changing to statins.    Assessment and Plan:     1. Mixed hyperlipidemia  Patient was in very good control on the Zetia.  Needs to resume, cannot take daily as she gets loose stools but 3 times a week as well for her.  Have rewritten that to be clear.  - ezetimibe (ZETIA) 10 MG Tab; Take 1 Tablet by mouth every Monday, Wednesday, and Friday.  Dispense: 36 Tablet; Refill: 3    2. Atrial fibrillation with RVR (HCC)/secondary hypercoagulable state (HCC).  Patient does have atrial fibrillation and does tend to go into fluid overload if she is off the furosemide and digitalis.  She also gets very rapid heartbeat off the digoxin.  I would like to suggest that she stay on this medication permanently as she has had 3 admissions all of which per her and her daughter have been related to stopping the digoxin.  Currently her rate is in reasonable control.  She is of course at increased risk for stroke.  She understands that very well.  She is on both clopidogrel held and low-dose Eliquis.  No further bleeding on this regimen.  She is not on aspirin due to history of bleeding.  Medications are rewritten.  I have reduced the furosemide from 40/day to 20 as patient is now pretty dry.  I have added potassium 3 times a week and her digoxin.  - furosemide (LASIX) 20 MG Tab; Take 1 Tablet by mouth every day.  Dispense: 90 Tablet; Refill: 3  - lisinopril (PRINIVIL) 5 MG Tab; Take 1 Tablet by mouth every day.  Dispense: 90 Tablet; Refill: 4  - digoxin (LANOXIN) 125 MCG Tab; Take 1 Tablet by mouth every day.  Dispense: 90 Tablet; Refill: 3  - potassium chloride SA (KDUR) 20 MEQ Tab CR; Take 1 Tablet by mouth every Monday, Wednesday, and  Friday.  Dispense: 36 Tablet; Refill: 3    3. HFrEF (heart failure with reduced ejection fraction) ([facility])  Actually her ejection fraction August 12 was 55% on visual estimate.  This is a test taken in atrial fibrillation.  Right ventricular systolic pressure is a little elevated at 40.  She will continue to follow carefully with cardiology.  - furosemide (LASIX) 20 MG Tab; Take 1 Tablet by mouth every day.  Dispense: 90 Tablet; Refill: 3  - lisinopril (PRINIVIL) 5 MG Tab; Take 1 Tablet by mouth every day.  Dispense: 90 Tablet; Refill: 4    4. Chronic anxiety this is an incidentally found left.  Patient has longstanding anxiety.  She has taken diazepam for many years on an as-needed basis.  She made the last 90 tablets last over 4 months.  PDMP review shows no inconsistencies.  Patient has been responsible with this medication and has done well with it.  It is a relatively high dose but she is accustomed to it having taken it for many years.  This is renewed.  - diazepam (VALIUM) 10 MG tablet; Take 1 Tablet by mouth every 12 hours as needed for Anxiety or Sleep for up to 90 days.  Dispense: 90 Tablet; Refill: 0    5. Complex ovarian cyst  This is an incidentally found left pelvic cyst a little over 4 cm in one dimension.  Patient denies any pelvic pain.  Follow-up ultrasound is suggested by radiology.  CT showed no lymphadenopathy or other masses.  Discussed with patient that this could be a serious finding.  She is amenable to the ultrasound.  - US-PELVIC TRANSVAGINAL ONLY; Future      - Chart and discharge summary were reviewed.   - Hospitalization and results reviewed with patient.   - Medications reviewed including instructions regarding high risk medications, dosing and side effects.  - Recommended Services: No services needed at this time  - Advance directive/POLST on file?  Yes    Follow-up:Return in about 4 months (around 12/30/2021), or if symptoms worsen or fail to improve.    Face-to-face transitional care  management services with MODERATE (today's visit is at least 14 days post discharge & LACE+ score of 28-58) medical decision complexity were provided.     LACE+ Historical Score Over Time (0-28: Low, 29-58: Medium, 59+: High): 74    Coding guide:   34524        - face-to-face within 14 day        - moderate decision complexity (LACE+ score of 28-58)  51474       - face-to-face within 7 days       - high medical decision complexity (LACE+ score 59+)

## 2021-09-02 ENCOUNTER — OFFICE VISIT (OUTPATIENT)
Dept: CARDIOLOGY | Facility: MEDICAL CENTER | Age: 81
End: 2021-09-02
Payer: MEDICARE

## 2021-09-02 VITALS
DIASTOLIC BLOOD PRESSURE: 62 MMHG | BODY MASS INDEX: 24.11 KG/M2 | OXYGEN SATURATION: 95 % | HEART RATE: 62 BPM | SYSTOLIC BLOOD PRESSURE: 118 MMHG | WEIGHT: 131 LBS | HEIGHT: 62 IN

## 2021-09-02 DIAGNOSIS — Z95.5 STENTED CORONARY ARTERY: ICD-10-CM

## 2021-09-02 DIAGNOSIS — N83.299 COMPLEX OVARIAN CYST: ICD-10-CM

## 2021-09-02 DIAGNOSIS — I50.20 HFREF (HEART FAILURE WITH REDUCED EJECTION FRACTION) (HCC): ICD-10-CM

## 2021-09-02 DIAGNOSIS — E78.2 MIXED HYPERLIPIDEMIA: ICD-10-CM

## 2021-09-02 DIAGNOSIS — Z79.899 HIGH RISK MEDICATION USE: ICD-10-CM

## 2021-09-02 DIAGNOSIS — Z79.01 CHRONIC ANTICOAGULATION: ICD-10-CM

## 2021-09-02 DIAGNOSIS — I48.91 ATRIAL FIBRILLATION WITH RVR (HCC): ICD-10-CM

## 2021-09-02 DIAGNOSIS — I10 HTN (HYPERTENSION), MALIGNANT: ICD-10-CM

## 2021-09-02 PROCEDURE — 99214 OFFICE O/P EST MOD 30 MIN: CPT | Performed by: NURSE PRACTITIONER

## 2021-09-02 RX ORDER — SPIRONOLACTONE 25 MG/1
12.5 TABLET ORAL DAILY
Qty: 45 TABLET | Refills: 3 | Status: SHIPPED | OUTPATIENT
Start: 2021-09-02 | End: 2021-12-14 | Stop reason: SDUPTHER

## 2021-09-02 RX ORDER — FUROSEMIDE 20 MG/1
20 TABLET ORAL
Qty: 30 TABLET | Refills: 5 | Status: SHIPPED | OUTPATIENT
Start: 2021-09-02 | End: 2021-11-04 | Stop reason: SDUPTHER

## 2021-09-02 ASSESSMENT — FIBROSIS 4 INDEX: FIB4 SCORE: 1.32

## 2021-09-02 NOTE — PROGRESS NOTES
Chief Complaint   Patient presents with   • Atrial Fibrillation   • HTN (Controlled)   • Hyperlipidemia       Subjective     Ivory Rowan is a 81 y.o. female who presents today A. fib, hypertension, dyslipidemia follow-up with her daughter Conchita.  Patient was last seen by Dr. Washington on 7/27/2021.  Patient since patient was last seen she was recently admitted on 8/12/2021 for A. fib with RVR and increased shortness of breath.  Patient stopped her digoxin due to increased diarrhea.    Today, patient reports significant pain due to ovarian cyst she follows up with GYN soon. Patient denies chest pain, shortness of breath, palpitations, dizziness/lightheadedness, orthopnea, PND or Edema. Patient reports she was told to follow high sodium with high fluid intake diet recommendations.  Patient has not been weighing herself at home, but notes an overall weight reduction.  Patient denies recreational drug use, excessive caffeine, or alcohol use.    We discussed the importance of low-sodium and drinking 64 ounces of fluid a day.  We also discussed the importance of of monitoring weights at home daily. Call office if weight increasing greater than 3 lbs in 1 day or greater than 5 lbs in 1 week. Based on physical examination findings, patient is euvolemic. No JVD, lungs are clear to auscultation, no pitting edema in bilateral lower extremities, no ascites. Dry weight is 131 lbs. we discussed decreasing her diuretics to Monday Wednesday Friday.  We will also initiate spironolactone with blood work in 2 weeks.    Past Medical History:   Diagnosis Date   • Acute on chronic heart failure with preserved ejection fraction (HCC) 8/13/2021   • Atrial fibrillation (HCC)    • Basal cell carcinoma of skin of nose    • CATARACT     Dr. Aaron, Dr. Orellana   • CHF (congestive heart failure) (HCC)    • Colon polyp     tubular adenomas   • Dental disorder     upper partial   • Dyslipidemia    • Glaucoma, right eye     Dr. Orellana   • Heart burn   "  • Hemorrhagic disorder (HCC)     eliquis   • Hypertension     pt states well controlled on meds   • IBS (irritable bowel syndrome)    • Indigestion    • MEDICAL HOME 10/23/12   • Mitral valve regurgitation    • Osteopenia    • Perennial allergic rhinitis with seasonal variation    • Persistent atrial fibrillation (HCC)    • Psychiatric problem     anxiety   • Type 2 diabetes mellitus, controlled (HCC)     diet controlled   • Valvular heart disease     mitral insufficiency   • Vertigo      Past Surgical History:   Procedure Laterality Date   • RECOVERY  2016    Procedure: CATH LAB-ZAFAR GUIDED CV-TO-LARGE GROUP;  Surgeon: [facility];  [place] [facility] [place]:    • COLONOSCOPY  2009    Dr. Lopez, 9 polyps   • APPENDECTOMY     • BREAST BIOPSY      left, reports wire report broke off during procedure   • CHOLECYSTECTOMY     • US-NEEDLE CORE BX-BREAST PANEL       Family History   Problem Relation Age of Onset   • Diabetes Mother    • Hypertension Mother    • Stroke Mother    • Cancer Father         lung/larynx   • Cancer Sister         \"female\"   • Genetic Disorder Sister         osteoporosis   • Cancer Paternal Aunt         breast   • Cancer Maternal Aunt    • Heart Disease Brother         CABG x 3     Social History     Socioeconomic History   • Marital status:      Spouse name: Not on file   • Number of children: 2   • Years of education: Not on file   • Highest education level: Not on file   Occupational History   • Occupation: Retired [address] [place] [address]      Employer: retired   Tobacco Use   • Smoking status: Former Smoker     Packs/day: 0.50     Years: 40.00     Pack years: 20.00     Types: Cigarettes     Quit date: 3/1/1996     Years since quittin.5   • Smokeless tobacco: Never Used   Vaping Use   • Vaping Use: Never used   Substance and Sexual Activity   • Alcohol use: Not Currently     Alcohol/week: 0.0 oz     Comment: now rare   • Drug use: No   • " Sexual activity: Not Currently     Partners: Male   Other Topics Concern   • Not on file   Social History Narrative   • Not on file     Social Determinants of Health     Financial Resource Strain:    • Difficulty of Paying Living Expenses:    Food Insecurity: No Food Insecurity   • Worried About Running Out of Food in the Last Year: Never true   • Ran Out of Food in the Last Year: Never true   Transportation Needs: No Transportation Needs   • Lack of Transportation (Medical): No   • Lack of Transportation (Non-Medical): No   Physical Activity:    • Days of Exercise per Week:    • Minutes of Exercise per Session:    Stress:    • Feeling of Stress :    Social Connections:    • Frequency of Communication with Friends and Family:    • Frequency of Social Gatherings with Friends and Family:    • Attends [employer] Services:    • Active Member of Clubs or Organizations:    • Attends Club or Organization Meetings:    • Marital Status:    Intimate Partner Violence:    • Fear of Current or Ex-Partner:    • Emotionally Abused:    • Physically Abused:    • Sexually Abused:      Allergies   Allergen Reactions   • Atorvastatin      myalgias   • Simvastatin      myalgias     Outpatient Encounter Medications as of 9/2/2021   Medication Sig Dispense Refill   • furosemide (LASIX) 20 MG Tab Take 1 Tablet by mouth every 48 hours. Monday, Wednesday, and Friday 30 Tablet 5   • spironolactone (ALDACTONE) 25 MG Tab Take 0.5 Tablets by mouth every day. 45 Tablet 3   • ezetimibe (ZETIA) 10 MG Tab Take 1 Tablet by mouth every Monday, Wednesday, and Friday. 36 Tablet 3   • lisinopril (PRINIVIL) 5 MG Tab Take 1 Tablet by mouth every day. 90 Tablet 4   • diazepam (VALIUM) 10 MG tablet Take 1 Tablet by mouth every 12 hours as needed for Anxiety or Sleep for up to 90 days. 90 Tablet 0   • digoxin (LANOXIN) 125 MCG Tab Take 1 Tablet by mouth every day. 90 Tablet 3   • potassium chloride SA (KDUR) 20 MEQ Tab CR Take 1 Tablet by mouth every Monday,  "Wednesday, and Friday. 36 Tablet 3   • metoprolol SR (TOPROL XL) 100 MG TABLET SR 24 HR Take 1 tablet by mouth 2 times a day. 200 tablet 3   • clopidogrel (PLAVIX) 75 MG Tab Take 1 tablet by mouth every day. 90 tablet 4   • apixaban (ELIQUIS) 2.5mg Tab Take 1 tablet by mouth 2 times a day. 180 tablet 4   • pantoprazole (PROTONIX) 40 MG Tablet Delayed Response Take 1 tablet by mouth every day. 90 tablet 3   • [DISCONTINUED] furosemide (LASIX) 20 MG Tab Take 1 Tablet by mouth every day. 90 Tablet 3     No facility-administered encounter medications on file as of 9/2/2021.     Review of Systems   Constitutional: Positive for weight loss. Negative for fever and malaise/fatigue.   Eyes: Negative for blurred vision.   Respiratory: Negative for cough and shortness of breath.    Cardiovascular: Negative for chest pain, palpitations, orthopnea, claudication, leg swelling and PND.   Gastrointestinal: Positive for abdominal pain (gyn). Negative for blood in stool, nausea and vomiting.   Genitourinary: Negative for dysuria, frequency and hematuria.   Musculoskeletal: Negative for falls and myalgias.   Neurological: Negative for dizziness, tingling and loss of consciousness.   Endo/Heme/Allergies: Does not bruise/bleed easily.              Objective     /62 (BP Location: Right arm, Patient Position: Sitting, BP Cuff Size: Adult)   Pulse 62   Ht 1.575 m (5' 2\")   Wt 59.4 kg (131 lb)   LMP 05/01/1991   SpO2 95%   BMI 23.96 kg/m²     Physical Exam  Vitals reviewed.   Constitutional:       Appearance: She is well-developed. She is not ill-appearing.   HENT:      Head: Normocephalic and atraumatic.   Eyes:      Pupils: Pupils are equal, round, and reactive to light.   Neck:      Vascular: No JVD.   Cardiovascular:      Rate and Rhythm: Normal rate. Rhythm irregular.      Heart sounds: Normal heart sounds. No murmur heard.   No friction rub. No gallop.    Pulmonary:      Effort: Pulmonary effort is normal. No respiratory " distress.      Breath sounds: Normal breath sounds.   Abdominal:      General: Bowel sounds are normal. There is no distension.      Palpations: Abdomen is soft.   Musculoskeletal:      Right lower leg: No edema.      Left lower leg: No edema.   Skin:     General: Skin is warm and dry.      Findings: No erythema.   Neurological:      General: No focal deficit present.      Mental Status: She is alert and oriented to person, place, and time. Mental status is at baseline.   Psychiatric:         Behavior: Behavior normal.            Lab Results   Component Value Date/Time    CHOLSTRLTOT 116 08/14/2021 03:17 AM    LDL 60 08/14/2021 03:17 AM    HDL 39 (A) 08/14/2021 03:17 AM    TRIGLYCERIDE 86 08/14/2021 03:17 AM       Lab Results   Component Value Date/Time    SODIUM 141 08/15/2021 07:53 AM    POTASSIUM 4.4 08/15/2021 07:53 AM    CHLORIDE 103 08/15/2021 07:53 AM    CO2 29 08/15/2021 07:53 AM    GLUCOSE 138 (H) 08/15/2021 07:53 AM    BUN 42 (H) 08/15/2021 07:53 AM    CREATININE 1.36 08/15/2021 07:53 AM    CREATININE 0.99 04/11/2011 12:00 AM    BUNCREATRAT 26 04/11/2011 12:00 AM    GLOMRATE 56 (L) 03/09/2011 07:55 AM     Lab Results   Component Value Date/Time    ALKPHOSPHAT 90 08/15/2021 07:53 AM    ASTSGOT 17 08/15/2021 07:53 AM    ALTSGPT 22 08/15/2021 07:53 AM    TBILIRUBIN 1.0 08/15/2021 07:53 AM    Echocardiogram 8/8/2019  CONCLUSIONS  Normal left ventricular size and systolic function.  Left ventricular ejection fraction is visually estimated to be 55%.  Normal right ventricular size and systolic function.  Aortic sclerosis without stenosis.  Mild aortic insufficiency.  Moderate to severe mitral regurgitation.  Moderate tricuspid regurgitation.  Right atrial pressure is estimated to be 3 mmHg.  Estimated right ventricular systolic pressure  is 55 mmHg.  Estimated pulmonary artery diastolic pressure of 10.     Compared to the images of the prior study done on 05/02/16: Unable to   retrieve previous study due to  technical difficulties. Compared to the   report, there has been no change to the MR. The LVEF has improved from   45% to 55%.       Coronary angiogram 2020  Indication for procedure: Positive stress test moderate or high risk     Procedures:  · Insertion of 5/6 Fr sheath in the right radial artery  · right and left coronary arteriograms  · Left heart catheterization  · Angioplasty and placement of a 3.5 by 8mm [place] drug-eluting stent in ostial  right coronary artery.  · Angioplasty and placement of a 2.75 by 22 mm [place] drug-eluting  stent in mid  right coronary artery.     Final diagnosis:   single vessel coronary artery disease.  Successful percutaneous intervention of right coronary artery.     Recommendations: Guideline directed medical therapy and risk factor management        Coronary arteriograms:  Left main: 20-30% distal left main disease.  Left anterior descendin-40% mid LAD disease, diagonals are small  Left circumflex: luminal irregularities. Three small marginal branches are noted. No significant disease is noted in marginal branches.  Right coronary: 80% ostial stenosis, mid RCA has tandem 90% lesions, dominant     Left Heart Catheterization:  Left Ventriculogram: Not performed  Left Ventricular EDP: 10 mm Hg   Aortic Valve Gradient: No significant AV gradient noted    Transthoracic echocardiogram (2021): Patient in atrial fibrillation, heart rate 120 bpm.  Left ventricular ejection fraction is visually estimated to be 55%,   [id number] variability in atrial fibrillation.  Trivial aortic sclerosis without stenosis.  Moderate eccentric mitral regurgitation.  Estimated right ventricular systolic pressure  is 40 mmHg.  Compared to the images of the prior study done on 19 EF is   normal, MR appears moderate, RVSP is reduced.    Assessment & Plan     1. Atrial fibrillation with RVR (HCC)  furosemide (LASIX) 20 MG Tab    Basic Metabolic Panel   2. HFrEF (heart failure with reduced  ejection fraction) (HCC)  furosemide (LASIX) 20 MG Tab    Basic Metabolic Panel   3. Stented coronary artery  Basic Metabolic Panel   4. High risk medication use  Basic Metabolic Panel   5. HTN (hypertension), malignant     6. Mixed hyperlipidemia     7. Chronic anticoagulation     8. Complex ovarian cyst         Medical Decision Making: Today's Assessment/Status/Plan:        Persistent Atrial Fibrillation (PAF)  - Asymptomatic, rate controlled.   - On OAC with Eliquis 2.5 mg twice daily, continue.  - Continue metoprolol  mg daily  -Digoxin restarted on hospitalization  -Financial barrier to Tikosyn in the past    HFrEF, Stage C, Class II, LVEF 55% recovered from 45%  -Heart failure due to ischemic cardiomyopathy  -ACE-I/ARB/ARNI: Continue lisinopril 5 mg daily  -Evidence Based Beta-blocker: Continue metoprolol  mg daily  -Aldosterone Antagonist: Initiate spironolactone 12.5 mg daily  -Diuretic: Decrease furosemide to 20 mg Monday Wednesday Friday  -Labs: BMP in 2 weeks   -Moderate to severe MR:  Patient declined evaluation previously with Dr. Washington  -Reinforced s/sx of worsening heart failure with patient and weight monitoring. Pt verbalizes understanding. Pt to call office if present.    -Heart Failure Education: pt to be contacted by HF nurse for further education    Coronary artery disease s/p PCI/EARLENE to RCA (6/2020); HLD  -LDL 60 (8/14/2021).  At goal.  -Toprol  mg twice daily  -Continue Plavix 75 mg daily  -No ASA due to OAC  -Lisinopril 5 mg daily  -Statin reaction in the past.  On Zetia 10 mg every evening    Hypertension  -Today in office blood pressure is well controlled  -Encouraged to continue home BP monitoring/log.  -Medication recommendations per above.    T2DM  -A1c 6.8 (8/13/2027)  -Recommend SGLT2 I for CV mortality benefit    FU in clinic in 2 months. Sooner if needed.    Patient verbalizes understanding and agrees with the plan of care.     Collaborating MD: Dr. Felix MD    PLEASE  NOTE: This Note was created using voice recognition Software. I have made every reasonable attempt to correct obvious errors, but I expect that there are errors of grammar and possibly content that I did not discover before finalizing the note

## 2021-09-02 NOTE — PATIENT INSTRUCTIONS
Decrease furosemide to Monday, Wednesday, and Friday    Start spironolactone 12.5 mg (half tablet) every morning      Blood work in 2 weeks

## 2021-09-13 ASSESSMENT — ENCOUNTER SYMPTOMS
TINGLING: 0
CLAUDICATION: 0
ORTHOPNEA: 0
BLOOD IN STOOL: 0
FEVER: 0
COUGH: 0
BRUISES/BLEEDS EASILY: 0
FALLS: 0
BLURRED VISION: 0
ABDOMINAL PAIN: 1
PALPITATIONS: 0
DIZZINESS: 0
MYALGIAS: 0
VOMITING: 0
LOSS OF CONSCIOUSNESS: 0
PND: 0
NAUSEA: 0
WEIGHT LOSS: 1
SHORTNESS OF BREATH: 0

## 2021-09-21 ENCOUNTER — HOSPITAL ENCOUNTER (OUTPATIENT)
Dept: LAB | Facility: MEDICAL CENTER | Age: 81
End: 2021-09-21
Attending: NURSE PRACTITIONER
Payer: MEDICARE

## 2021-09-21 DIAGNOSIS — Z79.899 HIGH RISK MEDICATION USE: ICD-10-CM

## 2021-09-21 DIAGNOSIS — I48.91 ATRIAL FIBRILLATION WITH RVR (HCC): ICD-10-CM

## 2021-09-21 DIAGNOSIS — Z95.5 STENTED CORONARY ARTERY: ICD-10-CM

## 2021-09-21 DIAGNOSIS — I50.20 HFREF (HEART FAILURE WITH REDUCED EJECTION FRACTION) (HCC): ICD-10-CM

## 2021-09-21 LAB
ANION GAP SERPL CALC-SCNC: 10 MMOL/L (ref 7–16)
BUN SERPL-MCNC: 33 MG/DL (ref 8–22)
CALCIUM SERPL-MCNC: 10.2 MG/DL (ref 8.5–10.5)
CHLORIDE SERPL-SCNC: 103 MMOL/L (ref 96–112)
CO2 SERPL-SCNC: 27 MMOL/L (ref 20–33)
CREAT SERPL-MCNC: 1.22 MG/DL (ref 0.5–1.4)
FASTING STATUS PATIENT QL REPORTED: NORMAL
GLUCOSE SERPL-MCNC: 100 MG/DL (ref 65–99)
POTASSIUM SERPL-SCNC: 5.2 MMOL/L (ref 3.6–5.5)
SODIUM SERPL-SCNC: 140 MMOL/L (ref 135–145)

## 2021-09-21 PROCEDURE — 36415 COLL VENOUS BLD VENIPUNCTURE: CPT

## 2021-09-21 PROCEDURE — 80048 BASIC METABOLIC PNL TOTAL CA: CPT

## 2021-11-04 ENCOUNTER — OFFICE VISIT (OUTPATIENT)
Dept: CARDIOLOGY | Facility: MEDICAL CENTER | Age: 81
End: 2021-11-04
Payer: MEDICARE

## 2021-11-04 VITALS
OXYGEN SATURATION: 97 % | HEART RATE: 78 BPM | SYSTOLIC BLOOD PRESSURE: 124 MMHG | WEIGHT: 132 LBS | HEIGHT: 62 IN | BODY MASS INDEX: 24.29 KG/M2 | DIASTOLIC BLOOD PRESSURE: 54 MMHG

## 2021-11-04 DIAGNOSIS — Z79.01 CHRONIC ANTICOAGULATION: ICD-10-CM

## 2021-11-04 DIAGNOSIS — N83.299 COMPLEX OVARIAN CYST: ICD-10-CM

## 2021-11-04 DIAGNOSIS — I48.19 PERSISTENT ATRIAL FIBRILLATION (HCC): ICD-10-CM

## 2021-11-04 DIAGNOSIS — E78.5 DYSLIPIDEMIA: ICD-10-CM

## 2021-11-04 DIAGNOSIS — I10 HTN (HYPERTENSION), MALIGNANT: ICD-10-CM

## 2021-11-04 DIAGNOSIS — I10 ESSENTIAL HYPERTENSION: ICD-10-CM

## 2021-11-04 DIAGNOSIS — E78.2 MIXED HYPERLIPIDEMIA: ICD-10-CM

## 2021-11-04 DIAGNOSIS — I48.91 ATRIAL FIBRILLATION WITH RVR (HCC): ICD-10-CM

## 2021-11-04 DIAGNOSIS — Z79.899 HIGH RISK MEDICATION USE: ICD-10-CM

## 2021-11-04 DIAGNOSIS — Z95.5 STENTED CORONARY ARTERY: ICD-10-CM

## 2021-11-04 DIAGNOSIS — I50.20 HFREF (HEART FAILURE WITH REDUCED EJECTION FRACTION) (HCC): ICD-10-CM

## 2021-11-04 PROCEDURE — 99213 OFFICE O/P EST LOW 20 MIN: CPT | Performed by: NURSE PRACTITIONER

## 2021-11-04 RX ORDER — FUROSEMIDE 20 MG/1
20 TABLET ORAL
Qty: 100 TABLET | Refills: 2 | Status: SHIPPED | OUTPATIENT
Start: 2021-11-04 | End: 2021-12-14

## 2021-11-04 ASSESSMENT — ENCOUNTER SYMPTOMS
CLAUDICATION: 0
ABDOMINAL PAIN: 0
BLOOD IN STOOL: 0
FALLS: 0
PND: 0
BRUISES/BLEEDS EASILY: 0
NAUSEA: 0
ORTHOPNEA: 0
BLURRED VISION: 0
DIZZINESS: 0
LOSS OF CONSCIOUSNESS: 0
VOMITING: 0
PALPITATIONS: 0
COUGH: 0
WEIGHT LOSS: 0
MYALGIAS: 0
TINGLING: 0
FEVER: 0
SHORTNESS OF BREATH: 0

## 2021-11-04 ASSESSMENT — FIBROSIS 4 INDEX: FIB4 SCORE: 1.32

## 2021-11-04 NOTE — PROGRESS NOTES
Chief Complaint   Patient presents with   • Atrial Fibrillation     follow up       Subjective     Ivory Rowan is a 81 y.o. female who presents today A. fib, hypertension, dyslipidemia follow-up with her daughter Conchita.  Patient was last seen on 9/2/2021 and by Dr. Washington on 7/27/2021.  Patient  was recently admitted on 8/12/2021 for A. fib with RVR and increased shortness of breath.  Patient stopped her digoxin due to increased diarrhea.    Today, patient feels well, denies chest pain, shortness of breath, palpitations, dizziness/lightheadedness, orthopnea, PND or Edema.  Patient has been weighing herself at home with stable weight between 132 to 133 pounds. Based on physical examination findings, patient is euvolemic. No JVD, lungs are clear to auscultation, no pitting edema in bilateral lower extremities, no ascites.  Dry weight is 132 lbs.    We reviewed her lab work in office today.  We will stop her potassium supplementation for her elevated potassium level.  We will also decrease her furosemide intake to as needed for weight gain.  Parameters noted on prescription and written instructions provided to patient today in office.  We will obtain follow-up blood work in 1 month.      Past Medical History:   Diagnosis Date   • Acute on chronic heart failure with preserved ejection fraction (HCC) 8/13/2021   • Atrial fibrillation (HCC)    • Basal cell carcinoma of skin of nose    • CATARACT     Dr. Aaron, Dr. Orellana   • CHF (congestive heart failure) (HCC)    • Colon polyp     tubular adenomas   • Dental disorder     upper partial   • Dyslipidemia    • Glaucoma, right eye     Dr. Orellana   • Heart burn    • Hemorrhagic disorder (HCC)     eliquis   • Hypertension     pt states well controlled on meds   • IBS (irritable bowel syndrome)    • Indigestion    • MEDICAL HOME 10/23/12   • Mitral valve regurgitation    • Osteopenia 6/09   • Perennial allergic rhinitis with seasonal variation    • Persistent atrial fibrillation  "(HCC)    • Psychiatric problem     anxiety   • Type 2 diabetes mellitus, controlled (HCC)     diet controlled   • Valvular heart disease     mitral insufficiency   • Vertigo      Past Surgical History:   Procedure Laterality Date   • RECOVERY  2016    Procedure: CATH LAB-ZAFAR GUIDED CV-TO-LARGE GROUP;  Surgeon: Recoveryonly Surgery;  [place] [facility] [place]:    • COLONOSCOPY  2009    Dr. Lopez, 9 polyps   • APPENDECTOMY     • BREAST BIOPSY      left, reports wire report broke off during procedure   • CHOLECYSTECTOMY     • US-NEEDLE CORE BX-BREAST PANEL       Family History   Problem Relation Age of Onset   • Diabetes Mother    • Hypertension Mother    • Stroke Mother    • Cancer Father         lung/larynx   • Cancer Sister         \"female\"   • Genetic Disorder Sister         osteoporosis   • Cancer Paternal Aunt         breast   • Cancer Maternal Aunt    • Heart Disease Brother         CABG x 3     Social History     Socioeconomic History   • Marital status:      Spouse name: Not on file   • Number of children: 2   • Years of education: Not on file   • Highest education level: Not on file   Occupational History   • Occupation: Retired [address] [place] [address] [employer]     Employer: retired   Tobacco Use   • Smoking status: Former Smoker     Packs/day: 0.50     Years: 40.00     Pack years: 20.00     Types: Cigarettes     Quit date: 3/1/1996     Years since quittin.6   • Smokeless tobacco: Never Used   Vaping Use   • Vaping Use: Never used   Substance and Sexual Activity   • Alcohol use: Not Currently     Alcohol/week: 0.0 oz     Comment: now rare   • Drug use: No   • Sexual activity: Not Currently     Partners: Male   Other Topics Concern   • Not on file   Social History Narrative   • Not on file     Social Determinants of Health     Financial Resource Strain:    • Difficulty of Paying Living Expenses:    Food Insecurity: No Food Insecurity   • Worried About Running Out of Food in " the Last Year: Never true   • Ran Out of Food in the Last Year: Never true   Transportation Needs: No Transportation Needs   • Lack of Transportation (Medical): No   • Lack of Transportation (Non-Medical): No   Physical Activity:    • Days of Exercise per Week:    • Minutes of Exercise per Session:    Stress:    • Feeling of Stress :    Social Connections:    • Frequency of Communication with Friends and Family:    • Frequency of Social Gatherings with Friends and Family:    • Attends [employer] Services:    • Active Member of Clubs or Organizations:    • Attends Club or Organization Meetings:    • Marital Status:    Intimate Partner Violence:    • Fear of Current or Ex-Partner:    • Emotionally Abused:    • Physically Abused:    • Sexually Abused:      Allergies   Allergen Reactions   • Atorvastatin      myalgias   • Simvastatin      myalgias     Outpatient Encounter Medications as of 11/4/2021   Medication Sig Dispense Refill   • furosemide (LASIX) 20 MG Tab Take 1 Tablet by mouth every 48 hours. Monday, Wednesday, and Friday 30 Tablet 5   • spironolactone (ALDACTONE) 25 MG Tab Take 0.5 Tablets by mouth every day. 45 Tablet 3   • ezetimibe (ZETIA) 10 MG Tab Take 1 Tablet by mouth every Monday, Wednesday, and Friday. 36 Tablet 3   • lisinopril (PRINIVIL) 5 MG Tab Take 1 Tablet by mouth every day. 90 Tablet 4   • diazepam (VALIUM) 10 MG tablet Take 1 Tablet by mouth every 12 hours as needed for Anxiety or Sleep for up to 90 days. 90 Tablet 0   • digoxin (LANOXIN) 125 MCG Tab Take 1 Tablet by mouth every day. 90 Tablet 3   • potassium chloride SA (KDUR) 20 MEQ Tab CR Take 1 Tablet by mouth every Monday, Wednesday, and Friday. 36 Tablet 3   • metoprolol SR (TOPROL XL) 100 MG TABLET SR 24 HR Take 1 tablet by mouth 2 times a day. 200 tablet 3   • clopidogrel (PLAVIX) 75 MG Tab Take 1 tablet by mouth every day. 90 tablet 4   • apixaban (ELIQUIS) 2.5mg Tab Take 1 tablet by mouth 2 times a day. 180 tablet 4   • pantoprazole  "(PROTONIX) 40 MG Tablet Delayed Response Take 1 tablet by mouth every day. 90 tablet 3     No facility-administered encounter medications on file as of 11/4/2021.     Review of Systems   Constitutional: Negative for fever, malaise/fatigue and weight loss.   Eyes: Negative for blurred vision.   Respiratory: Negative for cough and shortness of breath.    Cardiovascular: Negative for chest pain, palpitations, orthopnea, claudication, leg swelling and PND.   Gastrointestinal: Negative for abdominal pain, blood in stool, nausea and vomiting.   Genitourinary: Negative for dysuria, frequency and hematuria.   Musculoskeletal: Negative for falls and myalgias.   Neurological: Negative for dizziness, tingling and loss of consciousness.   Endo/Heme/Allergies: Does not bruise/bleed easily.              Objective     /54 (BP Location: Right arm, Patient Position: Sitting)   Pulse 78   Ht 1.575 m (5' 2\")   Wt 59.9 kg (132 lb)   LMP 05/01/1991   SpO2 97%   BMI 24.14 kg/m²     Physical Exam  Vitals reviewed.   Constitutional:       Appearance: She is well-developed. She is not ill-appearing.   HENT:      Head: Normocephalic and atraumatic.   Eyes:      Pupils: Pupils are equal, round, and reactive to light.   Neck:      Vascular: No JVD.   Cardiovascular:      Rate and Rhythm: Normal rate. Rhythm irregular.      Heart sounds: Normal heart sounds. No murmur heard.   No friction rub. No gallop.    Pulmonary:      Effort: Pulmonary effort is normal. No respiratory distress.      Breath sounds: Normal breath sounds.   Abdominal:      General: Bowel sounds are normal. There is no distension.      Palpations: Abdomen is soft.   Musculoskeletal:      Right lower leg: No edema.      Left lower leg: No edema.   Skin:     General: Skin is warm and dry.      Findings: No erythema.   Neurological:      General: No focal deficit present.      Mental Status: She is alert and oriented to person, place, and time. Mental status is at " baseline.   Psychiatric:         Behavior: Behavior normal.            Lab Results   Component Value Date/Time    CHOLSTRLTOT 116 08/14/2021 03:17 AM    LDL 60 08/14/2021 03:17 AM    HDL 39 (A) 08/14/2021 03:17 AM    TRIGLYCERIDE 86 08/14/2021 03:17 AM       Lab Results   Component Value Date/Time    SODIUM 140 09/21/2021 11:42 AM    POTASSIUM 5.2 09/21/2021 11:42 AM    CHLORIDE 103 09/21/2021 11:42 AM    CO2 27 09/21/2021 11:42 AM    GLUCOSE 100 (H) 09/21/2021 11:42 AM    BUN 33 (H) 09/21/2021 11:42 AM    CREATININE 1.22 09/21/2021 11:42 AM    CREATININE 0.99 04/11/2011 12:00 AM    BUNCREATRAT 26 04/11/2011 12:00 AM    GLOMRATE 56 (L) 03/09/2011 07:55 AM     Lab Results   Component Value Date/Time    ALKPHOSPHAT 90 08/15/2021 07:53 AM    ASTSGOT 17 08/15/2021 07:53 AM    ALTSGPT 22 08/15/2021 07:53 AM    TBILIRUBIN 1.0 08/15/2021 07:53 AM    Echocardiogram 8/8/2019  CONCLUSIONS  Normal left ventricular size and systolic function.  Left ventricular ejection fraction is visually estimated to be 55%.  Normal right ventricular size and systolic function.  Aortic sclerosis without stenosis.  Mild aortic insufficiency.  Moderate to severe mitral regurgitation.  Moderate tricuspid regurgitation.  Right atrial pressure is estimated to be 3 mmHg.  Estimated right ventricular systolic pressure  is 55 mmHg.  Estimated pulmonary artery diastolic pressure of 10.     Compared to the images of the prior study done on 05/02/16: Unable to   retrieve previous study due to technical difficulties. Compared to the   report, there has been no change to the MR. The LVEF has improved from   45% to 55%.       Coronary angiogram 6/18/2020  Indication for procedure: Positive stress test moderate or high risk     Procedures:  · Insertion of 5/6 Fr sheath in the right radial artery  · right and left coronary arteriograms  · Left heart catheterization  · Angioplasty and placement of a 3.5 by 8mm [place] drug-eluting stent in ostial  right coronary  artery.  · Angioplasty and placement of a 2.75 by 22 mm [place] drug-eluting  stent in mid  right coronary artery.     Final diagnosis:   single vessel coronary artery disease.  Successful percutaneous intervention of right coronary artery.     Recommendations: Guideline directed medical therapy and risk factor management     Coronary arteriograms:  Left main: 20-30% distal left main disease.  Left anterior descendin-40% mid LAD disease, diagonals are small  Left circumflex: luminal irregularities. Three small marginal branches are noted. No significant disease is noted in marginal branches.  Right coronary: 80% ostial stenosis, mid RCA has tandem 90% lesions, dominant     Left Heart Catheterization:  Left Ventriculogram: Not performed  Left Ventricular EDP: 10 mm Hg   Aortic Valve Gradient: No significant AV gradient noted    Transthoracic echocardiogram (2021): Patient in atrial fibrillation, heart rate 120 bpm.  Left ventricular ejection fraction is visually estimated to be 55%,   [id number] variability in atrial fibrillation.  Trivial aortic sclerosis without stenosis.  Moderate eccentric mitral regurgitation.  Estimated right ventricular systolic pressure  is 40 mmHg.  Compared to the images of the prior study done on 19 EF is   normal, MR appears moderate, RVSP is reduced.    Assessment & Plan     1. Atrial fibrillation with RVR ([facility])     2. HFrEF (heart failure with reduced ejection fraction) ([facility])     3. Stented coronary artery     4. High risk medication use     5. HTN (hypertension), malignant     6. Mixed hyperlipidemia     7. Chronic anticoagulation     8. Complex ovarian cyst     9. Persistent atrial fibrillation (HCC)     10. Essential hypertension     11. Dyslipidemia         Medical Decision Making: Today's Assessment/Status/Plan:        Persistent Atrial Fibrillation (PAF)  - Asymptomatic, rate controlled.   - On OAC with Eliquis 2.5 mg twice daily, continue.  - Continue metoprolol XL  100 mg daily  -Digoxin restarted on hospitalization  -Financial barrier to Tikosyn in the past    HFrEF, Stage C, Class II, LVEF 55% recovered from 45%  -Heart failure due to ischemic cardiomyopathy  -ACE-I/ARB/ARNI: Continue lisinopril 5 mg daily  -Evidence Based Beta-blocker: Continue metoprolol  mg daily  -Aldosterone Antagonist: Continue spironolactone 12.5 mg daily  -Diuretic: Decrease furosemide to 20 mg daily as needed for weight gain of 3 pounds in 1 day or 5 pounds in 1 week  -Labs: BMP in 4 weeks   -Moderate to severe MR:  Patient declined evaluation previously with Dr. Washington  -Reinforced s/sx of worsening heart failure with patient and weight monitoring. Pt verbalizes understanding. Pt to call office if present.    -Heart Failure Education: pt to be contacted by HF nurse for further education    Coronary artery disease s/p PCI/EARLENE to RCA (6/2020); HLD  -LDL 60 (8/14/2021).  At goal.  -Toprol  mg twice daily  -Continue Plavix 75 mg daily  -No ASA due to OAC  -Lisinopril 5 mg daily  -Statin reaction in the past.  On Zetia 10 mg every evening    Hypertension  -Today in office blood pressure is well controlled  -Encouraged to continue home BP monitoring/log.  -Medication recommendations per above.    T2DM  -A1c 6.8 (8/13/2027)  -Recommend SGLT2 I for CV mortality benefit.  Patient declines at this time    FU in clinic as scheduled with Dr. Washington1 month. Sooner if needed.    Patient verbalizes understanding and agrees with the plan of care.     Collaborating MD: Dr. Adri MD    PLEASE NOTE: This Note was created using voice recognition Software. I have made every reasonable attempt to correct obvious errors, but I expect that there are errors of grammar and possibly content that I did not discover before finalizing the note

## 2021-11-04 NOTE — PATIENT INSTRUCTIONS
Furosemide 20 mg daily as needed for weight gain greater than 3 pounds (~135lbs) in 1 day or 5 pounds in 1 week.

## 2021-12-06 ENCOUNTER — HOSPITAL ENCOUNTER (OUTPATIENT)
Dept: LAB | Facility: MEDICAL CENTER | Age: 81
End: 2021-12-06
Attending: NURSE PRACTITIONER
Payer: MEDICARE

## 2021-12-06 DIAGNOSIS — I10 HTN (HYPERTENSION), MALIGNANT: ICD-10-CM

## 2021-12-06 DIAGNOSIS — Z79.01 CHRONIC ANTICOAGULATION: ICD-10-CM

## 2021-12-06 DIAGNOSIS — I50.20 HFREF (HEART FAILURE WITH REDUCED EJECTION FRACTION) (HCC): ICD-10-CM

## 2021-12-06 DIAGNOSIS — I10 ESSENTIAL HYPERTENSION: ICD-10-CM

## 2021-12-06 DIAGNOSIS — Z79.899 HIGH RISK MEDICATION USE: ICD-10-CM

## 2021-12-06 DIAGNOSIS — I48.91 ATRIAL FIBRILLATION WITH RVR (HCC): ICD-10-CM

## 2021-12-06 DIAGNOSIS — N83.299 COMPLEX OVARIAN CYST: ICD-10-CM

## 2021-12-06 DIAGNOSIS — Z95.5 STENTED CORONARY ARTERY: ICD-10-CM

## 2021-12-06 DIAGNOSIS — I48.19 PERSISTENT ATRIAL FIBRILLATION (HCC): ICD-10-CM

## 2021-12-06 DIAGNOSIS — E78.5 DYSLIPIDEMIA: ICD-10-CM

## 2021-12-06 DIAGNOSIS — E78.2 MIXED HYPERLIPIDEMIA: ICD-10-CM

## 2021-12-06 LAB
ANION GAP SERPL CALC-SCNC: 13 MMOL/L (ref 7–16)
BUN SERPL-MCNC: 33 MG/DL (ref 8–22)
CALCIUM SERPL-MCNC: 10 MG/DL (ref 8.5–10.5)
CHLORIDE SERPL-SCNC: 104 MMOL/L (ref 96–112)
CO2 SERPL-SCNC: 21 MMOL/L (ref 20–33)
CREAT SERPL-MCNC: 1 MG/DL (ref 0.5–1.4)
GLUCOSE SERPL-MCNC: 91 MG/DL (ref 65–99)
POTASSIUM SERPL-SCNC: 4.6 MMOL/L (ref 3.6–5.5)
SODIUM SERPL-SCNC: 138 MMOL/L (ref 135–145)

## 2021-12-06 PROCEDURE — 80048 BASIC METABOLIC PNL TOTAL CA: CPT

## 2021-12-06 PROCEDURE — 36415 COLL VENOUS BLD VENIPUNCTURE: CPT

## 2021-12-14 ENCOUNTER — OFFICE VISIT (OUTPATIENT)
Dept: CARDIOLOGY | Facility: MEDICAL CENTER | Age: 81
End: 2021-12-14
Payer: MEDICARE

## 2021-12-14 VITALS
WEIGHT: 132 LBS | SYSTOLIC BLOOD PRESSURE: 124 MMHG | BODY MASS INDEX: 24.29 KG/M2 | OXYGEN SATURATION: 97 % | HEART RATE: 84 BPM | DIASTOLIC BLOOD PRESSURE: 90 MMHG | HEIGHT: 62 IN

## 2021-12-14 DIAGNOSIS — Z95.5 STENTED CORONARY ARTERY: ICD-10-CM

## 2021-12-14 DIAGNOSIS — I10 HTN (HYPERTENSION), MALIGNANT: ICD-10-CM

## 2021-12-14 DIAGNOSIS — E78.2 MIXED HYPERLIPIDEMIA: ICD-10-CM

## 2021-12-14 DIAGNOSIS — I48.19 PERSISTENT ATRIAL FIBRILLATION (HCC): ICD-10-CM

## 2021-12-14 DIAGNOSIS — I48.91 ATRIAL FIBRILLATION WITH RVR (HCC): ICD-10-CM

## 2021-12-14 DIAGNOSIS — I50.20 HFREF (HEART FAILURE WITH REDUCED EJECTION FRACTION) (HCC): ICD-10-CM

## 2021-12-14 LAB — EKG IMPRESSION: NORMAL

## 2021-12-14 PROCEDURE — 93000 ELECTROCARDIOGRAM COMPLETE: CPT | Performed by: INTERNAL MEDICINE

## 2021-12-14 PROCEDURE — 99214 OFFICE O/P EST MOD 30 MIN: CPT | Performed by: INTERNAL MEDICINE

## 2021-12-14 RX ORDER — METOPROLOL SUCCINATE 100 MG/1
100 TABLET, EXTENDED RELEASE ORAL 2 TIMES DAILY
Qty: 200 TABLET | Refills: 3 | Status: SHIPPED | OUTPATIENT
Start: 2021-12-14 | End: 2022-09-14

## 2021-12-14 RX ORDER — LISINOPRIL 5 MG/1
5 TABLET ORAL DAILY
Qty: 90 TABLET | Refills: 4 | Status: SHIPPED | OUTPATIENT
Start: 2021-12-14 | End: 2022-06-21

## 2021-12-14 RX ORDER — SPIRONOLACTONE 25 MG/1
12.5 TABLET ORAL DAILY
Qty: 45 TABLET | Refills: 4 | Status: SHIPPED | OUTPATIENT
Start: 2021-12-14 | End: 2022-06-21 | Stop reason: SDUPTHER

## 2021-12-14 RX ORDER — EZETIMIBE 10 MG/1
10 TABLET ORAL
Qty: 36 TABLET | Refills: 3 | Status: SHIPPED | OUTPATIENT
Start: 2021-12-15 | End: 2022-11-30

## 2021-12-14 RX ORDER — CLOPIDOGREL BISULFATE 75 MG/1
75 TABLET ORAL DAILY
Qty: 90 TABLET | Refills: 4 | Status: SHIPPED | OUTPATIENT
Start: 2021-12-14 | End: 2023-03-13

## 2021-12-14 ASSESSMENT — ENCOUNTER SYMPTOMS
VOMITING: 0
SENSORY CHANGE: 0
HEADACHES: 0
DIZZINESS: 0
DOUBLE VISION: 0
MYALGIAS: 0
ABDOMINAL PAIN: 0
FEVER: 0
COUGH: 0
CHILLS: 0
HALLUCINATIONS: 0
PALPITATIONS: 0
PND: 0
SHORTNESS OF BREATH: 0
EYE DISCHARGE: 0
NAUSEA: 0
WEIGHT LOSS: 0
ORTHOPNEA: 0
DEPRESSION: 0
EYE PAIN: 0
BLOOD IN STOOL: 0
CLAUDICATION: 0
SPEECH CHANGE: 0
FALLS: 0
BLURRED VISION: 0
LOSS OF CONSCIOUSNESS: 0
BRUISES/BLEEDS EASILY: 0

## 2021-12-14 ASSESSMENT — FIBROSIS 4 INDEX: FIB4 SCORE: 1.32

## 2021-12-14 NOTE — PROGRESS NOTES
Chief Complaint   Patient presents with   • Atrial Fibrillation     follow up       Subjective:   Ivory Rowan is an 81 y.o. female who presents today for cardiac care for persistent atrial fibrillation after prior failed cardioversion. She was in sinus for brief amount of time. Patient was diagnosed with atrial fibrillation earlier in 2016. Patient has had multiple hospitalizations due to A. fib with rapid ventricular rate leading to heart failure exacerbation. Her left ventricular systolic function is documented at about 45-50% on her most recent transthoracic echocardiogram. Patient is anticoagulated. Patient is taking Toprol- mg by mouth twice a day.     In the [facility], patient was not able to afford Tikosyn therapy and she did not go into the hospital for that.     I have independently interpreted and reviewed blood tests results with patient in clinic which shows normal LDL level 77, triglycerides 86. GFR of 53.     06/2020 Due to abnormal stress test, patient underwent coronary angigoram and found to have RCA disease s/p PCI and stent.    08/2021 Was in hospital due to elevated heart rate with afib due to stoppage of digoxin.    I have independently interpreted and reviewed echocardiogram's actual images with patient which showed normal left ventricular systolic function. No wall motion abnormality. No evidence of pulmonary hypertension. Moderate MR. 08/2021.    Past Medical History:   Diagnosis Date   • Acute on chronic heart failure with preserved ejection fraction (HCC) 8/13/2021   • Atrial fibrillation (HCC)    • Basal cell carcinoma of skin of nose    • CATARACT     Dr. Aaron, Dr. Oerllana   • CHF (congestive heart failure) (HCC)    • Colon polyp     tubular adenomas   • Dental disorder     upper partial   • Dyslipidemia    • Glaucoma, right eye     Dr. Orellana   • Heart burn    • Hemorrhagic disorder (HCC)     eliquis   • Hypertension     pt states well controlled on meds   • IBS (irritable bowel  "syndrome)    • Indigestion    • MEDICAL HOME 10/23/12   • Mitral valve regurgitation    • Osteopenia    • Perennial allergic rhinitis with seasonal variation    • Persistent atrial fibrillation (HCC)    • Psychiatric problem     anxiety   • Type 2 diabetes mellitus, controlled (HCC)     diet controlled   • Valvular heart disease     mitral insufficiency   • Vertigo      Past Surgical History:   Procedure Laterality Date   • RECOVERY  2016    Procedure: CATH LAB-ZAFAR GUIDED CV-TO-LARGE GROUP;  Surgeon: Recoveryonly Surgery;  [place] [facility] [place]  Service:    • COLONOSCOPY  2009    Dr. Lopez, 9 polyps   • APPENDECTOMY     • BREAST BIOPSY      left, reports wire report broke off during procedure   • CHOLECYSTECTOMY     • US-NEEDLE CORE BX-BREAST PANEL       Family History   Problem Relation Age of Onset   • Diabetes Mother    • Hypertension Mother    • Stroke Mother    • Cancer Father         lung/larynx   • Cancer Sister         \"female\"   • Genetic Disorder Sister         osteoporosis   • Cancer Paternal Aunt         breast   • Cancer Maternal Aunt    • Heart Disease Brother         CABG x 3     Social History     Socioeconomic History   • Marital status:      Spouse name: Not on file   • Number of children: 2   • Years of education: Not on file   • Highest education level: Not on file   Occupational History   • Occupation: Retired [employer]      Employer: retired   Tobacco Use   • Smoking status: Former Smoker     Packs/day: 0.50     Years: 40.00     Pack years: 20.00     Types: Cigarettes     Quit date: 3/1/1996     Years since quittin.8   • Smokeless tobacco: Never Used   Vaping Use   • Vaping Use: Never used   Substance and Sexual Activity   • Alcohol use: Not Currently     Alcohol/week: 0.0 oz     Comment: now rare   • Drug use: No   • Sexual activity: Not Currently     Partners: Male   Other Topics Concern   • Not on file   Social History Narrative   • " Not on file     Social Determinants of Health     Financial Resource Strain:    • Difficulty of Paying Living Expenses: Not on file   Food Insecurity: No Food Insecurity   • Worried About Running Out of Food in the Last Year: Never true   • Ran Out of Food in the Last Year: Never true   Transportation Needs: No Transportation Needs   • Lack of Transportation (Medical): No   • Lack of Transportation (Non-Medical): No   Physical Activity:    • Days of Exercise per Week: Not on file   • Minutes of Exercise per Session: Not on file   Stress:    • Feeling of Stress : Not on file   Social Connections:    • Frequency of Communication with Friends and Family: Not on file   • Frequency of Social Gatherings with Friends and Family: Not on file   • Attends [employer] Services: Not on file   • Active Member of Clubs or Organizations: Not on file   • Attends Club or Organization Meetings: Not on file   • Marital Status: Not on file   Intimate Partner Violence:    • Fear of Current or Ex-Partner: Not on file   • Emotionally Abused: Not on file   • Physically Abused: Not on file   • Sexually Abused: Not on file   Housing Stability:    • Unable to Pay for Housing in the Last Year: Not on file   • Number of Places Lived in the Last Year: Not on file   • Unstable Housing in the Last Year: Not on file     Allergies   Allergen Reactions   • Atorvastatin      myalgias   • Simvastatin      myalgias     Outpatient Encounter Medications as of 12/14/2021   Medication Sig Dispense Refill   • spironolactone (ALDACTONE) 25 MG Tab Take 0.5 Tablets by mouth every day. 45 Tablet 3   • ezetimibe (ZETIA) 10 MG Tab Take 1 Tablet by mouth every Monday, Wednesday, and Friday. 36 Tablet 3   • lisinopril (PRINIVIL) 5 MG Tab Take 1 Tablet by mouth every day. 90 Tablet 4   • digoxin (LANOXIN) 125 MCG Tab Take 1 Tablet by mouth every day. 90 Tablet 3   • metoprolol SR (TOPROL XL) 100 MG TABLET SR 24 HR Take 1 tablet by mouth 2 times a day. 200 tablet 3   •  "clopidogrel (PLAVIX) 75 MG Tab Take 1 tablet by mouth every day. 90 tablet 4   • apixaban (ELIQUIS) 2.5mg Tab Take 1 tablet by mouth 2 times a day. 180 tablet 4   • pantoprazole (PROTONIX) 40 MG Tablet Delayed Response Take 1 tablet by mouth every day. 90 tablet 3   • furosemide (LASIX) 20 MG Tab Take 1 Tablet by mouth 1 time a day as needed. Furosemide 20 mg daily as needed for weight gain greater than 3 pounds in 1 day or 5 pounds in 1 week. (Patient not taking: Reported on 12/14/2021) 100 Tablet 2   • potassium chloride SA (KDUR) 20 MEQ Tab CR Take 1 Tablet by mouth every Monday, Wednesday, and Friday. (Patient not taking: Reported on 12/14/2021) 36 Tablet 3     No facility-administered encounter medications on file as of 12/14/2021.     Review of Systems   Constitutional: Negative for chills, fever, malaise/fatigue and weight loss.   HENT: Negative for ear discharge, ear pain, hearing loss and nosebleeds.    Eyes: Negative for blurred vision, double vision, pain and discharge.   Respiratory: Negative for cough and shortness of breath.    Cardiovascular: Negative for chest pain, palpitations, orthopnea, claudication, leg swelling and PND.   Gastrointestinal: Negative for abdominal pain, blood in stool, melena, nausea and vomiting.   Genitourinary: Negative for dysuria and hematuria.   Musculoskeletal: Negative for falls, joint pain and myalgias.   Skin: Negative for itching and rash.   Neurological: Negative for dizziness, sensory change, speech change, loss of consciousness and headaches.   Endo/Heme/Allergies: Negative for environmental allergies. Does not bruise/bleed easily.   Psychiatric/Behavioral: Negative for depression, hallucinations and suicidal ideas.        Objective:   /90 (BP Location: Left arm, Patient Position: Sitting)   Pulse 84   Ht 1.575 m (5' 2\")   Wt 59.9 kg (132 lb)   LMP 05/01/1991   SpO2 97%   BMI 24.14 kg/m²     Physical Exam  Vitals and nursing note reviewed. "   Constitutional:       General: She is not in acute distress.     Appearance: She is not diaphoretic.   HENT:      Head: Normocephalic and atraumatic.      Right Ear: External ear normal.      Left Ear: External ear normal.      Nose: No congestion or rhinorrhea.   Eyes:      General:         Right eye: No discharge.         Left eye: No discharge.   Neck:      Thyroid: No thyromegaly.      Vascular: No JVD.   Cardiovascular:      Rate and Rhythm: Normal rate. Rhythm irregular.      Pulses: Normal pulses.   Pulmonary:      Effort: No respiratory distress.   Abdominal:      General: There is no distension.      Tenderness: There is no abdominal tenderness.   Musculoskeletal:         General: No swelling or tenderness.      Right lower leg: No edema.      Left lower leg: No edema.   Skin:     General: Skin is warm and dry.   Neurological:      Mental Status: She is alert and oriented to person, place, and time.      Cranial Nerves: No cranial nerve deficit.   Psychiatric:         Behavior: Behavior normal.         Assessment:     1. Persistent atrial fibrillation (HCC)  EKG   2. Stented coronary artery     3. Mixed hyperlipidemia     4. HTN (hypertension), malignant         Medical Decision Making:  Today's Assessment / Status / Plan:   CAD s/p RCA stent 06/2020:  Toprol  mg bid.  Continue plavix, BB, Zetia at current dose.  No ASA now to reduce risk of bleeding. Already on Eliquis 2.5 mg bid due to atrial fibrillation already.  Will continue Lisinopril 5 mg po daily.  Statin allergies.    Persistent atrial fibrillation:  Rate controlled.  Continue anticoagulation with Eiquis 2.5 mg bid, had nose bleed in the past with full dose.  Continue Toprol 100 mg bid.  Will stop Furosemide and potassium.  Digoxin was stopped last time but her heart rate went very high. She was in hospital for treatment. She does not want to stop that anymore. Will monitor closely.     Hypertension:  Blood pressure is well  controlled.  Continue Toprol 100 mg bid.     Dyslipidemia:  Statin intolerance.  Continue Zetia 10 mg qhs.    Mitral regurgitation (severe):  Patient does not want to get evaluated for intervention at this time.     I will see patient back in clinic with lab tests and studies results in 6 months.     I thank you Dr. Tesfaye for referring patient to our Cardiology Clinic today.

## 2021-12-21 ENCOUNTER — TELEPHONE (OUTPATIENT)
Dept: MEDICAL GROUP | Facility: MEDICAL CENTER | Age: 81
End: 2021-12-21

## 2021-12-21 NOTE — TELEPHONE ENCOUNTER
ESTABLISHED PATIENT PRE-VISIT PLANNING     Annual Wellness Visit Over Due    Phone Number Called: [phone] (home)   Call outcome: Called patient. No answer. Left Voicemail.  Message: Appointment scheduled with , Tuesday, 12/28/2021 at 1:00 PM, [address], [id number]. Arrive 10-15 minutes early for check-in.       Patient was contacted to complete PVP.     Note: Patient will not be contacted if there is no indication to call.     1.  Reviewed notes from the last few office visits within the medical group: Yes    2.  If any orders were placed at last visit or intended to be done for this visit (i.e. 6 mos follow-up), do we have Results/Consult Notes?         •  Labs - Labs were not ordered at last office visit with  on 08/30/2021.    Note: If patient appointment is for lab review and patient did not complete labs, check with provider if OK to reschedule patient until labs completed.         •  Imaging - Imaging ordered, NOT completed. Patient advised to complete prior to next appointment.      -Us-Pelvic Transvaginal Only: Not Done         •  Referrals - No referrals were ordered at last office visit.    3. Is this appointment scheduled as a Hospital Follow-Up? No    4.  Immunizations were updated in Epic using Reconcile Outside Information activity? Yes    5.  Patient is due for the following Health Maintenance Topics:   Health Maintenance Due   Topic Date Due   • Annual Wellness Visit  11/30/2017   • RETINAL SCREENING  07/01/2020       6.  AHA (Pulse8) form printed for Provider? No, patient does not have any open alerts

## 2021-12-22 ENCOUNTER — TELEPHONE (OUTPATIENT)
Dept: MEDICAL GROUP | Facility: MEDICAL CENTER | Age: 81
End: 2021-12-22

## 2021-12-22 NOTE — TELEPHONE ENCOUNTER
Received message from the Nurse, advising patient called, in regards of her appointment scheduled with Dr. Tesfaye on Tues. 12/28/21 at 1:00 PM. However, there was no other additional information provided in patient's message.     Phone Number Called: [phone] (home)   Call outcome: Called patient. No answer. Left Voice Mail.  Message: Advised returning call. Provided options if needing to reschedule or cancel upcoming appointment: [facility] Scheduling Teams: [phone] and/or [email or web address]. Advised can activate Us[employer]rend[employer] at [email or web address]

## 2021-12-22 NOTE — TELEPHONE ENCOUNTER
Patient called stating that she would like to know if she should keep her appointment next week with Dr. Tesfaye or reschedule it for after her pelvic ultrasound. Patient wanting to know what Dr. Tesfaye thinks.     Phone Number Called: [phone]    Call outcome: Spoke to patient regarding message below.    Message: Called to inform patient that the decision is entirely up to her. If she would like, we can schedule her for after the US. Patient decided to be schedule for after. Patient schedule 01/07/2022 at 1130AM.

## 2022-01-04 ENCOUNTER — HOSPITAL ENCOUNTER (OUTPATIENT)
Dept: RADIOLOGY | Facility: MEDICAL CENTER | Age: 82
End: 2022-01-04
Attending: FAMILY MEDICINE
Payer: MEDICARE

## 2022-01-04 DIAGNOSIS — N83.299 COMPLEX OVARIAN CYST: ICD-10-CM

## 2022-01-04 PROCEDURE — 76830 TRANSVAGINAL US NON-OB: CPT

## 2022-01-07 ENCOUNTER — OFFICE VISIT (OUTPATIENT)
Dept: MEDICAL GROUP | Facility: MEDICAL CENTER | Age: 82
End: 2022-01-07
Payer: MEDICARE

## 2022-01-07 ENCOUNTER — TELEPHONE (OUTPATIENT)
Dept: MEDICAL GROUP | Facility: MEDICAL CENTER | Age: 82
End: 2022-01-07

## 2022-01-07 VITALS
DIASTOLIC BLOOD PRESSURE: 68 MMHG | SYSTOLIC BLOOD PRESSURE: 128 MMHG | OXYGEN SATURATION: 99 % | WEIGHT: 130.29 LBS | HEIGHT: 62 IN | BODY MASS INDEX: 23.98 KG/M2 | HEART RATE: 68 BPM | TEMPERATURE: 97 F

## 2022-01-07 DIAGNOSIS — N83.202 CYST OF LEFT OVARY: ICD-10-CM

## 2022-01-07 DIAGNOSIS — F41.9 CHRONIC ANXIETY: ICD-10-CM

## 2022-01-07 DIAGNOSIS — I48.19 PERSISTENT ATRIAL FIBRILLATION (HCC): ICD-10-CM

## 2022-01-07 DIAGNOSIS — D68.69 SECONDARY HYPERCOAGULABLE STATE (HCC): ICD-10-CM

## 2022-01-07 DIAGNOSIS — E11.21 CONTROLLED TYPE 2 DIABETES MELLITUS WITH DIABETIC NEPHROPATHY, WITHOUT LONG-TERM CURRENT USE OF INSULIN (HCC): ICD-10-CM

## 2022-01-07 DIAGNOSIS — N18.31 STAGE 3A CHRONIC KIDNEY DISEASE: ICD-10-CM

## 2022-01-07 PROCEDURE — 99214 OFFICE O/P EST MOD 30 MIN: CPT | Performed by: FAMILY MEDICINE

## 2022-01-07 RX ORDER — DIAZEPAM 10 MG/1
10 TABLET ORAL EVERY 12 HOURS PRN
Qty: 90 TABLET | Refills: 0 | Status: SHIPPED | OUTPATIENT
Start: 2022-01-07 | End: 2022-07-14 | Stop reason: SDUPTHER

## 2022-01-07 ASSESSMENT — PATIENT HEALTH QUESTIONNAIRE - PHQ9: CLINICAL INTERPRETATION OF PHQ2 SCORE: 0

## 2022-01-07 ASSESSMENT — FIBROSIS 4 INDEX: FIB4 SCORE: 1.32

## 2022-01-07 NOTE — PROGRESS NOTES
Chief Complaint   Patient presents with   • Results     US DOS: 2022   • Ovarian Cyst   • Anxiety   • Chronic Kidney Disease   • Diabetes Mellitus       Subjective:     HPI:   Ivory Rowan presents today with the followin. Cyst of left ovary  Discussed ultrasound results.  The cyst that was incidentally visualized on CT scan is now somewhat smaller.  It appears to be a simple cyst on the left ovulating.  There is no ovarian mass or abnormality within the cyst appreciated by radiology.  This appears to be benign.    2. Chronic anxiety  Patient does have chronic anxiety and has been using the diazepam a little bit more with this cyst diagnosis as she was very afraid that this represented cancer.  She only has 6 tablets left.  She has made a 90-day quantity last nearly 5 months.  PDMP review shows no inconsistencies.  She has used the medication successfully without adverse event observed or reported.  The medication is renewed.    3. Stage 3a chronic kidney disease (HCC)  Patient's most recent EGFR is actually improved.  That was early December.  Follow-up lab orders for May or  discussed and placed.    4. Controlled type 2 diabetes mellitus with diabetic nephropathy, without long-term current use of insulin (HCC)  Patient has had very good blood sugar results on testing but is due for her A1c.  Order discussed and placed.    5. Secondary hypercoagulable state (HCC)/Persistent atrial fibrillation (HCC)  Patient continues anticoagulation on Eliquis due to her persistent atrial fibrillation.  She follows with cardiology for this problem.        Patient Active Problem List    Diagnosis Date Noted   • Cyst of left ovary 2022   • Secondary hypercoagulable state (HCC) 2021   • Complex ovarian cyst 2021   • HFrEF (heart failure with reduced ejection fraction) ([facility]) 2021   • Abdominal pain 2021   • Chronic anticoagulation 2019   • Hyperglycemia 2019   • Primary  open angle glaucoma (POAG) of both eyes 09/10/2018   • Persistent atrial fibrillation ([facility])    • Glaucoma, right eye    • Stage 3 chronic kidney disease ([facility]) 06/22/2017   • Type 2 diabetes mellitus, controlled ([facility])    • Chronic anxiety 01/04/2017   • Diabetes mellitus type II, non insulin dependent ([facility]) 11/29/2016   • Financial difficulties 11/29/2016   • Mitral valve regurgitation    • Atrial fibrillation with RVR ([facility]) 05/21/2016   • History of adenomatous polyp of colon 06/02/2015   • Perennial allergic rhinitis with seasonal variation    • Vertigo    • IBS (irritable bowel syndrome)    • Basal cell carcinoma of skin of nose    • Vitamin D deficiency 12/08/2011   • GERD (gastroesophageal reflux disease) 12/06/2010   • Hypertension 01/18/2010   • Hyperlipidemia 07/23/2009   • Osteopenia 07/23/2009   • Sinusitis, chronic    • Bilateral cataracts        Current medicines (including changes today)  Current Outpatient Medications   Medication Sig Dispense Refill   • diazepam (VALIUM) 10 MG tablet Take 1 Tablet by mouth every 12 hours as needed for Anxiety or Sleep for up to 90 days. 90 tablets is a 90 day quantity. 90 Tablet 0   • lisinopril (PRINIVIL) 5 MG Tab Take 1 Tablet by mouth every day. 90 Tablet 4   • metoprolol SR (TOPROL XL) 100 MG TABLET SR 24 HR Take 1 Tablet by mouth 2 times a day. 200 Tablet 3   • ezetimibe (ZETIA) 10 MG Tab Take 1 Tablet by mouth every Monday, Wednesday, and Friday. 36 Tablet 3   • spironolactone (ALDACTONE) 25 MG Tab Take 0.5 Tablets by mouth every day. 45 Tablet 4   • clopidogrel (PLAVIX) 75 MG Tab Take 1 Tablet by mouth every day. 90 Tablet 4   • apixaban (ELIQUIS) 2.5mg Tab Take 1 Tablet by mouth 2 times a day. 180 Tablet 4   • digoxin (LANOXIN) 125 MCG Tab Take 1 Tablet by mouth every day. 90 Tablet 3   • pantoprazole (PROTONIX) 40 MG Tablet Delayed Response Take 1 tablet by mouth every day. 90 tablet 3     No current facility-administered medications for this visit.  "      Allergies   Allergen Reactions   • Atorvastatin      myalgias   • Simvastatin      myalgias       ROS: As per HPI       Objective:     /68 (BP Location: Right arm, Patient Position: Sitting, BP Cuff Size: Adult)   Pulse 68   Temp 36.1 °C (97 °F) (Temporal)   Ht 1.575 m (5' 2\")   Wt 59.1 kg (130 lb 4.7 oz)   SpO2 99%  Body mass index is 23.83 kg/m².    Physical Exam:  Constitutional: Well-developed and well-nourished. Not diaphoretic. No distress. Lucid and fluent. Patient, daughter, physician and staff all wearing masks.  Skin: Skin is warm and dry. No rash noted.  Head: Atraumatic without lesions.  Eyes: Conjunctivae and extraocular motions are normal. Pupils are equal, round, and reactive to light. No scleral icterus.   Ears:  External ears unremarkable.   Neck: Supple, trachea midline. No thyromegaly present. No cervical or supraclavicular lymphadenopathy. No JVD or carotid bruits appreciated  Cardiovascular: irregularly irregular rate and rhythm.  Normal S1, S2 without murmur appreciated.  Chest: Effort normal. Clear to auscultation throughout. No adventitious sounds.   Extremities: No cyanosis, clubbing, erythema, nor edema.   Neurological: Alert and oriented x 3.   Psychiatric:  Behavior, mood, and affect are appropriate.       Assessment and Plan:     81 y.o. female with the following issues:    1. Cyst of left ovary     2. Chronic anxiety  diazepam (VALIUM) 10 MG tablet   3. Stage 3a chronic kidney disease (HCC)  Comp Metabolic Panel   4. Controlled type 2 diabetes mellitus with diabetic nephropathy, without long-term current use of insulin (HCC)  HEMOGLOBIN A1C    Comp Metabolic Panel   5. Secondary hypercoagulable state (HCC)     6. Persistent atrial fibrillation (HCC)           Followup: Return in about 6 months (around 7/7/2022), or if symptoms worsen or fail to improve.  "

## 2022-01-07 NOTE — TELEPHONE ENCOUNTER
ESTABLISHED PATIENT PRE-VISIT PLANNING     Annual Wellness Visit Over Due      Patient was NOT contacted to complete PVP.     Note: Patient will not be contacted if there is no indication to call.     1.  Reviewed notes from the last few office visits within the medical group: Yes    2.  If any orders were placed at last visit or intended to be done for this visit (i.e. 6 mos follow-up), do we have Results/Consult Notes?         •  Labs - Labs were not ordered at last office visit.  Last office visit with PCP Provider  was on 08/30/2021.    Note: If patient appointment is for lab review and patient did not complete labs, check with provider if OK to reschedule patient until labs completed.       •  Imaging - Imaging ordered, completed and results are in chart.       •  Referrals - No referrals were ordered at last office visit.    3. Is this appointment scheduled as a Hospital Follow-Up? No    4.  Immunizations were updated in Epic using Reconcile Outside Information activity? Yes    5.  Patient is due for the following Health Maintenance Topics:   Health Maintenance Due   Topic Date Due   • Annual Wellness Visit  11/30/2017   • RETINAL SCREENING  07/01/2020       6.  AHA (Pulse8) form printed for Provider? No, patient does not have any open alerts

## 2022-03-22 ENCOUNTER — TELEPHONE (OUTPATIENT)
Dept: CARDIOLOGY | Facility: MEDICAL CENTER | Age: 82
End: 2022-03-22
Payer: MEDICARE

## 2022-03-22 NOTE — TELEPHONE ENCOUNTER
TT    Pt called stating [facility] told Pt that it is dangerous for her to take clopidogrel and eliquis and they will not refill her medication without a call from our office. Please call Pt back at [phone].    Thank you

## 2022-05-25 ENCOUNTER — HOSPITAL ENCOUNTER (OUTPATIENT)
Dept: LAB | Facility: MEDICAL CENTER | Age: 82
End: 2022-05-25
Attending: INTERNAL MEDICINE
Payer: MEDICARE

## 2022-05-25 ENCOUNTER — HOSPITAL ENCOUNTER (OUTPATIENT)
Dept: LAB | Facility: MEDICAL CENTER | Age: 82
End: 2022-05-25
Attending: FAMILY MEDICINE
Payer: MEDICARE

## 2022-05-25 DIAGNOSIS — I48.19 PERSISTENT ATRIAL FIBRILLATION (HCC): ICD-10-CM

## 2022-05-25 DIAGNOSIS — E11.21 CONTROLLED TYPE 2 DIABETES MELLITUS WITH DIABETIC NEPHROPATHY, WITHOUT LONG-TERM CURRENT USE OF INSULIN (HCC): ICD-10-CM

## 2022-05-25 DIAGNOSIS — E78.2 MIXED HYPERLIPIDEMIA: ICD-10-CM

## 2022-05-25 DIAGNOSIS — N18.31 STAGE 3A CHRONIC KIDNEY DISEASE: ICD-10-CM

## 2022-05-25 LAB
ALBUMIN SERPL BCP-MCNC: 4.6 G/DL (ref 3.2–4.9)
ALBUMIN/GLOB SERPL: 1.5 G/DL
ALP SERPL-CCNC: 84 U/L (ref 30–99)
ALT SERPL-CCNC: 16 U/L (ref 2–50)
ANION GAP SERPL CALC-SCNC: 12 MMOL/L (ref 7–16)
ANION GAP SERPL CALC-SCNC: 13 MMOL/L (ref 7–16)
AST SERPL-CCNC: 25 U/L (ref 12–45)
BILIRUB SERPL-MCNC: 0.8 MG/DL (ref 0.1–1.5)
BUN SERPL-MCNC: 35 MG/DL (ref 8–22)
BUN SERPL-MCNC: 35 MG/DL (ref 8–22)
CALCIUM SERPL-MCNC: 9.8 MG/DL (ref 8.5–10.5)
CALCIUM SERPL-MCNC: 9.8 MG/DL (ref 8.5–10.5)
CHLORIDE SERPL-SCNC: 101 MMOL/L (ref 96–112)
CHLORIDE SERPL-SCNC: 102 MMOL/L (ref 96–112)
CHOLEST SERPL-MCNC: 172 MG/DL (ref 100–199)
CO2 SERPL-SCNC: 25 MMOL/L (ref 20–33)
CO2 SERPL-SCNC: 25 MMOL/L (ref 20–33)
CREAT SERPL-MCNC: 1.32 MG/DL (ref 0.5–1.4)
CREAT SERPL-MCNC: 1.33 MG/DL (ref 0.5–1.4)
DIGOXIN SERPL-MCNC: 1.1 NG/ML (ref 0.8–2)
EST. AVERAGE GLUCOSE BLD GHB EST-MCNC: 143 MG/DL
FASTING STATUS PATIENT QL REPORTED: NORMAL
GFR SERPLBLD CREATININE-BSD FMLA CKD-EPI: 40 ML/MIN/1.73 M 2
GFR SERPLBLD CREATININE-BSD FMLA CKD-EPI: 41 ML/MIN/1.73 M 2
GLOBULIN SER CALC-MCNC: 3 G/DL (ref 1.9–3.5)
GLUCOSE SERPL-MCNC: 116 MG/DL (ref 65–99)
GLUCOSE SERPL-MCNC: 118 MG/DL (ref 65–99)
HBA1C MFR BLD: 6.6 % (ref 4–5.6)
HDLC SERPL-MCNC: 50 MG/DL
LDLC SERPL CALC-MCNC: 98 MG/DL
POTASSIUM SERPL-SCNC: 4.8 MMOL/L (ref 3.6–5.5)
POTASSIUM SERPL-SCNC: 5 MMOL/L (ref 3.6–5.5)
PROT SERPL-MCNC: 7.6 G/DL (ref 6–8.2)
SODIUM SERPL-SCNC: 139 MMOL/L (ref 135–145)
SODIUM SERPL-SCNC: 139 MMOL/L (ref 135–145)
TRIGL SERPL-MCNC: 118 MG/DL (ref 0–149)

## 2022-05-25 PROCEDURE — 36415 COLL VENOUS BLD VENIPUNCTURE: CPT

## 2022-05-25 PROCEDURE — 80061 LIPID PANEL: CPT

## 2022-05-25 PROCEDURE — 80048 BASIC METABOLIC PNL TOTAL CA: CPT

## 2022-05-25 PROCEDURE — 80053 COMPREHEN METABOLIC PANEL: CPT

## 2022-05-25 PROCEDURE — 80162 ASSAY OF DIGOXIN TOTAL: CPT

## 2022-05-25 PROCEDURE — 83036 HEMOGLOBIN GLYCOSYLATED A1C: CPT

## 2022-06-21 ENCOUNTER — TELEPHONE (OUTPATIENT)
Dept: CARDIOLOGY | Facility: MEDICAL CENTER | Age: 82
End: 2022-06-21

## 2022-06-21 ENCOUNTER — OFFICE VISIT (OUTPATIENT)
Dept: CARDIOLOGY | Facility: MEDICAL CENTER | Age: 82
End: 2022-06-21
Payer: MEDICARE

## 2022-06-21 VITALS
OXYGEN SATURATION: 95 % | HEIGHT: 62 IN | WEIGHT: 136 LBS | SYSTOLIC BLOOD PRESSURE: 140 MMHG | HEART RATE: 85 BPM | DIASTOLIC BLOOD PRESSURE: 80 MMHG | BODY MASS INDEX: 25.03 KG/M2 | RESPIRATION RATE: 16 BRPM

## 2022-06-21 DIAGNOSIS — I10 HTN (HYPERTENSION), MALIGNANT: ICD-10-CM

## 2022-06-21 DIAGNOSIS — I48.19 PERSISTENT ATRIAL FIBRILLATION (HCC): ICD-10-CM

## 2022-06-21 DIAGNOSIS — Z95.5 STENTED CORONARY ARTERY: ICD-10-CM

## 2022-06-21 DIAGNOSIS — R06.09 DYSPNEA ON EXERTION: ICD-10-CM

## 2022-06-21 DIAGNOSIS — I34.0 MODERATE MITRAL REGURGITATION: ICD-10-CM

## 2022-06-21 LAB — EKG IMPRESSION: NORMAL

## 2022-06-21 PROCEDURE — 99215 OFFICE O/P EST HI 40 MIN: CPT | Performed by: INTERNAL MEDICINE

## 2022-06-21 PROCEDURE — 93000 ELECTROCARDIOGRAM COMPLETE: CPT | Performed by: INTERNAL MEDICINE

## 2022-06-21 RX ORDER — SPIRONOLACTONE 25 MG/1
12.5 TABLET ORAL DAILY
Qty: 45 TABLET | Refills: 4 | Status: SHIPPED | OUTPATIENT
Start: 2022-06-21 | End: 2023-08-11 | Stop reason: SDUPTHER

## 2022-06-21 RX ORDER — LOSARTAN POTASSIUM 50 MG/1
50 TABLET ORAL DAILY
Qty: 90 TABLET | Refills: 3 | Status: SHIPPED | OUTPATIENT
Start: 2022-06-21 | End: 2023-04-18

## 2022-06-21 RX ORDER — FUROSEMIDE 20 MG/1
20 TABLET ORAL DAILY
COMMUNITY
End: 2023-04-10

## 2022-06-21 ASSESSMENT — ENCOUNTER SYMPTOMS
DIZZINESS: 0
COUGH: 0
SHORTNESS OF BREATH: 1
PALPITATIONS: 0
ABDOMINAL PAIN: 0
CLAUDICATION: 0
EYE DISCHARGE: 0
EYE PAIN: 0
BLOOD IN STOOL: 0
DOUBLE VISION: 0
BRUISES/BLEEDS EASILY: 0
PND: 0
NAUSEA: 0
FALLS: 0
HALLUCINATIONS: 0
CHILLS: 0
DEPRESSION: 0
FEVER: 0
VOMITING: 0
MYALGIAS: 0
LOSS OF CONSCIOUSNESS: 0
SENSORY CHANGE: 0
BLURRED VISION: 0
HEADACHES: 0
ORTHOPNEA: 0
WEIGHT LOSS: 0
SPEECH CHANGE: 0

## 2022-06-21 ASSESSMENT — MINNESOTA LIVING WITH HEART FAILURE QUESTIONNAIRE (MLHF)
DIFFICULTY SLEEPING WELL AT NIGHT: 0
LOSS OF SELF CONTROL IN YOUR LIFE: 5
FEELING LIKE A BURDEN TO FAMILY AND FRIENDS: 5
MAKING YOU SHORT OF BREATH: 3
DIFFICULTY SOCIALIZING WITH FAMILY OR FRIENDS: 0
DIFFICULTY WITH SEXUAL ACTIVITIES: 0
WALKING ABOUT OR CLIMBING STAIRS DIFFICULT: 3
WORKING AROUND THE HOUSE OR YARD DIFFICULT: 3
GIVING YOU SIDE EFFECTS FROM TREATMENTS: 0
HAVING TO SIT OR LIE DOWN DURING THE DAY: 1
TIRED, FATIGUED OR LOW ON ENERGY: 5
SWELLING IN ANKLES OR LEGS: 5
COSTING YOU MONEY FOR MEDICAL CARE: 5
DIFFICULTY GOING AWAY FROM HOME: 0
MAKING YOU WORRY: 0
MAKING YOU STAY IN A HOSPITAL: 0
EATING LESS FOODS YOU LIKE: 1
MAKING YOU FEEL DEPRESSED: 5
TOTAL_SCORE: 50
DIFFICULTY TO CONCENTRATE OR REMEMBERING THINGS: 4
DIFFICULTY WITH RECREATIONAL PASTIMES, SPORTS, HOBBIES: 0
DIFFICULTY WORKING TO EARN A LIVING: 5

## 2022-06-21 ASSESSMENT — FIBROSIS 4 INDEX: FIB4 SCORE: 2.31

## 2022-06-21 NOTE — PROGRESS NOTES
Chief Complaint   Patient presents with   • Atrial Fibrillation     F/V DX: Persistent atrial fibrillation (HCC)   • Hyperlipidemia   • Congestive Heart Failure       Subjective:   Ivory Rowan is an 82 y.o. female who presents today for cardiac care for persistent atrial fibrillation after prior failed cardioversion. She was in sinus for brief amount of time. Patient was diagnosed with atrial fibrillation earlier in 2016. Patient has had multiple hospitalizations due to A. fib with rapid ventricular rate leading to heart failure exacerbation. Her left ventricular systolic function is documented at about 45-50% on her most recent transthoracic echocardiogram. Patient is anticoagulated. Patient is taking Toprol- mg by mouth twice a day.     In the [facility], patient was not able to afford Tikosyn therapy and she did not go into the hospital for that.      06/2020 Due to abnormal stress test, patient underwent coronary angigoram and found to have RCA disease s/p PCI and stent.    08/2021 Was in hospital due to elevated heart rate with afib due to stoppage of digoxin.    I have independently interpreted and reviewed echocardiogram's actual images with patient which showed normal left ventricular systolic function. No wall motion abnormality. No evidence of pulmonary hypertension. Moderate MR. 08/2021.    I have independently interpreted and reviewed blood tests results with patient in clinic which shows normal LDL level 98, triglycerides 118. GFR of 40, reduced from 53.    Patient is reporting of feeling more short of breath.    Past Medical History:   Diagnosis Date   • Acute on chronic heart failure with preserved ejection fraction (HCC) 8/13/2021   • Atrial fibrillation (HCC)    • Basal cell carcinoma of skin of nose    • CATARACT     Dr. Galen Dr. Orellana   • CHF (congestive heart failure) (HCC)    • Colon polyp     tubular adenomas   • Dental disorder     upper partial   • Dyslipidemia    • Glaucoma, right eye  "Dr. Orellana   • Heart burn    • Hemorrhagic disorder (HCC)     eliquis   • Hypertension     pt states well controlled on meds   • IBS (irritable bowel syndrome)    • Indigestion    • MEDICAL HOME 10/23/12   • Mitral valve regurgitation    • Osteopenia    • Perennial allergic rhinitis with seasonal variation    • Persistent atrial fibrillation (HCC)    • Psychiatric problem     anxiety   • Type 2 diabetes mellitus, controlled (HCC)     diet controlled   • Valvular heart disease     mitral insufficiency   • Vertigo      Past Surgical History:   Procedure Laterality Date   • RECOVERY  2016    Procedure: CATH LAB-ZAFAR GUIDED CV-TO-LARGE GROUP;  Surgeon: Recoveryonly Surgery;  Location: SURGERY PRE-POST PROC [facility] [place];  Service:    • COLONOSCOPY  2009    Dr. Lopez, 9 polyps   • APPENDECTOMY     • BREAST BIOPSY      left, reports wire report broke off during procedure   • CHOLECYSTECTOMY     • US-NEEDLE CORE BX-BREAST PANEL       Family History   Problem Relation Age of Onset   • Diabetes Mother    • Hypertension Mother    • Stroke Mother    • Cancer Father         lung/larynx   • Cancer Sister         \"female\"   • Genetic Disorder Sister         osteoporosis   • Cancer Paternal Aunt         breast   • Cancer Maternal Aunt    • Heart Disease Brother         CABG x 3     Social History     Socioeconomic History   • Marital status:      Spouse name: Not on file   • Number of children: 2   • Years of education: Not on file   • Highest education level: Not on file   Occupational History   • Occupation: Retired [address] [place] [address]      Employer: retired   Tobacco Use   • Smoking status: Former Smoker     Packs/day: 0.50     Years: 40.00     Pack years: 20.00     Types: Cigarettes     Quit date: 3/1/1996     Years since quittin.3   • Smokeless tobacco: Never Used   Vaping Use   • Vaping Use: Never used   Substance and Sexual Activity   • Alcohol use: Not Currently     Alcohol/week: 0.0 oz     Comment: " now rare   • Drug use: No   • Sexual activity: Not Currently     Partners: Male   Other Topics Concern   • Not on file   Social History Narrative   • Not on file     Social Determinants of Health     Financial Resource Strain: Not on file   Food Insecurity: No Food Insecurity   • Worried About Running Out of Food in the Last Year: Never true   • Ran Out of Food in the Last Year: Never true   Transportation Needs: No Transportation Needs   • Lack of Transportation (Medical): No   • Lack of Transportation (Non-Medical): No   Physical Activity: Not on file   Stress: Not on file   Social Connections: Not on file   Intimate Partner Violence: Not on file   Housing Stability: Not on file     Allergies   Allergen Reactions   • Atorvastatin      myalgias   • Simvastatin      myalgias     Outpatient Encounter Medications as of 6/21/2022   Medication Sig Dispense Refill   • furosemide (LASIX) 20 MG Tab Take 20 mg by mouth 2 times a day.     • lisinopril (PRINIVIL) 5 MG Tab Take 1 Tablet by mouth every day. 90 Tablet 4   • metoprolol SR (TOPROL XL) 100 MG TABLET SR 24 HR Take 1 Tablet by mouth 2 times a day. 200 Tablet 3   • ezetimibe (ZETIA) 10 MG Tab Take 1 Tablet by mouth every Monday, Wednesday, and Friday. 36 Tablet 3   • clopidogrel (PLAVIX) 75 MG Tab Take 1 Tablet by mouth every day. 90 Tablet 4   • apixaban (ELIQUIS) 2.5mg Tab Take 1 Tablet by mouth 2 times a day. 180 Tablet 4   • digoxin (LANOXIN) 125 MCG Tab Take 1 Tablet by mouth every day. 90 Tablet 3   • pantoprazole (PROTONIX) 40 MG Tablet Delayed Response Take 1 tablet by mouth every day. 90 tablet 3   • spironolactone (ALDACTONE) 25 MG Tab Take 0.5 Tablets by mouth every day. (Patient not taking: Reported on 6/21/2022) 45 Tablet 4     No facility-administered encounter medications on file as of 6/21/2022.     Review of Systems   Constitutional: Negative for chills, fever, malaise/fatigue and weight loss.   HENT: Negative for ear discharge, ear pain, hearing loss  "and nosebleeds.    Eyes: Negative for blurred vision, double vision, pain and discharge.   Respiratory: Positive for shortness of breath. Negative for cough.    Cardiovascular: Negative for chest pain, palpitations, orthopnea, claudication, leg swelling and PND.   Gastrointestinal: Negative for abdominal pain, blood in stool, melena, nausea and vomiting.   Genitourinary: Negative for dysuria and hematuria.   Musculoskeletal: Negative for falls, joint pain and myalgias.   Skin: Negative for itching and rash.   Neurological: Negative for dizziness, sensory change, speech change, loss of consciousness and headaches.   Endo/Heme/Allergies: Negative for environmental allergies. Does not bruise/bleed easily.   Psychiatric/Behavioral: Negative for depression, hallucinations and suicidal ideas.        Objective:   BP (!) 140/80 (BP Location: Left arm, Patient Position: Sitting, BP Cuff Size: Adult)   Pulse 85   Resp 16   Ht 1.575 m (5' 2\")   Wt 61.7 kg (136 lb)   LMP 05/01/1991   SpO2 95%   BMI 24.87 kg/m²     Physical Exam  Vitals and nursing note reviewed.   Constitutional:       General: She is not in acute distress.     Appearance: She is not diaphoretic.   HENT:      Head: Normocephalic and atraumatic.      Right Ear: External ear normal.      Left Ear: External ear normal.      Nose: No congestion or rhinorrhea.   Eyes:      General:         Right eye: No discharge.         Left eye: No discharge.   Neck:      Thyroid: No thyromegaly.      Vascular: No JVD.   Cardiovascular:      Rate and Rhythm: Normal rate. Rhythm irregular.      Pulses: Normal pulses.   Pulmonary:      Effort: No respiratory distress.   Abdominal:      General: There is no distension.      Tenderness: There is no abdominal tenderness.   Musculoskeletal:         General: No swelling or tenderness.      Right lower leg: No edema.      Left lower leg: No edema.   Skin:     General: Skin is warm and dry.   Neurological:      Mental Status: She " is alert and oriented to person, place, and time.      Cranial Nerves: No cranial nerve deficit.   Psychiatric:         Behavior: Behavior normal.         Assessment:     1. Moderate mitral regurgitation  EC-ZAFAR W/O CONT   2. Persistent atrial fibrillation (HCC)  EKG    EC-ZAFAR W/O CONT   3. Stented coronary artery  EC-ZAFAR W/O CONT   4. HTN (hypertension), malignant  EC-ZAFAR W/O CONT   5. Dyspnea on exertion  EC-ZAFAR W/O CONT       Medical Decision Making:  Today's Assessment / Status / Plan:   CAD s/p RCA stent 06/2020:  Toprol  mg bid.  Continue plavix, BB, Zetia at current dose.  No ASA now to reduce risk of bleeding. Already on Eliquis 2.5 mg bid due to atrial fibrillation already.  Will stop Lisinopril 5 mg po daily. Will start Losartan 50 mg daily.  Statin allergies.    Persistent atrial fibrillation:  Rate controlled.  Continue anticoagulation with Eiquis 2.5 mg bid, had nose bleed in the past with full dose.  Continue Toprol 100 mg bid.  Digoxin was stopped last time but her heart rate went very high. She was in hospital for treatment. She does not want to stop that anymore. Will monitor closely.     Hypertension:  Blood pressure is well controlled.  Continue Toprol 100 mg bid.     Dyslipidemia:  Statin intolerance.  Continue Zetia 10 mg qhs.    Mitral regurgitation (severe):  Will perform ZAFAR to further assess for possible intervention of MitraClip. Risks and benefits were explained to patient.

## 2022-06-22 DIAGNOSIS — K21.9 GASTROESOPHAGEAL REFLUX DISEASE WITHOUT ESOPHAGITIS: ICD-10-CM

## 2022-06-23 RX ORDER — PANTOPRAZOLE SODIUM 40 MG/1
40 TABLET, DELAYED RELEASE ORAL DAILY
Qty: 90 TABLET | Refills: 3 | Status: SHIPPED | OUTPATIENT
Start: 2022-06-23 | End: 2023-04-16

## 2022-06-23 NOTE — TELEPHONE ENCOUNTER
Patient is scheduled on 7-14-22 for a ZAFAR w/anesthesia with Dr. Washington. Patient was told to hold lasix AM day of procedure and to check in at 8:00 for a 10:00 procedure. H&P was done on 6-21-22 by Dr. Washington. Pre admit to call patient.

## 2022-06-24 ENCOUNTER — HOSPITAL ENCOUNTER (OUTPATIENT)
Facility: MEDICAL CENTER | Age: 82
End: 2022-06-24
Attending: INTERNAL MEDICINE | Admitting: INTERNAL MEDICINE
Payer: MEDICARE

## 2022-06-27 NOTE — TELEPHONE ENCOUNTER
Maurisio Castro voice message from this patient with questions in regards to this procedure. Please call her.    Thank You,  Charlene

## 2022-07-01 NOTE — TELEPHONE ENCOUNTER
Recvd communication from the admitting team, while she has this patient on the phone. This patient would like to cancel this procedure at this time. Per that patient, she feels like her daughter went over her head and made the decision to have this procedure done without her. She does not feel like this procedure is neccessary.    Patient refused to call the office to discuss. The PAR in admitting will notifiy the patient we will cancel this procedure at this time and request she call us if she would like to reschedule it. BERNAI to Dr. Washington.    Cancelled case with Kelvin in cath lab scheduling.

## 2022-07-14 DIAGNOSIS — F41.9 CHRONIC ANXIETY: ICD-10-CM

## 2022-07-14 RX ORDER — DIAZEPAM 10 MG/1
10 TABLET ORAL EVERY 12 HOURS PRN
Qty: 30 TABLET | Refills: 0 | Status: SHIPPED | OUTPATIENT
Start: 2022-07-14 | End: 2022-09-13

## 2022-07-14 NOTE — TELEPHONE ENCOUNTER
Patient has chronic anxiety.  For this reason she is on diazepam.  I last saw the patient in January.  I am able to send a 1 month refill in to her [facility] pharmacy but she needs to see me again for an appointment in order to continue the prescription.

## 2022-07-19 ENCOUNTER — TELEPHONE (OUTPATIENT)
Dept: HEALTH INFORMATION MANAGEMENT | Facility: OTHER | Age: 82
End: 2022-07-19
Payer: MEDICARE

## 2022-08-04 ENCOUNTER — OFFICE VISIT (OUTPATIENT)
Dept: CARDIOLOGY | Facility: MEDICAL CENTER | Age: 82
End: 2022-08-04
Payer: MEDICARE

## 2022-08-04 VITALS
HEIGHT: 62 IN | BODY MASS INDEX: 24.95 KG/M2 | RESPIRATION RATE: 18 BRPM | OXYGEN SATURATION: 97 % | WEIGHT: 135.6 LBS | SYSTOLIC BLOOD PRESSURE: 118 MMHG | DIASTOLIC BLOOD PRESSURE: 62 MMHG | HEART RATE: 77 BPM

## 2022-08-04 DIAGNOSIS — R06.09 DYSPNEA ON EXERTION: ICD-10-CM

## 2022-08-04 DIAGNOSIS — Z95.5 STENTED CORONARY ARTERY: ICD-10-CM

## 2022-08-04 DIAGNOSIS — Z79.01 CHRONIC ANTICOAGULATION: ICD-10-CM

## 2022-08-04 DIAGNOSIS — E78.2 MIXED HYPERLIPIDEMIA: ICD-10-CM

## 2022-08-04 DIAGNOSIS — I34.0 MODERATE MITRAL REGURGITATION: ICD-10-CM

## 2022-08-04 DIAGNOSIS — Z79.899 HIGH RISK MEDICATION USE: ICD-10-CM

## 2022-08-04 DIAGNOSIS — I10 HTN (HYPERTENSION), MALIGNANT: ICD-10-CM

## 2022-08-04 DIAGNOSIS — I48.11 LONGSTANDING PERSISTENT ATRIAL FIBRILLATION (HCC): ICD-10-CM

## 2022-08-04 PROCEDURE — 93000 ELECTROCARDIOGRAM COMPLETE: CPT | Performed by: INTERNAL MEDICINE

## 2022-08-04 PROCEDURE — 99214 OFFICE O/P EST MOD 30 MIN: CPT | Performed by: INTERNAL MEDICINE

## 2022-08-04 ASSESSMENT — ENCOUNTER SYMPTOMS
PALPITATIONS: 0
ORTHOPNEA: 0
COUGH: 0
NAUSEA: 0
BRUISES/BLEEDS EASILY: 0
WEIGHT LOSS: 0
VOMITING: 0
FALLS: 0
DEPRESSION: 0
LOSS OF CONSCIOUSNESS: 0
BLURRED VISION: 0
EYE DISCHARGE: 0
BLOOD IN STOOL: 0
CHILLS: 0
ABDOMINAL PAIN: 0
CLAUDICATION: 0
SPEECH CHANGE: 0
HEADACHES: 0
HALLUCINATIONS: 0
EYE PAIN: 0
DIZZINESS: 0
DOUBLE VISION: 0
FEVER: 0
SHORTNESS OF BREATH: 1
MYALGIAS: 0
SENSORY CHANGE: 0
PND: 0

## 2022-08-04 ASSESSMENT — FIBROSIS 4 INDEX: FIB4 SCORE: 2.31

## 2022-08-04 NOTE — PROGRESS NOTES
Chief Complaint   Patient presents with   • Atrial Fibrillation     F/V Dx: Atrial fibrillation with RVR ([facility])     • Hypertension   • Hyperlipidemia       Subjective:   Ivory Rowan is an 82 y.o. female who presents today for cardiac care for persistent atrial fibrillation after prior failed cardioversion. She was in sinus for brief amount of time. Patient was diagnosed with atrial fibrillation earlier in 2016. Patient has had multiple hospitalizations due to A. fib with rapid ventricular rate leading to heart failure exacerbation. Her left ventricular systolic function is documented at about 45-50% on her most recent transthoracic echocardiogram. Patient is anticoagulated. Patient is taking Toprol- mg by mouth twice a day.     In the [facility], patient was not able to afford Tikosyn therapy and she did not go into the hospital for that.      06/2020 Due to abnormal stress test, patient underwent coronary angigoram and found to have RCA disease s/p PCI and stent.    08/2021 Was in hospital due to elevated heart rate with afib due to stoppage of digoxin.    I have independently interpreted and reviewed echocardiogram's actual images with patient which showed normal left ventricular systolic function. No wall motion abnormality. No evidence of pulmonary hypertension. Moderate MR. 08/2021.    I have independently interpreted and reviewed blood tests results with patient in clinic which shows normal LDL level 98, triglycerides 118. GFR of 40, reduced from 53.    Patient is reporting of feeling more short of breath.    Past Medical History:   Diagnosis Date   • Acute on chronic heart failure with preserved ejection fraction (HCC) 8/13/2021   • Atrial fibrillation (HCC)    • Basal cell carcinoma of skin of nose    • CATARACT     Dr. Reyna Aguiar   • CHF (congestive heart failure) ([facility])    • Colon polyp     tubular adenomas   • Dental disorder     upper partial   • Dyslipidemia    • Glaucoma, right eye     Dr. Orellana  "  • Heart burn    • Hemorrhagic disorder (HCC)     eliquis   • Hypertension     pt states well controlled on meds   • IBS (irritable bowel syndrome)    • Indigestion    • MEDICAL HOME 10/23/12   • Mitral valve regurgitation    • Osteopenia    • Perennial allergic rhinitis with seasonal variation    • Persistent atrial fibrillation (HCC)    • Psychiatric problem     anxiety   • Type 2 diabetes mellitus, controlled (HCC)     diet controlled   • Valvular heart disease     mitral insufficiency   • Vertigo      Past Surgical History:   Procedure Laterality Date   • RECOVERY  2016    Procedure: CATH LAB-ZAFAR GUIDED CV-TO-LARGE GROUP;  Surgeon: Recoveryonly Surgery;  [place] [facility] [place]:    • COLONOSCOPY  2009    Dr. Lopez, 9 polyps   • APPENDECTOMY     • BREAST BIOPSY      left, reports wire report broke off during procedure   • CHOLECYSTECTOMY     • US-NEEDLE CORE BX-BREAST PANEL       Family History   Problem Relation Age of Onset   • Diabetes Mother    • Hypertension Mother    • Stroke Mother    • Cancer Father         lung/larynx   • Cancer Sister         \"female\"   • Genetic Disorder Sister         osteoporosis   • Cancer Paternal Aunt         breast   • Cancer Maternal Aunt    • Heart Disease Brother         CABG x 3     Social History     Socioeconomic History   • Marital status:      Spouse name: Not on file   • Number of children: 2   • Years of education: Not on file   • Highest education level: Not on file   Occupational History   • Occupation: Retired [address] [place] [address]      Employer: retired   Tobacco Use   • Smoking status: Former Smoker     Packs/day: 0.50     Years: 40.00     Pack years: 20.00     Types: Cigarettes     Quit date: 3/1/1996     Years since quittin.4   • Smokeless tobacco: Never Used   Vaping Use   • Vaping Use: Never used   Substance and Sexual Activity   • Alcohol use: Not Currently     Alcohol/week: 0.0 oz     Comment: now rare   • " Drug use: No   • Sexual activity: Not Currently     Partners: Male   Other Topics Concern   • Not on file   Social History Narrative   • Not on file     Social Determinants of Health     Financial Resource Strain: Not on file   Food Insecurity: No Food Insecurity   • Worried About Running Out of Food in the Last Year: Never true   • Ran Out of Food in the Last Year: Never true   Transportation Needs: No Transportation Needs   • Lack of Transportation (Medical): No   • Lack of Transportation (Non-Medical): No   Physical Activity: Not on file   Stress: Not on file   Social Connections: Not on file   Intimate Partner Violence: Not on file   Housing Stability: Not on file     Allergies   Allergen Reactions   • Atorvastatin      myalgias   • Simvastatin      myalgias     Outpatient Encounter Medications as of 8/4/2022   Medication Sig Dispense Refill   • diazepam (VALIUM) 10 MG tablet Take 1 Tablet by mouth every 12 hours as needed for Anxiety or Sleep for up to 30 days. 30 tablets is a 30 day quantity. (Patient taking differently: Take 10 mg by mouth as needed for Anxiety or Sleep. 30 tablets is a 30 day quantity.) 30 Tablet 0   • pantoprazole (PROTONIX) 40 MG Tablet Delayed Response TAKE 1 TABLET BY MOUTH EVERY DAY 90 Tablet 3   • furosemide (LASIX) 20 MG Tab Take 20 mg by mouth every day.     • spironolactone (ALDACTONE) 25 MG Tab Take 0.5 Tablets by mouth every day. 45 Tablet 4   • losartan (COZAAR) 50 MG Tab Take 1 Tablet by mouth every day. 90 Tablet 3   • metoprolol SR (TOPROL XL) 100 MG TABLET SR 24 HR Take 1 Tablet by mouth 2 times a day. 200 Tablet 3   • ezetimibe (ZETIA) 10 MG Tab Take 1 Tablet by mouth every Monday, Wednesday, and Friday. 36 Tablet 3   • clopidogrel (PLAVIX) 75 MG Tab Take 1 Tablet by mouth every day. 90 Tablet 4   • apixaban (ELIQUIS) 2.5mg Tab Take 1 Tablet by mouth 2 times a day. 180 Tablet 4   • digoxin (LANOXIN) 125 MCG Tab Take 1 Tablet by mouth every day. 90 Tablet 3     No  "facility-administered encounter medications on file as of 8/4/2022.     Review of Systems   Constitutional: Negative for chills, fever, malaise/fatigue and weight loss.   HENT: Negative for ear discharge, ear pain, hearing loss and nosebleeds.    Eyes: Negative for blurred vision, double vision, pain and discharge.   Respiratory: Positive for shortness of breath. Negative for cough.    Cardiovascular: Negative for chest pain, palpitations, orthopnea, claudication, leg swelling and PND.   Gastrointestinal: Negative for abdominal pain, blood in stool, melena, nausea and vomiting.   Genitourinary: Negative for dysuria and hematuria.   Musculoskeletal: Negative for falls, joint pain and myalgias.   Skin: Negative for itching and rash.   Neurological: Negative for dizziness, sensory change, speech change, loss of consciousness and headaches.   Endo/Heme/Allergies: Negative for environmental allergies. Does not bruise/bleed easily.   Psychiatric/Behavioral: Negative for depression, hallucinations and suicidal ideas.        Objective:   /62 (BP Location: Left arm, Patient Position: Sitting, BP Cuff Size: Adult)   Pulse 77   Resp 18   Ht 1.575 m (5' 2\")   Wt 61.5 kg (135 lb 9.6 oz)   LMP 05/01/1991   SpO2 97%   BMI 24.80 kg/m²     Physical Exam  Vitals and nursing note reviewed.   Constitutional:       General: She is not in acute distress.     Appearance: She is not diaphoretic.   HENT:      Head: Normocephalic and atraumatic.      Right Ear: External ear normal.      Left Ear: External ear normal.      Nose: No congestion or rhinorrhea.   Eyes:      General:         Right eye: No discharge.         Left eye: No discharge.   Neck:      Thyroid: No thyromegaly.      Vascular: No JVD.   Cardiovascular:      Rate and Rhythm: Normal rate. Rhythm irregular.      Pulses: Normal pulses.   Pulmonary:      Effort: No respiratory distress.   Abdominal:      General: There is no distension.      Tenderness: There is no " abdominal tenderness.   Musculoskeletal:         General: No swelling or tenderness.      Right lower leg: No edema.      Left lower leg: No edema.   Skin:     General: Skin is warm and dry.   Neurological:      Mental Status: She is alert and oriented to person, place, and time.      Cranial Nerves: No cranial nerve deficit.   Psychiatric:         Behavior: Behavior normal.         Assessment:     1. Longstanding persistent atrial fibrillation (HCC)  EKG   2. Stented coronary artery     3. Moderate mitral regurgitation     4. HTN (hypertension), malignant     5. Dyspnea on exertion     6. Mixed hyperlipidemia     7. High risk medication use     8. Chronic anticoagulation         Medical Decision Making:  Today's Assessment / Status / Plan:   CAD s/p RCA stent 06/2020:  Toprol  mg bid.  Continue plavix, BB, Zetia at current dose.  No ASA now to reduce risk of bleeding. Already on Eliquis 2.5 mg bid due to atrial fibrillation already.  Will continue Losartan 50 mg daily.  Statin allergies.    Persistent atrial fibrillation:  Rate controlled.  Continue anticoagulation with Eiquis 2.5 mg bid, had nose bleed in the past with full dose.  Continue Toprol 100 mg bid.  Digoxin was stopped last time but her heart rate went very high. She was in hospital for treatment. She does not want to stop that anymore. Will monitor closely.     Hypertension:  Blood pressure is well controlled.  Continue Toprol 100 mg bid.     Dyslipidemia:  Statin intolerance.  Continue Zetia 10 mg qhs.    Mitral regurgitation (severe):  At this time, patient does not want to do ZAFAR.

## 2022-08-05 LAB — EKG IMPRESSION: NORMAL

## 2022-08-18 ENCOUNTER — HOSPITAL ENCOUNTER (OUTPATIENT)
Dept: RADIOLOGY | Facility: MEDICAL CENTER | Age: 82
End: 2022-08-18
Attending: PHYSICIAN ASSISTANT
Payer: MEDICARE

## 2022-08-18 ENCOUNTER — APPOINTMENT (OUTPATIENT)
Dept: URGENT CARE | Facility: PHYSICIAN GROUP | Age: 82
End: 2022-08-18

## 2022-08-18 ENCOUNTER — HOSPITAL ENCOUNTER (OUTPATIENT)
Facility: MEDICAL CENTER | Age: 82
End: 2022-08-18
Attending: PHYSICIAN ASSISTANT
Payer: MEDICARE

## 2022-08-18 ENCOUNTER — OFFICE VISIT (OUTPATIENT)
Dept: URGENT CARE | Facility: PHYSICIAN GROUP | Age: 82
End: 2022-08-18
Payer: MEDICARE

## 2022-08-18 VITALS
DIASTOLIC BLOOD PRESSURE: 76 MMHG | HEIGHT: 62 IN | OXYGEN SATURATION: 93 % | HEART RATE: 88 BPM | WEIGHT: 134 LBS | BODY MASS INDEX: 24.66 KG/M2 | SYSTOLIC BLOOD PRESSURE: 126 MMHG | RESPIRATION RATE: 18 BRPM | TEMPERATURE: 98.4 F

## 2022-08-18 DIAGNOSIS — R10.9 ABDOMINAL DISCOMFORT: ICD-10-CM

## 2022-08-18 DIAGNOSIS — N30.01 ACUTE CYSTITIS WITH HEMATURIA: ICD-10-CM

## 2022-08-18 DIAGNOSIS — R05.9 COUGH: ICD-10-CM

## 2022-08-18 DIAGNOSIS — U07.1 COVID-19 VIRUS INFECTION: ICD-10-CM

## 2022-08-18 LAB
APPEARANCE UR: CLEAR
BILIRUB UR STRIP-MCNC: NEGATIVE MG/DL
COLOR UR AUTO: YELLOW
EXTERNAL QUALITY CONTROL: ABNORMAL
GLUCOSE UR STRIP.AUTO-MCNC: NEGATIVE MG/DL
KETONES UR STRIP.AUTO-MCNC: NEGATIVE MG/DL
LEUKOCYTE ESTERASE UR QL STRIP.AUTO: NORMAL
NITRITE UR QL STRIP.AUTO: POSITIVE
PH UR STRIP.AUTO: 5.5 [PH] (ref 5–8)
PROT UR QL STRIP: NEGATIVE MG/DL
RBC UR QL AUTO: NORMAL
SARS-COV+SARS-COV-2 AG RESP QL IA.RAPID: POSITIVE
SP GR UR STRIP.AUTO: 1.01
UROBILINOGEN UR STRIP-MCNC: 0.2 MG/DL

## 2022-08-18 PROCEDURE — 87077 CULTURE AEROBIC IDENTIFY: CPT

## 2022-08-18 PROCEDURE — 87086 URINE CULTURE/COLONY COUNT: CPT

## 2022-08-18 PROCEDURE — 87186 SC STD MICRODIL/AGAR DIL: CPT

## 2022-08-18 PROCEDURE — 71046 X-RAY EXAM CHEST 2 VIEWS: CPT

## 2022-08-18 PROCEDURE — 87426 SARSCOV CORONAVIRUS AG IA: CPT | Performed by: PHYSICIAN ASSISTANT

## 2022-08-18 PROCEDURE — 81002 URINALYSIS NONAUTO W/O SCOPE: CPT | Performed by: PHYSICIAN ASSISTANT

## 2022-08-18 PROCEDURE — 99215 OFFICE O/P EST HI 40 MIN: CPT | Mod: CS | Performed by: PHYSICIAN ASSISTANT

## 2022-08-18 RX ORDER — CEFDINIR 300 MG/1
300 CAPSULE ORAL 2 TIMES DAILY
Qty: 10 CAPSULE | Refills: 0 | Status: SHIPPED | OUTPATIENT
Start: 2022-08-18 | End: 2022-08-23

## 2022-08-18 ASSESSMENT — FIBROSIS 4 INDEX: FIB4 SCORE: 2.31

## 2022-08-18 NOTE — PROGRESS NOTES
Subjective:   Ivory Rowan is a 82 y.o. female who presents for Cough (Headache, ear pain, sore throat x 10 days)      HPI  The patient is an 82-year-old female with a history of persistent atrial fibrillation here in the clinic with her daughter presents with primary complaints of intermittent cough, sore throat, and congestion for about 10 days.  She has been having subjective fevers and chills.  She also reports of lower abdominal discomfort that she has had for years.  She denies any abdominal pain currently.  She is here to make sure she does not need any antibiotics. Denies any chest pain, difficulty breathing, vomiting, diarrhea, urinary issues. Did not test for COVID. Received COVID vaccines.     Medications:    apixaban Tabs  clopidogrel Tabs  digoxin Tabs  ezetimibe Tabs  furosemide Tabs  losartan Tabs  metoprolol SR Tb24  pantoprazole Tbec  spironolactone Tabs    Allergies: Atorvastatin and Simvastatin    Problem List: Ivory Rowan does not have any pertinent problems on file.    Surgical History:  Past Surgical History:   Procedure Laterality Date    RECOVERY  7/8/2016    Procedure: CATH LAB-ZAFAR GUIDED CV-TO-LARGE GROUP;  Surgeon: Recoveryonly Surgery;  Location[place] SURGERY PRE-POST PROC [place] [facility] [place];  Service:     COLONOSCOPY  8/2009    Dr. Lopez, 9 polyps    APPENDECTOMY      BREAST BIOPSY      left, reports wire report broke off during procedure    CHOLECYSTECTOMY      US-NEEDLE CORE BX-BREAST PANEL         Past Social Hx: Ivory Rowan  reports that she quit smoking about 26 years ago. Her smoking use included cigarettes. She has a 20.00 pack-year smoking history. She has never used smokeless tobacco. She reports that she does not currently use alcohol. She reports that she does not use drugs.     Past Family Hx:  Ivory Rowan family history includes Cancer in her father, maternal aunt, paternal aunt, and sister; Diabetes in her mother; Genetic Disorder in her sister; Heart Disease in  "her brother; Hypertension in her mother; Stroke in her mother.     Problem list, medications, and allergies reviewed by myself today in Epic.     Objective:     /76   Pulse 88   Temp 36.9 °C (98.4 °F) (Temporal)   Resp 18   Ht 1.575 m (5' 2\")   Wt 60.8 kg (134 lb)   LMP 05/01/1991   SpO2 93%   BMI 24.51 kg/m²     Physical Exam  Vitals reviewed.   Constitutional:       General: She is not in acute distress.     Appearance: Normal appearance. She is not ill-appearing or toxic-appearing.   HENT:      Right Ear: Tympanic membrane and ear canal normal.      Left Ear: Tympanic membrane and ear canal normal.      Nose: Mucosal edema and rhinorrhea present. Rhinorrhea is purulent.      Right Sinus: No maxillary sinus tenderness or frontal sinus tenderness.      Left Sinus: No maxillary sinus tenderness or frontal sinus tenderness.      Mouth/Throat:      Mouth: Mucous membranes are moist.      Pharynx: Oropharynx is clear. No oropharyngeal exudate or posterior oropharyngeal erythema.   Eyes:      Conjunctiva/sclera: Conjunctivae normal.      Pupils: Pupils are equal, round, and reactive to light.   Cardiovascular:      Rate and Rhythm: Rhythm irregular.      Heart sounds: Murmur heard.   Pulmonary:      Effort: Pulmonary effort is normal. No respiratory distress.      Breath sounds: Normal breath sounds. No wheezing, rhonchi or rales.   Abdominal:      General: Abdomen is flat. Bowel sounds are normal. There is no distension.      Palpations: Abdomen is soft. There is no mass.      Tenderness: There is no abdominal tenderness. There is no right CVA tenderness, left CVA tenderness, guarding or rebound.   Musculoskeletal:      Cervical back: Neck supple.   Lymphadenopathy:      Cervical: No cervical adenopathy.   Skin:     General: Skin is warm and dry.   Neurological:      General: No focal deficit present.      Mental Status: She is alert and oriented to person, place, and time.   Psychiatric:         Mood and " Affect: Mood normal.         Behavior: Behavior normal.     COVID Antigen: Positive     UA: Positive NIT. Positive LEUK.     RADIOLOGY RESULTS   DX-CHEST-2 VIEWS    Result Date: 8/18/2022 8/18/2022 9:15 AM HISTORY/REASON FOR EXAM: Cough for 10 days. TECHNIQUE/EXAM DESCRIPTION AND NUMBER OF VIEWS: Two views of the chest. COMPARISON:  8/12/2021 FINDINGS: Cardiomediastinal contours are normal. No pulmonary consolidation is seen. There is a 8 mm lobular density in the left lung base. No pleural space process is evident.     There is a 8 mm lobular density in the left lung base. This could be a nodule, scarring or atelectasis.         Diagnosis and associated orders:     1. COVID-19 virus infection    - DX-CHEST-2 VIEWS; Future  - POCT SARS-COV Antigen SUZETTE (Symptomatic only)    2. Abdominal discomfort    - POCT Urinalysis    3. Acute cystitis with hematuria    - cefdinir (OMNICEF) 300 MG Cap; Take 1 Capsule by mouth 2 times a day for 5 days.  Dispense: 10 Capsule; Refill: 0  - URINE CULTURE(NEW); Future     Comments/MDM:     X-ray results per radiologist interpretation above. I personally reviewed images and radiologist report which showed There is a 8 mm lobular density in the left lung base. This could be a nodule, scarring or atelectasis. Recommend mentioning this to PCP. I have low suspicion for pneumonia at this time.   COVID Antigen POSITIVE. Symptomatic and supportive care: Plenty of oral hydration and rest. Over the counter cough suppressant as directed. Tylenol for pain and fever as directed. Warm salt water gargles for sore throat, soft foods, cool liquids.Saline nasal spray and Flonase   UA shows signs of UTI.   Start Cefdinir. Urine culture pending. Will call back only if positive and necessary change in therapy. Increased fluid intake.   Overall, the patient is well-appearing. They are not hypoxic, afebrile, and a normal pulmonary exam. I was unable to elicit any abdominal tenderness on examination. At this  time, there is no obvious or immediate dictation for necessity of evaluation in the emergency department.      I personally reviewed prior external notes and test results pertinent to today's visit. Pathogenesis of diagnosis discussed including typical length and natural progression. Supportive care, natural history, differential diagnoses, and indications for immediate follow-up discussed. Patient expresses understanding and agrees to plan. Patient denies any other questions or concerns.     Follow-up with the primary care physician for recheck, reevaluation, and consideration of further management.    Time spent evaluating the patient was 40 minutes which included preparing for the visit, obtaining history, examination, ordering labs/tests/procedures/medications, independent interpretation, discussion of plan, counseling/education, medical information reconciliation, and documentation into chart.     Please note that this dictation was created using voice recognition software. I have made a reasonable attempt to correct obvious errors, but I expect that there are errors of grammar and possibly content that I did not discover before finalizing the note.    This note was electronically signed by Jose Alfredo Guillaume PA-C

## 2022-08-19 DIAGNOSIS — N30.01 ACUTE CYSTITIS WITH HEMATURIA: ICD-10-CM

## 2022-08-21 LAB
BACTERIA UR CULT: ABNORMAL
BACTERIA UR CULT: ABNORMAL
SIGNIFICANT IND 70042: ABNORMAL
SITE SITE: ABNORMAL
SOURCE SOURCE: ABNORMAL

## 2022-09-13 ENCOUNTER — OFFICE VISIT (OUTPATIENT)
Dept: MEDICAL GROUP | Facility: MEDICAL CENTER | Age: 82
End: 2022-09-13
Payer: MEDICARE

## 2022-09-13 VITALS
HEART RATE: 92 BPM | BODY MASS INDEX: 25.06 KG/M2 | TEMPERATURE: 99.3 F | WEIGHT: 136.2 LBS | OXYGEN SATURATION: 94 % | DIASTOLIC BLOOD PRESSURE: 72 MMHG | SYSTOLIC BLOOD PRESSURE: 140 MMHG | HEIGHT: 62 IN

## 2022-09-13 DIAGNOSIS — I48.19 PERSISTENT ATRIAL FIBRILLATION (HCC): ICD-10-CM

## 2022-09-13 DIAGNOSIS — I70.0 AORTIC ATHEROSCLEROSIS (HCC): ICD-10-CM

## 2022-09-13 DIAGNOSIS — F51.04 PSYCHOPHYSIOLOGIC INSOMNIA: ICD-10-CM

## 2022-09-13 DIAGNOSIS — N39.0 E. COLI URINARY TRACT INFECTION: ICD-10-CM

## 2022-09-13 DIAGNOSIS — I50.20 HFREF (HEART FAILURE WITH REDUCED EJECTION FRACTION) (HCC): ICD-10-CM

## 2022-09-13 DIAGNOSIS — Z86.16 HISTORY OF 2019 NOVEL CORONAVIRUS DISEASE (COVID-19): ICD-10-CM

## 2022-09-13 DIAGNOSIS — B96.20 E. COLI URINARY TRACT INFECTION: ICD-10-CM

## 2022-09-13 DIAGNOSIS — K58.1 IRRITABLE BOWEL SYNDROME WITH CONSTIPATION: Chronic | ICD-10-CM

## 2022-09-13 DIAGNOSIS — E11.21 CONTROLLED TYPE 2 DIABETES MELLITUS WITH DIABETIC NEPHROPATHY, WITHOUT LONG-TERM CURRENT USE OF INSULIN (HCC): ICD-10-CM

## 2022-09-13 DIAGNOSIS — I10 HTN (HYPERTENSION), MALIGNANT: ICD-10-CM

## 2022-09-13 DIAGNOSIS — N83.299 COMPLEX OVARIAN CYST: ICD-10-CM

## 2022-09-13 DIAGNOSIS — F41.9 CHRONIC ANXIETY: ICD-10-CM

## 2022-09-13 DIAGNOSIS — I48.91 ATRIAL FIBRILLATION WITH RVR (HCC): ICD-10-CM

## 2022-09-13 PROBLEM — R10.9 ABDOMINAL PAIN: Status: RESOLVED | Noted: 2021-08-12 | Resolved: 2022-09-13

## 2022-09-13 PROCEDURE — 99214 OFFICE O/P EST MOD 30 MIN: CPT | Performed by: FAMILY MEDICINE

## 2022-09-13 RX ORDER — DIAZEPAM 10 MG/1
10 TABLET ORAL EVERY 12 HOURS PRN
COMMUNITY
End: 2022-09-13

## 2022-09-13 RX ORDER — DIAZEPAM 10 MG/1
10 TABLET ORAL EVERY 12 HOURS PRN
Qty: 60 TABLET | Refills: 0 | Status: SHIPPED | OUTPATIENT
Start: 2022-09-13 | End: 2023-02-06 | Stop reason: SDUPTHER

## 2022-09-13 RX ORDER — CEFUROXIME AXETIL 250 MG/1
250 TABLET ORAL 2 TIMES DAILY
Qty: 14 TABLET | Refills: 0 | Status: SHIPPED | OUTPATIENT
Start: 2022-09-13 | End: 2022-09-20

## 2022-09-13 RX ORDER — DIGOXIN 125 MCG
125 TABLET ORAL DAILY
Qty: 90 TABLET | Refills: 3 | Status: SHIPPED | OUTPATIENT
Start: 2022-09-13 | End: 2023-08-11 | Stop reason: SDUPTHER

## 2022-09-13 ASSESSMENT — FIBROSIS 4 INDEX: FIB4 SCORE: 2.31

## 2022-09-13 NOTE — PROGRESS NOTES
Chief Complaint   Patient presents with    Hospital Follow-up     [facility] from 22       Subjective:     HPI:   Ivory Rowan presents today with the followin. Controlled type 2 diabetes mellitus with diabetic nephropathy, without long-term current use of insulin ([facility])  Patient's most recent A1c in May of this year was 6.6%.  She has generally had good control of her diabetes.  She is controlling  this with diet.  She is not taking any glucose lowering medication.  Lab orders are discussed and placed.  Monofilament exam performed today is normal.    2. E. coli urinary tract infection  Had e coli found on urine culture from urgent care.      3. Atrial fibrillation with RVR ([facility])  Patient does have atrial fibrillation.  She has been seen by Dr. Washington.  Currently her rate control is good.  She is stable on her current regimen.    4. HFrEF (heart failure with reduced ejection fraction) ([facility])  Discussed her medications, she is taking the furosemide 20 mg/day.  Also discussed the purpose of the spironolactone.  Daughter was a little concerned that she was taking 2 diuretics but I explained the different purposes of the medications.  She appears to be doing much better clinically on this regimen.    5. History of 2019 novel coronavirus disease (COVID-19)  Patient did test positive for COVID just a couple weeks ago.  She has been quite fatigued and achy.  She ascribes this to her previous UTI but I think recovering from COVID is part of her symptoms.    6. Irritable bowel syndrome with constipation  Patient has longstanding IBS with constipation.  Discussed control of this.  Discussed that she typically has to have a bowel movement late in the evening when she is already settled down.  Denies any bleeding.    7. Complex ovarian cyst  Patient does have history of a complex ovarian cyst in the pelvis and is having intermittent pelvic discomfort.  However, she has had that discomfort a number of years secondary to  her IBS.   discussed that that cyst has not been adequately characterized.  She and I and her daughter discussed this with her daughter contributing very little.  Ivory does get upset when her daughter says too much during visits.  I have asked the patient to think over whether she wants to pursue further imaging or diagnosis.  She asks what it would take to get rid of the cyst and I said if it was still present it would require surgery.  She does not want to do anything about it at this time and would like to think it over.    8. Aortic atherosclerosis (HCC)  Patient does have incidentally found aortic atherosclerosis.   no aneurysm has been identified.  Patient is a former smoker.  States she would not contemplate surgery    9. Chronic anxiety/Psychophysiologic insomnia  Patient has severe chronic anxiety and insomnia.  She is upset by the fact that I only gave her 30 tablets on the last prescription.  However, she never completed any of the refills.  I will go ahead and write for 60 tablets at this time.  Patient has been judicious in her use.  No adverse events reported or observed.  PDMP review shows no inconsistencies.  - diazepam (VALIUM) 10 MG tablet; Take 1 Tablet by mouth every 12 hours as needed for Anxiety or Sleep for up to 30 days. 60 tablets is a 30 day quantity.  Dispense: 60 Tablet; Refill: 0        Patient Active Problem List    Diagnosis Date Noted    History of 2019 novel coronavirus disease (COVID-19) 09/13/2022    E. coli urinary tract infection 09/13/2022    Aortic atherosclerosis (HCC) 09/13/2022    Cyst of left ovary 01/07/2022    Secondary hypercoagulable state (HCC) 08/30/2021    Complex ovarian cyst 08/13/2021    HFrEF (heart failure with reduced ejection fraction) (HCC) 08/12/2021    Chronic anticoagulation 08/14/2019    Hyperglycemia 08/07/2019    Primary open angle glaucoma (POAG) of both eyes 09/10/2018    Persistent atrial fibrillation (HCC)     Glaucoma, right eye     Stage 3  chronic kidney disease ([facility]) 06/22/2017    Type 2 diabetes mellitus, controlled ([facility])     Chronic anxiety 01/04/2017    Diabetes mellitus type II, non insulin dependent ([facility]) 11/29/2016    Financial difficulties 11/29/2016    Mitral valve regurgitation     Atrial fibrillation with RVR ([facility]) 05/21/2016    History of adenomatous polyp of colon 06/02/2015    Perennial allergic rhinitis with seasonal variation     Vertigo     IBS (irritable bowel syndrome)     Basal cell carcinoma of skin of nose     Vitamin D deficiency 12/08/2011    GERD (gastroesophageal reflux disease) 12/06/2010    Hypertension 01/18/2010    Hyperlipidemia 07/23/2009    Osteopenia 07/23/2009    Sinusitis, chronic     Bilateral cataracts        Current medicines (including changes today)  Current Outpatient Medications   Medication Sig Dispense Refill    cefUROXime (CEFTIN) 250 MG Tab Take 1 Tablet by mouth 2 times a day for 7 days. 14 Tablet 0    digoxin (LANOXIN) 125 MCG Tab Take 1 Tablet by mouth every day. 90 Tablet 3    diazepam (VALIUM) 10 MG tablet Take 1 Tablet by mouth every 12 hours as needed for Anxiety or Sleep for up to 30 days. 60 tablets is a 30 day quantity. 60 Tablet 0    pantoprazole (PROTONIX) 40 MG Tablet Delayed Response TAKE 1 TABLET BY MOUTH EVERY DAY 90 Tablet 3    furosemide (LASIX) 20 MG Tab Take 20 mg by mouth every day.      spironolactone (ALDACTONE) 25 MG Tab Take 0.5 Tablets by mouth every day. 45 Tablet 4    losartan (COZAAR) 50 MG Tab Take 1 Tablet by mouth every day. 90 Tablet 3    metoprolol SR (TOPROL XL) 100 MG TABLET SR 24 HR Take 1 Tablet by mouth 2 times a day. 200 Tablet 3    ezetimibe (ZETIA) 10 MG Tab Take 1 Tablet by mouth every Monday, Wednesday, and Friday. 36 Tablet 3    clopidogrel (PLAVIX) 75 MG Tab Take 1 Tablet by mouth every day. 90 Tablet 4    apixaban (ELIQUIS) 2.5mg Tab Take 1 Tablet by mouth 2 times a day. 180 Tablet 4     No current facility-administered medications for this visit.  "      Allergies   Allergen Reactions    Atorvastatin      myalgias    Simvastatin      myalgias       ROS: As per HPI       Objective:     BP (!) 140/72 (BP Location: Right arm, Patient Position: Sitting, BP Cuff Size: Large adult)   Pulse 92   Temp 37.4 °C (99.3 °F) (Temporal)   Ht 1.575 m (5' 2\")   Wt 61.8 kg (136 lb 3.2 oz)   SpO2 94%  Body mass index is 24.91 kg/m².    Physical Exam:  Constitutional: Well-developed and well-nourished. Not diaphoretic. No distress. Lucid and fluent. Patient, daughter, physician and staff all wearing masks.  Skin: Skin is warm and dry. No rash noted.  Head: Atraumatic without lesions.  Eyes: Conjunctivae and extraocular motions are normal. Pupils are equal, round, and reactive to light. No scleral icterus.   Ears:  External ears unremarkable.   Neck: Supple, trachea midline. No thyromegaly present. No cervical or supraclavicular lymphadenopathy. No JVD or carotid bruits appreciated  Cardiovascular: Regular rate and rhythm.  Normal S1, S2 without murmur appreciated.  Chest: Effort normal. Clear to auscultation throughout. No adventitious sounds.   Abdomen: Soft, non tender, and without distention. Active bowel sounds in all four quadrants. No rebound, guarding, masses or hepatosplenomegaly.  Extremities: No cyanosis, clubbing, erythema, nor edema.   Neurological: Alert and oriented x 3. Dno tremor appreciated.  Psychiatric:  Behavior, mood, and affect are appropriate.       Assessment and Plan:     82 y.o. female with the following issues:    1. Controlled type 2 diabetes mellitus with diabetic nephropathy, without long-term current use of insulin (HCC)  MICROALBUMIN CREAT RATIO URINE    Diabetic Monofilament LE Exam    HEMOGLOBIN A1C    Comp Metabolic Panel    Lipid Profile    TSH    CBC WITHOUT DIFFERENTIAL      2. E. coli urinary tract infection  cefUROXime (CEFTIN) 250 MG Tab      3. Atrial fibrillation with RVR (HCC)  digoxin (LANOXIN) 125 MCG Tab    Comp Metabolic Panel "    Lipid Profile    TSH      4. HFrEF (heart failure with reduced ejection fraction) (HCC)  Comp Metabolic Panel    Lipid Profile      5. History of 2019 novel coronavirus disease (COVID-19)        6. Irritable bowel syndrome with constipation  Comp Metabolic Panel    CBC WITHOUT DIFFERENTIAL      7. Complex ovarian cyst        8. Aortic atherosclerosis (HCC)        9. Chronic anxiety  diazepam (VALIUM) 10 MG tablet      10. Psychophysiologic insomnia  diazepam (VALIUM) 10 MG tablet            Followup: Return in about 6 months (around 3/13/2023), or if symptoms worsen or fail to improve.

## 2022-09-14 RX ORDER — METOPROLOL SUCCINATE 100 MG/1
TABLET, EXTENDED RELEASE ORAL
Qty: 200 TABLET | Refills: 3 | Status: SHIPPED | OUTPATIENT
Start: 2022-09-14 | End: 2023-08-11

## 2022-09-29 ENCOUNTER — OFFICE VISIT (OUTPATIENT)
Dept: DERMATOLOGY | Facility: IMAGING CENTER | Age: 82
End: 2022-09-29
Payer: MEDICARE

## 2022-09-29 DIAGNOSIS — L57.0 ACTINIC KERATOSIS: ICD-10-CM

## 2022-09-29 DIAGNOSIS — L21.9 SEBORRHEIC DERMATITIS: ICD-10-CM

## 2022-09-29 PROCEDURE — 17000 DESTRUCT PREMALG LESION: CPT | Performed by: NURSE PRACTITIONER

## 2022-09-29 PROCEDURE — 99213 OFFICE O/P EST LOW 20 MIN: CPT | Mod: 25 | Performed by: NURSE PRACTITIONER

## 2022-09-29 RX ORDER — CLOBETASOL PROPIONATE 0.46 MG/ML
SOLUTION TOPICAL
Qty: 50 ML | Refills: 3 | Status: SHIPPED | OUTPATIENT
Start: 2022-09-29 | End: 2023-04-17

## 2022-09-29 NOTE — PROGRESS NOTES
"DERMATOLOGY NOTE  NEW VISIT       Chief complaint: Establish Care    HPI/location: forehead red lesion, and scalp and nose \"white cast\"  Time present: - than 5 years   Painful lesion: No  Itching lesion: Yes  Enlarging lesion: No  Anything make it better or worse? No tx besides dandruff shampoo     History of skin cancer: Yes, Details: nose 2000  BCC   History of precancers/actinic keratoses: No  History of biopsies:Yes, Details: as above  History of blistering/severe sunburns:Yes, Details: child   Family history of skin cancer:No  Family history of atypical moles:No    Allergies   Allergen Reactions    Atorvastatin      myalgias    Simvastatin      myalgias        MEDICATIONS:  Medications relevant to specialty reviewed.     REVIEW OF SYSTEMS:   Positive for skin (see HPI)  Negative for fevers and chills    EXAM:  LMP 05/01/1991   Constitutional: Well-developed, well-nourished, and in no distress.     A focused skin exam was performed including the affected areas of the head (including face). Notable findings on exam today listed below and/or in assessment/plan.   Ill-defined erythematous gritty/scaly papule over the forehead/glabella  Dry flaky scalp to frontal vertex, areas of AA to posterior vertex, crown      IMPRESSION / PLAN:    1. Actinic keratosis  - NMSC education/counseling   CRYOTHERAPY:  Risks (including, but not limited to: skin discoloration, redness, blister, blood blister, recurrence, need for further treatment, infection, scar) and benefits of cryotherapy discussed. Patient verbally agreed to proceed with treatment. 1 cryotherapy freeze thaw cycles of 10 seconds were applied to 1 lesion on glabella with cryac. Patient tolerated procedure well. Aftercare instructions given--no specific care needed unless irritated during healing process, can apply Vaseline with small band-aid if needed.      2. Seborrheic dermatitis  discussed course/nature of disease  Rx below  Follow up 2 months    - clobetasol " (TEMOVATE) 0.05 % external solution; AAA twice a day for 1 weeks, then use 2-3 days per week as needed  Dispense: 50 mL; Refill: 3    Patient verbalized understanding and agrees with plan regarding the above                 Please note that this dictation was created using voice recognition software. I have made every reasonable attempt to correct obvious errors, but I expect that there are errors of grammar and possibly content that I did not discover before finalizing the note.      Return to clinic in: Return in about 2 months (around 11/29/2022) for follow up, seb. derm. and as needed for any new or changing skin lesions.

## 2022-10-04 ENCOUNTER — TELEPHONE (OUTPATIENT)
Dept: DERMATOLOGY | Facility: IMAGING CENTER | Age: 82
End: 2022-10-04
Payer: MEDICARE

## 2022-10-04 NOTE — TELEPHONE ENCOUNTER
Spoke with pt, discussed not a usual side effect unless she was putting the TCS solution in her nose. Discussed other various possible causes of epistaxis. Follow up as needed

## 2022-10-04 NOTE — TELEPHONE ENCOUNTER
1. Caller Name: Ivory Rowan                        Call Back Number: [phone]      How would the patient prefer to be contacted with a response: Phone call OK to leave a detailed message      Good RadhaIvory pritchard called stating that she saw you few days ago, because of the itchiness on her head. So, you recommended her a medication. However, she very is concern, because last night she was bleeding from the nose. So, now she is wondering, if this could be a side effect of the medication. Pt stated that she has heart issues and takes a lot of blood thinners. So she's worried this will happen again, because she might bleed to death.  Pt stated that she would like a call back ASAP.   Also, that she is going today to speak with the pharmacist, because he didn't gave her any recommendations.       Thank you,

## 2022-10-10 ENCOUNTER — TELEPHONE (OUTPATIENT)
Dept: CARDIOLOGY | Facility: MEDICAL CENTER | Age: 82
End: 2022-10-10
Payer: MEDICARE

## 2022-10-10 NOTE — TELEPHONE ENCOUNTER
TT    Caller: - Ivory    Topic/issue: Ivory has a Urinary Tract Infection, and would like to discus her:   spironolactone (ALDACTONE) 25 MG Tab [[id number]]    furosemide (LASIX) 20 MG Tab (Order #[id number]) on 6/21/22    Callback Number: [phone]    Thank you,   Tamiko CASE

## 2022-10-13 NOTE — TELEPHONE ENCOUNTER
TT    Caller: Ivory    Topic/issue: Pt was returning your call from yesterday. Tried your ext, but went to .    Callback Number: [phone]

## 2022-10-13 NOTE — TELEPHONE ENCOUNTER
Returned pt call, [phone], to review concerns.  Attempted to call pt, unable to reach.  Left voicemail to return this call at their earliest convenience.

## 2022-10-15 NOTE — TELEPHONE ENCOUNTER
Medical Advice  Shen Washington M.D.  You 4 hours ago (1:15 PM)     Ok to resume normal consumption of daily water intake. No restrictions for now     Shen Washington M.D.      Returned pt call and reviewed MD recommendations.  She verbalizes understanding and states no other concerns or questions at this time.  Pt is appreciative of information given.

## 2022-11-15 ENCOUNTER — HOSPITAL ENCOUNTER (OUTPATIENT)
Dept: LAB | Facility: MEDICAL CENTER | Age: 82
End: 2022-11-15
Attending: FAMILY MEDICINE
Payer: MEDICARE

## 2022-11-15 DIAGNOSIS — E11.21 CONTROLLED TYPE 2 DIABETES MELLITUS WITH DIABETIC NEPHROPATHY, WITHOUT LONG-TERM CURRENT USE OF INSULIN (HCC): ICD-10-CM

## 2022-11-15 DIAGNOSIS — I50.20 HFREF (HEART FAILURE WITH REDUCED EJECTION FRACTION) (HCC): ICD-10-CM

## 2022-11-15 DIAGNOSIS — K58.1 IRRITABLE BOWEL SYNDROME WITH CONSTIPATION: Chronic | ICD-10-CM

## 2022-11-15 DIAGNOSIS — I48.91 ATRIAL FIBRILLATION WITH RVR (HCC): ICD-10-CM

## 2022-11-15 LAB
ALBUMIN SERPL BCP-MCNC: 4.7 G/DL (ref 3.2–4.9)
ALBUMIN/GLOB SERPL: 1.6 G/DL
ALP SERPL-CCNC: 88 U/L (ref 30–99)
ALT SERPL-CCNC: 15 U/L (ref 2–50)
ANION GAP SERPL CALC-SCNC: 11 MMOL/L (ref 7–16)
AST SERPL-CCNC: 18 U/L (ref 12–45)
BILIRUB SERPL-MCNC: 0.6 MG/DL (ref 0.1–1.5)
BUN SERPL-MCNC: 27 MG/DL (ref 8–22)
CALCIUM SERPL-MCNC: 9.7 MG/DL (ref 8.5–10.5)
CHLORIDE SERPL-SCNC: 102 MMOL/L (ref 96–112)
CHOLEST SERPL-MCNC: 171 MG/DL (ref 100–199)
CO2 SERPL-SCNC: 24 MMOL/L (ref 20–33)
CREAT SERPL-MCNC: 1.01 MG/DL (ref 0.5–1.4)
ERYTHROCYTE [DISTWIDTH] IN BLOOD BY AUTOMATED COUNT: 45.1 FL (ref 35.9–50)
EST. AVERAGE GLUCOSE BLD GHB EST-MCNC: 151 MG/DL
FASTING STATUS PATIENT QL REPORTED: NORMAL
GFR SERPLBLD CREATININE-BSD FMLA CKD-EPI: 56 ML/MIN/1.73 M 2
GLOBULIN SER CALC-MCNC: 2.9 G/DL (ref 1.9–3.5)
GLUCOSE SERPL-MCNC: 136 MG/DL (ref 65–99)
HBA1C MFR BLD: 6.9 % (ref 4–5.6)
HCT VFR BLD AUTO: 37.1 % (ref 37–47)
HDLC SERPL-MCNC: 52 MG/DL
HGB BLD-MCNC: 11 G/DL (ref 12–16)
LDLC SERPL CALC-MCNC: 93 MG/DL
MCH RBC QN AUTO: 24.1 PG (ref 27–33)
MCHC RBC AUTO-ENTMCNC: 29.6 G/DL (ref 33.6–35)
MCV RBC AUTO: 81.2 FL (ref 81.4–97.8)
PLATELET # BLD AUTO: 215 K/UL (ref 164–446)
PMV BLD AUTO: 9.4 FL (ref 9–12.9)
POTASSIUM SERPL-SCNC: 4.6 MMOL/L (ref 3.6–5.5)
PROT SERPL-MCNC: 7.6 G/DL (ref 6–8.2)
RBC # BLD AUTO: 4.57 M/UL (ref 4.2–5.4)
SODIUM SERPL-SCNC: 137 MMOL/L (ref 135–145)
TRIGL SERPL-MCNC: 128 MG/DL (ref 0–149)
TSH SERPL DL<=0.005 MIU/L-ACNC: 4.67 UIU/ML (ref 0.38–5.33)
WBC # BLD AUTO: 7.5 K/UL (ref 4.8–10.8)

## 2022-11-15 PROCEDURE — 83036 HEMOGLOBIN GLYCOSYLATED A1C: CPT

## 2022-11-15 PROCEDURE — 84443 ASSAY THYROID STIM HORMONE: CPT

## 2022-11-15 PROCEDURE — 82043 UR ALBUMIN QUANTITATIVE: CPT

## 2022-11-15 PROCEDURE — 80061 LIPID PANEL: CPT

## 2022-11-15 PROCEDURE — 36415 COLL VENOUS BLD VENIPUNCTURE: CPT

## 2022-11-15 PROCEDURE — 85027 COMPLETE CBC AUTOMATED: CPT

## 2022-11-15 PROCEDURE — 80053 COMPREHEN METABOLIC PANEL: CPT

## 2022-11-15 PROCEDURE — 82570 ASSAY OF URINE CREATININE: CPT

## 2022-11-16 LAB
CREAT UR-MCNC: 124.79 MG/DL
MICROALBUMIN UR-MCNC: 20.6 MG/DL
MICROALBUMIN/CREAT UR: 165 MG/G (ref 0–30)

## 2022-11-26 DIAGNOSIS — E78.2 MIXED HYPERLIPIDEMIA: ICD-10-CM

## 2022-11-29 NOTE — TELEPHONE ENCOUNTER
Is the patient due for a refill? Yes    Was the patient seen the past year? Yes    Date of last office visit: 8/4/22    Does the patient have an upcoming appointment?  No       Provider to refill:TT    Does the patients insurance require a 100 day supply?  Yes

## 2022-11-30 RX ORDER — EZETIMIBE 10 MG/1
10 TABLET ORAL
Qty: 39 TABLET | Refills: 2 | Status: SHIPPED | OUTPATIENT
Start: 2022-11-30 | End: 2023-08-11 | Stop reason: SDUPTHER

## 2022-11-30 NOTE — TELEPHONE ENCOUNTER
"TT    Caller: Ivory Rowan     Medication Name and Dosage:  ezetimibe (ZETIA) 10 MG Tab [[id number]]    Medication amount left: patients states she has \"plenty of medication\"    Preferred Pharmacy: [facility] on file     Other questions (Topic): Patient called in asking for this medication to be refilled. Patient is confused and asking if the doctor is upset with her. Patient states she is worried and scared and wants to know if she should continue taking this medication. Please advise.     Callback Number (Will only call for issues): [phone] (home)      Thank you,   Nadege LARIOS"

## 2022-11-30 NOTE — TELEPHONE ENCOUNTER
To Dr. Washington-RX states pt to take three times/week but your note states QHS. Please confirm. Thank you!

## 2022-12-06 ENCOUNTER — OFFICE VISIT (OUTPATIENT)
Dept: DERMATOLOGY | Facility: IMAGING CENTER | Age: 82
End: 2022-12-06
Payer: MEDICARE

## 2022-12-06 DIAGNOSIS — L21.9 SEBORRHEIC DERMATITIS: ICD-10-CM

## 2022-12-06 PROCEDURE — 99212 OFFICE O/P EST SF 10 MIN: CPT | Performed by: NURSE PRACTITIONER

## 2022-12-06 NOTE — PROGRESS NOTES
DERMATOLOGY NOTE  NEW VISIT       Chief complaint: Follow-Up    HPI: Patient returns for follow up for management of seborrhea dermatitis  that is improving.  Locations affected: scalp   Symptoms: itching  Current treatment: clobetasol solution        History of skin cancer: Yes, Details: nose 2000  BCC   History of precancers/actinic keratoses: No  History of biopsies:Yes, Details: as above  History of blistering/severe sunburns:Yes, Details: child   Family history of skin cancer:No  Family history of atypical moles:No    Allergies   Allergen Reactions    Atorvastatin      myalgias    Simvastatin      myalgias        MEDICATIONS:  Medications relevant to specialty reviewed.     REVIEW OF SYSTEMS:   Positive for skin (see HPI)  Negative for fevers and chills    EXAM:  LMP 05/01/1991   Constitutional: Well-developed, well-nourished, and in no distress.     A focused skin exam was performed including the affected areas of the head (including face). Notable findings on exam today listed below and/or in assessment/plan.     No evidence of seborrhea to scalp    IMPRESSION / PLAN:    1. Seborrheic dermatitis  Again, discussed course/nature of disease  Continue topical clobetasol solution as needed, about 2-3 times per week  Call for refills as needed  Follow up annually and as needed    Patient verbalized understanding and agrees with plan regarding the above    Please note that this dictation was created using voice recognition software. I have made every reasonable attempt to correct obvious errors, but I expect that there are errors of grammar and possibly content that I did not discover before finalizing the note.      Return to clinic in: Return for annually and as needed. and as needed for any new or changing skin lesions.

## 2022-12-14 ENCOUNTER — APPOINTMENT (OUTPATIENT)
Dept: MEDICAL GROUP | Facility: MEDICAL CENTER | Age: 82
End: 2022-12-14
Payer: MEDICARE

## 2023-02-06 DIAGNOSIS — F51.04 PSYCHOPHYSIOLOGIC INSOMNIA: ICD-10-CM

## 2023-02-06 DIAGNOSIS — F41.9 CHRONIC ANXIETY: ICD-10-CM

## 2023-02-06 RX ORDER — DIAZEPAM 10 MG/1
10 TABLET ORAL EVERY 12 HOURS PRN
Qty: 60 TABLET | Refills: 0 | Status: SHIPPED | OUTPATIENT
Start: 2023-02-06 | End: 2023-03-13 | Stop reason: SDUPTHER

## 2023-02-06 NOTE — TELEPHONE ENCOUNTER
Received request via: Patient    Was the patient seen in the last year in this department? Yes    Does the patient have an active prescription (recently filled or refills available) for medication(s) requested? No    Does the patient have [employer] Plus and need 100 day supply (blood pressure, diabetes and cholesterol meds only)? Medication is not for cholesterol, blood pressure or diabetes    Pt has an upcoming visit on 3/13/23.

## 2023-02-27 RX ORDER — APIXABAN 2.5 MG/1
TABLET, FILM COATED ORAL
Qty: 180 TABLET | Refills: 1 | Status: SHIPPED | OUTPATIENT
Start: 2023-02-27 | End: 2023-08-04 | Stop reason: SDUPTHER

## 2023-03-12 DIAGNOSIS — I10 HTN (HYPERTENSION), MALIGNANT: ICD-10-CM

## 2023-03-13 ENCOUNTER — OFFICE VISIT (OUTPATIENT)
Dept: MEDICAL GROUP | Facility: MEDICAL CENTER | Age: 83
End: 2023-03-13
Payer: MEDICARE

## 2023-03-13 VITALS
HEART RATE: 70 BPM | WEIGHT: 145.94 LBS | DIASTOLIC BLOOD PRESSURE: 64 MMHG | SYSTOLIC BLOOD PRESSURE: 138 MMHG | OXYGEN SATURATION: 95 % | HEIGHT: 62 IN | BODY MASS INDEX: 26.86 KG/M2 | TEMPERATURE: 97.7 F

## 2023-03-13 DIAGNOSIS — I48.19 PERSISTENT ATRIAL FIBRILLATION (HCC): ICD-10-CM

## 2023-03-13 DIAGNOSIS — F32.2 MAJOR DEPRESSIVE DISORDER, SEVERE (HCC): ICD-10-CM

## 2023-03-13 DIAGNOSIS — D68.69 SECONDARY HYPERCOAGULABLE STATE (HCC): ICD-10-CM

## 2023-03-13 DIAGNOSIS — F41.9 CHRONIC ANXIETY: ICD-10-CM

## 2023-03-13 DIAGNOSIS — F51.04 PSYCHOPHYSIOLOGIC INSOMNIA: ICD-10-CM

## 2023-03-13 DIAGNOSIS — I70.0 AORTIC ATHEROSCLEROSIS (HCC): ICD-10-CM

## 2023-03-13 DIAGNOSIS — R35.0 URINARY FREQUENCY: ICD-10-CM

## 2023-03-13 DIAGNOSIS — K21.9 GASTROESOPHAGEAL REFLUX DISEASE WITHOUT ESOPHAGITIS: Chronic | ICD-10-CM

## 2023-03-13 DIAGNOSIS — N18.31 STAGE 3A CHRONIC KIDNEY DISEASE: ICD-10-CM

## 2023-03-13 DIAGNOSIS — E11.21 CONTROLLED TYPE 2 DIABETES MELLITUS WITH DIABETIC NEPHROPATHY, WITHOUT LONG-TERM CURRENT USE OF INSULIN (HCC): ICD-10-CM

## 2023-03-13 DIAGNOSIS — E11.9 DIABETES MELLITUS TYPE II, NON INSULIN DEPENDENT (HCC): ICD-10-CM

## 2023-03-13 PROCEDURE — 99214 OFFICE O/P EST MOD 30 MIN: CPT | Performed by: FAMILY MEDICINE

## 2023-03-13 RX ORDER — OXYBUTYNIN CHLORIDE 5 MG/1
5 TABLET, EXTENDED RELEASE ORAL DAILY
Qty: 90 TABLET | Refills: 3 | Status: SHIPPED | OUTPATIENT
Start: 2023-03-13 | End: 2023-08-11

## 2023-03-13 RX ORDER — CLOPIDOGREL BISULFATE 75 MG/1
75 TABLET ORAL DAILY
Qty: 100 TABLET | Refills: 1 | Status: SHIPPED | OUTPATIENT
Start: 2023-03-13 | End: 2023-08-11 | Stop reason: SDUPTHER

## 2023-03-13 RX ORDER — DIAZEPAM 10 MG/1
10 TABLET ORAL EVERY 12 HOURS PRN
Qty: 60 TABLET | Refills: 2 | Status: SHIPPED | OUTPATIENT
Start: 2023-03-13 | End: 2023-09-25 | Stop reason: SDUPTHER

## 2023-03-13 ASSESSMENT — PATIENT HEALTH QUESTIONNAIRE - PHQ9
SUM OF ALL RESPONSES TO PHQ QUESTIONS 1-9: 17
CLINICAL INTERPRETATION OF PHQ2 SCORE: 5
5. POOR APPETITE OR OVEREATING: 2 - MORE THAN HALF THE DAYS

## 2023-03-13 ASSESSMENT — FIBROSIS 4 INDEX: FIB4 SCORE: 1.75

## 2023-03-13 NOTE — PROGRESS NOTES
Chief Complaint   Patient presents with    Other     Urinary incontinence      Depression    Diabetes Mellitus    Chronic Kidney Disease       Subjective:     HPI:   Ivory Rowan presents today with the followin. Urinary frequency  Patient is having urinary frequency and some urgency.  Urinalysis and culture orders are discussed and placed.  Trial of low-dose oxybutynin but I want to be sure she rules out for infection.    2. Chronic anxiety  Patient has chronic severe anxiety.  She has had multiple stressors in her life including the death of one of her daughters.  She is very dissatisfied with her relationship with her other daughter.  Patient feels the diazepam really helps with the anxiety and helps her to calm down.  PDMP review shows no inconsistencies.  Renewal is placed.    3. Psychophysiologic insomnia  Patient does have nightly insomnia, often difficulty falling asleep.  She uses the diazepam as needed.  She denies hangover or daytime somnolence from the medication.  PDMP review shows no inconsistencies as discussed above.    4. Diabetes mellitus type II, non insulin dependent (HCC)/Controlled type 2 diabetes mellitus with diabetic nephropathy, without long-term current use of insulin ([facility])  Her diabetes continues to be well controlled overall.  She has stable moderate chronic kidney disease associated with this.  She continues to be well controlled without diabetes specific medication.    5. Stage 3a chronic kidney disease ([facility])  Her last EGFR was near normal, it has been stable overall    6. Gastroesophageal reflux disease without esophagitis  Patient does continue to have some gastroesophageal reflux.  Denies hematemesis.  Denies nausea or vomiting.  She tolerates the pantoprazole well and generally it controls her reflux and acidity.    7. Persistent atrial fibrillation (HCC)/Secondary hypercoagulable state (HCC)  She follows with cardiology for her persistent atrial fibrillation.  Rate is  currently well controlled.  She continues on clopidogrel and low-dose Eliquis.  She denies any bleeding.  She denies any visible blood in the stool or urine.  Denies any hematemesis or significant epistaxis.    8. Aortic atherosclerosis (HCC)  Patient has aortic atherosclerosis which has been noted on imaging.  No aneurysm has been noted.  Patient is a former smoker.    9. Major depressive disorder, severe (HCC)  Patient does have very significant frustration and depression.  She has had this for a number of years.  Antidepressants have been tried in the past but were not helpful.  Patient does describe a large amount of her depression to an unsatisfactory relationship with her daughter.  Patient is not receptive towards counseling or psychiatry at this time.  She denies plans for self-harm.  She feels that would have grave Islam implications.  Patient is aware that counseling and other interventions are available should she desire.        Patient Active Problem List    Diagnosis Date Noted    Major depressive disorder, severe (HCC) 03/13/2023    Urinary frequency 03/13/2023    History of 2019 novel coronavirus disease (COVID-19) 09/13/2022    E. coli urinary tract infection 09/13/2022    Aortic atherosclerosis (HCC) 09/13/2022    Psychophysiologic insomnia 09/13/2022    Cyst of left ovary 01/07/2022    Secondary hypercoagulable state ([facility]) 08/30/2021    Complex ovarian cyst 08/13/2021    HFrEF (heart failure with reduced ejection fraction) ([facility]) 08/12/2021    Chronic anticoagulation 08/14/2019    Hyperglycemia 08/07/2019    Primary open angle glaucoma (POAG) of both eyes 09/10/2018    Persistent atrial fibrillation ([facility])     Glaucoma, right eye     Stage 3 chronic kidney disease ([facility]) 06/22/2017    Type 2 diabetes mellitus, controlled ([facility])     Chronic anxiety 01/04/2017    Diabetes mellitus type II, non insulin dependent ([facility]) 11/29/2016    Financial difficulties 11/29/2016    Mitral valve regurgitation      Atrial fibrillation with RVR (HCC) 05/21/2016    History of adenomatous polyp of colon 06/02/2015    Perennial allergic rhinitis with seasonal variation     Vertigo     IBS (irritable bowel syndrome)     Basal cell carcinoma of skin of nose     Vitamin D deficiency 12/08/2011    GERD (gastroesophageal reflux disease) 12/06/2010    Hypertension 01/18/2010    Hyperlipidemia 07/23/2009    Osteopenia 07/23/2009    Sinusitis, chronic     Bilateral cataracts        Current medicines (including changes today)  Current Outpatient Medications   Medication Sig Dispense Refill    clopidogrel (PLAVIX) 75 MG Tab TAKE 1 TABLET BY MOUTH EVERY  Tablet 1    oxybutynin SR (DITROPAN-XL) 5 MG TABLET SR 24 HR Take 1 Tablet by mouth every day. 90 Tablet 3    diazepam (VALIUM) 10 MG tablet Take 1 Tablet by mouth every 12 hours as needed for Anxiety or Sleep for up to 90 days. 60 tablets is a 30 day quantity. 60 Tablet 2    ezetimibe (ZETIA) 10 MG Tab Take 1 Tablet by mouth every Monday, Wednesday, and Friday. 39 Tablet 2    ELIQUIS 2.5 MG Tab TAKE 1 TABLET BY MOUTH TWICE A  Tablet 1    clobetasol (TEMOVATE) 0.05 % external solution AAA twice a day for 1 weeks, then use 2-3 days per week as needed 50 mL 3    metoprolol SR (TOPROL XL) 100 MG TABLET SR 24 HR TAKE 1 TABLET BY MOUTH TWICE A  Tablet 3    digoxin (LANOXIN) 125 MCG Tab Take 1 Tablet by mouth every day. 90 Tablet 3    pantoprazole (PROTONIX) 40 MG Tablet Delayed Response TAKE 1 TABLET BY MOUTH EVERY DAY 90 Tablet 3    furosemide (LASIX) 20 MG Tab Take 20 mg by mouth every day.      spironolactone (ALDACTONE) 25 MG Tab Take 0.5 Tablets by mouth every day. 45 Tablet 4    losartan (COZAAR) 50 MG Tab Take 1 Tablet by mouth every day. 90 Tablet 3     No current facility-administered medications for this visit.       Allergies   Allergen Reactions    Atorvastatin      myalgias    Simvastatin      myalgias       ROS: As per HPI       Objective:     /64 (BP  "Location: Right arm, Patient Position: Sitting, BP Cuff Size: Adult)   Pulse 70   Temp 36.5 °C (97.7 °F) (Temporal)   Ht 1.575 m (5' 2\")   Wt 66.2 kg (145 lb 15.1 oz)   SpO2 95%  Body mass index is 26.69 kg/m².    Physical Exam:  Constitutional: Well-developed and well-nourished. Not diaphoretic. No distress. Lucid and fluent. Patient, physician and staff all wearing masks.  Skin: Skin is warm and dry. No rash noted.  Head: Atraumatic without lesions.  Eyes: Conjunctivae and extraocular motions are normal. Pupils are equal, round, and reactive to light. No scleral icterus.   Neck: Supple, trachea midline. No thyromegaly present. No cervical or supraclavicular lymphadenopathy. No JVD or carotid bruits appreciated  Cardiovascular: Irregularly irregular rate and rhythm.  Normal S1, S2 without murmur appreciated.  Chest: Effort normal. Clear to auscultation throughout. No adventitious sounds.   Abdomen: Soft, non tender, and without distention. Active bowel sounds in all four quadrants. No rebound, guarding, masses or hepatosplenomegaly.  Extremities: No cyanosis, clubbing, erythema, nor edema.   Neurological: Alert and oriented x 3.  No tremor appreciated.  Psychiatric:  Behavior, mood, and affect are appropriate.       Assessment and Plan:     82 y.o. female with the following issues:    1. Urinary frequency  oxybutynin SR (DITROPAN-XL) 5 MG TABLET SR 24 HR    URINALYSIS,CULTURE IF INDICATED      2. Chronic anxiety  diazepam (VALIUM) 10 MG tablet      3. Psychophysiologic insomnia  diazepam (VALIUM) 10 MG tablet      4. Diabetes mellitus type II, non insulin dependent (HCC)        5. Controlled type 2 diabetes mellitus with diabetic nephropathy, without long-term current use of insulin (HCC)  Lipid Profile    HEMOGLOBIN A1C    MICROALBUMIN CREAT RATIO URINE    Comp Metabolic Panel      6. Stage 3a chronic kidney disease (HCC)  TSH    CBC WITHOUT DIFFERENTIAL    Comp Metabolic Panel      7. Gastroesophageal reflux " disease without esophagitis  CBC WITHOUT DIFFERENTIAL      8. Persistent atrial fibrillation (HCC)  TSH    Comp Metabolic Panel      9. Atrial fibrillation with RVR (HCC)  CBC WITHOUT DIFFERENTIAL      10. Aortic atherosclerosis (HCC)        11. Secondary hypercoagulable state (HCC)        12. Major depressive disorder, severe (HCC)              Followup: Return in about 3 months (around 6/13/2023), or if symptoms worsen or fail to improve.

## 2023-04-10 ENCOUNTER — TELEPHONE (OUTPATIENT)
Dept: CARDIOLOGY | Facility: MEDICAL CENTER | Age: 83
End: 2023-04-10
Payer: MEDICARE

## 2023-04-10 DIAGNOSIS — I50.32 CHRONIC DIASTOLIC HEART FAILURE (HCC): ICD-10-CM

## 2023-04-10 DIAGNOSIS — R06.09 DYSPNEA ON EXERTION: ICD-10-CM

## 2023-04-10 DIAGNOSIS — I10 HTN (HYPERTENSION), MALIGNANT: ICD-10-CM

## 2023-04-10 DIAGNOSIS — I50.20 HFREF (HEART FAILURE WITH REDUCED EJECTION FRACTION) (HCC): ICD-10-CM

## 2023-04-10 RX ORDER — FUROSEMIDE 20 MG/1
20 TABLET ORAL
Qty: 90 TABLET | Refills: 0 | Status: SHIPPED | OUTPATIENT
Start: 2023-04-10 | End: 2023-04-18

## 2023-04-10 NOTE — TELEPHONE ENCOUNTER
Returned pt's call. She states that she does not have scale so she is unsure how much weight she has gained and over what time period, however, she went to her PCP recently and weighed 145 lbs, she believes she used to weigh 125 lbs. She has also been feeling bloated and her clothes are feeling tighter. She thinks she has some abdominal swelling. She does report not being as active during winter as she normally walks outside.     Pt has been taking her Spironolactone 12.5 mg daily as prescribed and her Lasix about once a week. Advised that she take Lasix once a day for 3 days and then resume PRN and complete BMP in 2 weeks. She will do this. Advised that she adhere to a 2 g sodium diet, make a sooner appt with us for further evaluation, and call us if s/s worsen, pt will do this.

## 2023-04-10 NOTE — TELEPHONE ENCOUNTER
TT    Caller: Ivory Rowan    Medication Name and Dosage:  furosemide (LASIX) 20 MG Tab [[id number]    Medication amount left: 0    Preferred Pharmacy: [facility] on [facility]     Other questions (Topic): Patient states last refill was from Elaine Lopez and is unsure why  has not refilled this medication. Patient also states she has gained weight and believes she needs to take this medication. See refill encounter from 4/8/23. Patient would like a call back to discuss. Please advise.     Callback Number (Will only call for issues): [phone] (home)     Thank you,   Nadege LARIOS

## 2023-04-10 NOTE — TELEPHONE ENCOUNTER
JAMIE Sprague,    This pt was returning your call from a few minutes ago.     Ph. [phone]      Thank you,    ASH

## 2023-04-10 NOTE — TELEPHONE ENCOUNTER
MANDEEP Danielson.  You 17 minutes ago (2:23 PM)     Okay to reorder.  BMP in 2 weeks.      LVM asking pt to call back to further discuss. Lab order placed and rx sent.

## 2023-04-15 DIAGNOSIS — L21.9 SEBORRHEIC DERMATITIS: ICD-10-CM

## 2023-04-15 DIAGNOSIS — R06.09 DYSPNEA ON EXERTION: ICD-10-CM

## 2023-04-15 DIAGNOSIS — K21.9 GASTROESOPHAGEAL REFLUX DISEASE WITHOUT ESOPHAGITIS: ICD-10-CM

## 2023-04-15 DIAGNOSIS — I10 HTN (HYPERTENSION), MALIGNANT: ICD-10-CM

## 2023-04-16 RX ORDER — PANTOPRAZOLE SODIUM 40 MG/1
40 TABLET, DELAYED RELEASE ORAL DAILY
Qty: 90 TABLET | Refills: 3 | Status: SHIPPED | OUTPATIENT
Start: 2023-04-16 | End: 2024-01-17

## 2023-04-17 RX ORDER — CLOBETASOL PROPIONATE 0.46 MG/ML
SOLUTION TOPICAL
Qty: 50 ML | Refills: 3 | Status: SHIPPED | OUTPATIENT
Start: 2023-04-17 | End: 2023-10-11

## 2023-04-17 NOTE — TELEPHONE ENCOUNTER
Is the patient due for a refill? Yes    Was the patient seen the past year? Yes    Date of last office visit: 8/4/22    Does the patient have an upcoming appointment?  Yes   If yes, When? 8/3/23    Provider to refill:TT    Does the patients insurance require a 100 day supply?  Yes

## 2023-04-18 RX ORDER — FUROSEMIDE 20 MG/1
20 TABLET ORAL
Qty: 90 TABLET | Refills: 0 | Status: SHIPPED | OUTPATIENT
Start: 2023-04-18 | End: 2023-12-13

## 2023-04-18 RX ORDER — LOSARTAN POTASSIUM 50 MG/1
50 TABLET ORAL DAILY
Qty: 100 TABLET | Refills: 3 | Status: SHIPPED | OUTPATIENT
Start: 2023-04-18 | End: 2023-08-11

## 2023-04-20 ENCOUNTER — HOSPITAL ENCOUNTER (OUTPATIENT)
Dept: LAB | Facility: MEDICAL CENTER | Age: 83
End: 2023-04-20
Attending: FAMILY MEDICINE
Payer: MEDICARE

## 2023-04-20 ENCOUNTER — HOSPITAL ENCOUNTER (OUTPATIENT)
Dept: LAB | Facility: MEDICAL CENTER | Age: 83
End: 2023-04-20
Attending: NURSE PRACTITIONER
Payer: MEDICARE

## 2023-04-20 DIAGNOSIS — N18.31 STAGE 3A CHRONIC KIDNEY DISEASE: ICD-10-CM

## 2023-04-20 DIAGNOSIS — I48.19 PERSISTENT ATRIAL FIBRILLATION (HCC): ICD-10-CM

## 2023-04-20 DIAGNOSIS — R35.0 URINARY FREQUENCY: ICD-10-CM

## 2023-04-20 DIAGNOSIS — I10 HTN (HYPERTENSION), MALIGNANT: ICD-10-CM

## 2023-04-20 DIAGNOSIS — K21.9 GASTROESOPHAGEAL REFLUX DISEASE WITHOUT ESOPHAGITIS: Chronic | ICD-10-CM

## 2023-04-20 DIAGNOSIS — E11.21 CONTROLLED TYPE 2 DIABETES MELLITUS WITH DIABETIC NEPHROPATHY, WITHOUT LONG-TERM CURRENT USE OF INSULIN (HCC): ICD-10-CM

## 2023-04-20 LAB
ALBUMIN SERPL BCP-MCNC: 4.5 G/DL (ref 3.2–4.9)
ALBUMIN/GLOB SERPL: 1.6 G/DL
ALP SERPL-CCNC: 95 U/L (ref 30–99)
ALT SERPL-CCNC: 13 U/L (ref 2–50)
ANION GAP SERPL CALC-SCNC: 13 MMOL/L (ref 7–16)
AST SERPL-CCNC: 17 U/L (ref 12–45)
BILIRUB SERPL-MCNC: 0.5 MG/DL (ref 0.1–1.5)
BUN SERPL-MCNC: 34 MG/DL (ref 8–22)
CALCIUM ALBUM COR SERPL-MCNC: 9.1 MG/DL (ref 8.5–10.5)
CALCIUM SERPL-MCNC: 9.5 MG/DL (ref 8.5–10.5)
CHLORIDE SERPL-SCNC: 98 MMOL/L (ref 96–112)
CHOLEST SERPL-MCNC: 157 MG/DL (ref 100–199)
CO2 SERPL-SCNC: 23 MMOL/L (ref 20–33)
CREAT SERPL-MCNC: 1.34 MG/DL (ref 0.5–1.4)
CREAT UR-MCNC: 83.29 MG/DL
ERYTHROCYTE [DISTWIDTH] IN BLOOD BY AUTOMATED COUNT: 45.3 FL (ref 35.9–50)
EST. AVERAGE GLUCOSE BLD GHB EST-MCNC: 148 MG/DL
FASTING STATUS PATIENT QL REPORTED: NORMAL
GFR SERPLBLD CREATININE-BSD FMLA CKD-EPI: 40 ML/MIN/1.73 M 2
GLOBULIN SER CALC-MCNC: 2.9 G/DL (ref 1.9–3.5)
GLUCOSE SERPL-MCNC: 120 MG/DL (ref 65–99)
HBA1C MFR BLD: 6.8 % (ref 4–5.6)
HCT VFR BLD AUTO: 36.9 % (ref 37–47)
HDLC SERPL-MCNC: 51 MG/DL
HGB BLD-MCNC: 11.2 G/DL (ref 12–16)
LDLC SERPL CALC-MCNC: 85 MG/DL
MCH RBC QN AUTO: 23.5 PG (ref 27–33)
MCHC RBC AUTO-ENTMCNC: 30.4 G/DL (ref 33.6–35)
MCV RBC AUTO: 77.5 FL (ref 81.4–97.8)
MICROALBUMIN UR-MCNC: 4.1 MG/DL
MICROALBUMIN/CREAT UR: 49 MG/G (ref 0–30)
PLATELET # BLD AUTO: 210 K/UL (ref 164–446)
PMV BLD AUTO: 10 FL (ref 9–12.9)
POTASSIUM SERPL-SCNC: 4.7 MMOL/L (ref 3.6–5.5)
PROT SERPL-MCNC: 7.4 G/DL (ref 6–8.2)
RBC # BLD AUTO: 4.76 M/UL (ref 4.2–5.4)
SODIUM SERPL-SCNC: 134 MMOL/L (ref 135–145)
TRIGL SERPL-MCNC: 104 MG/DL (ref 0–149)
TSH SERPL DL<=0.005 MIU/L-ACNC: 4.08 UIU/ML (ref 0.38–5.33)
WBC # BLD AUTO: 6.2 K/UL (ref 4.8–10.8)

## 2023-04-20 PROCEDURE — 83036 HEMOGLOBIN GLYCOSYLATED A1C: CPT

## 2023-04-20 PROCEDURE — 82043 UR ALBUMIN QUANTITATIVE: CPT

## 2023-04-20 PROCEDURE — 80061 LIPID PANEL: CPT

## 2023-04-20 PROCEDURE — 82570 ASSAY OF URINE CREATININE: CPT

## 2023-04-20 PROCEDURE — 85027 COMPLETE CBC AUTOMATED: CPT

## 2023-04-20 PROCEDURE — 80053 COMPREHEN METABOLIC PANEL: CPT

## 2023-04-20 PROCEDURE — 84443 ASSAY THYROID STIM HORMONE: CPT

## 2023-04-20 PROCEDURE — 36415 COLL VENOUS BLD VENIPUNCTURE: CPT

## 2023-05-16 ENCOUNTER — TELEPHONE (OUTPATIENT)
Dept: HEALTH INFORMATION MANAGEMENT | Facility: OTHER | Age: 83
End: 2023-05-16

## 2023-06-23 ENCOUNTER — TELEPHONE (OUTPATIENT)
Dept: MEDICAL GROUP | Facility: MEDICAL CENTER | Age: 83
End: 2023-06-23
Payer: MEDICARE

## 2023-06-23 NOTE — TELEPHONE ENCOUNTER
Pt has not been taking Oxybutnin due to Glaucoma. Pt has concerns that she should not be taking this, as the pressure in her eyes are already at a 20. Please advise. Pt is very concerned.

## 2023-06-23 NOTE — TELEPHONE ENCOUNTER
Oxybutynin is contraindicated in narrow angle glaucoma.  It is not contraindicated in open angle glaucoma which is what I believe she has.

## 2023-08-03 ENCOUNTER — APPOINTMENT (OUTPATIENT)
Dept: CARDIOLOGY | Facility: MEDICAL CENTER | Age: 83
End: 2023-08-03
Attending: INTERNAL MEDICINE
Payer: MEDICARE

## 2023-08-04 ENCOUNTER — TELEPHONE (OUTPATIENT)
Dept: CARDIOLOGY | Facility: MEDICAL CENTER | Age: 83
End: 2023-08-04
Payer: MEDICARE

## 2023-08-04 DIAGNOSIS — I48.19 PERSISTENT ATRIAL FIBRILLATION (HCC): ICD-10-CM

## 2023-08-04 NOTE — TELEPHONE ENCOUNTER
TT      Caller: Ivory Rowan    Medication Name and Dosage: ELIQUIS 2.5 MG Tab      Please call your pharmacy and have them send us a refill request or speak to a live representative, RX number may have changed.    Medication amount left: 1 bottle left    Preferred Pharmacy: [id number] - [place], [place] - [address] AT [facility]    Other questions (Topic): Patient received a letter that stated that they would not do an automatic renewal for this medication and she was concerned     Callback Number (Will only call for issues): [phone]    Thank you    -Wei OLSON

## 2023-08-11 ENCOUNTER — OFFICE VISIT (OUTPATIENT)
Dept: CARDIOLOGY | Facility: MEDICAL CENTER | Age: 83
End: 2023-08-11
Attending: INTERNAL MEDICINE
Payer: MEDICARE

## 2023-08-11 VITALS
BODY MASS INDEX: 26.13 KG/M2 | SYSTOLIC BLOOD PRESSURE: 142 MMHG | RESPIRATION RATE: 16 BRPM | HEIGHT: 62 IN | WEIGHT: 142 LBS | DIASTOLIC BLOOD PRESSURE: 72 MMHG | HEART RATE: 84 BPM | OXYGEN SATURATION: 94 %

## 2023-08-11 DIAGNOSIS — I34.0 MODERATE MITRAL REGURGITATION: ICD-10-CM

## 2023-08-11 DIAGNOSIS — Z79.01 CHRONIC ANTICOAGULATION: ICD-10-CM

## 2023-08-11 DIAGNOSIS — E78.2 MIXED HYPERLIPIDEMIA: ICD-10-CM

## 2023-08-11 DIAGNOSIS — D68.69 HYPERCOAGULABLE STATE DUE TO PERMANENT ATRIAL FIBRILLATION (HCC): ICD-10-CM

## 2023-08-11 DIAGNOSIS — I10 HTN (HYPERTENSION), MALIGNANT: ICD-10-CM

## 2023-08-11 DIAGNOSIS — Z95.5 STENTED CORONARY ARTERY: ICD-10-CM

## 2023-08-11 DIAGNOSIS — I48.19 PERSISTENT ATRIAL FIBRILLATION (HCC): ICD-10-CM

## 2023-08-11 DIAGNOSIS — I48.91 ATRIAL FIBRILLATION WITH RVR (HCC): ICD-10-CM

## 2023-08-11 DIAGNOSIS — I48.21 HYPERCOAGULABLE STATE DUE TO PERMANENT ATRIAL FIBRILLATION (HCC): ICD-10-CM

## 2023-08-11 LAB — EKG IMPRESSION: NORMAL

## 2023-08-11 PROCEDURE — 99214 OFFICE O/P EST MOD 30 MIN: CPT | Performed by: INTERNAL MEDICINE

## 2023-08-11 PROCEDURE — 93010 ELECTROCARDIOGRAM REPORT: CPT | Performed by: INTERNAL MEDICINE

## 2023-08-11 PROCEDURE — 99213 OFFICE O/P EST LOW 20 MIN: CPT | Performed by: INTERNAL MEDICINE

## 2023-08-11 PROCEDURE — 93005 ELECTROCARDIOGRAM TRACING: CPT | Performed by: INTERNAL MEDICINE

## 2023-08-11 PROCEDURE — 3078F DIAST BP <80 MM HG: CPT | Performed by: INTERNAL MEDICINE

## 2023-08-11 PROCEDURE — 3077F SYST BP >= 140 MM HG: CPT | Performed by: INTERNAL MEDICINE

## 2023-08-11 RX ORDER — CLOPIDOGREL BISULFATE 75 MG/1
75 TABLET ORAL DAILY
Qty: 100 TABLET | Refills: 4 | Status: SHIPPED | OUTPATIENT
Start: 2023-08-11 | End: 2023-08-11

## 2023-08-11 RX ORDER — ASPIRIN 81 MG/1
81 TABLET ORAL DAILY
Qty: 100 TABLET | Refills: 4 | Status: SHIPPED | OUTPATIENT
Start: 2023-08-11

## 2023-08-11 RX ORDER — EZETIMIBE 10 MG/1
10 TABLET ORAL
Qty: 100 TABLET | Refills: 4 | Status: SHIPPED | OUTPATIENT
Start: 2023-08-11 | End: 2023-08-11

## 2023-08-11 RX ORDER — DIAZEPAM 10 MG/1
TABLET ORAL
COMMUNITY
Start: 2023-06-19 | End: 2023-09-25

## 2023-08-11 RX ORDER — METOPROLOL SUCCINATE 100 MG/1
100 TABLET, EXTENDED RELEASE ORAL 2 TIMES DAILY
Qty: 200 TABLET | Refills: 4 | Status: SHIPPED | OUTPATIENT
Start: 2023-08-11 | End: 2024-03-06 | Stop reason: SDUPTHER

## 2023-08-11 RX ORDER — LOSARTAN POTASSIUM 100 MG/1
100 TABLET ORAL DAILY
Qty: 100 TABLET | Refills: 4 | Status: SHIPPED | OUTPATIENT
Start: 2023-08-11 | End: 2024-03-06 | Stop reason: SDUPTHER

## 2023-08-11 RX ORDER — DIGOXIN 125 MCG
125 TABLET ORAL DAILY
Qty: 100 TABLET | Refills: 4 | Status: SHIPPED | OUTPATIENT
Start: 2023-08-11

## 2023-08-11 RX ORDER — SPIRONOLACTONE 25 MG/1
12.5 TABLET ORAL DAILY
Qty: 45 TABLET | Refills: 4 | Status: SHIPPED | OUTPATIENT
Start: 2023-08-11 | End: 2024-03-06 | Stop reason: SDUPTHER

## 2023-08-11 ASSESSMENT — ENCOUNTER SYMPTOMS
VOMITING: 0
BRUISES/BLEEDS EASILY: 0
CHILLS: 0
DIZZINESS: 0
BLOOD IN STOOL: 0
HALLUCINATIONS: 0
SENSORY CHANGE: 0
MYALGIAS: 0
PALPITATIONS: 0
SHORTNESS OF BREATH: 1
LOSS OF CONSCIOUSNESS: 0
ORTHOPNEA: 0
WEIGHT LOSS: 0
EYE DISCHARGE: 0
FALLS: 0
BLURRED VISION: 0
ABDOMINAL PAIN: 0
FEVER: 0
DEPRESSION: 0
EYE PAIN: 0
PND: 0
CLAUDICATION: 0
SPEECH CHANGE: 0
COUGH: 0
NAUSEA: 0
DOUBLE VISION: 0
HEADACHES: 0

## 2023-08-11 ASSESSMENT — FIBROSIS 4 INDEX: FIB4 SCORE: 1.84

## 2023-08-11 NOTE — PROGRESS NOTES
Chief Complaint   Patient presents with    Atrial Fibrillation       Subjective:   Ivory Rowan is an 83 y.o. female who presents today for cardiac care for persistent atrial fibrillation after prior failed cardioversion. She was in sinus for brief amount of time. Patient was diagnosed with atrial fibrillation earlier in 2016. Patient has had multiple hospitalizations due to A. fib with rapid ventricular rate leading to heart failure exacerbation. Her left ventricular systolic function is documented at about 45-50% on her most recent transthoracic echocardiogram. Patient is anticoagulated. Patient is taking Toprol- mg by mouth twice a day.     In the [facility], patient was not able to afford Tikosyn therapy and she did not go into the hospital for that.      06/2020 Due to abnormal stress test, patient underwent coronary angigoram and found to have RCA disease s/p PCI and stent.    08/2021 Was in hospital due to elevated heart rate with afib due to stoppage of digoxin.    I have independently interpreted and reviewed echocardiogram's actual images with patient which showed normal left ventricular systolic function. No wall motion abnormality. No evidence of pulmonary hypertension. Moderate MR. 08/2021.    I have independently interpreted and reviewed blood tests results with patient in clinic which shows normal LDL level 85, triglycerides 104. GFR of 40, reduced from 56.    Patient is reporting of feeling more short of breath.    Past Medical History:   Diagnosis Date    Acute on chronic heart failure with preserved ejection fraction (HCC) 8/13/2021    Atrial fibrillation (HCC)     Basal cell carcinoma of skin of nose     CATARACT     Dr. Aaron, Dr. Orellana    CHF (congestive heart failure) (HCC)     Colon polyp     tubular adenomas    Dental disorder     upper partial    Dyslipidemia     Glaucoma, right eye     Dr. Orellana    Heart burn     Hemorrhagic disorder (HCC)     eliquis    Hypertension     pt states well  "controlled on meds    IBS (irritable bowel syndrome)     Indigestion     MEDICAL HOME 10/23/12    Mitral valve regurgitation     Osteopenia     Perennial allergic rhinitis with seasonal variation     Persistent atrial fibrillation (HCC)     Psychiatric problem     anxiety    Type 2 diabetes mellitus, controlled (HCC)     diet controlled    Valvular heart disease     mitral insufficiency    Vertigo      Past Surgical History:   Procedure Laterality Date    RECOVERY  2016    Procedure: CATH LAB-ZAFAR GUIDED CV-TO-LARGE GROUP;  Surgeon: [facility];  [place] [facility] [place]  Service:     COLONOSCOPY  2009    Dr. Lopez, 9 polyps    APPENDECTOMY      BREAST BIOPSY      left, reports wire report broke off during procedure    CHOLECYSTECTOMY      US-NEEDLE CORE BX-BREAST PANEL       Family History   Problem Relation Age of Onset    Diabetes Mother     Hypertension Mother     Stroke Mother     Cancer Father         lung/larynx    Cancer Sister         \"female\"    Genetic Disorder Sister         osteoporosis    Cancer Paternal Aunt         breast    Cancer Maternal Aunt     Heart Disease Brother         CABG x 3     Social History     Socioeconomic History    Marital status:      Spouse name: Not on file    Number of children: 2    Years of education: Not on file    Highest education level: Not on file   Occupational History    Occupation: Retired [address] [place] [address] [employer]     Employer: [employer]   Tobacco Use    Smoking status: Former     Packs/day: 0.50     Years: 40.00     Pack years: 20.00     Types: Cigarettes     Quit date: 3/1/1996     Years since quittin.4    Smokeless tobacco: Never   Vaping Use    Vaping Use: Never used   Substance and Sexual Activity    Alcohol use: Not Currently     Alcohol/week: 0.0 oz     Comment: now rare    Drug use: No    Sexual activity: Not Currently     Partners: Male   Other Topics Concern    Not on file   Social History Narrative    Not on " file     Social Determinants of Health     Financial Resource Strain: Not on file   Food Insecurity: No Food Insecurity (8/16/2021)    Hunger Vital Sign     Worried About Running Out of Food in the Last Year: Never true     Ran Out of Food in the Last Year: Never true   Transportation Needs: No Transportation Needs (8/16/2021)    PRAPARE - Transportation     Lack of Transportation (Medical): No     Lack of Transportation (Non-Medical): No   Physical Activity: Not on file   Stress: Not on file   Social Connections: Not on file   Intimate Partner Violence: Not on file   Housing Stability: Not on file     Allergies   Allergen Reactions    Atorvastatin      myalgias    Simvastatin      myalgias     Outpatient Encounter Medications as of 8/11/2023   Medication Sig Dispense Refill    diazepam (VALIUM) 10 MG tablet TAKE 1 TABLET BY MOUTH EVERY 12 HOURS AS NEEDED FOR ANXIETY OR SLEEP FOR 30 DAYS      apixaban (ELIQUIS) 2.5mg Tab Take 1 Tablet by mouth 2 times a day. 180 Tablet 1    furosemide (LASIX) 20 MG Tab TAKE 1 TABLET BY MOUTH 1 TIME A DAY AS NEEDED (FOR WEIGHT GAIN, OR INCREASED SWELLING OR SHORTNESS OF BREATH). 90 Tablet 0    losartan (COZAAR) 50 MG Tab TAKE 1 TABLET BY MOUTH EVERY  Tablet 3    clobetasol (TEMOVATE) 0.05 % external solution APPLY TO AFFECTED AREA TWICE A DAY FOR 1 WEEKS, THEN USE 2-3 DAYS PER WEEK AS NEEDED 50 mL 3    pantoprazole (PROTONIX) 40 MG Tablet Delayed Response TAKE 1 TABLET BY MOUTH EVERY DAY 90 Tablet 3    clopidogrel (PLAVIX) 75 MG Tab TAKE 1 TABLET BY MOUTH EVERY  Tablet 1    ezetimibe (ZETIA) 10 MG Tab Take 1 Tablet by mouth every Monday, Wednesday, and Friday. 39 Tablet 2    metoprolol SR (TOPROL XL) 100 MG TABLET SR 24 HR TAKE 1 TABLET BY MOUTH TWICE A  Tablet 3    digoxin (LANOXIN) 125 MCG Tab Take 1 Tablet by mouth every day. 90 Tablet 3    spironolactone (ALDACTONE) 25 MG Tab Take 0.5 Tablets by mouth every day. 45 Tablet 4    oxybutynin SR (DITROPAN-XL) 5 MG  "TABLET SR 24 HR Take 1 Tablet by mouth every day. (Patient not taking: Reported on 8/11/2023) 90 Tablet 3     No facility-administered encounter medications on file as of 8/11/2023.     Review of Systems   Constitutional:  Negative for chills, fever, malaise/fatigue and weight loss.   HENT:  Negative for ear discharge, ear pain, hearing loss and nosebleeds.    Eyes:  Negative for blurred vision, double vision, pain and discharge.   Respiratory:  Positive for shortness of breath. Negative for cough.    Cardiovascular:  Negative for chest pain, palpitations, orthopnea, claudication, leg swelling and PND.   Gastrointestinal:  Negative for abdominal pain, blood in stool, melena, nausea and vomiting.   Genitourinary:  Negative for dysuria and hematuria.   Musculoskeletal:  Negative for falls, joint pain and myalgias.   Skin:  Negative for itching and rash.   Neurological:  Negative for dizziness, sensory change, speech change, loss of consciousness and headaches.   Endo/Heme/Allergies:  Negative for environmental allergies. Does not bruise/bleed easily.   Psychiatric/Behavioral:  Negative for depression, hallucinations and suicidal ideas.         Objective:   BP (!) 142/72 (BP Location: Right arm, Patient Position: Sitting, BP Cuff Size: Adult)   Pulse 84   Resp 16   Ht 1.575 m (5' 2\")   Wt 64.4 kg (142 lb)   LMP 05/01/1991   SpO2 94%   BMI 25.97 kg/m²     Physical Exam  Vitals and nursing note reviewed.   Constitutional:       General: She is not in acute distress.     Appearance: She is not diaphoretic.   HENT:      Head: Normocephalic and atraumatic.      Right Ear: External ear normal.      Left Ear: External ear normal.      Nose: No congestion or rhinorrhea.   Eyes:      General:         Right eye: No discharge.         Left eye: No discharge.   Neck:      Thyroid: No thyromegaly.      Vascular: No JVD.   Cardiovascular:      Rate and Rhythm: Normal rate. Rhythm irregular.      Pulses: Normal pulses. "   Pulmonary:      Effort: No respiratory distress.   Abdominal:      General: There is no distension.      Tenderness: There is no abdominal tenderness.   Musculoskeletal:         General: No swelling or tenderness.      Right lower leg: No edema.      Left lower leg: No edema.   Skin:     General: Skin is warm and dry.   Neurological:      Mental Status: She is alert and oriented to person, place, and time.      Cranial Nerves: No cranial nerve deficit.   Psychiatric:         Behavior: Behavior normal.         Assessment:     1. Persistent atrial fibrillation (HCC)  EKG      2. HTN (hypertension), malignant        3. Mixed hyperlipidemia        4. Stented coronary artery        5. Hypercoagulable state due to permanent atrial fibrillation (HCC)        6. Moderate mitral regurgitation        7. Chronic anticoagulation            Medical Decision Making:  Today's Assessment / Status / Plan:   CAD s/p RCA stent 06/2020:  Toprol  mg bid.  Continue BB, Zetia at current dose.  Will stop clopidogrel. Restart aspirin 81 mg daily.  Already on Eliquis 2.5 mg bid due to atrial fibrillation already.  Will continue Losartan 50 mg daily.  Statin allergies. Will stop Zetia. Will start Nexlizet.    Persistent atrial fibrillation:  Rate controlled.  Continue anticoagulation with Eiquis 2.5 mg bid, had nose bleed in the past with full dose.  Continue Toprol 100 mg bid.  Digoxin was stopped last time but her heart rate went very high. She was in hospital for treatment. She does not want to stop that anymore. Will monitor closely.     Hypertension:  Blood pressure is not well controlled.  Will increase Losartan to 100 mg daily.  Continue Toprol 100 mg bid.     Dyslipidemia:  Statin intolerance.  Continue Zetia 10 mg qhs.    Mitral regurgitation (severe):  At this time, patient does not want to do ZAFAR.    Shen Washington M.D.

## 2023-08-11 NOTE — PATIENT INSTRUCTIONS
Will increase Losartan to 100 mg daily.    Will stop Clopidogrel. Restart aspirin 81 mg daily.    Will stop Zetia.    Will start Nexlizet.

## 2023-09-01 NOTE — ED TRIAGE NOTES
Pt BIB [facility] with c/c of chest pain and SOB. Pt found to be in a rapid afib. Pt reporting her cardiologist recently stopped her Digoxin.   
1-2x/week

## 2023-09-05 ENCOUNTER — TELEPHONE (OUTPATIENT)
Dept: MEDICAL GROUP | Facility: MEDICAL CENTER | Age: 83
End: 2023-09-05
Payer: MEDICARE

## 2023-09-05 NOTE — TELEPHONE ENCOUNTER
Phone Number Called: [phone]    Call outcome: Spoke to patient regarding message below.    Message: Patient called left message and stated that she had itchiness and dry skin throughout her body for about two weeks. She is unsure what cause it. But would like some relief.     Provider action needed:

## 2023-09-07 ENCOUNTER — OFFICE VISIT (OUTPATIENT)
Dept: URGENT CARE | Facility: PHYSICIAN GROUP | Age: 83
End: 2023-09-07
Payer: MEDICARE

## 2023-09-07 ENCOUNTER — HOSPITAL ENCOUNTER (OUTPATIENT)
Facility: MEDICAL CENTER | Age: 83
End: 2023-09-07
Attending: PHYSICIAN ASSISTANT
Payer: MEDICARE

## 2023-09-07 VITALS
HEIGHT: 62 IN | BODY MASS INDEX: 26.13 KG/M2 | WEIGHT: 142 LBS | RESPIRATION RATE: 16 BRPM | DIASTOLIC BLOOD PRESSURE: 80 MMHG | HEART RATE: 76 BPM | TEMPERATURE: 98.1 F | SYSTOLIC BLOOD PRESSURE: 110 MMHG | OXYGEN SATURATION: 96 %

## 2023-09-07 DIAGNOSIS — N76.0 ACUTE VAGINITIS: ICD-10-CM

## 2023-09-07 DIAGNOSIS — B37.9 YEAST INFECTION: ICD-10-CM

## 2023-09-07 DIAGNOSIS — R82.998 LEUKOCYTES IN URINE: ICD-10-CM

## 2023-09-07 DIAGNOSIS — N76.0 BV (BACTERIAL VAGINOSIS): ICD-10-CM

## 2023-09-07 DIAGNOSIS — L50.9 URTICARIA: ICD-10-CM

## 2023-09-07 DIAGNOSIS — B96.89 BV (BACTERIAL VAGINOSIS): ICD-10-CM

## 2023-09-07 DIAGNOSIS — L30.4 INTERTRIGO: ICD-10-CM

## 2023-09-07 LAB
AMBIGUOUS DTTM AMBI4: NORMAL
APPEARANCE UR: NORMAL
BILIRUB UR STRIP-MCNC: NEGATIVE MG/DL
COLOR UR AUTO: NORMAL
GLUCOSE UR STRIP.AUTO-MCNC: NEGATIVE MG/DL
KETONES UR STRIP.AUTO-MCNC: NEGATIVE MG/DL
LEUKOCYTE ESTERASE UR QL STRIP.AUTO: NORMAL
NITRITE UR QL STRIP.AUTO: NEGATIVE
PH UR STRIP.AUTO: 5.5 [PH] (ref 5–8)
PROT UR QL STRIP: 30 MG/DL
RBC UR QL AUTO: NORMAL
SP GR UR STRIP.AUTO: <=1.005
UROBILINOGEN UR STRIP-MCNC: 0.2 MG/DL

## 2023-09-07 PROCEDURE — 3074F SYST BP LT 130 MM HG: CPT | Performed by: PHYSICIAN ASSISTANT

## 2023-09-07 PROCEDURE — 87480 CANDIDA DNA DIR PROBE: CPT

## 2023-09-07 PROCEDURE — 87660 TRICHOMONAS VAGIN DIR PROBE: CPT

## 2023-09-07 PROCEDURE — 87086 URINE CULTURE/COLONY COUNT: CPT

## 2023-09-07 PROCEDURE — 3079F DIAST BP 80-89 MM HG: CPT | Performed by: PHYSICIAN ASSISTANT

## 2023-09-07 PROCEDURE — 99214 OFFICE O/P EST MOD 30 MIN: CPT | Mod: 25 | Performed by: PHYSICIAN ASSISTANT

## 2023-09-07 PROCEDURE — 87510 GARDNER VAG DNA DIR PROBE: CPT

## 2023-09-07 PROCEDURE — 81002 URINALYSIS NONAUTO W/O SCOPE: CPT | Performed by: PHYSICIAN ASSISTANT

## 2023-09-07 RX ORDER — CEFDINIR 300 MG/1
300 CAPSULE ORAL 2 TIMES DAILY
Qty: 10 CAPSULE | Refills: 0 | Status: SHIPPED | OUTPATIENT
Start: 2023-09-07 | End: 2023-09-12

## 2023-09-07 RX ORDER — KETOCONAZOLE 20 MG/G
CREAM TOPICAL
Qty: 30 G | Refills: 0 | Status: SHIPPED | OUTPATIENT
Start: 2023-09-07

## 2023-09-07 ASSESSMENT — FIBROSIS 4 INDEX: FIB4 SCORE: 1.84

## 2023-09-07 ASSESSMENT — ENCOUNTER SYMPTOMS
CHILLS: 0
FEVER: 0

## 2023-09-07 NOTE — TELEPHONE ENCOUNTER
Phone Number Called: [phone] (home)       Call outcome: Spoke to patient regarding message below.    Message: Recommendation for her to Aquaphor for her itchy skin.

## 2023-09-08 LAB
CANDIDA DNA VAG QL PROBE+SIG AMP: POSITIVE
G VAGINALIS DNA VAG QL PROBE+SIG AMP: POSITIVE
T VAGINALIS DNA VAG QL PROBE+SIG AMP: NEGATIVE

## 2023-09-08 RX ORDER — METRONIDAZOLE 7.5 MG/G
1 GEL VAGINAL
Qty: 5 EACH | Refills: 0 | Status: SHIPPED | OUTPATIENT
Start: 2023-09-08 | End: 2023-09-13

## 2023-09-08 RX ORDER — FLUCONAZOLE 150 MG/1
150 TABLET ORAL DAILY
Qty: 1 TABLET | Refills: 0 | Status: SHIPPED | OUTPATIENT
Start: 2023-09-08

## 2023-09-08 NOTE — PROGRESS NOTES
Subjective:   Ivory Rowan is a 83 y.o. female who presents today with   Chief Complaint   Patient presents with    Itching     All over body x2wk     Other  This is a new problem. The current episode started 1 to 4 weeks ago. The problem occurs constantly. The problem has been unchanged. Associated symptoms include a rash. Pertinent negatives include no chills or fever. She has tried nothing for the symptoms. The treatment provided no relief.     Patient does have prescription for clobetasol solution and states she has been using it for more than 2  months now.  Concerned that it could be causing rash.  Patient is very concerned at this point for this as she just read the instructions this morning stating not to use it for more than 2 weeks.  Patient states she has some itchiness under her breast bilaterally and feels as if she has a yeast infection.  Patient states she feels overwhelmed with symptoms she is having going on.    Patient placed a call to her primary care 3 days ago and was recommended to try Aquaphor for itchy skin.    PMH:  has a past medical history of Acute on chronic heart failure with preserved ejection fraction (HCC) (8/13/2021), Atrial fibrillation ([facility]), Basal cell carcinoma of skin of nose, CATARACT, CHF (congestive heart failure) ([facility]), Colon polyp, Dental disorder, Dyslipidemia, Glaucoma, right eye, Heart burn, Hemorrhagic disorder ([facility]), Hypertension, IBS (irritable bowel syndrome), Indigestion, MEDICAL HOME (10/23/12), Mitral valve regurgitation, Osteopenia (6/09), Perennial allergic rhinitis with seasonal variation, Persistent atrial fibrillation ([facility]), Psychiatric problem, Type 2 diabetes mellitus, controlled ([facility]), Valvular heart disease, and Vertigo.  MEDS:   Current Outpatient Medications:     ketoconazole (NIZORAL) 2 % Cream, Apply thin layer to affected area once daily for up to 14 days, Disp: 30 g, Rfl: 0    diazepam (VALIUM) 10 MG tablet, TAKE 1 TABLET BY MOUTH EVERY 12  HOURS AS NEEDED FOR ANXIETY OR SLEEP FOR 30 DAYS, Disp: , Rfl:     metoprolol SR (TOPROL XL) 100 MG TABLET SR 24 HR, Take 1 Tablet by mouth 2 times a day., Disp: 200 Tablet, Rfl: 4    losartan (COZAAR) 100 MG Tab, Take 1 Tablet by mouth every day., Disp: 100 Tablet, Rfl: 4    spironolactone (ALDACTONE) 25 MG Tab, Take 0.5 Tablets by mouth every day., Disp: 45 Tablet, Rfl: 4    apixaban (ELIQUIS) 2.5mg Tab, Take 1 Tablet by mouth 2 times a day., Disp: 180 Tablet, Rfl: 4    digoxin (LANOXIN) 125 MCG Tab, Take 1 Tablet by mouth every day., Disp: 100 Tablet, Rfl: 4    Bempedoic Acid-Ezetimibe 180-10 MG Tab, Take 180 mg by mouth every day., Disp: 100 Tablet, Rfl: 4    aspirin 81 MG EC tablet, Take 1 Tablet by mouth every day., Disp: 100 Tablet, Rfl: 4    furosemide (LASIX) 20 MG Tab, TAKE 1 TABLET BY MOUTH 1 TIME A DAY AS NEEDED (FOR WEIGHT GAIN, OR INCREASED SWELLING OR SHORTNESS OF BREATH)., Disp: 90 Tablet, Rfl: 0    clobetasol (TEMOVATE) 0.05 % external solution, APPLY TO AFFECTED AREA TWICE A DAY FOR 1 WEEKS, THEN USE 2-3 DAYS PER WEEK AS NEEDED, Disp: 50 mL, Rfl: 3    pantoprazole (PROTONIX) 40 MG Tablet Delayed Response, TAKE 1 TABLET BY MOUTH EVERY DAY, Disp: 90 Tablet, Rfl: 3  ALLERGIES:   Allergies   Allergen Reactions    Atorvastatin      myalgias    Simvastatin      myalgias     SURGHX:   Past Surgical History:   Procedure Laterality Date    RECOVERY  7/8/2016    Procedure: CATH LAB-ZAFAR GUIDED CV-TO-LARGE GROUP;  Surgeon: [facility];  Location: SURGERY PRE-POST PROC UNIT [facility] [place];  Service:     COLONOSCOPY  8/2009    Dr. Lopez, 9 polyps    APPENDECTOMY      BREAST BIOPSY      left, reports wire report broke off during procedure    CHOLECYSTECTOMY      US-NEEDLE CORE BX-BREAST PANEL       SOCHX:  reports that she quit smoking about 27 years ago. Her smoking use included cigarettes. She started smoking about 67 years ago. She has a 20.0 pack-year smoking history. She has never used smokeless tobacco.  "She reports that she does not currently use alcohol. She reports that she does not use drugs.  FH: Reviewed with patient, not pertinent to this visit.     Review of Systems   Constitutional:  Negative for chills and fever.   Genitourinary:  Negative for dysuria, frequency, hematuria and urgency.        Vaginal itching   Skin:  Positive for itching and rash.      Objective:   /80   Pulse 76   Temp 36.7 °C (98.1 °F) (Temporal)   Resp 16   Ht 1.575 m (5' 2\")   Wt 64.4 kg (142 lb)   LMP 05/01/1991   SpO2 96%   BMI 25.97 kg/m²   Physical Exam  Vitals and nursing note reviewed.   Constitutional:       General: She is not in acute distress.     Appearance: Normal appearance. She is well-developed. She is not ill-appearing or toxic-appearing.   HENT:      Head: Normocephalic and atraumatic.      Right Ear: Hearing normal.      Left Ear: Hearing normal.   Cardiovascular:      Rate and Rhythm: Normal rate.   Pulmonary:      Effort: Pulmonary effort is normal.      Breath sounds: Normal breath sounds. No stridor. No wheezing, rhonchi or rales.   Abdominal:      Tenderness: There is no abdominal tenderness. There is no right CVA tenderness or left CVA tenderness.   Genitourinary:     Comments: Patient deferred  exam  Musculoskeletal:      Comments: Normal movement in all 4 extremities   Skin:     General: Skin is warm and dry.             Comments: Intertrigo erythematous rash to the bra line as marked above.  No vesicles or pustules.  No other rash noted at this time.  Some erythema noted to the abdomen consistent with extension of the intertrigo rash as well.    Neurological:      Mental Status: She is alert and oriented to person, place, and time.      Coordination: Coordination normal.   Psychiatric:         Mood and Affect: Mood normal.       UA suspicious for infection    Assessment/Plan:   Assessment    1. Intertrigo  - ketoconazole (NIZORAL) 2 % Cream; Apply thin layer to affected area once daily for up " to 14 days  Dispense: 30 g; Refill: 0  - Referral back to [facility]    2. Urticaria  - Referral back to [facility]CP    3. Acute vaginitis  - POCT Urinalysis  - VAGINAL PATHOGENS DNA PANEL; Future  - URINE CULTURE(NEW); Future    Symptoms and presentation appear consistent with intertrigo under the patient's bra line.  We will send in a cream for her to use at this time to that area.  Recommend patient discontinue the clobetasol solution at this time.  No other rash noted on exam today.  We will follow-up with vaginal pathogen and urine culture test results and adjust treatment accordingly if needed.  Patient is alert and oriented to person and place on exam today.  Although patient notes rash to the scalp there is no rash on my exam.  Recommend patient follow back up with primary care doctor for recheck of these symptoms.  No raised lesions or bumps noted on exam.  No signs of altered mental status on my exam today and she is answering my questions appropriately.    Patient is concerned today that she may not be able to get back to the pharmacy if her urine culture does come back positive and therefore I will send over an oral antibiotic for her to  today as well but not start on until we get confirmation from the urine culture.    Differential diagnosis, natural history, supportive care, and indications for immediate follow-up discussed.   Patient given instructions and understanding of medications and treatment.    If not improving in 3-5 days, F/U with PCP or return to  if symptoms worsen.    Patient agreeable to plan.      Please note that this dictation was created using voice recognition software. I have made every reasonable attempt to correct obvious errors, but I expect that there are errors of grammar and possibly content that I did not discover before finalizing the note.    Onesimo Meza PA-C

## 2023-09-10 LAB
BACTERIA UR CULT: NORMAL
SIGNIFICANT IND 70042: NORMAL
SITE SITE: NORMAL
SOURCE SOURCE: NORMAL

## 2023-09-12 ENCOUNTER — TELEPHONE (OUTPATIENT)
Dept: CARDIOLOGY | Facility: MEDICAL CENTER | Age: 83
End: 2023-09-12
Payer: MEDICARE

## 2023-09-12 NOTE — TELEPHONE ENCOUNTER
Optical locations that accept [facility] Health Insurance plans    Home Visit:  [facility]  [phone]      [facility] (will not do bifocals for children)  [address] [place] [address]  [phone]     [facility]  [address] [place] [address]   Phone [phone]   Fax [phone]     [facility]  [email or web address]  [address] [place] [address]  [phone]    [place]  [facility] 20/20  [email or web address]  [address] [place] [address] [phone]      [facility]  [address], [address] [phone]     [facility]  [email or web address]  [address]  [phone]    [facility]  [address]  [place] [address]  Phone [phone]  Fax [phone]    [facility] Mercado, [facility]  [address]  [place] [address]  Phone: [phone]    [facility]  [address] [place] [address]   Phone [phone]   Fax [phone]    [facility] (Glasses for $49)  [address]   [place] [address]  Phone:    [facility]  [address] [place] [address]   Phone [phone]   Fax [phone]     [facility]  [address]  [phone]    [facility]  [address]  [phone]    [facility]  [address]  [phone]    [facility]  [address] [place] [address]  [phone]           Growth of the Eye During Childhood    At birth, the human eye is relatively short (when compared to ideal adult length). This means that light comes into focus behind the eye (hyperopia) rather than directly on the retina (emmetropia). As growth occurs over the first 10-12 years of life, the eye  Returned pt's call & d/w her. Pt will continue diuretics and stay well hydrated w/ 64 ounces of water a day.    grows longer as height increases. This means that we are designed to outgrow hyperopia throughout childhood.            While children are supposed to have hyperopia, the focusing system compensates (accomodates) for this so that we can see well. The closer an object gets to the eye, the more the focusing system accommodates so that the object can be seen clearly.        This added focusing power occurs when the ciliary muscle contracts, causing the lens inside of the eye to change shape (get thicker) so that focusing power increases.        If the eye grows too long, too quickly (I.e. if hyperopia is outgrown too quickly), the eye keeps growing longer and longer as long as height is increasing. This is how myopia (nearsightedness) occurs.        With myopia, distance vision is blurry.  Myopia tends to progress as long as height increases.      Factors that increase risk of myopia:  One or both parents with myopia  Too much near visual time (tablets, phones, etc.)  Not enough exposure to natural sunlight.      To minimize eyestrain and Lower the risk of becoming near-sighted:   - Limit use of near electronic devices to no more than 20 minutes at a time, no more than 2 hours a day  - No electronic devices before age 2  - Avoid watching screens (TV, devices, etc.)  in complete darkness  - Spend 1-3 hours outdoors daily so that the eyes are exposed to natural light       To better understand risks for vision myopia and problems,please visit:   [email or web address]    [email or web address]    [email or web address]          School-aged Vision:     A child needs many abilities to succeed in school. Good vision is a key. It has been estimated that as much as 80% of the learning a child does occurs through his or her eyes. Reading, writing, chalkboard work, and using computers are among the visual tasks students perform daily. A child's eyes are constantly in use in the classroom and at play. When his or her vision is not functioning  "properly, education and participation in sports can suffer.      As children progress in school, they face increasing demands on their visual abilities.   The school years are a very important time in every child's life. All parents want to see their children do well in school and most parents do all they can to provide them with the best educational opportunities. But too often one important learning tool may be overlooked - a child's vision.  As children progress in school, they face increasing demands on their visual abilities. The size of print in schoolbooks becomes smaller and the amount of time spent reading and studying increases significantly. Increased class work and homework place significant demands on the child's eyes. Unfortunately, the visual abilities of some students aren't performing up to the task.  When certain visual skills have not developed, or are poorly developed, learning is difficult and stressful, and children will typically:  Avoid reading and other near visual work as much as possible.   Attempt to do the work anyway, but with a lowered level of comprehension or efficiency.   Experience discomfort, fatigue and a short attention span.  Some children with learning difficulties exhibit specific behaviors of hyperactivity and distractibility. These children are often labeled as having "Attention Deficit Hyperactivity Disorder" (ADHD). However, undetected and untreated vision problems can elicit some of the very same signs and symptoms commonly attributed to ADHD. Due to these similarities, some children may be mislabeled as having ADHD when, in fact, they have an undetected vision problem.  Because vision may change frequently during the school years, regular eye and vision care is important. The most common vision problem is nearsightedness or myopia. However, some children have other forms of refractive error like farsightedness and astigmatism. In addition, the existence of eye focusing, " "eye tracking and eye coordination problems may affect school and sports performance.  Eyeglasses or contact lenses may provide the needed correction for many vision problems. However, a program of vision therapy may also be needed to help develop or enhance vision skills.    Vision Skills Needed For School Success      There are many visual skills beyond seeing clearly that team together to support academic success.   Vision is more than just the ability to see clearly, or having 20/20 eyesight. It is also the ability to understand and respond to what is seen. Basic visual skills include the ability to focus the eyes, use both eyes together as a team, and move them effectively. Other visual perceptual skills include:  recognition (the ability to tell the difference between letters like "b" and "d"),   comprehension (to "picture" in our mind what is happening in a story we are reading), and   retention (to be able to remember and recall details of what we read).  Every child needs to have the following vision skills for effective reading and learning:  Visual acuity -- the ability to see clearly in the distance for viewing the chalkboard, at an intermediate distance for the computer, and up close for reading a book.    Eye Focusing -- the ability to quickly and accurately maintain clear vision as the distance from objects change, such as when looking from the chalkboard to a paper on the desk and back. Eye focusing allows the child to easily maintain clear vision over time like when reading a book or writing a report.    Eye tracking -- the ability to keep the eyes on target when looking from one object to another, moving the eyes along a printed page, or following a moving object like a thrown ball.    Eye teaming -- the ability to coordinate and use both eyes together when moving the eyes along a printed page, and to be able to  distances and see depth for class work and sports.    Eye-hand coordination -- the " ability to use visual information to monitor and direct the hands when drawing a picture or trying to hit a ball.    Visual perception -- the ability to organize images on a printed page into letters, words and ideas and to understand and remember what is read.  If any of these visual skills are lacking or not functioning properly, a child will have to work harder. This can lead to headaches, fatigue and other eyestrain problems. Parents and teachers need to be alert for symptoms that may indicate a child has a vision problem.      Signs of Eye and Vision Problems  A child may not tell you that he or she has a vision problem because they may think the way they see is the way everyone sees.  Signs that may indicate a child has vision problem include:  Frequent eye rubbing or blinking   Short attention span   Avoiding reading and other close activities   Frequent headaches   Covering one eye   Tilting the head to one side   Holding reading materials close to the face   An eye turning in or out   Seeing double   Losing place when reading   Difficulty remembering what he or she reads    When is a Vision Exam Needed?      Your child should receive an eye examination at least once every two years-more frequently if specific problems or risk factors exist, or if recommended by your eye doctor.   Unfortunately, parents and educators often incorrectly assume that if a child passes a school screening, then there is no vision problem. However, many school vision screenings only test for distance visual acuity. A child who can see 20/20 can still have a vision problem. In reality, the vision skills needed for successful reading and learning are much more complex.  Even if a child passes a vision screening, they should receive a comprehensive optometric examination if:  They show any of the signs or symptoms of a vision problem listed above.   They are not achieving up to their potential.   They are minimally able to achieve,  but have to use excessive time and effort to do so.  Vision changes can occur without your child or you noticing them. Therefore, your child should receive an eye examination at least once every two years-more frequently if specific problems or risk factors exist, or if recommended by your eye doctor. The earlier a vision problem is detected and treated, the more likely treatment will be successful. When needed, the doctor can prescribe treatment including eyeglasses, contact lenses or vision therapy to correct any vision problems.      Sports Vision and Eye Protection  Outdoor games and sports are an enjoyable and important part of most children's lives. Whether playing catch in the back yard or participating in team sports at school, vision plays an important role in how well a child performs.  Specific visual skills needed for sports include:  Clear distance vision   Good depth perception   Wide field of vision   Effective eye-hand coordination  A child who consistently underperforms a certain skill in a sport, such as always hitting the front of the rim in basketball or swinging late at a pitched ball in baseball, may have a vision problem. If visual skills are not adequate, the child may continue to perform poorly. Correction of vision problems with eyeglasses or contact lenses, or a program of eye exercises called vision therapy can correct many vision problems, enhance vision skills, and improve sports vision performance. (Link to Sports Vision)  Eye protection should also be a major concern to all student athletes, especially in certain high-risk sports. Thousands of children suffer sports-related eye injuries each year and nearly all can be prevented by using the proper protective eyewear. That is why it is essential that all children wear appropriate, protective eyewear whenever playing sports. Eye protection should also be worn for other risky activities such as lawn mowing and trimming.  Regular  prescription eyeglasses or contact lenses are not a substitute for appropriate, well-fitted protective eyewear. Athletes need to use sports eyewear that is tailored to protect the eyes while playing the specific sport. Your doctor of optometry can recommend specific sports eyewear to provide the level of protection needed.   It is also important for all children to protect their eyes from damage caused by ultraviolet radiation in sunlight. Sunglasses are needed to protect the eyes outdoors and some sport-specific designs may even help improve sports performance.      Learning-Related Vision Problems    By Aristeo Bautista, with updates and review by Rik Cavanaugh, OD    Vision and learning are intimately related. In fact, experts say that roughly 80 percent of what a child learns in school is information that is presented visually. So good vision is essential for students of all ages to reach their full academic potential.  When children have difficulty in school -- from learning to read to understanding fractions to seeing the blackboard -- many parents and teachers believe these kids have vision problems.  And sometimes, they're right. Eyeglasses or contact lenses often help children better see the board in the front of the classroom and the books on their desk.  Ruling out simple refractive errors is the first step in making sure your child is visually ready for school. But nearsightedness, farsightedness and astigmatism are not the only visual disorders that can make learning more difficult.  Less obvious vision problems related to the way the eyes function and how the brain processes visual information also can limit your child's ability to learn.  Any vision problems that have the potential to affect academic and reading performance are considered learning-related vision problems.    Vision and Learning Disabilities  Learning-related vision problems are not learning disabilities. The [place] Individuals with  "Disabilities Education Act (IDEA)* defines a specific learning disability as: ". . . a disorder in one or more of the basic psychological processes involved in understanding or in using language, spoken or written, that may manifest itself in an imperfect ability to listen, think, speak, read, write, spell, or do mathematical calculations, including conditions such as perceptual disabilities, brain injury, minimal brain dysfunction, dyslexia, and developmental aphasia."  IDEA also says learning disabilities do not include learning problems that are primarily due to visual, hearing or motor disabilities. Mental retardation and emotional disturbances also are excluded as learning disabilities, along with learning problems related to environmental, cultural or economic disadvantage.  But specific vision problems can contribute to a child's learning problems, whether or not he has been diagnosed as "learning disabled." In other words, a child struggling in school may have a specific learning disability, a learning-related vision problem, or both.  If you are concerned about your child's performance in school, you need to find out the underlying cause (or causes) of the problem. The best way to do this is through a team approach that may include the child's teachers, the school psychologist, an eye doctor who specializes in children's vision and learning-related vision problems and perhaps other professionals.  Identifying all contributing causes of the learning problem increases the chances that the problem can be successfully treated.    Types of Learning-Related Vision Problems  Vision is a complex process that involves not only the eyes but the brain as well. Specific learning-related vision problems can be classified as one of three types. The first two types primarily affect visual input. The third primarily affects visual processing and integration.    If your child habitually places her head close to her book " when reading, she may have a vision problem that can affect her ability to learn.     Eye health and refractive problems. These problems can affect the visual acuity in each eye as measured by an eye chart. Refractive errors include nearsightedness, farsightedness and astigmatism, but also include more subtle optical errors called higher-order aberrations. Eye health problems can cause low vision -- permanently decreased visual acuity that cannot be corrected by conventional eyeglasses, contact lenses or refractive surgery.    Functional vision problems. Functional vision refers to a variety of specific functions of the eye and the neurological control of these functions, such as eye teaming (binocularity), fine eye movements (important for efficient reading), and accommodation (focusing amplitude, accuracy and flexibility). Deficits of functional visual skills can cause blurred or double vision, eye strain and headaches that can affect learning. Convergence insufficiency is a specific type of functional vision problem that affects the ability of the two eyes to stay accurately and comfortably aligned during reading.    Perceptual vision problems. Visual perception includes understanding what you see, identifying it, judging its importance and relating it to previously stored information in the brain. This means, for example, recognizing words that you have seen previously, and using the eyes and brain to form a mental picture of the words you see.  Most routine eye exams evaluate only the first of these categories of vision problems -- those related to eye health and refractive errors. However, many optometrists who specialize in children's vision problems and vision therapy offer exams to evaluate functional vision problems and perceptual vision problems that may affect learning.  Color blindness, though typically not considered a learning-related vision problem, may cause problems in school for young children  with color vision problems if color-matching or identifying specific colors is required in classroom activities. For this reason, all children should have an eye exam that includes a color blind test prior to starting school.    Symptoms of Learning-Related Vision Problems  Symptoms of learning-related vision problems include:  Headaches or eye strain   Blurred vision or double vision   Crossed eyes or eyes that appear to move independently of each other (Read more about strabismus.)   Dislike or avoidance of reading and close work   Short attention span during visual tasks   Turning or tilting the head to use one eye only, or closing or covering one eye   Placing the head very close to the book or desk when reading or writing   Excessive blinking or rubbing the eyes   Losing place while reading, or using a finger as a guide   Slow reading speed or poor reading comprehension   Difficulty remembering what was read   Omitting or repeating words, or confusing similar words   Persistent reversal of words or letters (after second grade)   Difficulty remembering, identifying or reproducing shapes   Poor eye-hand coordination   Evidence of developmental immaturity    Learning problems can lead to depression and low self-esteem. Seeing an eye doctor should be one of your first steps.   If your child shows one or more of these symptoms and is experiencing learning problems, it's possible he or she may have a learning-related vision problem.  To determine if such a problem exists, see an eye doctor who specializes in children's vision and learning-related vision problems for a comprehensive evaluation.  If no vision problem is detected, it's possible your child's symptoms are caused by a non-visual dysfunction, such as dyslexia or a learning disability. See an  for an evaluation to rule out these problems.  Signs of Attention and Developmental Disorders   Many people know attention disorders by the names  attention deficit disorder (ADD) or attention deficit/hyperactivity disorder (ADHD). Frequently such children are put on drugs like Ritalin. Occasionally children with attention disorders experience other problems that contribute to inattentiveness, such as a speech and language dysfunction or nonverbal disorder. Consult a pediatric neurologist for a definitive diagnosis.  Parents can easily identify the three components of the autism spectrum disorder: lack of eye contact, inability to relate socially or inappropriate social interaction, and unusual repetitive interests that exclude other activities. Any or all of these early signs should prompt a consultation with your family doctor or pediatrician.    Treatment of Learning-Related Vision Problems  If your child is diagnosed with a learning-related vision problem, treatment generally consists of an individualized and doctor-supervised program of vision therapy. Special eyeglasses also may be prescribed for either full-time wear or for specific tasks such as reading.  If your child is also receiving special education or other special services for a learning disability, ask the eye doctor who is supervising your child's vision therapy to contact your child's teacher and other professionals involved in his or her Individualized Education Program (IEP) or other remedial activities.  In some cases, vision therapy and remedial learning activities can be combined, and a cooperative effort to address your child's learning problems may be the best approach.  Also, keep in mind that children with learning difficulties may experience emotional problems as well, such as anxiety, depression and low self-esteem.  Reassure your child that learning problems and learning-related vision problems say nothing about a person's intelligence. Many children with learning difficulties have above-average IQs and simply process information differently than their peers.

## 2023-09-12 NOTE — TELEPHONE ENCOUNTER
TT          Caller: Ivory Rowan      Topic/issue: Patient is being treated by urgent care for a yeast infection and she was wanting to know if she should continue taking water pills while she is taking the medication for the yeast infection and she was asking for a call back    Callback Number: [phone]    Thank you    -Wei OLSON

## 2023-09-25 ENCOUNTER — HOSPITAL ENCOUNTER (OUTPATIENT)
Facility: MEDICAL CENTER | Age: 83
End: 2023-09-25
Attending: FAMILY MEDICINE
Payer: MEDICARE

## 2023-09-25 ENCOUNTER — OFFICE VISIT (OUTPATIENT)
Dept: MEDICAL GROUP | Facility: MEDICAL CENTER | Age: 83
End: 2023-09-25
Payer: MEDICARE

## 2023-09-25 VITALS
TEMPERATURE: 98 F | HEART RATE: 67 BPM | SYSTOLIC BLOOD PRESSURE: 132 MMHG | DIASTOLIC BLOOD PRESSURE: 70 MMHG | BODY MASS INDEX: 25.6 KG/M2 | WEIGHT: 139.11 LBS | HEIGHT: 62 IN | OXYGEN SATURATION: 95 %

## 2023-09-25 DIAGNOSIS — K21.9 GASTROESOPHAGEAL REFLUX DISEASE WITHOUT ESOPHAGITIS: Chronic | ICD-10-CM

## 2023-09-25 DIAGNOSIS — Z79.01 CHRONIC ANTICOAGULATION: ICD-10-CM

## 2023-09-25 DIAGNOSIS — R30.0 DYSURIA: ICD-10-CM

## 2023-09-25 DIAGNOSIS — E55.9 VITAMIN D DEFICIENCY: Chronic | ICD-10-CM

## 2023-09-25 DIAGNOSIS — E11.21 CONTROLLED TYPE 2 DIABETES MELLITUS WITH DIABETIC NEPHROPATHY, WITHOUT LONG-TERM CURRENT USE OF INSULIN (HCC): ICD-10-CM

## 2023-09-25 DIAGNOSIS — M62.838 MUSCLE SPASM: ICD-10-CM

## 2023-09-25 DIAGNOSIS — F51.04 PSYCHOPHYSIOLOGIC INSOMNIA: ICD-10-CM

## 2023-09-25 DIAGNOSIS — E11.9 DIABETES MELLITUS TYPE II, NON INSULIN DEPENDENT (HCC): ICD-10-CM

## 2023-09-25 DIAGNOSIS — I10 ESSENTIAL HYPERTENSION: ICD-10-CM

## 2023-09-25 DIAGNOSIS — Z78.0 POSTMENOPAUSAL STATUS: ICD-10-CM

## 2023-09-25 DIAGNOSIS — E78.5 DYSLIPIDEMIA, GOAL LDL BELOW 70: ICD-10-CM

## 2023-09-25 DIAGNOSIS — I70.0 AORTIC ATHEROSCLEROSIS (HCC): ICD-10-CM

## 2023-09-25 DIAGNOSIS — F41.9 CHRONIC ANXIETY: ICD-10-CM

## 2023-09-25 PROBLEM — N39.0 E. COLI URINARY TRACT INFECTION: Status: RESOLVED | Noted: 2022-09-13 | Resolved: 2023-09-25

## 2023-09-25 PROBLEM — B96.20 E. COLI URINARY TRACT INFECTION: Status: RESOLVED | Noted: 2022-09-13 | Resolved: 2023-09-25

## 2023-09-25 LAB
APPEARANCE UR: CLEAR
BILIRUB UR STRIP-MCNC: ABNORMAL MG/DL
COLOR UR AUTO: YELLOW
GLUCOSE UR STRIP.AUTO-MCNC: ABNORMAL MG/DL
KETONES UR STRIP.AUTO-MCNC: ABNORMAL MG/DL
LEUKOCYTE ESTERASE UR QL STRIP.AUTO: ABNORMAL
NITRITE UR QL STRIP.AUTO: ABNORMAL
PH UR STRIP.AUTO: 30 [PH] (ref 5–8)
PROT UR QL STRIP: 0.2 MG/DL
RBC UR QL AUTO: ABNORMAL
SP GR UR STRIP.AUTO: 1
UROBILINOGEN UR STRIP-MCNC: ABNORMAL MG/DL

## 2023-09-25 PROCEDURE — 3078F DIAST BP <80 MM HG: CPT | Performed by: FAMILY MEDICINE

## 2023-09-25 PROCEDURE — 87186 SC STD MICRODIL/AGAR DIL: CPT

## 2023-09-25 PROCEDURE — 81002 URINALYSIS NONAUTO W/O SCOPE: CPT | Performed by: FAMILY MEDICINE

## 2023-09-25 PROCEDURE — 87086 URINE CULTURE/COLONY COUNT: CPT

## 2023-09-25 PROCEDURE — 99214 OFFICE O/P EST MOD 30 MIN: CPT | Performed by: FAMILY MEDICINE

## 2023-09-25 PROCEDURE — 3075F SYST BP GE 130 - 139MM HG: CPT | Performed by: FAMILY MEDICINE

## 2023-09-25 PROCEDURE — 87086 URINE CULTURE/COLONY COUNT: CPT | Mod: 91

## 2023-09-25 PROCEDURE — 87077 CULTURE AEROBIC IDENTIFY: CPT

## 2023-09-25 RX ORDER — MULTIVITAMIN WITH IRON
1 TABLET ORAL DAILY
Qty: 30 TABLET | Refills: 11 | COMMUNITY
Start: 2023-09-25

## 2023-09-25 RX ORDER — DIAZEPAM 10 MG/1
10 TABLET ORAL EVERY 12 HOURS PRN
Qty: 60 TABLET | Refills: 2 | Status: SHIPPED | OUTPATIENT
Start: 2023-09-25 | End: 2023-12-24

## 2023-09-25 RX ORDER — EZETIMIBE 10 MG/1
10 TABLET ORAL DAILY
Qty: 100 TABLET | Refills: 3 | Status: SHIPPED | OUTPATIENT
Start: 2023-09-25 | End: 2024-03-06 | Stop reason: SDUPTHER

## 2023-09-25 ASSESSMENT — FIBROSIS 4 INDEX: FIB4 SCORE: 1.84

## 2023-09-25 NOTE — PROGRESS NOTES
Diabetes Focused Exam:    Chief Complaint   Patient presents with    Follow-Up     Uti x 9 month     Diabetes Follow-up    Dysuria    Dyslipidemia      Subjective:   HPI  Ivory Rowan is a 83 y.o. female who presents for follow up of chronic conditions of diabetes mellitus, hypertension and hyperlipidemia. She indicates that she is feeling well and denies any symptoms referable to the above diagnoses. Specifically denies chest pain, palpitations, dyspnea, orthopnea, PND or peripheral edema. Also denies polyuria, polydipsia, urinary complaints, abdominal complaints, myalgias, numbness, weakness or other related symptoms.     Getting persistent urinary tract infections.  She is getting external itching.      The patient is taking ASA every day 81 mg and taking all other medications as prescribed. Patient denies any side effects of medication.  DM: A1c goal <7  Glucose monitoring frequency: does not do  Hypoglycemic episodes none noted  Diabetic complications: none  ACR Albumin/Creatinine Ratio goal <30   HTN: Blood pressure goal <140/<80. Currently Rx ACE/ARB: Yes  Hyperlipidemia:Cholesterol goal LDL <100, total/HDL <5. Currently Rx Statin: Not Indicated  cannot tolerate.  Is not tolerating the nexilet.  Back to ezetimibe.  Last eye exam April this year, has november appointment.  Denies visual blurring, double vision, eye pain and floaters  Last monofilament foot exam: will be doing this in 3 months.   Denies foot pain, numbness, calluses, ulcers    See medications and orders placed in encounter report.  Past medical history, family history, social history reviewed and updated as documented in medical record.  Current medications including changes today:  Current Outpatient Medications   Medication Sig Dispense Refill    Magnesium 250 MG Tab Take 1 Tablet by mouth every day. 30 Tablet 11    diazepam (VALIUM) 10 MG tablet Take 1 Tablet by mouth every 12 hours as needed for Anxiety or Sleep for up to 90 days. 60  tablets is a 30 day quantity. 60 Tablet 2    ezetimibe (ZETIA) 10 MG Tab Take 1 Tablet by mouth every day. 100 Tablet 3    fluconazole (DIFLUCAN) 150 MG tablet Take 1 Tablet by mouth every day. 1 Tablet 0    ketoconazole (NIZORAL) 2 % Cream Apply thin layer to affected area once daily for up to 14 days 30 g 0    metoprolol SR (TOPROL XL) 100 MG TABLET SR 24 HR Take 1 Tablet by mouth 2 times a day. 200 Tablet 4    losartan (COZAAR) 100 MG Tab Take 1 Tablet by mouth every day. 100 Tablet 4    spironolactone (ALDACTONE) 25 MG Tab Take 0.5 Tablets by mouth every day. 45 Tablet 4    apixaban (ELIQUIS) 2.5mg Tab Take 1 Tablet by mouth 2 times a day. 180 Tablet 4    digoxin (LANOXIN) 125 MCG Tab Take 1 Tablet by mouth every day. 100 Tablet 4    aspirin 81 MG EC tablet Take 1 Tablet by mouth every day. 100 Tablet 4    furosemide (LASIX) 20 MG Tab TAKE 1 TABLET BY MOUTH 1 TIME A DAY AS NEEDED (FOR WEIGHT GAIN, OR INCREASED SWELLING OR SHORTNESS OF BREATH). 90 Tablet 0    clobetasol (TEMOVATE) 0.05 % external solution APPLY TO AFFECTED AREA TWICE A DAY FOR 1 WEEKS, THEN USE 2-3 DAYS PER WEEK AS NEEDED 50 mL 3    pantoprazole (PROTONIX) 40 MG Tablet Delayed Response TAKE 1 TABLET BY MOUTH EVERY DAY 90 Tablet 3     No current facility-administered medications for this visit.     Allergies:   Allergies   Allergen Reactions    Atorvastatin      myalgias    Simvastatin      myalgias      Social History     Tobacco Use    Smoking status: Former     Current packs/day: 0.00     Average packs/day: 0.5 packs/day for 40.0 years (20.0 ttl pk-yrs)     Types: Cigarettes     Start date: 3/1/1956     Quit date: 3/1/1996     Years since quittin.5    Smokeless tobacco: Never   Vaping Use    Vaping Use: Never used   Substance Use Topics    Alcohol use: Not Currently     Alcohol/week: 0.0 oz     Comment: now rare    Drug use: No     Exercise: she is having trouble walking due to muscle pain since starting the nexilet.  Health  "Maintenance/Immunizations: discussed.      ROS  Pertinent  ROS findings as above. Has shortness of breath with exertion.  Has diffuse muscle pain.       Objective:     OBJECTIVE:  /70   Pulse 67   Temp 36.7 °C (98 °F)   Ht 1.575 m (5' 2\")   Wt 63.1 kg (139 lb 1.8 oz)   LMP 05/01/1991   SpO2 95%   BMI 25.44 kg/m²  Body mass index is 25.44 kg/m². BMI: not obese  General: No apparent distress, conversant, cooperative and pleasant with the examination.  Psych: Alert and oriented x4, judgment and insight normal  Neck: No JVD or bruits, no adenopathy, supple  Thyroid: normal to inspection and palpation  Lungs: negative findings: normal respiratory rate and rhythm, lungs clear to auscultation  Heart: negative findings: S1 normal, S2 normal, however rate is irregularly irregular  Abdomen: Soft, nontender, no hepatosplenomegaly or masses, normal bowel sounds  Skin: No rashes, no cyanosis, no lesions or ulcers  Extremities: No cyanosis clubbing or edema.     POC labs:   Lab Results   Component Value Date/Time    POCGLUCOSE 111 (A) 02/06/2019 12:04 PM          Last labs    Lab Results   Component Value Date/Time    CHOLSTRLTOT 157 04/20/2023 06:13 AM    LDL 85 04/20/2023 06:13 AM    HDL 51 04/20/2023 06:13 AM    TRIGLYCERIDE 104 04/20/2023 06:13 AM       Lab Results   Component Value Date/Time    SODIUM 134 (L) 04/20/2023 06:13 AM    POTASSIUM 4.7 04/20/2023 06:13 AM    GLUCOSE 120 (H) 04/20/2023 06:13 AM    BUNCREATRAT 26 04/11/2011 12:00 AM    GLOMRATE 56 (L) 03/09/2011 07:55 AM     Lab Results   Component Value Date/Time    HBA1C 6.8 (H) 04/20/2023 06:13 AM    HBA1C 6.9 (H) 11/15/2022 07:17 AM    HBA1C 6.6 (H) 05/25/2022 06:22 AM     Lab Results   Component Value Date/Time    MALBCRT 49 (H) 04/20/2023 06:12 AM    MICROALBUR 4.1 04/20/2023 06:12 AM          Assessment/Plan:   Medications, refills, and referrals per orders.   1. Controlled type 2 diabetes mellitus with diabetic nephropathy, without long-term " current use of insulin (HCC)  Comp Metabolic Panel    Lipid Profile    MICROALBUMIN CREAT RATIO URINE    HEMOGLOBIN A1C      2. Diabetes mellitus type II, non insulin dependent (HCC)  Comp Metabolic Panel    Lipid Profile    MICROALBUMIN CREAT RATIO URINE    HEMOGLOBIN A1C      3. Dyslipidemia, goal LDL below 70  Comp Metabolic Panel    TSH    Lipid Profile    ezetimibe (ZETIA) 10 MG Tab      4. Aortic atherosclerosis (HCC)  ezetimibe (ZETIA) 10 MG Tab      5. Essential hypertension        6. Dysuria  POCT Urinalysis    URINE CULTURE(NEW)      7. Chronic anticoagulation        8. Gastroesophageal reflux disease without esophagitis  Comp Metabolic Panel    CBC WITHOUT DIFFERENTIAL      9. Muscle spasm  Magnesium 250 MG Tab      10. Vitamin D deficiency  VITAMIN D,25 HYDROXY (DEFICIENCY)      11. Postmenopausal status  DS-BONE DENSITY STUDY (DEXA)      12. Chronic anxiety  diazepam (VALIUM) 10 MG tablet      13. Psychophysiologic insomnia  diazepam (VALIUM) 10 MG tablet        DM2 A1c is at goal   Patient to monitor sugars: declines    Discussed diet, exercise, disease management and weight loss goals.   Education and advise provided today:All medications, side effects and compliance (discussed carefully)  Annual eye examinations at Ophthalmology  Diabetic diet discussed in detail, written exchange diet given  Foot care discussed and Podiatry visits  Glycohemoglobin and other lab monitoring  Labs immediately prior to next visit  Long term diabetic complications  Low cholesterol diet, weight control and daily exercise    Followup:   Do labs and RTC 6 months, sooner should new symptoms or problems arise.

## 2023-10-04 DIAGNOSIS — N39.0 URINARY TRACT INFECTION DUE TO KLEBSIELLA SPECIES: ICD-10-CM

## 2023-10-04 DIAGNOSIS — B96.89 URINARY TRACT INFECTION DUE TO KLEBSIELLA SPECIES: ICD-10-CM

## 2023-10-04 RX ORDER — CEFUROXIME AXETIL 250 MG/1
250 TABLET ORAL 2 TIMES DAILY
Qty: 20 TABLET | Refills: 0 | Status: SHIPPED | OUTPATIENT
Start: 2023-10-04 | End: 2023-10-14

## 2023-11-15 ENCOUNTER — TELEPHONE (OUTPATIENT)
Dept: CARDIOLOGY | Facility: MEDICAL CENTER | Age: 83
End: 2023-11-15
Payer: MEDICARE

## 2023-11-15 NOTE — TELEPHONE ENCOUNTER
TT    Caller: Ivory Rowan    Topic/issue: Patient had taken Furosemide in the past and now has been prescribed Spironolactone. She has been experiencing a bit of weight gain and she had read if someone has weight gain, swelling and shortness of breath that she might need to take the Furosemide instead. She states she has been experiencing these symptoms a little.    Patient also wants to know if she should complete the lab work that Dr. Washington ordered 08/11/23 still, she is going to lab 11/16/23 @ 7:00am to complete the labs ordered by Dr. Tesfaye.    Callback Number: [phone] (home)     Thank you,  [facility]

## 2023-11-15 NOTE — TELEPHONE ENCOUNTER
Returned call. Pt states per her wt's at dr thrasher, she has had no wt gain, but she thinks her legs are swollen as her pants feel tighter and she has more MENDEZ. However, pt reports not exercising as much as she used to. Pt thought that TT did not want her to take Lasix, informed her that TT did prescribe Lasix 20 mg PRN and she can take it for increased swelling, SOB, or wt gain, advised that she do this and call us if s/s do not resolve.

## 2023-11-16 ENCOUNTER — HOSPITAL ENCOUNTER (OUTPATIENT)
Dept: LAB | Facility: MEDICAL CENTER | Age: 83
End: 2023-11-16
Attending: FAMILY MEDICINE
Payer: MEDICARE

## 2023-11-16 DIAGNOSIS — E55.9 VITAMIN D DEFICIENCY: Chronic | ICD-10-CM

## 2023-11-16 DIAGNOSIS — E78.5 DYSLIPIDEMIA, GOAL LDL BELOW 70: ICD-10-CM

## 2023-11-16 DIAGNOSIS — E11.21 CONTROLLED TYPE 2 DIABETES MELLITUS WITH DIABETIC NEPHROPATHY, WITHOUT LONG-TERM CURRENT USE OF INSULIN (HCC): ICD-10-CM

## 2023-11-16 DIAGNOSIS — K21.9 GASTROESOPHAGEAL REFLUX DISEASE WITHOUT ESOPHAGITIS: Chronic | ICD-10-CM

## 2023-11-16 DIAGNOSIS — E11.9 DIABETES MELLITUS TYPE II, NON INSULIN DEPENDENT (HCC): ICD-10-CM

## 2023-11-16 LAB
25(OH)D3 SERPL-MCNC: 16 NG/ML (ref 30–100)
ALBUMIN SERPL BCP-MCNC: 4.8 G/DL (ref 3.2–4.9)
ALBUMIN/GLOB SERPL: 1.7 G/DL
ALP SERPL-CCNC: 97 U/L (ref 30–99)
ALT SERPL-CCNC: 15 U/L (ref 2–50)
ANION GAP SERPL CALC-SCNC: 13 MMOL/L (ref 7–16)
AST SERPL-CCNC: 26 U/L (ref 12–45)
BILIRUB SERPL-MCNC: 1 MG/DL (ref 0.1–1.5)
BUN SERPL-MCNC: 33 MG/DL (ref 8–22)
CALCIUM ALBUM COR SERPL-MCNC: 9.1 MG/DL (ref 8.5–10.5)
CALCIUM SERPL-MCNC: 9.7 MG/DL (ref 8.5–10.5)
CHLORIDE SERPL-SCNC: 95 MMOL/L (ref 96–112)
CHOLEST SERPL-MCNC: 133 MG/DL (ref 100–199)
CO2 SERPL-SCNC: 24 MMOL/L (ref 20–33)
CREAT SERPL-MCNC: 1.05 MG/DL (ref 0.5–1.4)
CREAT UR-MCNC: 85.62 MG/DL
ERYTHROCYTE [DISTWIDTH] IN BLOOD BY AUTOMATED COUNT: 46.9 FL (ref 35.9–50)
EST. AVERAGE GLUCOSE BLD GHB EST-MCNC: 157 MG/DL
FASTING STATUS PATIENT QL REPORTED: NORMAL
GFR SERPLBLD CREATININE-BSD FMLA CKD-EPI: 53 ML/MIN/1.73 M 2
GLOBULIN SER CALC-MCNC: 2.8 G/DL (ref 1.9–3.5)
GLUCOSE SERPL-MCNC: 128 MG/DL (ref 65–99)
HBA1C MFR BLD: 7.1 % (ref 4–5.6)
HCT VFR BLD AUTO: 34.9 % (ref 37–47)
HDLC SERPL-MCNC: 48 MG/DL
HGB BLD-MCNC: 10.2 G/DL (ref 12–16)
LDLC SERPL CALC-MCNC: 68 MG/DL
MCH RBC QN AUTO: 22.8 PG (ref 27–33)
MCHC RBC AUTO-ENTMCNC: 29.2 G/DL (ref 32.2–35.5)
MCV RBC AUTO: 78.1 FL (ref 81.4–97.8)
MICROALBUMIN UR-MCNC: 22 MG/DL
MICROALBUMIN/CREAT UR: 257 MG/G (ref 0–30)
PLATELET # BLD AUTO: 222 K/UL (ref 164–446)
PMV BLD AUTO: 9.9 FL (ref 9–12.9)
POTASSIUM SERPL-SCNC: 4.7 MMOL/L (ref 3.6–5.5)
PROT SERPL-MCNC: 7.6 G/DL (ref 6–8.2)
RBC # BLD AUTO: 4.47 M/UL (ref 4.2–5.4)
SODIUM SERPL-SCNC: 132 MMOL/L (ref 135–145)
TRIGL SERPL-MCNC: 85 MG/DL (ref 0–149)
TSH SERPL DL<=0.005 MIU/L-ACNC: 4.45 UIU/ML (ref 0.38–5.33)
WBC # BLD AUTO: 6.3 K/UL (ref 4.8–10.8)

## 2023-11-16 PROCEDURE — 36415 COLL VENOUS BLD VENIPUNCTURE: CPT

## 2023-11-16 PROCEDURE — 83036 HEMOGLOBIN GLYCOSYLATED A1C: CPT

## 2023-11-16 PROCEDURE — 85027 COMPLETE CBC AUTOMATED: CPT

## 2023-11-16 PROCEDURE — 80053 COMPREHEN METABOLIC PANEL: CPT

## 2023-11-16 PROCEDURE — 82306 VITAMIN D 25 HYDROXY: CPT

## 2023-11-16 PROCEDURE — 82043 UR ALBUMIN QUANTITATIVE: CPT

## 2023-11-16 PROCEDURE — 84443 ASSAY THYROID STIM HORMONE: CPT

## 2023-11-16 PROCEDURE — 80061 LIPID PANEL: CPT

## 2023-11-16 PROCEDURE — 82570 ASSAY OF URINE CREATININE: CPT

## 2023-11-21 ENCOUNTER — APPOINTMENT (OUTPATIENT)
Dept: RADIOLOGY | Facility: MEDICAL CENTER | Age: 83
End: 2023-11-21
Attending: FAMILY MEDICINE
Payer: MEDICARE

## 2023-11-27 NOTE — PROGRESS NOTES
Hospital Medicine Daily Progress Note    Date of Service  8/8/2019    Chief Complaint  79 y.o. female admitted 8/6/2019 with palpitation    Hospital Course    80 y/o F with PMH of afib, dementia came in with above and found to have afib with RVR      Interval Problem Update  Feeling better. HR is controlled. A little anxious. Denies chest pain. Patient was seen and examined by me today. Case was discussed with RN and multidisplinary team during rounds. Denies nausea, vomiting, diarrhea. Waiting for echo. HR controlled. Still on diltiazem infusion.      Consultants/Specialty  card    Code Status  full    Disposition  Remain on the floor    Review of Systems  Review of Systems   Constitutional: Negative for chills and weight loss.   HENT: Negative for congestion and nosebleeds.    Eyes: Negative for pain, discharge and redness.   Respiratory: Negative for sputum production and shortness of breath.    Cardiovascular: Positive for palpitations. Negative for chest pain and orthopnea.   Gastrointestinal: Negative for abdominal pain, diarrhea, nausea and vomiting.   Genitourinary: Negative for frequency and urgency.   Musculoskeletal: Negative for back pain, myalgias and neck pain.   Skin: Negative for itching and rash.   Neurological: Negative for focal weakness, seizures and headaches.   Psychiatric/Behavioral: The patient is not nervous/anxious and does not have insomnia.         Physical Exam  Temp:  [36.2 °C (97.1 °F)-37.1 °C (98.7 °F)] 36.2 °C (97.2 °F)  Pulse:  [60-94] 89  Resp:  [16-20] 18  BP: ()/(54-70) 112/61  SpO2:  [90 %-93 %] 91 %    Physical Exam   Constitutional: She is oriented to person, place, and time. No distress.   HENT:   Head: Normocephalic and atraumatic.   Eyes: Pupils are equal, round, and reactive to light. EOM are normal.   Neck: Normal range of motion. No tracheal deviation present. No thyromegaly present.   Cardiovascular: Normal rate and regular rhythm.   No murmur  heard.  Pulmonary/Chest: Effort normal. No respiratory distress. She has no wheezes.   Abdominal: Soft. She exhibits no distension. There is no tenderness.   Musculoskeletal: She exhibits no edema or tenderness.   Neurological: She is alert and oriented to person, place, and time.   Skin: Skin is warm and dry. She is not diaphoretic. No erythema.   Psychiatric: She has a normal mood and affect. Thought content normal.   Nursing note and vitals reviewed.      Fluids    Intake/Output Summary (Last 24 hours) at 8/8/2019 0716  Last data filed at 8/8/2019 0611  Gross per 24 hour   Intake 790 ml   Output 1550 ml   Net -760 ml       Laboratory  Recent Labs     08/06/19  0913 08/07/19  0309   WBC 8.5 7.3   RBC 4.53 4.09*   HEMOGLOBIN 13.6 12.0   HEMATOCRIT 42.2 38.0   MCV 93.2 92.9   MCH 30.0 29.3   MCHC 32.2* 31.6*   RDW 48.8 49.1   PLATELETCT 189 174   MPV 10.2 9.6     Recent Labs     08/06/19  0913 08/07/19  0309   SODIUM 141 141   POTASSIUM 4.2 3.9   CHLORIDE 105 105   CO2 27 27   GLUCOSE 132* 107*   BUN 36* 30*   CREATININE 1.07 1.06   CALCIUM 9.3 9.0     Recent Labs     08/06/19  0913   INR 1.65*               Imaging  DX-CHEST-PORTABLE (1 VIEW)   Final Result      Cardiomegaly and mild interstitial edema, suggestive of CHF.      EC-ECHOCARDIOGRAM COMPLETE W/O CONT    (Results Pending)        Assessment/Plan  * Persistent atrial fibrillation (HCC)- (present on admission)  Assessment & Plan  Recently taken off digoxin   Rate controlled  Continue metoprolol XL, continue apixaban  On IV diltiazem infusion, I am actively managing it, tirtrating rates as needed  Cardiology on  Echo: pending(to adjust medication depending on echo result with LVEF)  Continue to monitor on tele    Hyperglycemia- (present on admission)  Assessment & Plan  No history of DM    Shortness of breath- (present on admission)  Assessment & Plan  Related to afib    Chronic diastolic heart failure (HCC)- (present on admission)  Assessment &  Plan  Continue home dose of lasix    Stage 3 chronic kidney disease (HCC)- (present on admission)  Assessment & Plan  Cr stable       VTE prophylaxis: eliquis       ghassan

## 2023-12-12 DIAGNOSIS — I10 HTN (HYPERTENSION), MALIGNANT: ICD-10-CM

## 2023-12-12 DIAGNOSIS — R06.09 DYSPNEA ON EXERTION: ICD-10-CM

## 2023-12-13 RX ORDER — FUROSEMIDE 20 MG/1
20 TABLET ORAL
Qty: 90 TABLET | Refills: 0 | Status: SHIPPED | OUTPATIENT
Start: 2023-12-13

## 2024-01-11 ENCOUNTER — HOSPITAL ENCOUNTER (OUTPATIENT)
Dept: RADIOLOGY | Facility: MEDICAL CENTER | Age: 84
End: 2024-01-11
Attending: FAMILY MEDICINE
Payer: MEDICARE

## 2024-01-11 DIAGNOSIS — Z78.0 POSTMENOPAUSAL STATUS: ICD-10-CM

## 2024-01-11 PROCEDURE — 77080 DXA BONE DENSITY AXIAL: CPT

## 2024-01-17 ENCOUNTER — HOSPITAL ENCOUNTER (OUTPATIENT)
Facility: MEDICAL CENTER | Age: 84
End: 2024-01-17
Attending: FAMILY MEDICINE
Payer: MEDICARE

## 2024-01-17 ENCOUNTER — OFFICE VISIT (OUTPATIENT)
Dept: MEDICAL GROUP | Facility: MEDICAL CENTER | Age: 84
End: 2024-01-17
Payer: MEDICARE

## 2024-01-17 VITALS
TEMPERATURE: 98 F | OXYGEN SATURATION: 96 % | HEIGHT: 62 IN | SYSTOLIC BLOOD PRESSURE: 102 MMHG | HEART RATE: 83 BPM | BODY MASS INDEX: 25.58 KG/M2 | WEIGHT: 139 LBS | DIASTOLIC BLOOD PRESSURE: 60 MMHG

## 2024-01-17 DIAGNOSIS — K21.9 GASTROESOPHAGEAL REFLUX DISEASE WITHOUT ESOPHAGITIS: Chronic | ICD-10-CM

## 2024-01-17 DIAGNOSIS — R35.0 URINARY FREQUENCY: ICD-10-CM

## 2024-01-17 DIAGNOSIS — E11.21 CONTROLLED TYPE 2 DIABETES MELLITUS WITH DIABETIC NEPHROPATHY, WITHOUT LONG-TERM CURRENT USE OF INSULIN (HCC): ICD-10-CM

## 2024-01-17 DIAGNOSIS — F41.9 CHRONIC ANXIETY: ICD-10-CM

## 2024-01-17 DIAGNOSIS — Z79.01 CHRONIC ANTICOAGULATION: ICD-10-CM

## 2024-01-17 DIAGNOSIS — R05.3 PERSISTENT COUGH FOR 3 WEEKS OR LONGER: ICD-10-CM

## 2024-01-17 DIAGNOSIS — I48.19 PERSISTENT ATRIAL FIBRILLATION (HCC): ICD-10-CM

## 2024-01-17 DIAGNOSIS — Z87.440 HISTORY OF FREQUENT URINARY TRACT INFECTIONS: ICD-10-CM

## 2024-01-17 DIAGNOSIS — F32.2 MAJOR DEPRESSIVE DISORDER, SEVERE (HCC): ICD-10-CM

## 2024-01-17 DIAGNOSIS — J32.0 CHRONIC MAXILLARY SINUSITIS: ICD-10-CM

## 2024-01-17 DIAGNOSIS — E11.9 DIABETES MELLITUS TYPE II, NON INSULIN DEPENDENT (HCC): ICD-10-CM

## 2024-01-17 DIAGNOSIS — I70.0 AORTIC ATHEROSCLEROSIS (HCC): ICD-10-CM

## 2024-01-17 DIAGNOSIS — N18.31 STAGE 3A CHRONIC KIDNEY DISEASE: ICD-10-CM

## 2024-01-17 DIAGNOSIS — F51.04 PSYCHOPHYSIOLOGIC INSOMNIA: ICD-10-CM

## 2024-01-17 DIAGNOSIS — D68.69 SECONDARY HYPERCOAGULABLE STATE (HCC): ICD-10-CM

## 2024-01-17 DIAGNOSIS — M81.0 AGE-RELATED OSTEOPOROSIS WITHOUT CURRENT PATHOLOGICAL FRACTURE: ICD-10-CM

## 2024-01-17 DIAGNOSIS — J01.00 ACUTE NON-RECURRENT MAXILLARY SINUSITIS: ICD-10-CM

## 2024-01-17 PROCEDURE — 3078F DIAST BP <80 MM HG: CPT | Performed by: FAMILY MEDICINE

## 2024-01-17 PROCEDURE — 87086 URINE CULTURE/COLONY COUNT: CPT

## 2024-01-17 PROCEDURE — 87077 CULTURE AEROBIC IDENTIFY: CPT

## 2024-01-17 PROCEDURE — 3074F SYST BP LT 130 MM HG: CPT | Performed by: FAMILY MEDICINE

## 2024-01-17 PROCEDURE — 87186 SC STD MICRODIL/AGAR DIL: CPT

## 2024-01-17 PROCEDURE — 99214 OFFICE O/P EST MOD 30 MIN: CPT | Performed by: FAMILY MEDICINE

## 2024-01-17 RX ORDER — FAMOTIDINE 20 MG/1
20 TABLET, FILM COATED ORAL DAILY
Qty: 90 TABLET | Refills: 3 | Status: SHIPPED | OUTPATIENT
Start: 2024-01-17

## 2024-01-17 RX ORDER — BENZONATATE 100 MG/1
100 CAPSULE ORAL 3 TIMES DAILY PRN
Qty: 60 CAPSULE | Refills: 0 | Status: SHIPPED | OUTPATIENT
Start: 2024-01-17 | End: 2024-02-20

## 2024-01-17 RX ORDER — AMOXICILLIN 875 MG/1
875 TABLET, COATED ORAL 2 TIMES DAILY
Qty: 14 TABLET | Refills: 0 | Status: SHIPPED | OUTPATIENT
Start: 2024-01-17 | End: 2024-01-24

## 2024-01-17 ASSESSMENT — FIBROSIS 4 INDEX: FIB4 SCORE: 2.48

## 2024-01-17 ASSESSMENT — PATIENT HEALTH QUESTIONNAIRE - PHQ9: CLINICAL INTERPRETATION OF PHQ2 SCORE: 0

## 2024-01-19 NOTE — PROGRESS NOTES
Chief Complaint   Patient presents with    Follow-Up    Cough       Subjective:     HPI:   Ivory Rowan presents today with the followin. Persistent cough for 3 weeks or longer/Chronic maxillary sinusitis/Acute non-recurrent maxillary sinusitis  Patient had viral illness beginning around 5 to 6 weeks ago.  She no longer has any muscle aches or fever but does have persistent cough.  She is aware of some drainage.  Denies sore throat.  On examination there is a ribbon of phlegm in the posterior pharynx consistent with sinus infection.  Patient is prone to sinus infection and antibiotics are prescribed.  I have also prescribed benzonatate Perles    2. Controlled type 2 diabetes mellitus with diabetic nephropathy, without long-term current use of insulin (HCC)/Diabetes mellitus type II, non insulin dependent (HCC)/Stage 3a chronic kidney disease (HCC)  Patient's diabetes has generally been well-controlled.  Last A1c is 7.1% which I feel is very good for her age.  We will repeat labs in April after she sees which labs her cardiologist would like to repeat as well.    3. Gastroesophageal reflux disease without esophagitis  I have renewed her famotidine and have asked her to continue this.  It seems to be somewhat helpful.    4. Age-related osteoporosis without current pathological fracture  Patient does have osteoporosis on her most recent imaging performed a few days ago.  The spine was stable but over the last 6 years her left femur has reduced in density which would be an expected phenomenon.  No history of pathologic fracture.  Patient is encouraged to continue calcium and vitamin D supplementation and increase her walking.    5. Persistent atrial fibrillation (HCC)/Chronic anticoagulation/Secondary hypercoagulable state (HCC)  Patient does have persistent atrial fibrillation and does follow with cardiology.  She has a follow-up with cardiology in March.  Patient is compliant with her metoprolol and  apixaban medications.  She also continues her digoxin and spironolactone.  Rate control continues to be good.  Blood pressure is on the low side but quite good today.  Patient denies dizziness.    6. Aortic atherosclerosis (HCC)  Incidentally noted aortic atherosclerosis, common finding for age.  No aneurysm appreciated.    7. Chronic anxiety/Psychophysiologic insomnia/Major depressive disorder, severe (HCC)  Patient has very severe chronic anxiety.  She also has open loss of rage.  I believe this makes it difficult at times for her family to interact with her.  However, her daughter does see her frequently and does assist her.  Patient has diazepam to take as needed.  She has a current prescription and feels she has plenty.  No overuse has been observed.  No adverse events have been reported or observed.  Patient has longstanding depression.  However, we have tried several antidepressants that patient feels have not been helpful.  Not currently on antidepressant medication per se.  Denies any thoughts of self-harm or intrusive negative thoughts.    8. History of frequent urinary tract infections/Urinary frequency  Patient is having some urinary frequency.  She does have history of UTI.  Urine collected today for urine culture.          Patient Active Problem List    Diagnosis Date Noted    Age-related osteoporosis without current pathological fracture 01/17/2024    History of frequent urinary tract infections 01/17/2024    Muscle spasm 09/25/2023    Major depressive disorder, severe (HCC) 03/13/2023    Urinary frequency 03/13/2023    History of 2019 novel coronavirus disease (COVID-19) 09/13/2022    Aortic atherosclerosis (HCC) 09/13/2022    Psychophysiologic insomnia 09/13/2022    Cyst of left ovary 01/07/2022    Secondary hypercoagulable state (HCC) 08/30/2021    Complex ovarian cyst 08/13/2021    Chronic anticoagulation 08/14/2019    Hyperglycemia 08/07/2019    Primary open angle glaucoma (POAG) of both eyes  09/10/2018    Persistent atrial fibrillation (HCC)     Glaucoma, right eye     Stage 3 chronic kidney disease (HCC) 06/22/2017    Type 2 diabetes mellitus, controlled ([facility])     Chronic anxiety 01/04/2017    Diabetes mellitus type II, non insulin dependent ([facility]) 11/29/2016    Financial difficulties 11/29/2016    Mitral valve regurgitation     History of adenomatous polyp of colon 06/02/2015    Perennial allergic rhinitis with seasonal variation     Vertigo     IBS (irritable bowel syndrome)     Basal cell carcinoma of skin of nose     Vitamin D deficiency 12/08/2011    GERD (gastroesophageal reflux disease) 12/06/2010    Essential hypertension 01/18/2010    Dyslipidemia, goal LDL below 70 07/23/2009    Osteopenia 07/23/2009    Sinusitis, chronic     Bilateral cataracts        Current medicines (including changes today)  Current Outpatient Medications   Medication Sig Dispense Refill    benzonatate (TESSALON) 100 MG Cap Take 1 Capsule by mouth 3 times a day as needed for Cough. 60 Capsule 0    amoxicillin (AMOXIL) 875 MG tablet Take 1 Tablet by mouth 2 times a day for 7 days. 14 Tablet 0    famotidine (PEPCID) 20 MG Tab Take 1 Tablet by mouth every day. 90 Tablet 3    furosemide (LASIX) 20 MG Tab TAKE 1 TABLET BY MOUTH 1 TIME A DAY AS NEEDED (FOR WEIGHT GAIN, OR INCREASED SWELLING OR SHORTNESS OF BREATH). 90 Tablet 0    clobetasol (TEMOVATE) 0.05 % external solution APPLY TO AFFECTED AREA TWICE A DAY FOR 1 WEEKS, THEN USE 2-3 DAYS PER WEEK AS NEEDED 50 mL 1    Magnesium 250 MG Tab Take 1 Tablet by mouth every day. 30 Tablet 11    ezetimibe (ZETIA) 10 MG Tab Take 1 Tablet by mouth every day. 100 Tablet 3    fluconazole (DIFLUCAN) 150 MG tablet Take 1 Tablet by mouth every day. 1 Tablet 0    ketoconazole (NIZORAL) 2 % Cream Apply thin layer to affected area once daily for up to 14 days 30 g 0    metoprolol SR (TOPROL XL) 100 MG TABLET SR 24 HR Take 1 Tablet by mouth 2 times a day. 200 Tablet 4    losartan (COZAAR) 100  "MG Tab Take 1 Tablet by mouth every day. 100 Tablet 4    spironolactone (ALDACTONE) 25 MG Tab Take 0.5 Tablets by mouth every day. 45 Tablet 4    apixaban (ELIQUIS) 2.5mg Tab Take 1 Tablet by mouth 2 times a day. 180 Tablet 4    digoxin (LANOXIN) 125 MCG Tab Take 1 Tablet by mouth every day. 100 Tablet 4    aspirin 81 MG EC tablet Take 1 Tablet by mouth every day. 100 Tablet 4     No current facility-administered medications for this visit.       Allergies   Allergen Reactions    Atorvastatin      myalgias    Simvastatin      myalgias       ROS: As per HPI       Objective:     /60   Pulse 83   Temp 36.7 °C (98 °F)   Ht 1.575 m (5' 2\")   Wt 63 kg (139 lb)   SpO2 96%  Body mass index is 25.42 kg/m².    Physical Exam:  Constitutional: Well-developed and well-nourished. Not diaphoretic. No distress. Lucid and fluent.  Skin: Skin is warm and dry. No rash noted.  Head: Atraumatic without lesions.  Eyes: Conjunctivae and extraocular motions are normal. Pupils are equal, round, and reactive to light. No scleral icterus.   Ears:  External ears unremarkable.   Nose: Nares patent. Mucosa without edema, mild erythema. No discharge. No facial tenderness.  Mouth/Throat: Tongue normal. Oropharynx is clear and moist. Posterior pharynx with mild erythema, no exudates.  There is a ribbon of thick yellowish phlegm down the back of the throat.  Neck: Supple, trachea midline. No thyromegaly present. No cervical or supraclavicular lymphadenopathy. No JVD or carotid bruits appreciated  Cardiovascular: Regular rate and rhythm to auscultation today.  Normal S1, S2 without murmur appreciated.  Chest: Effort normal. Clear to auscultation throughout. No adventitious sounds.   Extremities: No cyanosis, clubbing, erythema, nor edema.   Neurological: Alert and oriented x 3.   Psychiatric:  Behavior, mood, and affect are appropriate.       Assessment and Plan:     83 y.o. female with the following issues:    1. Persistent cough for 3 " weeks or longer  benzonatate (TESSALON) 100 MG Cap      2. Chronic maxillary sinusitis        3. Acute non-recurrent maxillary sinusitis  amoxicillin (AMOXIL) 875 MG tablet      4. Controlled type 2 diabetes mellitus with diabetic nephropathy, without long-term current use of insulin ([facility])  Diabetic Monofilament Lower Extremity Exam      5. Diabetes mellitus type II, non insulin dependent ([facility])        6. Stage 3a chronic kidney disease ([facility])        7. Gastroesophageal reflux disease without esophagitis  famotidine (PEPCID) 20 MG Tab      8. Age-related osteoporosis without current pathological fracture        9. Persistent atrial fibrillation ([facility])        10. Aortic atherosclerosis ([facility])        11. Chronic anticoagulation        12. Secondary hypercoagulable state ([facility])        13. Chronic anxiety        14. Major depressive disorder, severe ([facility])        15. History of frequent urinary tract infections  URINE CULTURE(NEW)      16. Urinary frequency  URINE CULTURE(NEW)      17. Psychophysiologic insomnia              Followup: Return in about 4 months (around 5/17/2024), or if symptoms worsen or fail to improve.

## 2024-01-21 DIAGNOSIS — B96.89 URINARY TRACT INFECTION DUE TO KLEBSIELLA SPECIES: ICD-10-CM

## 2024-01-21 DIAGNOSIS — N39.0 URINARY TRACT INFECTION DUE TO KLEBSIELLA SPECIES: ICD-10-CM

## 2024-01-21 RX ORDER — CEFUROXIME AXETIL 250 MG/1
250 TABLET ORAL 2 TIMES DAILY
Qty: 14 TABLET | Refills: 0 | Status: SHIPPED | OUTPATIENT
Start: 2024-01-21 | End: 2024-01-28

## 2024-01-21 RX ORDER — PANTOPRAZOLE SODIUM 40 MG/1
TABLET, DELAYED RELEASE ORAL
COMMUNITY
Start: 2024-01-21 | End: 2024-03-06

## 2024-01-22 NOTE — PROGRESS NOTES
Patients urine culture came back with an infection.  Not sensitive to amoxicillin.  Will change to ceftin twice a day for a week.

## 2024-02-18 DIAGNOSIS — R05.3 PERSISTENT COUGH FOR 3 WEEKS OR LONGER: ICD-10-CM

## 2024-02-20 RX ORDER — BENZONATATE 100 MG/1
CAPSULE ORAL
Qty: 60 CAPSULE | Refills: 0 | Status: SHIPPED | OUTPATIENT
Start: 2024-02-20

## 2024-03-06 ENCOUNTER — OFFICE VISIT (OUTPATIENT)
Dept: CARDIOLOGY | Facility: MEDICAL CENTER | Age: 84
End: 2024-03-06
Attending: INTERNAL MEDICINE
Payer: MEDICARE

## 2024-03-06 VITALS
WEIGHT: 138 LBS | RESPIRATION RATE: 16 BRPM | HEART RATE: 74 BPM | DIASTOLIC BLOOD PRESSURE: 78 MMHG | SYSTOLIC BLOOD PRESSURE: 128 MMHG | BODY MASS INDEX: 25.4 KG/M2 | OXYGEN SATURATION: 94 % | HEIGHT: 62 IN

## 2024-03-06 DIAGNOSIS — Z79.01 CHRONIC ANTICOAGULATION: ICD-10-CM

## 2024-03-06 DIAGNOSIS — I48.21 HYPERCOAGULABLE STATE DUE TO PERMANENT ATRIAL FIBRILLATION (HCC): ICD-10-CM

## 2024-03-06 DIAGNOSIS — I10 HTN (HYPERTENSION), MALIGNANT: ICD-10-CM

## 2024-03-06 DIAGNOSIS — N18.31 STAGE 3A CHRONIC KIDNEY DISEASE: ICD-10-CM

## 2024-03-06 DIAGNOSIS — D68.69 HYPERCOAGULABLE STATE DUE TO PERMANENT ATRIAL FIBRILLATION (HCC): ICD-10-CM

## 2024-03-06 DIAGNOSIS — I70.0 AORTIC ATHEROSCLEROSIS (HCC): ICD-10-CM

## 2024-03-06 DIAGNOSIS — E78.2 MIXED HYPERLIPIDEMIA: ICD-10-CM

## 2024-03-06 DIAGNOSIS — I48.19 PERSISTENT ATRIAL FIBRILLATION (HCC): ICD-10-CM

## 2024-03-06 DIAGNOSIS — E78.5 DYSLIPIDEMIA, GOAL LDL BELOW 70: ICD-10-CM

## 2024-03-06 DIAGNOSIS — Z95.5 STENTED CORONARY ARTERY: ICD-10-CM

## 2024-03-06 DIAGNOSIS — E11.65 TYPE 2 DIABETES MELLITUS WITH HYPERGLYCEMIA, WITHOUT LONG-TERM CURRENT USE OF INSULIN (HCC): ICD-10-CM

## 2024-03-06 LAB — EKG IMPRESSION: NORMAL

## 2024-03-06 PROCEDURE — 99213 OFFICE O/P EST LOW 20 MIN: CPT | Performed by: INTERNAL MEDICINE

## 2024-03-06 PROCEDURE — 99214 OFFICE O/P EST MOD 30 MIN: CPT | Mod: 25 | Performed by: INTERNAL MEDICINE

## 2024-03-06 PROCEDURE — 3078F DIAST BP <80 MM HG: CPT | Performed by: INTERNAL MEDICINE

## 2024-03-06 PROCEDURE — 93010 ELECTROCARDIOGRAM REPORT: CPT | Performed by: INTERNAL MEDICINE

## 2024-03-06 PROCEDURE — 3074F SYST BP LT 130 MM HG: CPT | Performed by: INTERNAL MEDICINE

## 2024-03-06 PROCEDURE — 93005 ELECTROCARDIOGRAM TRACING: CPT | Performed by: INTERNAL MEDICINE

## 2024-03-06 RX ORDER — LOSARTAN POTASSIUM 100 MG/1
100 TABLET ORAL DAILY
Qty: 100 TABLET | Refills: 4 | Status: SHIPPED | OUTPATIENT
Start: 2024-03-06

## 2024-03-06 RX ORDER — METOPROLOL SUCCINATE 100 MG/1
100 TABLET, EXTENDED RELEASE ORAL 2 TIMES DAILY
Qty: 200 TABLET | Refills: 4 | Status: SHIPPED | OUTPATIENT
Start: 2024-03-06

## 2024-03-06 RX ORDER — EMPAGLIFLOZIN 10 MG/1
10 TABLET, FILM COATED ORAL DAILY
Qty: 90 TABLET | Refills: 4 | Status: SHIPPED | OUTPATIENT
Start: 2024-03-06

## 2024-03-06 RX ORDER — SPIRONOLACTONE 25 MG/1
12.5 TABLET ORAL DAILY
Qty: 45 TABLET | Refills: 4 | Status: SHIPPED | OUTPATIENT
Start: 2024-03-06

## 2024-03-06 RX ORDER — EZETIMIBE 10 MG/1
10 TABLET ORAL DAILY
Qty: 100 TABLET | Refills: 3 | Status: SHIPPED | OUTPATIENT
Start: 2024-03-06

## 2024-03-06 ASSESSMENT — ENCOUNTER SYMPTOMS
BLURRED VISION: 0
DEPRESSION: 0
SHORTNESS OF BREATH: 1
COUGH: 0
HALLUCINATIONS: 0
PALPITATIONS: 0
DIZZINESS: 0
LOSS OF CONSCIOUSNESS: 0
NAUSEA: 0
PND: 0
BLOOD IN STOOL: 0
FEVER: 0
ORTHOPNEA: 0
BRUISES/BLEEDS EASILY: 0
SENSORY CHANGE: 0
DOUBLE VISION: 0
FALLS: 0
CLAUDICATION: 0
EYE DISCHARGE: 0
WEIGHT LOSS: 0
HEADACHES: 0
MYALGIAS: 0
EYE PAIN: 0
CHILLS: 0
ABDOMINAL PAIN: 0
VOMITING: 0
SPEECH CHANGE: 0

## 2024-03-06 ASSESSMENT — FIBROSIS 4 INDEX: FIB4 SCORE: 2.48

## 2024-03-06 NOTE — PROGRESS NOTES
Chief Complaint   Patient presents with    Atrial Fibrillation     F/v dx: Persistent atrial fibrillation (HCC)       Subjective:   Ivory Rowan is an 83 y.o. female who presents today for cardiac care for persistent atrial fibrillation after prior failed cardioversion. She was in sinus for brief amount of time. Patient was diagnosed with atrial fibrillation earlier in 2016. Patient has had multiple hospitalizations due to A. fib with rapid ventricular rate leading to heart failure exacerbation. Her left ventricular systolic function is documented at about 45-50% on her most recent transthoracic echocardiogram. Patient is anticoagulated. Patient is taking Toprol- mg by mouth twice a day.     In the [facility], patient was not able to afford Tikosyn therapy and she did not go into the hospital for that.      06/2020 Due to abnormal stress test, patient underwent coronary angigoram and found to have RCA disease s/p PCI and stent.    08/2021 Was in hospital due to elevated heart rate with afib due to stoppage of digoxin.    I have independently interpreted and reviewed echocardiogram's actual images with patient which showed normal left ventricular systolic function. No wall motion abnormality. No evidence of pulmonary hypertension. Moderate MR. 08/2021.    I have independently interpreted and reviewed blood tests results with patient in clinic which shows normal LDL level 68, triglycerides 85. GFR of 53, K of 4.7. Hgba1c of 7.1.    Patient is reporting of feeling more short of breath.    Past Medical History:   Diagnosis Date    Acute on chronic heart failure with preserved ejection fraction (HCC) 08/13/2021    Age-related osteoporosis without current pathological fracture 01/17/2024    Atrial fibrillation (HCC)     Basal cell carcinoma of skin of nose     CATARACT     Dr. Aaron, Dr. Orellana    CHF (congestive heart failure) (HCC)     Colon polyp     tubular adenomas    Dental disorder     upper partial     "Dyslipidemia     Glaucoma, right eye     Dr. Orellana    Heart burn     Hemorrhagic disorder (HCC)     eliquis    Hypertension     pt states well controlled on meds    IBS (irritable bowel syndrome)     Indigestion     MEDICAL HOME 10/23/2012    Mitral valve regurgitation     Osteopenia 2009    Perennial allergic rhinitis with seasonal variation     Persistent atrial fibrillation (HCC)     Psychiatric problem     anxiety    Type 2 diabetes mellitus, controlled (HCC)     diet controlled    Valvular heart disease     mitral insufficiency    Vertigo      Past Surgical History:   Procedure Laterality Date    RECOVERY  2016    Procedure: CATH LAB-ZAFAR GUIDED CV-TO-LARGE GROUP;  Surgeon: Recovery[facility] Surgery;  Location: [place] PRE-POST PROC UN[place] [facility] [place]  Service:     COLONOSCOPY  2009    Dr. Lopez, 9 polyps    APPENDECTOMY      BREAST BIOPSY      left, reports wire report broke off during procedure    CHOLECYSTECTOMY      US-NEEDLE CORE BX-BREAST PANEL       Family History   Problem Relation Age of Onset    Diabetes Mother     Hypertension Mother     Stroke Mother     Cancer Father         lung/larynx    Cancer Sister         \"female\"    Genetic Disorder Sister         osteoporosis    Cancer Paternal Aunt         breast    Cancer Maternal Aunt     Heart Disease Brother         CABG x 3     Social History     Socioeconomic History    Marital status:      Spouse name: Not on file    Number of children: 2    Years of education: Not on file    Highest education level: Not on file   Occupational History    Occupation: Retired  [employer]     Employer: retired   Tobacco Use    Smoking status: Former     Current packs/day: 0.00     Average packs/day: 0.5 packs/day for 40.0 years (20.0 ttl pk-yrs)     Types: Cigarettes     Start date: 3/1/1956     Quit date: 3/1/1996     Years since quittin.0    Smokeless tobacco: Never   Vaping Use    Vaping Use: Never used   Substance and Sexual Activity    Alcohol " use: Not Currently     Alcohol/week: 0.0 oz     Comment: now rare    Drug use: No    Sexual activity: Not Currently     Partners: Male   Other Topics Concern    Not on file   Social History Narrative    Not on file     Social Determinants of Health     Financial Resource Strain: Not on file   Food Insecurity: No Food Insecurity (8/16/2021)    Hunger Vital Sign     Worried About Running Out of Food in the Last Year: Never true     Ran Out of Food in the Last Year: Never true   Transportation Needs: No Transportation Needs (8/16/2021)    PRAPARE - Transportation     Lack of Transportation (Medical): No     Lack of Transportation (Non-Medical): No   Physical Activity: Not on file   Stress: Not on file   Social Connections: Not on file   Intimate Partner Violence: Not on file   Housing Stability: Not on file     Allergies   Allergen Reactions    Atorvastatin      myalgias    Simvastatin      myalgias     Outpatient Encounter Medications as of 3/6/2024   Medication Sig Dispense Refill    Empagliflozin (JARDIANCE) 10 MG Tab tablet Take 1 Tablet by mouth every day. 90 Tablet 4    apixaban (ELIQUIS) 2.5mg Tab Take 1 Tablet by mouth 2 times a day. 180 Tablet 4    ezetimibe (ZETIA) 10 MG Tab Take 1 Tablet by mouth every day. 100 Tablet 3    losartan (COZAAR) 100 MG Tab Take 1 Tablet by mouth every day. 100 Tablet 4    metoprolol SR (TOPROL XL) 100 MG TABLET SR 24 HR Take 1 Tablet by mouth 2 times a day. 200 Tablet 4    spironolactone (ALDACTONE) 25 MG Tab Take 0.5 Tablets by mouth every day. 45 Tablet 4    benzonatate (TESSALON) 100 MG Cap TAKE 1 CAPSULE BY MOUTH THREE TIMES A DAY AS NEEDED FOR COUGH **NOT COVERED 60 Capsule 0    famotidine (PEPCID) 20 MG Tab Take 1 Tablet by mouth every day. 90 Tablet 3    furosemide (LASIX) 20 MG Tab TAKE 1 TABLET BY MOUTH 1 TIME A DAY AS NEEDED (FOR WEIGHT GAIN, OR INCREASED SWELLING OR SHORTNESS OF BREATH). 90 Tablet 0    clobetasol (TEMOVATE) 0.05 % external solution APPLY TO AFFECTED  AREA TWICE A DAY FOR 1 WEEKS, THEN USE 2-3 DAYS PER WEEK AS NEEDED 50 mL 1    Magnesium 250 MG Tab Take 1 Tablet by mouth every day. 30 Tablet 11    fluconazole (DIFLUCAN) 150 MG tablet Take 1 Tablet by mouth every day. 1 Tablet 0    ketoconazole (NIZORAL) 2 % Cream Apply thin layer to affected area once daily for up to 14 days 30 g 0    digoxin (LANOXIN) 125 MCG Tab Take 1 Tablet by mouth every day. 100 Tablet 4    aspirin 81 MG EC tablet Take 1 Tablet by mouth every day. 100 Tablet 4    [DISCONTINUED] pantoprazole (PROTONIX) 40 MG Tablet Delayed Response  (Patient not taking: Reported on 3/6/2024)      [DISCONTINUED] ezetimibe (ZETIA) 10 MG Tab Take 1 Tablet by mouth every day. 100 Tablet 3    [DISCONTINUED] metoprolol SR (TOPROL XL) 100 MG TABLET SR 24 HR Take 1 Tablet by mouth 2 times a day. 200 Tablet 4    [DISCONTINUED] losartan (COZAAR) 100 MG Tab Take 1 Tablet by mouth every day. 100 Tablet 4    [DISCONTINUED] spironolactone (ALDACTONE) 25 MG Tab Take 0.5 Tablets by mouth every day. 45 Tablet 4    [DISCONTINUED] apixaban (ELIQUIS) 2.5mg Tab Take 1 Tablet by mouth 2 times a day. 180 Tablet 4     No facility-administered encounter medications on file as of 3/6/2024.     Review of Systems   Constitutional:  Negative for chills, fever, malaise/fatigue and weight loss.   HENT:  Negative for ear discharge, ear pain, hearing loss and nosebleeds.    Eyes:  Negative for blurred vision, double vision, pain and discharge.   Respiratory:  Positive for shortness of breath. Negative for cough.    Cardiovascular:  Negative for chest pain, palpitations, orthopnea, claudication, leg swelling and PND.   Gastrointestinal:  Negative for abdominal pain, blood in stool, melena, nausea and vomiting.   Genitourinary:  Negative for dysuria and hematuria.   Musculoskeletal:  Negative for falls, joint pain and myalgias.   Skin:  Negative for itching and rash.   Neurological:  Negative for dizziness, sensory change, speech change, loss  "of consciousness and headaches.   Endo/Heme/Allergies:  Negative for environmental allergies. Does not bruise/bleed easily.   Psychiatric/Behavioral:  Negative for depression, hallucinations and suicidal ideas.         Objective:   /78 (BP Location: Left arm, Patient Position: Sitting, BP Cuff Size: Adult)   Pulse 74   Resp 16   Ht 1.575 m (5' 2\")   Wt 62.6 kg (138 lb)   LMP 05/01/1991   SpO2 94%   BMI 25.24 kg/m²     Physical Exam  Vitals and nursing note reviewed.   Constitutional:       General: She is not in acute distress.     Appearance: She is not diaphoretic.   HENT:      Head: Normocephalic and atraumatic.      Right Ear: External ear normal.      Left Ear: External ear normal.      Nose: No congestion or rhinorrhea.   Eyes:      General:         Right eye: No discharge.         Left eye: No discharge.   Neck:      Thyroid: No thyromegaly.      Vascular: No JVD.   Cardiovascular:      Rate and Rhythm: Normal rate. Rhythm irregular.      Pulses: Normal pulses.   Pulmonary:      Effort: No respiratory distress.   Abdominal:      General: There is no distension.      Tenderness: There is no abdominal tenderness.   Musculoskeletal:         General: No swelling or tenderness.      Right lower leg: No edema.      Left lower leg: No edema.   Skin:     General: Skin is warm and dry.   Neurological:      Mental Status: She is alert and oriented to person, place, and time.      Cranial Nerves: No cranial nerve deficit.   Psychiatric:         Behavior: Behavior normal.         Assessment:     1. Stented coronary artery        2. Persistent atrial fibrillation (HCC)  EKG    apixaban (ELIQUIS) 2.5mg Tab    metoprolol SR (TOPROL XL) 100 MG TABLET SR 24 HR      3. Type 2 diabetes mellitus with hyperglycemia, without long-term current use of insulin (HCC)  Empagliflozin (JARDIANCE) 10 MG Tab tablet      4. Stage 3a chronic kidney disease (HCC)  Empagliflozin (JARDIANCE) 10 MG Tab tablet      5. Hypercoagulable " state due to permanent atrial fibrillation (HCC)        6. Mixed hyperlipidemia        7. Chronic anticoagulation        8. Aortic atherosclerosis (HCC)  ezetimibe (ZETIA) 10 MG Tab      9. Dyslipidemia, goal LDL below 70  ezetimibe (ZETIA) 10 MG Tab      10. HTN (hypertension), malignant  losartan (COZAAR) 100 MG Tab    metoprolol SR (TOPROL XL) 100 MG TABLET SR 24 HR    spironolactone (ALDACTONE) 25 MG Tab          Medical Decision Making:  Today's Assessment / Status / Plan:   CAD s/p RCA stent 06/2020:  Toprol  mg bid.  Continue BB, Zetia at current dose.  Will stop clopidogrel. Restart aspirin 81 mg daily.  Already on Eliquis 2.5 mg bid due to atrial fibrillation already.  Will continue Losartan 50 mg daily.  Statin allergies. Unable to afford Nexlizet. Continue Zetia.     In terms of patient's co-morbiditie such as established cardiovascular disease and type II diabetes mellitus, based on recent trial, patient is indicated to be placed on Empagliflozin for mortality reduction benefit. Based on recent data, such therapy has a relative risk reduction of 38% and absolute risk reduction of 2.2% for cardiovascular death. I will start patient on Jardiance 10 mg once a day. I discussed with patient about potential benefits and risks. Patient understands that this is an adjunct to current regimen of sugar lowering agents and cardiac agents.    Persistent atrial fibrillation:  Rate controlled.  Continue anticoagulation with Eiquis 2.5 mg bid, had nose bleed in the past with full dose.  Continue Toprol 100 mg bid.  Digoxin was stopped last time but her heart rate went very high. She was in hospital for treatment. She does not want to stop that anymore. Will monitor closely.     Hypertension:  Blood pressure is not well controlled.  Will increase Losartan to 100 mg daily.  Continue Toprol 100 mg bid.     Dyslipidemia:  Statin intolerance.  Continue Zetia 10 mg qhs.    Mitral regurgitation (severe):  At this time,  patient does not want to do ZAFAR.        Shen Washington M.D.

## 2024-03-08 ENCOUNTER — APPOINTMENT (OUTPATIENT)
Dept: MEDICAL GROUP | Facility: MEDICAL CENTER | Age: 84
End: 2024-03-08
Payer: MEDICARE

## 2024-04-09 ENCOUNTER — HOSPITAL ENCOUNTER (OUTPATIENT)
Facility: MEDICAL CENTER | Age: 84
End: 2024-04-09
Attending: FAMILY MEDICINE
Payer: MEDICARE

## 2024-04-09 ENCOUNTER — OFFICE VISIT (OUTPATIENT)
Dept: MEDICAL GROUP | Facility: MEDICAL CENTER | Age: 84
End: 2024-04-09
Payer: MEDICARE

## 2024-04-09 VITALS
BODY MASS INDEX: 24.84 KG/M2 | HEIGHT: 62 IN | DIASTOLIC BLOOD PRESSURE: 62 MMHG | TEMPERATURE: 98 F | WEIGHT: 135 LBS | RESPIRATION RATE: 18 BRPM | OXYGEN SATURATION: 94 % | SYSTOLIC BLOOD PRESSURE: 124 MMHG | HEART RATE: 78 BPM

## 2024-04-09 DIAGNOSIS — E78.5 DYSLIPIDEMIA, GOAL LDL BELOW 70: ICD-10-CM

## 2024-04-09 DIAGNOSIS — E11.9 DIABETES MELLITUS TYPE II, NON INSULIN DEPENDENT (HCC): ICD-10-CM

## 2024-04-09 DIAGNOSIS — N39.0 URINARY TRACT INFECTION DUE TO KLEBSIELLA SPECIES: ICD-10-CM

## 2024-04-09 DIAGNOSIS — F41.9 CHRONIC ANXIETY: ICD-10-CM

## 2024-04-09 DIAGNOSIS — R42 VERTIGO: ICD-10-CM

## 2024-04-09 DIAGNOSIS — Z96.1 HISTORY OF CATARACT REMOVAL WITH INSERTION OF PROSTHETIC LENS: ICD-10-CM

## 2024-04-09 DIAGNOSIS — E11.65 TYPE 2 DIABETES MELLITUS WITH HYPERGLYCEMIA, WITHOUT LONG-TERM CURRENT USE OF INSULIN (HCC): ICD-10-CM

## 2024-04-09 DIAGNOSIS — Z98.49 HISTORY OF CATARACT REMOVAL WITH INSERTION OF PROSTHETIC LENS: ICD-10-CM

## 2024-04-09 DIAGNOSIS — B96.89 URINARY TRACT INFECTION DUE TO KLEBSIELLA SPECIES: ICD-10-CM

## 2024-04-09 DIAGNOSIS — I50.32 CHRONIC DIASTOLIC (CONGESTIVE) HEART FAILURE (HCC): ICD-10-CM

## 2024-04-09 PROBLEM — R35.0 URINARY FREQUENCY: Status: RESOLVED | Noted: 2023-03-13 | Resolved: 2024-04-09

## 2024-04-09 LAB
APPEARANCE UR: CLEAR
BILIRUB UR STRIP-MCNC: NORMAL MG/DL
COLOR UR AUTO: YELLOW
GLUCOSE UR STRIP.AUTO-MCNC: NORMAL MG/DL
KETONES UR STRIP.AUTO-MCNC: NORMAL MG/DL
LEUKOCYTE ESTERASE UR QL STRIP.AUTO: NORMAL
NITRITE UR QL STRIP.AUTO: NORMAL
PH UR STRIP.AUTO: 6.5 [PH] (ref 5–8)
PROT UR QL STRIP: NORMAL MG/DL
RBC UR QL AUTO: NORMAL
SP GR UR STRIP.AUTO: 1.02
UROBILINOGEN UR STRIP-MCNC: 0.2 MG/DL

## 2024-04-09 PROCEDURE — 92250 FUNDUS PHOTOGRAPHY W/I&R: CPT | Mod: TC | Performed by: FAMILY MEDICINE

## 2024-04-09 PROCEDURE — 3074F SYST BP LT 130 MM HG: CPT | Performed by: FAMILY MEDICINE

## 2024-04-09 PROCEDURE — 87086 URINE CULTURE/COLONY COUNT: CPT

## 2024-04-09 PROCEDURE — 99214 OFFICE O/P EST MOD 30 MIN: CPT | Performed by: FAMILY MEDICINE

## 2024-04-09 PROCEDURE — 3078F DIAST BP <80 MM HG: CPT | Performed by: FAMILY MEDICINE

## 2024-04-09 PROCEDURE — 81002 URINALYSIS NONAUTO W/O SCOPE: CPT | Performed by: FAMILY MEDICINE

## 2024-04-09 RX ORDER — DIAZEPAM 10 MG/1
10 TABLET ORAL EVERY 12 HOURS PRN
Qty: 60 TABLET | Refills: 2 | Status: SHIPPED | OUTPATIENT
Start: 2024-04-09 | End: 2024-07-08

## 2024-04-09 ASSESSMENT — PATIENT HEALTH QUESTIONNAIRE - PHQ9
4. FEELING TIRED OR HAVING LITTLE ENERGY: NOT AT ALL
6. FEELING BAD ABOUT YOURSELF - OR THAT YOU ARE A FAILURE OR HAVE LET YOURSELF OR YOUR FAMILY DOWN: NOT AL ALL
8. MOVING OR SPEAKING SO SLOWLY THAT OTHER PEOPLE COULD HAVE NOTICED. OR THE OPPOSITE, BEING SO FIGETY OR RESTLESS THAT YOU HAVE BEEN MOVING AROUND A LOT MORE THAN USUAL: NOT AT ALL
7. TROUBLE CONCENTRATING ON THINGS, SUCH AS READING THE NEWSPAPER OR WATCHING TELEVISION: NOT AT ALL
SUM OF ALL RESPONSES TO PHQ9 QUESTIONS 1 AND 2: 0
3. TROUBLE FALLING OR STAYING ASLEEP OR SLEEPING TOO MUCH: NOT AT ALL
1. LITTLE INTEREST OR PLEASURE IN DOING THINGS: NOT AT ALL
SUM OF ALL RESPONSES TO PHQ QUESTIONS 1-9: 0
5. POOR APPETITE OR OVEREATING: NOT AT ALL
9. THOUGHTS THAT YOU WOULD BE BETTER OFF DEAD, OR OF HURTING YOURSELF: NOT AT ALL
2. FEELING DOWN, DEPRESSED, IRRITABLE, OR HOPELESS: NOT AT ALL

## 2024-04-09 ASSESSMENT — FIBROSIS 4 INDEX: FIB4 SCORE: 2.48

## 2024-04-09 NOTE — PROGRESS NOTES
Chief Complaint   Patient presents with    Follow-Up    Diabetes Follow-up    UTI       Subjective:     HPI:   Ivory Rowan presents today with the following: Her daughter is here with her today and helps with history and correcting some details.    1. Type 2 diabetes mellitus with hyperglycemia, without long-term current use of insulin ([facility])/Diabetes mellitus type II, non insulin dependent ([facility])  Last A1c 7.1%.  doing fairly well.  Weight is gradually reducing.  Trying to be active.       2. Urinary tract infection due to Klebsiella species  History of klebsiella UTI.  Urinalysis today shows trace blood and protein.  Nitrite negative.  Urine culture taken today.    3. Chronic diastolic (congestive) heart failure ([facility])  Ejection fraction 45%.  Dr Washington would like her to be on Jaridance.  Discussed, she does not want to take due to possible side effects.      4. Dyslipidemia, goal LDL below 70  Patient denies chest pain, chest pressure, palpitations or exertional shortness of breath. Patient denies muscle aches or muscle weakness from the ezetimibe medication. She takes 3 times per week.  Patient is a former smoker. Patient takes 81 mg aspirin daily. Patient has no history of myocardial infarction, stroke or PVD.  The problem is clinically stable.    5. History of cataract removal with insertion of prosthetic lens  Was successful.  Sees Dr Cuenca every six months    6. Vertigo  Patient has episodic vertigo.  The diazepam is very helpful when that happens.  She is completely out of the medication.  She takes it rarely.  She would like a renewal.  PDMP review shows no inconsistencies.  I feel the medication is reasonable and necessary and is renewed.    7. Chronic anxiety  Patient has severe longstanding chronic anxiety.  She is particularly anxious and somewhat frustrated today.  Long discussion about the diazepam.  She is worried about becoming dependent on something.  I think that is a very significant and  reasonable worry.  PDMP review shows intermittent fills only.  There are no inconsistencies.  Patient would like to have some of the medication on hand.  I believe that is also reasonable.  The diazepam prescription is renewed.        Patient Active Problem List    Diagnosis Date Noted    History of cataract removal with insertion of prosthetic lens 04/09/2024    Age-related osteoporosis without current pathological fracture 01/17/2024    History of frequent urinary tract infections 01/17/2024    Muscle spasm 09/25/2023    Major depressive disorder, severe ([facility]) 03/13/2023    History of 2019 novel coronavirus disease (COVID-19) 09/13/2022    Aortic atherosclerosis ([facility]) 09/13/2022    Psychophysiologic insomnia 09/13/2022    Cyst of left ovary 01/07/2022    Secondary hypercoagulable state ([facility]) 08/30/2021    Complex ovarian cyst 08/13/2021    Chronic anticoagulation 08/14/2019    Hyperglycemia 08/07/2019    Primary open angle glaucoma (POAG) of both eyes 09/10/2018    Persistent atrial fibrillation ([facility])     Glaucoma, right eye     Stage 3 chronic kidney disease ([facility]) 06/22/2017    Chronic diastolic (congestive) heart failure ([facility]) 06/22/2017    Type 2 diabetes mellitus with hyperglycemia, without long-term current use of insulin ([facility])     Chronic anxiety 01/04/2017    Diabetes mellitus type II, non insulin dependent ([facility]) 11/29/2016    Financial difficulties 11/29/2016    Mitral valve regurgitation     History of adenomatous polyp of colon 06/02/2015    Perennial allergic rhinitis with seasonal variation     Vertigo     IBS (irritable bowel syndrome)     Basal cell carcinoma of skin of nose     Vitamin D deficiency 12/08/2011    GERD (gastroesophageal reflux disease) 12/06/2010    Essential hypertension 01/18/2010    Dyslipidemia, goal LDL below 70 07/23/2009    Osteopenia 07/23/2009    Sinusitis, chronic     Bilateral cataracts        Current medicines (including changes today)  Current Outpatient Medications    Medication Sig Dispense Refill    diazepam (VALIUM) 10 MG tablet Take 1 Tablet by mouth every 12 hours as needed for Anxiety or Sleep for up to 90 days. 60 tablets is a 30 day quantity 60 Tablet 2    Empagliflozin (JARDIANCE) 10 MG Tab tablet Take 1 Tablet by mouth every day. 90 Tablet 4    apixaban (ELIQUIS) 2.5mg Tab Take 1 Tablet by mouth 2 times a day. 180 Tablet 4    ezetimibe (ZETIA) 10 MG Tab Take 1 Tablet by mouth every day. 100 Tablet 3    losartan (COZAAR) 100 MG Tab Take 1 Tablet by mouth every day. 100 Tablet 4    metoprolol SR (TOPROL XL) 100 MG TABLET SR 24 HR Take 1 Tablet by mouth 2 times a day. 200 Tablet 4    spironolactone (ALDACTONE) 25 MG Tab Take 0.5 Tablets by mouth every day. 45 Tablet 4    benzonatate (TESSALON) 100 MG Cap TAKE 1 CAPSULE BY MOUTH THREE TIMES A DAY AS NEEDED FOR COUGH **NOT COVERED 60 Capsule 0    famotidine (PEPCID) 20 MG Tab Take 1 Tablet by mouth every day. 90 Tablet 3    furosemide (LASIX) 20 MG Tab TAKE 1 TABLET BY MOUTH 1 TIME A DAY AS NEEDED (FOR WEIGHT GAIN, OR INCREASED SWELLING OR SHORTNESS OF BREATH). 90 Tablet 0    clobetasol (TEMOVATE) 0.05 % external solution APPLY TO AFFECTED AREA TWICE A DAY FOR 1 WEEKS, THEN USE 2-3 DAYS PER WEEK AS NEEDED 50 mL 1    Magnesium 250 MG Tab Take 1 Tablet by mouth every day. 30 Tablet 11    fluconazole (DIFLUCAN) 150 MG tablet Take 1 Tablet by mouth every day. 1 Tablet 0    ketoconazole (NIZORAL) 2 % Cream Apply thin layer to affected area once daily for up to 14 days 30 g 0    digoxin (LANOXIN) 125 MCG Tab Take 1 Tablet by mouth every day. 100 Tablet 4    aspirin 81 MG EC tablet Take 1 Tablet by mouth every day. 100 Tablet 4     No current facility-administered medications for this visit.       Allergies   Allergen Reactions    Atorvastatin      myalgias    Simvastatin      myalgias       ROS: As per HPI  denies chest pain or increased shortness of breath.  Patient currently refusing to take jardiance.  Discussed, advised her  "it was likely to help her heart and her diabetes.        Objective:     /62   Pulse 78   Temp 36.7 °C (98 °F)   Resp 18   Ht 1.575 m (5' 2\")   Wt 61.2 kg (135 lb)   SpO2 94%  Body mass index is 24.69 kg/m².    Physical Exam:  Constitutional: Well-developed and well-nourished. Not diaphoretic. No distress. Lucid and fluent.  Skin: Skin is warm and dry. No rash noted.  Head: Atraumatic without lesions.  Eyes: Conjunctivae and extraocular motions are normal. Pupils are equal, round, and reactive to light. No scleral icterus.   Ears:  External ears unremarkable.   Neck: Supple, trachea midline. No thyromegaly present. No cervical or supraclavicular lymphadenopathy. No JVD or carotid bruits appreciated  Cardiovascular: irregularly irregular rate and rhythm.  Normal S1, S2 without murmur appreciated.  Chest: Effort normal. Clear to auscultation throughout. No adventitious sounds.   Extremities: No cyanosis, clubbing, erythema, nor edema.   Neurological: Alert and oriented x 3. No tremor appreciated.  Psychiatric:  Behavior, mood, and affect are appropriate.    Lab Results   Component Value Date/Time    POCCOLOR YELLOW 04/09/2024 09:49 AM    POCAPPEAR CLEAR 04/09/2024 09:49 AM    POCLEUKEST NEG 04/09/2024 09:49 AM    POCNITRITE NEG 04/09/2024 09:49 AM    POCUROBILIGE 0.2 04/09/2024 09:49 AM    POCPROTEIN 100 MG 04/09/2024 09:49 AM    POCURPH 6.5 04/09/2024 09:49 AM    POCBLOOD TRACE 04/09/2024 09:49 AM    POCSPGRV 1.020 04/09/2024 09:49 AM    POCKETONES NEG 04/09/2024 09:49 AM    POCBILIRUBIN NEG 04/09/2024 09:49 AM    POCGLUCUA NEG 04/09/2024 09:49 AM           Assessment and Plan:     83 y.o. female with the following issues:    1. Type 2 diabetes mellitus with hyperglycemia, without long-term current use of insulin (HCC)  POCT Retinal Eye Exam    Comp Metabolic Panel    CBC WITHOUT DIFFERENTIAL    TSH    Lipid Profile    MICROALBUMIN CREAT RATIO URINE    HEMOGLOBIN A1C      2. Diabetes mellitus type II, non " insulin dependent (HCC)  Comp Metabolic Panel      3. Urinary tract infection due to Klebsiella species  POCT Urinalysis    URINE CULTURE(NEW)      4. Chronic diastolic (congestive) heart failure (HCC)        5. Dyslipidemia, goal LDL below 70  Comp Metabolic Panel    Lipid Profile      6. History of cataract removal with insertion of prosthetic lens        7. Vertigo  diazepam (VALIUM) 10 MG tablet      8. Chronic anxiety  diazepam (VALIUM) 10 MG tablet            Followup: Return in about 6 months (around 10/9/2024), or if symptoms worsen or fail to improve.

## 2024-04-12 LAB
BACTERIA UR CULT: NORMAL
SIGNIFICANT IND 70042: NORMAL
SITE SITE: NORMAL
SOURCE SOURCE: NORMAL

## 2024-04-25 ENCOUNTER — OFFICE VISIT (OUTPATIENT)
Dept: URGENT CARE | Facility: PHYSICIAN GROUP | Age: 84
End: 2024-04-25
Payer: MEDICARE

## 2024-04-25 VITALS
OXYGEN SATURATION: 90 % | BODY MASS INDEX: 25.58 KG/M2 | RESPIRATION RATE: 14 BRPM | HEIGHT: 62 IN | WEIGHT: 139 LBS | HEART RATE: 79 BPM | DIASTOLIC BLOOD PRESSURE: 68 MMHG | TEMPERATURE: 97.7 F | SYSTOLIC BLOOD PRESSURE: 114 MMHG

## 2024-04-25 DIAGNOSIS — S50.312A ABRASION OF LEFT ELBOW, INITIAL ENCOUNTER: ICD-10-CM

## 2024-04-25 PROCEDURE — 3078F DIAST BP <80 MM HG: CPT

## 2024-04-25 PROCEDURE — 99212 OFFICE O/P EST SF 10 MIN: CPT

## 2024-04-25 PROCEDURE — 3074F SYST BP LT 130 MM HG: CPT

## 2024-04-25 ASSESSMENT — ENCOUNTER SYMPTOMS
FEVER: 0
CHILLS: 0
ROS SKIN COMMENTS: LEFT ELBOW ABRASION

## 2024-04-25 ASSESSMENT — FIBROSIS 4 INDEX: FIB4 SCORE: 2.51

## 2024-04-25 NOTE — PROGRESS NOTES
Chief Complaint   Patient presents with    Laceration     On left arm, took off band aid and skin was raw  X 10 days        HISTORY OF PRESENT ILLNESS: Patient is a pleasant 84 y.o. female who presents to urgent care today noted abrasion to the left arm near the elbow, patient has kept it covered with a Band-Aid, states it is not healing.  Denies any major source of pain, fevers.    Patient Active Problem List    Diagnosis Date Noted    History of cataract removal with insertion of prosthetic lens 04/09/2024    Age-related osteoporosis without current pathological fracture 01/17/2024    History of frequent urinary tract infections 01/17/2024    Muscle spasm 09/25/2023    Major depressive disorder, severe (HCC) 03/13/2023    History of 2019 novel coronavirus disease (COVID-19) 09/13/2022    Aortic atherosclerosis ([facility]) 09/13/2022    Psychophysiologic insomnia 09/13/2022    Cyst of left ovary 01/07/2022    Secondary hypercoagulable state ([facility]) 08/30/2021    Complex ovarian cyst 08/13/2021    Chronic anticoagulation 08/14/2019    Hyperglycemia 08/07/2019    Primary open angle glaucoma (POAG) of both eyes 09/10/2018    Persistent atrial fibrillation ([facility])     Glaucoma, right eye     Stage 3 chronic kidney disease ([facility]) 06/22/2017    Chronic diastolic (congestive) heart failure ([facility]) 06/22/2017    Type 2 diabetes mellitus with hyperglycemia, without long-term current use of insulin ([facility])     Chronic anxiety 01/04/2017    Diabetes mellitus type II, non insulin dependent ([facility]) 11/29/2016    Financial difficulties 11/29/2016    Mitral valve regurgitation     History of adenomatous polyp of colon 06/02/2015    Perennial allergic rhinitis with seasonal variation     Vertigo     IBS (irritable bowel syndrome)     Basal cell carcinoma of skin of nose     Vitamin D deficiency 12/08/2011    GERD (gastroesophageal reflux disease) 12/06/2010    Essential hypertension 01/18/2010    Dyslipidemia, goal LDL below 70 07/23/2009     Osteopenia 07/23/2009    Sinusitis, chronic     Bilateral cataracts        Allergies:Atorvastatin and Simvastatin    Current Outpatient Medications Ordered in Epic   Medication Sig Dispense Refill    diazepam (VALIUM) 10 MG tablet Take 1 Tablet by mouth every 12 hours as needed for Anxiety or Sleep for up to 90 days. 60 tablets is a 30 day quantity 60 Tablet 2    Empagliflozin (JARDIANCE) 10 MG Tab tablet Take 1 Tablet by mouth every day. 90 Tablet 4    apixaban (ELIQUIS) 2.5mg Tab Take 1 Tablet by mouth 2 times a day. 180 Tablet 4    ezetimibe (ZETIA) 10 MG Tab Take 1 Tablet by mouth every day. 100 Tablet 3    losartan (COZAAR) 100 MG Tab Take 1 Tablet by mouth every day. 100 Tablet 4    metoprolol SR (TOPROL XL) 100 MG TABLET SR 24 HR Take 1 Tablet by mouth 2 times a day. 200 Tablet 4    spironolactone (ALDACTONE) 25 MG Tab Take 0.5 Tablets by mouth every day. 45 Tablet 4    benzonatate (TESSALON) 100 MG Cap TAKE 1 CAPSULE BY MOUTH THREE TIMES A DAY AS NEEDED FOR COUGH **NOT COVERED 60 Capsule 0    famotidine (PEPCID) 20 MG Tab Take 1 Tablet by mouth every day. 90 Tablet 3    furosemide (LASIX) 20 MG Tab TAKE 1 TABLET BY MOUTH 1 TIME A DAY AS NEEDED (FOR WEIGHT GAIN, OR INCREASED SWELLING OR SHORTNESS OF BREATH). 90 Tablet 0    clobetasol (TEMOVATE) 0.05 % external solution APPLY TO AFFECTED AREA TWICE A DAY FOR 1 WEEKS, THEN USE 2-3 DAYS PER WEEK AS NEEDED 50 mL 1    Magnesium 250 MG Tab Take 1 Tablet by mouth every day. 30 Tablet 11    fluconazole (DIFLUCAN) 150 MG tablet Take 1 Tablet by mouth every day. 1 Tablet 0    ketoconazole (NIZORAL) 2 % Cream Apply thin layer to affected area once daily for up to 14 days 30 g 0    digoxin (LANOXIN) 125 MCG Tab Take 1 Tablet by mouth every day. 100 Tablet 4    aspirin 81 MG EC tablet Take 1 Tablet by mouth every day. 100 Tablet 4     No current Epic-ordered facility-administered medications on file.       Past Medical History:   Diagnosis Date    Acute on chronic heart  "failure with preserved ejection fraction (HCC) 2021    Age-related osteoporosis without current pathological fracture 2024    Atrial fibrillation (HCC)     Basal cell carcinoma of skin of nose     CATARACT     Dr. Aaron, Dr. Orellana    CHF (congestive heart failure) ([facility])     Colon polyp     tubular adenomas    Dental disorder     upper partial    Dyslipidemia     Glaucoma, right eye     Dr. Orellana    Heart burn     Hemorrhagic disorder (HCC)     eliquis    History of cataract removal with insertion of prosthetic lens 2024    Hypertension     pt states well controlled on meds    IBS (irritable bowel syndrome)     Indigestion     MEDICAL HOME 10/23/2012    Mitral valve regurgitation     Osteopenia 2009    Perennial allergic rhinitis with seasonal variation     Persistent atrial fibrillation (HCC)     Psychiatric problem     anxiety    Type 2 diabetes mellitus, controlled (HCC)     diet controlled    Valvular heart disease     mitral insufficiency    Vertigo        Social History     Tobacco Use    Smoking status: Former     Current packs/day: 0.00     Average packs/day: 0.5 packs/day for 40.0 years (20.0 ttl pk-yrs)     Types: Cigarettes     Start date: 3/1/1956     Quit date: 3/1/1996     Years since quittin.1    Smokeless tobacco: Never   Vaping Use    Vaping Use: Never used   Substance Use Topics    Alcohol use: Not Currently     Alcohol/week: 0.0 oz     Comment: now rare    Drug use: No       Family Status   Relation Name Status    Mo          DM, CVA    Fa          cancer    Sis  Alive    Sis  Alive    Bro  Alive    PAunt  (Not Specified)    MAunt  (Not Specified)    Bro  (Not Specified)     Family History   Problem Relation Age of Onset    Diabetes Mother     Hypertension Mother     Stroke Mother     Cancer Father         lung/larynx    Cancer Sister         \"female\"    Genetic Disorder Sister         osteoporosis    Cancer Paternal Aunt         breast    Cancer Maternal " "Aunt     Heart Disease Brother         CABG x 3       Review of Systems   Constitutional:  Negative for chills, fever and malaise/fatigue.   Skin:         Left elbow abrasion       Exam:  /68   Pulse 79   Temp 36.5 °C (97.7 °F) (Temporal)   Resp 14   Ht 1.575 m (5' 2\")   Wt 63 kg (139 lb)   SpO2 90%   Physical Exam  Vitals reviewed.   Constitutional:       General: She is not in acute distress.     Appearance: Normal appearance. She is normal weight. She is not toxic-appearing.   HENT:      Head: Normocephalic.      Right Ear: External ear normal.      Left Ear: External ear normal.      Nose: No nasal tenderness or mucosal edema.      Mouth/Throat:      Mouth: Mucous membranes are moist.      Tonsils: No tonsillar exudate. 1+ on the right. 1+ on the left.   Eyes:      General:         Right eye: No discharge.         Left eye: No discharge.      Extraocular Movements: Extraocular movements intact.      Conjunctiva/sclera: Conjunctivae normal.      Pupils: Pupils are equal, round, and reactive to light.   Cardiovascular:      Rate and Rhythm: Normal rate and regular rhythm.      Pulses: Normal pulses.      Heart sounds: Normal heart sounds. No murmur heard.  Pulmonary:      Effort: Pulmonary effort is normal. No respiratory distress.      Breath sounds: Normal breath sounds.   Musculoskeletal:         General: No swelling, tenderness, deformity or signs of injury. Normal range of motion.      Cervical back: Normal range of motion.      Right lower leg: No edema.      Left lower leg: No edema.   Skin:     General: Skin is warm and dry.      Capillary Refill: Capillary refill takes less than 2 seconds.      Findings: No bruising, erythema, lesion or rash.      Comments: Noted healing abrasion with slight bruise to the left elbow, about the size of a dime, area does not appear to be infected, no major redness or swelling.   Neurological:      General: No focal deficit present.      Mental Status: She is " alert.      Sensory: No sensory deficit.      Motor: No weakness.      Coordination: Coordination normal.      Gait: Gait normal.   Psychiatric:         Mood and Affect: Mood normal.         Behavior: Behavior normal.             Assessment/Plan:  1. Abrasion of left elbow, initial encounter    Based on physical exam along with review of systems encouraged patient to allow air to hit the abrasion in an effort to allow it to dry.  Area does not appear infected, no major redness or swelling.  No drainage.  Patient concerned and wanting a suture, this was declined at this time as abrasion is been ongoing for the last 10 days, increased risk for infection with suturing.  Encouraged ongoing wound management.    Supportive care, differential diagnoses, and indications for immediate follow-up discussed with patient.   Pathogenesis of diagnosis discussed including typical length and natural progression.   Instructed to return to clinic or nearest emergency department for any change in condition, further concerns, or worsening of symptoms.  Patient states understanding of the plan of care and discharge instructions.  Instructed to make an appointment, for follow up, with primary care provider.    Please note that this dictation was created using voice recognition software. I have made every reasonable attempt to correct obvious errors, but I expect that there are errors of grammar and possibly content that I did not discover before finalizing the note.      Yulia SCHNEIDER

## 2024-05-24 ENCOUNTER — HOSPITAL ENCOUNTER (OUTPATIENT)
Dept: LAB | Facility: MEDICAL CENTER | Age: 84
End: 2024-05-24
Attending: FAMILY MEDICINE
Payer: MEDICARE

## 2024-05-24 ENCOUNTER — HOSPITAL ENCOUNTER (OUTPATIENT)
Facility: MEDICAL CENTER | Age: 84
End: 2024-05-24
Attending: NURSE PRACTITIONER
Payer: MEDICARE

## 2024-05-24 ENCOUNTER — OFFICE VISIT (OUTPATIENT)
Dept: URGENT CARE | Facility: PHYSICIAN GROUP | Age: 84
End: 2024-05-24
Payer: MEDICARE

## 2024-05-24 VITALS
HEIGHT: 62 IN | TEMPERATURE: 98.2 F | DIASTOLIC BLOOD PRESSURE: 62 MMHG | RESPIRATION RATE: 16 BRPM | SYSTOLIC BLOOD PRESSURE: 126 MMHG | HEART RATE: 89 BPM | OXYGEN SATURATION: 100 % | WEIGHT: 140 LBS | BODY MASS INDEX: 25.76 KG/M2

## 2024-05-24 DIAGNOSIS — E78.5 DYSLIPIDEMIA, GOAL LDL BELOW 70: ICD-10-CM

## 2024-05-24 DIAGNOSIS — R39.15 URINARY URGENCY: ICD-10-CM

## 2024-05-24 DIAGNOSIS — R35.0 URINARY FREQUENCY: ICD-10-CM

## 2024-05-24 DIAGNOSIS — E11.9 DIABETES MELLITUS TYPE II, NON INSULIN DEPENDENT (HCC): ICD-10-CM

## 2024-05-24 DIAGNOSIS — N30.01 ACUTE CYSTITIS WITH HEMATURIA: ICD-10-CM

## 2024-05-24 DIAGNOSIS — E11.65 TYPE 2 DIABETES MELLITUS WITH HYPERGLYCEMIA, WITHOUT LONG-TERM CURRENT USE OF INSULIN (HCC): ICD-10-CM

## 2024-05-24 LAB
ALBUMIN SERPL BCP-MCNC: 4.2 G/DL (ref 3.2–4.9)
ALBUMIN/GLOB SERPL: 1.6 G/DL
ALP SERPL-CCNC: 97 U/L (ref 30–99)
ALT SERPL-CCNC: 19 U/L (ref 2–50)
ANION GAP SERPL CALC-SCNC: 16 MMOL/L (ref 7–16)
APPEARANCE UR: NORMAL
AST SERPL-CCNC: 27 U/L (ref 12–45)
BILIRUB SERPL-MCNC: 1 MG/DL (ref 0.1–1.5)
BILIRUB UR STRIP-MCNC: NEGATIVE MG/DL
BUN SERPL-MCNC: 28 MG/DL (ref 8–22)
CALCIUM ALBUM COR SERPL-MCNC: 9.1 MG/DL (ref 8.5–10.5)
CALCIUM SERPL-MCNC: 9.3 MG/DL (ref 8.5–10.5)
CHLORIDE SERPL-SCNC: 96 MMOL/L (ref 96–112)
CHOLEST SERPL-MCNC: 129 MG/DL (ref 100–199)
CO2 SERPL-SCNC: 21 MMOL/L (ref 20–33)
COLOR UR AUTO: YELLOW
CREAT SERPL-MCNC: 1.08 MG/DL (ref 0.5–1.4)
CREAT UR-MCNC: 71.43 MG/DL
ERYTHROCYTE [DISTWIDTH] IN BLOOD BY AUTOMATED COUNT: 49.9 FL (ref 35.9–50)
EST. AVERAGE GLUCOSE BLD GHB EST-MCNC: 137 MG/DL
FASTING STATUS PATIENT QL REPORTED: NORMAL
GFR SERPLBLD CREATININE-BSD FMLA CKD-EPI: 51 ML/MIN/1.73 M 2
GLOBULIN SER CALC-MCNC: 2.6 G/DL (ref 1.9–3.5)
GLUCOSE SERPL-MCNC: 124 MG/DL (ref 65–99)
GLUCOSE UR STRIP.AUTO-MCNC: NEGATIVE MG/DL
HBA1C MFR BLD: 6.4 % (ref 4–5.6)
HCT VFR BLD AUTO: 37.9 % (ref 37–47)
HDLC SERPL-MCNC: 53 MG/DL
HGB BLD-MCNC: 11.1 G/DL (ref 12–16)
KETONES UR STRIP.AUTO-MCNC: NEGATIVE MG/DL
LDLC SERPL CALC-MCNC: 61 MG/DL
LEUKOCYTE ESTERASE UR QL STRIP.AUTO: NORMAL
MCH RBC QN AUTO: 22.9 PG (ref 27–33)
MCHC RBC AUTO-ENTMCNC: 29.3 G/DL (ref 32.2–35.5)
MCV RBC AUTO: 78.3 FL (ref 81.4–97.8)
MICROALBUMIN UR-MCNC: 57.9 MG/DL
MICROALBUMIN/CREAT UR: 811 MG/G (ref 0–30)
NITRITE UR QL STRIP.AUTO: NEGATIVE
PH UR STRIP.AUTO: 6 [PH] (ref 5–8)
PLATELET # BLD AUTO: 228 K/UL (ref 164–446)
PMV BLD AUTO: 9.7 FL (ref 9–12.9)
POTASSIUM SERPL-SCNC: 5.1 MMOL/L (ref 3.6–5.5)
PROT SERPL-MCNC: 6.8 G/DL (ref 6–8.2)
PROT UR QL STRIP: 30 MG/DL
RBC # BLD AUTO: 4.84 M/UL (ref 4.2–5.4)
RBC UR QL AUTO: NORMAL
SODIUM SERPL-SCNC: 133 MMOL/L (ref 135–145)
SP GR UR STRIP.AUTO: 1.01
TRIGL SERPL-MCNC: 73 MG/DL (ref 0–149)
TSH SERPL DL<=0.005 MIU/L-ACNC: 3.9 UIU/ML (ref 0.38–5.33)
UROBILINOGEN UR STRIP-MCNC: 0.2 MG/DL
WBC # BLD AUTO: 9.9 K/UL (ref 4.8–10.8)

## 2024-05-24 PROCEDURE — 3074F SYST BP LT 130 MM HG: CPT | Performed by: NURSE PRACTITIONER

## 2024-05-24 PROCEDURE — 3078F DIAST BP <80 MM HG: CPT | Performed by: NURSE PRACTITIONER

## 2024-05-24 PROCEDURE — 81002 URINALYSIS NONAUTO W/O SCOPE: CPT | Performed by: NURSE PRACTITIONER

## 2024-05-24 PROCEDURE — 99214 OFFICE O/P EST MOD 30 MIN: CPT | Performed by: NURSE PRACTITIONER

## 2024-05-24 RX ORDER — CEFDINIR 300 MG/1
300 CAPSULE ORAL EVERY 12 HOURS
Qty: 10 CAPSULE | Refills: 0 | Status: SHIPPED | OUTPATIENT
Start: 2024-05-24 | End: 2024-05-29

## 2024-05-24 ASSESSMENT — FIBROSIS 4 INDEX: FIB4 SCORE: 2.51

## 2024-05-24 NOTE — PROGRESS NOTES
"Subjective:   Ivory Rowan is a 84 y.o. female who presents for Other (Blood in urine X1 week, pressure felt like she needed to go but couldn't last night)       HPI  Patient is a pleasant 84 y.o. who presents for evaluation of 1 week history of intermittent urinary frequenc and suprapubic pressure.  Patient endorses a several hour history of being unable to urinate, despite sitting in the restroom \"quite some time.\"  Patient's daughter is present with her during the visit, reports that she had a UTI approximately 1 month ago with similar symptoms.  Daughter is concerned due to the UTIs finished her period of time.  No prior known urologic history.  Denies fever, chills, flank pain.  Patient has known dementia, patient's daughter states current mental status is at her baseline.    ROS  All other systems are negative except as documented above within HPI.    MEDS:   Current Outpatient Medications:     diazepam (VALIUM) 10 MG tablet, Take 1 Tablet by mouth every 12 hours as needed for Anxiety or Sleep for up to 90 days. 60 tablets is a 30 day quantity, Disp: 60 Tablet, Rfl: 2    Empagliflozin (JARDIANCE) 10 MG Tab tablet, Take 1 Tablet by mouth every day., Disp: 90 Tablet, Rfl: 4    apixaban (ELIQUIS) 2.5mg Tab, Take 1 Tablet by mouth 2 times a day., Disp: 180 Tablet, Rfl: 4    ezetimibe (ZETIA) 10 MG Tab, Take 1 Tablet by mouth every day., Disp: 100 Tablet, Rfl: 3    losartan (COZAAR) 100 MG Tab, Take 1 Tablet by mouth every day., Disp: 100 Tablet, Rfl: 4    metoprolol SR (TOPROL XL) 100 MG TABLET SR 24 HR, Take 1 Tablet by mouth 2 times a day., Disp: 200 Tablet, Rfl: 4    spironolactone (ALDACTONE) 25 MG Tab, Take 0.5 Tablets by mouth every day., Disp: 45 Tablet, Rfl: 4    famotidine (PEPCID) 20 MG Tab, Take 1 Tablet by mouth every day., Disp: 90 Tablet, Rfl: 3    furosemide (LASIX) 20 MG Tab, TAKE 1 TABLET BY MOUTH 1 TIME A DAY AS NEEDED (FOR WEIGHT GAIN, OR INCREASED SWELLING OR SHORTNESS OF BREATH)., Disp: " "90 Tablet, Rfl: 0    Magnesium 250 MG Tab, Take 1 Tablet by mouth every day., Disp: 30 Tablet, Rfl: 11    fluconazole (DIFLUCAN) 150 MG tablet, Take 1 Tablet by mouth every day., Disp: 1 Tablet, Rfl: 0    ketoconazole (NIZORAL) 2 % Cream, Apply thin layer to affected area once daily for up to 14 days, Disp: 30 g, Rfl: 0    digoxin (LANOXIN) 125 MCG Tab, Take 1 Tablet by mouth every day., Disp: 100 Tablet, Rfl: 4    aspirin 81 MG EC tablet, Take 1 Tablet by mouth every day., Disp: 100 Tablet, Rfl: 4    benzonatate (TESSALON) 100 MG Cap, TAKE 1 CAPSULE BY MOUTH THREE TIMES A DAY AS NEEDED FOR COUGH **NOT COVERED (Patient not taking: Reported on 5/24/2024), Disp: 60 Capsule, Rfl: 0    clobetasol (TEMOVATE) 0.05 % external solution, APPLY TO AFFECTED AREA TWICE A DAY FOR 1 WEEKS, THEN USE 2-3 DAYS PER WEEK AS NEEDED (Patient not taking: Reported on 5/24/2024), Disp: 50 mL, Rfl: 1  ALLERGIES:   Allergies   Allergen Reactions    Atorvastatin      myalgias    Simvastatin      myalgias       Patient's PMH, SocHx, SurgHx, FamHx, Drug allergies and medications were reviewed.     Objective:   /62   Pulse 89   Temp 36.8 °C (98.2 °F) (Temporal)   Resp 16   Ht 1.575 m (5' 2\")   Wt 63.5 kg (140 lb)   LMP 05/01/1991   SpO2 100%   BMI 25.61 kg/m²     Physical Exam  Vitals and nursing note reviewed.   Constitutional:       General: She is awake.      Appearance: Normal appearance. She is well-developed.   HENT:      Head: Normocephalic and atraumatic.      Right Ear: External ear normal.      Left Ear: External ear normal.      Nose: Nose normal.      Mouth/Throat:      Mouth: Mucous membranes are moist.      Pharynx: Oropharynx is clear.   Eyes:      Extraocular Movements: Extraocular movements intact.      Conjunctiva/sclera: Conjunctivae normal.   Cardiovascular:      Rate and Rhythm: Normal rate and regular rhythm.      Heart sounds: Normal heart sounds.   Pulmonary:      Effort: Pulmonary effort is normal.      " Breath sounds: Normal breath sounds.   Abdominal:      General: Bowel sounds are normal.      Palpations: Abdomen is soft.      Tenderness: There is abdominal tenderness in the suprapubic area. There is no right CVA tenderness or left CVA tenderness.   Musculoskeletal:         General: Normal range of motion.      Cervical back: Normal range of motion and neck supple.   Skin:     General: Skin is warm and dry.   Neurological:      General: No focal deficit present.      Mental Status: She is alert and oriented to person, place, and time.   Psychiatric:         Mood and Affect: Mood normal.         Behavior: Behavior normal. Behavior is cooperative.         Thought Content: Thought content normal.         Judgment: Judgment normal.         Assessment/Plan:   Assessment    1. Acute cystitis with hematuria  - POCT Urinalysis  - Urine Culture; Future  - cefdinir (OMNICEF) 300 MG Cap; Take 1 Capsule by mouth every 12 hours for 5 days.  Dispense: 10 Capsule; Refill: 0    2. Urinary frequency  - POCT Urinalysis  - Urine Culture; Future    3. Urinary urgency  - POCT Urinalysis  - Urine Culture; Future      Vital signs stable at today's acute urgent care visit.  Reviewed test results that were completed in the clinic.  Send urine for culture.  Begin antibiotics as listed.  Recommend begin cranberry tablets as well as pushing fluids. Discussed management options (risks, benefits, and alternatives to treatment).     Advised the patient to follow-up with the primary care provider for recheck, reevaluation, and/or consideration of further management if necessary. Return to urgent care with any worsening symptoms or if there is no improvement in their current condition. Red flags discussed and indications to immediately call 911 or present to the Emergency Department.  All questions were encouraged and answered to the patient's satisfaction and understanding, and they agree to the plan of care.     I personally reviewed prior  external notes and test results pertinent to today's visit.  I have independently reviewed and interpreted all diagnostics ordered during this urgent care acute visit. Time spent evaluating this patient was a minimum of 30 minutes and includes preparing for visit, counseling/education, exam and evaluation, obtaining history, independent interpretation and ordering lab/test/procedures.      Please note that this dictation was created using voice recognition software. I have made a reasonable attempt to correct obvious errors, but I expect that there are errors of grammar and possibly content that I did not discover before finalizing the note.

## 2024-05-29 ENCOUNTER — TELEPHONE (OUTPATIENT)
Dept: HEALTH INFORMATION MANAGEMENT | Facility: OTHER | Age: 84
End: 2024-05-29
Payer: MEDICARE

## 2024-06-06 ENCOUNTER — OFFICE VISIT (OUTPATIENT)
Dept: MEDICAL GROUP | Facility: MEDICAL CENTER | Age: 84
End: 2024-06-06
Payer: MEDICARE

## 2024-06-06 VITALS
HEART RATE: 70 BPM | WEIGHT: 132.72 LBS | DIASTOLIC BLOOD PRESSURE: 80 MMHG | SYSTOLIC BLOOD PRESSURE: 132 MMHG | HEIGHT: 62 IN | TEMPERATURE: 98 F | BODY MASS INDEX: 24.42 KG/M2 | RESPIRATION RATE: 18 BRPM | OXYGEN SATURATION: 99 %

## 2024-06-06 DIAGNOSIS — B96.20 E. COLI URINARY TRACT INFECTION: ICD-10-CM

## 2024-06-06 DIAGNOSIS — F41.9 CHRONIC ANXIETY: ICD-10-CM

## 2024-06-06 DIAGNOSIS — R30.0 DYSURIA: ICD-10-CM

## 2024-06-06 DIAGNOSIS — R42 VERTIGO: ICD-10-CM

## 2024-06-06 DIAGNOSIS — N39.0 E. COLI URINARY TRACT INFECTION: ICD-10-CM

## 2024-06-06 LAB
APPEARANCE UR: CLEAR
BILIRUB UR STRIP-MCNC: NORMAL MG/DL
COLOR UR AUTO: YELLOW
GLUCOSE UR STRIP.AUTO-MCNC: NORMAL MG/DL
KETONES UR STRIP.AUTO-MCNC: NORMAL MG/DL
LEUKOCYTE ESTERASE UR QL STRIP.AUTO: NORMAL
NITRITE UR QL STRIP.AUTO: NORMAL
PH UR STRIP.AUTO: 7 [PH] (ref 5–8)
PROT UR QL STRIP: 100 MG/DL
RBC UR QL AUTO: NORMAL
SP GR UR STRIP.AUTO: 1.01
UROBILINOGEN UR STRIP-MCNC: 0.2 MG/DL

## 2024-06-06 PROCEDURE — 99213 OFFICE O/P EST LOW 20 MIN: CPT | Performed by: FAMILY MEDICINE

## 2024-06-06 PROCEDURE — 3079F DIAST BP 80-89 MM HG: CPT | Performed by: FAMILY MEDICINE

## 2024-06-06 PROCEDURE — 3075F SYST BP GE 130 - 139MM HG: CPT | Performed by: FAMILY MEDICINE

## 2024-06-06 PROCEDURE — 81002 URINALYSIS NONAUTO W/O SCOPE: CPT | Performed by: FAMILY MEDICINE

## 2024-06-06 RX ORDER — DIAZEPAM 10 MG/1
10 TABLET ORAL EVERY 12 HOURS PRN
Qty: 60 TABLET | Refills: 2 | Status: SHIPPED | OUTPATIENT
Start: 2024-06-06 | End: 2024-09-04

## 2024-06-06 RX ORDER — LATANOPROST 50 UG/ML
1 SOLUTION/ DROPS OPHTHALMIC
COMMUNITY
Start: 2024-05-21

## 2024-06-06 RX ORDER — CEFDINIR 300 MG/1
300 CAPSULE ORAL 2 TIMES DAILY
Qty: 14 CAPSULE | Refills: 0 | Status: SHIPPED | OUTPATIENT
Start: 2024-06-06 | End: 2024-06-13

## 2024-06-06 ASSESSMENT — FIBROSIS 4 INDEX: FIB4 SCORE: 2.25

## 2024-06-06 NOTE — PROGRESS NOTES
Chief Complaint   Patient presents with    Follow-Up     UTI, x1 wk     UTI    Vertigo    Anxiety       Subjective:     HPI:   Ivory Rowan presents today with the followin. E. coli urinary tract infection  Patient's most recent UTI showed growth of E. coli and was generally sensitive.  She was placed on cefdinir 5-day course which helped significantly with her symptoms.  However, 4 to 5 days after she stopped the cefdinir her symptoms have returned.  I am going to place her back on the cefdinir, this time for a 7-day course.  We will then recheck a urine culture 3 weeks after she completes the antibiotic regimen.    2. Dysuria  She notes that she is waking up hourly to urinate.. Takes spironolactone daily.  Takes furosemide if there is edema or shortness of breath.  Point-of-care urine dip test today does not show high glucose.  Blood test 2 weeks ago did show elevated glucose as usual but a well-controlled A1c of 6.4%.  I feel the dysuria is more due to the chronic infection.    3. Vertigo  Patient continues to take the diazepam as needed for her vertigo.  She finds this quite effective.  PDMP review shows no inconsistencies.  Renewal is placed.    4. Chronic anxiety  Patient has been encouraged to use the diazepam for her chronic anxiety as well.  She and her daughter feel that this is helpful.  Patient is quite sparing in this use of medication.  PDMP review shows no inconsistencies.  The medication is renewed.        Patient Active Problem List    Diagnosis Date Noted    History of cataract removal with insertion of prosthetic lens 2024    Age-related osteoporosis without current pathological fracture 2024    History of frequent urinary tract infections 2024    Muscle spasm 2023    Major depressive disorder, severe (HCC) 2023    History of 2019 novel coronavirus disease (COVID-19) 2022    Aortic atherosclerosis (HCC) 2022    Psychophysiologic insomnia  09/13/2022    Cyst of left ovary 01/07/2022    Secondary hypercoagulable state (HCC) 08/30/2021    Complex ovarian cyst 08/13/2021    Chronic anticoagulation 08/14/2019    Hyperglycemia 08/07/2019    Primary open angle glaucoma (POAG) of both eyes 09/10/2018    Persistent atrial fibrillation (HCC)     Glaucoma, right eye     Stage 3 chronic kidney disease (HCC) 06/22/2017    Chronic diastolic (congestive) heart failure (HCC) 06/22/2017    Type 2 diabetes mellitus with hyperglycemia, without long-term current use of insulin ([facility])     Chronic anxiety 01/04/2017    Diabetes mellitus type II, non insulin dependent ([facility]) 11/29/2016    Financial difficulties 11/29/2016    Mitral valve regurgitation     History of adenomatous polyp of colon 06/02/2015    Perennial allergic rhinitis with seasonal variation     Vertigo     IBS (irritable bowel syndrome)     Basal cell carcinoma of skin of nose     Vitamin D deficiency 12/08/2011    GERD (gastroesophageal reflux disease) 12/06/2010    Essential hypertension 01/18/2010    Dyslipidemia, goal LDL below 70 07/23/2009    Osteopenia 07/23/2009    Sinusitis, chronic     Bilateral cataracts        Current medicines (including changes today)  Current Outpatient Medications   Medication Sig Dispense Refill    cefdinir (OMNICEF) 300 MG Cap Take 1 Capsule by mouth 2 times a day for 7 days. 14 Capsule 0    diazepam (VALIUM) 10 MG tablet Take 1 Tablet by mouth every 12 hours as needed for Anxiety or Sleep for up to 90 days. 60 tablets is a 30 day quantity 60 Tablet 2    latanoprost (XALATAN) 0.005 % Solution Administer 1 Drop into both eyes at bedtime. Instill 1 drop into both eyes every night at bedtime      apixaban (ELIQUIS) 2.5mg Tab Take 1 Tablet by mouth 2 times a day. 180 Tablet 4    ezetimibe (ZETIA) 10 MG Tab Take 1 Tablet by mouth every day. 100 Tablet 3    losartan (COZAAR) 100 MG Tab Take 1 Tablet by mouth every day. 100 Tablet 4    metoprolol SR (TOPROL XL) 100 MG TABLET SR 24  "HR Take 1 Tablet by mouth 2 times a day. 200 Tablet 4    spironolactone (ALDACTONE) 25 MG Tab Take 0.5 Tablets by mouth every day. 45 Tablet 4    benzonatate (TESSALON) 100 MG Cap TAKE 1 CAPSULE BY MOUTH THREE TIMES A DAY AS NEEDED FOR COUGH **NOT COVERED 60 Capsule 0    famotidine (PEPCID) 20 MG Tab Take 1 Tablet by mouth every day. 90 Tablet 3    furosemide (LASIX) 20 MG Tab TAKE 1 TABLET BY MOUTH 1 TIME A DAY AS NEEDED (FOR WEIGHT GAIN, OR INCREASED SWELLING OR SHORTNESS OF BREATH). 90 Tablet 0    clobetasol (TEMOVATE) 0.05 % external solution APPLY TO AFFECTED AREA TWICE A DAY FOR 1 WEEKS, THEN USE 2-3 DAYS PER WEEK AS NEEDED 50 mL 1    Magnesium 250 MG Tab Take 1 Tablet by mouth every day. 30 Tablet 11    ketoconazole (NIZORAL) 2 % Cream Apply thin layer to affected area once daily for up to 14 days 30 g 0    digoxin (LANOXIN) 125 MCG Tab Take 1 Tablet by mouth every day. 100 Tablet 4    aspirin 81 MG EC tablet Take 1 Tablet by mouth every day. 100 Tablet 4     No current facility-administered medications for this visit.       Allergies   Allergen Reactions    Atorvastatin      myalgias    Simvastatin      myalgias       ROS: As per HPI       Objective:     /80   Pulse 70   Temp 36.7 °C (98 °F)   Resp 18   Ht 1.575 m (5' 2\")   Wt 60.2 kg (132 lb 11.5 oz)   SpO2 99%  Body mass index is 24.27 kg/m².    Physical Exam:  Constitutional: Well-developed and well-nourished. Not diaphoretic. No distress. Lucid and fluent.  Skin: Skin is warm and dry. No rash noted.  Head: Atraumatic without lesions.  Eyes: Conjunctivae and extraocular motions are normal. Pupils are equal, round, and reactive to light. No scleral icterus.   Ears:  External ears unremarkable.   Neck: Supple, trachea midline. No thyromegaly present. No cervical or supraclavicular lymphadenopathy. No JVD or carotid bruits appreciated  Cardiovascular: Regular rate and rhythm.  Normal S1, S2 without murmur appreciated.  Chest: Effort normal. Clear " to auscultation throughout. No adventitious sounds.  No CVAT.  Extremities: No cyanosis, clubbing, erythema, nor edema.   Neurological: Alert and oriented x 3. No tremor appreciated.  Psychiatric:  Behavior, mood, and affect are appropriate.    Lab Results   Component Value Date/Time    POCCOLOR yellow 06/06/2024 10:13 AM    POCAPPEAR clear 06/06/2024 10:13 AM    POCLEUKEST small 06/06/2024 10:13 AM    POCNITRITE neg 06/06/2024 10:13 AM    POCUROBILIGE 0.2 06/06/2024 10:13 AM    POCPROTEIN 100 06/06/2024 10:13 AM    POCURPH 7.0 06/06/2024 10:13 AM    POCBLOOD trace 06/06/2024 10:13 AM    POCSPGRV 1.015 06/06/2024 10:13 AM    POCKETONES neg 06/06/2024 10:13 AM    POCBILIRUBIN neg 06/06/2024 10:13 AM    POCGLUCUA neg 06/06/2024 10:13 AM           Assessment and Plan:     84 y.o. female with the following issues:    1. E. coli urinary tract infection  POCT Urinalysis    cefdinir (OMNICEF) 300 MG Cap      2. Dysuria  POCT Urinalysis    cefdinir (OMNICEF) 300 MG Cap      3. Vertigo  diazepam (VALIUM) 10 MG tablet      4. Chronic anxiety  diazepam (VALIUM) 10 MG tablet            Followup: Return in about 3 months (around 9/6/2024), or if symptoms worsen or fail to improve.

## 2024-06-19 ENCOUNTER — HOSPITAL ENCOUNTER (OUTPATIENT)
Dept: LAB | Facility: MEDICAL CENTER | Age: 84
End: 2024-06-19
Attending: FAMILY MEDICINE
Payer: MEDICARE

## 2024-06-19 DIAGNOSIS — B96.20 E. COLI URINARY TRACT INFECTION: ICD-10-CM

## 2024-06-19 DIAGNOSIS — N39.0 E. COLI URINARY TRACT INFECTION: ICD-10-CM

## 2024-06-19 DIAGNOSIS — R30.0 DYSURIA: ICD-10-CM

## 2024-06-19 PROCEDURE — 87086 URINE CULTURE/COLONY COUNT: CPT

## 2024-06-21 LAB
BACTERIA UR CULT: NORMAL
SIGNIFICANT IND 70042: NORMAL
SITE SITE: NORMAL
SOURCE SOURCE: NORMAL

## 2024-06-24 ENCOUNTER — APPOINTMENT (OUTPATIENT)
Dept: RADIOLOGY | Facility: MEDICAL CENTER | Age: 84
End: 2024-06-24
Attending: EMERGENCY MEDICINE
Payer: MEDICARE

## 2024-06-24 ENCOUNTER — HOSPITAL ENCOUNTER (EMERGENCY)
Facility: MEDICAL CENTER | Age: 84
End: 2024-06-25
Attending: EMERGENCY MEDICINE
Payer: MEDICARE

## 2024-06-24 DIAGNOSIS — K52.9 COLITIS: ICD-10-CM

## 2024-06-24 DIAGNOSIS — K60.2 ANAL FISSURE: ICD-10-CM

## 2024-06-24 DIAGNOSIS — K62.5 RECTAL BLEEDING: ICD-10-CM

## 2024-06-24 DIAGNOSIS — K64.9 HEMORRHOIDS, UNSPECIFIED HEMORRHOID TYPE: ICD-10-CM

## 2024-06-24 LAB
ABO GROUP BLD: NORMAL
ALBUMIN SERPL BCP-MCNC: 4.1 G/DL (ref 3.2–4.9)
ALBUMIN/GLOB SERPL: 1.4 G/DL
ALP SERPL-CCNC: 129 U/L (ref 30–99)
ALT SERPL-CCNC: 16 U/L (ref 2–50)
ANION GAP SERPL CALC-SCNC: 13 MMOL/L (ref 7–16)
APPEARANCE UR: CLEAR
APTT PPP: 23 SEC (ref 24.7–36)
AST SERPL-CCNC: 22 U/L (ref 12–45)
BACTERIA #/AREA URNS HPF: NEGATIVE /HPF
BASOPHILS # BLD AUTO: 0.7 % (ref 0–1.8)
BASOPHILS # BLD: 0.06 K/UL (ref 0–0.12)
BILIRUB SERPL-MCNC: 0.9 MG/DL (ref 0.1–1.5)
BILIRUB UR QL STRIP.AUTO: NEGATIVE
BLD GP AB SCN SERPL QL: NORMAL
BUN SERPL-MCNC: 23 MG/DL (ref 8–22)
CALCIUM ALBUM COR SERPL-MCNC: 9.2 MG/DL (ref 8.5–10.5)
CALCIUM SERPL-MCNC: 9.3 MG/DL (ref 8.5–10.5)
CFT BLD TEG: 5.2 MIN (ref 4.6–9.1)
CFT P HPASE BLD TEG: 4.4 MIN (ref 4.3–8.3)
CHLORIDE SERPL-SCNC: 98 MMOL/L (ref 96–112)
CLOT ANGLE BLD TEG: 74.9 DEGREES (ref 63–78)
CLOT LYSIS 30M P MA LENFR BLD TEG: 0 % (ref 0–2.6)
CO2 SERPL-SCNC: 24 MMOL/L (ref 20–33)
COLOR UR: YELLOW
CREAT SERPL-MCNC: 1.02 MG/DL (ref 0.5–1.4)
CT.EXTRINSIC BLD ROTEM: 1.3 MIN (ref 0.8–2.1)
EKG IMPRESSION: NORMAL
EOSINOPHIL # BLD AUTO: 0.12 K/UL (ref 0–0.51)
EOSINOPHIL NFR BLD: 1.3 % (ref 0–6.9)
EPI CELLS #/AREA URNS HPF: NEGATIVE /HPF
ERYTHROCYTE [DISTWIDTH] IN BLOOD BY AUTOMATED COUNT: 48.6 FL (ref 35.9–50)
ETHANOL BLD-MCNC: <10.1 MG/DL
GFR SERPLBLD CREATININE-BSD FMLA CKD-EPI: 55 ML/MIN/1.73 M 2
GLOBULIN SER CALC-MCNC: 2.9 G/DL (ref 1.9–3.5)
GLUCOSE SERPL-MCNC: 103 MG/DL (ref 65–99)
GLUCOSE UR STRIP.AUTO-MCNC: NEGATIVE MG/DL
HCT VFR BLD AUTO: 34.3 % (ref 37–47)
HGB BLD-MCNC: 10.4 G/DL (ref 12–16)
HYALINE CASTS #/AREA URNS LPF: NORMAL /LPF
IMM GRANULOCYTES # BLD AUTO: 0.04 K/UL (ref 0–0.11)
IMM GRANULOCYTES NFR BLD AUTO: 0.4 % (ref 0–0.9)
INR PPP: 1.43 (ref 0.87–1.13)
KETONES UR STRIP.AUTO-MCNC: NEGATIVE MG/DL
LEUKOCYTE ESTERASE UR QL STRIP.AUTO: NEGATIVE
LYMPHOCYTES # BLD AUTO: 0.66 K/UL (ref 1–4.8)
LYMPHOCYTES NFR BLD: 7.4 % (ref 22–41)
MCF BLD TEG: 60.2 MM (ref 52–69)
MCF.PLATELET INHIB BLD ROTEM: 19 MM (ref 15–32)
MCH RBC QN AUTO: 23.3 PG (ref 27–33)
MCHC RBC AUTO-ENTMCNC: 30.3 G/DL (ref 32.2–35.5)
MCV RBC AUTO: 76.7 FL (ref 81.4–97.8)
MICRO URNS: ABNORMAL
MONOCYTES # BLD AUTO: 1.44 K/UL (ref 0–0.85)
MONOCYTES NFR BLD AUTO: 16.2 % (ref 0–13.4)
NEUTROPHILS # BLD AUTO: 6.58 K/UL (ref 1.82–7.42)
NEUTROPHILS NFR BLD: 74 % (ref 44–72)
NITRITE UR QL STRIP.AUTO: NEGATIVE
NRBC # BLD AUTO: 0 K/UL
NRBC BLD-RTO: 0 /100 WBC (ref 0–0.2)
PA AA BLD-ACNC: 29.3 % (ref 0–11)
PA ADP BLD-ACNC: 20.1 % (ref 0–17)
PH UR STRIP.AUTO: 5.5 [PH] (ref 5–8)
PLATELET # BLD AUTO: 217 K/UL (ref 164–446)
PMV BLD AUTO: 9.2 FL (ref 9–12.9)
POTASSIUM SERPL-SCNC: 4.2 MMOL/L (ref 3.6–5.5)
PROT SERPL-MCNC: 7 G/DL (ref 6–8.2)
PROT UR QL STRIP: 30 MG/DL
PROTHROMBIN TIME: 17.6 SEC (ref 12–14.6)
RBC # BLD AUTO: 4.47 M/UL (ref 4.2–5.4)
RBC # URNS HPF: NORMAL /HPF
RBC UR QL AUTO: NEGATIVE
RH BLD: NORMAL
SODIUM SERPL-SCNC: 135 MMOL/L (ref 135–145)
SP GR UR STRIP.AUTO: 1.01
TEG ALGORITHM TGALG: ABNORMAL
UROBILINOGEN UR STRIP.AUTO-MCNC: 0.2 MG/DL
WBC # BLD AUTO: 8.9 K/UL (ref 4.8–10.8)
WBC #/AREA URNS HPF: NORMAL /HPF

## 2024-06-24 PROCEDURE — 85576 BLOOD PLATELET AGGREGATION: CPT | Mod: 91

## 2024-06-24 PROCEDURE — 85730 THROMBOPLASTIN TIME PARTIAL: CPT

## 2024-06-24 PROCEDURE — 700117 HCHG RX CONTRAST REV CODE 255: Performed by: EMERGENCY MEDICINE

## 2024-06-24 PROCEDURE — A9270 NON-COVERED ITEM OR SERVICE: HCPCS | Performed by: EMERGENCY MEDICINE

## 2024-06-24 PROCEDURE — 93005 ELECTROCARDIOGRAM TRACING: CPT | Performed by: EMERGENCY MEDICINE

## 2024-06-24 PROCEDURE — 99285 EMERGENCY DEPT VISIT HI MDM: CPT

## 2024-06-24 PROCEDURE — 85025 COMPLETE CBC W/AUTO DIFF WBC: CPT

## 2024-06-24 PROCEDURE — 86901 BLOOD TYPING SEROLOGIC RH(D): CPT

## 2024-06-24 PROCEDURE — 85384 FIBRINOGEN ACTIVITY: CPT

## 2024-06-24 PROCEDURE — 74177 CT ABD & PELVIS W/CONTRAST: CPT

## 2024-06-24 PROCEDURE — 70450 CT HEAD/BRAIN W/O DYE: CPT

## 2024-06-24 PROCEDURE — 86850 RBC ANTIBODY SCREEN: CPT

## 2024-06-24 PROCEDURE — 51701 INSERT BLADDER CATHETER: CPT

## 2024-06-24 PROCEDURE — 36415 COLL VENOUS BLD VENIPUNCTURE: CPT

## 2024-06-24 PROCEDURE — 80053 COMPREHEN METABOLIC PANEL: CPT

## 2024-06-24 PROCEDURE — 700102 HCHG RX REV CODE 250 W/ 637 OVERRIDE(OP): Performed by: EMERGENCY MEDICINE

## 2024-06-24 PROCEDURE — 82077 ASSAY SPEC XCP UR&BREATH IA: CPT

## 2024-06-24 PROCEDURE — 86900 BLOOD TYPING SEROLOGIC ABO: CPT

## 2024-06-24 PROCEDURE — 85610 PROTHROMBIN TIME: CPT

## 2024-06-24 PROCEDURE — 85347 COAGULATION TIME ACTIVATED: CPT

## 2024-06-24 PROCEDURE — 81001 URINALYSIS AUTO W/SCOPE: CPT

## 2024-06-24 RX ORDER — LOSARTAN POTASSIUM 50 MG/1
50 TABLET ORAL ONCE
Status: COMPLETED | OUTPATIENT
Start: 2024-06-24 | End: 2024-06-24

## 2024-06-24 RX ADMIN — LOSARTAN POTASSIUM 50 MG: 50 TABLET, FILM COATED ORAL at 23:21

## 2024-06-24 RX ADMIN — IOHEXOL 80 ML: 350 INJECTION, SOLUTION INTRAVENOUS at 23:20

## 2024-06-24 ASSESSMENT — FIBROSIS 4 INDEX: FIB4 SCORE: 2.25

## 2024-06-25 ENCOUNTER — PHARMACY VISIT (OUTPATIENT)
Dept: PHARMACY | Facility: MEDICAL CENTER | Age: 84
End: 2024-06-25
Payer: COMMERCIAL

## 2024-06-25 VITALS
WEIGHT: 140 LBS | SYSTOLIC BLOOD PRESSURE: 215 MMHG | OXYGEN SATURATION: 97 % | BODY MASS INDEX: 24.8 KG/M2 | HEART RATE: 91 BPM | DIASTOLIC BLOOD PRESSURE: 90 MMHG | HEIGHT: 63 IN | TEMPERATURE: 98 F | RESPIRATION RATE: 39 BRPM

## 2024-06-25 PROCEDURE — RXMED WILLOW AMBULATORY MEDICATION CHARGE: Performed by: EMERGENCY MEDICINE

## 2024-06-25 RX ORDER — AMOXICILLIN AND CLAVULANATE POTASSIUM 875; 125 MG/1; MG/1
1 TABLET, FILM COATED ORAL 2 TIMES DAILY
Qty: 10 TABLET | Refills: 0 | Status: ACTIVE | OUTPATIENT
Start: 2024-06-25 | End: 2024-06-30

## 2024-06-25 RX ORDER — HYDROCORTISONE ACETATE 25 MG/1
25 SUPPOSITORY RECTAL EVERY 12 HOURS
Qty: 10 SUPPOSITORY | Refills: 0 | Status: SHIPPED | OUTPATIENT
Start: 2024-06-25 | End: 2024-06-30

## 2024-06-25 NOTE — DISCHARGE PLANNING
Medical Social Work    MSW received a voalte message from bedside RN that pt is in need of a ride home.  Pt has no one to pick her up, doesn't have money for a ride and doesn't qualify for MTM.  RN verified pt's home address: [address] [place] [address] [address] [place], [place].  Chart review indicates no prior assistance for transportation and due to pt's age and time of night a voucher (# [id number]) was provided to RN to assure pt returns home safely.  Pt informed RN that she has keys to get into her apartment.

## 2024-06-25 NOTE — ED PROVIDER NOTES
ED Provider Note    CHIEF COMPLAINT  Chief Complaint   Patient presents with    Rectal Bleeding     Bloody when wiping per pt and glf 2-3 days ago. On thinners. Does have notable bruising to right forehead/temporal area.      EXTERNAL RECORDS REVIEWED  Patient visit from 6/6 with patient was seen for follow-up encounter of urinary tract infection, some vertiginous-like symptoms, dysuria and chronic anxiety.    HPI/ROS  LIMITATION TO HISTORY   Select: : None  OUTSIDE HISTORIAN(S):  none    Ivory Rowan is a 84 y.o. female who presents the emergency room for concerns regarding some bloody stools when she was wiping she noticed about 2 to 3 days ago.  This occurred at the same time she apparently and had a ground-level fall at her apartment where she lives with other elderly adults.  She had bruising all over her head, she had a small amount of headache though this is improved, she denies vomiting and at time of my assessment she is primarily complaining of some abdominal discomfort.  She takes anticoagulation for history of persistent A-fib.  The other known history of type 2 diabetes, IBS, prior CHF    During my discussion with her she is having small amount of lower abdominal discomfort, pointing towards the suprapubic and left lower abdomen.  She had had multiple attempts at wiping and had noticed some bloody bowel movements.  Nursing staff had not noticed any gross bleeding in her stools and she is denying any bloody vaginal discharge or vaginal pain.  She has had urinary tract infections in the past, is currently denying any dysuria.    Bruising is noticeable over the right temporal and forehead area.    PAST MEDICAL HISTORY   has a past medical history of Acute on chronic heart failure with preserved ejection fraction (HCC) (08/13/2021), Age-related osteoporosis without current pathological fracture (01/17/2024), Atrial fibrillation ([facility]), Basal cell carcinoma of skin of nose, CATARACT, CHF (congestive  "heart failure) (HCC), Colon polyp, Dental disorder, Dyslipidemia, Glaucoma, right eye, Heart burn, Hemorrhagic disorder (HCC), History of cataract removal with insertion of prosthetic lens (2024), Hypertension, IBS (irritable bowel syndrome), Indigestion, MEDICAL HOME (10/23/2012), Mitral valve regurgitation, Osteopenia (2009), Perennial allergic rhinitis with seasonal variation, Persistent atrial fibrillation (HCC), Psychiatric problem, Type 2 diabetes mellitus, controlled (HCC), Valvular heart disease, and Vertigo.    SURGICAL HISTORY   has a past surgical history that includes breast biopsy; recovery (2016); us-needle core bx-breast panel; appendectomy; cholecystectomy; and colonoscopy (2009).    FAMILY HISTORY  Family History   Problem Relation Age of Onset    Diabetes Mother     Hypertension Mother     Stroke Mother     Cancer Father         lung/larynx    Cancer Sister         \"female\"    Genetic Disorder Sister         osteoporosis    Cancer Paternal Aunt         breast    Cancer Maternal Aunt     Heart Disease Brother         CABG x 3       SOCIAL HISTORY  Social History     Tobacco Use    Smoking status: Former     Current packs/day: 0.00     Average packs/day: 0.5 packs/day for 40.0 years (20.0 ttl pk-yrs)     Types: Cigarettes     Start date: 3/1/1956     Quit date: 3/1/1996     Years since quittin.3    Smokeless tobacco: Never   Vaping Use    Vaping status: Never Used   Substance and Sexual Activity    Alcohol use: Not Currently     Alcohol/week: 0.0 oz     Comment: now rare    Drug use: No    Sexual activity: Not Currently     Partners: Male       CURRENT MEDICATIONS  Home Medications    **Home medications have not yet been reviewed for this encounter**       Audit from Redirected Encounters    **Home medications have not yet been reviewed for this encounter**         ALLERGIES  Allergies   Allergen Reactions    Atorvastatin      myalgias    Simvastatin      myalgias       PHYSICAL " "EXAM  VITAL SIGNS: BP (!) 215/90   Pulse 91   Temp 36.7 °C (98 °F) (Temporal)   Resp (!) 39   Ht 1.6 m (5' 3\")   Wt 63.5 kg (140 lb)   LMP 1991   SpO2 97%   BMI 24.80 kg/m²    General: Alert, Oriented x3. No acute distress. Non-toxic appearing.   Head: Normocephalic, Atraumatic.   Eyes: Pupils: R: 3 mm, L:3 mm. EOMI. Sclerae/Conjunctivae normal in appearance. No Raccoon Eyes.   Nose: No septal hematomas.   Ears: No hemotymapnum, no Barry Sign.   Mouth: No midface instability. No malocclusion.   Neck: No midline tenderness, step-off, or hematoma.   Back: No TTP. No step-off, or hematoma.   Chest: No retractions. Chest wall is non-tender   Lungs: Clear and equal to auscultation bilaterally. No wheezes, rales, or rhonchi. No respiratory distress.   Cardiovascular: Regular Rate and Rhythm. Normal S1 and S2.   Abdomen: Soft, non-distended, non-tender. No rebound or guarding.   Pelvis: Stable.  Rectal exam shows multiple external hemorrhoids that are nonthrombosed, there is a single area of clot where prior bleeding was noted and there is a single posterior fissure that is also not with any active bleeding.  Musculoskeletal: Full active and passive ROM of bilateral shoulders, elbows, wrists, hips, knees, and ankles without pain or tenderness.   Neuro: A&O x4. Motor: 5/5 to flexion/extension of all 4 extremities. Sensory intact in all 4 extremities.   Skin: Rectal changes as noted above.  No cutaneous bruising is noted.    EKG/LABS  Results for orders placed or performed during the hospital encounter of 24   EKG (NOW)   Result Value Ref Range    Report       [facility] Emergency Dept.    Test Date:  2024  Pt Name:    KYLE WISEMAN                  Department: ER  MRN:        [id number]                      Room:       RD 05  Gender:     Female                       Technician:  :        [id number]                   Requested By:ALONSO SHULTZ  Order #:    [id number]                 "    Reading MD:    Measurements  Intervals                                Axis  Rate:       65                           P:          0  OR:         0                            QRS:        83  QRSD:       76                           T:          15  QT:         329  QTc:        342    Interpretive Statements  Atrial fibrillation  Anteroseptal infarct, age indeterminate  Compared to ECG 03/06/2024 10:27:55  No significant changes       I have independently interpreted this EKG    RADIOLOGY/PROCEDURES   I have independently interpreted the diagnostic imaging associated with this visit and am waiting the final reading from the radiologist.   My preliminary interpretation is as follows: No intracranial bleed, CT of the abdomen pelvis shows stable previous identified cystic lesion of the left ovary, bile duct dilation without other signs of obstructive hepatobiliary process and wall thickening of the rectosigmoid colon.  Radiologist interpretation:  CT-ABDOMEN-PELVIS WITH   Final Result         1. Cardiomegaly, atherosclerosis, and coronary artery disease.   2. Stable biliary ductal dilatation.   3. Interval increase in size of a cystic lesion in the left hemipelvis. This is likely ovarian in etiology.   4. Mild wall thickening of the rectosigmoid colon may be due to inadequate distention or mild colitis.   5. Mild left lower lobe infiltrate.      CT-HEAD W/O   Final Result         1. Atrophy and chronic white matter disease.   2. No definite acute intracranial abnormality.   3. Sphenoid sinusitis.                 COURSE & MEDICAL DECISION MAKING    ASSESSMENT, COURSE AND PLAN  Care Narrative: Patient seen evaluated for symptoms as described above.  The patient has a prior history of some inflammatory bowel disease and some colitis and prior known hemorrhoids.  She has been straining excessively and here in the emergency room she did not have any gross blood on stool samples, she had some bleeding in around the  hemorrhoids with what appears to be developing fissure.  She is hemodynamically stable is not taking her nighttime blood pressure medications.  Overall with her bruising on the face we did obtain CT scan to rule out subdural hematoma or other underlying traumatic injuries.  Thankfully there is no evidence of fracture or intracranial bleed.  CT of the abdomen pelvis was obtained as patient was having some perceived dark bowel movements.  She had some suprapubic and left lower quadrant discomfort.  There is no evidence of kidney infection or evidence of stone pathology.  CT scan of the abdomen pelvis shows some stable previously identified structures including a left cystic structure of the ovary and new identified wall thickening of the rectosigmoid colon.  The patient is not septic, there is no other coagulopathy and no endorgan dysfunction.  At this time patient be treated with symptomatic medications including short course of Augmentin for possible diverticular disease and early colitis.    Discharged home in stable condition    DISPOSITION AND DISCUSSIONS  I have discussed management of the patient with the following physicians and SEBASTIAN's:  none    Discussion of management with other QHP or appropriate source(s): None     Escalation of care considered, and ultimately not performed:acute inpatient care management, however at this time, the patient is most appropriate for outpatient management    Barriers to care at this time, including but not limited to: none.     Decision tools and prescription drugs considered including, but not limited to: Antibiotics augmentin .    FINAL DIAGNOSIS  1. Colitis    2. Rectal bleeding    3. Hemorrhoids, unspecified hemorrhoid type    4. Anal fissure         Electronically signed by: Robe Hall M.D., 6/24/2024 8:34 PM

## 2024-06-25 NOTE — ED TRIAGE NOTES
Chief Complaint   Patient presents with    Rectal Bleeding     Bloody when wiping per pt and glf 2-3 days ago. On thinners. Does have notable bruising to right forehead/temporal area.    Pt denies hitting her head despite notable bruising.

## 2024-06-25 NOTE — ED NOTES
Pt ambulated to restroom with steady gait , pt back in room , pt attached to monitor , no needs at this time

## 2024-06-25 NOTE — ED NOTES
Pt was ambulated to restroom with another RN. Pt informed of need for urine sample, pt urinated and didn't provide urine sample.

## 2024-06-25 NOTE — ED NOTES
PIV removed , d/c paperwork signed , pt wheeled to front lobby with social work to  call her a cab home

## 2024-06-25 NOTE — ED NOTES
Bedside report received from off going RN/tech: Ana, assumed care of patient.  POC discussed with patient. Call light within reach, all needs addressed at this time.       Fall risk interventions in place: Not Applicable (all applicable per [place] Fall risk assessment)   Continuous monitoring: {Continuous Cardiac Monitorin}  IVF/IV medications: {IVF/IV medications:2000}  Oxygen: {Oxygen:2000}  Bedside sitter: {Bedside sitter:2000}  Isolation: {Isolation:2000}

## 2024-06-25 NOTE — ED NOTES
Pt up for discharge , pt states that if she goes home in a taxi that she will be able to get inside safely, social work aware

## 2024-06-25 NOTE — ED NOTES
Bedside report received from off going RN/tech: Ana, assumed care of patient.  POC discussed with patient. Call light within reach, all needs addressed at this time.       Fall risk interventions in place: Move the patient closer to the nurse's station, Patient's personal possessions are with in their safe reach, Place socks on patient, Place fall risk sign on patient's door, Give patient urinal if applicable, Keep floor surfaces clean and dry, and Accompanied to restroom (all applicable per [place] Fall risk assessment)   Continuous monitoring: Cardiac Leads, Pulse Ox, or Blood Pressure  IVF/IV medications: Not Applicable   Oxygen: How many liters 2L, Traced the line to wall oxygen, and No oxygen tank in room  Bedside sitter: Not Applicable   Isolation: Not Applicable

## 2024-06-25 NOTE — ED NOTES
Prior to pt d/c , this RN asked pt if she wore home o2 , pt stated that if she refuses to wear it and if she is sent home with it again she will send it back , ERP aware

## 2024-06-28 ENCOUNTER — TELEPHONE (OUTPATIENT)
Dept: MEDICAL GROUP | Facility: MEDICAL CENTER | Age: 84
End: 2024-06-28
Payer: MEDICARE

## 2024-06-28 NOTE — TELEPHONE ENCOUNTER
Phone Number Called: [phone] (home)       Call outcome: Spoke to patient regarding message below.    Message: UA results

## 2024-06-28 NOTE — TELEPHONE ENCOUNTER
----- Message from Checo Tesfaye M.D. sent at 6/26/2024  5:35 PM PDT -----  Please let her know that the urine culture showed no definite infection.

## 2024-07-02 DIAGNOSIS — K21.9 GASTROESOPHAGEAL REFLUX DISEASE WITHOUT ESOPHAGITIS: ICD-10-CM

## 2024-07-02 RX ORDER — PANTOPRAZOLE SODIUM 40 MG/1
40 TABLET, DELAYED RELEASE ORAL DAILY
Qty: 90 TABLET | Refills: 3 | Status: SHIPPED | OUTPATIENT
Start: 2024-07-02

## 2024-07-03 ENCOUNTER — TELEPHONE (OUTPATIENT)
Dept: HEALTH INFORMATION MANAGEMENT | Facility: OTHER | Age: 84
End: 2024-07-03

## 2024-07-03 NOTE — TELEPHONE ENCOUNTER
Ivory Melo stated she's going to stop taking famotidine (PEPCID) 20 MG Tab  because she thinks its messing with her memory she wanted you to know. Also she has having blood in her stool on and off Ivory stated she's waiting for her daughter to return to town to go see you  please advise

## 2024-07-04 NOTE — TELEPHONE ENCOUNTER
Frankly it would be quite unusual for the Pepcid to be creating memory problems.  However, if she wants to stop it that is her decision.  I am concerned about the blood in the stool.  I hope she can speak with her daughter about this soon.  She should definitely consider going to the emergency room if the blood in the stool persists.

## 2024-07-17 ENCOUNTER — OFFICE VISIT (OUTPATIENT)
Dept: MEDICAL GROUP | Facility: MEDICAL CENTER | Age: 84
End: 2024-07-17
Payer: MEDICARE

## 2024-07-17 VITALS
TEMPERATURE: 98 F | RESPIRATION RATE: 18 BRPM | OXYGEN SATURATION: 96 % | BODY MASS INDEX: 23.57 KG/M2 | DIASTOLIC BLOOD PRESSURE: 80 MMHG | HEART RATE: 64 BPM | SYSTOLIC BLOOD PRESSURE: 132 MMHG | HEIGHT: 63 IN | WEIGHT: 133 LBS

## 2024-07-17 DIAGNOSIS — E11.65 TYPE 2 DIABETES MELLITUS WITH HYPERGLYCEMIA, WITHOUT LONG-TERM CURRENT USE OF INSULIN (HCC): ICD-10-CM

## 2024-07-17 DIAGNOSIS — D50.9 MICROCYTIC ANEMIA: ICD-10-CM

## 2024-07-17 DIAGNOSIS — K62.5 COLITIS WITH RECTAL BLEEDING: ICD-10-CM

## 2024-07-17 DIAGNOSIS — K52.9 COLITIS WITH RECTAL BLEEDING: ICD-10-CM

## 2024-07-17 DIAGNOSIS — I48.91 ATRIAL FIBRILLATION WITH RVR (HCC): ICD-10-CM

## 2024-07-17 DIAGNOSIS — N83.299 COMPLEX OVARIAN CYST: ICD-10-CM

## 2024-07-17 PROCEDURE — 3075F SYST BP GE 130 - 139MM HG: CPT | Performed by: FAMILY MEDICINE

## 2024-07-17 PROCEDURE — 3079F DIAST BP 80-89 MM HG: CPT | Performed by: FAMILY MEDICINE

## 2024-07-17 PROCEDURE — 99214 OFFICE O/P EST MOD 30 MIN: CPT | Performed by: FAMILY MEDICINE

## 2024-07-17 RX ORDER — CEFUROXIME AXETIL 250 MG/1
250 TABLET ORAL 2 TIMES DAILY
Qty: 14 TABLET | Refills: 0 | Status: SHIPPED | OUTPATIENT
Start: 2024-07-17 | End: 2024-07-24

## 2024-07-17 RX ORDER — QUINIDINE GLUCONATE 324 MG
240 TABLET, EXTENDED RELEASE ORAL
Qty: 90 TABLET | Refills: 3 | Status: SHIPPED | OUTPATIENT
Start: 2024-07-17

## 2024-07-17 RX ORDER — DIGOXIN 125 MCG
125 TABLET ORAL DAILY
Qty: 100 TABLET | Refills: 4 | Status: SHIPPED | OUTPATIENT
Start: 2024-07-17

## 2024-07-17 ASSESSMENT — FIBROSIS 4 INDEX: FIB4 SCORE: 2.1

## 2024-07-18 ENCOUNTER — PATIENT OUTREACH (OUTPATIENT)
Dept: HEALTH INFORMATION MANAGEMENT | Facility: OTHER | Age: 84
End: 2024-07-18
Payer: MEDICARE

## 2024-07-18 DIAGNOSIS — I10 ESSENTIAL HYPERTENSION: ICD-10-CM

## 2024-08-16 DIAGNOSIS — I10 HTN (HYPERTENSION), MALIGNANT: ICD-10-CM

## 2024-08-16 DIAGNOSIS — R06.09 DYSPNEA ON EXERTION: ICD-10-CM

## 2024-08-16 DIAGNOSIS — Z95.5 STENTED CORONARY ARTERY: ICD-10-CM

## 2024-08-19 RX ORDER — ASPIRIN 81 MG/1
81 TABLET, COATED ORAL
Qty: 90 TABLET | Refills: 2 | Status: SHIPPED | OUTPATIENT
Start: 2024-08-19

## 2024-08-19 RX ORDER — FUROSEMIDE 20 MG
20 TABLET ORAL
Qty: 90 TABLET | Refills: 0 | Status: ON HOLD | OUTPATIENT
Start: 2024-08-19 | End: 2024-08-29

## 2024-08-25 ENCOUNTER — APPOINTMENT (OUTPATIENT)
Dept: RADIOLOGY | Facility: MEDICAL CENTER | Age: 84
End: 2024-08-25
Attending: EMERGENCY MEDICINE
Payer: MEDICARE

## 2024-08-25 ENCOUNTER — HOSPITAL ENCOUNTER (INPATIENT)
Facility: MEDICAL CENTER | Age: 84
LOS: 4 days | End: 2024-08-29
Attending: EMERGENCY MEDICINE | Admitting: STUDENT IN AN ORGANIZED HEALTH CARE EDUCATION/TRAINING PROGRAM
Payer: MEDICARE

## 2024-08-25 DIAGNOSIS — I50.9 ACUTE CONGESTIVE HEART FAILURE, UNSPECIFIED HEART FAILURE TYPE (HCC): Primary | ICD-10-CM

## 2024-08-25 DIAGNOSIS — F41.9 CHRONIC ANXIETY: ICD-10-CM

## 2024-08-25 DIAGNOSIS — E87.1 HYPONATREMIA: ICD-10-CM

## 2024-08-25 DIAGNOSIS — R53.1 GENERALIZED WEAKNESS: ICD-10-CM

## 2024-08-25 DIAGNOSIS — I50.23 ACUTE ON CHRONIC SYSTOLIC CHF (CONGESTIVE HEART FAILURE) (HCC): ICD-10-CM

## 2024-08-25 DIAGNOSIS — R10.84 GENERALIZED ABDOMINAL PAIN: ICD-10-CM

## 2024-08-25 DIAGNOSIS — R42 VERTIGO: ICD-10-CM

## 2024-08-25 DIAGNOSIS — I10 HYPERTENSION, UNSPECIFIED TYPE: ICD-10-CM

## 2024-08-25 PROBLEM — N18.31 STAGE 3A CHRONIC KIDNEY DISEASE: Status: ACTIVE | Noted: 2017-06-22

## 2024-08-25 PROBLEM — I16.0 HYPERTENSIVE URGENCY: Status: ACTIVE | Noted: 2024-08-25

## 2024-08-25 LAB
ABO GROUP BLD: NORMAL
ALBUMIN SERPL BCP-MCNC: 3.8 G/DL (ref 3.2–4.9)
ALBUMIN/GLOB SERPL: 1.4 G/DL
ALP SERPL-CCNC: 107 U/L (ref 30–99)
ALT SERPL-CCNC: 29 U/L (ref 2–50)
ANION GAP SERPL CALC-SCNC: 12 MMOL/L (ref 7–16)
APPEARANCE UR: CLEAR
APTT PPP: 33.3 SEC (ref 24.7–36)
AST SERPL-CCNC: 32 U/L (ref 12–45)
BACTERIA #/AREA URNS HPF: NEGATIVE /HPF
BASOPHILS # BLD AUTO: 1.1 % (ref 0–1.8)
BASOPHILS # BLD: 0.06 K/UL (ref 0–0.12)
BILIRUB SERPL-MCNC: 1.4 MG/DL (ref 0.1–1.5)
BILIRUB UR QL STRIP.AUTO: NEGATIVE
BLD GP AB SCN SERPL QL: NORMAL
BUN SERPL-MCNC: 35 MG/DL (ref 8–22)
CALCIUM ALBUM COR SERPL-MCNC: 9.4 MG/DL (ref 8.5–10.5)
CALCIUM SERPL-MCNC: 9.2 MG/DL (ref 8.5–10.5)
CHLORIDE SERPL-SCNC: 97 MMOL/L (ref 96–112)
CO2 SERPL-SCNC: 19 MMOL/L (ref 20–33)
COLOR UR: YELLOW
COMMENT 1642: NORMAL
CREAT SERPL-MCNC: 1.19 MG/DL (ref 0.5–1.4)
EKG IMPRESSION: NORMAL
EOSINOPHIL # BLD AUTO: 0.06 K/UL (ref 0–0.51)
EOSINOPHIL NFR BLD: 1.1 % (ref 0–6.9)
EPI CELLS #/AREA URNS HPF: NEGATIVE /HPF
ERYTHROCYTE [DISTWIDTH] IN BLOOD BY AUTOMATED COUNT: 81.1 FL (ref 35.9–50)
GFR SERPLBLD CREATININE-BSD FMLA CKD-EPI: 45 ML/MIN/1.73 M 2
GLOBULIN SER CALC-MCNC: 2.7 G/DL (ref 1.9–3.5)
GLUCOSE SERPL-MCNC: 113 MG/DL (ref 65–99)
GLUCOSE UR STRIP.AUTO-MCNC: NEGATIVE MG/DL
HCT VFR BLD AUTO: 51 % (ref 37–47)
HGB BLD-MCNC: 15.7 G/DL (ref 12–16)
HYALINE CASTS #/AREA URNS LPF: NORMAL /LPF
IMM GRANULOCYTES # BLD AUTO: 0.02 K/UL (ref 0–0.11)
IMM GRANULOCYTES NFR BLD AUTO: 0.4 % (ref 0–0.9)
INR PPP: 1.6 (ref 0.87–1.13)
KETONES UR STRIP.AUTO-MCNC: NEGATIVE MG/DL
LACTATE SERPL-SCNC: 1.3 MMOL/L (ref 0.5–2)
LEUKOCYTE ESTERASE UR QL STRIP.AUTO: NEGATIVE
LIPASE SERPL-CCNC: 63 U/L (ref 11–82)
LYMPHOCYTES # BLD AUTO: 0.65 K/UL (ref 1–4.8)
LYMPHOCYTES NFR BLD: 11.6 % (ref 22–41)
MAGNESIUM SERPL-MCNC: 1.7 MG/DL (ref 1.5–2.5)
MCH RBC QN AUTO: 26.7 PG (ref 27–33)
MCHC RBC AUTO-ENTMCNC: 30.8 G/DL (ref 32.2–35.5)
MCV RBC AUTO: 86.7 FL (ref 81.4–97.8)
MICRO URNS: ABNORMAL
MONOCYTES # BLD AUTO: 0.79 K/UL (ref 0–0.85)
MONOCYTES NFR BLD AUTO: 14.2 % (ref 0–13.4)
MORPHOLOGY BLD-IMP: NORMAL
NEUTROPHILS # BLD AUTO: 4 K/UL (ref 1.82–7.42)
NEUTROPHILS NFR BLD: 71.6 % (ref 44–72)
NITRITE UR QL STRIP.AUTO: NEGATIVE
NRBC # BLD AUTO: 0 K/UL
NRBC BLD-RTO: 0 /100 WBC (ref 0–0.2)
NT-PROBNP SERPL IA-MCNC: 8282 PG/ML (ref 0–125)
OVALOCYTES BLD QL SMEAR: NORMAL
PH UR STRIP.AUTO: 5 [PH] (ref 5–8)
PHOSPHATE SERPL-MCNC: 3.8 MG/DL (ref 2.5–4.5)
PLATELET # BLD AUTO: 185 K/UL (ref 164–446)
PLATELET BLD QL SMEAR: NORMAL
PMV BLD AUTO: 9 FL (ref 9–12.9)
POIKILOCYTOSIS BLD QL SMEAR: NORMAL
POLYCHROMASIA BLD QL SMEAR: NORMAL
POTASSIUM SERPL-SCNC: 4.9 MMOL/L (ref 3.6–5.5)
PROT SERPL-MCNC: 6.5 G/DL (ref 6–8.2)
PROT UR QL STRIP: 30 MG/DL
PROTHROMBIN TIME: 19.3 SEC (ref 12–14.6)
RBC # BLD AUTO: 5.88 M/UL (ref 4.2–5.4)
RBC # URNS HPF: NORMAL /HPF
RBC BLD AUTO: PRESENT
RBC UR QL AUTO: NEGATIVE
RH BLD: NORMAL
SODIUM SERPL-SCNC: 128 MMOL/L (ref 135–145)
SP GR UR STRIP.AUTO: 1.01
TROPONIN T SERPL-MCNC: 25 NG/L (ref 6–19)
UROBILINOGEN UR STRIP.AUTO-MCNC: 0.2 MG/DL
WBC # BLD AUTO: 5.6 K/UL (ref 4.8–10.8)
WBC #/AREA URNS HPF: NORMAL /HPF

## 2024-08-25 PROCEDURE — 84484 ASSAY OF TROPONIN QUANT: CPT

## 2024-08-25 PROCEDURE — 96374 THER/PROPH/DIAG INJ IV PUSH: CPT

## 2024-08-25 PROCEDURE — 86900 BLOOD TYPING SEROLOGIC ABO: CPT

## 2024-08-25 PROCEDURE — 85730 THROMBOPLASTIN TIME PARTIAL: CPT

## 2024-08-25 PROCEDURE — 86901 BLOOD TYPING SEROLOGIC RH(D): CPT

## 2024-08-25 PROCEDURE — 84100 ASSAY OF PHOSPHORUS: CPT

## 2024-08-25 PROCEDURE — 770020 HCHG ROOM/CARE - TELE (206)

## 2024-08-25 PROCEDURE — 99223 1ST HOSP IP/OBS HIGH 75: CPT | Mod: AI | Performed by: STUDENT IN AN ORGANIZED HEALTH CARE EDUCATION/TRAINING PROGRAM

## 2024-08-25 PROCEDURE — 83690 ASSAY OF LIPASE: CPT

## 2024-08-25 PROCEDURE — A9270 NON-COVERED ITEM OR SERVICE: HCPCS | Performed by: STUDENT IN AN ORGANIZED HEALTH CARE EDUCATION/TRAINING PROGRAM

## 2024-08-25 PROCEDURE — 85025 COMPLETE CBC W/AUTO DIFF WBC: CPT

## 2024-08-25 PROCEDURE — 36415 COLL VENOUS BLD VENIPUNCTURE: CPT

## 2024-08-25 PROCEDURE — 80053 COMPREHEN METABOLIC PANEL: CPT

## 2024-08-25 PROCEDURE — 93005 ELECTROCARDIOGRAM TRACING: CPT | Performed by: EMERGENCY MEDICINE

## 2024-08-25 PROCEDURE — 700117 HCHG RX CONTRAST REV CODE 255: Performed by: EMERGENCY MEDICINE

## 2024-08-25 PROCEDURE — 94760 N-INVAS EAR/PLS OXIMETRY 1: CPT

## 2024-08-25 PROCEDURE — 86850 RBC ANTIBODY SCREEN: CPT

## 2024-08-25 PROCEDURE — 74177 CT ABD & PELVIS W/CONTRAST: CPT

## 2024-08-25 PROCEDURE — 83735 ASSAY OF MAGNESIUM: CPT

## 2024-08-25 PROCEDURE — 71045 X-RAY EXAM CHEST 1 VIEW: CPT

## 2024-08-25 PROCEDURE — 81001 URINALYSIS AUTO W/SCOPE: CPT

## 2024-08-25 PROCEDURE — 700111 HCHG RX REV CODE 636 W/ 250 OVERRIDE (IP): Performed by: EMERGENCY MEDICINE

## 2024-08-25 PROCEDURE — 83880 ASSAY OF NATRIURETIC PEPTIDE: CPT

## 2024-08-25 PROCEDURE — 700102 HCHG RX REV CODE 250 W/ 637 OVERRIDE(OP): Performed by: STUDENT IN AN ORGANIZED HEALTH CARE EDUCATION/TRAINING PROGRAM

## 2024-08-25 PROCEDURE — 83605 ASSAY OF LACTIC ACID: CPT

## 2024-08-25 PROCEDURE — 99285 EMERGENCY DEPT VISIT HI MDM: CPT

## 2024-08-25 PROCEDURE — 85610 PROTHROMBIN TIME: CPT

## 2024-08-25 RX ORDER — METOPROLOL SUCCINATE 100 MG/1
100 TABLET, EXTENDED RELEASE ORAL 2 TIMES DAILY
Status: DISCONTINUED | OUTPATIENT
Start: 2024-08-25 | End: 2024-08-29 | Stop reason: HOSPADM

## 2024-08-25 RX ORDER — LABETALOL HYDROCHLORIDE 5 MG/ML
10 INJECTION, SOLUTION INTRAVENOUS EVERY 4 HOURS PRN
Status: DISCONTINUED | OUTPATIENT
Start: 2024-08-25 | End: 2024-08-29 | Stop reason: HOSPADM

## 2024-08-25 RX ORDER — ASPIRIN 81 MG/1
81 TABLET ORAL DAILY
Status: DISCONTINUED | OUTPATIENT
Start: 2024-08-26 | End: 2024-08-29 | Stop reason: HOSPADM

## 2024-08-25 RX ORDER — DIGOXIN 125 MCG
125 TABLET ORAL EVERY EVENING
Status: DISCONTINUED | OUTPATIENT
Start: 2024-08-25 | End: 2024-08-29 | Stop reason: HOSPADM

## 2024-08-25 RX ORDER — EZETIMIBE 10 MG/1
10 TABLET ORAL DAILY
Status: DISCONTINUED | OUTPATIENT
Start: 2024-08-26 | End: 2024-08-29 | Stop reason: HOSPADM

## 2024-08-25 RX ORDER — FAMOTIDINE 20 MG/1
20 TABLET, FILM COATED ORAL DAILY
Status: DISCONTINUED | OUTPATIENT
Start: 2024-08-26 | End: 2024-08-29 | Stop reason: HOSPADM

## 2024-08-25 RX ORDER — PANTOPRAZOLE SODIUM 40 MG/1
40 TABLET, DELAYED RELEASE ORAL DAILY
Status: DISCONTINUED | OUTPATIENT
Start: 2024-08-26 | End: 2024-08-25

## 2024-08-25 RX ORDER — SPIRONOLACTONE 25 MG/1
12.5 TABLET ORAL DAILY
Status: DISCONTINUED | OUTPATIENT
Start: 2024-08-26 | End: 2024-08-29 | Stop reason: HOSPADM

## 2024-08-25 RX ORDER — FUROSEMIDE 10 MG/ML
80 INJECTION INTRAMUSCULAR; INTRAVENOUS ONCE
Status: COMPLETED | OUTPATIENT
Start: 2024-08-25 | End: 2024-08-25

## 2024-08-25 RX ORDER — ACETAMINOPHEN 325 MG/1
650 TABLET ORAL EVERY 6 HOURS PRN
Status: DISCONTINUED | OUTPATIENT
Start: 2024-08-25 | End: 2024-08-29 | Stop reason: HOSPADM

## 2024-08-25 RX ORDER — LOSARTAN POTASSIUM 50 MG/1
100 TABLET ORAL DAILY
Status: DISCONTINUED | OUTPATIENT
Start: 2024-08-26 | End: 2024-08-29 | Stop reason: HOSPADM

## 2024-08-25 RX ORDER — FUROSEMIDE 10 MG/ML
80 INJECTION INTRAMUSCULAR; INTRAVENOUS
Status: DISCONTINUED | OUTPATIENT
Start: 2024-08-26 | End: 2024-08-28

## 2024-08-25 RX ADMIN — APIXABAN 2.5 MG: 2.5 TABLET, FILM COATED ORAL at 22:03

## 2024-08-25 RX ADMIN — FUROSEMIDE 80 MG: 10 INJECTION INTRAMUSCULAR; INTRAVENOUS at 20:45

## 2024-08-25 RX ADMIN — IOHEXOL 80 ML: 350 INJECTION, SOLUTION INTRAVENOUS at 19:46

## 2024-08-25 RX ADMIN — METOPROLOL SUCCINATE 100 MG: 100 TABLET, EXTENDED RELEASE ORAL at 22:02

## 2024-08-25 SDOH — ECONOMIC STABILITY: TRANSPORTATION INSECURITY
IN THE PAST 12 MONTHS, HAS LACK OF RELIABLE TRANSPORTATION KEPT YOU FROM MEDICAL APPOINTMENTS, MEETINGS, WORK OR FROM GETTING THINGS NEEDED FOR DAILY LIVING?: NO

## 2024-08-25 SDOH — ECONOMIC STABILITY: TRANSPORTATION INSECURITY
IN THE PAST 12 MONTHS, HAS THE LACK OF TRANSPORTATION KEPT YOU FROM MEDICAL APPOINTMENTS OR FROM GETTING MEDICATIONS?: YES

## 2024-08-25 ASSESSMENT — ENCOUNTER SYMPTOMS
WEAKNESS: 1
COUGH: 0
VOMITING: 0
DIARRHEA: 0
FEVER: 0
NAUSEA: 0
SHORTNESS OF BREATH: 1
ABDOMINAL PAIN: 1

## 2024-08-25 ASSESSMENT — PATIENT HEALTH QUESTIONNAIRE - PHQ9
SUM OF ALL RESPONSES TO PHQ9 QUESTIONS 1 AND 2: 3
SUM OF ALL RESPONSES TO PHQ QUESTIONS 1-9: 18
2. FEELING DOWN, DEPRESSED, IRRITABLE, OR HOPELESS: NEARLY EVERY DAY
6. FEELING BAD ABOUT YOURSELF - OR THAT YOU ARE A FAILURE OR HAVE LET YOURSELF OR YOUR FAMILY DOWN: NEARLY EVERY DAY
9. THOUGHTS THAT YOU WOULD BE BETTER OFF DEAD, OR OF HURTING YOURSELF: NEARLY EVERY DAY
3. TROUBLE FALLING OR STAYING ASLEEP OR SLEEPING TOO MUCH: NEARLY EVERY DAY
8. MOVING OR SPEAKING SO SLOWLY THAT OTHER PEOPLE COULD HAVE NOTICED. OR THE OPPOSITE, BEING SO FIGETY OR RESTLESS THAT YOU HAVE BEEN MOVING AROUND A LOT MORE THAN USUAL: NOT AT ALL
4. FEELING TIRED OR HAVING LITTLE ENERGY: NEARLY EVERY DAY
5. POOR APPETITE OR OVEREATING: NOT AT ALL
1. LITTLE INTEREST OR PLEASURE IN DOING THINGS: NOT AT ALL
7. TROUBLE CONCENTRATING ON THINGS, SUCH AS READING THE NEWSPAPER OR WATCHING TELEVISION: NEARLY EVERY DAY

## 2024-08-25 ASSESSMENT — SOCIAL DETERMINANTS OF HEALTH (SDOH)
WITHIN THE PAST 12 MONTHS, YOU WORRIED THAT YOUR FOOD WOULD RUN OUT BEFORE YOU GOT THE MONEY TO BUY MORE: NEVER TRUE
WITHIN THE LAST YEAR, HAVE YOU BEEN HUMILIATED OR EMOTIONALLY ABUSED IN OTHER WAYS BY YOUR PARTNER OR EX-PARTNER?: NO
IN THE PAST 12 MONTHS, HAS THE ELECTRIC, GAS, OIL, OR WATER COMPANY THREATENED TO SHUT OFF SERVICE IN YOUR HOME?: NO
WITHIN THE LAST YEAR, HAVE YOU BEEN AFRAID OF YOUR PARTNER OR EX-PARTNER?: NO
WITHIN THE PAST 12 MONTHS, THE FOOD YOU BOUGHT JUST DIDN'T LAST AND YOU DIDN'T HAVE MONEY TO GET MORE: NEVER TRUE
WITHIN THE LAST YEAR, HAVE TO BEEN RAPED OR FORCED TO HAVE ANY KIND OF SEXUAL ACTIVITY BY YOUR PARTNER OR EX-PARTNER?: NO
WITHIN THE LAST YEAR, HAVE YOU BEEN KICKED, HIT, SLAPPED, OR OTHERWISE PHYSICALLY HURT BY YOUR PARTNER OR EX-PARTNER?: NO

## 2024-08-25 ASSESSMENT — LIFESTYLE VARIABLES
EVER HAD A DRINK FIRST THING IN THE MORNING TO STEADY YOUR NERVES TO GET RID OF A HANGOVER: NO
EVER FELT BAD OR GUILTY ABOUT YOUR DRINKING: NO
ON A TYPICAL DAY WHEN YOU DRINK ALCOHOL HOW MANY DRINKS DO YOU HAVE: 0
HOW MANY TIMES IN THE PAST YEAR HAVE YOU HAD 5 OR MORE DRINKS IN A DAY: 0
AVERAGE NUMBER OF DAYS PER WEEK YOU HAVE A DRINK CONTAINING ALCOHOL: 0
HAVE PEOPLE ANNOYED YOU BY CRITICIZING YOUR DRINKING: NO
TOTAL SCORE: 0
ALCOHOL_USE: NO
DOES PATIENT WANT TO STOP DRINKING: NO
HAVE YOU EVER FELT YOU SHOULD CUT DOWN ON YOUR DRINKING: NO
CONSUMPTION TOTAL: NEGATIVE
TOTAL SCORE: 0
ALCOHOL_USE: NO
TOTAL SCORE: 0

## 2024-08-25 ASSESSMENT — COGNITIVE AND FUNCTIONAL STATUS - GENERAL
EATING MEALS: A LITTLE
STANDING UP FROM CHAIR USING ARMS: A LOT
CLIMB 3 TO 5 STEPS WITH RAILING: TOTAL
MOVING FROM LYING ON BACK TO SITTING ON SIDE OF FLAT BED: A LOT
MOBILITY SCORE: 12
PERSONAL GROOMING: A LOT
HELP NEEDED FOR BATHING: A LOT
SUGGESTED CMS G CODE MODIFIER MOBILITY: CL
DRESSING REGULAR LOWER BODY CLOTHING: A LOT
TURNING FROM BACK TO SIDE WHILE IN FLAT BAD: A LITTLE
DRESSING REGULAR UPPER BODY CLOTHING: A LOT
MOVING TO AND FROM BED TO CHAIR: A LOT
WALKING IN HOSPITAL ROOM: A LOT
SUGGESTED CMS G CODE MODIFIER DAILY ACTIVITY: CL
DAILY ACTIVITIY SCORE: 13
TOILETING: A LOT

## 2024-08-25 ASSESSMENT — FIBROSIS 4 INDEX
FIB4 SCORE: 2.1
FIB4 SCORE: 2.67

## 2024-08-25 ASSESSMENT — CHA2DS2 SCORE
AGE 65 TO 74: NO
CHF OR LEFT VENTRICULAR DYSFUNCTION: YES
CHA2DS2 VASC SCORE: 6
PRIOR STROKE OR TIA OR THROMBOEMBOLISM: NO
AGE 75 OR GREATER: YES
HYPERTENSION: YES
DIABETES: YES
VASCULAR DISEASE: NO
SEX: FEMALE

## 2024-08-25 ASSESSMENT — PAIN DESCRIPTION - PAIN TYPE: TYPE: ACUTE PAIN

## 2024-08-25 NOTE — ED TRIAGE NOTES
Chief Complaint   Patient presents with    Weakness     Worsening over the last 3 days. Reports hematuria & blood in stool, unsure how long that has been happening. Poor historian. Takes eliquis.     Abdominal Pain     Pt BIB [facility] from home for above.     Pt arrives A&Ox3, disoriented to time, pt appears pale. Hx afib. Per report, family stated pt dealing with UTI for about 6 months.     Pt placed on monitoring, EKG performed, call light within reach, chart up for ERP

## 2024-08-26 ENCOUNTER — APPOINTMENT (OUTPATIENT)
Dept: CARDIOLOGY | Facility: MEDICAL CENTER | Age: 84
End: 2024-08-26
Attending: INTERNAL MEDICINE
Payer: MEDICARE

## 2024-08-26 LAB
ALBUMIN SERPL BCP-MCNC: 3.6 G/DL (ref 3.2–4.9)
ALBUMIN/GLOB SERPL: 1.4 G/DL
ALP SERPL-CCNC: 97 U/L (ref 30–99)
ALT SERPL-CCNC: 24 U/L (ref 2–50)
ANION GAP SERPL CALC-SCNC: 13 MMOL/L (ref 7–16)
AST SERPL-CCNC: 32 U/L (ref 12–45)
BILIRUB SERPL-MCNC: 1.7 MG/DL (ref 0.1–1.5)
BUN SERPL-MCNC: 35 MG/DL (ref 8–22)
CALCIUM ALBUM COR SERPL-MCNC: 10 MG/DL (ref 8.5–10.5)
CALCIUM SERPL-MCNC: 9.7 MG/DL (ref 8.5–10.5)
CHLORIDE SERPL-SCNC: 95 MMOL/L (ref 96–112)
CO2 SERPL-SCNC: 22 MMOL/L (ref 20–33)
CREAT SERPL-MCNC: 1.32 MG/DL (ref 0.5–1.4)
ERYTHROCYTE [DISTWIDTH] IN BLOOD BY AUTOMATED COUNT: 80.6 FL (ref 35.9–50)
GFR SERPLBLD CREATININE-BSD FMLA CKD-EPI: 40 ML/MIN/1.73 M 2
GLOBULIN SER CALC-MCNC: 2.5 G/DL (ref 1.9–3.5)
GLUCOSE SERPL-MCNC: 95 MG/DL (ref 65–99)
HCT VFR BLD AUTO: 45.6 % (ref 37–47)
HGB BLD-MCNC: 14.1 G/DL (ref 12–16)
MAGNESIUM SERPL-MCNC: 1.7 MG/DL (ref 1.5–2.5)
MCH RBC QN AUTO: 26.7 PG (ref 27–33)
MCHC RBC AUTO-ENTMCNC: 30.9 G/DL (ref 32.2–35.5)
MCV RBC AUTO: 86.4 FL (ref 81.4–97.8)
PLATELET # BLD AUTO: 172 K/UL (ref 164–446)
PMV BLD AUTO: 8.9 FL (ref 9–12.9)
POTASSIUM SERPL-SCNC: 4.7 MMOL/L (ref 3.6–5.5)
PROCALCITONIN SERPL-MCNC: 0.08 NG/ML
PROT SERPL-MCNC: 6.1 G/DL (ref 6–8.2)
RBC # BLD AUTO: 5.28 M/UL (ref 4.2–5.4)
SODIUM SERPL-SCNC: 130 MMOL/L (ref 135–145)
WBC # BLD AUTO: 6 K/UL (ref 4.8–10.8)

## 2024-08-26 PROCEDURE — 700111 HCHG RX REV CODE 636 W/ 250 OVERRIDE (IP): Performed by: INTERNAL MEDICINE

## 2024-08-26 PROCEDURE — 97162 PT EVAL MOD COMPLEX 30 MIN: CPT

## 2024-08-26 PROCEDURE — 700111 HCHG RX REV CODE 636 W/ 250 OVERRIDE (IP): Mod: JZ

## 2024-08-26 PROCEDURE — 770020 HCHG ROOM/CARE - TELE (206)

## 2024-08-26 PROCEDURE — 700111 HCHG RX REV CODE 636 W/ 250 OVERRIDE (IP): Mod: JZ | Performed by: STUDENT IN AN ORGANIZED HEALTH CARE EDUCATION/TRAINING PROGRAM

## 2024-08-26 PROCEDURE — 700102 HCHG RX REV CODE 250 W/ 637 OVERRIDE(OP): Performed by: STUDENT IN AN ORGANIZED HEALTH CARE EDUCATION/TRAINING PROGRAM

## 2024-08-26 PROCEDURE — 97166 OT EVAL MOD COMPLEX 45 MIN: CPT

## 2024-08-26 PROCEDURE — 36415 COLL VENOUS BLD VENIPUNCTURE: CPT

## 2024-08-26 PROCEDURE — 83735 ASSAY OF MAGNESIUM: CPT

## 2024-08-26 PROCEDURE — 99418 PROLNG IP/OBS E/M EA 15 MIN: CPT | Performed by: INTERNAL MEDICINE

## 2024-08-26 PROCEDURE — 80053 COMPREHEN METABOLIC PANEL: CPT

## 2024-08-26 PROCEDURE — 85027 COMPLETE CBC AUTOMATED: CPT

## 2024-08-26 PROCEDURE — 99223 1ST HOSP IP/OBS HIGH 75: CPT | Performed by: INTERNAL MEDICINE

## 2024-08-26 PROCEDURE — 99233 SBSQ HOSP IP/OBS HIGH 50: CPT | Performed by: INTERNAL MEDICINE

## 2024-08-26 PROCEDURE — 97535 SELF CARE MNGMENT TRAINING: CPT

## 2024-08-26 PROCEDURE — A9270 NON-COVERED ITEM OR SERVICE: HCPCS | Performed by: STUDENT IN AN ORGANIZED HEALTH CARE EDUCATION/TRAINING PROGRAM

## 2024-08-26 PROCEDURE — 700111 HCHG RX REV CODE 636 W/ 250 OVERRIDE (IP)

## 2024-08-26 PROCEDURE — 84145 PROCALCITONIN (PCT): CPT

## 2024-08-26 RX ORDER — HALOPERIDOL 5 MG/ML
5 INJECTION INTRAMUSCULAR EVERY 4 HOURS PRN
Status: DISCONTINUED | OUTPATIENT
Start: 2024-08-26 | End: 2024-08-27

## 2024-08-26 RX ORDER — MAGNESIUM SULFATE HEPTAHYDRATE 40 MG/ML
2 INJECTION, SOLUTION INTRAVENOUS ONCE
Status: COMPLETED | OUTPATIENT
Start: 2024-08-26 | End: 2024-08-26

## 2024-08-26 RX ORDER — UREA 10 %
1000 LOTION (ML) TOPICAL DAILY
Status: DISCONTINUED | OUTPATIENT
Start: 2024-08-26 | End: 2024-08-29 | Stop reason: HOSPADM

## 2024-08-26 RX ORDER — LORAZEPAM 2 MG/ML
0.5 INJECTION INTRAMUSCULAR EVERY 4 HOURS PRN
Status: DISCONTINUED | OUTPATIENT
Start: 2024-08-26 | End: 2024-08-26

## 2024-08-26 RX ORDER — GAUZE BANDAGE 2" X 2"
100 BANDAGE TOPICAL DAILY
Status: DISCONTINUED | OUTPATIENT
Start: 2024-08-26 | End: 2024-08-29 | Stop reason: HOSPADM

## 2024-08-26 RX ORDER — ALPRAZOLAM 0.25 MG
0.25 TABLET ORAL NIGHTLY PRN
Status: DISCONTINUED | OUTPATIENT
Start: 2024-08-26 | End: 2024-08-26

## 2024-08-26 RX ADMIN — FUROSEMIDE 80 MG: 10 INJECTION INTRAMUSCULAR; INTRAVENOUS at 17:52

## 2024-08-26 RX ADMIN — LORAZEPAM 0.5 MG: 2 INJECTION INTRAMUSCULAR; INTRAVENOUS at 14:29

## 2024-08-26 RX ADMIN — HALOPERIDOL LACTATE 5 MG: 5 INJECTION, SOLUTION INTRAMUSCULAR at 20:45

## 2024-08-26 RX ADMIN — APIXABAN 2.5 MG: 2.5 TABLET, FILM COATED ORAL at 05:12

## 2024-08-26 RX ADMIN — OMEPRAZOLE 20 MG: 20 CAPSULE, DELAYED RELEASE ORAL at 05:14

## 2024-08-26 RX ADMIN — FAMOTIDINE 20 MG: 20 TABLET, FILM COATED ORAL at 05:13

## 2024-08-26 RX ADMIN — LOSARTAN POTASSIUM 100 MG: 50 TABLET, FILM COATED ORAL at 05:11

## 2024-08-26 RX ADMIN — ASPIRIN 81 MG: 81 TABLET, COATED ORAL at 05:12

## 2024-08-26 RX ADMIN — FUROSEMIDE 80 MG: 10 INJECTION INTRAMUSCULAR; INTRAVENOUS at 05:11

## 2024-08-26 RX ADMIN — SPIRONOLACTONE 12.5 MG: 25 TABLET ORAL at 05:13

## 2024-08-26 RX ADMIN — LORAZEPAM 0.5 MG: 2 INJECTION INTRAMUSCULAR; INTRAVENOUS at 18:31

## 2024-08-26 RX ADMIN — EZETIMIBE 10 MG: 10 TABLET ORAL at 05:14

## 2024-08-26 RX ADMIN — MAGNESIUM SULFATE HEPTAHYDRATE 2 G: 2 INJECTION, SOLUTION INTRAVENOUS at 05:17

## 2024-08-26 ASSESSMENT — ENCOUNTER SYMPTOMS
COUGH: 0
DIZZINESS: 0
ABDOMINAL PAIN: 0
NAUSEA: 0
EYE PAIN: 0
WEAKNESS: 1
FOCAL WEAKNESS: 0
HEMOPTYSIS: 0
NERVOUS/ANXIOUS: 0
INSOMNIA: 0
EYE REDNESS: 0
PALPITATIONS: 0
FEVER: 0
FALLS: 0
CONSTIPATION: 0
LOSS OF CONSCIOUSNESS: 0
HEADACHES: 0
WHEEZING: 0
CHILLS: 0
SHORTNESS OF BREATH: 0
VOMITING: 0
SEIZURES: 0
MYALGIAS: 0
BLOOD IN STOOL: 0
TREMORS: 0
DIARRHEA: 0

## 2024-08-26 ASSESSMENT — COGNITIVE AND FUNCTIONAL STATUS - GENERAL
DAILY ACTIVITIY SCORE: 19
MOVING FROM LYING ON BACK TO SITTING ON SIDE OF FLAT BED: A LITTLE
SUGGESTED CMS G CODE MODIFIER MOBILITY: CK
MOVING TO AND FROM BED TO CHAIR: A LITTLE
DRESSING REGULAR UPPER BODY CLOTHING: A LITTLE
CLIMB 3 TO 5 STEPS WITH RAILING: A LOT
DRESSING REGULAR LOWER BODY CLOTHING: A LITTLE
SUGGESTED CMS G CODE MODIFIER DAILY ACTIVITY: CK
HELP NEEDED FOR BATHING: A LITTLE
TOILETING: A LITTLE
TURNING FROM BACK TO SIDE WHILE IN FLAT BAD: A LITTLE
MOBILITY SCORE: 17
WALKING IN HOSPITAL ROOM: A LITTLE
STANDING UP FROM CHAIR USING ARMS: A LITTLE
PERSONAL GROOMING: A LITTLE

## 2024-08-26 ASSESSMENT — PAIN DESCRIPTION - PAIN TYPE
TYPE: ACUTE PAIN
TYPE: ACUTE PAIN

## 2024-08-26 ASSESSMENT — GAIT ASSESSMENTS
ASSISTIVE DEVICE: FRONT WHEEL WALKER
GAIT LEVEL OF ASSIST: MINIMAL ASSIST
DEVIATION: BRADYKINETIC;SHUFFLED GAIT
DISTANCE (FEET): 12

## 2024-08-26 ASSESSMENT — ACTIVITIES OF DAILY LIVING (ADL): TOILETING: INDEPENDENT

## 2024-08-26 ASSESSMENT — FIBROSIS 4 INDEX: FIB4 SCORE: 3.15

## 2024-08-26 NOTE — DISCHARGE PLANNING
Care Transition Team Discharge Planning    Anticipated Discharge Information  Discharge Disposition: D/T to SNF with Medicare cert in anticipation of skilled care (03)    Discharge Plan:  PASRR completed    PASRR# [id number]    OT recommending SNF, requested DPA to send out SNF referrals.

## 2024-08-26 NOTE — DISCHARGE PLANNING
"SPIRITUAL HEALTH SERVICES Progress Note  [facility] ([facility]) 6B    Saw pt Antony Aguila  per Initial Visit.    Illness Narrative - Antony lives with diabetes. He shared that one medication does one thing but not what the other one does and he's trying to understand. Antony kept falling asleep during this initial visit and so we kept it brief    Distress - Antony was clearly fatigued    Coping - n/a    Meaning-Making - When I asked if I could come back another day, Antony said \"I hope I don't have too many more days here\" and is looking forward to returning home    Plan - I introduced myself as the unit  and shared information about [facility]    Ankush Santiago MDiv  Chaplain Resident  Pager [phone]    * [facility] remains available 24/7 for emergent requests/referrals, either by having the switchboard page the on-call  or by entering an ASAP/STAT consult in Epic (this will also page the on-call ). Routine Epic consults receive an initial response within 24 hours.*    " [phone]  Per RN CMGAY sent [facility] referrals.

## 2024-08-26 NOTE — ASSESSMENT & PLAN NOTE
She has chronic abdominal pain, her daughter thinks it may have gotten worse after she started using iron for history of anemia   Hemoglobin 15 on presentation, can stop iron for now until follow-up with her PCP next month   CT of the abdomen in the ED reviewed shows a known ovarian cyst for which she is following with her PCP  There is dilation of her CBD up to 20 mm however, denies postprandial pain, no right upper quadrant pain or Gaspar sign on exam.  LFTs and T. bili normal on labs.  I discussed these findings with the family and they do not wish to pursue MRCP.  If there are any changes, reconsider

## 2024-08-26 NOTE — CARE PLAN
The patient is Watcher - Medium risk of patient condition declining or worsening    Shift Goals  Clinical Goals: Monitor I/o's, increase mobility  Patient Goals: rest and comfort  Family Goals: thu    Progress made toward(s) clinical / shift goals:      Problem: Pain - Standard  Goal: Alleviation of pain or a reduction in pain to the patient’s comfort goal  Outcome: Progressing     Problem: Skin Integrity  Goal: Skin integrity is maintained or improved  Outcome: Progressing     Problem: Fall Risk  Goal: Patient will remain free from falls  Outcome: Progressing     Patient is not progressing towards the following goals:      Problem: Knowledge Deficit - Standard  Goal: Patient and family/care givers will demonstrate understanding of plan of care, disease process/condition, diagnostic tests and medications  Outcome: Not Progressing   Pt requiring multiple attempts to reorient. Pt not understanding why she is in hospital.

## 2024-08-26 NOTE — DISCHARGE PLANNING
HTH/SCP TCN chart review completed. Collaborated with ANNA.  The most current review of medical record, knowledge of pt's PLOF and social support, LACE+ score of 76, 6 clicks scores of 13 ADL's and 12 mobility were considered.      Pt seen at bedside. Introduced TCN program. Provided education regarding post acute levels of care. Education provided regarding case management policy for blanket SNF referrals. Discussed HTH/SCP plan benefits. Pt verbalizes understanding.     Patient states she lives alone in a [facility] [address]ment and was modified  independent with ADL's, some IADL's, and mobility (reports no AD at home) at her baseline level of function.  She reports having [employer] paid caregiver that takes her shopping.  She is currently on RA.  She states she is not at her baseline level of function.      In collaboration with CM, current discharge considerations are noted to be likely for post-acute placement/SNF.      TCN will continue to follow and collaborate with discharge planning team as additional post acute needs arise. Thank you.     Completed today:  PT/OT orders in chart.    Choice obtained: None.  See above.    Pt aware of [facility]'s blanket referral policy    Addendum:  1204- Per chart review, OT recommends post-acute placement on 8/26/24 for additional occupational therapy services prior to discharge home.

## 2024-08-26 NOTE — ASSESSMENT & PLAN NOTE
Moderate regurg seen on echo 2021.  She has been complaining of worsening shortness of breath and volume overload currently  Repeat echocardiogram ordered

## 2024-08-26 NOTE — THERAPY
"Occupational Therapy   Initial Evaluation     Patient Name: Ivory Rowan  Age:  84 y.o., Sex:  female  Medical Record #: [id number]  Today's Date: 8/26/2024     Precautions  Precautions: (P) Fall Risk    Assessment  Patient is 84 y.o. female who presented 8/25/2024 with weakness, hematuria and abdominal pain.  PMH of CHF, hypertension, dementia at baseline.   Pt seen for OT Eval. Pt demo'd with impairments in cognition (report of dementia at baseline), thought she was at [facility] [place], able to recall with reorientation. Pt currently lives alone per pt, daughter is supportive however unclear how much assistance pt truly receives at home. At this time pt would be unsafe at home for performance in ADLs and especially IADLs (cooking, med mgmt, etc). Pt required SBA to CGA for functional mobility with FWW. Pt retropulsive in sitting and required assist for majority of ADLs. Due to deficits and current level of assist at home (per pt) would recommend post acute placement. Will continue to benefit from inpt OT     Plan    Occupational Therapy Initial Treatment Plan   Treatment Interventions: (P) Self Care / Activities of Daily Living, Adaptive Equipment, Neuro Re-Education / Balance, Therapeutic Exercises, Therapeutic Activity, Cognitive Skill Development  Treatment Frequency: (P) 3 Times per Week  Duration: (P) Until Therapy Goals Met    DC Equipment Recommendations: (P) Unable to determine at this time  Discharge Recommendations: (P) Recommend post-acute placement for additional occupational therapy services prior to discharge home     Subjective    \"How did I walk up here?\"      Objective       08/26/24 0947   Prior Living Situation   Prior Services Home-Independent   Housing / Facility 1 Story Apartment / Condo;Other (Comments)  ([facility] apartment)   Steps Into Home 0   Steps In Home 0   Elevator Yes   Bathroom Set up Bathtub / Shower Combination   Equipment Owned None   Lives with - Patient's Self Care Capacity Alone " and Unable to Care For Self   Comments Pt reports she lives alone, daughter checks in only 1x/ month per pt and has a hired CG come 1x//wk to assist for grocery shopping however pt currently poor historian   Prior Level of ADL Function   Self Feeding Independent   Grooming / Hygiene Independent   Bathing Independent   Dressing Independent   Toileting Independent   Prior Level of IADL Function   Medication Management Independent   Laundry Independent   Kitchen Mobility Independent   Finances Requires Assist   Home Management Independent   Shopping Requires Assist   Prior Level Of Mobility Independent Without Device in Community   Driving / Transportation Relatives / Others Provide Transportation   History of Falls   History of Falls Yes   Date of Last Fall   (pt reports fall in bathroom recently prior to admit)   Precautions   Precautions Fall Risk   Vitals   Pulse 70   Pulse Oximetry 93 %   O2 Delivery Device None - Room Air   Pain   Intervention Declines   Pain 0 - 10 Group   Therapist Pain Assessment During Activity;Post Activity Pain Same as Prior to Activity;Nurse Notified  (no c/o pain throughout)   Cognition    Cognition / Consciousness X   Orientation Level Not Oriented to Day   Level of Consciousness Alert   Ability To Follow Commands 1 Step   New Learning Impaired   Attention Impaired   Comments pt oriented to year and month. [place] [facility] [place] and able to recall. pt with decreased insight, demo'd with impaired memory, safety awareness, problem solving. unclear baseline level   Passive ROM Upper Body   Passive ROM Upper Body WDL   Active ROM Upper Body   Active ROM Upper Body  WDL   Dominant Hand Right   Strength Upper Body   Upper Body Strength  X   Gross Strength Generalized Weakness, Equal Bilaterally.    Comments BUE functional however diminished strength   Sensation Upper Body   Upper Extremity Sensation  WDL   Upper Body Muscle Tone   Upper Body Muscle  Tone  WDL   Neurological Concerns   Neurological Concerns No   Coordination Upper Body   Coordination WDL   Balance Assessment   Sitting Balance (Static) Fair -  (retropulsive in sitting)   Sitting Balance (Dynamic) Poor +   Standing Balance (Static) Fair -   Standing Balance (Dynamic) Fair -   Weight Shift Sitting Poor   Weight Shift Standing Fair   Comments w/FWW   Bed Mobility    Comments was up in chair pre and post   ADL Assessment   Eating Independent   Grooming Standby Assist;Standing  (hand hygiene, hair grooming)   Upper Body Dressing Minimal Assist   Lower Body Dressing Moderate Assist   Toileting Standby Assist   Functional Mobility   Sit to Stand Standby Assist   Bed, Chair, Wheelchair Transfer Contact Guard Assist   Toilet Transfers Contact Guard Assist   Transfer Method Stand Step   Mobility chair> in rm mobility> toilet> bathroom> chair   Comments w/FWW and without   Visual Perception   Visual Perception  WDL   Edema / Skin Assessment   Edema / Skin  X   Comments scattered bruising LUE, LLE   Activity Tolerance   Sitting in Chair pre and post session   Standing 8 min total   Patient / Family Goals   Patient / Family Goal #1 to get stronger   Short Term Goals   Short Term Goal # 1 Pt will perform toilet transfers supervised   Short Term Goal # 2 Pt will complete LB dressing supervised   Short Term Goal # 3 Pt will complete toileting supervised   Education Group   Education Provided Activities of Daily Living;Role of Occupational Therapist   Role of Occupational Therapist Patient Response Patient;Acceptance;Explanation;Demonstration;Verbal Demonstration;Action Demonstration   ADL Patient Response Patient;Acceptance;Explanation;Demonstration;Reinforcement Needed   Occupational Therapy Initial Treatment Plan    Treatment Interventions Self Care / Activities of Daily Living;Adaptive Equipment;Neuro Re-Education / Balance;Therapeutic Exercises;Therapeutic Activity;Cognitive Skill Development   Treatment  Frequency 3 Times per Week   Duration Until Therapy Goals Met   Problem List   Problem List Decreased Active Daily Living Skills;Decreased Upper Extremity Strength Right;Decreased Upper Extremity Strength Left;Decreased Activity Tolerance;Decreased Functional Mobility;Safety Awareness Deficits / Cognition;Impaired Cognitive Function   Anticipated Discharge Equipment and Recommendations   DC Equipment Recommendations Unable to determine at this time   Discharge Recommendations Recommend post-acute placement for additional occupational therapy services prior to discharge home   Interdisciplinary Plan of Care Collaboration   IDT Collaboration with  Nursing;Physical Therapist   Patient Position at End of Therapy Seated;Chair Alarm On;Call Light within Reach;Tray Table within Reach   Collaboration Comments RN updated   Session Information   Date / Session Number  8/26, #1 (1/3, 9/1)

## 2024-08-26 NOTE — ED NOTES
Assumed care of patient, patient bedside report received from RN Kerwin . Pt alert, respirations even and unlabored, on room air . Introduced self as pt RN, POC discussed, call light in reach, standard fall risk interventions in place.     
ERP at bedside  
ERP at bedside   
Hospitalist at bedside   
Med rec updated and complete. Allergies reviewed.    Pt confirmed name and date of birth.    Interviewed pt/family at bedside.    No outpatient antibiotic use in last 30 days.    Last dose of Eliquis 08/25/24    Home pharmacy  [facility] = [phone]  
PIV placed, labs sent. Socks placed, purewick placed.   
Patient Blood 186/89 labetalol hold, awaiting pharmacy to verify routine antihypertensives. Patient and daughter updated on plan of care   
Patient blood pressures 200/131 mmhg, Hospitalist made aware.   
Patient daughter has concerns about admission, ERP requested to speak with patient, ERP stated this nurse he spoke with them already.   This nurse explained plan of care to the daughter and advised to wait for hospitalist for further concerns.   
Patient report to RN.   
Patient return from CT.  
Patient spo2 dropped to 72% placed on 2 liters O2 via nansal canula.   
Patient to CT.  
Patient transferred to the floor accompanied by floor nurse, patient AAO X 4, respirations even and unlabored on 2 liters O2 via nasal canula. Patient in possession of personal belongings.   
Pt straight cathed, urine sent, green top recollected and sent to lab  
Report given to Maddie VELEZ  
No

## 2024-08-26 NOTE — CONSULTS
Cardiology Initial Consultation    Date of Service  8/26/2024    Referring Physician  Eugenio Worthington M.D.    Reason for Consultation  Acute on Chronic Systolic Heart Failure    History of Presenting Illness  Ivory Rowan is a 84 y.o. female with a past medical history of Chronic systolic CHF, Persistent Atrial Fibrillation, Mitral Valve Regurgitation, and Stage 3A CKD, who presented 8/25/2024 with worsening weakness over a few days. Patient is a poor historian, and she reports she has dementia.    Review of Systems  Review of Systems   Unable to perform ROS: Acuity of condition       Past Medical History   has a past medical history of Acute on chronic heart failure with preserved ejection fraction (HCC) (08/13/2021), Age-related osteoporosis without current pathological fracture (01/17/2024), Atrial fibrillation (HCC), Basal cell carcinoma of skin of nose, CATARACT, CHF (congestive heart failure) (HCC), Colon polyp, Dental disorder, Dyslipidemia, Glaucoma, right eye, Heart burn, Hemorrhagic disorder (HCC), History of cataract removal with insertion of prosthetic lens (04/09/2024), Hypertension, IBS (irritable bowel syndrome), Indigestion, MEDICAL HOME (10/23/2012), Mitral valve regurgitation, Osteopenia (06/2009), Perennial allergic rhinitis with seasonal variation, Persistent atrial fibrillation (HCC), Psychiatric problem, Type 2 diabetes mellitus, controlled (HCC), Valvular heart disease, and Vertigo.    Surgical History   has a past surgical history that includes breast biopsy; recovery (7/8/2016); us-needle core bx-breast panel; appendectomy; cholecystectomy; and colonoscopy (8/2009).    Family History  family history includes Cancer in her father, maternal aunt, paternal aunt, and sister; Diabetes in her mother; Genetic Disorder in her sister; Heart Disease in her brother; Hypertension in her mother; Stroke in her mother.    Social History   reports that she quit smoking about 28 years ago. Her smoking  use included cigarettes. She started smoking about 68 years ago. She has a 20 pack-year smoking history. She has never used smokeless tobacco. She reports that she does not currently use alcohol. She reports that she does not use drugs.    Medications  Prior to Admission Medications   Prescriptions Last Dose Informant Patient Reported? Taking?   apixaban (ELIQUIS) 2.5mg Tab 8/25/2024 at 0800 Patient, Family Member No Yes   Sig: Take 1 Tablet by mouth 2 times a day.   aspirin (ASPIRIN LOW DOSE) 81 MG EC tablet 8/25/2024 at 0800 Patient, Family Member No Yes   Sig: TAKE 1 TABLET BY MOUTH EVERY DAY   diazepam (VALIUM) 10 MG tablet 8/24/2024 at 1900 Patient, Family Member No Yes   Sig: Take 1 Tablet by mouth every 12 hours as needed for Anxiety or Sleep for up to 90 days. 60 tablets is a 30 day quantity   digoxin (LANOXIN) 125 MCG Tab 8/24/2024 at 1900 Patient, Family Member No Yes   Sig: Take 1 Tablet by mouth every day.   ezetimibe (ZETIA) 10 MG Tab 8/25/2024 at 0800 Patient, Family Member No Yes   Sig: Take 1 Tablet by mouth every day.   famotidine (PEPCID) 20 MG Tab 8/25/2024 at 0800 Patient, Family Member No Yes   Sig: Take 1 Tablet by mouth every day.   ferrous gluconate (FERGON) 240 (27 Fe) MG tablet 8/25/2024 at 1200 Patient, Family Member No Yes   Sig: Take 1 Tablet by mouth with dinner.   furosemide (LASIX) 20 MG Tab 8/15/2024 at 0800 Patient, Family Member No No   Sig: TAKE 1 TABLET BY MOUTH 1 TIME A DAY AS NEEDED (FOR WEIGHT GAIN, OR INCREASED SWELLING OR SHORTNESS OF BREATH).   latanoprost (XALATAN) 0.005 % Solution 8/24/2024 at 1900 Patient, Family Member Yes Yes   Sig: Administer 1 Drop into both eyes at bedtime. Instill 1 drop into both eyes every night at bedtime   losartan (COZAAR) 100 MG Tab 8/25/2024 at 0800 Patient, Family Member No Yes   Sig: Take 1 Tablet by mouth every day.   metoprolol SR (TOPROL XL) 100 MG TABLET SR 24 HR 8/25/2024 at 0800 Patient, Family Member No Yes   Sig: Take 1 Tablet by  mouth 2 times a day.   pantoprazole (PROTONIX) 40 MG Tablet Delayed Response 8/25/2024 at 0600 Patient, Family Member No Yes   Sig: TAKE 1 TABLET BY MOUTH EVERY DAY   spironolactone (ALDACTONE) 25 MG Tab 8/25/2024 at 0800 Patient, Family Member No Yes   Sig: Take 0.5 Tablets by mouth every day.      Facility-Administered Medications: None       Allergies  Allergies   Allergen Reactions    Atorvastatin      myalgias    Simvastatin      myalgias       Vital signs in last 24 hours  Temp:  [36.4 °C (97.5 °F)-36.7 °C (98.1 °F)] 36.4 °C (97.5 °F)  Pulse:  [51-80] 51  Resp:  [16-39] 16  BP: (136-200)/() 164/69  SpO2:  [72 %-100 %] 91 %    Physical Exam  Physical Exam  Cardiovascular:      Comments: Patient refused to be auscultated  Pulmonary:      Comments: Patient refused to be auscultated  Abdominal:      General: Abdomen is flat.      Palpations: Abdomen is soft.   Musculoskeletal:      Right lower leg: Edema present.      Left lower leg: Edema present.   Neurological:      Mental Status: She is alert. She is disoriented.   Psychiatric:         Mood and Affect: Mood normal.       Lab Review  Lab Results   Component Value Date/Time    WBC 6.0 08/26/2024 02:39 AM    RBC 5.28 08/26/2024 02:39 AM    HEMOGLOBIN 14.1 08/26/2024 02:39 AM    HEMATOCRIT 45.6 08/26/2024 02:39 AM    MCV 86.4 08/26/2024 02:39 AM    MCH 26.7 (L) 08/26/2024 02:39 AM    MCHC 30.9 (L) 08/26/2024 02:39 AM    MPV 8.9 (L) 08/26/2024 02:39 AM      Lab Results   Component Value Date/Time    SODIUM 130 (L) 08/26/2024 02:39 AM    POTASSIUM 4.7 08/26/2024 02:39 AM    CHLORIDE 95 (L) 08/26/2024 02:39 AM    CO2 22 08/26/2024 02:39 AM    GLUCOSE 95 08/26/2024 02:39 AM    BUN 35 (H) 08/26/2024 02:39 AM    CREATININE 1.32 08/26/2024 02:39 AM    CREATININE 0.99 04/11/2011 12:00 AM    BUNCREATRAT 26 04/11/2011 12:00 AM    GLOMRATE 56 (L) 03/09/2011 07:55 AM      Lab Results   Component Value Date/Time    ASTSGOT 32 08/26/2024 02:39 AM    ALTSGPT 24 08/26/2024  02:39 AM     Lab Results   Component Value Date/Time    CHOLSTRLTOT 129 05/24/2024 06:08 AM    LDL 61 05/24/2024 06:08 AM    HDL 53 05/24/2024 06:08 AM    TRIGLYCERIDE 73 05/24/2024 06:08 AM    TROPONINT 25 (H) 08/25/2024 06:35 PM       Recent Labs     08/25/24  1835   NTPROBNP [id number]*       Cardiac Imaging and Procedures Review    Echocardiogram:  Pending for this encounter    Echocardiogram 8/13/2021:  CONCLUSIONS  Patient in atrial fibrillation, heart rate 120 bpm.  Left ventricular ejection fraction is visually estimated to be 55%,  [id number]-ah-grxp variability in atrial fibrillation.  Trivial aortic sclerosis without stenosis.  Moderate eccentric mitral regurgitation.  Estimated right ventricular systolic pressure is 40 mmHg.         Imaging  CT-ABDOMEN-PELVIS WITH   Final Result      1.  Partially imaged 7 mm nodule in the right middle lobe for which further workup with chest CT is recommended.   2.  Moderate right pleural effusion.   3.  Mild basilar pulmonary opacity suggesting atelectasis and/or infiltrates.   4.  Cardiomegaly. Reflux of IV contrast into hepatic veins suggesting cardiac insufficiency.   5.  Fatty liver infiltration.   6.  Severe intra and extra hepatic bile duct dilation for which further workup with MRCP is recommended.   7.  Mild ascites. Anasarca.   8.  Left ovarian/adnexal cystic lesion has increased in size. Recommend further workup with pelvic ultrasound.   9.  Mild small bowel ileus.   10.  Moderate diffuse gastric wall thickening, could be accentuated by under distention. They be further assessed with upper GI series.      DX-CHEST-PORTABLE (1 VIEW)   Final Result         Airspace opacities in the bilateral lower lobes, right more than left, atelectasis or infection.      EC-ECHOCARDIOGRAM COMPLETE W/O CONT    (Results Pending)       Results for orders placed or performed during the hospital encounter of 08/25/24   EKG   Result Value Ref Range    Report       [facility]  [place] [facility].    Test Date:  2024  Pt Name:    KYLE WISEMAN                  Department: ER  MRN:        [id number]                      Room:        23  Gender:     Female                       Technician: [id number]  :        [id number]                   Requested By:ER TRIAGE PROTOCOL  Order #:    [id number]                    Reading MD: Tree Ledezma    Measurements  Intervals                                Axis  Rate:       58                           P:          0  TN:         0                            QRS:        132  QRSD:       80                           T:          -37  QT:         351  QTc:        345    Interpretive Statements  Atrial fibrillation  Right axis deviation  Probable anteroseptal infarct, old  Borderline T abnormalities, inferior leads  Compared to ECG 2024 21:49:20  Right-axis deviation now present  T-wave abnormality now present  Myocardial infarct finding still present  Electronically Signed On 2024 17: 55:38 PDT by Tree Ledezma           Assessment/Plan  No new Assessment & Plan notes have been filed under this hospital service since the last note was generated.  Service: Cardiology      Problems:  # Acute on chronic systolic CHF (congestive heart failure)  Echo  showed an EF of 55%, mildly reduced RV function, moderate MR, RVSP of 40.   -Hold metoprolol and digoxin since patient has been bradycardic on telemetry  -Continue IV furosemide twice daily and check I/O and daily weights.  -Fluid and salt restriction  -Repeat echocardiogram.    # Persistent atrial fibrillation  -Hold metoprolol and digoxin since patient has been bradycardic on telemetry    # Mitral valve regurgitation  -Moderate regurg seen on echo .   -Repeat echocardiogram.    # Stage 3a chronic kidney disease  -At baseline, continue monitoring BMP      Thank you for allowing me to participate in the care of this patient.    I will continue to follow this patient    Please contact  me with any questions.    Lionel Cody M.D.   PGY-1 Resident Physician  [facility]  [phone]

## 2024-08-26 NOTE — ASSESSMENT & PLAN NOTE
Chief Complaint   Patient presents with   • Blood in Urine   • Urinary Catheter Problem     Pt bib EMS with complaints of blood in urine from wilson catheter. Per pt wilson was placed about 2 weeks ago in hospital. Bright red bloody urine noted. No STAT lock in place. Wilson  in place, site is bloody.    Echo 2021 showed an EF of 55%, mildly reduced RV function, moderate MR, RVSP of 40.  EF previously 45%  She is not volume overloaded with edema on exam, CT imaging shows pleural effusion on the right  Will admit for CHF exacerbation, IV furosemide twice daily ordered.  BMP ordered to continue monitoring kidney function and electrolytes while on diuretics  Continue home metoprolol, spironolactone, losartan, digoxin  I/O, daily weights, fluid and salt restriction  Repeat echo  Cardiology consulted  -Transition to oral Lasix  -Cleared for discharge from cardiology standpoint

## 2024-08-26 NOTE — ED PROVIDER NOTES
ED Provider Note    Scribed for Tree Ledezma by Stevie Wakefield. 8/25/2024  5:23 PM    Primary care provider: Checo Tsefaye M.D.  Means of arrival: EMS  History obtained from: Patient  History limited by: None    CHIEF COMPLAINT  Chief Complaint   Patient presents with    Weakness     Worsening over the last 3 days. Reports hematuria & blood in stool, unsure how long that has been happening. Poor historian. Takes eliquis.     Abdominal Pain     EXTERNAL RECORDS REVIEWED  Outpatient Notes patient was seen and diagnosed with colitis with rectal bleeding on the 17th of last month in July, noted to have a history of anemia, type 2 diabetes, A-fib and ovarian cyst    HPI/ROS  LIMITATION TO HISTORY   Select: : None  OUTSIDE HISTORIAN(S):  Family Helped the patient provide history, as noted below.     HPI  Ivory Rowan is a 84 y.o. female who presents to the Emergency Department via EMS for evaluation of weakness and abdominal pain onset three days ago. Patient reports that her abdominal pain is worse at night, which causes her difficulty sleeping. Her family states that she has struggled to get out of bed at all for the past three days due to worsening weakness, which has been worsening for the past two weeks. She has been able to get up to eat meals, but has not had the energy to get out of bed regularly. Patient has been experiencing associated hematuria, but denies any bloody stools. She has a history of UTI's and recently had four sets of antibiotics completed.  At her last primary care visit she did not have any infection. Patient takes eliquis as prescribed.     REVIEW OF SYSTEMS  As above, all other systems reviewed and are negative.   See HPI for further details.     PAST MEDICAL HISTORY   has a past medical history of Acute on chronic heart failure with preserved ejection fraction (HCC) (08/13/2021), Age-related osteoporosis without current pathological fracture (01/17/2024), Atrial fibrillation  "(HCC), Basal cell carcinoma of skin of nose, CATARACT, CHF (congestive heart failure) (HCC), Colon polyp, Dental disorder, Dyslipidemia, Glaucoma, right eye, Heart burn, Hemorrhagic disorder (HCC), History of cataract removal with insertion of prosthetic lens (2024), Hypertension, IBS (irritable bowel syndrome), Indigestion, MEDICAL HOME (10/23/2012), Mitral valve regurgitation, Osteopenia (2009), Perennial allergic rhinitis with seasonal variation, Persistent atrial fibrillation (HCC), Psychiatric problem, Type 2 diabetes mellitus, controlled (HCC), Valvular heart disease, and Vertigo.  SURGICAL HISTORY   has a past surgical history that includes breast biopsy; recovery (2016); us-needle core bx-breast panel; appendectomy; cholecystectomy; and colonoscopy (2009).  SOCIAL HISTORY  Social History     Tobacco Use    Smoking status: Former     Current packs/day: 0.00     Average packs/day: 0.5 packs/day for 40.0 years (20.0 ttl pk-yrs)     Types: Cigarettes     Start date: 3/1/1956     Quit date: 3/1/1996     Years since quittin.5    Smokeless tobacco: Never   Vaping Use    Vaping status: Never Used   Substance Use Topics    Alcohol use: Not Currently     Alcohol/week: 0.0 oz     Comment: now rare    Drug use: No      Social History     Substance and Sexual Activity   Drug Use No     FAMILY HISTORY  Family History   Problem Relation Age of Onset    Diabetes Mother     Hypertension Mother     Stroke Mother     Cancer Father         lung/larynx    Cancer Sister         \"female\"    Genetic Disorder Sister         osteoporosis    Cancer Paternal Aunt         breast    Cancer Maternal Aunt     Heart Disease Brother         CABG x 3     CURRENT MEDICATIONS  Home Medications       Reviewed by Manuel Gomez (Pharmacy Tech) on 24 at 2058  Med List Status: Complete     Medication Last Dose Status   apixaban (ELIQUIS) 2.5mg Tab 2024 Active   aspirin (ASPIRIN LOW DOSE) 81 MG EC tablet 2024 " "Active   diazepam (VALIUM) 10 MG tablet 8/24/2024 Active   digoxin (LANOXIN) 125 MCG Tab 8/24/2024 Active   ezetimibe (ZETIA) 10 MG Tab 8/25/2024 Active   famotidine (PEPCID) 20 MG Tab 8/25/2024 Active   ferrous gluconate (FERGON) 240 (27 Fe) MG tablet 8/25/2024 Active   furosemide (LASIX) 20 MG Tab 8/15/2024 Active   latanoprost (XALATAN) 0.005 % Solution 8/24/2024 Active   losartan (COZAAR) 100 MG Tab 8/25/2024 Active   metoprolol SR (TOPROL XL) 100 MG TABLET SR 24 HR 8/25/2024 Active   pantoprazole (PROTONIX) 40 MG Tablet Delayed Response 8/25/2024 Active   spironolactone (ALDACTONE) 25 MG Tab 8/25/2024 Active                  Audit from Redirected Encounters    **Home medications have not yet been reviewed for this encounter**       ALLERGIES  Allergies   Allergen Reactions    Atorvastatin      myalgias    Simvastatin      myalgias       PHYSICAL EXAM    VITAL SIGNS:   Vitals:    08/25/24 2030 08/25/24 2100 08/25/24 2110 08/25/24 2142   BP: (!) 200/131 (!) 191/113 (!) 186/89 139/83   Pulse: 72 62 70 62   Resp: 18 18 18 18   Temp:    36.7 °C (98.1 °F)   TempSrc:    Temporal   SpO2: 98% 92% 99% 96%   Weight:    60.2 kg (132 lb 11.5 oz)   Height:    1.575 m (5' 2\")     Vitals: My interpretation: Hypertensive, not tachycardic, afebrile, not hypoxic    Reinterpretation of vitals: Hypertensive otherwise unremarkable    Cardiac Monitor Interpretation: The cardiac monitor revealed normal Sinus Rhythm as interpreted by me. The cardiac monitor was ordered secondary to the patient's history of possible sepsis and to monitor for dysrhythmia and/or tachycardia.    PE:   Gen: sitting comfortably, speaking clearly, appears in no acute distress   ENT: Mucous membranes moist, posterior pharynx clear, uvula midline, nares patent bilaterally   Neck: Supple, FROM  Pulmonary: Lungs are clear to auscultation bilaterally. No tachypnea  CV:  RRR, no murmur appreciated, pulses 2+ in both upper and lower extremities  Abdomen: soft, " NT/ND; no rebound/guarding  : no CVA or suprapubic tenderness   Neuro: A&Ox4 (person, place, time, situation), speech fluent, gait steady, no focal deficits appreciated  Skin: No rash or lesions.  No pallor or jaundice.  No cyanosis.  Warm and dry.     DIAGNOSTIC STUDIES / PROCEDURES    LABS  Results for orders placed or performed during the hospital encounter of 08/25/24   CBC WITH DIFFERENTIAL   Result Value Ref Range    WBC 5.6 4.8 - 10.8 K/uL    RBC 5.88 (H) 4.20 - 5.40 M/uL    Hemoglobin 15.7 12.0 - 16.0 g/dL    Hematocrit 51.0 (H) 37.0 - 47.0 %    MCV 86.7 81.4 - 97.8 fL    MCH 26.7 (L) 27.0 - 33.0 pg    MCHC 30.8 (L) 32.2 - 35.5 g/dL    RDW 81.1 (H) 35.9 - 50.0 fL    Platelet Count 185 164 - 446 K/uL    MPV 9.0 9.0 - 12.9 fL    Neutrophils-Polys 71.60 44.00 - 72.00 %    Lymphocytes 11.60 (L) 22.00 - 41.00 %    Monocytes 14.20 (H) 0.00 - 13.40 %    Eosinophils 1.10 0.00 - 6.90 %    Basophils 1.10 0.00 - 1.80 %    Immature Granulocytes 0.40 0.00 - 0.90 %    Nucleated RBC 0.00 0.00 - 0.20 /100 WBC    Neutrophils (Absolute) 4.00 1.82 - 7.42 K/uL    Lymphs (Absolute) 0.65 (L) 1.00 - 4.80 K/uL    Monos (Absolute) 0.79 0.00 - 0.85 K/uL    Eos (Absolute) 0.06 0.00 - 0.51 K/uL    Baso (Absolute) 0.06 0.00 - 0.12 K/uL    Immature Granulocytes (abs) 0.02 0.00 - 0.11 K/uL    NRBC (Absolute) 0.00 K/uL   LACTIC ACID   Result Value Ref Range    Lactic Acid 1.3 0.5 - 2.0 mmol/L   URINALYSIS CULTURE, IF INDICATED    Specimen: Urine, Clean Catch   Result Value Ref Range    Color Yellow     Character Clear     Specific Gravity 1.007 <1.035    Ph 5.0 5.0 - 8.0    Glucose Negative Negative mg/dL    Ketones Negative Negative mg/dL    Protein 30 (A) Negative mg/dL    Bilirubin Negative Negative    Urobilinogen, Urine 0.2 Negative    Nitrite Negative Negative    Leukocyte Esterase Negative Negative    Occult Blood Negative Negative    Micro Urine Req Microscopic    APTT   Result Value Ref Range    APTT 33.3 24.7 - 36.0 sec    PROTHROMBIN TIME (INR)   Result Value Ref Range    PT 19.3 (H) 12.0 - 14.6 sec    INR 1.60 (H) 0.87 - 1.13   COD (ADULT)   Result Value Ref Range    ABO Grouping Only O     Rh Grouping Only POS     Antibody Screen-Cod NEG    PLATELET ESTIMATE   Result Value Ref Range    Plt Estimation Normal    MORPHOLOGY   Result Value Ref Range    RBC Morphology Present     Polychromia 1+     Poikilocytosis 2+     Ovalocytes 1+    PERIPHERAL SMEAR REVIEW   Result Value Ref Range    Peripheral Smear Review see below    DIFFERENTIAL COMMENT   Result Value Ref Range    Comments-Diff see below    Comp Metabolic Panel   Result Value Ref Range    Sodium 128 (L) 135 - 145 mmol/L    Potassium 4.9 3.6 - 5.5 mmol/L    Chloride 97 96 - 112 mmol/L    Co2 19 (L) 20 - 33 mmol/L    Anion Gap 12.0 7.0 - 16.0    Glucose 113 (H) 65 - 99 mg/dL    Bun 35 (H) 8 - 22 mg/dL    Creatinine 1.19 0.50 - 1.40 mg/dL    Calcium 9.2 8.5 - 10.5 mg/dL    Correct Calcium 9.4 8.5 - 10.5 mg/dL    AST(SGOT) 32 12 - 45 U/L    ALT(SGPT) 29 2 - 50 U/L    Alkaline Phosphatase 107 (H) 30 - 99 U/L    Total Bilirubin 1.4 0.1 - 1.5 mg/dL    Albumin 3.8 3.2 - 4.9 g/dL    Total Protein 6.5 6.0 - 8.2 g/dL    Globulin 2.7 1.9 - 3.5 g/dL    A-G Ratio 1.4 g/dL   TROPONIN   Result Value Ref Range    Troponin T 25 (H) 6 - 19 ng/L   LIPASE   Result Value Ref Range    Lipase 63 11 - 82 U/L   proBrain Natriuretic Peptide, NT   Result Value Ref Range    NT-proBNP 8282 (H) 0 - 125 pg/mL   MAGNESIUM   Result Value Ref Range    Magnesium 1.7 1.5 - 2.5 mg/dL   PHOSPHORUS   Result Value Ref Range    Phosphorus 3.8 2.5 - 4.5 mg/dL   URINE MICROSCOPIC (W/UA)   Result Value Ref Range    WBC 0-2 /hpf    RBC 0-2 /hpf    Bacteria Negative None /hpf    Epithelial Cells Negative /hpf    Hyaline Cast 0-2 /lpf   ESTIMATED GFR   Result Value Ref Range    GFR (CKD-EPI) 45 (A) >60 mL/min/1.73 m 2   EKG   Result Value Ref Range    Report       [facility] Emergency Dept.    Test Date:   2024  Pt Name:    KYLE WISEMAN                  Department: ER  MRN:        [id number]                      Room:       [facility]  Gender:     Female                       Technician: [id number]  :        [id number]                   Requested By:ER TRIAGE PROTOCOL  Order #:    [id number]                    Reading MD: Tree Ledezma    Measurements  Intervals                                Axis  Rate:       58                           P:          0  TX:         0                            QRS:        132  QRSD:       80                           T:          -37  QT:         351  QTc:        345    Interpretive Statements  Atrial fibrillation  Right axis deviation  Probable anteroseptal infarct, old  Borderline T abnormalities, inferior leads  Compared to ECG 2024 21:49:20  Right-axis deviation now present  T-wave abnormality now present  Myocardial infarct finding still present  Electronically Signed On 2024 17: 55:38 PDT by Tree Ledezma        All labs reviewed by me. Labs were compared to prior labs if they were available. Significant for no leukocytosis, no anemia, lactic acid normal, urinalysis negative for ketones, infection or blood, PT and INR mildly elevated consistent with a poorly patient's anticoagulant state, mild hyponatremia of 128, otherwise normal electrolytes, normal glucose, normal renal function, normal liver enzymes, normal bilirubin, troponin essentially negative, lipase normal, BNP is elevated to 8000, baseline around 5000, magnesium and phosphorus are normal.    RADIOLOGY  I have independently interpreted the diagnostic imaging associated with this visit and am waiting the final reading from the radiologist.   My preliminary interpretation is a follows: No focal consolidation, no new changes and cardiomegaly  Radiologist interpretation is as follows:  CT-ABDOMEN-PELVIS WITH   Final Result      1.  Partially imaged 7 mm nodule in the right middle lobe for which further workup  with chest CT is recommended.   2.  Moderate right pleural effusion.   3.  Mild basilar pulmonary opacity suggesting atelectasis and/or infiltrates.   4.  Cardiomegaly. Reflux of IV contrast into hepatic veins suggesting cardiac insufficiency.   5.  Fatty liver infiltration.   6.  Severe intra and extra hepatic bile duct dilation for which further workup with MRCP is recommended.   7.  Mild ascites. Anasarca.   8.  Left ovarian/adnexal cystic lesion has increased in size. Recommend further workup with pelvic ultrasound.   9.  Mild small bowel ileus.   10.  Moderate diffuse gastric wall thickening, could be accentuated by under distention. They be further assessed with upper GI series.      DX-CHEST-PORTABLE (1 VIEW)   Final Result         Airspace opacities in the bilateral lower lobes, right more than left, atelectasis or infection.      EC-ECHOCARDIOGRAM COMPLETE W/O CONT    (Results Pending)     COURSE & MEDICAL DECISION MAKING  Nursing notes, VS, PMSFHx, labs, imaging, EKG reviewed in chart.    Heart Score: Moderate    ED Observation Status? No; Patient does not meet criteria for ED Observation.     Ddx: MI, PE, pneumonia, colitis, UTI, pyelonephritis, ureterolithiasis    MDM: 5:23 PM Ivory Rowan is a 84 y.o. female who presented with concerns for a few days of hematuria that is noted possible by patient and daughter.  Patient denies acute UTI symptoms however .  She has moderate dementia at baseline.  She also reports chronic nightly abdominal pain that is been going on for years.  Daughter at bedside helps provide most of the collateral formation and is an excellent historian.  She is on Eliquis for history of A-fib.  She was diagnosed on chart review on the 16th of last month with colitis and was found to have an anal fissure but denied further evaluation with sigmoidoscopy and underwent treatment with antibiotics at that time. Remote history of UTIs.  Upon arrival here patient is alert and oriented at  baseline for herself, notably hypertensive otherwise unremarkable vital signs.  Her physical exam shows some very mild tenderness that is generalized in the abdomen but no other acute findings.  Undergo workup for possible UTI.  Daughter also stated that she felt weak so she underwent a cardiac workup including chest x-ray, EKG and labs.  Chest x-ray on my independent interpretation shows no changes in cardiomegaly and no focal consolidation, however radiology reads that there is some opacification in the bilateral lower lobes right more than left that could be atelectasis or infection.  However patient has no signs of pneumonia, cough, shortness of breath etc.  Her EKG shows chronic A-fib that is rate controlled without ischemic changes. All labs reviewed by me. Labs were compared to prior labs if they were available. Significant for no leukocytosis, no anemia, lactic acid normal, urinalysis negative for ketones, infection or blood, PT and INR mildly elevated consistent with a poorly patient's anticoagulant state, mild hyponatremia of 128, otherwise normal electrolytes, normal glucose, normal renal function, normal liver enzymes, normal bilirubin, troponin essentially negative, lipase normal, BNP is elevated to 8000, baseline around 5000, magnesium and phosphorus are normal.  She will undergo CT imaging as her urinalysis is negative but she is telling abdominal discomfort.  CT imaging shows multiple chronic findings including concern for possible biliary duct dilation, pelvic mass that needs repeat imaging, pulmonary nodule that will need imaging as it is only partially imaged.  Overall none of these things are severely acute but I do think the patient is having congestive heart failure and would benefit from diuresis restarted here in the ED and continue diuresis as inpatient considering her elevated BNP and pleural effusion.  Daughter is amenable to plan for admission.  Patient stable at time admission.  Possible  amenable to admission.    Hypertension counseling:   I spent a total of 5 minutes counseling patient on blood pressure control and management.  We discussed medication management with PCP.  I recommended keeping a blood pressure journal, taking the blood pressure once in the morning once evening after resting for 5 minutes.  Presented in general to PCP and discussing medication management or adjustment.  Discussed hypertensive emergencies and what signs and symptoms to be monitoring for and to return to the ED for.  Patient verbalized understanding and was receptive of counseling.    ADDITIONAL PROBLEM LIST AND DISPOSITION    I have discussed management of the patient with the following physicians and SEBASTIAN's: Hospitalist    Discussion of management with other QHP or appropriate source(s): None     Barriers to care at this time, including but not limited to:  None .     Decision tools and prescription drugs considered including, but not limited to:  None .    FINAL IMPRESSION  1. Acute congestive heart failure, unspecified heart failure type (HCC) Acute   2. Generalized weakness Acute   3. Generalized abdominal pain Acute   4. Hyponatremia Acute   5. Hypertension, unspecified type Acute      Stevie LARA (Binta), am scribing for, and in the presence of, Tree Ledezma.    Electronically signed by: Stevie Wakefield (Sharleneibchon), 8/25/2024    ITree personally performed the services described in this documentation, as scribed by Stevie Wakefield in my presence, and it is both accurate and complete.    The note accurately reflects work and decisions made by me.  Tree Ledezma  8/25/2024  8:03 PM

## 2024-08-26 NOTE — PROGRESS NOTES
Monitor Summary:  Rhythm: Afib Rate: 48-62  Ectopies: occasional PVC, several pauses around 3.2 second,   Measurement: ./.07/.

## 2024-08-26 NOTE — PROGRESS NOTES
"Hospital Medicine Daily Progress Note    Date of Service  8/26/2024    Chief Complaint  Ivory Rowan is a 84 y.o. female admitted 8/25/2024 with Weakness (Worsening over the last 3 days. Reports hematuria & blood in stool, unsure how long that has been happening. Poor historian. Takes eliquis. ) and Abdominal Pain        Hospital Course  No notes on file    Interval Problem Update  Patient seen and examine at bedside  Managing for CHF exacerbation with pleural effusion and edema as well as ileus, hyponatremia. Fatty liver, severe ductal dilatation, increasing L ovarian and adnexal cystic lesion on CT, family and patient does not wish further work-up for this and is following their primary. CXR possible ateleatcasis, no leukocytosis so at this time I do not see indication for antibiotics. Ordered procalcitonin PENDING  No leukocytosis. Na 130. Despite reading of \"mild ileus\" patient placed on cardiac diet and moving bowels. Anticoagulated on apixaban (monitor HR, BP, melena, bleeding and renal function), digoxin (monitor HR and renal function), 2021 echo EF 55% elevated RVSP; echo odered and PENDING. Meanwhile Eliquis AND aspirin, BB, losartan and spironolactone (Monitor renal function and K+ when on ARB and spironolactone) On IV Lasix. Na 130, K 4.7, Cr-1.15-1.32. Monitor K+, renal function and blood pressures. Wound consult for pressure ulcers.     Confused/poor insight but can get agitated. She has NO CAPACITY therefore cannot leave against medical advice. Sinus pauses up to 4s last night. HOLD digoxin and BB. Consulted Dr. Lamar, Cardiology. Not sinus pauses likely just chornic Afib so no indication for pacer OK to continue BB if not bradycardic. Ok to hold digoxin in his opinion given elderly age and elderly renal function unless really needed for Afib w/ RVR. I spoke with daughter however who mentioned that she sees Dr. Washington, Cardiology and everytime he takes her off digoxin she gets into RVR therefore I " resumed digoxin. I spoke with daughter. Nursing reports patient has been wanting to leave AMA however when I saw her myself she cannot repeat the plan and she appears irritably confused. SHe has little to NO CAPACITY. Daughter confirms this even with her conversations with her. She is hoping she may go to rehab or skilled but feels she is not safe going home. At this point in time I agree with her. Ordered vitamins, thimaine, continue diuretics as above but trend Cr-    Likely needs skilled. PT/OT PENDING.     I reviewed the chart along with vitals, labs, imaging, test (both pending and resulted) and recommendations from specialists and interdisciplinary team.      I have discussed this patient's plan of care and discharge plan at IDT rounds today with Case Management, Nursing, Nursing leadership, and other members of the IDT team.    Consultants/Specialty  cardiology    Code Status  DNAR/DNI    Disposition  The patient is not medically cleared for discharge to home or a post-acute facility.      I have placed the appropriate orders for post-discharge needs.    Review of Systems  Review of Systems   Constitutional:  Negative for chills and fever.   HENT:  Negative for congestion, hearing loss and nosebleeds.    Eyes:  Negative for pain and redness.   Respiratory:  Negative for cough, hemoptysis, shortness of breath and wheezing.    Cardiovascular:  Negative for chest pain and palpitations.   Gastrointestinal:  Negative for abdominal pain, blood in stool, constipation, diarrhea, nausea and vomiting.   Genitourinary:  Negative for dysuria, frequency and hematuria.   Musculoskeletal:  Negative for falls, joint pain and myalgias.   Skin:  Negative for rash.   Neurological:  Positive for weakness. Negative for dizziness, tremors, focal weakness, seizures, loss of consciousness and headaches.   Psychiatric/Behavioral:  The patient is not nervous/anxious and does not have insomnia.    All other systems reviewed and are  negative.       Physical Exam  Temp:  [36.4 °C (97.5 °F)-36.7 °C (98.1 °F)] 36.5 °C (97.7 °F)  Pulse:  [51-80] 68  Resp:  [16-39] 18  BP: (136-200)/() 149/64  SpO2:  [72 %-100 %] 92 %    Physical Exam  Vitals and nursing note reviewed.   Constitutional:       General: She is not in acute distress.  HENT:      Head: Normocephalic and atraumatic.      Right Ear: External ear normal.      Left Ear: External ear normal.      Nose: Nose normal.      Mouth/Throat:      Mouth: Mucous membranes are moist.   Eyes:      General: No scleral icterus.     Conjunctiva/sclera: Conjunctivae normal.   Cardiovascular:      Rate and Rhythm: Normal rate and regular rhythm.      Pulses: Normal pulses.      Heart sounds: No murmur heard.     No friction rub. No gallop.   Pulmonary:      Effort: Pulmonary effort is normal. No respiratory distress.      Breath sounds: Normal breath sounds. No stridor. No wheezing, rhonchi or rales.   Chest:      Chest wall: No tenderness.   Abdominal:      General: Abdomen is flat. Bowel sounds are normal. There is no distension.      Palpations: Abdomen is soft.      Tenderness: There is no abdominal tenderness. There is no guarding or rebound.   Musculoskeletal:         General: No swelling, tenderness or deformity. Normal range of motion.      Cervical back: Normal range of motion and neck supple. No rigidity.      Right lower leg: Edema present.      Left lower leg: Edema present.   Skin:     General: Skin is warm.      Coloration: Skin is not jaundiced.      Findings: Lesion (pressure ulcer) present.   Neurological:      General: No focal deficit present.      Mental Status: She is alert and oriented to person, place, and time. Mental status is at baseline.   Psychiatric:         Mood and Affect: Mood normal.         Behavior: Behavior normal.         Thought Content: Thought content normal.         Judgment: Judgment normal.         Fluids    Intake/Output Summary (Last 24 hours) at 8/26/2024  1410  Last data filed at 8/26/2024 1341  Gross per 24 hour   Intake 550 ml   Output 3400 ml   Net -2850 ml       Laboratory  Recent Labs     08/25/24  1740 08/26/24  0239   WBC 5.6 6.0   RBC 5.88* 5.28   HEMOGLOBIN 15.7 14.1   HEMATOCRIT 51.0* 45.6   MCV 86.7 86.4   MCH 26.7* 26.7*   MCHC 30.8* 30.9*   RDW 81.1* 80.6*   PLATELETCT 185 172   MPV 9.0 8.9*     Recent Labs     08/25/24  1835 08/26/24  0239   SODIUM 128* 130*   POTASSIUM 4.9 4.7   CHLORIDE 97 95*   CO2 19* 22   GLUCOSE 113* 95   BUN 35* 35*   CREATININE 1.19 1.32   CALCIUM 9.2 9.7     Recent Labs     08/25/24  1740   APTT 33.3   INR 1.60*               Imaging  CT-ABDOMEN-PELVIS WITH   Final Result      1.  Partially imaged 7 mm nodule in the right middle lobe for which further workup with chest CT is recommended.   2.  Moderate right pleural effusion.   3.  Mild basilar pulmonary opacity suggesting atelectasis and/or infiltrates.   4.  Cardiomegaly. Reflux of IV contrast into hepatic veins suggesting cardiac insufficiency.   5.  Fatty liver infiltration.   6.  Severe intra and extra hepatic bile duct dilation for which further workup with MRCP is recommended.   7.  Mild ascites. Anasarca.   8.  Left ovarian/adnexal cystic lesion has increased in size. Recommend further workup with pelvic ultrasound.   9.  Mild small bowel ileus.   10.  Moderate diffuse gastric wall thickening, could be accentuated by under distention. They be further assessed with upper GI series.      DX-CHEST-PORTABLE (1 VIEW)   Final Result         Airspace opacities in the bilateral lower lobes, right more than left, atelectasis or infection.      EC-ECHOCARDIOGRAM COMPLETE W/O CONT    (Results Pending)        Assessment/Plan  * Acute on chronic systolic CHF (congestive heart failure) (HCC)- (present on admission)  Assessment & Plan  Echo 2021 showed an EF of 55%, mildly reduced RV function, moderate MR, RVSP of 40.  EF previously 45%  She is not volume overloaded with edema on  exam, CT imaging shows pleural effusion on the right  Will admit for CHF exacerbation, IV furosemide twice daily ordered.  BMP ordered to continue monitoring kidney function and electrolytes while on diuretics  Continue home metoprolol, spironolactone, losartan, digoxin  I/O, daily weights, fluid and salt restriction  Repeat echo    Hypertensive urgency- (present on admission)  Assessment & Plan  Continue home meds, as needed antihypertensives ordered    Generalized abdominal pain- (present on admission)  Assessment & Plan  She has chronic abdominal pain, her daughter thinks it may have gotten worse after she started using iron for history of anemia   Hemoglobin 15 on presentation, can stop iron for now until follow-up with her PCP next month   CT of the abdomen in the ED reviewed shows a known ovarian cyst for which she is following with her PCP  There is dilation of her CBD up to 20 mm however, denies postprandial pain, no right upper quadrant pain or Gaspar sign on exam.  LFTs and T. bili normal on labs.  I discussed these findings with the family and they do not wish to pursue MRCP.  If there are any changes, reconsider    Persistent atrial fibrillation (HCC)- (present on admission)  Assessment & Plan  Continue digoxin, metoprolol, apixaban    Vitals:    08/26/24 1145   BP: (!) 149/64   Pulse:    Resp:    Temp:    SpO2:      Pause not sinus ok to resume digoxin and BB    Stage 3a chronic kidney disease- (present on admission)  Assessment & Plan  At baseline, BMP ordered continue monitoring, avoid nephrotoxic agents    Mitral valve regurgitation- (present on admission)  Assessment & Plan  Moderate regurg seen on echo 2021.  She has been complaining of worsening shortness of breath and volume overload currently  Repeat echocardiogram ordered    Essential hypertension- (present on admission)  Assessment & Plan  Continue metoprolol, spironolactone, losartan  Vitals:    08/26/24 1145   BP: (!) 149/64   Pulse:     Resp:    Temp:    SpO2:      Stable    Dyslipidemia, goal LDL below 70- (present on admission)  Assessment & Plan  Continue ezetimibe         VTE prophylaxis: Eliquis    I have performed a physical exam and reviewed and updated ROS and Plan today (8/26/2024). In review of yesterday's note (8/25/2024), there are no changes except as documented above.    Patient is has a high medical complexity, complex decision making and is at high risk for complication, morbidity, and mortality, thus requiring the highest level of my preparedness for sudden, emergent intervention. Medical decision making is therefore complex. I provided  services, which included ordering labs and/or imaging, and discussing the case with various consultants.medication orders, frequent reevaluations of the patient's condition and response to treatment. Time was also devoted to counseling and coordinating care including review of records, pertinent lab data and studies, as well as discussing diagnostic evaluation and work up, planned therapeutic interventions and future disposition of care. Where indicated, the assessment and plan reflect discussion of patient with consultants, other healthcare providers, family members, and additional research needed to obtain further information in formulating the plan of care for Ivory Rowan. Total time spent was 65 minutes.   In addition to that I spent another 15 minutes for advanced care/counseling discussing and confirming code status and goals of care with patient, family, consultants and Palliative team.

## 2024-08-26 NOTE — DISCHARGE PLANNING
[facility] Transitional Care Coordination    Referral from:  Dr. Worthington  Insurance Provider on Facesheet:  SCP  Potential Rehab diagnosis:  Cardiac    Chart review indicates patient has ongoing medical management and may have therapy needs to possibly meet inpatient rehab facility criteria with the goal of returning to community.      D/C Support:  To be determined - Current documentation reflects Daughter/Hired Caregiver for intermittent support    Physiatry consult pended, waiting for additional information.  Would welcome PT as clinically appropriate.  TCC will follow.  Please reach out sooner if PMR consult requested for medical management.     Last Covid test:    Thank you for the referral.

## 2024-08-26 NOTE — PROGRESS NOTES
Monitor tech notified RN and NAP at 0145 that patient had a 3.1 second pause.  Patient then had three more > 3 second pauses. Provider was notified multiple times.  Magnesium sulfate was ordered and administered to patient.

## 2024-08-26 NOTE — CARE PLAN
The patient is Watcher - Medium risk of patient condition declining or worsening    Shift Goals  Clinical Goals: monitor I/Os, monitor VS and labs  Patient Goals: comfort  Family Goals: thu    Progress made toward(s) clinical / shift goals:    Problem: Pain - Standard  Goal: Alleviation of pain or a reduction in pain to the patient’s comfort goal  Outcome: Progressing     Problem: Knowledge Deficit - Standard  Goal: Patient and family/care givers will demonstrate understanding of plan of care, disease process/condition, diagnostic tests and medications  Outcome: Progressing     Problem: Skin Integrity  Goal: Skin integrity is maintained or improved  Outcome: Progressing     Problem: Fall Risk  Goal: Patient will remain free from falls  Outcome: Progressing     Problem: Depression  Goal: Patient and family/caregiver will verbalize accurate information about at least two of the possible causes of depression, three-four of the signs and symptoms of depression  Outcome: Progressing     Problem: Psychosocial  Goal: Patient's ability to identify and develop effective coping behaviors will improve  Outcome: Progressing     Problem: Care Map:  Admission Optimal Outcome for the Heart Failure Patient  Goal: Admission:  Optimal Care of the heart failure patient  Outcome: Progressing       Patient is not progressing towards the following goals:

## 2024-08-26 NOTE — H&P
Hospital Medicine History & Physical Note    Date of Service  8/25/2024    Primary Care Physician  Checo Tesfaye M.D.    Code Status  DNAR/DNI    Chief Complaint  Chief Complaint   Patient presents with    Weakness     Worsening over the last 3 days. Reports hematuria & blood in stool, unsure how long that has been happening. Poor historian. Takes eliquis.     Abdominal Pain       History of Presenting Illness  Ivory Rowan is a 84 y.o. female who presented 8/25/2024 with weakness.  PMH of CHF, hypertension.  She has been feeling weak for the past 3 days unable to stand on her own associated with some shortness of breath.  Denies cough or fever, has chronic chills.  She decided to come in today because she noted some blood in her urine, only 1 episode.  Denies dysuria or other urinary symptoms.  She has chronic epigastric abdominal pain which is not any worse than usual.  No changes in bowel habitus.    In the ED afebrile, BP significant elevated systolic up to 200s, saturating in 70s on room air and requiring 3 L O2 on my evaluation.  Left show NT proBNP of 8200, no hematuria on UA.    I discussed the plan of care with patient, bedside RN, and edp .    Review of Systems  Review of Systems   Constitutional:  Negative for fever.   Respiratory:  Positive for shortness of breath. Negative for cough.    Cardiovascular:  Negative for chest pain.   Gastrointestinal:  Positive for abdominal pain. Negative for diarrhea, nausea and vomiting.   Genitourinary:  Negative for dysuria and urgency.   Neurological:  Positive for weakness.       Past Medical History   has a past medical history of Acute on chronic heart failure with preserved ejection fraction (HCC) (08/13/2021), Age-related osteoporosis without current pathological fracture (01/17/2024), Atrial fibrillation (HCC), Basal cell carcinoma of skin of nose, CATARACT, CHF (congestive heart failure) (HCC), Colon polyp, Dental disorder, Dyslipidemia, Glaucoma, right  eye, Heart burn, Hemorrhagic disorder (HCC), History of cataract removal with insertion of prosthetic lens (04/09/2024), Hypertension, IBS (irritable bowel syndrome), Indigestion, MEDICAL HOME (10/23/2012), Mitral valve regurgitation, Osteopenia (06/2009), Perennial allergic rhinitis with seasonal variation, Persistent atrial fibrillation (HCC), Psychiatric problem, Type 2 diabetes mellitus, controlled (HCC), Valvular heart disease, and Vertigo.    Surgical History   has a past surgical history that includes breast biopsy; recovery (7/8/2016); us-needle core bx-breast panel; appendectomy; cholecystectomy; and colonoscopy (8/2009).     Family History  family history includes Cancer in her father, maternal aunt, paternal aunt, and sister; Diabetes in her mother; Genetic Disorder in her sister; Heart Disease in her brother; Hypertension in her mother; Stroke in her mother.   Family history reviewed with patient. There is no family history that is pertinent to the chief complaint.     Social History   reports that she quit smoking about 28 years ago. Her smoking use included cigarettes. She started smoking about 68 years ago. She has a 20 pack-year smoking history. She has never used smokeless tobacco. She reports that she does not currently use alcohol. She reports that she does not use drugs.    Allergies  Allergies   Allergen Reactions    Atorvastatin      myalgias    Simvastatin      myalgias       Medications  Prior to Admission Medications   Prescriptions Last Dose Informant Patient Reported? Taking?   apixaban (ELIQUIS) 2.5mg Tab 8/25/2024 at 0800 Patient, Family Member No Yes   Sig: Take 1 Tablet by mouth 2 times a day.   aspirin (ASPIRIN LOW DOSE) 81 MG EC tablet 8/25/2024 at 0800 Patient, Family Member No Yes   Sig: TAKE 1 TABLET BY MOUTH EVERY DAY   diazepam (VALIUM) 10 MG tablet 8/24/2024 at 1900 Patient, Family Member No Yes   Sig: Take 1 Tablet by mouth every 12 hours as needed for Anxiety or Sleep for up  to 90 days. 60 tablets is a 30 day quantity   digoxin (LANOXIN) 125 MCG Tab 8/24/2024 at 1900 Patient, Family Member No Yes   Sig: Take 1 Tablet by mouth every day.   ezetimibe (ZETIA) 10 MG Tab 8/25/2024 at 0800 Patient, Family Member No Yes   Sig: Take 1 Tablet by mouth every day.   famotidine (PEPCID) 20 MG Tab 8/25/2024 at 0800 Patient, Family Member No Yes   Sig: Take 1 Tablet by mouth every day.   ferrous gluconate (FERGON) 240 (27 Fe) MG tablet 8/25/2024 at 1200 Patient, Family Member No Yes   Sig: Take 1 Tablet by mouth with dinner.   furosemide (LASIX) 20 MG Tab 8/15/2024 at 0800 Patient, Family Member No No   Sig: TAKE 1 TABLET BY MOUTH 1 TIME A DAY AS NEEDED (FOR WEIGHT GAIN, OR INCREASED SWELLING OR SHORTNESS OF BREATH).   latanoprost (XALATAN) 0.005 % Solution 8/24/2024 at 1900 Patient, Family Member Yes Yes   Sig: Administer 1 Drop into both eyes at bedtime. Instill 1 drop into both eyes every night at bedtime   losartan (COZAAR) 100 MG Tab 8/25/2024 at 0800 Patient, Family Member No Yes   Sig: Take 1 Tablet by mouth every day.   metoprolol SR (TOPROL XL) 100 MG TABLET SR 24 HR 8/25/2024 at 0800 Patient, Family Member No Yes   Sig: Take 1 Tablet by mouth 2 times a day.   pantoprazole (PROTONIX) 40 MG Tablet Delayed Response 8/25/2024 at 0600 Patient, Family Member No Yes   Sig: TAKE 1 TABLET BY MOUTH EVERY DAY   spironolactone (ALDACTONE) 25 MG Tab 8/25/2024 at 0800 Patient, Family Member No Yes   Sig: Take 0.5 Tablets by mouth every day.      Facility-Administered Medications: None       Physical Exam  Temp:  [36.5 °C (97.7 °F)] 36.5 °C (97.7 °F)  Pulse:  [59-80] 70  Resp:  [18-39] 18  BP: (143-200)/() 186/89  SpO2:  [72 %-99 %] 99 %  Blood Pressure : (!) 200/131   Temperature: 36.5 °C (97.7 °F)   Pulse: 72   Respiration: 18   Pulse Oximetry: 98 %       Physical Exam  HENT:      Head: Normocephalic and atraumatic.      Mouth/Throat:      Mouth: Mucous membranes are moist.      Pharynx: No  oropharyngeal exudate or posterior oropharyngeal erythema.   Cardiovascular:      Rate and Rhythm: Normal rate and regular rhythm.      Pulses: Normal pulses.      Heart sounds: Normal heart sounds. No murmur heard.  Pulmonary:      Effort: No respiratory distress.      Comments: Decreased breath sounds on the right  Musculoskeletal:         General: No tenderness. Normal range of motion.      Right lower leg: Edema present.      Left lower leg: Edema present.   Skin:     General: Skin is warm and dry.   Neurological:      General: No focal deficit present.      Mental Status: She is alert and oriented to person, place, and time.   Psychiatric:         Mood and Affect: Mood normal.         Laboratory:  Recent Labs     08/25/24  1740   WBC 5.6   RBC 5.88*   HEMOGLOBIN 15.7   HEMATOCRIT 51.0*   MCV 86.7   MCH 26.7*   MCHC 30.8*   RDW 81.1*   PLATELETCT 185   MPV 9.0     Recent Labs     08/25/24  1835   SODIUM 128*   POTASSIUM 4.9   CHLORIDE 97   CO2 19*   GLUCOSE 113*   BUN 35*   CREATININE 1.19   CALCIUM 9.2     Recent Labs     08/25/24  1835   ALTSGPT 29   ASTSGOT 32   ALKPHOSPHAT 107*   TBILIRUBIN 1.4   LIPASE 63   GLUCOSE 113*     Recent Labs     08/25/24  1740   APTT 33.3   INR 1.60*     Recent Labs     08/25/24  1835   NTPROBNP 8282*         Recent Labs     08/25/24  1835   TROPONINT 25*       Imaging:  CT-ABDOMEN-PELVIS WITH   Final Result      1.  Partially imaged 7 mm nodule in the right middle lobe for which further workup with chest CT is recommended.   2.  Moderate right pleural effusion.   3.  Mild basilar pulmonary opacity suggesting atelectasis and/or infiltrates.   4.  Cardiomegaly. Reflux of IV contrast into hepatic veins suggesting cardiac insufficiency.   5.  Fatty liver infiltration.   6.  Severe intra and extra hepatic bile duct dilation for which further workup with MRCP is recommended.   7.  Mild ascites. Anasarca.   8.  Left ovarian/adnexal cystic lesion has increased in size. Recommend further  workup with pelvic ultrasound.   9.  Mild small bowel ileus.   10.  Moderate diffuse gastric wall thickening, could be accentuated by under distention. They be further assessed with upper GI series.      DX-CHEST-PORTABLE (1 VIEW)   Final Result         Airspace opacities in the bilateral lower lobes, right more than left, atelectasis or infection.      EC-ECHOCARDIOGRAM COMPLETE W/O CONT    (Results Pending)       X-Ray:  I have personally reviewed the images and compared with prior images. and My impression is: Opacity in the right  EKG:  I have personally reviewed the images and compared with prior images. and My impression is: A-fib    Assessment/Plan:  Justification for Admission Status  I anticipate this patient will require at least two midnights for appropriate medical management, necessitating inpatient admission because CHF exacerbation    * Acute on chronic systolic CHF (congestive heart failure) (HCC)- (present on admission)  Assessment & Plan  Echo 2021 showed an EF of 55%, mildly reduced RV function, moderate MR, RVSP of 40.  EF previously 45%  She is not volume overloaded with edema on exam, CT imaging shows pleural effusion on the right  Will admit for CHF exacerbation, IV furosemide twice daily ordered.  BMP ordered to continue monitoring kidney function and electrolytes while on diuretics  Continue home metoprolol, spironolactone, losartan, digoxin  I/O, daily weights, fluid and salt restriction  Repeat echo    Persistent atrial fibrillation (HCC)- (present on admission)  Assessment & Plan  Continue digoxin, metoprolol, apixaban    Stage 3a chronic kidney disease- (present on admission)  Assessment & Plan  At baseline, BMP ordered continue monitoring, avoid nephrotoxic agents    Mitral valve regurgitation- (present on admission)  Assessment & Plan  Moderate regurg seen on echo 2021.  She has been complaining of worsening shortness of breath and volume overload currently  Repeat echocardiogram  ordered    Generalized abdominal pain- (present on admission)  Assessment & Plan  She has chronic abdominal pain, her daughter thinks it may have gotten worse after she started using iron for history of anemia   Hemoglobin 15 on presentation, can stop iron for now until follow-up with her PCP next month   CT of the abdomen in the ED reviewed shows a known ovarian cyst for which she is following with her PCP  There is dilation of her CBD up to 20 mm however, denies postprandial pain, no right upper quadrant pain or Gaspar sign on exam.  LFTs and T. bili normal on labs.  I discussed these findings with the family and they do not wish to pursue MRCP.  If there are any changes, reconsider    Hypertensive urgency- (present on admission)  Assessment & Plan  Continue home meds, as needed antihypertensives ordered    Essential hypertension- (present on admission)  Assessment & Plan  Continue metoprolol, spironolactone, losartan    Dyslipidemia, goal LDL below 70- (present on admission)  Assessment & Plan  Continue ezetimibe        VTE prophylaxis: therapeutic anticoagulation with apixaban

## 2024-08-26 NOTE — PROGRESS NOTES
Bedside report received from off going RN/tech: Case, assumed care of patient.     Fall Risk Score: HIGH RISK  Fall risk interventions in place: Place yellow fall risk ID band on patient, Provide patient/family education based on risk assessment, Educate patient/family to call staff for assistance when getting out of bed, Place fall precaution signage outside patient door, Place patient in room close to nursing station, and Utilize bed/chair fall alarm  Bed type: Regular (Donavon Score less than 17 interventions in place)  Patient on cardiac monitor: Yes  IVF/IV medications: Not Applicable   Oxygen: How many liters 2L  Bedside sitter: Not Applicable   Isolation: Not applicable

## 2024-08-26 NOTE — THERAPY
"Physical Therapy   Initial Evaluation     Patient Name: Ivory Rowan  Age:  84 y.o., Sex:  female  Medical Record #: [id number]  Today's Date: 8/26/2024     Precautions  Precautions: Fall Risk    Assessment  Patient is 84 y.o. female presenting with weakness, SOB, hematuria. Pt found to have acute on chronic systolic CHF, a fib, hypertensive urgency. Pt with PMH including CHF, HTN, DLD, CKD 3a, mitral valve regurgitation, chronic epigastric abdominal pain. Pt is independent with functional mobility at baseline using no AD. Lives in a senior apartment alone. Dtr lives nearby and checks on pt daily per RN. Pt was a questionable historian.    During current session, pt presents with impaired cog, poor safety awareness, generalized weakness, impaired balance, and decreased endurance requiring overall min A-CGA for mobility as detailed below. Able to walk 12 ft with FWW, pt self-limited distance d/t low motivation and pleasant confusion. Encouraged pt to continue mobilizing OOB with nursing as tolerated. Recommend post-acute placement to address these significant functional deficits below baseline. Pt would benefit from continued acute PT services to improve functional mobility prior to d/c.    Plan    Physical Therapy Initial Treatment Plan   Treatment Plan : Bed Mobility, Equipment, Family / Caregiver Training, Gait Training, Manual Therapy, Neuro Re-Education / Balance, Self Care / Home Evaluation, Stair Training, Therapeutic Activities, Therapeutic Exercise  Treatment Frequency: 4 Times per Week  Duration: Until Therapy Goals Met    DC Equipment Recommendations: Unable to determine at this time  Discharge Recommendations: Recommend post-acute placement for additional physical therapy services prior to discharge home       Subjective    Pt received up in recliner, agreeable to participate. \"I can't walk right now, I don't have a bra on.\"     Objective       08/26/24 1138   Initial Contact Note    Initial Contact " "Note Order Received and Verified, Physical Therapy Evaluation in Progress with Full Report to Follow.   Precautions   Precautions Fall Risk   Vitals   O2 Delivery Device None - Room Air   Pain 0 - 10 Group   Therapist Pain Assessment Post Activity Pain Same as Prior to Activity;Nurse Notified  (no c/o pain)   Prior Living Situation   Prior Services None   Housing / Facility 1 Story Apartment / Condo  (senior apartment)   Steps Into Home 0   Steps In Home 0   Equipment Owned None   Lives with - Patient's Self Care Capacity Alone and Unable to Care For Self   Comments Dtr lives nearby. Pt reports that dtr does not provide any help other than driving her to dr's appointments, however per RN dtr goes to see pt every day. Pt endorses having a helper through [employer] who takes her grocery shopping 1x/wk   Prior Level of Functional Mobility   Bed Mobility Independent   Transfer Status Independent   Ambulation Independent   Ambulation Distance limited community   Assistive Devices Used None   Stairs Independent   History of Falls   History of Falls Yes   Date of Last Fall   (related to admit, pt denies any other falls in last 5 yrs)   Cognition    Cognition / Consciousness X   Level of Consciousness Alert   Ability To Follow Commands 1 Step   Safety Awareness Impaired   New Learning Impaired   Attention Impaired   Comments pleasant but resistant to mobility stating that she feels \"lazy,\" cooperative with max encouragement, unclear cog baseline   Passive ROM Lower Body   Passive ROM Lower Body WDL   Active ROM Lower Body    Active ROM Lower Body  WDL   Strength Lower Body   Lower Body Strength  X   Gross Strength Generalized Weakness, Equal Bilaterally   Comments functional for short distance ambulation   Sensation Lower Body   Comments no c/o altered sensation BLE   Coordination Lower Body    Coordination Lower Body  X   Comments slow   Balance Assessment   Sitting Balance (Static) Fair   Sitting Balance " (Dynamic) Fair   Standing Balance (Static) Fair -   Standing Balance (Dynamic) Fair -   Weight Shift Sitting Fair   Weight Shift Standing Fair   Comments FWW   Bed Mobility    Scooting Standby Assist   Comments up in recliner pre and post   Gait Analysis   Gait Level Of Assist Minimal Assist   Assistive Device Front Wheel Walker   Distance (Feet) 12   # of Times Distance was Traveled 1   Deviation Bradykinetic;Shuffled Gait   # of Stairs Climbed 0   Comments pt with poor environmental awareness intermittently running FWW into obstacles   Functional Mobility   Sit to Stand Contact Guard Assist   Bed, Chair, Wheelchair Transfer Contact Guard Assist   Transfer Method Stand Step   Mobility recliner>up in room FWW>recliner   6 Clicks Assessment - How much HELP from from another person do you currently need... (If the patient hasn't done an activity recently, how much help from another person do you think he/she would need if he/she tried?)   Turning from your back to your side while in a flat bed without using bedrails? 3   Moving from lying on your back to sitting on the side of a flat bed without using bedrails? 3   Moving to and from a bed to a chair (including a wheelchair)? 3   Standing up from a chair using your arms (e.g., wheelchair, or bedside chair)? 3   Walking in hospital room? 3   Climbing 3-5 steps with a railing? 2   6 clicks Mobility Score 17   Patient / Family Goals    Patient / Family Goal #1   (pt did not state)   Short Term Goals    Short Term Goal # 1 Pt will perform supine<>sit with SPV to progress bed mobility in 6 visits.   Short Term Goal # 2 Pt will perform sit<>stand and stand step txfer with FWW and SPV to progress OOB mobility in 6 visits.   Short Term Goal # 3 Pt will ambulate 150 ft with FWW and SPV to access home in 6 visits.   Short Term Goal # 4 Pt will navigate 1 curb step with FWW and SPV to access community in 6 visits.   Education Group   Education Provided Role of Physical Therapist    Role of Physical Therapist Patient Response Patient;Acceptance;Explanation;Demonstration;Verbal Demonstration;Action Demonstration;Reinforcement Needed   Physical Therapy Initial Treatment Plan    Treatment Plan  Bed Mobility;Equipment;Family / Caregiver Training;Gait Training;Manual Therapy;Neuro Re-Education / Balance;Self Care / Home Evaluation;Stair Training;Therapeutic Activities;Therapeutic Exercise   Treatment Frequency 4 Times per Week   Duration Until Therapy Goals Met   Problem List    Problems Impaired Bed Mobility;Impaired Transfers;Impaired Ambulation;Functional Strength Deficit;Impaired Balance;Decreased Activity Tolerance;Safety Awareness Deficits / Cognition   Anticipated Discharge Equipment and Recommendations   DC Equipment Recommendations Unable to determine at this time   Discharge Recommendations Recommend post-acute placement for additional physical therapy services prior to discharge home   Interdisciplinary Plan of Care Collaboration   IDT Collaboration with  Nursing;Occupational Therapist   Patient Position at End of Therapy Seated;Chair Alarm On;Call Light within Reach;Tray Table within Reach;Phone within Reach   Collaboration Comments RN and OT updated   Session Information   Date / Session Number  8/26- 1(1/4, 9/1)

## 2024-08-26 NOTE — PROGRESS NOTES
4 Eyes Skin Assessment Completed by ROSIE Willoughby and ROSIE Calvin.    Head WDL  Ears Redness, Blanching, Non-Blanching, and Discoloration    Nose Redness, Blanching, and Discoloration    Mouth Redness  Neck Redness and Blanching  Breast/Chest Redness and Excoriation          Shoulder Blades WDL  Spine Bruising, scattered scabs    (R) Arm/Elbow/Hand Redness, Blanching, and Bruising    (L) Arm/Elbow/Hand Redness, Blanching, and Bruising    Abdomen WDL  Groin Redness and Blanching  Scrotum/Coccyx/Buttocks Redness and Blanching  (R) Leg Redness, Blanching, and Edema    (L) Leg Redness, Blanching, Scab, Bruising, and Edema (scratch on thigh)    (R) Heel/Foot/Toe Redness, Blanching, Boggy, and Swelling  (L) Heel/Foot/Toe Redness, Blanching, Boggy, and Swelling          Devices In Places Tele Box, Blood Pressure Cuff, Pulse Ox, and Nasal Cannula      Interventions In Place Gray Ear Foams, NC W/Ear Foams, Heel Mepilex, TAP System, Barrier Cream, and Pressure Redistribution Mattress    Possible Skin Injury Yes    Pictures Uploaded Into Epic Yes  Wound Consult Placed Yes  RN Wound Prevention Protocol Ordered Yes

## 2024-08-27 ENCOUNTER — APPOINTMENT (OUTPATIENT)
Dept: CARDIOLOGY | Facility: MEDICAL CENTER | Age: 84
End: 2024-08-27
Attending: INTERNAL MEDICINE
Payer: MEDICARE

## 2024-08-27 PROBLEM — G93.40 ENCEPHALOPATHY: Status: ACTIVE | Noted: 2024-08-27

## 2024-08-27 LAB
ALBUMIN SERPL BCP-MCNC: 4.2 G/DL (ref 3.2–4.9)
BUN SERPL-MCNC: 30 MG/DL (ref 8–22)
CALCIUM ALBUM COR SERPL-MCNC: 10.2 MG/DL (ref 8.5–10.5)
CALCIUM SERPL-MCNC: 10.4 MG/DL (ref 8.5–10.5)
CHLORIDE SERPL-SCNC: 90 MMOL/L (ref 96–112)
CO2 SERPL-SCNC: 30 MMOL/L (ref 20–33)
CREAT SERPL-MCNC: 1 MG/DL (ref 0.5–1.4)
EKG IMPRESSION: NORMAL
ERYTHROCYTE [DISTWIDTH] IN BLOOD BY AUTOMATED COUNT: 81 FL (ref 35.9–50)
GFR SERPLBLD CREATININE-BSD FMLA CKD-EPI: 56 ML/MIN/1.73 M 2
GLUCOSE SERPL-MCNC: 121 MG/DL (ref 65–99)
HCT VFR BLD AUTO: 54.4 % (ref 37–47)
HGB BLD-MCNC: 17.3 G/DL (ref 12–16)
LV EJECT FRACT  99904: 60
LV EJECT FRACT MOD 2C 99903: 58.35
LV EJECT FRACT MOD 4C 99902: 62.33
LV EJECT FRACT MOD BP 99901: 60.92
MCH RBC QN AUTO: 27.2 PG (ref 27–33)
MCHC RBC AUTO-ENTMCNC: 31.8 G/DL (ref 32.2–35.5)
MCV RBC AUTO: 85.4 FL (ref 81.4–97.8)
PHOSPHATE SERPL-MCNC: 3.9 MG/DL (ref 2.5–4.5)
PLATELET # BLD AUTO: 178 K/UL (ref 164–446)
PMV BLD AUTO: 9.3 FL (ref 9–12.9)
POTASSIUM SERPL-SCNC: 4.2 MMOL/L (ref 3.6–5.5)
RBC # BLD AUTO: 6.37 M/UL (ref 4.2–5.4)
SODIUM SERPL-SCNC: 136 MMOL/L (ref 135–145)
WBC # BLD AUTO: 8 K/UL (ref 4.8–10.8)

## 2024-08-27 PROCEDURE — 700111 HCHG RX REV CODE 636 W/ 250 OVERRIDE (IP): Mod: JZ

## 2024-08-27 PROCEDURE — 97530 THERAPEUTIC ACTIVITIES: CPT

## 2024-08-27 PROCEDURE — A9270 NON-COVERED ITEM OR SERVICE: HCPCS | Performed by: STUDENT IN AN ORGANIZED HEALTH CARE EDUCATION/TRAINING PROGRAM

## 2024-08-27 PROCEDURE — 93005 ELECTROCARDIOGRAM TRACING: CPT | Performed by: INTERNAL MEDICINE

## 2024-08-27 PROCEDURE — 36415 COLL VENOUS BLD VENIPUNCTURE: CPT

## 2024-08-27 PROCEDURE — A9270 NON-COVERED ITEM OR SERVICE: HCPCS | Performed by: INTERNAL MEDICINE

## 2024-08-27 PROCEDURE — 85027 COMPLETE CBC AUTOMATED: CPT

## 2024-08-27 PROCEDURE — 93306 TTE W/DOPPLER COMPLETE: CPT | Mod: 26 | Performed by: INTERNAL MEDICINE

## 2024-08-27 PROCEDURE — 700102 HCHG RX REV CODE 250 W/ 637 OVERRIDE(OP): Performed by: INTERNAL MEDICINE

## 2024-08-27 PROCEDURE — 99233 SBSQ HOSP IP/OBS HIGH 50: CPT | Performed by: INTERNAL MEDICINE

## 2024-08-27 PROCEDURE — 99223 1ST HOSP IP/OBS HIGH 75: CPT | Performed by: PHYSICAL MEDICINE & REHABILITATION

## 2024-08-27 PROCEDURE — 700102 HCHG RX REV CODE 250 W/ 637 OVERRIDE(OP): Performed by: STUDENT IN AN ORGANIZED HEALTH CARE EDUCATION/TRAINING PROGRAM

## 2024-08-27 PROCEDURE — 99232 SBSQ HOSP IP/OBS MODERATE 35: CPT | Performed by: INTERNAL MEDICINE

## 2024-08-27 PROCEDURE — 80069 RENAL FUNCTION PANEL: CPT

## 2024-08-27 PROCEDURE — 93306 TTE W/DOPPLER COMPLETE: CPT

## 2024-08-27 PROCEDURE — 700111 HCHG RX REV CODE 636 W/ 250 OVERRIDE (IP): Mod: JZ | Performed by: STUDENT IN AN ORGANIZED HEALTH CARE EDUCATION/TRAINING PROGRAM

## 2024-08-27 PROCEDURE — 93010 ELECTROCARDIOGRAM REPORT: CPT | Performed by: INTERNAL MEDICINE

## 2024-08-27 PROCEDURE — 770020 HCHG ROOM/CARE - TELE (206)

## 2024-08-27 RX ORDER — LATANOPROST 50 UG/ML
1 SOLUTION/ DROPS OPHTHALMIC
Status: DISCONTINUED | OUTPATIENT
Start: 2024-08-27 | End: 2024-08-29 | Stop reason: HOSPADM

## 2024-08-27 RX ORDER — POLYETHYLENE GLYCOL 3350 17 G/17G
1 POWDER, FOR SOLUTION ORAL
Status: DISCONTINUED | OUTPATIENT
Start: 2024-08-27 | End: 2024-08-27

## 2024-08-27 RX ORDER — AMOXICILLIN 250 MG
2 CAPSULE ORAL EVERY EVENING
Status: DISCONTINUED | OUTPATIENT
Start: 2024-08-28 | End: 2024-08-27

## 2024-08-27 RX ORDER — QUETIAPINE FUMARATE 25 MG/1
25 TABLET, FILM COATED ORAL EVERY 6 HOURS PRN
Status: DISCONTINUED | OUTPATIENT
Start: 2024-08-27 | End: 2024-08-29 | Stop reason: HOSPADM

## 2024-08-27 RX ORDER — DIAZEPAM 5 MG
10 TABLET ORAL EVERY 12 HOURS PRN
Status: DISCONTINUED | OUTPATIENT
Start: 2024-08-27 | End: 2024-08-29 | Stop reason: HOSPADM

## 2024-08-27 RX ADMIN — APIXABAN 2.5 MG: 2.5 TABLET, FILM COATED ORAL at 11:40

## 2024-08-27 RX ADMIN — METOPROLOL SUCCINATE 100 MG: 100 TABLET, EXTENDED RELEASE ORAL at 17:24

## 2024-08-27 RX ADMIN — CYANOCOBALAMIN TAB 500 MCG 1000 MCG: 500 TAB at 11:58

## 2024-08-27 RX ADMIN — HALOPERIDOL LACTATE 5 MG: 5 INJECTION, SOLUTION INTRAMUSCULAR at 01:18

## 2024-08-27 RX ADMIN — METOPROLOL SUCCINATE 100 MG: 100 TABLET, EXTENDED RELEASE ORAL at 11:41

## 2024-08-27 RX ADMIN — DIGOXIN 125 MCG: 0.12 TABLET ORAL at 17:25

## 2024-08-27 RX ADMIN — APIXABAN 2.5 MG: 2.5 TABLET, FILM COATED ORAL at 17:25

## 2024-08-27 RX ADMIN — ASPIRIN 81 MG: 81 TABLET, COATED ORAL at 11:57

## 2024-08-27 RX ADMIN — FUROSEMIDE 80 MG: 10 INJECTION INTRAMUSCULAR; INTRAVENOUS at 05:06

## 2024-08-27 RX ADMIN — THERA TABS 1 TABLET: TAB at 11:57

## 2024-08-27 RX ADMIN — SPIRONOLACTONE 12.5 MG: 25 TABLET ORAL at 11:57

## 2024-08-27 RX ADMIN — OMEPRAZOLE 20 MG: 20 CAPSULE, DELAYED RELEASE ORAL at 11:57

## 2024-08-27 RX ADMIN — LATANOPROST 1 DROP: 50 SOLUTION OPHTHALMIC at 21:00

## 2024-08-27 RX ADMIN — LOSARTAN POTASSIUM 100 MG: 50 TABLET, FILM COATED ORAL at 11:39

## 2024-08-27 RX ADMIN — FUROSEMIDE 80 MG: 10 INJECTION INTRAMUSCULAR; INTRAVENOUS at 17:24

## 2024-08-27 RX ADMIN — EZETIMIBE 10 MG: 10 TABLET ORAL at 11:57

## 2024-08-27 ASSESSMENT — PAIN DESCRIPTION - PAIN TYPE
TYPE: ACUTE PAIN
TYPE: ACUTE PAIN

## 2024-08-27 ASSESSMENT — COGNITIVE AND FUNCTIONAL STATUS - GENERAL
WALKING IN HOSPITAL ROOM: TOTAL
STANDING UP FROM CHAIR USING ARMS: A LOT
MOBILITY SCORE: 10
SUGGESTED CMS G CODE MODIFIER MOBILITY: CL
TURNING FROM BACK TO SIDE WHILE IN FLAT BAD: A LOT
MOVING TO AND FROM BED TO CHAIR: A LOT
CLIMB 3 TO 5 STEPS WITH RAILING: TOTAL
MOVING FROM LYING ON BACK TO SITTING ON SIDE OF FLAT BED: A LOT

## 2024-08-27 ASSESSMENT — FIBROSIS 4 INDEX: FIB4 SCORE: 3.15

## 2024-08-27 ASSESSMENT — GAIT ASSESSMENTS: GAIT LEVEL OF ASSIST: UNABLE TO PARTICIPATE

## 2024-08-27 NOTE — CARE PLAN
The patient is Watcher - Medium risk of patient condition declining or worsening    Shift Goals  Clinical Goals: vss, monitor labs, safety, rest  Patient Goals: rest, go home  Family Goals: thu- none present    Progress made toward(s) clinical / shift goals:    Problem: Pain - Standard  Goal: Alleviation of pain or a reduction in pain to the patient’s comfort goal  Outcome: Progressing     Problem: Knowledge Deficit - Standard  Goal: Patient and family/care givers will demonstrate understanding of plan of care, disease process/condition, diagnostic tests and medications  Outcome: Progressing     Problem: Skin Integrity  Goal: Skin integrity is maintained or improved  Outcome: Progressing     Problem: Fall Risk  Goal: Patient will remain free from falls  Outcome: Progressing     Problem: Safety - Medical Restraint  Goal: Remains free of injury from restraints (Restraint for Interference with Medical Device)  Outcome: Progressing       Patient is not progressing towards the following goals:

## 2024-08-27 NOTE — DISCHARGE PLANNING
Met with daughter Conchita. Pt is restrained.   Conchita said that pt was living independently at [facility] apartments.  Pt was independent with ADLs and IADLs, able to drive to grocery store.  But 2 weeks ago, the pt's situation changed and pt could barely walk and was SOB.    We discussed discharge planning. Pt is currently restrained. So unable to go to SNF. But Explained to daughter that several SNF have accepted pt. Notified her that [facility] have confirmed acceptance and she want pt to go to [facility] because it is close to her house and she knows people there. Daughter said that she is planning to clean one of the bedrooms in her house so pt can move in after SNF.     Confirmed information on facesheet as correct.   PCP is :Dr. Checo Tesfaye  Pharmacy is :[facility] [address]  DME:none  Social Support:daughter Conchita  Anticipate dc plan and resources needed:SNF ( daughter chose [facility])  Transportation:will need medical transport.      Care Transition Team Assessment    Information Source  Orientation Level: Unable to assess  Information Given By: Other (Comments)  Who is responsible for making decisions for patient? : Adult child    Readmission Evaluation  Is this a readmission?: No    Elopement Risk  Legal Hold: No  Ambulatory or Self Mobile in Wheelchair: Yes  Disoriented: Time-At Risk for Elopement, Situation-At Risk for Elopement  Psychiatric Symptoms: Impulsivity-at Risk for Elopement  History of Wandering: No  Elopement this Admit: No  Vocalizing Wanting to Leave: No  Displays Behaviors, Body Language Wanting to Leave: No-Not at Risk for Elopement  Elopement Risk: Not at Risk for Elopement    Interdisciplinary Discharge Planning  Does Admitting Nurse Feel This Could be a Complex Discharge?: No  Primary Care Physician: Dr Checo Tesfaye  Lives with - Patient's Self Care Capacity: Alone and Able to Care For Self  Patient or legal guardian wants to designate a caregiver: No  Support Systems:  Children  Housing / Facility: 1 Story Apartment / Condo (senior apartment)  Do You Take your Prescribed Medications Regularly: Yes  Able to Return to Previous ADL's: Future Time w/Therapy  Mobility Issues: No  Prior Services: Home-Independent  Patient Prefers to be Discharged to:: SNF  Assistance Needed: No  Durable Medical Equipment: Not Applicable    Discharge Preparedness  What is your plan after discharge?: Skilled nursing facility  What are your discharge supports?: Child  Prior Functional Level: Ambulatory, Drives Self, Independent with Activities of Daily Living, Independent with Medication Management  Difficulity with ADLs: Walking, Toileting, Bathing, Dressing  Difficulity with IADLs: Cooking, Driving, Keeping track of finances, Laundry, Managing medication, Shopping, Using the telephone or computer    Functional Assesment  Prior Functional Level: Ambulatory, Drives Self, Independent with Activities of Daily Living, Independent with Medication Management    Finances  Financial Barriers to Discharge: No  Prescription Coverage: Yes    Vision / Hearing Impairment  Vision Impairment : Yes  Right Eye Vision: Wears Glasses, Other (Comments)  Left Eye Vision: Wears Glasses, Other (Comments)  Hearing Impairment : Yes  Hearing Impairment: Both Ears, Hearing Device Not Available, Patient Declines to Wear Hearing Device(s)    Values / Beliefs / Concerns  Values / Beliefs Concerns : No    Advance Directive  Advance Directive?: Living Will    Domestic Abuse  Have you ever been the victim of abuse or violence?: No  Possible Abuse/Neglect Reported to:: Not Applicable         Discharge Risks or Barriers  Discharge risks or barriers?: Post-acute placement / services  Patient risk factors: Cognitive / sensory / physical deficit    Anticipated Discharge Information  Discharge Disposition: D/T to SNF with Medicare cert in anticipation of skilled care (03)

## 2024-08-27 NOTE — CONSULTS
Physical Medicine and Rehabilitation Consultation          Date of initial consultation: 8/27/2024  Consulting provider: Eugenio Worthington M.D.   Reason for consultation: assess for acute inpatient rehab appropriateness  LOS: 2 Day(s)    Chief complaint: AMS    HPI: The patient is a 84 y.o. female with a past medical history of hypertension, atrial fibrillation, CAD status post PCI to RCA;  who presented on 8/25/2024  4:38 PM with acute heart failure.  Patient reported difficulty sleeping and struggling to get out of bed for 3 days due to weakness.  Patient also reported hematuria and blood in stools, as well as abdominal pain.  Patient underwent medical workup including review of medications.  Patient was found to have no capacity, and is not safe to return home.  Patient is requiring restraints.  She has been accepted by several SNF's    The patient currently presents obtunded.  She is in bilateral upper extremity wrist restraints, she is unable to open her eyes, does not answer questions or follow commands.      ROS  Pertinent positives are mentioned in the HPI, all others reviewed and are negative.    Social Hx:  1 SH  0 MALIHA  With: Alone.  Daughter lives nearby, helps with driving.  Patient has support through [employer] who takes her grocery shopping once a week.    THERAPY:  Restrictions: Fall risk  PT: Functional mobility   8/26: Walking 12 feet with front wheel walker at min assist    OT: ADLs  8/26: Mod assist lower body dressing    SLP:   None    IMAGING:  CT abdomen pelvis 8/25/2024  1.  Partially imaged 7 mm nodule in the right middle lobe for which further workup with chest CT is recommended.  2.  Moderate right pleural effusion.  3.  Mild basilar pulmonary opacity suggesting atelectasis and/or infiltrates.  4.  Cardiomegaly. Reflux of IV contrast into hepatic veins suggesting cardiac insufficiency.  5.  Fatty liver infiltration.  6.  Severe intra and extra hepatic bile duct dilation  for which further workup with MRCP is recommended.  7.  Mild ascites. Anasarca.  8.  Left ovarian/adnexal cystic lesion has increased in size. Recommend further workup with pelvic ultrasound.  9.  Mild small bowel ileus.  10.  Moderate diffuse gastric wall thickening, could be accentuated by under distention. They be further assessed with upper GI series.    PROCEDURES:  None    PMH:  Past Medical History:   Diagnosis Date    Acute on chronic heart failure with preserved ejection fraction (HCC) 08/13/2021    Age-related osteoporosis without current pathological fracture 01/17/2024    Atrial fibrillation (HCC)     Basal cell carcinoma of skin of nose     CATARACT     Dr. Aaron, Dr. Orellana    CHF (congestive heart failure) ([facility])     Colon polyp     tubular adenomas    Dental disorder     upper partial    Dyslipidemia     Glaucoma, right eye     Dr. Orellana    Heart burn     Hemorrhagic disorder ([facility])     eliquis    History of cataract removal with insertion of prosthetic lens 04/09/2024    Hypertension     pt states well controlled on meds    IBS (irritable bowel syndrome)     Indigestion     MEDICAL HOME 10/23/2012    Mitral valve regurgitation     Osteopenia 06/2009    Perennial allergic rhinitis with seasonal variation     Persistent atrial fibrillation (HCC)     Psychiatric problem     anxiety    Type 2 diabetes mellitus, controlled (HCC)     diet controlled    Valvular heart disease     mitral insufficiency    Vertigo        PSH:  Past Surgical History:   Procedure Laterality Date    RECOVERY  7/8/2016    Procedure: CATH LAB-ZAFAR GUIDED CV-TO-LARGE GROUP;  Surgeon: [facility];  [place] [facility]  Service:     COLONOSCOPY  8/2009    Dr. Lopez, 9 polyps    APPENDECTOMY      BREAST BIOPSY      left, reports wire report broke off during procedure    CHOLECYSTECTOMY      US-NEEDLE CORE BX-BREAST PANEL         FHX:  Family History   Problem Relation Age of Onset    Diabetes Mother      "Hypertension Mother     Stroke Mother     Cancer Father         lung/larynx    Cancer Sister         \"female\"    Genetic Disorder Sister         osteoporosis    Cancer Paternal Aunt         breast    Cancer Maternal Aunt     Heart Disease Brother         CABG x 3       Medications:  Current Facility-Administered Medications   Medication Dose    thiamine (Vitamin B-1) tablet 100 mg  100 mg    cyanocobalamin (Vitamin B-12) tablet 1,000 mcg  1,000 mcg    multivitamin tablet 1 Tablet  1 Tablet    haloperidol lactate (Haldol) injection 5 mg  5 mg    acetaminophen (Tylenol) tablet 650 mg  650 mg    labetalol (Normodyne/Trandate) injection 10 mg  10 mg    furosemide (Lasix) injection 80 mg  80 mg    apixaban (Eliquis) tablet 2.5 mg  2.5 mg    aspirin EC tablet 81 mg  81 mg    digoxin (Lanoxin) tablet 125 mcg  125 mcg    ezetimibe (Zetia) tablet 10 mg  10 mg    famotidine (Pepcid) tablet 20 mg  20 mg    losartan (Cozaar) tablet 100 mg  100 mg    spironolactone (Aldactone) tablet 12.5 mg  12.5 mg    metoprolol SR (Toprol XL) tablet 100 mg  100 mg    omeprazole (PriLOSEC) capsule 20 mg  20 mg       Allergies:  Allergies   Allergen Reactions    Atorvastatin      myalgias    Simvastatin      myalgias         Physical Exam:  Vitals: BP (!) 175/80   Pulse 85   Temp 36.6 °C (97.9 °F) (Temporal)   Resp 18   Ht 1.575 m (5' 2\")   Wt 57.8 kg (127 lb 6.8 oz)   SpO2 96%   Gen: NAD.  Obtunded.  Head: NC/AT  Eyes/ Nose/ Mouth: PERRLA, moist mucous membranes  Cardio: RRR, good distal perfusion, warm extremities  Pulm: normal respiratory effort, no cyanosis   Abd: Soft NTND, negative borborygmi   Ext: No peripheral edema. No calf tenderness. No clubbing.    Labs: Reviewed and significant for   Recent Labs     08/25/24  1740 08/26/24  0239 08/27/24  0746   RBC 5.88* 5.28 6.37*   HEMOGLOBIN 15.7 14.1 17.3*   HEMATOCRIT 51.0* 45.6 54.4*   PLATELETCT 185 172 178   PROTHROMBTM 19.3*  --   --    APTT 33.3  --   --    INR 1.60*  --   --  "     Recent Labs     08/25/24  1835 08/26/24  0239 08/27/24  0606   SODIUM 128* 130* 136   POTASSIUM 4.9 4.7 4.2   CHLORIDE 97 95* 90*   CO2 19* 22 30   GLUCOSE 113* 95 121*   BUN 35* 35* 30*   CREATININE 1.19 1.32 1.00   CALCIUM 9.2 9.7 10.4     Recent Results (from the past 24 hour(s))   Renal Function Panel    Collection Time: 08/27/24  6:06 AM   Result Value Ref Range    Sodium 136 135 - 145 mmol/L    Potassium 4.2 3.6 - 5.5 mmol/L    Chloride 90 (L) 96 - 112 mmol/L    Co2 30 20 - 33 mmol/L    Glucose 121 (H) 65 - 99 mg/dL    Creatinine 1.00 0.50 - 1.40 mg/dL    Bun 30 (H) 8 - 22 mg/dL    Calcium 10.4 8.5 - 10.5 mg/dL    Correct Calcium 10.2 8.5 - 10.5 mg/dL    Phosphorus 3.9 2.5 - 4.5 mg/dL    Albumin 4.2 3.2 - 4.9 g/dL   ESTIMATED GFR    Collection Time: 08/27/24  6:06 AM   Result Value Ref Range    GFR (CKD-EPI) 56 (A) >60 mL/min/1.73 m 2   CBC WITHOUT DIFFERENTIAL    Collection Time: 08/27/24  7:46 AM   Result Value Ref Range    WBC 8.0 4.8 - 10.8 K/uL    RBC 6.37 (H) 4.20 - 5.40 M/uL    Hemoglobin 17.3 (H) 12.0 - 16.0 g/dL    Hematocrit 54.4 (H) 37.0 - 47.0 %    MCV 85.4 81.4 - 97.8 fL    MCH 27.2 27.0 - 33.0 pg    MCHC 31.8 (L) 32.2 - 35.5 g/dL    RDW 81.0 (H) 35.9 - 50.0 fL    Platelet Count 178 164 - 446 K/uL    MPV 9.3 9.0 - 12.9 fL         ASSESSMENT:  Patient is a 84 y.o. female admitted with CHF     Rehabilitation: Impaired ADLs and mobility  Barriers to transfer include: Insurance authorization, TCCs to verify disposition, medical clearance and bed availability. All cases are subject to administrative review.     Disposition recommendations:  -Patient is too low level to qualify for IPR, and she lives alone, therefore is not a candidate for IPR.  Patient will need SNF placement until the daughter can find a better permanent solution for her  -PMR will sign off, please reconsult or reach out via Voalte if further evaluation or medical management is requested    Medical Complexity:    Altered mental  status  -Recommend EEG  -Recommend trial of modafinil 100 mg every morning  -Avoid strong centrally acting medications  -Minimize pain medication  -PT OT and SLP as tolerated  -Anticipate SNF placement    Acute on chronic systolic heart failure  -Cardiology following  -Diuresis with Lasix  -On home digoxin  -Repeat TTE    Hypertension  -Continue spironolactone, losartan    Persistent atrial fibrillation  -Rate control with metoprolol  -AC with Eliquis    DVT PPX: Eliquis      Thank you for allowing us to participate in the care of this patient.     Patient was seen for >80 minutes on unit/floor of which > 50% of time was spent on counseling and coordination of care regarding the above, including prognosis, risk reduction, benefits of treatment, and options for next stage of care.    Narciso Mckeon, DO   Physical Medicine and Rehabilitation     Please note that this dictation was created using voice recognition software. I have made every reasonable attempt to correct obvious errors, but there may be errors of grammar and possibly content that I did not discover before finalizing the note.

## 2024-08-27 NOTE — PROGRESS NOTES
Bedside report received from off going RN/tech: Ana, assumed care of patient.     Fall Risk Score: HIGH RISK  Fall risk interventions in place: Place yellow fall risk ID band on patient, Provide patient/family education based on risk assessment, Educate patient/family to call staff for assistance when getting out of bed, Place fall precaution signage outside patient door, Place patient in room close to nursing station, Utilize bed/chair fall alarm, Tele-sitter, and Bed alarm connected correctly, safety sitter at bedside  Bed type: Regular (Donavon Score less than 17 interventions in place)  Patient on cardiac monitor: Yes  IVF/IV medications: Not Applicable   Oxygen: Room Air  Bedside sitter: Not Applicable   Isolation: Not applicable

## 2024-08-27 NOTE — PROGRESS NOTES
Cardiology Follow Up Progress Note    Date of Service  8/27/2024    Attending Physician  Rhonda Iniguez D.O.    Chief Complaint   acute on chronic heart failure exacerbation    HPI  Ivory Rowan is a 84 y.o. female admitted 8/25/2024 with   past medical history significant for hypertension, permanent atrial fibrillation status post failed attempts at restoration of sinus rhythm in the past, CAD status post prior PCI to RCA, mild left ventricular systolic dysfunction EF 45 to 50% who presented to the hospital yesterday with worsening shortness of breath.     In the emergency department, she was noted to have severely elevated blood pressure. SBP > 200 mmHg. She was found to have acute hypoxic respiratory failure with O2 sat in the 70s. Labs showed elevated NTproBNP of 8200. Troponin minimally elevated. EKG demonstrated Atrial fibrillation with nonspecific ST-T changes.    Cardiology was consulted due to acute on chronic heart failure exacerbation.     Interim Events  The patient is currently at the bedside, and reports difficulty sleeping last night and denies any other complaints. Due to advanced dementia, the patient is a poor historian and unable to provide a detailed review of systems. Additional history is limited by the patient's cognitive impairment. Physical exam is limited since patient refuses to be examined completely.     This morning, telemetry shows PVCs, and on auscultation, intermittent extra heart sounds with pauses were heard suggestive of PVCs.       Review of Systems  Review of Systems   Unable to perform ROS: Acuity of condition       Vital signs in last 24 hours  Temp:  [36.4 °C (97.5 °F)-36.6 °C (97.9 °F)] 36.4 °C (97.5 °F)  Pulse:  [68-92] 73  Resp:  [16-20] 20  BP: (131-185)/(64-99) 131/99  SpO2:  [90 %-98 %] 93 %    Physical Exam  Physical Exam  HENT:      Head: Atraumatic.      Right Ear: External ear normal.      Left Ear: External ear normal.      Nose: Nose normal. No rhinorrhea.       Mouth/Throat:      Mouth: Mucous membranes are moist.   Eyes:      Conjunctiva/sclera: Conjunctivae normal.      Pupils: Pupils are equal, round, and reactive to light.   Cardiovascular:      Rate and Rhythm: Rhythm irregular.      Comments: Extra heart sounds.  Pulmonary:      Effort: Pulmonary effort is normal. No respiratory distress.   Musculoskeletal:      Right lower leg: Edema present.      Left lower leg: Edema present.   Skin:     General: Skin is warm.      Findings: Bruising (right lower leg) present.   Neurological:      Mental Status: She is alert. She is disoriented and confused.   Psychiatric:         Attention and Perception: Attention normal.         Mood and Affect: Mood normal.         Speech: Speech normal.      Comments: Dementia         Lab Review  Lab Results   Component Value Date/Time    WBC 6.0 08/26/2024 02:39 AM    RBC 5.28 08/26/2024 02:39 AM    HEMOGLOBIN 14.1 08/26/2024 02:39 AM    HEMATOCRIT 45.6 08/26/2024 02:39 AM    MCV 86.4 08/26/2024 02:39 AM    MCH 26.7 (L) 08/26/2024 02:39 AM    MCHC 30.9 (L) 08/26/2024 02:39 AM    MPV 8.9 (L) 08/26/2024 02:39 AM      Lab Results   Component Value Date/Time    SODIUM 136 08/27/2024 06:06 AM    POTASSIUM 4.2 08/27/2024 06:06 AM    CHLORIDE 90 (L) 08/27/2024 06:06 AM    CO2 30 08/27/2024 06:06 AM    GLUCOSE 121 (H) 08/27/2024 06:06 AM    BUN 30 (H) 08/27/2024 06:06 AM    CREATININE 1.00 08/27/2024 06:06 AM    CREATININE 0.99 04/11/2011 12:00 AM    BUNCREATRAT 26 04/11/2011 12:00 AM    GLOMRATE 56 (L) 03/09/2011 07:55 AM      Lab Results   Component Value Date/Time    ASTSGOT 32 08/26/2024 02:39 AM    ALTSGPT 24 08/26/2024 02:39 AM     Lab Results   Component Value Date/Time    CHOLSTRLTOT 129 05/24/2024 06:08 AM    LDL 61 05/24/2024 06:08 AM    HDL 53 05/24/2024 06:08 AM    TRIGLYCERIDE 73 05/24/2024 06:08 AM    TROPONINT 25 (H) 08/25/2024 06:35 PM       Recent Labs     08/25/24  1835   NTPROBNP 8282*       Cardiac Imaging and Procedures  Review  EKG:  No new EKG  Echocardiogram:  No new Echo.  Chest X-Ray:  No new CXR.      Assessment/Plan  No new Assessment & Plan notes have been filed under this hospital service since the last note was generated.  Service: Cardiology      Thank you for allowing me to participate in the care of this patient.  I will continue to follow this patient    Please contact me with any questions.    Lionel Cody M.D.   PGY-1  [facility]  [phone]

## 2024-08-27 NOTE — DISCHARGE PLANNING
[facility] Transitional Care Coordination    Physiatry consult complete.  Dr. Mckeon recommending -     Disposition recommendations:  -Patient is too low level to qualify for IPR, and she lives alone, therefore is not a candidate for IPR.  Patient will need SNF placement until the daughter can find a better permanent solution for her  -PMR will sign off, please reconsult or reach out via Voalte if further evaluation or medical management is requested    Volate update to T8 ANNA Gaines.

## 2024-08-27 NOTE — DISCHARGE PLANNING
Case Management Discharge Planning    Admission Date: 8/25/2024  GMLOS: 3.9  ALOS: 2    6-Clicks ADL Score: 19  6-Clicks Mobility Score: 17  PT and/or OT Eval ordered: Yes  Post-acute Referrals Ordered: Yes  Post-acute Choice Obtained: Yes  Has referral(s) been sent to post-acute provider:  Yes      Anticipated Discharge Dispo: Discharge Disposition: D/T to SNF with Medicare cert in anticipation of skilled care (03)  S[facility]-accepted by [facility]    DME Needed: none    Action(s) Taken: Discussed with MD, pt is on restraints at this time. Jeane from [facility] has confirmed acceptance.    Escalations Completed: n/a    Medically Clear: no-pt is confused and on restraints    Next Steps: follow up with MD tomorrow.     Barriers to Discharge: pt is on restraints.    Is the patient up for discharge tomorrow: no

## 2024-08-27 NOTE — PROGRESS NOTES
Monitor Summary  Rhythm: AFIB  Rate: 49-61  Ectopy: 4.06 SEC pause, PVC, COUP,   - / .08 / -

## 2024-08-27 NOTE — WOUND TEAM
[facility]  Inpatient Services  Wound and Skin Care Brief Evaluation    Admission Date: 8/25/2024     Last order of IP CONSULT TO WOUND CARE was found on 8/26/2024 from Hospital Encounter on 8/25/2024     HPI, PMH, SH: Reviewed    Chief Complaint   Patient presents with    Weakness     Worsening over the last 3 days. Reports hematuria & blood in stool, unsure how long that has been happening. Poor historian. Takes eliquis.     Abdominal Pain     Diagnosis: Acute on chronic systolic CHF (congestive heart failure) ([facility]) [I50.23]    Unit where seen by Wound Team: T822/00     Wound consult placed regarding ears and LLE bruising. Chart and images reviewed. This clinician in to assess patient. Patient pleasant and agreeable. Bruising to ear appears to be healing with no new interventions at this time. Grey foam pads in place. LLE bruising with intact scab, no drainage. All injuries likely related to previous falls.            No pressure injuries or advanced wound care needs identified. Wound consult completed. No further follow up unless indicated and consulted.          PREVENTATIVE INTERVENTIONS:    Q shift Donavon - performed per nursing policy  Q shift pressure point assessments - performed per nursing policy    Surface/Positioning  Standard/trauma mattress - Currently in Place    Offloading/Redistribution  Heel offloading dressing (Silicone dressing) - Currently in Place      Respiratory  Silicone O2 tubing - Currently in Place  Gray Foam Ear protectors - Currently in Place    Containment/Moisture Prevention    Purwick/Condom Cath - Currently in Place

## 2024-08-27 NOTE — THERAPY
"Physical Therapy   Daily Treatment     Patient Name: Ivory Rowan  Age:  84 y.o., Sex:  female  Medical Record #: [id number]  Today's Date: 8/27/2024     Precautions  Precautions: Fall Risk    Assessment    Pt seen for PT tx session. Pt with significantly increased confusion and lethargy requiring significantly increased assist at mod-max A for all mobility. Assisted pt to EOB and helped her with taking a few bites of lunch. Pt became more alert and reported she needed to have a BM. Assisted pt from EOB<>BSC with FWW and max A d/t impaired sequencing, slow movements, generalized weakness, impaired balance, and decreased endurance. Will follow.    Plan    Treatment Plan Status: Continue Current Treatment Plan  Type of Treatment: Bed Mobility, Equipment, Family / Caregiver Training, Gait Training, Manual Therapy, Neuro Re-Education / Balance, Self Care / Home Evaluation, Stair Training, Therapeutic Activities, Therapeutic Exercise  Treatment Frequency: 4 Times per Week  Treatment Duration: Until Therapy Goals Met    DC Equipment Recommendations: Unable to determine at this time  Discharge Recommendations: Recommend post-acute placement for additional physical therapy services prior to discharge home      Subjective    Pt received resting in bed with b/l wrist restraints, agreeable to participate. \"I have so many holes in my feet.\"     Objective       08/27/24 1525   Precautions   Precautions Fall Risk   Vitals   O2 (LPM) 1   O2 Delivery Device Silicone Nasal Cannula   Pain 0 - 10 Group   Therapist Pain Assessment During Activity;Nurse Notified  (c/o RUE pain when lifting it to eat)   Cognition    Cognition / Consciousness X   Level of Consciousness Alert   Ability To Follow Commands 1 Step   Safety Awareness Impaired   New Learning Impaired   Attention Impaired   Sequencing Impaired   Comments cooperative, much more confused and lethargic than yesterday, became more alert with eating and mobility   Balance   Sitting " Balance (Static) Fair   Sitting Balance (Dynamic) Fair -   Standing Balance (Static) Poor -   Standing Balance (Dynamic) Poor -   Weight Shift Sitting Fair   Weight Shift Standing Poor   Skilled Intervention Compensatory Strategies;Facilitation;Postural Facilitation;Sequencing;Tactile Cuing;Verbal Cuing   Comments FWW   Bed Mobility    Supine to Sit Moderate Assist   Scooting Contact Guard Assist   Rolling Moderate Assist to Lt.   Skilled Intervention Compensatory Strategies;Facilitation;Postural Facilitation;Sequencing;Tactile Cuing;Verbal Cuing   Comments HOBE slightly, sitting EOB with RN post   Gait Analysis   Gait Level Of Assist Unable to Participate   Comments limited by significantly increased confusion and lethargy today   Functional Mobility   Sit to Stand Moderate Assist   Bed, Chair, Wheelchair Transfer Maximal Assist   Toilet Transfers Maximal Assist   Transfer Method Stand Step   Mobility bed>BSC>sitting EOB with FWW   Skilled Intervention Compensatory Strategies;Facilitation;Postural Facilitation;Sequencing;Tactile Cuing;Verbal Cuing   Comments required assist for FWW mgt d/t poor comprehension of commands and poor motor sequencing   6 Clicks Assessment - How much HELP from from another person do you currently need... (If the patient hasn't done an activity recently, how much help from another person do you think he/she would need if he/she tried?)   Turning from your back to your side while in a flat bed without using bedrails? 2   Moving from lying on your back to sitting on the side of a flat bed without using bedrails? 2   Moving to and from a bed to a chair (including a wheelchair)? 2   Standing up from a chair using your arms (e.g., wheelchair, or bedside chair)? 2   Walking in hospital room? 1   Climbing 3-5 steps with a railing? 1   6 clicks Mobility Score 10   Short Term Goals    Short Term Goal # 1 Pt will perform supine<>sit with SPV to progress bed mobility in 6 visits.   Goal Outcome # 1  goal not met   Short Term Goal # 2 Pt will perform sit<>stand and stand step txfer with FWW and SPV to progress OOB mobility in 6 visits.   Goal Outcome # 2 Goal not met   Short Term Goal # 3 Pt will ambulate 150 ft with FWW and SPV to access home in 6 visits.   Goal Outcome # 3 Goal not met   Short Term Goal # 4 Pt will navigate 1 curb step with FWW and SPV to access community in 6 visits.   Goal Outcome # 4 Goal not met   Physical Therapy Treatment Plan   Physical Therapy Treatment Plan Continue Current Treatment Plan   Anticipated Discharge Equipment and Recommendations   DC Equipment Recommendations Unable to determine at this time   Discharge Recommendations Recommend post-acute placement for additional physical therapy services prior to discharge home   Interdisciplinary Plan of Care Collaboration   IDT Collaboration with  Nursing   Patient Position at End of Therapy Seated;Edge of Bed;Call Light within Reach;Tray Table within Reach;Phone within Reach;Other (Comments)  (RN in room to assist with eating, telesitter)   Collaboration Comments RN updated   Session Information   Date / Session Number  8/27- 2(2/4, 9/1)

## 2024-08-27 NOTE — PROGRESS NOTES
Hospital Medicine Daily Progress Note    Date of Service  8/27/2024    Chief Complaint  Ivory Rowan is a 84 y.o. female admitted 8/25/2024 with weakness and abdominal pain     Hospital Course  84 y.o. female who presented 8/25/2024 with weakness.  PMH of CHF, hypertension.  She has been feeling weak for the past 3 days unable to stand on her own associated with some shortness of breath. She decided to come in today because she noted some blood in her urine, only 1 episode.  Denies dysuria or other urinary symptoms.  She has chronic epigastric abdominal pain which is not any worse than usual.  No changes in bowel habitus.     In the ED afebrile, BP significant elevated systolic up to 200s, saturating in 70s on room air and requiring 3 L O2 on my evaluation.  Left show NT proBNP of 8200, no hematuria on UA. CT abd/pelvis showed known ovarian cyst and dilation of her CBD up to 20 mm.  Patient noted to have a right pleural effusion admitted for CHF exacerbation started on IV Lasix.  On telemetry patient noted to have bradycardia, cardiology consulted.     Interval Problem Update  Mildly hypertensive overnight otherwise vital stable  Cr 1, GFR 56   Na improving 136  telemetry reviewed patient in afib   Echo EF 60%, RVSP 54 mmHg  Discussed with cardiology continue IV Lasix for today, start oral tomorrow  PT/OT recommended SNF   Patient in restraints.  Patient with sundowning overnight given IV Haldol.  She is very somnolent this morning.  Per review of patient's admission medication reconciliation and her  she takes Valium 10 mg twice daily as needed at home. Patient does not seem to do well with haldol, dc haldol and added seroquel prn agitation  Per PM&R patient is not a candidate for IPR, recommending SNF  Discussed with nursing patient more awake this afternoon, worked with therapy. Will trial off of restraints.     I have discussed this patient's plan of care and discharge plan at IDT rounds today with Case  Management, Nursing, Nursing leadership, and other members of the IDT team.    Consultants/Specialty  cardiology    Code Status  DNAR/DNI    Disposition  The patient is not medically cleared for discharge to home or a post-acute facility.  Anticipate discharge to: skilled nursing facility    I have placed the appropriate orders for post-discharge needs.    Review of Systems  Review of Systems   Unable to perform ROS: Medical condition        Physical Exam  Temp:  [36.4 °C (97.5 °F)-36.6 °C (97.9 °F)] 36.6 °C (97.9 °F)  Pulse:  [73-92] 80  Resp:  [16-20] 18  BP: (102-175)/(47-99) 102/47  SpO2:  [90 %-98 %] 95 %    Physical Exam  Vitals and nursing note reviewed.   Constitutional:       General: She is not in acute distress.  HENT:      Head: Normocephalic and atraumatic.   Eyes:      Conjunctiva/sclera: Conjunctivae normal.   Cardiovascular:      Rate and Rhythm: Normal rate and regular rhythm.      Pulses: Normal pulses.      Heart sounds: Normal heart sounds.   Pulmonary:      Effort: Pulmonary effort is normal. No respiratory distress.      Breath sounds: Normal breath sounds.   Abdominal:      General: Abdomen is flat. There is no distension.      Palpations: Abdomen is soft.      Tenderness: There is no abdominal tenderness.   Musculoskeletal:         General: Normal range of motion.      Cervical back: Normal range of motion and neck supple.      Right lower leg: No edema.      Left lower leg: No edema.   Skin:     General: Skin is warm and dry.   Neurological:      General: No focal deficit present.      Mental Status: She is oriented to person, place, and time. She is lethargic.      Cranial Nerves: No cranial nerve deficit.   Psychiatric:         Mood and Affect: Mood normal.         Behavior: Behavior normal.         Fluids    Intake/Output Summary (Last 24 hours) at 8/27/2024 1721  Last data filed at 8/27/2024 1400  Gross per 24 hour   Intake 800 ml   Output 1600 ml   Net -800 ml        Laboratory  Recent  Labs     08/25/24  1740 08/26/24  0239 08/27/24  0746   WBC 5.6 6.0 8.0   RBC 5.88* 5.28 6.37*   HEMOGLOBIN 15.7 14.1 17.3*   HEMATOCRIT 51.0* 45.6 54.4*   MCV 86.7 86.4 85.4   MCH 26.7* 26.7* 27.2   MCHC 30.8* 30.9* 31.8*   RDW 81.1* 80.6* 81.0*   PLATELETCT 185 172 178   MPV 9.0 8.9* 9.3     Recent Labs     08/25/24  1835 08/26/24  0239 08/27/24  0606   SODIUM 128* 130* 136   POTASSIUM 4.9 4.7 4.2   CHLORIDE 97 95* 90*   CO2 19* 22 30   GLUCOSE 113* 95 121*   BUN 35* 35* 30*   CREATININE 1.19 1.32 1.00   CALCIUM 9.2 9.7 10.4     Recent Labs     08/25/24  1740   APTT 33.3   INR 1.60*               Imaging  EC-ECHOCARDIOGRAM COMPLETE W/O CONT   Final Result      CT-ABDOMEN-PELVIS WITH   Final Result      1.  Partially imaged 7 mm nodule in the right middle lobe for which further workup with chest CT is recommended.   2.  Moderate right pleural effusion.   3.  Mild basilar pulmonary opacity suggesting atelectasis and/or infiltrates.   4.  Cardiomegaly. Reflux of IV contrast into hepatic veins suggesting cardiac insufficiency.   5.  Fatty liver infiltration.   6.  Severe intra and extra hepatic bile duct dilation for which further workup with MRCP is recommended.   7.  Mild ascites. Anasarca.   8.  Left ovarian/adnexal cystic lesion has increased in size. Recommend further workup with pelvic ultrasound.   9.  Mild small bowel ileus.   10.  Moderate diffuse gastric wall thickening, could be accentuated by under distention. They be further assessed with upper GI series.      DX-CHEST-PORTABLE (1 VIEW)   Final Result         Airspace opacities in the bilateral lower lobes, right more than left, atelectasis or infection.           Assessment/Plan  * Acute on chronic systolic CHF (congestive heart failure) (HCC)- (present on admission)  Assessment & Plan  Echo 2021 showed an EF of 55%, mildly reduced RV function, moderate MR, RVSP of 40.  EF previously 45%  She is not volume overloaded with edema on exam, CT imaging shows  pleural effusion on the right  Will admit for CHF exacerbation, IV furosemide twice daily ordered.  BMP ordered to continue monitoring kidney function and electrolytes while on diuretics  Continue home metoprolol, spironolactone, losartan, digoxin  I/O, daily weights, fluid and salt restriction  Repeat echo  Cardiology consulted, recommending 1 more day of IV diuresis and transition to oral tomorrow    Encephalopathy  Assessment & Plan  Likely due to underlying cognitive impairment with sundowning  Patient has been on Valium 10 mg twice daily for many years  Restart her home Valium to see if this helps her sleep at night  If her mentation does not improve we will get an EEG    Hypertensive urgency- (present on admission)  Assessment & Plan  Continue home meds, as needed antihypertensives ordered    Generalized abdominal pain- (present on admission)  Assessment & Plan  She has chronic abdominal pain, her daughter thinks it may have gotten worse after she started using iron for history of anemia   Hemoglobin 15 on presentation, can stop iron for now until follow-up with her PCP next month   CT of the abdomen in the ED reviewed shows a known ovarian cyst for which she is following with her PCP  There is dilation of her CBD up to 20 mm however, denies postprandial pain, no right upper quadrant pain or Gaspar sign on exam.  LFTs and T. bili normal on labs.  I discussed these findings with the family and they do not wish to pursue MRCP.  If there are any changes, reconsider    Persistent atrial fibrillation (HCC)- (present on admission)  Assessment & Plan  Continue digoxin, metoprolol, apixaban    Pause not sinus ok to resume digoxin and BB    Stage 3a chronic kidney disease- (present on admission)  Assessment & Plan  At baseline, BMP ordered continue monitoring, avoid nephrotoxic agents    Mitral valve regurgitation- (present on admission)  Assessment & Plan  Moderate regurg seen on echo 2021.  She has been complaining  of worsening shortness of breath and volume overload currently  Repeat echocardiogram ordered    Essential hypertension- (present on admission)  Assessment & Plan  Continue metoprolol, spironolactone, losartan    Stable    Dyslipidemia, goal LDL below 70- (present on admission)  Assessment & Plan  Continue ezetimibe         VTE prophylaxis:    therapeutic anticoagulation with eliquis 2.5 mg BID      I have performed a physical exam and reviewed and updated ROS and Plan today (8/27/2024). In review of yesterday's note (8/26/2024), there are no changes except as documented above.

## 2024-08-27 NOTE — CARE PLAN
The patient is Watcher - Medium risk of patient condition declining or worsening    Shift Goals  Clinical Goals: monitor vital signs, monitor restraints and skin integrity  Patient Goals: rest and comfort  Family Goals: thu- none present    Progress made toward(s) clinical / shift goals:        Problem: Pain - Standard  Goal: Alleviation of pain or a reduction in pain to the patient’s comfort goal  Outcome: Progressing     Problem: Knowledge Deficit - Standard  Goal: Patient and family/care givers will demonstrate understanding of plan of care, disease process/condition, diagnostic tests and medications  Outcome: Progressing     Problem: Skin Integrity  Goal: Skin integrity is maintained or improved  Outcome: Progressing     Problem: Fall Risk  Goal: Patient will remain free from falls  Outcome: Progressing     Problem: Depression  Goal: Patient and family/caregiver will verbalize accurate information about at least two of the possible causes of depression, three-four of the signs and symptoms of depression  Outcome: Progressing     Problem: Provide Safe Environment  Goal: Suicide environmental safety, protocols, policies, and practices will be implemented  Outcome: Progressing     Problem: Psychosocial  Goal: Patient's ability to identify and develop effective coping behaviors will improve  Outcome: Progressing

## 2024-08-28 PROBLEM — E83.42 HYPOMAGNESEMIA: Status: ACTIVE | Noted: 2024-08-28

## 2024-08-28 LAB
ALBUMIN SERPL BCP-MCNC: 3.5 G/DL (ref 3.2–4.9)
BUN SERPL-MCNC: 39 MG/DL (ref 8–22)
CALCIUM ALBUM COR SERPL-MCNC: 9.8 MG/DL (ref 8.5–10.5)
CALCIUM SERPL-MCNC: 9.4 MG/DL (ref 8.5–10.5)
CHLORIDE SERPL-SCNC: 87 MMOL/L (ref 96–112)
CO2 SERPL-SCNC: 29 MMOL/L (ref 20–33)
CREAT SERPL-MCNC: 1.53 MG/DL (ref 0.5–1.4)
ERYTHROCYTE [DISTWIDTH] IN BLOOD BY AUTOMATED COUNT: 80.3 FL (ref 35.9–50)
GFR SERPLBLD CREATININE-BSD FMLA CKD-EPI: 33 ML/MIN/1.73 M 2
GLUCOSE SERPL-MCNC: 153 MG/DL (ref 65–99)
HCT VFR BLD AUTO: 51.5 % (ref 37–47)
HGB BLD-MCNC: 16.4 G/DL (ref 12–16)
MAGNESIUM SERPL-MCNC: 1.4 MG/DL (ref 1.5–2.5)
MCH RBC QN AUTO: 27.5 PG (ref 27–33)
MCHC RBC AUTO-ENTMCNC: 31.8 G/DL (ref 32.2–35.5)
MCV RBC AUTO: 86.4 FL (ref 81.4–97.8)
PHOSPHATE SERPL-MCNC: 3.6 MG/DL (ref 2.5–4.5)
PLATELET # BLD AUTO: 146 K/UL (ref 164–446)
PMV BLD AUTO: 8.8 FL (ref 9–12.9)
POTASSIUM SERPL-SCNC: 4 MMOL/L (ref 3.6–5.5)
RBC # BLD AUTO: 5.96 M/UL (ref 4.2–5.4)
SODIUM SERPL-SCNC: 130 MMOL/L (ref 135–145)
WBC # BLD AUTO: 9 K/UL (ref 4.8–10.8)

## 2024-08-28 PROCEDURE — 85027 COMPLETE CBC AUTOMATED: CPT

## 2024-08-28 PROCEDURE — A9270 NON-COVERED ITEM OR SERVICE: HCPCS | Performed by: INTERNAL MEDICINE

## 2024-08-28 PROCEDURE — 700102 HCHG RX REV CODE 250 W/ 637 OVERRIDE(OP): Performed by: INTERNAL MEDICINE

## 2024-08-28 PROCEDURE — 700111 HCHG RX REV CODE 636 W/ 250 OVERRIDE (IP): Mod: JZ | Performed by: STUDENT IN AN ORGANIZED HEALTH CARE EDUCATION/TRAINING PROGRAM

## 2024-08-28 PROCEDURE — 99233 SBSQ HOSP IP/OBS HIGH 50: CPT | Performed by: INTERNAL MEDICINE

## 2024-08-28 PROCEDURE — 700102 HCHG RX REV CODE 250 W/ 637 OVERRIDE(OP): Performed by: STUDENT IN AN ORGANIZED HEALTH CARE EDUCATION/TRAINING PROGRAM

## 2024-08-28 PROCEDURE — A9270 NON-COVERED ITEM OR SERVICE: HCPCS | Performed by: STUDENT IN AN ORGANIZED HEALTH CARE EDUCATION/TRAINING PROGRAM

## 2024-08-28 PROCEDURE — 700111 HCHG RX REV CODE 636 W/ 250 OVERRIDE (IP): Performed by: INTERNAL MEDICINE

## 2024-08-28 PROCEDURE — 83735 ASSAY OF MAGNESIUM: CPT

## 2024-08-28 PROCEDURE — 36415 COLL VENOUS BLD VENIPUNCTURE: CPT

## 2024-08-28 PROCEDURE — 770001 HCHG ROOM/CARE - MED/SURG/GYN PRIV*

## 2024-08-28 PROCEDURE — 80069 RENAL FUNCTION PANEL: CPT

## 2024-08-28 RX ORDER — MAGNESIUM SULFATE HEPTAHYDRATE 40 MG/ML
4 INJECTION, SOLUTION INTRAVENOUS ONCE
Status: COMPLETED | OUTPATIENT
Start: 2024-08-28 | End: 2024-08-28

## 2024-08-28 RX ORDER — FUROSEMIDE 40 MG
40 TABLET ORAL
Status: DISCONTINUED | OUTPATIENT
Start: 2024-08-29 | End: 2024-08-29 | Stop reason: HOSPADM

## 2024-08-28 RX ADMIN — METOPROLOL SUCCINATE 100 MG: 100 TABLET, EXTENDED RELEASE ORAL at 04:45

## 2024-08-28 RX ADMIN — LOSARTAN POTASSIUM 100 MG: 50 TABLET, FILM COATED ORAL at 04:45

## 2024-08-28 RX ADMIN — FAMOTIDINE 20 MG: 20 TABLET, FILM COATED ORAL at 04:45

## 2024-08-28 RX ADMIN — METOPROLOL SUCCINATE 100 MG: 100 TABLET, EXTENDED RELEASE ORAL at 17:06

## 2024-08-28 RX ADMIN — CYANOCOBALAMIN TAB 500 MCG 1000 MCG: 500 TAB at 04:45

## 2024-08-28 RX ADMIN — THERA TABS 1 TABLET: TAB at 04:45

## 2024-08-28 RX ADMIN — OMEPRAZOLE 20 MG: 20 CAPSULE, DELAYED RELEASE ORAL at 04:45

## 2024-08-28 RX ADMIN — EZETIMIBE 10 MG: 10 TABLET ORAL at 04:45

## 2024-08-28 RX ADMIN — FUROSEMIDE 80 MG: 10 INJECTION INTRAMUSCULAR; INTRAVENOUS at 04:45

## 2024-08-28 RX ADMIN — LATANOPROST 1 DROP: 50 SOLUTION OPHTHALMIC at 21:03

## 2024-08-28 RX ADMIN — DIGOXIN 125 MCG: 0.12 TABLET ORAL at 17:06

## 2024-08-28 RX ADMIN — ASPIRIN 81 MG: 81 TABLET, COATED ORAL at 04:45

## 2024-08-28 RX ADMIN — SPIRONOLACTONE 12.5 MG: 25 TABLET ORAL at 04:45

## 2024-08-28 RX ADMIN — MAGNESIUM SULFATE HEPTAHYDRATE 4 G: 4 INJECTION, SOLUTION INTRAVENOUS at 18:34

## 2024-08-28 RX ADMIN — Medication 100 MG: at 04:45

## 2024-08-28 RX ADMIN — APIXABAN 2.5 MG: 2.5 TABLET, FILM COATED ORAL at 04:45

## 2024-08-28 RX ADMIN — APIXABAN 2.5 MG: 2.5 TABLET, FILM COATED ORAL at 17:06

## 2024-08-28 ASSESSMENT — ENCOUNTER SYMPTOMS
NAUSEA: 0
DIZZINESS: 0
CHILLS: 0
VOMITING: 0
NERVOUS/ANXIOUS: 1
MYALGIAS: 0
ABDOMINAL PAIN: 0
HEADACHES: 0
DEPRESSION: 0
SHORTNESS OF BREATH: 0
FEVER: 0
WEAKNESS: 1

## 2024-08-28 ASSESSMENT — PAIN DESCRIPTION - PAIN TYPE: TYPE: ACUTE PAIN

## 2024-08-28 ASSESSMENT — FIBROSIS 4 INDEX: FIB4 SCORE: 3.05

## 2024-08-28 NOTE — PROGRESS NOTES
Monitor Summary     Rhythm: A-fib  Heart Rate: 59-93  Ectopy: R PVC  Measurement: --/.07/--    PAPER STRIP

## 2024-08-28 NOTE — CARE PLAN
The patient is Stable - Low risk of patient condition declining or worsening    Shift Goals  Clinical Goals: diurese, monitor mentation  Patient Goals: rest  Family Goals: N/A    Progress made toward(s) clinical / shift goals:    Problem: Knowledge Deficit - Standard  Goal: Patient and family/care givers will demonstrate understanding of plan of care, disease process/condition, diagnostic tests and medications  Description: Target End Date:  1-3 days or as soon as patient condition allows    Document in Patient Education    1.  Patient and family/caregiver oriented to unit, equipment, visitation policy and means for communicating concern  2.  Complete/review Learning Assessment  3.  Assess knowledge level of disease process/condition, treatment plan, diagnostic tests and medications  4.  Explain disease process/condition, treatment plan, diagnostic tests and medications  Outcome: Progressing  Note: Pt updated on POC, all questions answered at this time.     Problem: Fall Risk  Goal: Patient will remain free from falls  Description: Target End Date:  Prior to discharge or change in level of care    Document interventions on the [place] Fall Risk Assessment    1.  Assess for fall risk factors  2.  Implement fall precautions  Outcome: Progressing  Note: Pt is a high fall risk. Bed is in lowest, locked position. Bed alarm is on. Pt calls appropriately.

## 2024-08-28 NOTE — ASSESSMENT & PLAN NOTE
Likely due to underlying cognitive impairment with sundowning  Patient has been on Valium 10 mg twice daily for many years  Restart her home Valium to see if this helps her sleep at night  If her mentation does not improve we will get an EEG

## 2024-08-28 NOTE — DISCHARGE INSTRUCTIONS
HF Patient Discharge Instructions  Monitor your weight daily, and maintain a weight chart, to track your weight changes.   Activity as tolerated, unless your Doctor has ordered otherwise.   Follow a low fat, low cholesterol, low salt diet unless instructed otherwise by your Doctor. Read the labels on the back of food products and track your intake of fat, cholesterol and salt.   Fluid Restriction Yes. If a Fluid Restriction has been ordered by your Doctor, measure fluids with a measuring cup to ensure that you are not exceeding the restriction. 1500 mL  No smoking.  Oxygen Yes. If your Doctor has ordered that you wear Oxygen at home, it is important to wear it as ordered.  Did you receive an explanation from staff on the importance of taking each of your medications and why it is necessary to keep taking them unless your doctor says to stop? Yes  Were all of your questions answered about how to manage your heart failure and what to do if you have increased signs and symptoms after you go home? Yes  Do you feel like your heart failure care team involved you in the care treatment plan and allowed you to make decisions regarding your care while in the hospital and addressed any discharge needs you might have? Yes    See the educational handout provided at discharge for more information on monitoring your daily weight, activity and diet. This also explains more about Heart Failure, symptoms of a flare-up and some of the tests that you have undergone.     Warning Signs of a Flare-Up include:  Swelling in the ankles or lower legs.  Shortness of breath, while at rest, or while doing normal activities.   Shortness of breath at night when in bed, or coughing in bed.   Requiring more pillows to sleep at night, or needing to sit up at night to sleep.  Feeling weak, dizzy or fatigued.     When to call your Doctor:  Call your Primary Care Physician or Cardiologist if:   You experience any pain radiating to your jaw or  neck.  You have any difficulty breathing.  You experience weight gain of 3 lbs in a day or 5 lbs in a week.   You feel any palpitations or irregular heartbeats.  You become dizzy or lose consciousness.   If you have had an angiogram or had a pacemaker or AICD placed, and experience:  Bleeding, drainage or swelling at the surgical / puncture site.  Fever greater than 100.0 F  Shock from internal defibrillator.  Cool and / or numb extremities.     Please access the [facility] My HF Guide/Heart Failure Interactive Workbook:   [email or web address]

## 2024-08-28 NOTE — PROGRESS NOTES
Hospital Medicine Daily Progress Note    Date of Service  8/28/2024    Chief Complaint  Ivory Rowan is a 84 y.o. female admitted 8/25/2024 with weakness and abdominal pain     Hospital Course  84 y.o. female who presented 8/25/2024 with weakness.  PMH of CHF, hypertension.  She has been feeling weak for the past 3 days unable to stand on her own associated with some shortness of breath. She decided to come in today because she noted some blood in her urine, only 1 episode.  Denies dysuria or other urinary symptoms.  She has chronic epigastric abdominal pain which is not any worse than usual.  No changes in bowel habitus.     In the ED afebrile, BP significant elevated systolic up to 200s, saturating in 70s on room air and requiring 3 L O2 on my evaluation.  Left show NT proBNP of 8200, no hematuria on UA. CT abd/pelvis showed known ovarian cyst and dilation of her CBD up to 20 mm.  Patient noted to have a right pleural effusion admitted for CHF exacerbation started on IV Lasix.  On telemetry patient noted to have bradycardia, cardiology consulted.     Interval Problem Update  8/27 Mildly hypertensive overnight otherwise vital stable  Cr 1, GFR 56   Na improving 136  telemetry reviewed patient in afib   Echo EF 60%, RVSP 54 mmHg  Discussed with cardiology continue IV Lasix for today, start oral tomorrow  PT/OT recommended SNF   Patient in restraints.  Patient with sundowning overnight given IV Haldol.  She is very somnolent this morning.  Per review of patient's admission medication reconciliation and her  she takes Valium 10 mg twice daily as needed at home. Patient does not seem to do well with haldol, dc haldol and added seroquel prn agitation  Per PM&R patient is not a candidate for IPR, recommending SNF  Discussed with nursing patient more awake this afternoon, worked with therapy. Will trial off of restraints.     8/28 Vitals stable on 0.5 L NC satting 98%   Telemetry reviewed afib rate controlled, DC  telemetry  Has been out of restraints since yesterday afternoon   Transition IV lasix to oral   Creatinine 1.53, GFR 33  Magnesium 1.4, IV replacement ordered  Cardiology cleared the patient for discharge from their standpoint  Patient alert today and able to have a conversation.  She does want to go home but understands that she is too weak to get up to stand by herself at this point and does need rehab as she is at risk of falling and has fallen at home prior to admission.  She denies any other complaints at this time no chest pain, shortness of breath, or abdominal pain.  Reports she has had anxiety issues for a long time and the Valium is the only thing that helps her sleep at night.      I have discussed this patient's plan of care and discharge plan at IDT rounds today with Case Management, Nursing, Nursing leadership, and other members of the IDT team.    Consultants/Specialty  cardiology    Code Status  DNAR/DNI    Disposition  The patient is not medically cleared for discharge to home or a post-acute facility.  Anticipate discharge to: skilled nursing facility    I have placed the appropriate orders for post-discharge needs.    Review of Systems  Review of Systems   Constitutional:  Positive for malaise/fatigue. Negative for chills and fever.   Respiratory:  Negative for shortness of breath.    Cardiovascular:  Negative for chest pain.   Gastrointestinal:  Negative for abdominal pain, nausea and vomiting.   Musculoskeletal:  Negative for myalgias.   Skin:  Negative for rash.   Neurological:  Positive for weakness. Negative for dizziness and headaches.   Psychiatric/Behavioral:  Negative for depression. The patient is nervous/anxious.    All other systems reviewed and are negative.       Physical Exam  Temp:  [36.4 °C (97.5 °F)-36.5 °C (97.7 °F)] 36.4 °C (97.5 °F)  Pulse:  [60-72] 62  Resp:  [16-20] 16  BP: ()/(46-76) 115/62  SpO2:  [94 %-98 %] 97 %    Physical Exam  Vitals and nursing note reviewed.    Constitutional:       General: She is not in acute distress.     Appearance: She is ill-appearing (Chronically).   HENT:      Head: Normocephalic and atraumatic.      Mouth/Throat:      Pharynx: Oropharynx is clear.   Eyes:      Conjunctiva/sclera: Conjunctivae normal.   Cardiovascular:      Rate and Rhythm: Normal rate. Rhythm irregular.      Pulses: Normal pulses.   Pulmonary:      Effort: Pulmonary effort is normal. No respiratory distress.      Breath sounds: Normal breath sounds. No wheezing.      Comments: On nasal cannula  Abdominal:      General: Abdomen is flat. There is no distension.      Palpations: Abdomen is soft.      Tenderness: There is no abdominal tenderness.   Musculoskeletal:         General: Normal range of motion.      Cervical back: Normal range of motion and neck supple.      Right lower leg: No edema.      Left lower leg: No edema.   Skin:     General: Skin is warm and dry.   Neurological:      General: No focal deficit present.      Mental Status: She is alert and oriented to person, place, and time.      Cranial Nerves: No cranial nerve deficit.   Psychiatric:         Mood and Affect: Mood normal.         Behavior: Behavior normal.         Fluids    Intake/Output Summary (Last 24 hours) at 8/28/2024 1805  Last data filed at 8/28/2024 0501  Gross per 24 hour   Intake 240 ml   Output 500 ml   Net -260 ml        Laboratory  Recent Labs     08/26/24  0239 08/27/24  0746 08/28/24  1014   WBC 6.0 8.0 9.0   RBC 5.28 6.37* 5.96*   HEMOGLOBIN 14.1 17.3* 16.4*   HEMATOCRIT 45.6 54.4* 51.5*   MCV 86.4 85.4 86.4   MCH 26.7* 27.2 27.5   MCHC 30.9* 31.8* 31.8*   RDW 80.6* 81.0* 80.3*   PLATELETCT 172 178 146*   MPV 8.9* 9.3 8.8*     Recent Labs     08/26/24  0239 08/27/24  0606 08/28/24  1014   SODIUM 130* 136 130*   POTASSIUM 4.7 4.2 4.0   CHLORIDE 95* 90* 87*   CO2 22 30 29   GLUCOSE 95 121* 153*   BUN 35* 30* 39*   CREATININE 1.32 1.00 1.53*   CALCIUM 9.7 10.4 9.4                      Imaging  EC-ECHOCARDIOGRAM COMPLETE W/O CONT   Final Result      CT-ABDOMEN-PELVIS WITH   Final Result      1.  Partially imaged 7 mm nodule in the right middle lobe for which further workup with chest CT is recommended.   2.  Moderate right pleural effusion.   3.  Mild basilar pulmonary opacity suggesting atelectasis and/or infiltrates.   4.  Cardiomegaly. Reflux of IV contrast into hepatic veins suggesting cardiac insufficiency.   5.  Fatty liver infiltration.   6.  Severe intra and extra hepatic bile duct dilation for which further workup with MRCP is recommended.   7.  Mild ascites. Anasarca.   8.  Left ovarian/adnexal cystic lesion has increased in size. Recommend further workup with pelvic ultrasound.   9.  Mild small bowel ileus.   10.  Moderate diffuse gastric wall thickening, could be accentuated by under distention. They be further assessed with upper GI series.      DX-CHEST-PORTABLE (1 VIEW)   Final Result         Airspace opacities in the bilateral lower lobes, right more than left, atelectasis or infection.           Assessment/Plan  * Acute on chronic systolic CHF (congestive heart failure) (HCC)- (present on admission)  Assessment & Plan  Echo 2021 showed an EF of 55%, mildly reduced RV function, moderate MR, RVSP of 40.  EF previously 45%  She is not volume overloaded with edema on exam, CT imaging shows pleural effusion on the right  Will admit for CHF exacerbation, IV furosemide twice daily ordered.  BMP ordered to continue monitoring kidney function and electrolytes while on diuretics  Continue home metoprolol, spironolactone, losartan, digoxin  I/O, daily weights, fluid and salt restriction  Repeat echo  Cardiology consulted  -Transition to oral Lasix  -Cleared for discharge from cardiology standpoint    Encephalopathy  Assessment & Plan  Likely due to underlying cognitive impairment with sundowning  Patient has been on Valium 10 mg twice daily for many years  Restart her home Valium to see  if this helps her sleep at night  If her mentation does not improve we will get an EEG    Hypertensive urgency- (present on admission)  Assessment & Plan  Continue home meds, as needed antihypertensives ordered    Generalized abdominal pain- (present on admission)  Assessment & Plan  She has chronic abdominal pain, her daughter thinks it may have gotten worse after she started using iron for history of anemia   Hemoglobin 15 on presentation, can stop iron for now until follow-up with her PCP next month   CT of the abdomen in the ED reviewed shows a known ovarian cyst for which she is following with her PCP  There is dilation of her CBD up to 20 mm however, denies postprandial pain, no right upper quadrant pain or Gaspar sign on exam.  LFTs and T. bili normal on labs.  I discussed these findings with the family and they do not wish to pursue MRCP.  If there are any changes, reconsider    Persistent atrial fibrillation (HCC)- (present on admission)  Assessment & Plan  Continue digoxin, metoprolol, apixaban    Pause not sinus ok to resume digoxin and BB    Stage 3a chronic kidney disease- (present on admission)  Assessment & Plan  At baseline, BMP ordered continue monitoring, avoid nephrotoxic agents    Mitral valve regurgitation- (present on admission)  Assessment & Plan  Moderate regurg seen on echo 2021.  She has been complaining of worsening shortness of breath and volume overload currently  Repeat echocardiogram ordered    Essential hypertension- (present on admission)  Assessment & Plan  Continue metoprolol, spironolactone, losartan    Stable    Dyslipidemia, goal LDL below 70- (present on admission)  Assessment & Plan  Continue ezetimibe       Total time spent in chart review, at bedside with the patient, discussing with consultants, nursing and case management: 53 minutes    VTE prophylaxis: Eliquis 2.5 mg twice daily    I have performed a physical exam and reviewed and updated ROS and Plan today (8/28/2024).  In review of yesterday's note (8/27/2024), there are no changes except as documented above.

## 2024-08-28 NOTE — DISCHARGE PLANNING
"[facility] chart review completed. Collaborated with ANNA Gaines.  Per chart review, OT recommends post-acute placement on 8/26/24.  Current discharge considerations are for post-acute placement.  Per collaboration with CM, patient is on restraints and will need 24 hours off prior to discharge to SNF.  [facility] following.      TCN will continue to follow and collaborate with discharge planning team as additional post acute needs arise. Thank you.    Completed:  PT recommends post-acute placement on 8/26/24.    OT recommends post-acute placement on 8/26/24.    Choice obtained: None. Defer to CM to obtain SNF choice.   Pt aware of [facility]'s blanket referral policy.    Addendum:  165- Per chart review, patient has been accepted to [facility], [facility], [facility] and [facility] SNF's.  [facility] and [facility] have declined.  [facility] is no longer following for IRF, please see CAC note by LUIS ANTONIO Jones on 8/27/24 at 3:09 PM.  PT recommends post-acute placement on 8/27/24.  Per RN CM note by Liana Lovell R.N. note on 8/27/24 at 2:03 PM, \"she wants pt to go to [place] because it is close to her house and she knows people there. Daughter said that she is planning to clean one of the bedrooms so pt can move in after SNF\".  TCN will request for [facility] SNF authorization.        "

## 2024-08-28 NOTE — PROGRESS NOTES
Bedside report received from off going RN/tech: Patricia assumed care of patient.     Fall Risk Score: HIGH RISK  Fall risk interventions in place: Provide patient/family education based on risk assessment, Educate patient/family to call staff for assistance when getting out of bed, Place fall precaution signage outside patient door, Place patient in room close to nursing station, Utilize bed/chair fall alarm, and Bed alarm connected correctly  Bed type: Regular (Donavon Score less than 17 interventions in place)  Patient on cardiac monitor: Yes  IVF/IV medications: Not Applicable   Oxygen: How many liters 0.5L  Bedside sitter: Not Applicable   Isolation: Not applicable

## 2024-08-28 NOTE — DISCHARGE SUMMARY
Discharge Summary    CHIEF COMPLAINT ON ADMISSION  Chief Complaint   Patient presents with    Weakness     Worsening over the last 3 days. Reports hematuria & blood in stool, unsure how long that has been happening. Poor historian. Takes eliquis.     Abdominal Pain       Reason for Admission  ems    Admission Date  8/25/2024     CODE STATUS  DNAR/DNI    HPI & HOSPITAL COURSE  This is a 84 y.o. female here with weakness and abdominal pain      84 y.o. female who presented 8/25/2024 with weakness.  PMH of CHF, hypertension.  She has been feeling weak for the past 3 days unable to stand on her own associated with some shortness of breath. She decided to come in today because she noted some blood in her urine, only 1 episode.  Denies dysuria or other urinary symptoms.  She has chronic epigastric abdominal pain which is not any worse than usual.  No changes in bowel habitus.     In the ED afebrile, BP significant elevated systolic up to 200s, saturating in 70s on room air and requiring 3 L O2 on my evaluation.  Left show NT proBNP of 8200, no hematuria on UA. CT abd/pelvis showed known ovarian cyst and dilation of her CBD up to 20 mm.  Patient noted to have a right pleural effusion admitted for CHF exacerbation started on IV Lasix.  On telemetry patient noted to have bradycardia, cardiology consulted. No heart block resumed metoprolol and digoxin. Echo EF 60%, RVSP 54 mmHg. Patient had LYNNE from over diuresis given IV fluids with improvement. Patient to start oral lasix 8/31. I recommend repeat BMP in 1 week to reevaluate kidney function. Cardiology cleared the patient for discharge. Patient had some worsening anxiety her home medication of valium had been held which she had been on since early adulthood. Once this was restarted her mentation and anxiety improved. PT/OT recommended post acute placement. Patient's vital signs are stable and she is ready to discharge to SNF. She is to return to the ER if her condition  worsens and follow up with outpatient cardiology for further heart failure management.     Therefore, she is discharged in good and stable condition to home with close outpatient follow-up.    The patient met 2-midnight criteria for an inpatient stay at the time of discharge.      FOLLOW UP ITEMS POST DISCHARGE  Follow up with primary care and cardiology   Repeat BMP in 1 week to follow up renal function    DISCHARGE DIAGNOSES  Principal Problem:    Acute on chronic systolic CHF (congestive heart failure) (HCC) (POA: Yes)  Active Problems:    Dyslipidemia, goal LDL below 70 (POA: Yes)    Essential hypertension (POA: Yes)    Mitral valve regurgitation (POA: Yes)    Stage 3a chronic kidney disease (POA: Yes)    Persistent atrial fibrillation (HCC) (POA: Yes)    Generalized abdominal pain (POA: Yes)    Hypertensive urgency (POA: Yes)    Encephalopathy (POA: Unknown)    Hypomagnesemia (POA: Unknown)  Resolved Problems:    * No resolved hospital problems. *      FOLLOW UP  Future Appointments   Date Time Provider Department Center   8/30/2024 10:15 AM DEANN Tran None   9/6/2024 10:00 AM Checo Tesfaye M.D. [address]   10/1/2024 10:00 AM DEANN Tran None     [facility]  [address] [address]  [phone]          MEDICATIONS ON DISCHARGE     Medication List        CHANGE how you take these medications        Instructions   furosemide 40 MG Tabs  Start taking on: August 31, 2024  What changed:   medication strength  how much to take  when to take this  reasons to take this  These instructions start on August 31, 2024. If you are unsure what to do until then, ask your doctor or other care provider.  Commonly known as: Lasix   Take 1 Tablet by mouth every day.  Dose: 40 mg            CONTINUE taking these medications        Instructions   apixaban 2.5mg Tabs  Commonly known as: Eliquis   Take 1 Tablet by mouth 2 times a day.  Dose: 2.5 mg      Aspirin Low Dose 81 MG EC tablet  Generic drug: aspirin   TAKE 1 TABLET BY MOUTH EVERY DAY  Dose: 81 mg     diazepam 10 MG tablet  Commonly known as: Valium   Doctor's comments: 60 tablets is a 30 day quantity.  ICD-10 codes R42, F41.9  Take 1 Tablet by mouth every 12 hours as needed for Anxiety or Sleep for up to 3 days. 60 tablets is a 30 day quantity  Dose: 10 mg     digoxin 125 MCG Tabs  Commonly known as: Lanoxin   Take 1 Tablet by mouth every day.  Dose: 125 mcg     ezetimibe 10 MG Tabs  Commonly known as: Zetia   Take 1 Tablet by mouth every day.  Dose: 10 mg     famotidine 20 MG Tabs  Commonly known as: Pepcid   Take 1 Tablet by mouth every day.  Dose: 20 mg     ferrous gluconate 240 (27 Fe) MG tablet  Commonly known as: Fergon   Take 1 Tablet by mouth with dinner.  Dose: 240 mg     latanoprost 0.005 % Soln  Commonly known as: Xalatan   Administer 1 Drop into both eyes at bedtime. Instill 1 drop into both eyes every night at bedtime  Dose: 1 Drop     losartan 100 MG Tabs  Commonly known as: Cozaar   Take 1 Tablet by mouth every day.  Dose: 100 mg     metoprolol  MG Tb24  Commonly known as: Toprol XL   Take 1 Tablet by mouth 2 times a day.  Dose: 100 mg     pantoprazole 40 MG Tbec  Commonly known as: Protonix   TAKE 1 TABLET BY MOUTH EVERY DAY  Dose: 40 mg     spironolactone 25 MG Tabs  Commonly known as: Aldactone   Take 0.5 Tablets by mouth every day.  Dose: 12.5 mg              Allergies  Allergies   Allergen Reactions    Atorvastatin      myalgias    Simvastatin      myalgias       DIET  Orders Placed This Encounter   Procedures    Diet Order Diet: Cardiac; Second Modifier: (optional): 2 Gram Sodium; Fluid modifications: (optional): 1500 ml Fluid Restriction     Standing Status:   Standing     Number of Occurrences:   1     Order Specific Question:   Diet:     Answer:   Cardiac [6]     Order Specific Question:   Second Modifier: (optional)     Answer:   2 Gram Sodium [7]     Order Specific  Question:   Fluid modifications: (optional)     Answer:   1500 ml Fluid Restriction [9]       ACTIVITY  As tolerated.  Weight bearing as tolerated    LINES, DRAINS, AND WOUNDS  This is an automated list. Peripheral IVs will be removed prior to discharge.  Peripheral IV 08/25/24 20 G Right Antecubital (Active)   Site Assessment Clean;Dry;Intact 08/27/24 2100   Dressing Type Transparent;Tape 08/27/24 2100   Line Status Scrubbed the hub prior to access;Flushed;Saline locked 08/27/24 2100   Dressing Status Clean;Dry;Intact 08/27/24 2100   Dressing Intervention N/A 08/27/24 2100   Dressing Change Due 08/27/24 08/26/24 2015   Date Primary Tubing Changed 08/27/24 08/27/24 2100   Date Secondary Tubing Changed 08/27/24 08/27/24 2100   Infiltration Grading ([facility], [facility]) 0 08/27/24 2100   Phlebitis Scale ([facility] Only) 0 08/27/24 2100       Peripheral IV 08/27/24 20 G Anterior;Left Forearm (Active)   Site Assessment Clean;Dry;Intact 08/27/24 2100   Dressing Type Transparent;Occlusive 08/27/24 2100   Line Status Scrubbed the hub prior to access;Flushed;Saline locked 08/27/24 2100   Dressing Status Clean;Dry;Intact 08/27/24 2100   Dressing Intervention N/A 08/27/24 2100   Date Primary Tubing Changed 08/27/24 08/27/24 2100   Date Secondary Tubing Changed 08/27/24 08/27/24 2100   Infiltration Grading ([facility]) 0 08/27/24 2100   Phlebitis Scale ([facility] Only) 0 08/27/24 2100       Wound 08/25/24 Pressure Injury Ear Right right ear is non-blanching. (Active)   Wound Image   08/26/24 0500   Site Assessment Red;Intact 08/27/24 2000   Periwound Assessment Pink;Red 08/27/24 2000   Margins Attached edges 08/27/24 2000       Wound 08/25/24 Other (comment) Pretibial Left (Active)   Wound Image   08/26/24 0500   Site Assessment Purple;Dry 08/27/24 2000   Periwound Assessment Ecchymosis;Intact 08/27/24 2000   Margins Defined edges 08/27/24 2000   Closure Open to air 08/27/24 2000   Drainage Amount None 08/27/24 2000   Treatments Site  care;Offloading 08/27/24 2000   Offloading/DME Other (comment) 08/27/24 2000   Dressing Status Open to Air 08/27/24 2000       Peripheral IV 08/25/24 20 G Right Antecubital (Active)   Site Assessment Clean;Dry;Intact 08/27/24 2100   Dressing Type Transparent;Tape 08/27/24 2100   Line Status Scrubbed the hub prior to access;Flushed;Saline locked 08/27/24 2100   Dressing Status Clean;Dry;Intact 08/27/24 2100   Dressing Intervention N/A 08/27/24 2100   Dressing Change Due 08/27/24 08/26/24 2015   Date Primary Tubing Changed 08/27/24 08/27/24 2100   Date Secondary Tubing Changed 08/27/24 08/27/24 2100   Infiltration Grading ([facility]) 0 08/27/24 2100   Phlebitis Scale ([facility] Only) 0 08/27/24 2100       Peripheral IV 08/27/24 20 G Anterior;Left Forearm (Active)   Site Assessment Clean;Dry;Intact 08/27/24 2100   Dressing Type Transparent;Occlusive 08/27/24 2100   Line Status Scrubbed the hub prior to access;Flushed;Saline locked 08/27/24 2100   Dressing Status Clean;Dry;Intact 08/27/24 2100   Dressing Intervention N/A 08/27/24 2100   Date Primary Tubing Changed 08/27/24 08/27/24 2100   Date Secondary Tubing Changed 08/27/24 08/27/24 2100   Infiltration Grading ([facility]) 0 08/27/24 2100   Phlebitis Scale ([facility] Only) 0 08/27/24 2100               MENTAL STATUS ON TRANSFER  A&Ox3          CONSULTATIONS  Cardiology   PM&R    PROCEDURES  None     LABORATORY  Lab Results   Component Value Date    SODIUM 131 (L) 08/29/2024    POTASSIUM 4.4 08/29/2024    CHLORIDE 89 (L) 08/29/2024    CO2 34 (H) 08/29/2024    GLUCOSE 114 (H) 08/29/2024    BUN 43 (H) 08/29/2024    CREATININE 1.46 (H) 08/29/2024    CREATININE 0.99 04/11/2011    GLOMRATE 56 (L) 03/09/2011        Lab Results   Component Value Date    WBC 7.9 08/29/2024    HEMOGLOBIN 16.4 (H) 08/29/2024    HEMATOCRIT 52.3 (H) 08/29/2024    PLATELETCT 149 (L) 08/29/2024        Total time of the discharge process exceeds 37 minutes.

## 2024-08-28 NOTE — CARE PLAN
The patient is Watcher - Medium risk of patient condition declining or worsening    Shift Goals  Clinical Goals: hemodynamic stability  Patient Goals: rest  Family Goals: updates, go home    Progress made toward(s) clinical / shift goals:    Problem: Pain - Standard  Goal: Alleviation of pain or a reduction in pain to the patient’s comfort goal  Outcome: Progressing     Problem: Knowledge Deficit - Standard  Goal: Patient and family/care givers will demonstrate understanding of plan of care, disease process/condition, diagnostic tests and medications  Outcome: Progressing     Problem: Skin Integrity  Goal: Skin integrity is maintained or improved  Outcome: Progressing     Problem: Fall Risk  Goal: Patient will remain free from falls  Outcome: Progressing     Problem: Depression  Goal: Patient and family/caregiver will verbalize accurate information about at least two of the possible causes of depression, three-four of the signs and symptoms of depression  Outcome: Progressing     Problem: Provide Safe Environment  Goal: Suicide environmental safety, protocols, policies, and practices will be implemented  Outcome: Progressing     Problem: Psychosocial  Goal: Patient's ability to identify and develop effective coping behaviors will improve  Outcome: Progressing  Goal: Patient's ability to identify and utilize available support systems will improve  Outcome: Progressing     Problem: Care Map:  Admission Optimal Outcome for the Heart Failure Patient  Goal: Admission:  Optimal Care of the heart failure patient  Outcome: Progressing     Problem: Care Map:  Day 3 Optimal Outcome for the Heart Failure Patient  Goal: Day 3:  Optimal Care of the heart failure patient  Outcome: Progressing     Problem: Care Map:  Day Before Discharge Optimal Outcome for the Heart Failure Patient  Goal: Day Before Discharge:  Optimal Care of the heart failure patient  Outcome: Progressing       Patient is not progressing towards the following  goals:

## 2024-08-28 NOTE — DISCHARGE PLANNING
TCN following. HTH/SCP chart reviewed. No new TCN needs identified. Please see prior TCN note from 8/27/24 for most recent discharge planning considerations if indicated.  Current discharge considerations are noted to be post-acute placement/SNF.  TCC no longer following. Barriers to discharge plan- on tele-sitter.   Per review, noted current 6 click scores 19 ADL's, 10 mobility and per kardex mobility documented at 5 feet X 1 with FWW.      Completed:  PT recommends post-acute placement on 8/27/24.    OT recommends post-acute placement on 8/26/24.    Choice obtained: None. Defer to CM to obtain SNF choice.   Pt aware of [facility] blanket referral policy.

## 2024-08-28 NOTE — DISCHARGE PLANNING
Case Management Discharge Planning    Admission Date: 8/25/2024  GMLOS: 3.9  ALOS: 3    6-Clicks ADL Score: 19  6-Clicks Mobility Score: 10  PT and/or OT Eval ordered: Yes  Post-acute Referrals Ordered: Yes  Post-acute Choice Obtained: Yes  Has referral(s) been sent to post-acute provider:  Yes      Anticipated Discharge Dispo: Discharge Disposition: D/T to SNF with Medicare cert in anticipation of skilled care (03)  [facility]    DME Needed: none    Action(s) Taken: discussed during IDT, per MD, ok to dc once accepted by [facility]. Per notes, restraints have been off since 4pm 08/27 but noted that Tele Sitter was in room this morning. BS RN stated that this is off and confirmed by CN. Willian at [facility] said that they are concerned that pt was obtunded yesterday.     CM went in room and pt was conversant. Asking to have daughter bring in the check book to pay for rent. Explained that she is going to a rehab facility because she is too weak.     Daughter called and asked if she could take pt home however explained that per Dr. Iniguez it would benefit pt to go to SNF because per RN, pt needed 2 person assistance for transfer. Daughter agreeable and will be in hospital this evening to talk to pt.     Jeane from [facility] said she thinks that the Tele sitter is on. Charge nurse talked to Jeane. CM called [facility] and not agreeable to accept pt today due to the Tele sitter in room.   CM later check and it has been removed. Also, Willian wants to see how pt will work with PT today.     CM notified Dr. Iniguez and ok to transfer pt tomorrow.   CM also talked to Althea at [facility] and they also have a bed tomorrow. Daughter said she prefers [facility] as pt knows that area and pt know people there.      Escalations Completed: n/a    Medically Clear: no-still encepalopathic    Next Steps: follow up with MD tomorrow as well as [facility].     Barriers to Discharge: tele sitter was in room so [facility] declined to accept today but they will  reconsider for tomorrow.     Is the patient up for discharge tomorrow: yes

## 2024-08-29 VITALS
WEIGHT: 126.98 LBS | OXYGEN SATURATION: 95 % | RESPIRATION RATE: 18 BRPM | SYSTOLIC BLOOD PRESSURE: 127 MMHG | BODY MASS INDEX: 23.37 KG/M2 | HEART RATE: 59 BPM | HEIGHT: 62 IN | TEMPERATURE: 97.5 F | DIASTOLIC BLOOD PRESSURE: 64 MMHG

## 2024-08-29 LAB
ALBUMIN SERPL BCP-MCNC: 3.5 G/DL (ref 3.2–4.9)
ANION GAP SERPL CALC-SCNC: 8 MMOL/L (ref 7–16)
BUN SERPL-MCNC: 43 MG/DL (ref 8–22)
BUN SERPL-MCNC: 43 MG/DL (ref 8–22)
CALCIUM ALBUM COR SERPL-MCNC: 9.6 MG/DL (ref 8.5–10.5)
CALCIUM SERPL-MCNC: 9.2 MG/DL (ref 8.5–10.5)
CALCIUM SERPL-MCNC: 9.2 MG/DL (ref 8.5–10.5)
CHLORIDE SERPL-SCNC: 88 MMOL/L (ref 96–112)
CHLORIDE SERPL-SCNC: 89 MMOL/L (ref 96–112)
CO2 SERPL-SCNC: 33 MMOL/L (ref 20–33)
CO2 SERPL-SCNC: 34 MMOL/L (ref 20–33)
CREAT SERPL-MCNC: 1.46 MG/DL (ref 0.5–1.4)
CREAT SERPL-MCNC: 1.66 MG/DL (ref 0.5–1.4)
ERYTHROCYTE [DISTWIDTH] IN BLOOD BY AUTOMATED COUNT: 80.3 FL (ref 35.9–50)
GFR SERPLBLD CREATININE-BSD FMLA CKD-EPI: 30 ML/MIN/1.73 M 2
GFR SERPLBLD CREATININE-BSD FMLA CKD-EPI: 35 ML/MIN/1.73 M 2
GLUCOSE SERPL-MCNC: 114 MG/DL (ref 65–99)
GLUCOSE SERPL-MCNC: 99 MG/DL (ref 65–99)
HCT VFR BLD AUTO: 52.3 % (ref 37–47)
HGB BLD-MCNC: 16.4 G/DL (ref 12–16)
MAGNESIUM SERPL-MCNC: 2.9 MG/DL (ref 1.5–2.5)
MCH RBC QN AUTO: 27.2 PG (ref 27–33)
MCHC RBC AUTO-ENTMCNC: 31.4 G/DL (ref 32.2–35.5)
MCV RBC AUTO: 86.9 FL (ref 81.4–97.8)
PHOSPHATE SERPL-MCNC: 3.3 MG/DL (ref 2.5–4.5)
PLATELET # BLD AUTO: 149 K/UL (ref 164–446)
PMV BLD AUTO: 9.3 FL (ref 9–12.9)
POTASSIUM SERPL-SCNC: 3.9 MMOL/L (ref 3.6–5.5)
POTASSIUM SERPL-SCNC: 4.4 MMOL/L (ref 3.6–5.5)
RBC # BLD AUTO: 6.02 M/UL (ref 4.2–5.4)
SODIUM SERPL-SCNC: 131 MMOL/L (ref 135–145)
SODIUM SERPL-SCNC: 133 MMOL/L (ref 135–145)
WBC # BLD AUTO: 7.9 K/UL (ref 4.8–10.8)

## 2024-08-29 PROCEDURE — 700102 HCHG RX REV CODE 250 W/ 637 OVERRIDE(OP): Performed by: INTERNAL MEDICINE

## 2024-08-29 PROCEDURE — 700102 HCHG RX REV CODE 250 W/ 637 OVERRIDE(OP): Performed by: STUDENT IN AN ORGANIZED HEALTH CARE EDUCATION/TRAINING PROGRAM

## 2024-08-29 PROCEDURE — 700102 HCHG RX REV CODE 250 W/ 637 OVERRIDE(OP)

## 2024-08-29 PROCEDURE — A9270 NON-COVERED ITEM OR SERVICE: HCPCS

## 2024-08-29 PROCEDURE — 700105 HCHG RX REV CODE 258: Performed by: INTERNAL MEDICINE

## 2024-08-29 PROCEDURE — 83735 ASSAY OF MAGNESIUM: CPT

## 2024-08-29 PROCEDURE — 85027 COMPLETE CBC AUTOMATED: CPT

## 2024-08-29 PROCEDURE — 80069 RENAL FUNCTION PANEL: CPT

## 2024-08-29 PROCEDURE — 80048 BASIC METABOLIC PNL TOTAL CA: CPT

## 2024-08-29 PROCEDURE — A9270 NON-COVERED ITEM OR SERVICE: HCPCS | Performed by: INTERNAL MEDICINE

## 2024-08-29 PROCEDURE — A9270 NON-COVERED ITEM OR SERVICE: HCPCS | Performed by: STUDENT IN AN ORGANIZED HEALTH CARE EDUCATION/TRAINING PROGRAM

## 2024-08-29 PROCEDURE — 36415 COLL VENOUS BLD VENIPUNCTURE: CPT

## 2024-08-29 RX ORDER — DIAZEPAM 10 MG
10 TABLET ORAL EVERY 12 HOURS PRN
Qty: 6 TABLET | Refills: 0 | Status: SHIPPED | OUTPATIENT
Start: 2024-08-29 | End: 2024-08-29

## 2024-08-29 RX ORDER — DIAZEPAM 10 MG
10 TABLET ORAL EVERY 12 HOURS PRN
Qty: 6 TABLET | Refills: 0 | Status: SHIPPED | OUTPATIENT
Start: 2024-08-29 | End: 2024-09-01

## 2024-08-29 RX ORDER — SODIUM CHLORIDE 9 MG/ML
INJECTION, SOLUTION INTRAVENOUS CONTINUOUS
Status: DISCONTINUED | OUTPATIENT
Start: 2024-08-29 | End: 2024-08-29 | Stop reason: HOSPADM

## 2024-08-29 RX ORDER — TRAMADOL HYDROCHLORIDE 50 MG/1
50 TABLET ORAL ONCE
Status: COMPLETED | OUTPATIENT
Start: 2024-08-29 | End: 2024-08-29

## 2024-08-29 RX ORDER — FUROSEMIDE 40 MG
40 TABLET ORAL DAILY
Status: SHIPPED
Start: 2024-08-31

## 2024-08-29 RX ADMIN — ASPIRIN 81 MG: 81 TABLET, COATED ORAL at 05:58

## 2024-08-29 RX ADMIN — FAMOTIDINE 20 MG: 20 TABLET, FILM COATED ORAL at 06:00

## 2024-08-29 RX ADMIN — FUROSEMIDE 40 MG: 40 TABLET ORAL at 06:00

## 2024-08-29 RX ADMIN — SPIRONOLACTONE 12.5 MG: 25 TABLET ORAL at 06:01

## 2024-08-29 RX ADMIN — LOSARTAN POTASSIUM 100 MG: 50 TABLET, FILM COATED ORAL at 05:58

## 2024-08-29 RX ADMIN — TRAMADOL HYDROCHLORIDE 50 MG: 50 TABLET ORAL at 06:24

## 2024-08-29 RX ADMIN — OMEPRAZOLE 20 MG: 20 CAPSULE, DELAYED RELEASE ORAL at 05:58

## 2024-08-29 RX ADMIN — CYANOCOBALAMIN TAB 500 MCG 1000 MCG: 500 TAB at 05:57

## 2024-08-29 RX ADMIN — METOPROLOL SUCCINATE 100 MG: 100 TABLET, EXTENDED RELEASE ORAL at 05:59

## 2024-08-29 RX ADMIN — EZETIMIBE 10 MG: 10 TABLET ORAL at 05:57

## 2024-08-29 RX ADMIN — THERA TABS 1 TABLET: TAB at 06:01

## 2024-08-29 RX ADMIN — Medication 100 MG: at 05:58

## 2024-08-29 RX ADMIN — SODIUM CHLORIDE: 9 INJECTION, SOLUTION INTRAVENOUS at 07:58

## 2024-08-29 RX ADMIN — APIXABAN 2.5 MG: 2.5 TABLET, FILM COATED ORAL at 06:01

## 2024-08-29 ASSESSMENT — FIBROSIS 4 INDEX: FIB4 SCORE: 3.64

## 2024-08-29 ASSESSMENT — PAIN DESCRIPTION - PAIN TYPE: TYPE: CHRONIC PAIN

## 2024-08-29 NOTE — DISCHARGE PLANNING
0905  GAY spoke with Willian from [facility]. Facility is considering accepting pt today, Willian requesting for qualifying diagnosis for Seroquel or else facility will get in trouble for administering. [facility] notified, DPA to follow up with Willian.     -1828  DPA informed Willian with [facility] that Seroquel will be D/C'd, pt never received it. Willian OK with any time in afternoon, plan is for [id number] transport time. DPA to follow up with Willian once transport time is confirmed. [facility] notified.     -[phone]  DPA informed Willian with [facility] that transport has been set for 1500 today.

## 2024-08-29 NOTE — PROGRESS NOTES
Patient transferred to SNF via [facility]. Patient showed no signs or symptoms of distress at time of discharge.

## 2024-08-29 NOTE — DISCHARGE PLANNING
Case Management Discharge Planning    Admission Date: 8/25/2024  GMLOS: 3.9  ALOS: 4    6-Clicks ADL Score: 19  6-Clicks Mobility Score: 10  PT and/or OT Eval ordered: Yes  PT/OT: Recommending post acute placement   Post-acute Referrals Ordered: Yes  Post-acute Choice Obtained: Yes  Has referral(s) been sent to post-acute provider:  Yes      Anticipated Discharge Dispo: Discharge Disposition: D/T to SNF with Medicare cert in anticipation of skilled care (03)    DME Needed: No    Action(s) Taken: Pt discussed in IDT rounds that pt need labs redrawn and transport to SNF can be set up later in the afternoon. Per [facility] that can accept pt [id number]. LMSW requested 1500 transportation via [employer]. Pending confirmation.     1148 Transportation confirmed for 1500. LMSW spoke with daughter and pt at bedside to inform them both of transportation time. Pt and daughter are both agreeable. LMSW received verbal on COBRA.     Escalations Completed: None    Medically Clear: Yes    Next Steps: F/U with transport.

## 2024-08-29 NOTE — CARE PLAN
The patient is Stable - Low risk of patient condition declining or worsening    Shift Goals  Clinical Goals: discharge to rehab  Patient Goals: sleep  Family Goals: thu    Progress made toward(s) clinical / shift goals:    Problem: Pain - Standard  Goal: Alleviation of pain or a reduction in pain to the patient’s comfort goal  Outcome: Progressing     Problem: Knowledge Deficit - Standard  Goal: Patient and family/care givers will demonstrate understanding of plan of care, disease process/condition, diagnostic tests and medications  Outcome: Progressing     Problem: Skin Integrity  Goal: Skin integrity is maintained or improved  Outcome: Progressing     Problem: Fall Risk  Goal: Patient will remain free from falls  Outcome: Progressing     Problem: Depression  Goal: Patient and family/caregiver will verbalize accurate information about at least two of the possible causes of depression, three-four of the signs and symptoms of depression  Outcome: Progressing     Problem: Provide Safe Environment  Goal: Suicide environmental safety, protocols, policies, and practices will be implemented  Outcome: Progressing     Problem: Care Map:  Day 3 Optimal Outcome for the Heart Failure Patient  Goal: Day 3:  Optimal Care of the heart failure patient  Outcome: Progressing       Patient is not progressing towards the following goals:

## 2024-08-29 NOTE — PROGRESS NOTES
Bedside report received from off going RN/tech: Case, assumed care of patient.     Fall Risk Score: HIGH RISK  Fall risk interventions in place: Place yellow fall risk ID band on patient, Provide patient/family education based on risk assessment, Educate patient/family to call staff for assistance when getting out of bed, Place fall precaution signage outside patient door, Place patient in room close to nursing station, Utilize bed/chair fall alarm, and Bed alarm connected correctly  Bed type: Regular (Donavon Score less than 17 interventions in place)  Patient on cardiac monitor: Yes  IVF/IV medications: Not Applicable   Oxygen: How many liters 0.5L, Traced the line to wall oxygen, and No oxygen tank in room  Bedside sitter: Not Applicable   Isolation: Not applicable

## 2024-08-29 NOTE — PROGRESS NOTES
Bedside report received from off going RN/tech: Tree VELEZ, assumed care of patient.     Fall Risk Score: HIGH RISK  Fall risk interventions in place: Place yellow fall risk ID band on patient, Provide patient/family education based on risk assessment, Educate patient/family to call staff for assistance when getting out of bed, Place fall precaution signage outside patient door, Place patient in room close to nursing station, Utilize bed/chair fall alarm, Notify charge of high risk for huddle, and Bed alarm connected correctly  Bed type: Regular (Donavon Score less than 17 interventions in place)  Patient on cardiac monitor: No   IVF/IV medications: Infusion per MAR (List Med(s)) Magnesium sulfate  Oxygen: How many liters 0.5L, Traced the line to wall oxygen, and No oxygen tank in room  Bedside sitter: Not Applicable   Isolation: Not applicable

## 2024-08-29 NOTE — DISCHARGE PLANNING
DC Transport Scheduled    Transport Company Scheduled:  [facility]  Spoke with Layne at [facility] to schedule transport.    Scheduled Date: 8/29/2024  Scheduled Time: 1500    Destination: [facility]  Destination address: [address] [place] [address]    Notified care team of scheduled transport via [employer].     If there are any changes needed to the DC transportation scheduled, please contact [facility] at ext. [phone] between the hours of 7864-7238 Mon-Fri. If outside those hours, contact the ED Case Manager at ext. [phone].

## 2024-08-29 NOTE — DOCUMENTATION QUERY
"                                                                         [facility]                                                                       Query Response Note      PATIENT:               KYLE WISEMAN  ACCT #:                  [id number]  MRN:                     [id number]  :                      1940  ADMIT DATE:       2024 4:38 PM  DISCH DATE:        2024 3:25 PM  RESPONDING  PROVIDER #:        [id number]           QUERY TEXT:    Conflicting documentation is found in the medical record regarding CHF type.  Can the type of CHF be further clarified based on the provided clinical indicators?      Note: If you agree with a diagnosis listed above, please remember to include it in your concurrent daily documentation and onto the Discharge Summary.    The patient's Clinical Indicators include:  (H&P) \"past medical history of Acute on chronic heart failure with preserved ejection fraction\"    (Cardiology Consult ) \"Acute heart failure--HFpEF - Acute on chronic systolic CHF - Echo  showed an EF of 55%, mildly reduced RV function, moderate MR, RVSP of 40.\"    (PN ) \"Acute on chronic systolic CHF\"    (PN ) \"Echo EF 60%, RVSP 54 mmHg\"    ECHO : Normal left ventricular size, thickness, and systolic function.  The ejection fraction is measured to be 60% by Mcgarry's biplane.  Normal right ventricular size.Reduced right ventricular systolic  function.  Mild to moderate mitral regurgitation.  Right ventricular systolic pressure is estimated to be 54 mmHg.    Risk Factors: Hypertensive urgency, Persistent atrial fibrillation, Mitral valve regurgitation, Essential hypertension, Hypomagnesemia, Stage 3a chronic kidney disease    Tx: IV furosemide twice daily, I/O, daily weights, fluid and salt restriction, Repeat echo    If you have any questions, please contact:  Parish Monterroso RN CDI [facility]  [email or web address]  Parish Monterroso Via [employer]  Options provided:   -- " Diastolic / HFpEF   -- Combined systolic / diastolic   -- Systolic / HFrEF   -- Other explanation, (please specify other explanation)      Query created by: Parish Lakhani on 8/29/2024 8:50 AM    RESPONSE TEXT:    Diastolic / HFpEF          Electronically signed by:  JESUS DAVIS MD 8/29/2024 4:48 PM

## 2024-08-29 NOTE — DISCHARGE PLANNING
"TCN following. HTH/SCP chart reviewed. No new TCN needs identified. Please see prior TCN note from 8/27/24 for most recent discharge planning considerations if indicated.  Current discharge considerations are noted for dc to [facility] later today. Note transportation has been setup via [employer] and SNF auth via [facility] is in place. TCC/RIRF no longer following as \"Patient is too low level to qualify for IPR, and she lives alone, therefore is not a candidate for IPR\". TCN will continue to assist with dc planning as indicated.      Completed:  PT recommends post-acute placement on 8/27/24.    OT recommends post-acute placement on 8/26/24.    Choice obtained: None. Defer to CM to obtain SNF choice.   Pt aware of [facility] blanket referral policy.   "

## 2024-09-24 ENCOUNTER — HOME HEALTH ADMISSION (OUTPATIENT)
Dept: HOME HEALTH SERVICES | Facility: HOME HEALTHCARE | Age: 84
End: 2024-09-24
Payer: MEDICARE

## 2024-09-26 ENCOUNTER — HOME CARE VISIT (OUTPATIENT)
Dept: HOME HEALTH SERVICES | Facility: HOME HEALTHCARE | Age: 84
End: 2024-09-26

## 2024-10-01 ENCOUNTER — OFFICE VISIT (OUTPATIENT)
Dept: CARDIOLOGY | Facility: MEDICAL CENTER | Age: 84
End: 2024-10-01
Attending: INTERNAL MEDICINE
Payer: MEDICARE

## 2024-10-01 VITALS
RESPIRATION RATE: 14 BRPM | HEART RATE: 56 BPM | HEIGHT: 62 IN | WEIGHT: 125.8 LBS | DIASTOLIC BLOOD PRESSURE: 68 MMHG | OXYGEN SATURATION: 94 % | BODY MASS INDEX: 23.15 KG/M2 | SYSTOLIC BLOOD PRESSURE: 130 MMHG

## 2024-10-01 DIAGNOSIS — Z79.01 CHRONIC ANTICOAGULATION: ICD-10-CM

## 2024-10-01 DIAGNOSIS — N18.32 STAGE 3B CHRONIC KIDNEY DISEASE: ICD-10-CM

## 2024-10-01 DIAGNOSIS — I51.89 LEFT VENTRICULAR DIASTOLIC DYSFUNCTION, NYHA CLASS 3: ICD-10-CM

## 2024-10-01 DIAGNOSIS — I34.0 MILD MITRAL REGURGITATION: ICD-10-CM

## 2024-10-01 DIAGNOSIS — E11.65 TYPE 2 DIABETES MELLITUS WITH HYPERGLYCEMIA, WITHOUT LONG-TERM CURRENT USE OF INSULIN (HCC): ICD-10-CM

## 2024-10-01 DIAGNOSIS — D68.69 HYPERCOAGULABLE STATE DUE TO PERMANENT ATRIAL FIBRILLATION (HCC): ICD-10-CM

## 2024-10-01 DIAGNOSIS — Z95.5 STENTED CORONARY ARTERY: ICD-10-CM

## 2024-10-01 DIAGNOSIS — I50.30 ACC/AHA STAGE C HEART FAILURE WITH PRESERVED EJECTION FRACTION (HCC): ICD-10-CM

## 2024-10-01 DIAGNOSIS — E78.2 MIXED HYPERLIPIDEMIA: ICD-10-CM

## 2024-10-01 DIAGNOSIS — I48.21 HYPERCOAGULABLE STATE DUE TO PERMANENT ATRIAL FIBRILLATION (HCC): ICD-10-CM

## 2024-10-01 DIAGNOSIS — R06.09 DYSPNEA ON EXERTION: ICD-10-CM

## 2024-10-01 DIAGNOSIS — I10 HTN (HYPERTENSION), MALIGNANT: ICD-10-CM

## 2024-10-01 DIAGNOSIS — I48.11 LONGSTANDING PERSISTENT ATRIAL FIBRILLATION (HCC): ICD-10-CM

## 2024-10-01 LAB — EKG IMPRESSION: NORMAL

## 2024-10-01 PROCEDURE — 99214 OFFICE O/P EST MOD 30 MIN: CPT | Performed by: INTERNAL MEDICINE

## 2024-10-01 PROCEDURE — 3075F SYST BP GE 130 - 139MM HG: CPT | Performed by: INTERNAL MEDICINE

## 2024-10-01 PROCEDURE — 93010 ELECTROCARDIOGRAM REPORT: CPT | Performed by: INTERNAL MEDICINE

## 2024-10-01 PROCEDURE — 3078F DIAST BP <80 MM HG: CPT | Performed by: INTERNAL MEDICINE

## 2024-10-01 PROCEDURE — G2211 COMPLEX E/M VISIT ADD ON: HCPCS | Performed by: INTERNAL MEDICINE

## 2024-10-01 PROCEDURE — 99213 OFFICE O/P EST LOW 20 MIN: CPT | Performed by: INTERNAL MEDICINE

## 2024-10-01 PROCEDURE — 93005 ELECTROCARDIOGRAM TRACING: CPT | Performed by: INTERNAL MEDICINE

## 2024-10-01 ASSESSMENT — ENCOUNTER SYMPTOMS
MYALGIAS: 0
FEVER: 0
DEPRESSION: 0
DIZZINESS: 0
EYE PAIN: 0
COUGH: 0
BRUISES/BLEEDS EASILY: 0
ABDOMINAL PAIN: 0
CHILLS: 0
CLAUDICATION: 0
DOUBLE VISION: 0
ORTHOPNEA: 0
SPEECH CHANGE: 0
VOMITING: 0
EYE DISCHARGE: 0
FALLS: 0
NAUSEA: 0
LOSS OF CONSCIOUSNESS: 0
SENSORY CHANGE: 0
PND: 0
BLURRED VISION: 0
MEMORY LOSS: 1
HEADACHES: 0
BLOOD IN STOOL: 0
WEIGHT LOSS: 0
PALPITATIONS: 0
HALLUCINATIONS: 0
SHORTNESS OF BREATH: 1

## 2024-10-01 ASSESSMENT — FIBROSIS 4 INDEX: FIB4 SCORE: 3.64

## 2024-10-21 ENCOUNTER — PATIENT OUTREACH (OUTPATIENT)
Dept: HEALTH INFORMATION MANAGEMENT | Facility: OTHER | Age: 84
End: 2024-10-21
Payer: MEDICARE

## 2024-11-04 ENCOUNTER — HOSPITAL ENCOUNTER (OUTPATIENT)
Dept: LAB | Facility: MEDICAL CENTER | Age: 84
End: 2024-11-04
Attending: FAMILY MEDICINE
Payer: MEDICARE

## 2024-11-04 DIAGNOSIS — E11.65 TYPE 2 DIABETES MELLITUS WITH HYPERGLYCEMIA, WITHOUT LONG-TERM CURRENT USE OF INSULIN (HCC): ICD-10-CM

## 2024-11-04 DIAGNOSIS — K62.5 COLITIS WITH RECTAL BLEEDING: ICD-10-CM

## 2024-11-04 DIAGNOSIS — K52.9 COLITIS WITH RECTAL BLEEDING: ICD-10-CM

## 2024-11-04 DIAGNOSIS — D50.9 MICROCYTIC ANEMIA: ICD-10-CM

## 2024-11-04 DIAGNOSIS — I48.91 ATRIAL FIBRILLATION WITH RVR (HCC): ICD-10-CM

## 2024-11-04 LAB
BASOPHILS # BLD AUTO: 1 % (ref 0–1.8)
BASOPHILS # BLD: 0.06 K/UL (ref 0–0.12)
EOSINOPHIL # BLD AUTO: 0.11 K/UL (ref 0–0.51)
EOSINOPHIL NFR BLD: 1.9 % (ref 0–6.9)
ERYTHROCYTE [DISTWIDTH] IN BLOOD BY AUTOMATED COUNT: 54.6 FL (ref 35.9–50)
HCT VFR BLD AUTO: 47.8 % (ref 37–47)
HGB BLD-MCNC: 15.5 G/DL (ref 12–16)
IMM GRANULOCYTES # BLD AUTO: 0.02 K/UL (ref 0–0.11)
IMM GRANULOCYTES NFR BLD AUTO: 0.3 % (ref 0–0.9)
LYMPHOCYTES # BLD AUTO: 0.76 K/UL (ref 1–4.8)
LYMPHOCYTES NFR BLD: 13 % (ref 22–41)
MCH RBC QN AUTO: 29.8 PG (ref 27–33)
MCHC RBC AUTO-ENTMCNC: 32.4 G/DL (ref 32.2–35.5)
MCV RBC AUTO: 91.9 FL (ref 81.4–97.8)
MONOCYTES # BLD AUTO: 0.76 K/UL (ref 0–0.85)
MONOCYTES NFR BLD AUTO: 13 % (ref 0–13.4)
NEUTROPHILS # BLD AUTO: 4.15 K/UL (ref 1.82–7.42)
NEUTROPHILS NFR BLD: 70.8 % (ref 44–72)
NRBC # BLD AUTO: 0 K/UL
NRBC BLD-RTO: 0 /100 WBC (ref 0–0.2)
PLATELET # BLD AUTO: 177 K/UL (ref 164–446)
PMV BLD AUTO: 9.4 FL (ref 9–12.9)
RBC # BLD AUTO: 5.2 M/UL (ref 4.2–5.4)
WBC # BLD AUTO: 5.9 K/UL (ref 4.8–10.8)

## 2024-11-04 PROCEDURE — 36415 COLL VENOUS BLD VENIPUNCTURE: CPT

## 2024-11-04 PROCEDURE — 80053 COMPREHEN METABOLIC PANEL: CPT

## 2024-11-04 PROCEDURE — 83540 ASSAY OF IRON: CPT

## 2024-11-04 PROCEDURE — 85025 COMPLETE CBC W/AUTO DIFF WBC: CPT

## 2024-11-04 PROCEDURE — 83550 IRON BINDING TEST: CPT

## 2024-11-05 LAB
ALBUMIN SERPL BCP-MCNC: 4.5 G/DL (ref 3.2–4.9)
ALBUMIN/GLOB SERPL: 1.5 G/DL
ALP SERPL-CCNC: 106 U/L (ref 30–99)
ALT SERPL-CCNC: 23 U/L (ref 2–50)
ANION GAP SERPL CALC-SCNC: 11 MMOL/L (ref 7–16)
AST SERPL-CCNC: 32 U/L (ref 12–45)
BILIRUB SERPL-MCNC: 0.7 MG/DL (ref 0.1–1.5)
BUN SERPL-MCNC: 34 MG/DL (ref 8–22)
CALCIUM ALBUM COR SERPL-MCNC: 10 MG/DL (ref 8.5–10.5)
CALCIUM SERPL-MCNC: 10.4 MG/DL (ref 8.5–10.5)
CHLORIDE SERPL-SCNC: 101 MMOL/L (ref 96–112)
CO2 SERPL-SCNC: 23 MMOL/L (ref 20–33)
CREAT SERPL-MCNC: 0.99 MG/DL (ref 0.5–1.4)
GFR SERPLBLD CREATININE-BSD FMLA CKD-EPI: 56 ML/MIN/1.73 M 2
GLOBULIN SER CALC-MCNC: 3.1 G/DL (ref 1.9–3.5)
GLUCOSE SERPL-MCNC: 110 MG/DL (ref 65–99)
IRON SATN MFR SERPL: 32 % (ref 15–55)
IRON SERPL-MCNC: 109 UG/DL (ref 40–170)
POTASSIUM SERPL-SCNC: 4.5 MMOL/L (ref 3.6–5.5)
PROT SERPL-MCNC: 7.6 G/DL (ref 6–8.2)
SODIUM SERPL-SCNC: 135 MMOL/L (ref 135–145)
TIBC SERPL-MCNC: 340 UG/DL (ref 250–450)
UIBC SERPL-MCNC: 231 UG/DL (ref 110–370)

## 2024-12-09 ENCOUNTER — PATIENT OUTREACH (OUTPATIENT)
Dept: HEALTH INFORMATION MANAGEMENT | Facility: OTHER | Age: 84
End: 2024-12-09
Payer: MEDICARE

## 2024-12-09 NOTE — PROGRESS NOTES
Community Health Worker Intake     Synopsis: Chw received referral indicating patient is inquiring about programs to assist her as she lives alone.Ivory lives alone and is interested in programs available to help her. Care management to assess for programs such as Homemaker and/or ADSD programs    12/17/24- Chw contacted patient regarding referral received to assist her with programs. Pt states she does live alone but was not aware of any referral to help her with in-home care or additional assistance. Pt states she does have to manage many things on her own and can not afford a caregiver. Chw explained to the patient she may be able to apply for ADSD- [facility] program, which may qualify her for in home care aid and other assistance programs for free.Chw also explained the application process to the patient. Pt states she is not interested in this at this time and thanked this chw for the call. Chw encouraged the patient to contact [facility] with any future needs or assistance.

## 2024-12-24 ENCOUNTER — TELEPHONE (OUTPATIENT)
Dept: CARDIOLOGY | Facility: MEDICAL CENTER | Age: 84
End: 2024-12-24
Payer: MEDICARE

## 2024-12-24 NOTE — TELEPHONE ENCOUNTER
Phone Number Called: [phone]    Call outcome: Spoke to patient regarding message below.    Message: Called to advise patient to complete labs from 10/01/24.Pt was unsure of what she needed to do before appointment. Pt also a little confused about what her intention of her original call was. I reminded patient which office I was calling from and what call center noted was her reason for calling. Pt states she has some bouts of confusion. After further conversation, patient  stated she needs to coordinate lab draw and FV w/ TT with her daughter. Patient will call after 1st of the year to get scheduled with TT.

## 2024-12-24 NOTE — TELEPHONE ENCOUNTER
TT    Caller: Ivory Rowan     Topic/issue: Patient is requesting a call back about what needs to happen before the next appointment. Patient would like to know if labs need to be done before hand as well.    Callback Number: [phone]     Thank you,  Aleksandra WALTERS

## 2024-12-27 DIAGNOSIS — K21.9 GASTROESOPHAGEAL REFLUX DISEASE WITHOUT ESOPHAGITIS: Chronic | ICD-10-CM

## 2024-12-27 RX ORDER — FAMOTIDINE 20 MG/1
20 TABLET, FILM COATED ORAL DAILY
Qty: 90 TABLET | Refills: 3 | Status: SHIPPED | OUTPATIENT
Start: 2024-12-27

## 2024-12-27 NOTE — TELEPHONE ENCOUNTER
Received request via: Pharmacy    Was the patient seen in the last year in this department? Yes famotidine (PEPCID) 20 MG Tab     Does the patient have an active prescription (recently filled or refills available) for medication(s) requested? No    Pharmacy Name: [facility]/pharmacy #[id number] - [place], [place] - [address] [facility]     Does the patient have [facility] Plus and need 100-day supply? (This applies to ALL medications) Yes, quantity updated to 100 days

## 2025-02-03 DIAGNOSIS — I10 HTN (HYPERTENSION), MALIGNANT: ICD-10-CM

## 2025-02-03 DIAGNOSIS — R06.09 DYSPNEA ON EXERTION: ICD-10-CM

## 2025-02-04 RX ORDER — FUROSEMIDE 20 MG/1
20 TABLET ORAL
Qty: 90 TABLET | Refills: 0 | Status: SHIPPED | OUTPATIENT
Start: 2025-02-04

## 2025-02-27 ENCOUNTER — TELEPHONE (OUTPATIENT)
Dept: CARDIOLOGY | Facility: MEDICAL CENTER | Age: 85
End: 2025-02-27
Payer: MEDICARE

## 2025-02-27 NOTE — TELEPHONE ENCOUNTER
TT    Caller: Ivory Rowan    Topic/issue: MEDICATION MANAGEMENT    Ivory states that she has stopped taking the DIGOXIN for 3 weeks now due to frequent symptoms related to this medication. Please advise.    Thank you,  Chau OLSON    Callback Number: [phone] (home)

## 2025-02-27 NOTE — TELEPHONE ENCOUNTER
Phone Number Called: [phone]    Call outcome: Did not leave a detailed message. Requested patient to call back.

## 2025-03-08 DIAGNOSIS — I10 HTN (HYPERTENSION), MALIGNANT: ICD-10-CM

## 2025-03-08 DIAGNOSIS — I48.19 PERSISTENT ATRIAL FIBRILLATION (HCC): ICD-10-CM

## 2025-03-10 NOTE — TELEPHONE ENCOUNTER
Is the patient due for a refill? Yes    Was the patient seen the last 15 months? Yes    Date of last office visit: 10/1/2024    Does the patient have an upcoming appointment?  Yes   If yes, When? 3/25/2025    Provider to refill:TT    Does the patient have [facility] and need 100-day supply? (This applies to ALL medications) Yes, quantity updated to 100 days

## 2025-03-11 RX ORDER — METOPROLOL SUCCINATE 100 MG/1
100 TABLET, EXTENDED RELEASE ORAL 2 TIMES DAILY
Qty: 200 TABLET | Refills: 3 | Status: SHIPPED | OUTPATIENT
Start: 2025-03-11

## 2025-03-13 ENCOUNTER — HOSPITAL ENCOUNTER (OUTPATIENT)
Dept: LAB | Facility: MEDICAL CENTER | Age: 85
End: 2025-03-13
Attending: INTERNAL MEDICINE
Payer: MEDICARE

## 2025-03-13 DIAGNOSIS — N18.32 STAGE 3B CHRONIC KIDNEY DISEASE: ICD-10-CM

## 2025-03-13 DIAGNOSIS — R06.09 DYSPNEA ON EXERTION: ICD-10-CM

## 2025-03-13 DIAGNOSIS — I51.89 LEFT VENTRICULAR DIASTOLIC DYSFUNCTION, NYHA CLASS 3: ICD-10-CM

## 2025-03-13 DIAGNOSIS — I50.30 ACC/AHA STAGE C HEART FAILURE WITH PRESERVED EJECTION FRACTION (HCC): ICD-10-CM

## 2025-03-13 LAB
ANION GAP SERPL CALC-SCNC: 13 MMOL/L (ref 7–16)
BUN SERPL-MCNC: 27 MG/DL (ref 8–22)
CALCIUM SERPL-MCNC: 10.2 MG/DL (ref 8.5–10.5)
CHLORIDE SERPL-SCNC: 94 MMOL/L (ref 96–112)
CHOLEST SERPL-MCNC: 168 MG/DL (ref 100–199)
CO2 SERPL-SCNC: 25 MMOL/L (ref 20–33)
CREAT SERPL-MCNC: 1.28 MG/DL (ref 0.5–1.4)
FASTING STATUS PATIENT QL REPORTED: NORMAL
GFR SERPLBLD CREATININE-BSD FMLA CKD-EPI: 41 ML/MIN/1.73 M 2
GLUCOSE SERPL-MCNC: 113 MG/DL (ref 65–99)
HDLC SERPL-MCNC: 75 MG/DL
LDLC SERPL CALC-MCNC: 76 MG/DL
NT-PROBNP SERPL IA-MCNC: 2383 PG/ML (ref 0–125)
POTASSIUM SERPL-SCNC: 4.5 MMOL/L (ref 3.6–5.5)
SODIUM SERPL-SCNC: 132 MMOL/L (ref 135–145)
TRIGL SERPL-MCNC: 85 MG/DL (ref 0–149)

## 2025-03-13 PROCEDURE — 36415 COLL VENOUS BLD VENIPUNCTURE: CPT

## 2025-03-13 PROCEDURE — 83880 ASSAY OF NATRIURETIC PEPTIDE: CPT

## 2025-03-13 PROCEDURE — 80048 BASIC METABOLIC PNL TOTAL CA: CPT

## 2025-03-13 PROCEDURE — 80061 LIPID PANEL: CPT

## 2025-03-14 ENCOUNTER — RESULTS FOLLOW-UP (OUTPATIENT)
Dept: CARDIOLOGY | Facility: MEDICAL CENTER | Age: 85
End: 2025-03-14
Payer: MEDICARE

## 2025-03-22 ENCOUNTER — HOSPITAL ENCOUNTER (INPATIENT)
Facility: MEDICAL CENTER | Age: 85
LOS: 4 days | DRG: 481 | End: 2025-03-26
Attending: EMERGENCY MEDICINE | Admitting: STUDENT IN AN ORGANIZED HEALTH CARE EDUCATION/TRAINING PROGRAM
Payer: MEDICARE

## 2025-03-22 ENCOUNTER — APPOINTMENT (OUTPATIENT)
Dept: RADIOLOGY | Facility: MEDICAL CENTER | Age: 85
DRG: 481 | End: 2025-03-22
Attending: EMERGENCY MEDICINE
Payer: MEDICARE
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
## 2025-03-22 DIAGNOSIS — S72.002A CLOSED FRACTURE OF LEFT HIP, INITIAL ENCOUNTER (HCC): Primary | ICD-10-CM

## 2025-03-22 DIAGNOSIS — I10 HYPERTENSION, UNSPECIFIED TYPE: ICD-10-CM

## 2025-03-22 DIAGNOSIS — I10 HTN (HYPERTENSION), MALIGNANT: ICD-10-CM

## 2025-03-22 DIAGNOSIS — I48.91 ATRIAL FIBRILLATION, UNSPECIFIED TYPE (HCC): ICD-10-CM

## 2025-03-22 DIAGNOSIS — S72.142A CLOSED DISPLACED INTERTROCHANTERIC FRACTURE OF LEFT FEMUR, INITIAL ENCOUNTER (HCC): ICD-10-CM

## 2025-03-22 DIAGNOSIS — E78.5 DYSLIPIDEMIA, GOAL LDL BELOW 70: ICD-10-CM

## 2025-03-22 DIAGNOSIS — S72.001A HIP FRACTURE REQUIRING OPERATIVE REPAIR, RIGHT, CLOSED, INITIAL ENCOUNTER (HCC): ICD-10-CM

## 2025-03-22 DIAGNOSIS — I70.0 AORTIC ATHEROSCLEROSIS (HCC): ICD-10-CM

## 2025-03-22 PROBLEM — S72.90XA FEMUR FRACTURE (HCC): Status: ACTIVE | Noted: 2025-03-22

## 2025-03-22 PROBLEM — Z01.818 ENCOUNTER FOR PREOPERATIVE ASSESSMENT: Status: ACTIVE | Noted: 2025-03-22

## 2025-03-22 PROBLEM — Z71.89 ACP (ADVANCE CARE PLANNING): Status: ACTIVE | Noted: 2025-03-22

## 2025-03-22 LAB
ALBUMIN SERPL BCP-MCNC: 4.1 G/DL (ref 3.2–4.9)
ALBUMIN/GLOB SERPL: 1.5 G/DL
ALP SERPL-CCNC: 80 U/L (ref 30–99)
ALT SERPL-CCNC: 35 U/L (ref 2–50)
ANION GAP SERPL CALC-SCNC: 12 MMOL/L (ref 7–16)
AST SERPL-CCNC: 39 U/L (ref 12–45)
BASOPHILS # BLD AUTO: 0.3 % (ref 0–1.8)
BASOPHILS # BLD: 0.03 K/UL (ref 0–0.12)
BILIRUB SERPL-MCNC: 1.2 MG/DL (ref 0.1–1.5)
BUN SERPL-MCNC: 30 MG/DL (ref 8–22)
CALCIUM ALBUM COR SERPL-MCNC: 9.6 MG/DL (ref 8.5–10.5)
CALCIUM SERPL-MCNC: 9.7 MG/DL (ref 8.5–10.5)
CHLORIDE SERPL-SCNC: 97 MMOL/L (ref 96–112)
CO2 SERPL-SCNC: 21 MMOL/L (ref 20–33)
CREAT SERPL-MCNC: 1.01 MG/DL (ref 0.5–1.4)
EKG IMPRESSION: NORMAL
EOSINOPHIL # BLD AUTO: 0.03 K/UL (ref 0–0.51)
EOSINOPHIL NFR BLD: 0.3 % (ref 0–6.9)
ERYTHROCYTE [DISTWIDTH] IN BLOOD BY AUTOMATED COUNT: 45.6 FL (ref 35.9–50)
GFR SERPLBLD CREATININE-BSD FMLA CKD-EPI: 55 ML/MIN/1.73 M 2
GLOBULIN SER CALC-MCNC: 2.7 G/DL (ref 1.9–3.5)
GLUCOSE SERPL-MCNC: 113 MG/DL (ref 65–99)
HCT VFR BLD AUTO: 42.4 % (ref 37–47)
HGB BLD-MCNC: 14.6 G/DL (ref 12–16)
HOLDING TUBE BB 8507: NORMAL
IMM GRANULOCYTES # BLD AUTO: 0.04 K/UL (ref 0–0.11)
IMM GRANULOCYTES NFR BLD AUTO: 0.4 % (ref 0–0.9)
LYMPHOCYTES # BLD AUTO: 0.46 K/UL (ref 1–4.8)
LYMPHOCYTES NFR BLD: 4.4 % (ref 22–41)
MCH RBC QN AUTO: 31.9 PG (ref 27–33)
MCHC RBC AUTO-ENTMCNC: 34.4 G/DL (ref 32.2–35.5)
MCV RBC AUTO: 92.8 FL (ref 81.4–97.8)
MONOCYTES # BLD AUTO: 0.91 K/UL (ref 0–0.85)
MONOCYTES NFR BLD AUTO: 8.8 % (ref 0–13.4)
NEUTROPHILS # BLD AUTO: 8.93 K/UL (ref 1.82–7.42)
NEUTROPHILS NFR BLD: 85.8 % (ref 44–72)
NRBC # BLD AUTO: 0 K/UL
NRBC BLD-RTO: 0 /100 WBC (ref 0–0.2)
PLATELET # BLD AUTO: 136 K/UL (ref 164–446)
PMV BLD AUTO: 8.8 FL (ref 9–12.9)
POTASSIUM SERPL-SCNC: 4.7 MMOL/L (ref 3.6–5.5)
PROT SERPL-MCNC: 6.8 G/DL (ref 6–8.2)
RBC # BLD AUTO: 4.57 M/UL (ref 4.2–5.4)
SODIUM SERPL-SCNC: 130 MMOL/L (ref 135–145)
TROPONIN T SERPL-MCNC: 13 NG/L (ref 6–19)
WBC # BLD AUTO: 10.4 K/UL (ref 4.8–10.8)

## 2025-03-22 PROCEDURE — 770001 HCHG ROOM/CARE - MED/SURG/GYN PRIV*

## 2025-03-22 PROCEDURE — 700105 HCHG RX REV CODE 258: Performed by: STUDENT IN AN ORGANIZED HEALTH CARE EDUCATION/TRAINING PROGRAM

## 2025-03-22 PROCEDURE — 36415 COLL VENOUS BLD VENIPUNCTURE: CPT

## 2025-03-22 PROCEDURE — A9270 NON-COVERED ITEM OR SERVICE: HCPCS | Performed by: STUDENT IN AN ORGANIZED HEALTH CARE EDUCATION/TRAINING PROGRAM

## 2025-03-22 PROCEDURE — 71045 X-RAY EXAM CHEST 1 VIEW: CPT

## 2025-03-22 PROCEDURE — 73552 X-RAY EXAM OF FEMUR 2/>: CPT | Mod: LT

## 2025-03-22 PROCEDURE — 80053 COMPREHEN METABOLIC PANEL: CPT

## 2025-03-22 PROCEDURE — 99285 EMERGENCY DEPT VISIT HI MDM: CPT

## 2025-03-22 PROCEDURE — 93005 ELECTROCARDIOGRAM TRACING: CPT | Mod: TC | Performed by: EMERGENCY MEDICINE

## 2025-03-22 PROCEDURE — 99497 ADVNCD CARE PLAN 30 MIN: CPT | Performed by: STUDENT IN AN ORGANIZED HEALTH CARE EDUCATION/TRAINING PROGRAM

## 2025-03-22 PROCEDURE — 99222 1ST HOSP IP/OBS MODERATE 55: CPT | Mod: 25,AI | Performed by: STUDENT IN AN ORGANIZED HEALTH CARE EDUCATION/TRAINING PROGRAM

## 2025-03-22 PROCEDURE — 72170 X-RAY EXAM OF PELVIS: CPT

## 2025-03-22 PROCEDURE — 84484 ASSAY OF TROPONIN QUANT: CPT

## 2025-03-22 PROCEDURE — 700111 HCHG RX REV CODE 636 W/ 250 OVERRIDE (IP): Performed by: STUDENT IN AN ORGANIZED HEALTH CARE EDUCATION/TRAINING PROGRAM

## 2025-03-22 PROCEDURE — 700102 HCHG RX REV CODE 250 W/ 637 OVERRIDE(OP): Performed by: STUDENT IN AN ORGANIZED HEALTH CARE EDUCATION/TRAINING PROGRAM

## 2025-03-22 PROCEDURE — 85025 COMPLETE CBC W/AUTO DIFF WBC: CPT

## 2025-03-22 PROCEDURE — 94760 N-INVAS EAR/PLS OXIMETRY 1: CPT

## 2025-03-22 RX ORDER — ONDANSETRON 4 MG/1
4 TABLET, ORALLY DISINTEGRATING ORAL EVERY 4 HOURS PRN
Status: DISCONTINUED | OUTPATIENT
Start: 2025-03-22 | End: 2025-03-26 | Stop reason: HOSPADM

## 2025-03-22 RX ORDER — DIAZEPAM 10 MG/2ML
2.5 INJECTION, SOLUTION INTRAMUSCULAR; INTRAVENOUS ONCE
Status: COMPLETED | OUTPATIENT
Start: 2025-03-22 | End: 2025-03-22

## 2025-03-22 RX ORDER — METOPROLOL SUCCINATE 50 MG/1
100 TABLET, EXTENDED RELEASE ORAL 2 TIMES DAILY
Status: DISCONTINUED | OUTPATIENT
Start: 2025-03-23 | End: 2025-03-26 | Stop reason: HOSPADM

## 2025-03-22 RX ORDER — EZETIMIBE 10 MG/1
10 TABLET ORAL DAILY
Status: DISCONTINUED | OUTPATIENT
Start: 2025-03-23 | End: 2025-03-26 | Stop reason: HOSPADM

## 2025-03-22 RX ORDER — HYDROMORPHONE HYDROCHLORIDE 1 MG/ML
0.5 INJECTION, SOLUTION INTRAMUSCULAR; INTRAVENOUS; SUBCUTANEOUS
Status: DISCONTINUED | OUTPATIENT
Start: 2025-03-22 | End: 2025-03-26 | Stop reason: HOSPADM

## 2025-03-22 RX ORDER — LOSARTAN POTASSIUM 50 MG/1
100 TABLET ORAL DAILY
Status: DISCONTINUED | OUTPATIENT
Start: 2025-03-23 | End: 2025-03-26 | Stop reason: HOSPADM

## 2025-03-22 RX ORDER — OXYCODONE HYDROCHLORIDE 10 MG/1
10 TABLET ORAL
Refills: 0 | Status: DISCONTINUED | OUTPATIENT
Start: 2025-03-22 | End: 2025-03-26 | Stop reason: HOSPADM

## 2025-03-22 RX ORDER — DIGOXIN 250 MCG
125 TABLET ORAL DAILY
Status: DISCONTINUED | OUTPATIENT
Start: 2025-03-23 | End: 2025-03-22

## 2025-03-22 RX ORDER — ACETAMINOPHEN 325 MG/1
650 TABLET ORAL EVERY 6 HOURS PRN
Status: DISCONTINUED | OUTPATIENT
Start: 2025-03-22 | End: 2025-03-23

## 2025-03-22 RX ORDER — LATANOPROST 50 UG/ML
1 SOLUTION/ DROPS OPHTHALMIC
Status: DISCONTINUED | OUTPATIENT
Start: 2025-03-22 | End: 2025-03-23

## 2025-03-22 RX ORDER — SODIUM CHLORIDE 9 MG/ML
INJECTION, SOLUTION INTRAVENOUS CONTINUOUS
Status: DISCONTINUED | OUTPATIENT
Start: 2025-03-23 | End: 2025-03-23

## 2025-03-22 RX ORDER — ONDANSETRON 2 MG/ML
4 INJECTION INTRAMUSCULAR; INTRAVENOUS EVERY 4 HOURS PRN
Status: DISCONTINUED | OUTPATIENT
Start: 2025-03-22 | End: 2025-03-26 | Stop reason: HOSPADM

## 2025-03-22 RX ORDER — LABETALOL HYDROCHLORIDE 5 MG/ML
10 INJECTION, SOLUTION INTRAVENOUS EVERY 4 HOURS PRN
Status: DISCONTINUED | OUTPATIENT
Start: 2025-03-22 | End: 2025-03-26 | Stop reason: HOSPADM

## 2025-03-22 RX ORDER — OXYCODONE HYDROCHLORIDE 5 MG/1
5 TABLET ORAL
Refills: 0 | Status: DISCONTINUED | OUTPATIENT
Start: 2025-03-22 | End: 2025-03-26 | Stop reason: HOSPADM

## 2025-03-22 RX ADMIN — SODIUM CHLORIDE: 9 INJECTION, SOLUTION INTRAVENOUS at 23:35

## 2025-03-22 RX ADMIN — OXYCODONE 5 MG: 5 TABLET ORAL at 19:19

## 2025-03-22 RX ADMIN — DIAZEPAM 2.5 MG: 10 INJECTION, SOLUTION INTRAMUSCULAR; INTRAVENOUS at 22:03

## 2025-03-22 RX ADMIN — OXYCODONE HYDROCHLORIDE 10 MG: 10 TABLET ORAL at 23:33

## 2025-03-22 ASSESSMENT — PAIN DESCRIPTION - PAIN TYPE
TYPE: ACUTE PAIN

## 2025-03-22 ASSESSMENT — SOCIAL DETERMINANTS OF HEALTH (SDOH)
WITHIN THE LAST YEAR, HAVE TO BEEN RAPED OR FORCED TO HAVE ANY KIND OF SEXUAL ACTIVITY BY YOUR PARTNER OR EX-PARTNER?: NO
WITHIN THE PAST 12 MONTHS, THE FOOD YOU BOUGHT JUST DIDN'T LAST AND YOU DIDN'T HAVE MONEY TO GET MORE: NEVER TRUE
WITHIN THE LAST YEAR, HAVE YOU BEEN HUMILIATED OR EMOTIONALLY ABUSED IN OTHER WAYS BY YOUR PARTNER OR EX-PARTNER?: NO
WITHIN THE LAST YEAR, HAVE YOU BEEN AFRAID OF YOUR PARTNER OR EX-PARTNER?: NO
WITHIN THE PAST 12 MONTHS, YOU WORRIED THAT YOUR FOOD WOULD RUN OUT BEFORE YOU GOT THE MONEY TO BUY MORE: NEVER TRUE
IN THE PAST 12 MONTHS, HAS THE ELECTRIC, GAS, OIL, OR WATER COMPANY THREATENED TO SHUT OFF SERVICE IN YOUR HOME?: NO
WITHIN THE LAST YEAR, HAVE YOU BEEN KICKED, HIT, SLAPPED, OR OTHERWISE PHYSICALLY HURT BY YOUR PARTNER OR EX-PARTNER?: NO

## 2025-03-22 ASSESSMENT — PATIENT HEALTH QUESTIONNAIRE - PHQ9
1. LITTLE INTEREST OR PLEASURE IN DOING THINGS: NOT AT ALL
6. FEELING BAD ABOUT YOURSELF - OR THAT YOU ARE A FAILURE OR HAVE LET YOURSELF OR YOUR FAMILY DOWN: NOT AL ALL
SUM OF ALL RESPONSES TO PHQ9 QUESTIONS 1 AND 2: 3
9. THOUGHTS THAT YOU WOULD BE BETTER OFF DEAD, OR OF HURTING YOURSELF: NOT AT ALL
5. POOR APPETITE OR OVEREATING: NOT AT ALL
3. TROUBLE FALLING OR STAYING ASLEEP OR SLEEPING TOO MUCH: NOT AT ALL
8. MOVING OR SPEAKING SO SLOWLY THAT OTHER PEOPLE COULD HAVE NOTICED. OR THE OPPOSITE, BEING SO FIGETY OR RESTLESS THAT YOU HAVE BEEN MOVING AROUND A LOT MORE THAN USUAL: NOT AT ALL
7. TROUBLE CONCENTRATING ON THINGS, SUCH AS READING THE NEWSPAPER OR WATCHING TELEVISION: NOT AT ALL
SUM OF ALL RESPONSES TO PHQ QUESTIONS 1-9: 3
4. FEELING TIRED OR HAVING LITTLE ENERGY: NOT AT ALL
2. FEELING DOWN, DEPRESSED, IRRITABLE, OR HOPELESS: NEARLY EVERY DAY

## 2025-03-22 ASSESSMENT — LIFESTYLE VARIABLES
EVER HAD A DRINK FIRST THING IN THE MORNING TO STEADY YOUR NERVES TO GET RID OF A HANGOVER: NO
EVER FELT BAD OR GUILTY ABOUT YOUR DRINKING: NO
HAVE PEOPLE ANNOYED YOU BY CRITICIZING YOUR DRINKING: NO
TOTAL SCORE: 0
TOTAL SCORE: 0
DO YOU DRINK ALCOHOL: NO
CONSUMPTION TOTAL: INCOMPLETE
TOTAL SCORE: 0
HAVE YOU EVER FELT YOU SHOULD CUT DOWN ON YOUR DRINKING: NO

## 2025-03-22 ASSESSMENT — ENCOUNTER SYMPTOMS
CARDIOVASCULAR NEGATIVE: 1
NEUROLOGICAL NEGATIVE: 1
PSYCHIATRIC NEGATIVE: 1
EYES NEGATIVE: 1
RESPIRATORY NEGATIVE: 1
FALLS: 1
GASTROINTESTINAL NEGATIVE: 1

## 2025-03-22 ASSESSMENT — FIBROSIS 4 INDEX: FIB4 SCORE: 3.13

## 2025-03-23 ENCOUNTER — APPOINTMENT (OUTPATIENT)
Dept: RADIOLOGY | Facility: MEDICAL CENTER | Age: 85
DRG: 481 | End: 2025-03-23
Attending: ORTHOPAEDIC SURGERY
Payer: MEDICARE

## 2025-03-23 ENCOUNTER — ANESTHESIA (OUTPATIENT)
Dept: SURGERY | Facility: MEDICAL CENTER | Age: 85
DRG: 481 | End: 2025-03-23
Payer: MEDICARE

## 2025-03-23 ENCOUNTER — SURGERY (OUTPATIENT)
Age: 85
End: 2025-03-23
Payer: MEDICARE

## 2025-03-23 ENCOUNTER — ANESTHESIA EVENT (OUTPATIENT)
Dept: SURGERY | Facility: MEDICAL CENTER | Age: 85
DRG: 481 | End: 2025-03-23
Payer: MEDICARE

## 2025-03-23 PROBLEM — E87.1 HYPONATREMIA: Status: ACTIVE | Noted: 2025-03-23

## 2025-03-23 LAB
ANION GAP SERPL CALC-SCNC: 8 MMOL/L (ref 7–16)
BUN SERPL-MCNC: 22 MG/DL (ref 8–22)
CALCIUM SERPL-MCNC: 8.5 MG/DL (ref 8.5–10.5)
CHLORIDE SERPL-SCNC: 95 MMOL/L (ref 96–112)
CO2 SERPL-SCNC: 22 MMOL/L (ref 20–33)
CREAT SERPL-MCNC: 0.88 MG/DL (ref 0.5–1.4)
ERYTHROCYTE [DISTWIDTH] IN BLOOD BY AUTOMATED COUNT: 45.9 FL (ref 35.9–50)
GFR SERPLBLD CREATININE-BSD FMLA CKD-EPI: 65 ML/MIN/1.73 M 2
GLUCOSE SERPL-MCNC: 139 MG/DL (ref 65–99)
HCT VFR BLD AUTO: 35.6 % (ref 37–47)
HGB BLD-MCNC: 12.2 G/DL (ref 12–16)
MCH RBC QN AUTO: 31.5 PG (ref 27–33)
MCHC RBC AUTO-ENTMCNC: 34.3 G/DL (ref 32.2–35.5)
MCV RBC AUTO: 92 FL (ref 81.4–97.8)
PLATELET # BLD AUTO: 123 K/UL (ref 164–446)
PMV BLD AUTO: 8.6 FL (ref 9–12.9)
POTASSIUM SERPL-SCNC: 4.5 MMOL/L (ref 3.6–5.5)
RBC # BLD AUTO: 3.87 M/UL (ref 4.2–5.4)
SODIUM SERPL-SCNC: 124 MMOL/L (ref 135–145)
SODIUM SERPL-SCNC: 125 MMOL/L (ref 135–145)
WBC # BLD AUTO: 8 K/UL (ref 4.8–10.8)

## 2025-03-23 PROCEDURE — 160029 HCHG SURGERY MINUTES - 1ST 30 MINS LEVEL 4: Performed by: ORTHOPAEDIC SURGERY

## 2025-03-23 PROCEDURE — 160036 HCHG PACU - EA ADDL 30 MINS PHASE I: Performed by: ORTHOPAEDIC SURGERY

## 2025-03-23 PROCEDURE — 160035 HCHG PACU - 1ST 60 MINS PHASE I: Performed by: ORTHOPAEDIC SURGERY

## 2025-03-23 PROCEDURE — 770001 HCHG ROOM/CARE - MED/SURG/GYN PRIV*

## 2025-03-23 PROCEDURE — 160009 HCHG ANES TIME/MIN: Performed by: ORTHOPAEDIC SURGERY

## 2025-03-23 PROCEDURE — 700101 HCHG RX REV CODE 250: Performed by: STUDENT IN AN ORGANIZED HEALTH CARE EDUCATION/TRAINING PROGRAM

## 2025-03-23 PROCEDURE — 700102 HCHG RX REV CODE 250 W/ 637 OVERRIDE(OP): Performed by: GENERAL PRACTICE

## 2025-03-23 PROCEDURE — 160041 HCHG SURGERY MINUTES - EA ADDL 1 MIN LEVEL 4: Performed by: ORTHOPAEDIC SURGERY

## 2025-03-23 PROCEDURE — C1713 ANCHOR/SCREW BN/BN,TIS/BN: HCPCS | Performed by: ORTHOPAEDIC SURGERY

## 2025-03-23 PROCEDURE — 700102 HCHG RX REV CODE 250 W/ 637 OVERRIDE(OP): Performed by: STUDENT IN AN ORGANIZED HEALTH CARE EDUCATION/TRAINING PROGRAM

## 2025-03-23 PROCEDURE — 73502 X-RAY EXAM HIP UNI 2-3 VIEWS: CPT | Mod: LT

## 2025-03-23 PROCEDURE — 700111 HCHG RX REV CODE 636 W/ 250 OVERRIDE (IP): Mod: JZ

## 2025-03-23 PROCEDURE — 80048 BASIC METABOLIC PNL TOTAL CA: CPT

## 2025-03-23 PROCEDURE — A9270 NON-COVERED ITEM OR SERVICE: HCPCS | Performed by: STUDENT IN AN ORGANIZED HEALTH CARE EDUCATION/TRAINING PROGRAM

## 2025-03-23 PROCEDURE — 160015 HCHG STAT PREOP MINUTES: Performed by: ORTHOPAEDIC SURGERY

## 2025-03-23 PROCEDURE — 700105 HCHG RX REV CODE 258: Performed by: STUDENT IN AN ORGANIZED HEALTH CARE EDUCATION/TRAINING PROGRAM

## 2025-03-23 PROCEDURE — 36415 COLL VENOUS BLD VENIPUNCTURE: CPT

## 2025-03-23 PROCEDURE — 160002 HCHG RECOVERY MINUTES (STAT): Performed by: ORTHOPAEDIC SURGERY

## 2025-03-23 PROCEDURE — 99233 SBSQ HOSP IP/OBS HIGH 50: CPT | Performed by: GENERAL PRACTICE

## 2025-03-23 PROCEDURE — 0QS736Z REPOSITION LEFT UPPER FEMUR WITH INTRAMEDULLARY INTERNAL FIXATION DEVICE, PERCUTANEOUS APPROACH: ICD-10-PCS | Performed by: ORTHOPAEDIC SURGERY

## 2025-03-23 PROCEDURE — 160048 HCHG OR STATISTICAL LEVEL 1-5: Performed by: ORTHOPAEDIC SURGERY

## 2025-03-23 PROCEDURE — 99223 1ST HOSP IP/OBS HIGH 75: CPT | Mod: 57 | Performed by: ORTHOPAEDIC SURGERY

## 2025-03-23 PROCEDURE — 700111 HCHG RX REV CODE 636 W/ 250 OVERRIDE (IP): Mod: JZ | Performed by: STUDENT IN AN ORGANIZED HEALTH CARE EDUCATION/TRAINING PROGRAM

## 2025-03-23 PROCEDURE — A9270 NON-COVERED ITEM OR SERVICE: HCPCS | Performed by: GENERAL PRACTICE

## 2025-03-23 PROCEDURE — 85027 COMPLETE CBC AUTOMATED: CPT

## 2025-03-23 PROCEDURE — 27245 TREAT THIGH FRACTURE: CPT | Mod: LT | Performed by: ORTHOPAEDIC SURGERY

## 2025-03-23 PROCEDURE — 84295 ASSAY OF SERUM SODIUM: CPT | Mod: 91

## 2025-03-23 DEVICE — SCREW CROSS LOCK 5MM X 30MM (4TX5=20): Type: IMPLANTABLE DEVICE | Site: FEMUR | Status: FUNCTIONAL

## 2025-03-23 DEVICE — SCREW LAG 10.5MM X 100MM (4TX2=8): Type: IMPLANTABLE DEVICE | Site: FEMUR | Status: FUNCTIONAL

## 2025-03-23 DEVICE — NAIL HIP 127.5 DEGREE 10MM X 180MM (4TX2=8): Type: IMPLANTABLE DEVICE | Site: FEMUR | Status: FUNCTIONAL

## 2025-03-23 RX ORDER — ASPIRIN 81 MG/1
81 TABLET ORAL
Status: DISCONTINUED | OUTPATIENT
Start: 2025-03-23 | End: 2025-03-23

## 2025-03-23 RX ORDER — ONDANSETRON 2 MG/ML
4 INJECTION INTRAMUSCULAR; INTRAVENOUS
Status: DISCONTINUED | OUTPATIENT
Start: 2025-03-23 | End: 2025-03-23 | Stop reason: HOSPADM

## 2025-03-23 RX ORDER — ASPIRIN 81 MG/1
81 TABLET ORAL
Status: DISCONTINUED | OUTPATIENT
Start: 2025-03-24 | End: 2025-03-26 | Stop reason: HOSPADM

## 2025-03-23 RX ORDER — OXYCODONE HYDROCHLORIDE 5 MG/1
5 TABLET ORAL EVERY 6 HOURS PRN
Qty: 12 TABLET | Refills: 0 | Status: SHIPPED | OUTPATIENT
Start: 2025-03-24 | End: 2025-03-27

## 2025-03-23 RX ORDER — ONDANSETRON 2 MG/ML
INJECTION INTRAMUSCULAR; INTRAVENOUS PRN
Status: DISCONTINUED | OUTPATIENT
Start: 2025-03-23 | End: 2025-03-23 | Stop reason: SURG

## 2025-03-23 RX ORDER — AMOXICILLIN 250 MG
2 CAPSULE ORAL 2 TIMES DAILY
Status: DISCONTINUED | OUTPATIENT
Start: 2025-03-23 | End: 2025-03-26 | Stop reason: HOSPADM

## 2025-03-23 RX ORDER — LABETALOL HYDROCHLORIDE 5 MG/ML
5 INJECTION, SOLUTION INTRAVENOUS
Status: DISCONTINUED | OUTPATIENT
Start: 2025-03-23 | End: 2025-03-23 | Stop reason: HOSPADM

## 2025-03-23 RX ORDER — DIPHENHYDRAMINE HYDROCHLORIDE 50 MG/ML
12.5 INJECTION, SOLUTION INTRAMUSCULAR; INTRAVENOUS
Status: DISCONTINUED | OUTPATIENT
Start: 2025-03-23 | End: 2025-03-23 | Stop reason: HOSPADM

## 2025-03-23 RX ORDER — OXYCODONE HCL 5 MG/5 ML
5 SOLUTION, ORAL ORAL
Status: COMPLETED | OUTPATIENT
Start: 2025-03-23 | End: 2025-03-23

## 2025-03-23 RX ORDER — HALOPERIDOL 5 MG/ML
1 INJECTION INTRAMUSCULAR
Status: DISCONTINUED | OUTPATIENT
Start: 2025-03-23 | End: 2025-03-23 | Stop reason: HOSPADM

## 2025-03-23 RX ORDER — OXYCODONE HCL 5 MG/5 ML
10 SOLUTION, ORAL ORAL
Status: COMPLETED | OUTPATIENT
Start: 2025-03-23 | End: 2025-03-23

## 2025-03-23 RX ORDER — POLYETHYLENE GLYCOL 3350 17 G/17G
1 POWDER, FOR SOLUTION ORAL DAILY
Status: DISCONTINUED | OUTPATIENT
Start: 2025-03-24 | End: 2025-03-26 | Stop reason: HOSPADM

## 2025-03-23 RX ORDER — FAMOTIDINE 20 MG/1
20 TABLET, FILM COATED ORAL DAILY
Status: DISCONTINUED | OUTPATIENT
Start: 2025-03-23 | End: 2025-03-26 | Stop reason: HOSPADM

## 2025-03-23 RX ORDER — ACETAMINOPHEN 325 MG/1
650 TABLET ORAL EVERY 6 HOURS
Status: DISCONTINUED | OUTPATIENT
Start: 2025-03-23 | End: 2025-03-26 | Stop reason: HOSPADM

## 2025-03-23 RX ORDER — AMOXICILLIN 250 MG
2 CAPSULE ORAL 2 TIMES DAILY
Status: SHIPPED
Start: 2025-03-23

## 2025-03-23 RX ORDER — HYDROMORPHONE HYDROCHLORIDE 1 MG/ML
0.4 INJECTION, SOLUTION INTRAMUSCULAR; INTRAVENOUS; SUBCUTANEOUS
Status: DISCONTINUED | OUTPATIENT
Start: 2025-03-23 | End: 2025-03-23 | Stop reason: HOSPADM

## 2025-03-23 RX ORDER — HALOPERIDOL 5 MG/ML
5 INJECTION INTRAMUSCULAR EVERY 6 HOURS PRN
Status: DISCONTINUED | OUTPATIENT
Start: 2025-03-23 | End: 2025-03-26 | Stop reason: HOSPADM

## 2025-03-23 RX ORDER — EZETIMIBE 10 MG/1
10 TABLET ORAL
Status: SHIPPED
Start: 2025-03-24

## 2025-03-23 RX ORDER — ACETAMINOPHEN 325 MG/1
650 TABLET ORAL EVERY 6 HOURS PRN
Status: DISCONTINUED | OUTPATIENT
Start: 2025-03-28 | End: 2025-03-26 | Stop reason: HOSPADM

## 2025-03-23 RX ORDER — HYDRALAZINE HYDROCHLORIDE 20 MG/ML
5 INJECTION INTRAMUSCULAR; INTRAVENOUS
Status: DISCONTINUED | OUTPATIENT
Start: 2025-03-23 | End: 2025-03-23 | Stop reason: HOSPADM

## 2025-03-23 RX ORDER — EPHEDRINE SULFATE 50 MG/ML
5 INJECTION, SOLUTION INTRAVENOUS
Status: DISCONTINUED | OUTPATIENT
Start: 2025-03-23 | End: 2025-03-23 | Stop reason: HOSPADM

## 2025-03-23 RX ORDER — ROCURONIUM BROMIDE 10 MG/ML
INJECTION, SOLUTION INTRAVENOUS PRN
Status: DISCONTINUED | OUTPATIENT
Start: 2025-03-23 | End: 2025-03-23 | Stop reason: SURG

## 2025-03-23 RX ORDER — ENOXAPARIN SODIUM 100 MG/ML
40 INJECTION SUBCUTANEOUS DAILY
Status: DISCONTINUED | OUTPATIENT
Start: 2025-03-23 | End: 2025-03-24

## 2025-03-23 RX ORDER — HYDROMORPHONE HYDROCHLORIDE 1 MG/ML
0.1 INJECTION, SOLUTION INTRAMUSCULAR; INTRAVENOUS; SUBCUTANEOUS
Status: DISCONTINUED | OUTPATIENT
Start: 2025-03-23 | End: 2025-03-23 | Stop reason: HOSPADM

## 2025-03-23 RX ORDER — DEXAMETHASONE SODIUM PHOSPHATE 4 MG/ML
INJECTION, SOLUTION INTRA-ARTICULAR; INTRALESIONAL; INTRAMUSCULAR; INTRAVENOUS; SOFT TISSUE PRN
Status: DISCONTINUED | OUTPATIENT
Start: 2025-03-23 | End: 2025-03-23 | Stop reason: SURG

## 2025-03-23 RX ORDER — LIDOCAINE HYDROCHLORIDE 20 MG/ML
INJECTION, SOLUTION EPIDURAL; INFILTRATION; INTRACAUDAL; PERINEURAL PRN
Status: DISCONTINUED | OUTPATIENT
Start: 2025-03-23 | End: 2025-03-23 | Stop reason: SURG

## 2025-03-23 RX ORDER — SODIUM CHLORIDE 9 MG/ML
INJECTION, SOLUTION INTRAVENOUS CONTINUOUS
Status: ACTIVE | OUTPATIENT
Start: 2025-03-23 | End: 2025-03-23

## 2025-03-23 RX ORDER — SODIUM CHLORIDE, SODIUM LACTATE, POTASSIUM CHLORIDE, CALCIUM CHLORIDE 600; 310; 30; 20 MG/100ML; MG/100ML; MG/100ML; MG/100ML
INJECTION, SOLUTION INTRAVENOUS
Status: DISCONTINUED | OUTPATIENT
Start: 2025-03-23 | End: 2025-03-23 | Stop reason: SURG

## 2025-03-23 RX ORDER — ACETAMINOPHEN 500 MG
1000 TABLET ORAL ONCE
Status: COMPLETED | OUTPATIENT
Start: 2025-03-23 | End: 2025-03-23

## 2025-03-23 RX ORDER — ALBUTEROL SULFATE 5 MG/ML
2.5 SOLUTION RESPIRATORY (INHALATION)
Status: DISCONTINUED | OUTPATIENT
Start: 2025-03-23 | End: 2025-03-23 | Stop reason: HOSPADM

## 2025-03-23 RX ORDER — HYDROMORPHONE HYDROCHLORIDE 1 MG/ML
0.2 INJECTION, SOLUTION INTRAMUSCULAR; INTRAVENOUS; SUBCUTANEOUS
Status: DISCONTINUED | OUTPATIENT
Start: 2025-03-23 | End: 2025-03-23 | Stop reason: HOSPADM

## 2025-03-23 RX ORDER — CEFAZOLIN SODIUM 1 G/3ML
INJECTION, POWDER, FOR SOLUTION INTRAMUSCULAR; INTRAVENOUS PRN
Status: DISCONTINUED | OUTPATIENT
Start: 2025-03-23 | End: 2025-03-23 | Stop reason: SURG

## 2025-03-23 RX ADMIN — FAMOTIDINE 20 MG: 20 TABLET, FILM COATED ORAL at 08:37

## 2025-03-23 RX ADMIN — LOSARTAN POTASSIUM 100 MG: 50 TABLET, FILM COATED ORAL at 05:15

## 2025-03-23 RX ADMIN — PHENYLEPHRINE HYDROCHLORIDE 100 MCG: 10 INJECTION INTRAVENOUS at 10:59

## 2025-03-23 RX ADMIN — ONDANSETRON 4 MG: 2 INJECTION INTRAMUSCULAR; INTRAVENOUS at 11:33

## 2025-03-23 RX ADMIN — PHENYLEPHRINE HYDROCHLORIDE 100 MCG: 10 INJECTION INTRAVENOUS at 11:14

## 2025-03-23 RX ADMIN — EZETIMIBE 10 MG: 10 TABLET ORAL at 05:15

## 2025-03-23 RX ADMIN — FENTANYL CITRATE 100 MCG: 50 INJECTION, SOLUTION INTRAMUSCULAR; INTRAVENOUS at 10:51

## 2025-03-23 RX ADMIN — CEFAZOLIN 2 G: 1 INJECTION, POWDER, FOR SOLUTION INTRAMUSCULAR; INTRAVENOUS at 10:51

## 2025-03-23 RX ADMIN — PHENYLEPHRINE HYDROCHLORIDE 100 MCG: 10 INJECTION INTRAVENOUS at 11:03

## 2025-03-23 RX ADMIN — PHENYLEPHRINE HYDROCHLORIDE 50 MCG: 10 INJECTION INTRAVENOUS at 10:51

## 2025-03-23 RX ADMIN — ROCURONIUM BROMIDE 50 MG: 10 INJECTION INTRAVENOUS at 10:51

## 2025-03-23 RX ADMIN — HALOPERIDOL LACTATE 5 MG: 5 INJECTION, SOLUTION INTRAMUSCULAR at 20:20

## 2025-03-23 RX ADMIN — SODIUM CHLORIDE, POTASSIUM CHLORIDE, SODIUM LACTATE AND CALCIUM CHLORIDE: 600; 310; 30; 20 INJECTION, SOLUTION INTRAVENOUS at 10:46

## 2025-03-23 RX ADMIN — ACETAMINOPHEN 1000 MG: 500 TABLET ORAL at 09:44

## 2025-03-23 RX ADMIN — DEXAMETHASONE SODIUM PHOSPHATE 8 MG: 4 INJECTION INTRA-ARTICULAR; INTRALESIONAL; INTRAMUSCULAR; INTRAVENOUS; SOFT TISSUE at 10:51

## 2025-03-23 RX ADMIN — OXYCODONE HYDROCHLORIDE 10 MG: 10 TABLET ORAL at 05:28

## 2025-03-23 RX ADMIN — LIDOCAINE HYDROCHLORIDE 80 MG: 20 INJECTION, SOLUTION EPIDURAL; INFILTRATION; INTRACAUDAL; PERINEURAL at 10:51

## 2025-03-23 RX ADMIN — PHENYLEPHRINE HYDROCHLORIDE 50 MCG: 10 INJECTION INTRAVENOUS at 10:55

## 2025-03-23 RX ADMIN — SUGAMMADEX 200 MG: 100 INJECTION, SOLUTION INTRAVENOUS at 11:33

## 2025-03-23 RX ADMIN — OXYCODONE HYDROCHLORIDE 5 MG: 5 SOLUTION ORAL at 12:03

## 2025-03-23 RX ADMIN — PROPOFOL 50 MG: 10 INJECTION, EMULSION INTRAVENOUS at 10:51

## 2025-03-23 RX ADMIN — METOPROLOL SUCCINATE 100 MG: 50 TABLET, FILM COATED, EXTENDED RELEASE ORAL at 05:15

## 2025-03-23 ASSESSMENT — PAIN SCALES - GENERAL: PAIN_LEVEL: 2

## 2025-03-23 ASSESSMENT — CHA2DS2 SCORE
HYPERTENSION: YES
CHA2DS2 VASC SCORE: 5
SEX: FEMALE
AGE 65 TO 74: NO
DIABETES: NO
PRIOR STROKE OR TIA OR THROMBOEMBOLISM: NO
VASCULAR DISEASE: NO
CHF OR LEFT VENTRICULAR DYSFUNCTION: YES
AGE 75 OR GREATER: YES

## 2025-03-23 ASSESSMENT — PAIN DESCRIPTION - PAIN TYPE
TYPE: ACUTE PAIN;SURGICAL PAIN
TYPE: ACUTE PAIN
TYPE: ACUTE PAIN;SURGICAL PAIN
TYPE: ACUTE PAIN

## 2025-03-23 NOTE — ASSESSMENT & PLAN NOTE
X-ray imaging revealed left femoral intertrochanteric fracture.  Orthopedic surgery consulted, patient underwent intramedullary device placement on 3/23.  Weightbearing as tolerated.  Patient has follow-up with orthopedic surgery in about 2 weeks for postoperative wound check and to remove staples.  Pain medication  Bowel regimen  We will continue to monitor urinary output, bowel movements and pain control. Patient is to be evaluated by PT/OT postoperatively.

## 2025-03-23 NOTE — PROGRESS NOTES
Patient A&OX4. Patient stated pain. Meds administered per MAR.  Pure wic in place.  Water and call light within reach.  Npo at midnight.  Patient refused to turn due to pain.   It is ok to administer morning dose of blood pressure medications per Renetta SCHNEIDER.

## 2025-03-23 NOTE — PROGRESS NOTES
4 Eyes Skin Assessment Completed by ROSIE Bynum and ROSIE Keys.    Head WDL  Ears WDL  Nose WDL  Mouth WDL  Neck WDL  Breast/Chest WDL  Shoulder Blades WDL  Spine unable to check,, patient refused to turn due to pain  (R) Arm/Elbow/Hand Redness, Blanching, and Bruising, cuts  (L) Arm/Elbow/Hand Redness, Blanching, and Bruising  Abdomen WDL  Groin WDL  Scrotum/Coccyx/Buttocks unable to check, patient refuse to turn due to pain  (R) Leg Bruising, varicose veins  (L) Leg Bruising,  varicose veins  (R) Heel/Foot/Toe Redness and Blanching  (L) Heel/Foot/Toe Redness and Blanching          Devices In Places Blood Pressure Cuff and Pulse Ox      Interventions In Place Pillows    Possible Skin Injury No    Pictures Uploaded Into Epic N/A  Wound Consult Placed N/A  RN Wound Prevention Protocol Ordered Yes

## 2025-03-23 NOTE — ANESTHESIA TIME REPORT
Anesthesia Start and Stop Event Times       Date Time Event    3/23/2025 0851 Ready for Procedure     1046 Anesthesia Start     1156 Anesthesia Stop          Responsible Staff  03/23/25      Name Role Begin End    Trent Clemens M.D. Anesth 1046 1156          Overtime Reason:  no overtime (within assigned shift)    Comments:

## 2025-03-23 NOTE — H&P
Hospital Medicine History & Physical Note    Date of Service  3/22/2025    Primary Care Physician  Checo Tesfaye M.D.    Consultants  Orthopedic surgery    Code Status  DNAR/DNI    Chief Complaint  Chief Complaint   Patient presents with    Fall    Hip Pain     Left        History of Presenting Illness  Ivory Rowan is a 84 y.o. female who presented 3/22/2025 with a mechanical fall. Patient was attempting to ambulate today when she suddenly had a ground-level fall and landed on her left hip.  She denies head strike and loss of consciousness.  She took her Eliquis for atrial fibrillation this morning.  She was brought to the ER as she was unable to ambulate and bear weight on her left hip.    In ER, x-ray pelvis showed impacted left femoral intertrochanteric fracture.  EKG showing atrial fibrillation with no acute changes.    I discussed the plan of care with patient.    Review of Systems  Review of Systems   Constitutional:  Positive for malaise/fatigue.   HENT: Negative.     Eyes: Negative.    Respiratory: Negative.     Cardiovascular: Negative.    Gastrointestinal: Negative.    Genitourinary: Negative.    Musculoskeletal:  Positive for falls and joint pain.   Skin: Negative.    Neurological: Negative.    Endo/Heme/Allergies: Negative.    Psychiatric/Behavioral: Negative.         Past Medical History   has a past medical history of Acute on chronic heart failure with preserved ejection fraction (HCC) (08/13/2021), Age-related osteoporosis without current pathological fracture (01/17/2024), Atrial fibrillation ([facility]), Basal cell carcinoma of skin of nose, CATARACT, CHF (congestive heart failure) ([facility]), Colon polyp, Dental disorder, Dyslipidemia, Glaucoma, right eye, Heart burn, Hemorrhagic disorder ([facility]), History of cataract removal with insertion of prosthetic lens (04/09/2024), Hypertension, IBS (irritable bowel syndrome), Indigestion, [facility] (10/23/2012), Mitral valve regurgitation, Osteopenia  (06/2009), Perennial allergic rhinitis with seasonal variation, Persistent atrial fibrillation (HCC), Psychiatric problem, Type 2 diabetes mellitus, controlled (HCC), Valvular heart disease, and Vertigo.    Surgical History   has a past surgical history that includes breast biopsy; recovery (7/8/2016); us-needle core bx-breast panel; appendectomy; cholecystectomy; and colonoscopy (8/2009).     Family History  family history includes Cancer in her father, maternal aunt, paternal aunt, and sister; Diabetes in her mother; Genetic Disorder in her sister; Heart Disease in her brother; Hypertension in her mother; Stroke in her mother.   Family history reviewed with patient. There is no family history that is pertinent to the chief complaint.     Social History   reports that she quit smoking about 29 years ago. Her smoking use included cigarettes. She started smoking about 69 years ago. She has a 20 pack-year smoking history. She has never used smokeless tobacco. She reports that she does not currently use alcohol. She reports that she does not use drugs.    Allergies  Allergies   Allergen Reactions    Atorvastatin      myalgias    Simvastatin      myalgias       Medications  Prior to Admission Medications   Prescriptions Last Dose Informant Patient Reported? Taking?   apixaban (ELIQUIS) 2.5mg Tab  Patient, Family Member No No   Sig: Take 1 Tablet by mouth 2 times a day.   aspirin (ASPIRIN LOW DOSE) 81 MG EC tablet  Patient, Family Member No No   Sig: TAKE 1 TABLET BY MOUTH EVERY DAY   digoxin (LANOXIN) 125 MCG Tab  Patient, Family Member No No   Sig: Take 1 Tablet by mouth every day.   ezetimibe (ZETIA) 10 MG Tab  Patient, Family Member No No   Sig: Take 1 Tablet by mouth every day.   famotidine (PEPCID) 20 MG Tab   No No   Sig: TAKE 1 TABLET BY MOUTH EVERY DAY   ferrous gluconate (FERGON) 240 (27 Fe) MG tablet  Patient, Family Member No No   Sig: Take 1 Tablet by mouth with dinner.   furosemide (LASIX) 20 MG Tab   No No    Sig: TAKE 1 TABLET BY MOUTH 1 TIME A DAY AS NEEDED (FOR WEIGHT GAIN, OR INCREASED SWELLING OR SHORTNESS OF BREATH).   furosemide (LASIX) 40 MG Tab   No No   Sig: Take 1 Tablet by mouth every day.   latanoprost (XALATAN) 0.005 % Solution  Patient, Family Member Yes No   Sig: Administer 1 Drop into both eyes at bedtime. Instill 1 drop into both eyes every night at bedtime   losartan (COZAAR) 100 MG Tab  Patient, Family Member No No   Sig: Take 1 Tablet by mouth every day.   metoprolol SR (TOPROL XL) 100 MG TABLET SR 24 HR   No No   Sig: Take 1 Tablet by mouth 2 times a day.   spironolactone (ALDACTONE) 25 MG Tab  Patient, Family Member No No   Sig: Take 0.5 Tablets by mouth every day.      Facility-Administered Medications: None       Physical Exam  Temp:  [35.8 °C (96.5 °F)-37.1 °C (98.8 °F)] 37.1 °C (98.8 °F)  Pulse:  [77] 77  Resp:  [20] 20  BP: (173)/(73) 173/73  SpO2:  [96 %] 96 %  Blood Pressure : (!) 173/73   Temperature: 37.1 °C (98.8 °F)   Pulse: 77   Respiration: 20   Pulse Oximetry: 96 %       Physical Exam  Constitutional:       Appearance: Normal appearance.      Comments: [place]   HENT:      Head: Normocephalic.      Nose: Nose normal.      Mouth/Throat:      Mouth: Mucous membranes are moist.   Eyes:      Pupils: Pupils are equal, round, and reactive to light.   Cardiovascular:      Rate and Rhythm: Tachycardia present. Rhythm irregular.   Pulmonary:      Effort: Pulmonary effort is normal.      Breath sounds: Normal breath sounds.   Abdominal:      General: Abdomen is flat. Bowel sounds are normal.      Palpations: Abdomen is soft.   Musculoskeletal:      Cervical back: Neck supple.      Comments: Left lower extremity internally rotated, range of motion limited due to pain   Skin:     General: Skin is warm.   Neurological:      Mental Status: She is alert and oriented to person, place, and time. Mental status is at baseline.   Psychiatric:         Mood and Affect: Mood normal.         Behavior: Behavior  "normal.         Thought Content: Thought content normal.         Judgment: Judgment normal.         Laboratory:          No results for input(s): \"ALTSGPT\", \"ASTSGOT\", \"ALKPHOSPHAT\", \"TBILIRUBIN\", \"DBILIRUBIN\", \"GAMMAGT\", \"AMYLASE\", \"LIPASE\", \"ALB\", \"PREALBUMIN\", \"GLUCOSE\" in the last 72 hours.      No results for input(s): \"NTPROBNP\" in the last 72 hours.      No results for input(s): \"TROPONINT\" in the last 72 hours.    Imaging:  DX-FEMUR-2+ LEFT   Final Result      Impacted left femoral intratrochanteric fracture.      DX-PELVIS-1 OR 2 VIEWS   Final Result      Impacted left femoral intratrochanteric fracture.      DX-CHEST-LIMITED (1 VIEW)   Final Result         No acute cardiopulmonary abnormalities are identified.          X-Ray:  I have personally reviewed the images and compared with prior images.    Assessment/Plan:  Justification for Admission Status  I anticipate this patient will require at least two midnights for appropriate medical management, necessitating inpatient admission because pt has femur fracture    Patient will need a Med/Surg bed on ORTHOPEDICS service .  The need is secondary to femur fracture.    * Femur fracture (HCC)  Assessment & Plan  Noted to have a mechanical fall, x-ray pelvis showing impacted left femoral intertrochanteric fracture  Orthopedic surgery consulted, n.p.o. at midnight  Pain medication  Gentle hydration    ACP (advance care planning)  Assessment & Plan  16 minutes spent discussing goals of care with patient and daughter at bedside.  We went over the process of CPR and what patient would want should she clinically deteriorate and have a cardiac arrest.  Patient states that she would like to be DNR/DNI    Encounter for preoperative assessment  Assessment & Plan  Admit to:    Telemetry with continuous pulse oximetry due to: age 65 or older with history of: CHF    Cardiovascular:   Patient has history of CHF: Continue home beta blocker and rate control medications. "   Pre-op EKG: Yes    Pulmonary:  Oxygen per protocol    GI:   No history of cirrhosis. Standard bowel regimen. Hold for loose stools.    Renal:   Labs: Metabolic Panel with AM labs    Musculoskeletal:   Check 25 OH vitamin D level. If 31-40 pg/mL, consider starting vitamin D3 1000 IU PO daily. If 20-30 pg/mL, consider vitamin D3 2000 IU PO daily. If <20 pg/mL, vitamin D2 50,000 IU weekly x 8 weeks then 2000 IU PO daily.      Hematologic:  Plan on pharmacologic DVT prophylaxis post operative day #1. Hold for decreasing hemoglobin. Notify provider for hemoglobin less than 8.  Order preoperative type and cross.   Hgb every 6 hours if high bleeding risk.   If patient was on anticoagulation prior to arrival risks and benefits will be weighed by teams including surgery, hospitalist, geriatrics, and anesthesia for delaying surgery more than 24 hours.       Medical Assessment Risk:  Intermediate    Surgical Risk:   Intermediate      Persistent atrial fibrillation (HCC)- (present on admission)  Assessment & Plan  Hold Eliquis for now due to surgery in the morning    Stage 3a chronic kidney disease- (present on admission)  Assessment & Plan  Appears to be at baseline    Dyslipidemia, goal LDL below 70- (present on admission)  Assessment & Plan  Continue home meds        VTE prophylaxis: none due to surgery in the morning

## 2025-03-23 NOTE — ANESTHESIA PROCEDURE NOTES
Airway    Date/Time: 3/23/2025 10:52 AM    Performed by: Trent Clemens M.D.  Authorized by: Trent Clemens M.D.    Location:  OR  Urgency:  Elective  Difficult Airway: No    Indications for Airway Management:  Anesthesia      Spontaneous Ventilation: absent    Sedation Level:  Deep  Preoxygenated: Yes    Patient Position:  Sniffing  Mask Difficulty Assessment:  0 - not attempted  Final Airway Type:  Endotracheal airway  Final Endotracheal Airway:  ETT  Cuffed: Yes    Technique Used for Successful ETT Placement:  Direct laryngoscopy    Insertion Site:  Oral  Blade Type:  Kathie  Laryngoscope Blade/Videolaryngoscope Blade Size:  3  ETT Size (mm):  7.0  Measured from:  Teeth  ETT to Teeth (cm):  20  Placement Verified by: auscultation and capnometry    Cormack-Lehane Classification:  Grade I - full view of glottis  Number of Attempts at Approach:  1  Number of Other Approaches Attempted:  0

## 2025-03-23 NOTE — THERAPY
Occupational Therapy Contact Note    Patient Name: Ivory Rowan  Age:  84 y.o., Sex:  female  Medical Record #: [id number]  Today's Date: 3/23/2025    OT consult rec'd. Pt pending surgical intervention for left intertrochanteric fx, scheduled for today. Will hold OT eval and will re-attempt as appropriate/able post op.

## 2025-03-23 NOTE — ASSESSMENT & PLAN NOTE
Admit to:    Telemetry with continuous pulse oximetry due to: age 65 or older with history of: CHF    Cardiovascular:   Patient has history of CHF: Continue home beta blocker and rate control medications.   Pre-op EKG: Yes    Pulmonary:  Oxygen per protocol    GI:   No history of cirrhosis. Standard bowel regimen. Hold for loose stools.    Renal:   Labs: Metabolic Panel with AM labs    Musculoskeletal:   Check 25 OH vitamin D level. If 31-40 pg/mL, consider starting vitamin D3 1000 IU PO daily. If 20-30 pg/mL, consider vitamin D3 2000 IU PO daily. If <20 pg/mL, vitamin D2 50,000 IU weekly x 8 weeks then 2000 IU PO daily.      Hematologic:  Plan on pharmacologic DVT prophylaxis post operative day #1. Hold for decreasing hemoglobin. Notify provider for hemoglobin less than 8.  Order preoperative type and cross.   Hgb every 6 hours if high bleeding risk.   If patient was on anticoagulation prior to arrival risks and benefits will be weighed by teams including surgery, hospitalist, geriatrics, and anesthesia for delaying surgery more than 24 hours.       Medical Assessment Risk:  Intermediate    Surgical Risk:   Intermediate

## 2025-03-23 NOTE — OP REPORT
DATE OF OPERATION: 3/23/2025     PREOPERATIVE DIAGNOSIS: left intertrochanteric femur fracture    POSTOPERATIVE DIAGNOSIS: Same    PROCEDURE PERFORMED: Surgical treatment of left intertrochanteric femur fracture with intramedullary device    SURGEON: Lane Galdamez M.D.     ASSISTANT: none    ANESTHESIA: General    SPECIMEN: None    ESTIMATED BLOOD LOSS: 50 mL      INDICATIONS: The patient is a 84 y.o. female with a left intertrochanteric femur fracture resulting from a ground level fall.  The patient denies antecedent pain, and was found to have a normal neurovascular exam and intact skin envelope.  Radiographs demonstrated the intertrochanteric femur fracture.  Given these findings, the patient is an appropriately indicated candidate for surgical treatment of the intertrochanteric fracture with an intramedullary device.  I discussed the risks and benefits of the procedure, including the risks of infection, wound healing complication, neurovascular injury, persistent hip pain, malunion, non-union, malrotation, and the medical risks of anesthesia including MI, stroke, and death.  Benefits include early mobilization, improved chance of union, and reduction in the medical risks of hip fractures.  Alternatives to surgery were also discussed, including non-operative management, which I did not recommend.  The patient was in agreement with the plan to proceed, and the informed consent was signed and documented.  I met with the patient pre-operatively and marked the operative extremity with their agreement.  We proceeded to the operating room.     DESCRIPTION OF PROCEDURE:  The patient was seen in the preoperative holding area on the date of surgery.  The operative hip was marked with the surgeon's initials.  The patient was taken to the operating room and placed supine on the operating table.  Anesthesia was induced.  Both feet were secured in the ski boot foot holders.  A preliminary reduction was done under image  intensifier imaging.  Traction and manipulation were utilized to obtain an anatomic position.  At this point, the operative hip was prepped and draped in normal sterile fashion.  Operative pause was conducted to the correct patient, side, site, and procedure were identified as well as surgeon's initial on the operative extremity.  A guide pin was placed percutaneously to the start point on the proximal femur.  This was advanced into the intramedullary space.  Next, a skin incision was made with over this guide pin and an opening drill was used to gain access to the intramedullary space.  A short hip nail was placed through this opening and advanced to the correct depth for placement of the cephalomedullary screw.  Using the outrigger guide, we placed a cephalomedullary screw over a guide pin aiming for the center-center of the femoral head.  Once this was fully placed, I used the screw to adjust the rotation of the head neck component of the fracture to its anatomic location and then further compressed across the fracture using a compression wheel.  This was locked in place with proximal set screw.  We then used the outrigger guide to place a bicortical interlocking screw distally.  Once this had been drilled and placed, final fluoroscopic images were obtained and showed anatomic reduction of the hip.  The wounds were then thoroughly irrigated and closed in layered fashion with 2-0 Vicryl and staples.  Sterile dressings were placed.  The patient was allowed to awaken in the operating room and taken to the PACU in stable condition.     POSTOPERATIVE PLAN:  Weight bearing as tolerated.  Mobilize with physical and occupational therapies.  DVT prophylaxis with SCDs and Lovenox until mobilizing independently and then can be switched to aspirin for 4 weeks.  The patient will follow up in clinic in 2 weeks to check wounds and remove staples.      ____________________________________   Lane Galdamez M.D.   DD: 3/23/2025   11:43 AM

## 2025-03-23 NOTE — PROGRESS NOTES
Patient arrived to unit from PACU, assumed patient care, received report from previous bedside RN. Patient VSS, alert and oriented x2, complaining of confusion post op, denies pain. Safety precautions in place, skin check performed, heel mepilex applied for skin integrity prevention. Ice applied to surgical site, elevated with pillow. Educated patient and family on the plan of care for the day, questions answered, patient states understanding.

## 2025-03-23 NOTE — ANESTHESIA PREPROCEDURE EVALUATION
Case: [id number] Date/Time: 03/23/25 0955    Procedure: INSERTION, INTRAMEDULLARY JEWELS, FEMUR, PROXIMAL (Left)    Location: [facility] OR 16 / SURGERY [facility]    Surgeons: Lane Galdamez M.D.            Relevant Problems   CARDIAC   (positive) Aortic atherosclerosis (HCC)   (positive) Atrial fibrillation with RVR (HCC)   (positive) Essential hypertension   (positive) Hypertensive urgency   (positive) Mitral valve regurgitation   (positive) Persistent atrial fibrillation (HCC)      GI   (positive) GERD (gastroesophageal reflux disease)         (positive) Stage 3a chronic kidney disease      ENDO   (positive) Diabetes mellitus type II, non insulin dependent (HCC)   (positive) Type 2 diabetes mellitus with hyperglycemia, without long-term current use of insulin (HCC)     Most Recent TTE:  Left Ventricle  Normal left ventricular size, thickness, and systolic function. The ejection fraction is measured to be  60% by Mcgarry's biplane. No regional wall motion abnormalities. Indeterminate diastolic function.     Right Ventricle  Normal right ventricular size. Reduced right ventricular systolic   function.     Right Atrium  Enlarged right atrium. Normal inferior vena cava size and inspiratory collapse.     Left Atrium  Moderately dilated left atrium. Left atrial volume index is 42 mL/sq m.     Mitral Valve  Prolapse of the anterior mitral leaflet. No mitral stenosis. Mild to moderate mitral regurgitation.     Aortic Valve  Aortic valve sclerosis without significant stenosis. Mild aortic   insufficiency.     Tricuspid Valve  Structurally normal tricuspid valve without significant stenosis. Mild tricuspid regurgitation. Right atrial pressure is estimated to be 3 mmHg. Right ventricular systolic pressure is estimated to be 54 mmHg. Right heart pressures are consistent with moderate pulmonary hypertension.     Pulmonic Valve  Structurally normal pulmonic valve without significant stenosis. Mild pulmonic insufficiency.      Pericardium  No pericardial effusion. Pleural effusion noted in subcostal view.     Aorta  Normal aortic root for body surface area. The ascending aorta diameter is 2.4 cm.    Most Recent EKG:  Atrial fibrillation   Borderline right axis deviation   Borderline low voltage, extremity leads   Abnormal T, consider ischemia, anterior leads     Physical Exam    Anesthesia Plan    ASA 3- EMERGENT   ASA physical status 3 criteria: hypertension - poorly controlled and diabetes - poorly controlledASA physical status emergent criteria: displaced fracture with possible neurovascular compromise    Plan - general       Airway plan will be ETT          Induction: intravenous    Postoperative Plan: Postoperative administration of opioids is intended.    Pertinent diagnostic labs and testing reviewed    Informed Consent:    Anesthetic plan and risks discussed with patient.    Use of blood products discussed with: patient whom consented to blood products.

## 2025-03-23 NOTE — ED NOTES
Bedside report received from off going RN/tech: ROSIE Bach assumed care of patient.  POC discussed with patient. Call light within reach, all needs addressed at this time.       Fall risk interventions in place: Keep floor surfaces clean and dry (all applicable per [place] Fall risk assessment)   Continuous monitoring: Cardiac Leads, Pulse Ox, or Blood Pressure  IVF/IV medications: Not Applicable   Oxygen: Room Air  Bedside sitter: Not Applicable   Isolation: Not Applicable

## 2025-03-23 NOTE — CARE PLAN
The patient is Stable - Low risk of patient condition declining or worsening    Shift Goals  Clinical Goals: pain control, surgery, safety  Patient Goals: pain control, rest  Family Goals: NA    Progress made toward(s) clinical / shift goals:  yes  Problem: Pain - Standard  Goal: Alleviation of pain or a reduction in pain to the patient’s comfort goal  Outcome: Progressing     Problem: Fall Risk  Goal: Patient will remain free from falls  Outcome: Progressing     Problem: Safety  Goal: Will remain free from injury  Outcome: Progressing  Goal: Will remain free from falls  Outcome: Progressing       Patient is not progressing towards the following goals:

## 2025-03-23 NOTE — PROGRESS NOTES
Hospital Medicine Daily Progress Note    Date of Service  3/23/2025    Chief Complaint  Ivory Rowan is a 84 y.o. female admitted 3/22/2025 with fall    Hospital Course  This is an 84-year-old female with past medical history of atrial fibrillation on Eliquis, hypertension, dyslipidemia, CKD 3A, and glaucoma who was admitted on 3/22 after sustaining a ground-level fall.     X-ray imaging revealed left femoral intertrochanteric fracture.  Orthopedic surgery consulted.      Interval Problem Update  Left femoral fracture - patient is for operative repair today.    Hyponatremia - likely secondary to diurectics.  Held patient's Lasix and Aldactone. Serial sodium Q6H.  Serial BMP.  Mentation intact.    We will continue to monitor urinary output, bowel movements and pain control. Patient is to be evaluated by PT/OT postoperatively.     I have discussed this patient's plan of care and discharge plan at IDT rounds today with Case Management, Nursing, Nursing leadership, and other members of the IDT team.    Consultants/Specialty  orthopedics    Code Status  DNAR/DNI    Disposition  Patient is not medically clear to discharge   I have placed the appropriate orders for post-discharge needs.    Review of Systems  Review of Systems   All other systems reviewed and are negative.       Physical Exam  Temp:  [35.8 °C (96.5 °F)-37.3 °C (99.2 °F)] 37.3 °C (99.2 °F)  Pulse:  [74-98] 77  Resp:  [15-20] 16  BP: (120-173)/(66-94) 152/67  SpO2:  [93 %-96 %] 94 %    Physical Exam  Vitals and nursing note reviewed.   Constitutional:       General: She is not in acute distress.     Appearance: Normal appearance.   HENT:      Head: Normocephalic and atraumatic.      Mouth/Throat:      Mouth: Mucous membranes are moist.      Pharynx: No oropharyngeal exudate.   Eyes:      Extraocular Movements: Extraocular movements intact.      Pupils: Pupils are equal, round, and reactive to light.   Cardiovascular:      Rate and Rhythm: Normal rate and  regular rhythm.      Pulses: Normal pulses.      Heart sounds: No murmur heard.     No friction rub. No gallop.   Pulmonary:      Effort: Pulmonary effort is normal. No respiratory distress.      Breath sounds: No wheezing, rhonchi or rales.   Abdominal:      General: Bowel sounds are normal. There is no distension.      Palpations: Abdomen is soft. There is no mass.      Tenderness: There is no abdominal tenderness.   Musculoskeletal:         General: No swelling or tenderness. Normal range of motion.      Cervical back: Normal range of motion. No rigidity. No muscular tenderness.      Right lower leg: No edema.      Left lower leg: No edema.   Skin:     General: Skin is warm and dry.      Capillary Refill: Capillary refill takes less than 2 seconds.      Findings: No erythema or rash.   Neurological:      General: No focal deficit present.      Mental Status: She is alert and oriented to person, place, and time.      Motor: No weakness.      Gait: Gait normal.         Fluids    Intake/Output Summary (Last 24 hours) at 3/23/2025 1134  Last data filed at 3/23/2025 0419  Gross per 24 hour   Intake --   Output 250 ml   Net -250 ml        Laboratory  Recent Labs     03/22/25  1757 03/23/25  0245   WBC 10.4 8.0   RBC 4.57 3.87*   HEMOGLOBIN 14.6 12.2   HEMATOCRIT 42.4 35.6*   MCV 92.8 92.0   MCH 31.9 31.5   MCHC 34.4 34.3   RDW 45.6 45.9   PLATELETCT 136* 123*   MPV 8.8* 8.6*     Recent Labs     03/22/25  1757 03/23/25  0245 03/23/25  0747   SODIUM 130* 125* 125*   POTASSIUM 4.7 4.5  --    CHLORIDE 97 95*  --    CO2 21 22  --    GLUCOSE 113* 139*  --    BUN 30* 22  --    CREATININE 1.01 0.88  --    CALCIUM 9.7 8.5  --                    Imaging  DX-FEMUR-2+ LEFT   Final Result      Impacted left femoral intratrochanteric fracture.      DX-PELVIS-1 OR 2 VIEWS   Final Result      Impacted left femoral intratrochanteric fracture.      DX-CHEST-LIMITED (1 VIEW)   Final Result         No acute cardiopulmonary abnormalities  are identified.      DX-PORTABLE FLUOROSCOPY < 1 HOUR Reason For Exam: Main OR    (Results Pending)   DX-HIP-UNILATERAL-W/O PELVIS-2/3 VIEWS LEFT    (Results Pending)        Assessment/Plan  * Femur fracture (HCC)  Assessment & Plan  X-ray imaging revealed left femoral intertrochanteric fracture.  Orthopedic surgery consulted.  Pain medication  Bowel regimen  We will continue to monitor urinary output, bowel movements and pain control. Patient is to be evaluated by PT/OT postoperatively.     Hyponatremia  Assessment & Plan  Likely secondary to diuretics  Held lasix and aldactone  Serial sodium Q6H.   Serial BMP    Persistent atrial fibrillation (HCC)- (present on admission)  Assessment & Plan  Hold Eliquis for now due to surgery in the morning    ACP (advance care planning)  Assessment & Plan  16 minutes spent discussing goals of care with patient and daughter at bedside.  We went over the process of CPR and what patient would want should she clinically deteriorate and have a cardiac arrest.  Patient states that she would like to be DNR/DNI    Encounter for preoperative assessment  Assessment & Plan  Admit to:    Telemetry with continuous pulse oximetry due to: age 65 or older with history of: CHF    Cardiovascular:   Patient has history of CHF: Continue home beta blocker and rate control medications.   Pre-op EKG: Yes    Pulmonary:  Oxygen per protocol    GI:   No history of cirrhosis. Standard bowel regimen. Hold for loose stools.    Renal:   Labs: Metabolic Panel with AM labs    Musculoskeletal:   Check 25 OH vitamin D level. If 31-40 pg/mL, consider starting vitamin D3 1000 IU PO daily. If 20-30 pg/mL, consider vitamin D3 2000 IU PO daily. If <20 pg/mL, vitamin D2 50,000 IU weekly x 8 weeks then 2000 IU PO daily.      Hematologic:  Plan on pharmacologic DVT prophylaxis post operative day #1. Hold for decreasing hemoglobin. Notify provider for hemoglobin less than 8.  Order preoperative type and cross.   Hgb  every 6 hours if high bleeding risk.   If patient was on anticoagulation prior to arrival risks and benefits will be weighed by teams including surgery, hospitalist, geriatrics, and anesthesia for delaying surgery more than 24 hours.       Medical Assessment Risk:  Intermediate    Surgical Risk:   Intermediate      Stage 3a chronic kidney disease- (present on admission)  Assessment & Plan  Appears to be at baseline    Dyslipidemia, goal LDL below 70- (present on admission)  Assessment & Plan  Continue home meds         VTE prophylaxis: Eliquis    I have performed a physical exam and reviewed and updated ROS and Plan today (3/23/2025). In review of yesterday's note (3/22/2025), there are no changes except as documented above.      Greater than 51 minutes spent prepping to see patient (e.g. reviewing EMR) obtaining and/or reviewing separately obtained history. Performing a medically appropriate examination and evaluation. Independently interpreting results and communicating results to patient/family/caregiver. Counseling and educating the patient/family/caregiver. Ordering medications, tests, and/or procedures. Referring and communicating with other health care professionals.  Documenting clinical information in EPIC. Care coordination.

## 2025-03-23 NOTE — ASSESSMENT & PLAN NOTE
16 minutes spent discussing goals of care with patient and daughter at bedside.  We went over the process of CPR and what patient would want should she clinically deteriorate and have a cardiac arrest.  Patient states that she would like to be DNR/DNI

## 2025-03-23 NOTE — ASSESSMENT & PLAN NOTE
Eliquis resumed 3/24  Patient has known history of atrial fibrillation, heart rate fluctuating . Reviewed MAR, patient did not receive her a.m. metoprolol, one-time dose given. Heart rate now in the 80s.

## 2025-03-23 NOTE — PROGRESS NOTES
Pt transported to pre-op via hospital bed with male transporter.    Pt Aox4, on RA. Does not complain of pain at this time.     Partial dentures and personal clothing left at bedside. Pt daughter ambulated alongside bed to pre-op

## 2025-03-23 NOTE — ED PROVIDER NOTES
"ED Provider Note    Scribed for Tree Ledezma by Modesto Wakefield. 3/22/2025  5:20 PM    Primary care provider: Checo Tesfaye M.D.  Means of arrival: EMS  History obtained from: Patient  History limited by: None    CHIEF COMPLAINT  Chief Complaint   Patient presents with    Fall    Hip Pain     Left      EXTERNAL RECORDS REVIEWED  Outpatient labs & studies The patient had labs done on 3/13/25 that showed decreased kidney function and elevated BNP of 2383.    HPI/ROS  LIMITATION TO HISTORY   Select: : None  OUTSIDE HISTORIAN(S):  Family member was present and provided helpful and collateral history.    HPI  Ivory Rowan is a 84 y.o. female who presents to the Emergency Department via EMS for evaluation of injuries secondary to a ground level fall onset 2 hours ago. The patient reports she lost her balance, and she believes this is due to recent loss of \"half of my weight\". She reports associated left hip pain that is mostly only present when she attempts to move. She denies any chest pain, dizziness, shortness of breath, head stroke, or loss of consciousness with the fall.     REVIEW OF SYSTEMS  As above, all other systems reviewed and are negative.   See HPI for further details.     PAST MEDICAL HISTORY   has a past medical history of Acute on chronic heart failure with preserved ejection fraction (HCC) (08/13/2021), Age-related osteoporosis without current pathological fracture (01/17/2024), Atrial fibrillation (HCC), Basal cell carcinoma of skin of nose, CATARACT, CHF (congestive heart failure) (HCC), Colon polyp, Dental disorder, Dyslipidemia, Glaucoma, right eye, Heart burn, Hemorrhagic disorder (HCC), History of cataract removal with insertion of prosthetic lens (04/09/2024), Hypertension, IBS (irritable bowel syndrome), Indigestion, MEDICAL HOME (10/23/2012), Mitral valve regurgitation, Osteopenia (06/2009), Perennial allergic rhinitis with seasonal variation, Persistent atrial fibrillation (HCC), " "Psychiatric problem, Type 2 diabetes mellitus, controlled (HCC), Valvular heart disease, and Vertigo.  SURGICAL HISTORY   has a past surgical history that includes breast biopsy; recovery (2016); us-needle core bx-breast panel; appendectomy; cholecystectomy; and colonoscopy (2009).  SOCIAL HISTORY  Social History     Tobacco Use    Smoking status: Former     Current packs/day: 0.00     Average packs/day: 0.5 packs/day for 40.0 years (20.0 ttl pk-yrs)     Types: Cigarettes     Start date: 3/1/1956     Quit date: 3/1/1996     Years since quittin.0    Smokeless tobacco: Never   Vaping Use    Vaping status: Never Used   Substance Use Topics    Alcohol use: Not Currently     Alcohol/week: 0.0 oz     Comment: now rare    Drug use: No      Social History     Substance and Sexual Activity   Drug Use No     FAMILY HISTORY  Family History   Problem Relation Age of Onset    Diabetes Mother     Hypertension Mother     Stroke Mother     Cancer Father         lung/larynx    Cancer Sister         \"female\"    Genetic Disorder Sister         osteoporosis    Cancer Paternal Aunt         breast    Cancer Maternal Aunt     Heart Disease Brother         CABG x 3     CURRENT MEDICATIONS  Home Medications       Reviewed by Ludmila Lincoln, EMT-Basic (Acute Care Technician - Basic) on 25 at 1917  Med List Status: Not Addressed     Medication Last Dose Status   apixaban (ELIQUIS) 2.5mg Tab  Active   aspirin (ASPIRIN LOW DOSE) 81 MG EC tablet  Active   digoxin (LANOXIN) 125 MCG Tab  Active   ezetimibe (ZETIA) 10 MG Tab  Active   famotidine (PEPCID) 20 MG Tab  Active   ferrous gluconate (FERGON) 240 (27 Fe) MG tablet  Active   furosemide (LASIX) 20 MG Tab  Active   furosemide (LASIX) 40 MG Tab  Active   latanoprost (XALATAN) 0.005 % Solution  Active   losartan (COZAAR) 100 MG Tab  Active   metoprolol SR (TOPROL XL) 100 MG TABLET SR 24 HR  Active   spironolactone (ALDACTONE) 25 MG Tab  Active                  Audit from " Redirected Encounters    **Home medications have not yet been reviewed for this encounter**       ALLERGIES  Allergies   Allergen Reactions    Atorvastatin      myalgias    Simvastatin      myalgias       PHYSICAL EXAM    VITAL SIGNS:   Vitals:    25 1730 25 1800 25 1839 25 1919   BP: (!) 141/87 (!) 149/72 (!) 160/94    Pulse: 86 84 98    Resp: 18 18 16 17   Temp:   36.7 °C (98.1 °F)    TempSrc:   Temporal    SpO2: 93% 94% 94%    Weight:       Height:         Vitals: My interpretation: hypertensive, not tachycardic, afebrile, not hypoxic    Reinterpretation of vitals: unchanged     PE:   Gen: sitting comfortably, speaking clearly, appears in no acute distress   ENT: Mucous membranes moist, posterior pharynx clear, uvula midline, nares patent bilaterally   Neck: Supple, FROM  Pulmonary: Lungs are clear to auscultation bilaterally. No tachypnea  CV:  RRR, no murmur appreciated, pulses 2+ in both upper and lower extremities  Abdomen: soft, NT/ND; no rebound/guarding  : no CVA or suprapubic tenderness   Neuro: A&Ox4 (person, place, time, situation), speech fluent, gait steady, no focal deficits appreciated  Skin: No rash or lesions.  No pallor or jaundice.  No cyanosis.  Warm and dry.   Left hip: There is tenderness to the left hip without deformity, decreased range of motion secondary to pain, held in a flexed position, neurovascular intact distally    DIAGNOSTIC STUDIES / PROCEDURES    LABS  Results for orders placed or performed during the hospital encounter of 25   EKG (NOW)    Collection Time: 25  5:46 PM   Result Value Ref Range    Report       [facility] Emergency Dept.    Test Date:  2025  Pt Name:    KYLE WISEMAN                  Department: ER  MRN:        [id number]                      Room:       ORTHO  Gender:     Female                       Technician: [id number]  :        [id number]                   Requested By:PABLO ALBARADO  Order #:     [id number]                    Reading MD: Tree Ledezma    Measurements  Intervals                                Axis  Rate:       72                           P:          0  ND:         0                            QRS:        85  QRSD:       90                           T:          17  QT:         358  QTc:        392    Interpretive Statements  Atrial fibrillation  Borderline right axis deviation  Borderline low voltage, extremity leads  Abnormal T, consider ischemia, anterior leads  Compared to ECG 10/01/2024 09:54:13  T-wave abnormality now present  Possible ischemia now present  Myocardial infarct finding no longer present  Electronically Radha d On 03- 17:46:24 PDT by Tree Ledezma     CBC w/ Differential    Collection Time: 03/22/25  5:57 PM   Result Value Ref Range    WBC 10.4 4.8 - 10.8 K/uL    RBC 4.57 4.20 - 5.40 M/uL    Hemoglobin 14.6 12.0 - 16.0 g/dL    Hematocrit 42.4 37.0 - 47.0 %    MCV 92.8 81.4 - 97.8 fL    MCH 31.9 27.0 - 33.0 pg    MCHC 34.4 32.2 - 35.5 g/dL    RDW 45.6 35.9 - 50.0 fL    Platelet Count 136 (L) 164 - 446 K/uL    MPV 8.8 (L) 9.0 - 12.9 fL    Neutrophils-Polys 85.80 (H) 44.00 - 72.00 %    Lymphocytes 4.40 (L) 22.00 - 41.00 %    Monocytes 8.80 0.00 - 13.40 %    Eosinophils 0.30 0.00 - 6.90 %    Basophils 0.30 0.00 - 1.80 %    Immature Granulocytes 0.40 0.00 - 0.90 %    Nucleated RBC 0.00 0.00 - 0.20 /100 WBC    Neutrophils (Absolute) 8.93 (H) 1.82 - 7.42 K/uL    Lymphs (Absolute) 0.46 (L) 1.00 - 4.80 K/uL    Monos (Absolute) 0.91 (H) 0.00 - 0.85 K/uL    Eos (Absolute) 0.03 0.00 - 0.51 K/uL    Baso (Absolute) 0.03 0.00 - 0.12 K/uL    Immature Granulocytes (abs) 0.04 0.00 - 0.11 K/uL    NRBC (Absolute) 0.00 K/uL   Complete Metabolic Panel (CMP)    Collection Time: 03/22/25  5:57 PM   Result Value Ref Range    Sodium 130 (L) 135 - 145 mmol/L    Potassium 4.7 3.6 - 5.5 mmol/L    Chloride 97 96 - 112 mmol/L    Co2 21 20 - 33 mmol/L    Anion Gap 12.0 7.0 - 16.0    Glucose  113 (H) 65 - 99 mg/dL    Bun 30 (H) 8 - 22 mg/dL    Creatinine 1.01 0.50 - 1.40 mg/dL    Calcium 9.7 8.5 - 10.5 mg/dL    Correct Calcium 9.6 8.5 - 10.5 mg/dL    AST(SGOT) 39 12 - 45 U/L    ALT(SGPT) 35 2 - 50 U/L    Alkaline Phosphatase 80 30 - 99 U/L    Total Bilirubin 1.2 0.1 - 1.5 mg/dL    Albumin 4.1 3.2 - 4.9 g/dL    Total Protein 6.8 6.0 - 8.2 g/dL    Globulin 2.7 1.9 - 3.5 g/dL    A-G Ratio 1.5 g/dL   Troponin    Collection Time: 03/22/25  5:57 PM   Result Value Ref Range    Troponin T 13 6 - 19 ng/L   HOLD BLOOD BANK SPECIMEN (NOT TESTED)    Collection Time: 03/22/25  5:57 PM   Result Value Ref Range    Holding Tube - Bb DONE    ESTIMATED GFR    Collection Time: 03/22/25  5:57 PM   Result Value Ref Range    GFR (CKD-EPI) 55 (A) >60 mL/min/1.73 m 2     *Note: Due to a large number of results and/or encounters for the requested time period, some results have not been displayed. A complete set of results can be found in Results Review.      All labs reviewed by me. Labs were compared to prior labs if they were available. Significant for no leukocytosis, no anemia, normal electrolytes, normal glucose, normal renal function, normal liver enzymes, normal bilirubin, troponin negative.    RADIOLOGY  I have independently interpreted the diagnostic imaging associated with this visit and am waiting the final reading from the radiologist.   My preliminary interpretation is a follows: Left hip fracture.  Radiologist interpretation is as follows:  DX-FEMUR-2+ LEFT   Final Result      Impacted left femoral intratrochanteric fracture.      DX-PELVIS-1 OR 2 VIEWS   Final Result      Impacted left femoral intratrochanteric fracture.      DX-CHEST-LIMITED (1 VIEW)   Final Result         No acute cardiopulmonary abnormalities are identified.        COURSE & MEDICAL DECISION MAKING  Nursing notes, VS, PMSFHx, labs, imaging, EKG reviewed in chart.    Heart Score: Moderate    ED Observation Status? No; Patient does not meet  criteria for ED Observation.     Ddx: Strain, sprain, fracture, dislocation    MDM: 5:20 PM Ivory Rowan is a 84 y.o. female who presented with mechanical ground-level fall 2 hours prior to arrival resulting in left hip pain.  No head strike or loss of conscious.  On Eliquis for history of A-fib.  Arrives with her daughter at bedside who provides helpful collateral remission.  Vital signs are unremarkable with a mild hypertension.  Physical exam shows tenderness to left hip concerning for fracture, decreased range of motion secondary to pain, but neurovascularly intact.  X-rays ordered and do show an intertrochanteric fracture of the left femoral hip consistent with patient's presentation.  Complete x-rays with femur and chest x-ray done which were unremarkable.  EKG presurgical ordered which was showing chronic atrial fibrillation, on Eliquis.  All labs reviewed by me. Labs were compared to prior labs if they were available. Significant for no leukocytosis, no anemia, normal electrolytes, normal glucose, normal renal function, normal liver enzymes, normal bilirubin, troponin negative..  Discussed with orthopedics who recommends hospitalist admission.  Hospitalist is willing to admit.  Planned for surgical fixation in the morning.    ADDITIONAL PROBLEM LIST AND DISPOSITION    I have discussed management of the patient with the following physicians and SEBASTIAN's: Hospitalist, orthopedic surgery    Discussion of management with other QHP or appropriate source(s): None     Barriers to care at this time, including but not limited to:  None .     Decision tools and prescription drugs considered including, but not limited to:  None .    FINAL IMPRESSION  1. Closed fracture of left hip, initial encounter (HCC) Acute   2. Fall, initial encounter Acute   3. Hypertension, unspecified type Acute   4. Atrial fibrillation, unspecified type (HCC) Acute      Modesto LARAScribe), tomás scribing for, and in the presence of,  Tree Ledezma.    Electronically signed by: Modesto Wakefield (Scribe), 3/22/2025    I, Tree Ledezma personally performed the services described in this documentation, as scribed by Modesto Wakefield in my presence, and it is both accurate and complete.    The note accurately reflects work and decisions made by me.  Tree Ledezma  3/22/2025  6:04 PM

## 2025-03-23 NOTE — CONSULTS
Date of Service: 03/23/25    Ivory Rowan was seen today in consultation for left hip fracture at the request of Dr. Ledezma    CC: Left hip fracture    HPI: Ivory Rowan is a 84 y.o. female who presents with complaints of pain to left hip.  This started yesterday after a ground-level fall.  This sounds to be mechanical in nature.  She thinks that she just lost her balance falling onto her left side.  She lives alone.  She was able to crawl to a phone to call for help.  The pain is 7/10 and is described as sharp.  The pain is made worse by palpation of the area and made better by rest and immobilization.    PMH:   Past Medical History:   Diagnosis Date    Acute on chronic heart failure with preserved ejection fraction (HCC) 08/13/2021    Age-related osteoporosis without current pathological fracture 01/17/2024    Atrial fibrillation ([facility])     Basal cell carcinoma of skin of nose     CATARACT     Dr. Aaron, Dr. Orellana    CHF (congestive heart failure) ([facility])     Colon polyp     tubular adenomas    Dental disorder     upper partial    Dyslipidemia     Glaucoma, right eye     Dr. Orellana    Heart burn     Hemorrhagic disorder ([facility])     eliquis    History of cataract removal with insertion of prosthetic lens 04/09/2024    Hypertension     pt states well controlled on meds    IBS (irritable bowel syndrome)     Indigestion     MEDICAL HOME 10/23/2012    Mitral valve regurgitation     Osteopenia 06/2009    Perennial allergic rhinitis with seasonal variation     Persistent atrial fibrillation ([facility])     Psychiatric problem     anxiety    Type 2 diabetes mellitus, controlled ([facility])     diet controlled    Valvular heart disease     mitral insufficiency    Vertigo        PSH:   Past Surgical History:   Procedure Laterality Date    RECOVERY  7/8/2016    Procedure: CATH LAB-ZAFAR GUIDED CV-TO-LARGE GROUP;  Surgeon: Recoveryonly Surgery;  Location: SURGERY PRE-POST PROC UNIT [facility];  Service:     COLONOSCOPY  8/2009      "John, 9 polyps    APPENDECTOMY      BREAST BIOPSY      left, reports wire report broke off during procedure    CHOLECYSTECTOMY      US-NEEDLE CORE BX-BREAST PANEL         FH:   Family History   Problem Relation Age of Onset    Diabetes Mother     Hypertension Mother     Stroke Mother     Cancer Father         lung/larynx    Cancer Sister         \"female\"    Genetic Disorder Sister         osteoporosis    Cancer Paternal Aunt         breast    Cancer Maternal Aunt     Heart Disease Brother         CABG x 3       SH:   Social History     Socioeconomic History    Marital status:      Spouse name: Not on file    Number of children: 2    Years of education: Not on file    Highest education level: Not on file   Occupational History    Occupation: Retired 2009 [employer]     Employer: retired   Tobacco Use    Smoking status: Former     Current packs/day: 0.00     Average packs/day: 0.5 packs/day for 40.0 years (20.0 ttl pk-yrs)     Types: Cigarettes     Start date: 3/1/1956     Quit date: 3/1/1996     Years since quittin.0    Smokeless tobacco: Never   Vaping Use    Vaping status: Never Used   Substance and Sexual Activity    Alcohol use: Not Currently     Alcohol/week: 0.0 oz     Comment: now rare    Drug use: No    Sexual activity: Not Currently     Partners: Male   Other Topics Concern    Not on file   Social History Narrative    Not on file     Social Drivers of Health     Financial Resource Strain: Not on file   Food Insecurity: No Food Insecurity (3/22/2025)    Hunger Vital Sign     Worried About Running Out of Food in the Last Year: Never true     Ran Out of Food in the Last Year: Never true   Transportation Needs: No Transportation Needs (3/22/2025)    PRAPARE - Transportation     Lack of Transportation (Medical): No     Lack of Transportation (Non-Medical): No   Physical Activity: Not on file   Stress: Not on file   Social Connections: Not on file   Intimate Partner Violence: Not At Risk " "(3/22/2025)    Humiliation, Afraid, Rape, and Kick questionnaire     Fear of Current or Ex-Partner: No     Emotionally Abused: No     Physically Abused: No     Sexually Abused: No   Housing Stability: Low Risk  (3/22/2025)    Housing Stability Vital Sign     Unable to Pay for Housing in the Last Year: No     Number of Times Moved in the Last Year: 0     Homeless in the Last Year: No       ROS: In review of the following systems: Constitutional, Eyes, ENT, Cardiovascular,Respiratory, GI, , Musculoskeletal, Skin, Neuro, Psych, Hematologic, Endocrine, Allergic; no pertinent findings were found related to the chief complaint and orthopedic injury     BP (!) 152/67   Pulse 77   Temp 37.3 °C (99.2 °F) (Temporal)   Resp 16   Ht 1.575 m (5' 2\")   Wt 52.2 kg (115 lb)   SpO2 94%     Physical Exam:  General: Well nourished, well developed, age appropriate appearance   HEENT: Normocephalic, atraumatic  Psych: Normal mood and affect  Neck: Supple, no pain to motion  Chest/Pulmonary: breathing unlabored, no audible wheezing  Cardio: Regular heart rate and rhythm  Neuro: Sensation grossly intact to BUE and BLE, moving all four extremities  Skin: Intact with no full thickness abrasions or lacerations  MSK: Left hip was not ranged due to the known injury.  She denies pain at the knee leg or ankle.  The other 3 extremities are atraumatic    Imaging and labs: X-rays of the left hip show comminuted and slightly impacted intertrochanteric hip fracture    Recent Labs     03/22/25  1757 03/23/25  0245   WBC 10.4 8.0   RBC 4.57 3.87*   HEMOGLOBIN 14.6 12.2   HEMATOCRIT 42.4 35.6*   MCV 92.8 92.0   MCH 31.9 31.5   RDW 45.6 45.9   PLATELETCT 136* 123*   MPV 8.8* 8.6*   NEUTSPOLYS 85.80*  --    LYMPHOCYTES 4.40*  --    MONOCYTES 8.80  --    EOSINOPHILS 0.30  --    BASOPHILS 0.30  --        Assessment:   1. Closed fracture of left hip, initial encounter ([facility]) Acute       2. Fall, initial encounter Acute       3. Hypertension, " unspecified type Acute       4. Atrial fibrillation, unspecified type (HCC) Acute       5. Hip fracture requiring operative repair, right, closed, initial encounter ([facility])  Referral to Skilled Nursing Facility          I discussed the diagnosis and findings with the patient at length.  I reviewed possible non operative and operative interventions and the risks and benefits of each of these.  I recommended intervention nail fixation of the left hip fracture.  This will allow for earlier range of motion and weightbearing.  I recommended urgent management of the hip fracture decrease risk of morbidity mortality there are associated delayed operative care.  Afterwards she will start with physical therapy to work on early mobilization.  Her goal is to return home with her daughter but there is a chance that she will need postacute facilities.  She had a chance to ask questions and all of these were answered to her satisfaction.

## 2025-03-23 NOTE — ASSESSMENT & PLAN NOTE
Likely secondary to diuretics  Held lasix and aldactone  Worsening 3/24 - fluid restriction  Salt tablets  Serial sodium Q6H.   Serial BMP

## 2025-03-23 NOTE — CARE PLAN
The patient is Stable - Low risk of patient condition declining or worsening    Shift Goals  Clinical Goals: pain control, surgery, NPO status, safety  Patient Goals: pain control, comfort  Family Goals: not present    Progress made toward(s) clinical / shift goals:    Problem: Pain - Standard  Goal: Alleviation of pain or a reduction in pain to the patient’s comfort goal  Outcome: Progressing     Problem: Knowledge Deficit - Standard  Goal: Patient and family/care givers will demonstrate understanding of plan of care, disease process/condition, diagnostic tests and medications  Outcome: Progressing     Surgery this AM, IM Mando in L hip. NPO at this time until back on unit. Plan of care discussed with patient at beside. Personal belongings and call light with patient in bed.     Problem: Skin Integrity  Goal: Skin integrity is maintained or improved  Outcome: Progressing     Problem: Fall Risk  Goal: Patient will remain free from falls  Outcome: Progressing     Patient educated to stay in bed and use call light if needing to ambulate in bathroom of have needs addressed. Patient belongings at bedside with patient. Treaded socks placed on pt feet. Bed placed and locked on lowest level. DME placed in pt bathroom to prevent independent ambulation without staff present.     Patient is not progressing towards the following goals:

## 2025-03-23 NOTE — ED NOTES
Med Rec complete per daughter and Reconcile Outside Information   Allergies reviewed  Antibiotics in the past 30 days:no  Anticoagulant in past 14 days:yes  Anticoagulant:Eliquis 2.5 mg Last dose:03/22/25  Pharmacy patient utilizes:[facility] on [address]    Pt unable to verify names of medications she takes. States she took her medications yesterday morning.    Attempted to contact daughter on demographics. Left a voicemail for a callback.    Medications on med list are from recent dispense history through [facility] pharmacy for a 3 month supply via Reconcile Outside Information    Addendum: daughter called back and went over medications and directions (med rec updated)

## 2025-03-23 NOTE — PROGRESS NOTES
4 Eyes Skin Assessment Completed by ROSIE Valladares and Falguni RN.    Head WDL  Ears WDL  Nose WDL  Mouth WDL  Neck WDL  Breast/Chest WDL  Shoulder Blades WDL  Spine WDL  (R) Arm/Elbow/Hand Redness, Blanching, and Bruising  (L) Arm/Elbow/Hand Redness, Blanching, and Bruising  Abdomen WDL  Groin WDL  Scrotum/Coccyx/Buttocks Redness and Blanching  (R) Leg Bruising  (L) Leg Bruising and Incision Gauze and tegaderm x3 CDI  (R) Heel/Foot/Toe Redness and Blanching  (L) Heel/Foot/Toe Redness and Blanching          Devices In Places Blood Pressure Cuff, Pulse Ox, SCD's, Hypo Pads, and Oxy Mask      Interventions In Place Heel Mepilex, TAP System, Pillows, Barrier Cream, Heels Loaded W/Pillows, and Pressure Redistribution Mattress    Possible Skin Injury No    Pictures Uploaded Into Epic N/A  Wound Consult Placed N/A  RN Wound Prevention Protocol Ordered No

## 2025-03-23 NOTE — PROGRESS NOTES
Left intertrochanteric hip fracture from a ground-level fall.  Plan for operative fixation today.  Patient to remain n.p.o. pending surgery this morning.

## 2025-03-23 NOTE — ED NOTES
Pt aox4, skin pink warm and dry, airway patent, rr even and unlabored. Vitals stable. Pt in NAD at this time with no new complaints. Transports with transport team in stable condition.

## 2025-03-23 NOTE — ANESTHESIA POSTPROCEDURE EVALUATION
Patient: Ivory Rowan    Procedure Summary       Date: 03/23/25 Room / Location: [place] [id number] / SURGERY [facility]    Anesthesia Start: 1046 Anesthesia Stop: 1156    Procedure: INSERTION, INTRAMEDULLARY JEWELS, FEMUR, PROXIMAL (Left: Leg Upper) Diagnosis: (Closed fracture of left hip)    Surgeons: Lane Galdamez M.D. Responsible Provider: Trent Clemens M.D.    Anesthesia Type: general ASA Status: 3 - Emergent            Final Anesthesia Type: general  Last vitals  BP   Blood Pressure : (!) 152/67    Temp   37.3 °C (99.2 °F)    Pulse   77   Resp   16    SpO2   94 %      Anesthesia Post Evaluation    Patient location during evaluation: PACU  Patient participation: complete - patient participated  Level of consciousness: awake and alert  Pain score: 2    Airway patency: patent  Anesthetic complications: no  Cardiovascular status: hemodynamically stable  Respiratory status: acceptable  Hydration status: euvolemic    PONV: none          No notable events documented.     Nurse Pain Score: 2 (NPRS)

## 2025-03-23 NOTE — HOSPITAL COURSE
This is an 84-year-old female with past medical history of atrial fibrillation on Eliquis, hypertension, dyslipidemia, CKD 3A, and glaucoma who was admitted on 3/22 after sustaining a ground-level fall.     X-ray imaging revealed left femoral intertrochanteric fracture.  Orthopedic surgery consulted, patient underwent intramedullary device placement on 3/23.  Weightbearing as tolerated.  Patient has follow-up with orthopedic surgery in about 2 weeks for postoperative wound check and to remove staples.    Hyponatremia - likely secondary to diurectics.  Held patient's Lasix and Aldactone. Monitor sodium.     Patient noted to have orthostatic hypotension after working with physical therapy, had a short episode of disorientation and loss of consciousness, resolved abruptly.  Patient back to her baseline. HELD LOSARTAN.     Patient has known history of atrial fibrillation, heart rate fluctuating . Resumed her metoprolol with good rate control.

## 2025-03-24 ENCOUNTER — HOME HEALTH ADMISSION (OUTPATIENT)
Dept: HOME HEALTH SERVICES | Facility: HOME HEALTHCARE | Age: 85
End: 2025-03-24
Payer: MEDICARE

## 2025-03-24 PROBLEM — I95.1 ORTHOSTATIC HYPOTENSION: Status: ACTIVE | Noted: 2025-03-24

## 2025-03-24 LAB
ANION GAP SERPL CALC-SCNC: 11 MMOL/L (ref 7–16)
BUN SERPL-MCNC: 24 MG/DL (ref 8–22)
CALCIUM SERPL-MCNC: 9.2 MG/DL (ref 8.5–10.5)
CHLORIDE SERPL-SCNC: 90 MMOL/L (ref 96–112)
CO2 SERPL-SCNC: 21 MMOL/L (ref 20–33)
CREAT SERPL-MCNC: 1.03 MG/DL (ref 0.5–1.4)
EKG IMPRESSION: NORMAL
ERYTHROCYTE [DISTWIDTH] IN BLOOD BY AUTOMATED COUNT: 44.9 FL (ref 35.9–50)
GFR SERPLBLD CREATININE-BSD FMLA CKD-EPI: 54 ML/MIN/1.73 M 2
GLUCOSE SERPL-MCNC: 156 MG/DL (ref 65–99)
HCT VFR BLD AUTO: 25.6 % (ref 37–47)
HGB BLD-MCNC: 8.8 G/DL (ref 12–16)
MCH RBC QN AUTO: 31.5 PG (ref 27–33)
MCHC RBC AUTO-ENTMCNC: 34.4 G/DL (ref 32.2–35.5)
MCV RBC AUTO: 91.8 FL (ref 81.4–97.8)
PLATELET # BLD AUTO: 106 K/UL (ref 164–446)
PMV BLD AUTO: 9.3 FL (ref 9–12.9)
POTASSIUM SERPL-SCNC: 5.6 MMOL/L (ref 3.6–5.5)
RBC # BLD AUTO: 2.79 M/UL (ref 4.2–5.4)
SODIUM SERPL-SCNC: 122 MMOL/L (ref 135–145)
SODIUM SERPL-SCNC: 124 MMOL/L (ref 135–145)
WBC # BLD AUTO: 9.3 K/UL (ref 4.8–10.8)

## 2025-03-24 PROCEDURE — 700102 HCHG RX REV CODE 250 W/ 637 OVERRIDE(OP): Performed by: GENERAL PRACTICE

## 2025-03-24 PROCEDURE — 700102 HCHG RX REV CODE 250 W/ 637 OVERRIDE(OP): Performed by: STUDENT IN AN ORGANIZED HEALTH CARE EDUCATION/TRAINING PROGRAM

## 2025-03-24 PROCEDURE — 97166 OT EVAL MOD COMPLEX 45 MIN: CPT

## 2025-03-24 PROCEDURE — A9270 NON-COVERED ITEM OR SERVICE: HCPCS | Performed by: GENERAL PRACTICE

## 2025-03-24 PROCEDURE — 97162 PT EVAL MOD COMPLEX 30 MIN: CPT

## 2025-03-24 PROCEDURE — A9270 NON-COVERED ITEM OR SERVICE: HCPCS | Performed by: ORTHOPAEDIC SURGERY

## 2025-03-24 PROCEDURE — 93005 ELECTROCARDIOGRAM TRACING: CPT | Mod: TC | Performed by: EMERGENCY MEDICAL TECHNICIAN, INTERMEDIATE

## 2025-03-24 PROCEDURE — A9270 NON-COVERED ITEM OR SERVICE: HCPCS | Performed by: STUDENT IN AN ORGANIZED HEALTH CARE EDUCATION/TRAINING PROGRAM

## 2025-03-24 PROCEDURE — 36415 COLL VENOUS BLD VENIPUNCTURE: CPT

## 2025-03-24 PROCEDURE — 97535 SELF CARE MNGMENT TRAINING: CPT

## 2025-03-24 PROCEDURE — 700102 HCHG RX REV CODE 250 W/ 637 OVERRIDE(OP): Performed by: ORTHOPAEDIC SURGERY

## 2025-03-24 PROCEDURE — 770001 HCHG ROOM/CARE - MED/SURG/GYN PRIV*

## 2025-03-24 PROCEDURE — 99233 SBSQ HOSP IP/OBS HIGH 50: CPT | Performed by: GENERAL PRACTICE

## 2025-03-24 PROCEDURE — 84295 ASSAY OF SERUM SODIUM: CPT

## 2025-03-24 PROCEDURE — 80048 BASIC METABOLIC PNL TOTAL CA: CPT

## 2025-03-24 PROCEDURE — 85027 COMPLETE CBC AUTOMATED: CPT

## 2025-03-24 RX ORDER — SODIUM CHLORIDE 1 G/1
2 TABLET ORAL
Status: COMPLETED | OUTPATIENT
Start: 2025-03-24 | End: 2025-03-25

## 2025-03-24 RX ORDER — METOPROLOL TARTRATE 100 MG/1
100 TABLET ORAL ONCE
Status: COMPLETED | OUTPATIENT
Start: 2025-03-24 | End: 2025-03-24

## 2025-03-24 RX ADMIN — ACETAMINOPHEN 650 MG: 325 TABLET ORAL at 16:16

## 2025-03-24 RX ADMIN — EZETIMIBE 10 MG: 10 TABLET ORAL at 05:32

## 2025-03-24 RX ADMIN — SODIUM CHLORIDE 2 G: 1 TABLET ORAL at 08:47

## 2025-03-24 RX ADMIN — METOPROLOL 100 MG: 100 TABLET ORAL at 12:44

## 2025-03-24 RX ADMIN — FAMOTIDINE 20 MG: 20 TABLET, FILM COATED ORAL at 05:32

## 2025-03-24 RX ADMIN — ACETAMINOPHEN 650 MG: 325 TABLET ORAL at 05:32

## 2025-03-24 RX ADMIN — APIXABAN 2.5 MG: 5 TABLET, FILM COATED ORAL at 16:16

## 2025-03-24 RX ADMIN — ACETAMINOPHEN 650 MG: 325 TABLET ORAL at 12:44

## 2025-03-24 RX ADMIN — SENNOSIDES AND DOCUSATE SODIUM 2 TABLET: 50; 8.6 TABLET ORAL at 16:16

## 2025-03-24 RX ADMIN — OXYCODONE 5 MG: 5 TABLET ORAL at 07:58

## 2025-03-24 RX ADMIN — ASPIRIN 81 MG: 81 TABLET, COATED ORAL at 05:32

## 2025-03-24 RX ADMIN — POLYETHYLENE GLYCOL 3350 1 PACKET: 17 POWDER, FOR SOLUTION ORAL at 05:31

## 2025-03-24 RX ADMIN — SODIUM CHLORIDE 2 G: 1 TABLET ORAL at 12:44

## 2025-03-24 RX ADMIN — METOPROLOL SUCCINATE 100 MG: 50 TABLET, FILM COATED, EXTENDED RELEASE ORAL at 16:15

## 2025-03-24 RX ADMIN — SODIUM CHLORIDE 2 G: 1 TABLET ORAL at 16:14

## 2025-03-24 ASSESSMENT — LIFESTYLE VARIABLES
TOTAL SCORE: 0
TOTAL SCORE: 0
AVERAGE NUMBER OF DAYS PER WEEK YOU HAVE A DRINK CONTAINING ALCOHOL: 0
EVER HAD A DRINK FIRST THING IN THE MORNING TO STEADY YOUR NERVES TO GET RID OF A HANGOVER: NO
HOW MANY TIMES IN THE PAST YEAR HAVE YOU HAD 5 OR MORE DRINKS IN A DAY: 0
ALCOHOL_USE: NO
EVER FELT BAD OR GUILTY ABOUT YOUR DRINKING: NO
CONSUMPTION TOTAL: NEGATIVE
HAVE YOU EVER FELT YOU SHOULD CUT DOWN ON YOUR DRINKING: NO
DOES PATIENT WANT TO STOP DRINKING: NO
ON A TYPICAL DAY WHEN YOU DRINK ALCOHOL HOW MANY DRINKS DO YOU HAVE: 0
HAVE PEOPLE ANNOYED YOU BY CRITICIZING YOUR DRINKING: NO
TOTAL SCORE: 0

## 2025-03-24 ASSESSMENT — COGNITIVE AND FUNCTIONAL STATUS - GENERAL
MOVING TO AND FROM BED TO CHAIR: A LITTLE
SUGGESTED CMS G CODE MODIFIER DAILY ACTIVITY: CK
EATING MEALS: A LITTLE
PERSONAL GROOMING: A LITTLE
STANDING UP FROM CHAIR USING ARMS: A LITTLE
MOBILITY SCORE: 18
TURNING FROM BACK TO SIDE WHILE IN FLAT BAD: A LITTLE
HELP NEEDED FOR BATHING: A LOT
WALKING IN HOSPITAL ROOM: A LITTLE
SUGGESTED CMS G CODE MODIFIER MOBILITY: CK
DAILY ACTIVITIY SCORE: 15
CLIMB 3 TO 5 STEPS WITH RAILING: A LITTLE
DRESSING REGULAR LOWER BODY CLOTHING: A LOT
MOVING FROM LYING ON BACK TO SITTING ON SIDE OF FLAT BED: A LITTLE
DRESSING REGULAR UPPER BODY CLOTHING: A LITTLE
TOILETING: A LOT

## 2025-03-24 ASSESSMENT — GAIT ASSESSMENTS
DEVIATION: ANTALGIC;STEP TO;BRADYKINETIC
DISTANCE (FEET): 60
GAIT LEVEL OF ASSIST: CONTACT GUARD ASSIST
ASSISTIVE DEVICE: FRONT WHEEL WALKER

## 2025-03-24 ASSESSMENT — PAIN DESCRIPTION - PAIN TYPE
TYPE: ACUTE PAIN;SURGICAL PAIN
TYPE: ACUTE PAIN

## 2025-03-24 ASSESSMENT — ACTIVITIES OF DAILY LIVING (ADL): TOILETING: INDEPENDENT

## 2025-03-24 ASSESSMENT — SOCIAL DETERMINANTS OF HEALTH (SDOH)
IN THE PAST 12 MONTHS, HAS THE ELECTRIC, GAS, OIL, OR WATER COMPANY THREATENED TO SHUT OFF SERVICE IN YOUR HOME?: NO
WITHIN THE PAST 12 MONTHS, THE FOOD YOU BOUGHT JUST DIDN'T LAST AND YOU DIDN'T HAVE MONEY TO GET MORE: NEVER TRUE
WITHIN THE PAST 12 MONTHS, YOU WORRIED THAT YOUR FOOD WOULD RUN OUT BEFORE YOU GOT THE MONEY TO BUY MORE: NEVER TRUE

## 2025-03-24 NOTE — DISCHARGE PLANNING
"Case Management Discharge Planning    Admission Date: 3/22/2025  GMLOS: 4.4  ALOS: 2    6-Clicks ADL Score:    6-Clicks Mobility Score: 18      Anticipated Discharge Dispo: Discharge Disposition: Disch to IP rehab facility or distinct part unit (62)    DME Needed: No    Action(s) Taken: Updated Provider/Nurse on Discharge Plan  \"This is an 84-year-old female with past medical history of atrial fibrillation on Eliquis, hypertension, dyslipidemia, CKD 3A, and glaucoma who was admitted on 3/22 after sustaining a ground-level fall. \"     Pt was discussed in IDT rounds with Dr Mckenna.    PT recommends post acute placement  OT?    Admissions at [facility] is following   Pt has multiple SNF acceptance.    This RN CM left a  VM to Conchita Pt s Daughter and requested a return call.            Escalations Completed: None    Medically Clear: No    Next Steps:   CM to continue to assist Pt with discharge as needed    Barriers to Discharge:   Medical clearance  Pending placement    Is the patient up for discharge tomorrow: No        "

## 2025-03-24 NOTE — DISCHARGE PLANNING
Good morning.  This referral has been escalated to a Clinical Supervisor for review in order to determine Home Health appropriateness.  This issue will be resolved as quickly as possible.  Thank you!

## 2025-03-24 NOTE — DISCHARGE PLANNING
1039  Referral sent to [facility].    1059  Referral sent to [facility].

## 2025-03-24 NOTE — DISCHARGE PLANNING
Per PMR patient is a candidate for IPR pending no further drop in Na and confirmation of dc support. Daughter will need to provide at least CGA and provide assistance with ADLs and IADLs, intermittent assistance would not be sufficient. Contacted daughter Conchita and left vm.

## 2025-03-24 NOTE — DISCHARGE PLANNING
Good morning,  Per supervisor review, before making a decision on acceptance for home health, PT eval will be needed.    Referral will remain on hold.

## 2025-03-24 NOTE — CARE PLAN
The patient is Stable - Low risk of patient condition declining or worsening    Shift Goals  Clinical Goals: pain control, PT/OT eval, safety  Patient Goals: pain control, comfort  Family Goals: NA    Progress made toward(s) clinical / shift goals:  yes  Problem: Pain - Standard  Goal: Alleviation of pain or a reduction in pain to the patient’s comfort goal  Outcome: Progressing     Problem: Fall Risk  Goal: Patient will remain free from falls  Outcome: Progressing     Problem: Safety  Goal: Will remain free from injury  Outcome: Progressing  Goal: Will remain free from falls  Outcome: Progressing       Patient is not progressing towards the following goals:

## 2025-03-24 NOTE — PROGRESS NOTES
Patient not oriented to time. Patient confused and agitated. Called patient's daughter couple of times but she did not answer.   Informed  nurse the situation. The charge nursed tried to calm patient down but it did not work. Informed Elaine SCHNEIDER about it. She ordered medication to calm patient down. Medication administered as ordered.  Water and call light within reach.  VCC on.   Explained why lovenox is started but patient stated she wound not want to start new medication.

## 2025-03-24 NOTE — THERAPY
"Physical Therapy   Initial Evaluation     Patient Name: Ivory Rowan  Age:  84 y.o., Sex:  female  Medical Record #: [id number]  Today's Date: 3/24/2025     Precautions  Precautions: Weight Bearing As Tolerated Left Lower Extremity    Assessment    84 y.o. female was admitted s/p fall for a L intertrochanteric fracture.  She is now s/p ORIF and cleared for WBAT LLE.  PMHx includes a fib, HFpEF, osteoporosis, skin, CA, R glaucoma, HTN, IBS, DM, and vertigo.  She lives alone in a [address] [place] [address] apartment with 1 MALIHA and was independent from mobility without an AD.  On eval, she presents with pain, impaired cognition with decreased safety awareness, weakness, and decreased activity tolerance impairing her mobility as detailed below.  She will benefit from continued acute PT services to address the above deficits in order to return home safely. Recommend post acute PT services.    Plan    Physical Therapy Initial Treatment Plan   Treatment Plan : (P) Bed Mobility, Equipment, Family / Caregiver Training, Gait Training, Manual Therapy, Neuro Re-Education / Balance, Self Care / Home Evaluation, Stair Training, Therapeutic Activities, Therapeutic Exercise  Treatment Frequency: (P) 4 Times per Week  Duration: (P) Until Therapy Goals Met    DC Equipment Recommendations: (P) Unable to determine at this time  Discharge Recommendations: (P) Recommend post-acute placement for additional physical therapy services prior to discharge home       Subjective    \"I can't walk on this.\"     Objective       03/24/25 1000   Initial Contact Note    Initial Contact Note Order Received and Verified, Physical Therapy Evaluation in Progress with Full Report to Follow.   Precautions   Precautions Weight Bearing As Tolerated Left Lower Extremity   Vitals   Pulse   ()   Room Air Oximetry 93   O2 Delivery Device None - Room Air   Vitals Comments at rest   Pain 0 - 10 Group   Therapist Pain Assessment During Activity  (L hip, not rated)   Prior " Living Situation   Housing / Facility 1 Story Apartment / Condo  (1st floor)   Steps Into Home 1   Rail   (ramp)   Bathroom Set up Bathtub / Shower Combination;Grab Bars   Equipment Owned 4-Wheel Walker   Lives with - Patient's Self Care Capacity Alone and Able to Care For Self   Comments Pt is a questionable . Daughter's home is 1SH, no steps. Daughter works.   Prior Level of Functional Mobility   Bed Mobility Independent   Transfer Status Independent   Ambulation Independent   Ambulation Distance community   Assistive Devices Used None   Comments doesn't drive   History of Falls   History of Falls Yes   Date of Last Fall   (reason for admission. Pt and daughter report only the current fall x1 year)   Cognition    Cognition / Consciousness X   Level of Consciousness Alert   Comments impaired memory with limited carry over noted for instructions with transfers and use of 4ww brakes.   Active ROM Upper Body   Comments WFL for mobility   Strength Upper Body   Comments WFL for mobility   Active ROM Lower Body    Active ROM Lower Body  WDL   Strength Lower Body   Lower Body Strength  X   Comments L hip 3+/5, L knee and ankle, RLE grossly 4-/5.   Sensation Lower Body   Lower Extremity Sensation   WDL   Balance Assessment   Sitting Balance (Static) Fair   Sitting Balance (Dynamic) Fair   Standing Balance (Static) Fair   Standing Balance (Dynamic) Fair -   Weight Shift Sitting Fair   Weight Shift Standing Fair   Comments with FWW   Bed Mobility    Supine to Sit Standby Assist   Sit to Supine Standby Assist   Scooting Supervised   Rolling Standby Assist   Comments bed flat, no rail   Gait Analysis   Gait Level Of Assist Contact Guard Assist   Assistive Device Front Wheel Walker   Distance (Feet) 60   # of Times Distance was Traveled 2   Deviation Antalgic;Step To;Bradykinetic   # of Stairs Climbed 1   Level of Assist with Stairs Contact Guard Assist   Weight Bearing Status WBAT LLE   Functional Mobility   Sit to  Stand Contact Guard Assist   Bed, Chair, Wheelchair Transfer Contact Guard Assist   Transfer Method Stand Step   Mobility with rollator, FWW   Activity Tolerance   Sitting in Chair post session   Edema / Skin Assessment   Edema / Skin  Not Assessed   Short Term Goals    Short Term Goal # 1 Pt will perform supine < > sit SPV with bed flat, no rail in 6 visits in order to set up for upright mobility   Short Term Goal # 2 Pt will perform sit < > stand SPV in 6 visits in order to prepare for ambulation   Short Term Goal # 3 Pt will ambulate 150' SPV /c FWW in 6 visits in order to return home safely   Short Term Goal # 4 Pt will ascend/ descend 1 step SPVin 6 visits in order to access her home   Education Group   Education Provided Role of Physical Therapist;Gait Training;Stair Training;Use of Assistive Device;Transfer Status;Weight Bearing Status   Role of Physical Therapist Patient Response Patient;Acceptance;Explanation;Action Demonstration   Gait Training Patient Response Patient;Acceptance;Explanation;Action Demonstration;Reinforcement Needed   Stair Training Patient Response Patient;Acceptance;Explanation;Action Demonstration;Reinforcement Needed   Use of Assistive Device Patient Response Patient;Acceptance;Explanation;Action Demonstration;Reinforcement Needed  (rollator)   Transfer Status Patient Response Patient;Acceptance;Explanation;Action Demonstration;Reinforcement Needed   Weight Bearing Status Patient Response Patient;Acceptance;Explanation;Action Demonstration   Physical Therapy Initial Treatment Plan    Treatment Plan  Bed Mobility;Equipment;Family / Caregiver Training;Gait Training;Manual Therapy;Neuro Re-Education / Balance;Self Care / Home Evaluation;Stair Training;Therapeutic Activities;Therapeutic Exercise   Treatment Frequency 4 Times per Week   Duration Until Therapy Goals Met   Problem List    Problems Pain;Impaired Bed Mobility;Impaired Transfers;Impaired Ambulation;Functional Strength  Deficit;Decreased Activity Tolerance;Safety Awareness Deficits / Cognition   Anticipated Discharge Equipment and Recommendations   DC Equipment Recommendations Unable to determine at this time   Discharge Recommendations Recommend post-acute placement for additional physical therapy services prior to discharge home   Interdisciplinary Plan of Care Collaboration   IDT Collaboration with  Nursing;Physician   Patient Position at End of Therapy Seated;Chair Alarm On;Call Light within Reach;Tray Table within Reach;Family / Friend in Room  (daughter in the room)   Collaboration Comments PT recommendations   Session Information   Date / Session Number  3/24 -1 (1/4, 3/30)

## 2025-03-24 NOTE — THERAPY
Occupational Therapy   Initial Evaluation     Patient Name: Ivory Rowan  Age:  84 y.o., Sex:  female  Medical Record #: [id number]  Today's Date: 3/24/2025     Precautions: Weight Bearing As Tolerated Left Lower Extremity    Assessment    Patient is 84 y.o. female s/p GLF resulting in L IT fx. Pt underwent placement of IM device. H/o a-fib, HTN, osteopenia, DMII, CHF, glaucoma, cataracts. Pt seen for OT eval and treatment. See grid below for details. Pt received in arm chair having recently completed PT eval (including short distance gait); daughter present. Pt asleep and very difficult to rouse, not responding to loud voice, clapping, physical manipulation of limbs and head or sternal rub. VS stable (see values below). Ortho PA arrived and assisted with rousing pt who finally opened her eyes. Pt very slow to respond to commands and inquiries, demonstrated confusion and disorientation. She was able to stand and pivot back to EOB with min A. Arousal improved, however cognition continued to be moderately impaired (primarily poor recall). Unclear etiology of mental status change. RN updated. Pt was able to engage in grooming task at bed level; further ADL deferred. Pt is below functional baseline from OT standpoint and will benefit from ongong acute OT to maximize functional independence and safety. Daughter reports pt may be able to stay with her prior to return to pt's own home (where she lives alone).     Plan    Occupational Therapy Initial Treatment Plan   Treatment Interventions: Self Care / Activities of Daily Living, Adaptive Equipment, Neuro Re-Education / Balance, Therapeutic Exercises, Therapeutic Activity, Family / Caregiver Training, Cognitive Skill Development  Treatment Frequency: 4 Times per Week  Duration: Until Therapy Goals Met    DC Equipment Recommendations: Unable to determine at this time  Discharge Recommendations: Recommend post-acute placement for additional occupational therapy services  "prior to discharge home     Subjective    \"I don't know.\" (response to most queries following initial unresponsiveness)     Objective       03/24/25 1056   Prior Living Situation   Prior Services Other (Comments);Meals on Wheels  (intermittent assist with I-ADL from daughter)   Housing / Facility 1 Story House   Steps Into Home   (ramp entry)   Bathroom Set up Bathtub / Shower Combination;Grab Bars   Equipment Owned 4-Wheel Walker;Grab Bar(s) In Tub / Shower;Ramp   Lives with - Patient's Self Care Capacity Alone and Able to Care For Self   Comments Pt lives alone, daughter lives nearby and works   Prior Level of ADL Function   Self Feeding Independent   Grooming / Hygiene Independent   Bathing Independent  (pt takes full baths)   Dressing Independent   Toileting Independent   Prior Level of IADL Function   Medication Management Requires Assist   Laundry Independent   Home Management Requires Assist   Shopping Requires Assist   Prior Level Of Mobility Independent Without Device in Community;Independent Without Device in Home  (limited community distances)   Driving / Transportation Relatives / Others Provide Transportation   History of Falls   History of Falls Yes   Precautions   Precautions Weight Bearing As Tolerated Left Lower Extremity   Vitals   Pulse 90   Patient BP Position Sitting   Blood Pressure  116/54   Room Air Oximetry 94   O2 (LPM) 0   O2 Delivery Device None - Room Air   Pain   Pain Scales 0 to 10 Scale    Pain 0 - 10 Group   Therapist Pain Assessment Post Activity Pain Same as Prior to Activity;Nurse Notified  (no c/o pain this session)   Non Verbal Descriptors   Non Verbal Scale  Calm;Unlabored Breathing   Cognition    Cognition / Consciousness X   Orientation Level Not Oriented to Month;Not Oriented to Place;Not Oriented to Reason;Not Oriented to Time   New Learning Impaired   Attention Impaired   Sequencing Impaired   Comments Pt unresponsive at start of session; required max repeated stimulation " "to rouse, then demonstrated moderate confusion/disorientation   Active ROM Upper Body   Active ROM Upper Body  WDL   Strength Upper Body   Upper Body Strength  X   Gross Strength Generalized Weakness, Equal Bilaterally.    Sensation Upper Body   Upper Extremity Sensation  WDL   Upper Body Muscle Tone   Upper Body Muscle Tone  WDL   Coordination Upper Body   Coordination WDL   Comments grossly, limited assessment due to arousal issues   Balance Assessment   Sitting Balance (Static) Fair   Sitting Balance (Dynamic) Fair   Standing Balance (Static) Fair -   Standing Balance (Dynamic) Poor +   Weight Shift Sitting Fair   Weight Shift Standing Poor   Comments FWW and assist of 2   Bed Mobility    Supine to Sit   (up to chair pre)   Sit to Supine Moderate Assist   Scooting Contact Guard Assist  (seated)   ADL Assessment   Grooming Minimal Assist  (bed level oral care; assist to clean partial)   Lower Body Dressing   (deferred due to needing to return to bed)   Toileting   (declined need, used commode earlier)   Functional Mobility   Sit to Stand Minimal Assist   Bed, Chair, Wheelchair Transfer Minimal Assist   Transfer Method Stand Pivot  (FWW)   Mobility transfer, bed mobility   Visual Perception   Visual Perception  WDL   Comments wears readers   Edema / Skin Assessment   Edema / Skin  Not Assessed   Activity Tolerance   Sitting in Chair >30 min pre   Sitting Edge of Bed 3 min   Standing 1-2 min   Comments limited by somnolence   Patient / Family Goals   Patient / Family Goal #1 \"I don't know\"   Short Term Goals   Short Term Goal # 1 Pt will complete toilet transfers with SBA   Short Term Goal # 2 Pt will complete toileting with SBA   Short Term Goal # 3 Pt will complete LB dressing with SBA using AE PRN   Short Term Goal # 4 Pt will complete standing grooming with SBA   Education Group   Education Provided Role of Occupational Therapist;Weight Bearing Precautions;Transfers   Role of Occupational Therapist Patient " Response Patient;Acceptance;Explanation;Verbal Demonstration;Reinforcement Needed;Family   Transfers Patient Response Patient;Acceptance;Demonstration;Explanation;Action Demonstration;Reinforcement Needed  (pivot using FWW)   Weight Bearing Precautions Patient Response Patient;Acceptance;Explanation;Demonstration;Action Demonstration;Verbal Demonstration;Reinforcement Needed  (WBAT LLE)   Additional Comments Discussed post-op options with pt's daughter   Occupational Therapy Initial Treatment Plan    Treatment Interventions Self Care / Activities of Daily Living;Adaptive Equipment;Neuro Re-Education / Balance;Therapeutic Exercises;Therapeutic Activity;Family / Caregiver Training;Cognitive Skill Development   Treatment Frequency 4 Times per Week   Duration Until Therapy Goals Met   Problem List   Problem List Decreased Homemaking Skills;Decreased Active Daily Living Skills;Decreased Functional Mobility;Decreased Activity Tolerance;Impaired Postural Control / Balance;Decreased Upper Extremity Strength Right;Decreased Upper Extremity Strength Left;Impaired Cognitive Function   Interdisciplinary Plan of Care Collaboration   IDT Collaboration with  Nursing;Family / Caregiver;Physical Therapist;Physician Assistant   Patient Position at End of Therapy In Bed;Bed Alarm On;Call Light within Reach;Tray Table within Reach;Family / Friend in Room   Collaboration Comments OT results and recs; period of unresponsiveness start of session   Session Information   Date / Session Number  3/24 #1 (1/4, 3/30)

## 2025-03-24 NOTE — CARE PLAN
The patient is Stable - Low risk of patient condition declining or worsening    Shift Goals  Clinical Goals: pain control, surgery, NPO status, safety  Patient Goals: pain control, comfort  Family Goals: not present    Progress made toward(s) clinical / shift goals:    Problem: Pain - Standard  Goal: Alleviation of pain or a reduction in pain to the patient’s comfort goal  Outcome: Progressing     Problem: Knowledge Deficit - Standard  Goal: Patient and family/care givers will demonstrate understanding of plan of care, disease process/condition, diagnostic tests and medications  Outcome: Progressing     Problem: Skin Integrity  Goal: Skin integrity is maintained or improved  Outcome: Progressing     Problem: Fall Risk  Goal: Patient will remain free from falls  Outcome: Progressing     Problem: Safety  Goal: Will remain free from injury  Outcome: Progressing  Goal: Will remain free from falls  Outcome: Progressing       Patient is not progressing towards the following goals:

## 2025-03-24 NOTE — DOCUMENTATION QUERY
[facility]                                                                       Query Response Note      PATIENT:               KYLE WISEMAN  ACCT #:                  [id number]  MRN:                     [id number]  :                      1940  ADMIT DATE:       3/22/2025 4:30 PM  DISCH DATE:          RESPONDING  PROVIDER #:        [id number]           QUERY TEXT:    When both osteoporosis and a trauma occur with a resulting fracture, coding clinic directs the  to query the physician for clarification of the type of fracture. Based on clinical findings, risk factors and treatment, can this fracture be further specified as:    NOTE:  If you agree with a diagnosis provided, please remember to include it in your daily concurrent documentation and onto the Discharge Summary    The patient's Clinical Indicators include:  Findings:  --Patient admitted s/p GLF for impacted left femoral IT fracture  --Per ED Provider Notes , ''The patient reports she lost her balance, and she believes this is due to recent loss of half of her     weight'' documented  --Per H&P and Orthopedic consult , patient has a past medical history of osteoporosis  --Pelvic xray on  admission:  Comminuted and impacted intertrochanteric fracture of the left femur, there is diffuse     osteopenia  --Left femur xray on  admission:  Diffuse osteopenia, impacted left femoral intertrochanteric fracture    Treatment:  --Orthopedic consult--s/p IM nailing of left IT femur fracture of   --Pelvic xray/left femur xray    Risk Factors:  --S/p GLF with left femur IT fx sustained  --Past medical history of osteoporosis    Thank you,  Kristal WHITTAKER, RN  Clinical   Connect via [employer]  Options provided:   -- Pathological fracture secondary to osteoporosis   -- Traumatic fracture   -- Other explanation-please specify, Please  specify other explanation   -- Unable to determine      Query created by: Kristal Maurice on 3/24/2025 8:10 AM    RESPONSE TEXT:    Unable to determine          Electronically signed by:  MITCH ECHAVARRIA MD 3/24/2025 8:17 AM

## 2025-03-24 NOTE — PROGRESS NOTES
"    Orthopedic PA Progress Note    Interval changes:  Patient doing well postop. Family at bedside  OT at bedside on arrival; Dr. Mckenna notified of patient brief mental status change  L hip dressings are CDI  WBAT LLE  No pending ortho procedures  Cleared for DC from orthopedic standpoint pending therapy recs and medical optimization    ROS - Patient denies any new issues.  Denies any numbness or tingling. Pain controlled.    BP 99/49   Pulse 90   Temp 36.4 °C (97.5 °F) (Temporal)   Resp 16   Ht 1.575 m (5' 2\")   Wt 52.2 kg (115 lb)   SpO2 93%     Patient seen and examined  No acute distress  Breathing non labored  RRR  LLE: Surgical dressing is clean, dry, and intact. Patient clearly fires tibialis anterior, EHL, and gastrocnemius/soleus. Sensation is intact to light touch throughout superficial peroneal, deep peroneal, tibial, saphenous, and sural nerve distributions. Strong and palpable 2+ dorsalis pedis and posterior tibial pulses with capillary refill less than 2 seconds.   Recent Labs     03/22/25  1757 03/23/25  0245 03/24/25  0612   WBC 10.4 8.0 9.3   RBC 4.57 3.87* 2.79*   HEMOGLOBIN 14.6 12.2 8.8*   HEMATOCRIT 42.4 35.6* 25.6*   MCV 92.8 92.0 91.8   MCH 31.9 31.5 31.5   MCHC 34.4 34.3 34.4   RDW 45.6 45.9 44.9   PLATELETCT 136* 123* 106*   MPV 8.8* 8.6* 9.3       Active Hospital Problems    Diagnosis     Dyslipidemia, goal LDL below 70 [E78.5]      Priority: Medium    Orthostatic hypotension [I95.1]     Hyponatremia [E87.1]     Femur fracture (HCC) [S72.90XA]     Encounter for preoperative assessment [Z01.818]     ACP (advance care planning) [Z71.89]     Persistent atrial fibrillation (HCC) [I48.19]     Stage 3a chronic kidney disease [N18.31]        DX-PORTABLE FLUOROSCOPY < 1 HOUR Reason For Exam: Main OR   Final Result      Portable fluoroscopy utilized for 49 seconds.         INTERPRETING LOCATION: [address] [place] [address], [place], [address]      DX-HIP-UNILATERAL-W/O PELVIS-2/3 VIEWS LEFT   Final Result    "   Digitized intraoperative radiograph is submitted for review. This examination is not for diagnostic purpose but for guidance during a surgical procedure. Please see the patient's chart for full procedural details.         INTERPRETING LOCATION: [address] [place] [address] [place], [id number]      DX-FEMUR-2+ LEFT   Final Result      Impacted left femoral intratrochanteric fracture.      DX-PELVIS-1 OR 2 VIEWS   Final Result      Impacted left femoral intratrochanteric fracture.      DX-CHEST-LIMITED (1 VIEW)   Final Result         No acute cardiopulmonary abnormalities are identified.          Assessment/Plan:  Patient doing well postop. Family at bedside  OT at bedside on arrival; Dr. Mckenna notified of patient brief mental status change  L hip dressings are CDI  WBAT LLE  No pending ortho procedures  Cleared for DC from orthopedic standpoint pending therapy recs and medical optimization    POD#1 S/p  Surgical treatment of left intertrochanteric femur fracture with intramedullary device   Wt bearing status - WBAT LLE  Future Procedures - None planned   Wound care/Drains - Dressings to be changed every other day by nursing. Or PRN for saturation starting POD#2  Sutures/Staples out- 14-21 days post operatively. Removal will completed by ortho SEBASTIAN's unless transferred.  Inpatient DVT prophylaxis: eliquis3/24  DVT Prophylaxis outpatient: eliquis  Antibiotics:  Perioperative completed  DVT Prophylaxis- TEDS/SCDs/Foot pumps  Case Coordination for Discharge Planning - Disposition per therapy recs.   Follow up with Dr. Galdamez 2 weeks following final surgery for repeat imaging and wound check. (Est follow up date 4/7)

## 2025-03-24 NOTE — DISCHARGE PLANNING
[facility] Transitional Care Coordination     Referral from: Dr Galdamez  Insurance Provider on Facesheet: SCP  Potential Rehab Diagnosis: Hip fx     Chart review indicates patient may have on going medical management and may have therapy needs to possibly meet inpatient rehab facility criteria with the goal of returning to community.     D/C support: TBD     Physiatry consultation pended per protocol. Awaiting therapy evals, TCC will follow.     Thank you for the referral.

## 2025-03-24 NOTE — CARE PLAN
The patient is Stable - Low risk of patient condition declining or worsening    Shift Goals  Clinical Goals: pain control, PT/OT eval, safety  Patient Goals: pain control, comfort  Family Goals: NA    Progress made toward(s) clinical / shift goals:    Problem: Pain - Standard  Goal: Alleviation of pain or a reduction in pain to the patient’s comfort goal  Outcome: Progressing     Problem: Knowledge Deficit - Standard  Goal: Patient and family/care givers will demonstrate understanding of plan of care, disease process/condition, diagnostic tests and medications  Outcome: Progressing     Problem: Skin Integrity  Goal: Skin integrity is maintained or improved  Outcome: Progressing     Problem: Fall Risk  Goal: Patient will remain free from falls  Outcome: Progressing     Problem: Safety  Goal: Will remain free from injury  Outcome: Progressing  Goal: Will remain free from falls  Outcome: Progressing       Patient is not progressing towards the following goals:

## 2025-03-24 NOTE — ASSESSMENT & PLAN NOTE
Patient noted to have orthostatic hypotension after working with physical therapy, had a short episode of disorientation and loss of consciousness, resolved abruptly.  Patient back to her baseline. HELD LOSARTAN.

## 2025-03-24 NOTE — CONSULTS
Physical Medicine and Rehabilitation Consultation              Date of initial consultation: 3/24/2025  Requesting provider: ordered by Lane Galdamez M.D. at 03/24/25 8215    Consulting provider: Lucila Edwards D.O.  Reason for consultation: assess for acute inpatient rehab appropriateness  LOS: 2 Day(s)    Chief complaint: left hip pain after GLF.     HPI: The patient is a 84 y.o.  female with a past medical history of afib on eliquis, HTN, and HLD;  who presented on 3/22/2025  4:30 PM with left hip pain after a GLF. Per documentation, patient had a fall landing on her left side. She did not hit her head and did not have LOC.  Upon eval in the ED, pelvis xrays showed an impacted left femoral intertrochanteric fractures. Ortho consulted, and patient was taken to the OR for surgical treatment of left intertrochanteric femur fracture with IM device by Dr. Galdamez. Patient is WBAT LLE. Patient's hospital course has been notable for ABLA, hyponatremia, and hyperkalemia.     Patient seen and examined at bedside with nursing.  Patient reports she feels ok. Denies pain. Patient goes on tangents easily during ROS, mild confusion? Gets confused about date and and her birth date. Notified nursing, nursing reports patient has had episodes of confusion that come and go.     Social Hx:  Patient lives alone in a 1 story apartment with 1 MALIHA. Daughter may be able to provide support, daughter works during the day.    1 MALIHA  At prior level of function patient was Independent with mobility and ADLs.     Tobacco: Former smoker, has a 20 pack year smoking history   Alcohol: Denied   Drugs: Denied     THERAPY:  Restrictions: WBAT LLE   PT: Functional mobility   3/24  CGA with FWW x 60 x2, CGA sit to stand, CGA transfers     OT: ADLs  3/24 Min A grooming, min A  transfers, lower body dressing deferred     SLP:   None     IMAGING:  DX-PORTABLE FLUOROSCOPY < 1 HOUR Reason For Exam: Main OR  Narrative: 3/23/2025 10:45  AM    HISTORY/REASON FOR EXAM:  Left hip nail    TECHNIQUE/EXAM DESCRIPTION AND NUMBER OF VIEWS:  Portable fluoroscopy for less than one hour in OR.    FINDINGS:  The portable fluoroscopy unit was obligated to the procedure for less than one hour. Actual fluoro time was 49 seconds.    Fluoroscopy dose(DAP): 2.228 Gy*cm^2  Impression: Portable fluoroscopy utilized for 49 seconds.    INTERPRETING LOCATION: [address] [facility], [facility], [address]  DX-HIP-UNILATERAL-W/O PELVIS-2/3 VIEWS LEFT  Narrative: 3/23/2025 10:45 AM    HISTORY/REASON FOR EXAM:  Left hip nail    TECHNIQUE/EXAM DESCRIPTION AND NUMBER OF VIEWS:  4 views of the LEFT hip.  Digitized Intraoperative Radiograph    FINDINGS:    Fluoroscopic time: 49 seconds    Fluoroscopy dose(DAP): 2.228 Gy*cm^2  Impression: Digitized intraoperative radiograph is submitted for review. This examination is not for diagnostic purpose but for guidance during a surgical procedure. Please see the patient's chart for full procedural details.    INTERPRETING LOCATION: [address] [place] [address], [id number]        PROCEDURES:  3/23 Surgical treatment of left intertrochanteric femur fracture with  IM device by Dr. Galdamez     PMH:  Past Medical History:   Diagnosis Date    Acute on chronic heart failure with preserved ejection fraction (HCC) 08/13/2021    Age-related osteoporosis without current pathological fracture 01/17/2024    Atrial fibrillation (HCC)     Basal cell carcinoma of skin of nose     CATARACT     Dr. Aaron, Dr. Orellana    CHF (congestive heart failure) (HCC)     Colon polyp     tubular adenomas    Dental disorder     upper partial    Dyslipidemia     Glaucoma, right eye     Dr. Orellana    Heart burn     Hemorrhagic disorder (HCC)     eliquis    History of cataract removal with insertion of prosthetic lens 04/09/2024    Hypertension     pt states well controlled on meds    IBS (irritable bowel syndrome)     Indigestion     MEDICAL HOME 10/23/2012    Mitral valve regurgitation     Osteopenia  "06/2009    Perennial allergic rhinitis with seasonal variation     Persistent atrial fibrillation (HCC)     Psychiatric problem     anxiety    Type 2 diabetes mellitus, controlled (HCC)     diet controlled    Valvular heart disease     mitral insufficiency    Vertigo        PSH:  Past Surgical History:   Procedure Laterality Date    PB OPEN FIX INTER/SUBTROCH FX,IMPLNT Left 3/23/2025    Procedure: INSERTION, INTRAMEDULLARY JEWELS, FEMUR, PROXIMAL;  Surgeon: Lane Galdamez M.D.;  Location: SURGERY [facility];  Service: Orthopedics    RECOVERY  7/8/2016    Procedure: CATH LAB-ZAFAR GUIDED CV-TO-LARGE GROUP;  Surgeon: [facility];  Location: SURGERY PRE-POST PROC UNIT [facility];  Service:     COLONOSCOPY  8/2009    Dr. Lopez, 9 polyps    APPENDECTOMY      BREAST BIOPSY      left, reports wire report broke off during procedure    CHOLECYSTECTOMY      US-NEEDLE CORE BX-BREAST PANEL         FHX:  Family History   Problem Relation Age of Onset    Diabetes Mother     Hypertension Mother     Stroke Mother     Cancer Father         lung/larynx    Cancer Sister         \"female\"    Genetic Disorder Sister         osteoporosis    Cancer Paternal Aunt         breast    Cancer Maternal Aunt     Heart Disease Brother         CABG x 3       Medications:  Current Facility-Administered Medications   Medication Dose    sodium chloride (Salt) tablet 2 g  2 g    apixaban (Eliquis) tablet 2.5 mg  2.5 mg    famotidine (Pepcid) tablet 20 mg  20 mg    aspirin EC tablet 81 mg  81 mg    senna-docusate (Pericolace Or Senokot S) 8.6-50 MG per tablet 2 Tablet  2 Tablet    And    polyethylene glycol/lytes (Miralax) Packet 1 Packet  1 Packet    Pharmacy Consult Request ...Pain Management Review 1 Each  1 Each    acetaminophen (Tylenol) tablet 650 mg  650 mg    Followed by    [START ON 3/28/2025] acetaminophen (Tylenol) tablet 650 mg  650 mg    haloperidol lactate (Haldol) injection 5 mg  5 mg    labetalol (Normodyne/Trandate) injection 10 mg  " "10 mg    ondansetron (Zofran) syringe/vial injection 4 mg  4 mg    Or    ondansetron (Zofran ODT) dispertab 4 mg  4 mg    oxyCODONE immediate-release (Roxicodone) tablet 5 mg  5 mg    Or    oxyCODONE immediate release (Roxicodone) tablet 10 mg  10 mg    Or    HYDROmorphone (Dilaudid) injection 0.5 mg  0.5 mg    ezetimibe (Zetia) tablet 10 mg  10 mg    [Held by provider] losartan (Cozaar) tablet 100 mg  100 mg    metoprolol SR (Toprol XL) tablet 100 mg  100 mg       Allergies:  Allergies   Allergen Reactions    Atorvastatin      myalgias    Simvastatin      myalgias         Physical Exam:  Vitals: BP 96/45   Pulse 96   Temp 36.5 °C (97.7 °F) (Temporal)   Resp 16   Ht 1.575 m (5' 2\")   Wt 52.2 kg (115 lb)   SpO2 90%   Gen: NAD, laying comfortably in bed   Head:  NC/AT  Eyes/ Nose/ Mouth: PERRLA, moist mucous membranes  Cardio: RRR, good distal perfusion, warm extremities  Pulm: normal respiratory effort, no cyanosis   Abd: Soft NTND, negative borborygmi   Ext: No peripheral edema. No calf tenderness. No clubbing.    Mental status:  A&Ox4 (person, place, date, situation) answers questions appropriately follows commands (perseverates on her birthday, get confused during orientation questions)   Speech: fluent, no aphasia or dysarthria    CRANIAL NERVES:  2,3: visual acuity grossly intact, PERRL  3,4,6: EOMI bilaterally, no nystagmus or diplopia  5: sensation intact to light touch bilaterally and symmetric  7: no facial asymmetry  8: hearing grossly intact    Motor:      Upper Extremity  Myotome R L   Shoulder flexion C5 5/5 5/5   Elbow flexion C5 5/5 5/5   Wrist extension C6 5/5 5/5   Elbow extension C7 5/5 5/5   Finger flexion C8 5/5 5/5   Finger abduction T1 5/5 5/5     Lower Extremity Myotome R L   Hip flexion L2 5/5 3/5   Knee extension L3 5/5 3/5   Ankle dorsiflexion L4 5/5 5/5   Toe extension L5 5/5 5/5   Ankle plantarflexion S1 5/5 55/5       Sensory:   intact to light touch through out    DTRs: 2+ in " bilateral  biceps  No clonus at bilateral ankles  Negative Hubbard b/l     Tone: no spasticity noted    Coordination:   intact finger to nose bilaterally  intact fine motor with fingers bilaterally      Labs: Reviewed and significant for   Recent Labs     03/22/25 1757 03/23/25 0245 03/24/25 0612   RBC 4.57 3.87* 2.79*   HEMOGLOBIN 14.6 12.2 8.8*   HEMATOCRIT 42.4 35.6* 25.6*   PLATELETCT 136* 123* 106*     Recent Labs     03/22/25 1757 03/23/25 0245 03/23/25  0747 03/23/25  1814 03/24/25  0612 03/24/25  1153   SODIUM 130* 125*   < > 124* 122* 124*   POTASSIUM 4.7 4.5  --   --  5.6*  --    CHLORIDE 97 95*  --   --  90*  --    CO2 21 22  --   --  21  --    GLUCOSE 113* 139*  --   --  156*  --    BUN 30* 22  --   --  24*  --    CREATININE 1.01 0.88  --   --  1.03  --    CALCIUM 9.7 8.5  --   --  9.2  --     < > = values in this interval not displayed.     Recent Results (from the past 24 hours)   SODIUM SERUM (NA)    Collection Time: 03/23/25  6:14 PM   Result Value Ref Range    Sodium 124 (L) 135 - 145 mmol/L   CBC WITHOUT DIFFERENTIAL    Collection Time: 03/24/25  6:12 AM   Result Value Ref Range    WBC 9.3 4.8 - 10.8 K/uL    RBC 2.79 (L) 4.20 - 5.40 M/uL    Hemoglobin 8.8 (L) 12.0 - 16.0 g/dL    Hematocrit 25.6 (L) 37.0 - 47.0 %    MCV 91.8 81.4 - 97.8 fL    MCH 31.5 27.0 - 33.0 pg    MCHC 34.4 32.2 - 35.5 g/dL    RDW 44.9 35.9 - 50.0 fL    Platelet Count 106 (L) 164 - 446 K/uL    MPV 9.3 9.0 - 12.9 fL   Basic Metabolic Panel    Collection Time: 03/24/25  6:12 AM   Result Value Ref Range    Sodium 122 (L) 135 - 145 mmol/L    Potassium 5.6 (H) 3.6 - 5.5 mmol/L    Chloride 90 (L) 96 - 112 mmol/L    Co2 21 20 - 33 mmol/L    Glucose 156 (H) 65 - 99 mg/dL    Bun 24 (H) 8 - 22 mg/dL    Creatinine 1.03 0.50 - 1.40 mg/dL    Calcium 9.2 8.5 - 10.5 mg/dL    Anion Gap 11.0 7.0 - 16.0   ESTIMATED GFR    Collection Time: 03/24/25  6:12 AM   Result Value Ref Range    GFR (CKD-EPI) 54 (A) >60 mL/min/1.73 m 2   EKG     Collection Time: 25 11:05 AM   Result Value Ref Range    Report       [facility]    Test Date:  2025  Pt Name:    KYLE WISEMAN                  Department: 131  MRN:        [id number]                      Room:       [facility]  Gender:     Female                       Technician: CODIE  :        [id number]                   Requested By:LAWANDA TIERNEY  Order #:    [id number]                    Reading MD:    Measurements  Intervals                                Axis  Rate:       112                          P:          0  MN:         0                            QRS:        89  QRSD:       117                          T:          17  QT:         322  QTc:        440    Interpretive Statements  Atrial fibrillation  Nonspecific intraventricular conduction delay  Borderline repolarization abnormality  Baseline wander in lead(s) V5  Compared to ECG 2025 17:41:36  Intraventricular conduction delay now present  T-wave abnormality no longer present  Possible ischemia no longer present     SODIUM SERUM (NA)    Collection Time: 25 11:53 AM   Result Value Ref Range    Sodium 124 (L) 135 - 145 mmol/L         ASSESSMENT:  Patient is a 84 y.o. female admitted with left hip fracture     C Code / Diagnosis to Support: 0008.11 - Orthopaedic Disorders: Status Post Unilateral Hip Fracture  See DISPO details below for recommendations on appropriate level of rehab for this diagnosis.    Barriers to transfer include: Insurance authorization, TCCs to verify disposition, medical clearance and bed availability     Assessment and Plan:  Left hip fracture   - sustained in GLF   - pelvic xray showed left intertrochanteric femur fracture   - ortho consulted  - 3/23 surgical treatment of left intertrochanteric femur fx with IM device by Dr. Galdamez   - WBAT LLE   - continues to require PT/OT     ABLA   - post op drop in Hgb   - 3/24 Hgb 8.8   - primary team monitoring CBC    Hyponatremia   - post op drop in na   - 3/24  Na 124   - on salt tabs   - primary team monitoring electrolytes     Hyperkalemia   - post op K 5.6   - primary team monitoring electrolytes     Afib   - on Eliquis for anticoagulation   - on metropolol for rate control     HTN   - on home dose losartan and metoprolol     Bowel  Last BM: 03/22/25  - on scheduled bowel meds     DISPO:  - patient is currently functioning below their level of baseline, recommend post acute rehab  - recommend IRF level therapy with 3hr of therapy 5 days per week   - prior to acceptance to IRF, will need no further drop in Na and confirmation of DC support from daughter   - TCC to assist with insurance auth and DC support from daughter ( at least CGA and ability to help with ADLs and IADLs, intermittent support would not be sufficient)        Medical Complexity:  Left hip fracture   ABLA   Hyponatremia   Hyperkalemia   Afib   Impaired mobility and ADLs    DVT PPX: Eliquis       Thank you for allowing us to participate in the care of this patient.     Patient was seen for >80 minutes on unit/floor of which > 50% of time was spent on counseling and coordination of care regarding the above, including prognosis, risk reduction, benefits of treatment, and options for next stage of care.    Lucila Sr, DO   Physical Medicine and Rehabilitation     Please note that this dictation was created using voice recognition software. I have made every reasonable attempt to correct obvious errors, but there may be errors of grammar and possibly content that I did not discover before finalizing the note.

## 2025-03-24 NOTE — DISCHARGE PLANNING
Post Acute Navigator Team    Per chart review, patient has been identified as a candidate for a goals of care discussion.     EOL Index high risk score 55  High LACE + score 74  6 click Mobility score TBD   6 click ADL score TBD   Readmission: NO     Per chart review, no POLST in EMR. AD uploaded is not complete.   Dr. Law's H&P note on 3/22/25 states ACP discussed and code status changed to DNR/DNI. PANRN will provide POLST to patient and assist. Patient being reviewed for [facility] for post acute services.     1104- This PANRN introduced self to patient and daughter Conchita at bedside, and explained PAN role. Due to patient's recent episode of not being alert and irregular HR, Dr. Mckenna came to bedside. Goals of care discussed with patient and Conchita. POLST provided and explained in detail. Patient A&OX4 and able to make her decisions. Conchita also in agreement and is her NOK. Patient and Conchita also report that patient would not want any invasive measures including cardioversion. Information provided on [facility] including HH, Home Palliative Care, Transitional Care Team, and Hospice. This RN provided education on hospice including philosophy, goals of care, individualized care plan, and core CMS requirements hospice agencies provide. Palliative Care and Hospice flyers provided. Crystal and patient report that they have a lot to think about and would like to move forward with rehabilitation. Conchita reports that patient can live with her for a period of time but feels patient is not safe to live alone moving forward. Conchita inquired about placement options. Medicaid GH Waiver discussed. Conchita would like more information. Per Conchita, patient had medicaid but lost it due to increase of SSI to $1800.    1513- [place] Medicaid GH Waiver application brought to bedside for daughter Conchita. Patient was being assessed by physiologist for rehab. Patient was disoriented from previous visit earlier  "today and was not making sense. Patient has had intermittent confusion per Wei primary RN. After physiologist left bedside, patient inquired about housing with hospice. LINUS explained need for GH waiver application. Patient stated \"I don't want to die but I am ready to go when the good Lord takes me.\" LINUS will follow up with patient tomorrow.    Wei fiore RN and Dr. Mckenna updated    Care team updated    Completed POLST taped at head of bed and will be scanned into patient EMR.     This Post Acute Navigator will round on patient to provide information for future reference on [facility] including home health, home palliative care, and hospice.    Please reach out to me directly should care team feel patient will benefit from a discharge goals of care conversation regarding outpatient palliative care or hospice.    "

## 2025-03-24 NOTE — PROGRESS NOTES
Hospital Medicine Daily Progress Note    Date of Service  3/24/2025    Chief Complaint  Ivory Rowan is a 84 y.o. female admitted 3/22/2025 with fall    Hospital Course  This is an 84-year-old female with past medical history of atrial fibrillation on Eliquis, hypertension, dyslipidemia, CKD 3A, and glaucoma who was admitted on 3/22 after sustaining a ground-level fall.     X-ray imaging revealed left femoral intertrochanteric fracture.  Orthopedic surgery consulted, patient underwent intramedullary device placement on 3/23.  Weightbearing as tolerated.  Patient has follow-up with orthopedic surgery in about 2 weeks for postoperative wound check and to remove staples.    Interval Problem Update  Left femoral fracture - s/p IMN, WBAT    Hyponatremia - likely secondary to diurectics.  Held patient's Lasix and Aldactone. Serial sodium Q6H.  Serial BMP.  Mentation intact.    Patient noted to have orthostatic hypotension after working with physical therapy, had a short episode of disorientation and loss of consciousness, resolved abruptly.  Patient back to her baseline. HELD LOSARTAN.    Patient has known history of atrial fibrillation, heart rate fluctuating . Reviewed MAR, patient did not receive her a.m. metoprolol, one-time dose given. Heart rate now in the 80s.     Patient seen by PT/OT with recommendations for SNF. After discussing with patient's daughter at bedside yesterday and today, she is now amendable to SNF placement.    I have discussed this patient's plan of care and discharge plan at IDT rounds today with Case Management, Nursing, Nursing leadership, and other members of the IDT team.    Consultants/Specialty  orthopedics    Code Status  DNAR/DNI    Disposition  Patient is not medically clear to discharge to skilled nursing facility.   I have placed the appropriate orders for post-discharge needs.    Review of Systems  Review of Systems   All other systems reviewed and are negative.        Physical Exam  Temp:  [36.2 °C (97.2 °F)-36.5 °C (97.7 °F)] 36.5 °C (97.7 °F)  Pulse:  [] 96  Resp:  [15-18] 16  BP: ()/(45-71) 96/45  SpO2:  [90 %-100 %] 90 %    Physical Exam  Vitals and nursing note reviewed.   Constitutional:       General: She is not in acute distress.     Appearance: Normal appearance.   HENT:      Head: Normocephalic and atraumatic.      Mouth/Throat:      Mouth: Mucous membranes are moist.      Pharynx: No oropharyngeal exudate.   Eyes:      Extraocular Movements: Extraocular movements intact.      Pupils: Pupils are equal, round, and reactive to light.   Cardiovascular:      Rate and Rhythm: Normal rate and regular rhythm.      Pulses: Normal pulses.      Heart sounds: No murmur heard.     No friction rub. No gallop.   Pulmonary:      Effort: Pulmonary effort is normal. No respiratory distress.      Breath sounds: No wheezing, rhonchi or rales.   Abdominal:      General: Bowel sounds are normal. There is no distension.      Palpations: Abdomen is soft. There is no mass.      Tenderness: There is no abdominal tenderness.   Musculoskeletal:         General: No swelling or tenderness. Normal range of motion.      Cervical back: Normal range of motion. No rigidity. No muscular tenderness.      Right lower leg: No edema.      Left lower leg: No edema.   Skin:     General: Skin is warm and dry.      Capillary Refill: Capillary refill takes less than 2 seconds.      Findings: No erythema or rash.   Neurological:      General: No focal deficit present.      Mental Status: She is alert and oriented to person, place, and time.      Motor: No weakness.      Gait: Gait normal.         Fluids    Intake/Output Summary (Last 24 hours) at 3/24/2025 1506  Last data filed at 3/24/2025 0600  Gross per 24 hour   Intake --   Output 800 ml   Net -800 ml        Laboratory  Recent Labs     03/22/25  1757 03/23/25  0245 03/24/25  0612   WBC 10.4 8.0 9.3   RBC 4.57 3.87* 2.79*   HEMOGLOBIN 14.6 12.2  8.8*   HEMATOCRIT 42.4 35.6* 25.6*   MCV 92.8 92.0 91.8   MCH 31.9 31.5 31.5   MCHC 34.4 34.3 34.4   RDW 45.6 45.9 44.9   PLATELETCT 136* 123* 106*   MPV 8.8* 8.6* 9.3     Recent Labs     03/22/25  1757 03/23/25  0245 03/23/25  0747 03/23/25  1814 03/24/25  0612 03/24/25  1153   SODIUM 130* 125*   < > 124* 122* 124*   POTASSIUM 4.7 4.5  --   --  5.6*  --    CHLORIDE 97 95*  --   --  90*  --    CO2 21 22  --   --  21  --    GLUCOSE 113* 139*  --   --  156*  --    BUN 30* 22  --   --  24*  --    CREATININE 1.01 0.88  --   --  1.03  --    CALCIUM 9.7 8.5  --   --  9.2  --     < > = values in this interval not displayed.                   Imaging  DX-PORTABLE FLUOROSCOPY < 1 HOUR Reason For Exam: Main OR   Final Result      Portable fluoroscopy utilized for 49 seconds.         INTERPRETING LOCATION: [address] [facility], [address]      DX-HIP-UNILATERAL-W/O PELVIS-2/3 VIEWS LEFT   Final Result      Digitized intraoperative radiograph is submitted for review. This examination is not for diagnostic purpose but for guidance during a surgical procedure. Please see the patient's chart for full procedural details.         INTERPRETING LOCATION: [address] [facility], [address]      DX-FEMUR-2+ LEFT   Final Result      Impacted left femoral intratrochanteric fracture.      DX-PELVIS-1 OR 2 VIEWS   Final Result      Impacted left femoral intratrochanteric fracture.      DX-CHEST-LIMITED (1 VIEW)   Final Result         No acute cardiopulmonary abnormalities are identified.           Assessment/Plan  * Femur fracture (HCC)  Assessment & Plan  X-ray imaging revealed left femoral intertrochanteric fracture.  Orthopedic surgery consulted, patient underwent intramedullary device placement on 3/23.  Weightbearing as tolerated.  Patient has follow-up with orthopedic surgery in about 2 weeks for postoperative wound check and to remove staples.  Pain medication  Bowel regimen  We will continue to monitor urinary output, bowel movements and pain  control. Patient is to be evaluated by PT/OT postoperatively.     Orthostatic hypotension  Assessment & Plan  Patient noted to have orthostatic hypotension after working with physical therapy, had a short episode of disorientation and loss of consciousness, resolved abruptly.  Patient back to her baseline. HELD LOSARTAN.    Hyponatremia  Assessment & Plan  Likely secondary to diuretics  Held lasix and aldactone  Worsening 3/24 - fluid restriction  Salt tablets  Serial sodium Q6H.   Serial BMP    Persistent atrial fibrillation (HCC)- (present on admission)  Assessment & Plan  Eliquis resumed 3/24  Patient has known history of atrial fibrillation, heart rate fluctuating . Reviewed MAR, patient did not receive her a.m. metoprolol, one-time dose given. Heart rate now in the 80s.    ACP (advance care planning)  Assessment & Plan  16 minutes spent discussing goals of care with patient and daughter at bedside.  We went over the process of CPR and what patient would want should she clinically deteriorate and have a cardiac arrest.  Patient states that she would like to be DNR/DNI    Encounter for preoperative assessment  Assessment & Plan  Admit to:    Telemetry with continuous pulse oximetry due to: age 65 or older with history of: CHF    Cardiovascular:   Patient has history of CHF: Continue home beta blocker and rate control medications.   Pre-op EKG: Yes    Pulmonary:  Oxygen per protocol    GI:   No history of cirrhosis. Standard bowel regimen. Hold for loose stools.    Renal:   Labs: Metabolic Panel with AM labs    Musculoskeletal:   Check 25 OH vitamin D level. If 31-40 pg/mL, consider starting vitamin D3 1000 IU PO daily. If 20-30 pg/mL, consider vitamin D3 2000 IU PO daily. If <20 pg/mL, vitamin D2 50,000 IU weekly x 8 weeks then 2000 IU PO daily.      Hematologic:  Plan on pharmacologic DVT prophylaxis post operative day #1. Hold for decreasing hemoglobin. Notify provider for hemoglobin less than 8.  Order  preoperative type and cross.   Hgb every 6 hours if high bleeding risk.   If patient was on anticoagulation prior to arrival risks and benefits will be weighed by teams including surgery, hospitalist, geriatrics, and anesthesia for delaying surgery more than 24 hours.       Medical Assessment Risk:  Intermediate    Surgical Risk:   Intermediate      Stage 3a chronic kidney disease- (present on admission)  Assessment & Plan  Appears to be at baseline    Dyslipidemia, goal LDL below 70- (present on admission)  Assessment & Plan  Continue home meds         VTE prophylaxis: Eliquis    I have performed a physical exam and reviewed and updated ROS and Plan today (3/24/2025). In review of yesterday's note (3/23/2025), there are no changes except as documented above.      Greater than 55 minutes spent prepping to see patient (e.g. reviewing EMR) obtaining and/or reviewing separately obtained history. Performing a medically appropriate examination and evaluation. Independently interpreting results and communicating results to patient/family/caregiver. Counseling and educating the patient/family/caregiver. Ordering medications, tests, and/or procedures. Referring and communicating with other health care professionals.  Documenting clinical information in EPIC. Care coordination.

## 2025-03-25 ENCOUNTER — APPOINTMENT (OUTPATIENT)
Dept: RADIOLOGY | Facility: MEDICAL CENTER | Age: 85
DRG: 481 | End: 2025-03-25
Attending: GENERAL PRACTICE
Payer: MEDICARE

## 2025-03-25 ENCOUNTER — APPOINTMENT (OUTPATIENT)
Dept: CARDIOLOGY | Facility: MEDICAL CENTER | Age: 85
End: 2025-03-25
Attending: INTERNAL MEDICINE
Payer: MEDICARE

## 2025-03-25 LAB
ANION GAP SERPL CALC-SCNC: 9 MMOL/L (ref 7–16)
BUN SERPL-MCNC: 31 MG/DL (ref 8–22)
CALCIUM SERPL-MCNC: 9.2 MG/DL (ref 8.5–10.5)
CHLORIDE SERPL-SCNC: 96 MMOL/L (ref 96–112)
CO2 SERPL-SCNC: 24 MMOL/L (ref 20–33)
CREAT SERPL-MCNC: 1.19 MG/DL (ref 0.5–1.4)
DIGOXIN SERPL-MCNC: <0.3 NG/ML (ref 0.8–2)
ERYTHROCYTE [DISTWIDTH] IN BLOOD BY AUTOMATED COUNT: 47.6 FL (ref 35.9–50)
GFR SERPLBLD CREATININE-BSD FMLA CKD-EPI: 45 ML/MIN/1.73 M 2
GLUCOSE SERPL-MCNC: 107 MG/DL (ref 65–99)
HCT VFR BLD AUTO: 23.8 % (ref 37–47)
HGB BLD-MCNC: 8.2 G/DL (ref 12–16)
MAGNESIUM SERPL-MCNC: 2.2 MG/DL (ref 1.5–2.5)
MCH RBC QN AUTO: 31.9 PG (ref 27–33)
MCHC RBC AUTO-ENTMCNC: 34.5 G/DL (ref 32.2–35.5)
MCV RBC AUTO: 92.6 FL (ref 81.4–97.8)
PHOSPHATE SERPL-MCNC: 2.8 MG/DL (ref 2.5–4.5)
PLATELET # BLD AUTO: 120 K/UL (ref 164–446)
PMV BLD AUTO: 8.7 FL (ref 9–12.9)
POTASSIUM SERPL-SCNC: 5.2 MMOL/L (ref 3.6–5.5)
RBC # BLD AUTO: 2.57 M/UL (ref 4.2–5.4)
SODIUM SERPL-SCNC: 129 MMOL/L (ref 135–145)
WBC # BLD AUTO: 8.4 K/UL (ref 4.8–10.8)

## 2025-03-25 PROCEDURE — 700102 HCHG RX REV CODE 250 W/ 637 OVERRIDE(OP): Performed by: GENERAL PRACTICE

## 2025-03-25 PROCEDURE — A9270 NON-COVERED ITEM OR SERVICE: HCPCS | Performed by: GENERAL PRACTICE

## 2025-03-25 PROCEDURE — 84100 ASSAY OF PHOSPHORUS: CPT

## 2025-03-25 PROCEDURE — 700102 HCHG RX REV CODE 250 W/ 637 OVERRIDE(OP): Performed by: NURSE PRACTITIONER

## 2025-03-25 PROCEDURE — 83735 ASSAY OF MAGNESIUM: CPT

## 2025-03-25 PROCEDURE — 770001 HCHG ROOM/CARE - MED/SURG/GYN PRIV*

## 2025-03-25 PROCEDURE — A9270 NON-COVERED ITEM OR SERVICE: HCPCS | Performed by: NURSE PRACTITIONER

## 2025-03-25 PROCEDURE — 36415 COLL VENOUS BLD VENIPUNCTURE: CPT

## 2025-03-25 PROCEDURE — 85027 COMPLETE CBC AUTOMATED: CPT

## 2025-03-25 PROCEDURE — 700102 HCHG RX REV CODE 250 W/ 637 OVERRIDE(OP): Performed by: ORTHOPAEDIC SURGERY

## 2025-03-25 PROCEDURE — A9270 NON-COVERED ITEM OR SERVICE: HCPCS | Performed by: ORTHOPAEDIC SURGERY

## 2025-03-25 PROCEDURE — 74018 RADEX ABDOMEN 1 VIEW: CPT

## 2025-03-25 PROCEDURE — 97530 THERAPEUTIC ACTIVITIES: CPT

## 2025-03-25 PROCEDURE — 97116 GAIT TRAINING THERAPY: CPT

## 2025-03-25 PROCEDURE — A9270 NON-COVERED ITEM OR SERVICE: HCPCS | Performed by: STUDENT IN AN ORGANIZED HEALTH CARE EDUCATION/TRAINING PROGRAM

## 2025-03-25 PROCEDURE — 80162 ASSAY OF DIGOXIN TOTAL: CPT

## 2025-03-25 PROCEDURE — 99232 SBSQ HOSP IP/OBS MODERATE 35: CPT | Performed by: GENERAL PRACTICE

## 2025-03-25 PROCEDURE — 700102 HCHG RX REV CODE 250 W/ 637 OVERRIDE(OP): Performed by: STUDENT IN AN ORGANIZED HEALTH CARE EDUCATION/TRAINING PROGRAM

## 2025-03-25 PROCEDURE — 80048 BASIC METABOLIC PNL TOTAL CA: CPT

## 2025-03-25 RX ORDER — LATANOPROST 50 UG/ML
1 SOLUTION/ DROPS OPHTHALMIC
Status: DISCONTINUED | OUTPATIENT
Start: 2025-03-25 | End: 2025-03-26 | Stop reason: HOSPADM

## 2025-03-25 RX ORDER — BISACODYL 5 MG
10 TABLET, DELAYED RELEASE (ENTERIC COATED) ORAL ONCE
Status: COMPLETED | OUTPATIENT
Start: 2025-03-25 | End: 2025-03-25

## 2025-03-25 RX ADMIN — METOPROLOL SUCCINATE 100 MG: 50 TABLET, FILM COATED, EXTENDED RELEASE ORAL at 04:59

## 2025-03-25 RX ADMIN — FAMOTIDINE 20 MG: 20 TABLET, FILM COATED ORAL at 04:59

## 2025-03-25 RX ADMIN — SODIUM CHLORIDE 2 G: 1 TABLET ORAL at 13:48

## 2025-03-25 RX ADMIN — METOPROLOL SUCCINATE 100 MG: 50 TABLET, FILM COATED, EXTENDED RELEASE ORAL at 17:47

## 2025-03-25 RX ADMIN — SODIUM CHLORIDE 2 G: 1 TABLET ORAL at 10:10

## 2025-03-25 RX ADMIN — BISACODYL 10 MG: 5 TABLET, COATED ORAL at 10:10

## 2025-03-25 RX ADMIN — LATANOPROST 1 DROP: 50 SOLUTION OPHTHALMIC at 22:07

## 2025-03-25 RX ADMIN — SENNOSIDES AND DOCUSATE SODIUM 2 TABLET: 50; 8.6 TABLET ORAL at 05:00

## 2025-03-25 RX ADMIN — SODIUM CHLORIDE 2 G: 1 TABLET ORAL at 17:47

## 2025-03-25 RX ADMIN — ACETAMINOPHEN 650 MG: 325 TABLET ORAL at 04:59

## 2025-03-25 RX ADMIN — ACETAMINOPHEN 650 MG: 325 TABLET ORAL at 17:47

## 2025-03-25 RX ADMIN — APIXABAN 2.5 MG: 5 TABLET, FILM COATED ORAL at 17:47

## 2025-03-25 RX ADMIN — SENNOSIDES AND DOCUSATE SODIUM 2 TABLET: 50; 8.6 TABLET ORAL at 17:47

## 2025-03-25 RX ADMIN — ACETAMINOPHEN 650 MG: 325 TABLET ORAL at 13:48

## 2025-03-25 RX ADMIN — ASPIRIN 81 MG: 81 TABLET, COATED ORAL at 04:59

## 2025-03-25 RX ADMIN — EZETIMIBE 10 MG: 10 TABLET ORAL at 04:59

## 2025-03-25 RX ADMIN — POLYETHYLENE GLYCOL 3350 1 PACKET: 17 POWDER, FOR SOLUTION ORAL at 04:59

## 2025-03-25 RX ADMIN — APIXABAN 2.5 MG: 5 TABLET, FILM COATED ORAL at 04:59

## 2025-03-25 ASSESSMENT — COGNITIVE AND FUNCTIONAL STATUS - GENERAL
STANDING UP FROM CHAIR USING ARMS: A LITTLE
MOBILITY SCORE: 18
MOVING FROM LYING ON BACK TO SITTING ON SIDE OF FLAT BED: A LITTLE
SUGGESTED CMS G CODE MODIFIER MOBILITY: CK
MOVING TO AND FROM BED TO CHAIR: A LITTLE
CLIMB 3 TO 5 STEPS WITH RAILING: A LITTLE
WALKING IN HOSPITAL ROOM: A LITTLE
TURNING FROM BACK TO SIDE WHILE IN FLAT BAD: A LITTLE

## 2025-03-25 ASSESSMENT — PAIN DESCRIPTION - PAIN TYPE
TYPE: ACUTE PAIN;SURGICAL PAIN
TYPE: ACUTE PAIN;SURGICAL PAIN

## 2025-03-25 ASSESSMENT — GAIT ASSESSMENTS
DISTANCE (FEET): 70
GAIT LEVEL OF ASSIST: STANDBY ASSIST
DEVIATION: ANTALGIC;BRADYKINETIC
ASSISTIVE DEVICE: FRONT WHEEL WALKER

## 2025-03-25 NOTE — DISCHARGE PLANNING
"Case Management Discharge Planning    Admission Date: 3/22/2025  GMLOS: 4.4  ALOS: 3    6-Clicks ADL Score: 15  6-Clicks Mobility Score: 18  PT and/or OT Eval ordered: Yes  Post-acute Referrals Ordered: Yes  Post-acute Choice Obtained: Yes  Has referral(s) been sent to post-acute provider:  Yes      Anticipated Discharge Dispo: Discharge Disposition: D/T to SNF with medicare cert w/planned hosp IP readmit (83)    DME Needed: No    Action(s) Taken: Updated Provider/Nurse on Discharge Plan    Pt was discussed in IDT rounds with Dr Mckenna.  Per Rounds and per Harriet , Clinical Admissions Coor ,  Pt s insurance declined [facility] .    Pt has multiple SNF Acceptance.    Pt has been accepted with [facility], [facility] and [facility].    This RN CM met with Pt at bedside. Explained the above.    Per Pt \" pls call my Daughter Conchita so she can assist me.\"    This RN CM called Conchita, went to  ,  Left a message and requested a return call.    13:20 This RN CM called Conchita and explained the above.  Per Conchita :\" I talked to Harriet, [facility] and the Doctors accepted my Mom, it is the insurance , \" I will call [facility]. Explained that  the recommendation is SNF /Home Health .    Per Conchita \" My Mom was at [facility]  before and I did not like the Rehab  that she received there\" . \" I am the only one who helps and assist my Mom\" .    This RN CM told Conchita  \" You can call [facility] and I don t want you to feel  rushed\"  . This RN CM states that Pt is medically cleared and there is  a Discharge Order. All of a sudden she got angry and yelled and states \" you are not discharging my Mom today , it is late  \" . Conchita was very upset and hang up.     This RN CM informed Dr Clarisa PANDYA of this update.     Receive a call from Conchita again . Per Conchita :\" I called [facility] , they never got  a request for  authorization \" explained to Conchita hat we will verify with Harriet of [facility] .     Per " Charge ROSIE Barragan,  per  Harriet she will  send a copy of insurance declination to Conchita, Daughter      Escalations Completed: None    Medically Clear: Yes    Next Steps:   CM to continue to assist Pt with discharge as needed    Barriers to Discharge:   Pending Placement  Pending SNF choice    Is the patient up for discharge tomorrow: No

## 2025-03-25 NOTE — PROGRESS NOTES
"      Orthopaedic Progress Note    Interval changes:  Patient doing well   L hip dressings are CDI  Cleared for DC to SNF by ortho pending medicine clearance    ROS - Patient denies any new issues.  Pain well controlled.    /44   Pulse 75   Temp 36.4 °C (97.5 °F) (Temporal)   Resp 15   Ht 1.575 m (5' 2\")   Wt 52.2 kg (115 lb)   SpO2 88%     Patient seen and examined  No acute distress  Breathing non labored  RRR  L hip dressings CDI, DNVI, moves all toes, cap refill <2 sec.     Recent Labs     03/23/25  0245 03/24/25  0612 03/25/25  0552   WBC 8.0 9.3 8.4   RBC 3.87* 2.79* 2.57*   HEMOGLOBIN 12.2 8.8* 8.2*   HEMATOCRIT 35.6* 25.6* 23.8*   MCV 92.0 91.8 92.6   MCH 31.5 31.5 31.9   MCHC 34.3 34.4 34.5   RDW 45.9 44.9 47.6   PLATELETCT 123* 106* 120*   MPV 8.6* 9.3 8.7*       Active Hospital Problems    Diagnosis     Dyslipidemia, goal LDL below 70 [E78.5]      Priority: Medium    Orthostatic hypotension [I95.1]     Hyponatremia [E87.1]     Femur fracture (HCC) [S72.90XA]     Encounter for preoperative assessment [Z01.818]     ACP (advance care planning) [Z71.89]     Persistent atrial fibrillation (HCC) [I48.19]     Stage 3a chronic kidney disease [N18.31]        Assessment/Plan:  Patient doing well   L hip dressings are CDI  Cleared for DC to home by ortho pending medicine clearance  POD#2 S/P Surgical treatment of left intertrochanteric femur fracture with intramedullary device   Wt bearing status - WBAT  Wound care/Drains - Dressings to be changed every other day by nursing. Or PRN for saturation    Future Procedures - none planned    Sutures/Staples out- 14-21 days post operatively. Removal by ortho mid levels only.  PT/OT-initiated  Antibiotics: Perioperative completed  DVT Prophylaxis- TEDS/SCDs/Foot pumps  Brandt-not needed per ortho  Case Coordination for Discharge Planning - Disposition per therapy recs.    "

## 2025-03-25 NOTE — DISCHARGE PLANNING
Received call back from daughter. Plan after rehab if for patient to stay with daughter in a SSH with no MALIHA. Daughter reports she is a realtor and her job is flexible, she'll be able to take time off to provide 24/7 care upon dc. Daughter's S/O will also be able to assist when he returns from [place]. Discussed expectations for IPR and answered questions.    Reached out to attending for medical clearance and SCP for authorization.

## 2025-03-25 NOTE — CARE PLAN
The patient is Stable - Low risk of patient condition declining or worsening    Shift Goals  Clinical Goals: Monitor VS, safety and pain management  Patient Goals: Rest  Family Goals: MATTHEW    Progress made toward(s) clinical / shift goals: Patient is alert and oriented x4. Updated on POC and verbalized understanding. Medicated per MAR and tolerated well. Fall precautions and hourly rounding in place. Needs attended.    Patient is not progressing towards the following goals:

## 2025-03-25 NOTE — CARE PLAN
The patient is Stable - Low risk of patient condition declining or worsening    Shift Goals  Clinical Goals: pain control, safety, mobility, BM  Patient Goals: pain control, comfort  Family Goals: not present    Progress made toward(s) clinical / shift goals:    Problem: Mobility  Goal: Patient's capacity to carry out activities will improve  Outcome: Progressing  Flowsheets (Taken 3/25/2025 1131)  Mobility:   Encouraged mobilization per interdisciplinary team recommendations   Monitored for signs of activity intolerance   Provided assistive devices   Collaborated with PT/OT   Administered pain management to allow progressive mobilization   Provided rest periods between activities     Problem: Self Care  Goal: Patient will have the ability to perform ADLs independently or with assistance (bathe, groom, dress, toilet and feed)  Outcome: Progressing     Problem: Infection - Standard  Goal: Patient will remain free from infection  Outcome: Progressing  Flowsheets (Taken 3/25/2025 1131)  Standard Infection Interventions:   Assessed for signs and symptoms of infection   Instructed patient/family on signs and symptoms of infection   Provided education on proper hand hygiene and infection prevention measures   Assessed for removal IV, central lines, intra-arterial or urinary catheters   Implemented standard precautions     Problem: Wound/ / Incision Healing  Goal: Patient's wound/surgical incision will decrease in size and heals properly  Outcome: Progressing       Patient is not progressing towards the following goals:

## 2025-03-25 NOTE — DISCHARGE PLANNING
"TCN following. HTH/SCP chart review completed. Noted per physiatry note on 3/24, \"recommend IRF level therapy\".  Noted PT/OT recommending post acute placement.  TCN reached out to [facility] team for [place] authorization. Noted multiple SNFs accepting.     Completed:  PT/OT recommending post acute placement.   Choice obtained: none  SCP with [facility]. Anticipate post acute placement.     *Update*  Per CN, patient's MCG criteria currently meets for SNF / HH only.  TCN informed CM and TCC.       "

## 2025-03-25 NOTE — DISCHARGE PLANNING
SCP denied IPR, recommending home health or SNF. Provided update to cm and attending, TCC will no longer follow.

## 2025-03-25 NOTE — PROGRESS NOTES
[facility] Medicine Daily Progress Note    Date of Service  3/25/2025    Chief Complaint  Ivory Rowan is a 84 y.o. female admitted 3/22/2025 with fall    Hospital Course  This is an 84-year-old female with past medical history of atrial fibrillation on Eliquis, hypertension, dyslipidemia, CKD 3A, and glaucoma who was admitted on 3/22 after sustaining a ground-level fall.     X-ray imaging revealed left femoral intertrochanteric fracture.  Orthopedic surgery consulted, patient underwent intramedullary device placement on 3/23.  Weightbearing as tolerated.  Patient has follow-up with orthopedic surgery in about 2 weeks for postoperative wound check and to remove staples.    Hyponatremia - likely secondary to diurectics.  Held patient's Lasix and Aldactone. Monitor sodium.     Patient noted to have orthostatic hypotension after working with physical therapy, had a short episode of disorientation and loss of consciousness, resolved abruptly.  Patient back to her baseline. HELD LOSARTAN.     Patient has known history of atrial fibrillation, heart rate fluctuating . Resumed her metoprolol with good rate control.     Interval Problem Update  Left femoral fracture - s/p IMN, WBAT    Hyponatremia - likely secondary to diurectics.  Held patient's Lasix and Aldactone. Serial sodium Q6H.  Serial BMP.  Mentation intact.    Patient noted to have orthostatic hypotension after working with physical therapy, had a short episode of disorientation and loss of consciousness, resolved abruptly.  Patient back to her baseline. HELD LOSARTAN.    Patient has known history of atrial fibrillation, heart rate fluctuating . Reviewed MAR, patient did not receive her a.m. metoprolol, one-time dose given. Heart rate now in the 80s.     Patient seen by PT/OT with recommendations for SNF. After discussing with patient's daughter at bedside yesterday and today, she is now amendable to SNF placement.    Candidate for IPR but denied  approval by insurance - they recommend SNF. Daughter does not want patient to discharge to SNF. CM following.    I have discussed this patient's plan of care and discharge plan at IDT rounds today with Case Management, Nursing, Nursing leadership, and other members of the IDT team.    Consultants/Specialty  orthopedics    Code Status  DNAR/DNI    Disposition  Patient is medically clear to discharge to skilled nursing facility.   I have placed the appropriate orders for post-discharge needs.    Review of Systems  Review of Systems   All other systems reviewed and are negative.       Physical Exam  Temp:  [36.4 °C (97.5 °F)-36.6 °C (97.9 °F)] 36.4 °C (97.5 °F)  Pulse:  [75-90] 75  Resp:  [15-16] 15  BP: ()/(44-59) 113/44  SpO2:  [88 %-99 %] 88 %    Physical Exam  Vitals and nursing note reviewed.   Constitutional:       General: She is not in acute distress.     Appearance: Normal appearance.   HENT:      Head: Normocephalic and atraumatic.      Mouth/Throat:      Mouth: Mucous membranes are moist.      Pharynx: No oropharyngeal exudate.   Eyes:      Extraocular Movements: Extraocular movements intact.      Pupils: Pupils are equal, round, and reactive to light.   Cardiovascular:      Rate and Rhythm: Normal rate and regular rhythm.      Pulses: Normal pulses.      Heart sounds: No murmur heard.     No friction rub. No gallop.   Pulmonary:      Effort: Pulmonary effort is normal. No respiratory distress.      Breath sounds: No wheezing, rhonchi or rales.   Abdominal:      General: Bowel sounds are normal. There is no distension.      Palpations: Abdomen is soft. There is no mass.      Tenderness: There is no abdominal tenderness.   Musculoskeletal:         General: No swelling or tenderness. Normal range of motion.      Cervical back: Normal range of motion. No rigidity. No muscular tenderness.      Right lower leg: No edema.      Left lower leg: No edema.   Skin:     General: Skin is warm and dry.       Capillary Refill: Capillary refill takes less than 2 seconds.      Findings: No erythema or rash.   Neurological:      General: No focal deficit present.      Mental Status: She is alert and oriented to person, place, and time.      Motor: No weakness.      Gait: Gait normal.         Fluids    Intake/Output Summary (Last 24 hours) at 3/25/2025 1429  Last data filed at 3/25/2025 0459  Gross per 24 hour   Intake 150 ml   Output 950 ml   Net -800 ml        Laboratory  Recent Labs     03/23/25  0245 03/24/25  0612 03/25/25  0552   WBC 8.0 9.3 8.4   RBC 3.87* 2.79* 2.57*   HEMOGLOBIN 12.2 8.8* 8.2*   HEMATOCRIT 35.6* 25.6* 23.8*   MCV 92.0 91.8 92.6   MCH 31.5 31.5 31.9   MCHC 34.3 34.4 34.5   RDW 45.9 44.9 47.6   PLATELETCT 123* 106* 120*   MPV 8.6* 9.3 8.7*     Recent Labs     03/23/25  0245 03/23/25  0747 03/24/25  0612 03/24/25  1153 03/25/25  0552   SODIUM 125*   < > 122* 124* 129*   POTASSIUM 4.5  --  5.6*  --  5.2   CHLORIDE 95*  --  90*  --  96   CO2 22  --  21  --  24   GLUCOSE 139*  --  156*  --  107*   BUN 22  --  24*  --  31*   CREATININE 0.88  --  1.03  --  1.19   CALCIUM 8.5  --  9.2  --  9.2    < > = values in this interval not displayed.                   Imaging  DX-PORTABLE FLUOROSCOPY < 1 HOUR Reason For Exam: Main OR   Final Result      Portable fluoroscopy utilized for 49 seconds.         INTERPRETING LOCATION: [address] [facility], [address]      DX-HIP-UNILATERAL-W/O PELVIS-2/3 VIEWS LEFT   Final Result      Digitized intraoperative radiograph is submitted for review. This examination is not for diagnostic purpose but for guidance during a surgical procedure. Please see the patient's chart for full procedural details.         INTERPRETING LOCATION: [address] [facility], [address]      DX-FEMUR-2+ LEFT   Final Result      Impacted left femoral intratrochanteric fracture.      DX-PELVIS-1 OR 2 VIEWS   Final Result      Impacted left femoral intratrochanteric fracture.      DX-CHEST-LIMITED (1 VIEW)   Final  Result         No acute cardiopulmonary abnormalities are identified.      SI-LJILXUE-2 VIEW    (Results Pending)        Assessment/Plan  * Femur fracture (HCC)  Assessment & Plan  X-ray imaging revealed left femoral intertrochanteric fracture.  Orthopedic surgery consulted, patient underwent intramedullary device placement on 3/23.  Weightbearing as tolerated.  Patient has follow-up with orthopedic surgery in about 2 weeks for postoperative wound check and to remove staples.  Pain medication  Bowel regimen  We will continue to monitor urinary output, bowel movements and pain control. Patient is to be evaluated by PT/OT postoperatively.     Orthostatic hypotension  Assessment & Plan  Patient noted to have orthostatic hypotension after working with physical therapy, had a short episode of disorientation and loss of consciousness, resolved abruptly.  Patient back to her baseline. HELD LOSARTAN.    Hyponatremia  Assessment & Plan  Likely secondary to diuretics  Held lasix and aldactone  Worsening 3/24 - fluid restriction  Salt tablets  Serial sodium Q6H.   Serial BMP    Persistent atrial fibrillation (HCC)- (present on admission)  Assessment & Plan  Eliquis resumed 3/24  Patient has known history of atrial fibrillation, heart rate fluctuating . Reviewed MAR, patient did not receive her a.m. metoprolol, one-time dose given. Heart rate now in the 80s.    ACP (advance care planning)  Assessment & Plan  16 minutes spent discussing goals of care with patient and daughter at bedside.  We went over the process of CPR and what patient would want should she clinically deteriorate and have a cardiac arrest.  Patient states that she would like to be DNR/DNI    Encounter for preoperative assessment  Assessment & Plan  Admit to:    Telemetry with continuous pulse oximetry due to: age 65 or older with history of: CHF    Cardiovascular:   Patient has history of CHF: Continue home beta blocker and rate control medications.    Pre-op EKG: Yes    Pulmonary:  Oxygen per protocol    GI:   No history of cirrhosis. Standard bowel regimen. Hold for loose stools.    Renal:   Labs: Metabolic Panel with AM labs    Musculoskeletal:   Check 25 OH vitamin D level. If 31-40 pg/mL, consider starting vitamin D3 1000 IU PO daily. If 20-30 pg/mL, consider vitamin D3 2000 IU PO daily. If <20 pg/mL, vitamin D2 50,000 IU weekly x 8 weeks then 2000 IU PO daily.      Hematologic:  Plan on pharmacologic DVT prophylaxis post operative day #1. Hold for decreasing hemoglobin. Notify provider for hemoglobin less than 8.  Order preoperative type and cross.   Hgb every 6 hours if high bleeding risk.   If patient was on anticoagulation prior to arrival risks and benefits will be weighed by teams including surgery, hospitalist, geriatrics, and anesthesia for delaying surgery more than 24 hours.       Medical Assessment Risk:  Intermediate    Surgical Risk:   Intermediate      Stage 3a chronic kidney disease- (present on admission)  Assessment & Plan  Appears to be at baseline    Dyslipidemia, goal LDL below 70- (present on admission)  Assessment & Plan  Continue home meds         VTE prophylaxis: Eliquis    I have performed a physical exam and reviewed and updated ROS and Plan today (3/25/2025). In review of yesterday's note (3/24/2025), there are no changes except as documented above.

## 2025-03-25 NOTE — THERAPY
Physical Therapy   Daily Treatment     Patient Name: Ivory Rowan  Age:  84 y.o., Sex:  female  Medical Record #: [id number]  Today's Date: 3/25/2025     Precautions  Precautions: (P) Weight Bearing As Tolerated Left Lower Extremity    Assessment    Pt was agreeable to PT session.  She demonstrates no carry over of sequencing for safe transfers between sessions or trials.  Pt ambulated 70' x2 with standing rest break, VCs for walker placement.  Pt ism most limited by pain and decreased activity tolerance.  PT will continue to follow.  Recommend post acute PT services.    Plan    Treatment Plan Status: (P) Continue Current Treatment Plan  Type of Treatment: Bed Mobility, Equipment, Family / Caregiver Training, Gait Training, Manual Therapy, Neuro Re-Education / Balance, Self Care / Home Evaluation, Stair Training, Therapeutic Activities, Therapeutic Exercise  Treatment Frequency: 4 Times per Week  Treatment Duration: Until Therapy Goals Met    DC Equipment Recommendations: (P) Unable to determine at this time  Discharge Recommendations: (P) Recommend post-acute placement for additional physical therapy services prior to discharge home           Objective       03/25/25 1033   Precautions   Precautions Weight Bearing As Tolerated Left Lower Extremity   Vitals   O2 Delivery Device None - Room Air   Pain 0 - 10 Group   Therapist Pain Assessment During Activity  (L hip- not rated)   Cognition    Level of Consciousness Alert   New Learning Impaired   Attention Impaired   Sequencing Impaired   Balance   Sitting Balance (Static) Fair   Sitting Balance (Dynamic) Fair   Standing Balance (Static) Fair -   Standing Balance (Dynamic) Fair -   Weight Shift Sitting Fair   Weight Shift Standing Fair   Comments with FWW   Bed Mobility    Comments Pt up in chair when PT entered   Gait Analysis   Gait Level Of Assist Standby Assist   Assistive Device Front Wheel Walker   Distance (Feet) 70  (+ 10' x2)   # of Times Distance was  Traveled 2   Deviation Antalgic;Bradykinetic   Weight Bearing Status WBAT LLE   Skilled Intervention Verbal Cuing   Functional Mobility   Sit to Stand Contact Guard Assist   Bed, Chair, Wheelchair Transfer Standby Assist   Toilet Transfers Contact Guard Assist   Transfer Method Stand Step   Mobility with FWW   Skilled Intervention Verbal Cuing;Sequencing   Short Term Goals    Short Term Goal # 1 Pt will perform supine < > sit SPV with bed flat, no rail in 6 visits in order to set up for upright mobility   Goal Outcome # 1 goal not met   Short Term Goal # 2 Pt will perform sit < > stand SPV in 6 visits in order to prepare for ambulation   Goal Outcome # 2 Goal not met   Short Term Goal # 3 Pt will ambulate 150' SPV /c FWW in 6 visits in order to return home safely   Goal Outcome # 3 Goal not met   Short Term Goal # 4 Pt will ascend/ descend 1 step SPVin 6 visits in order to access her home   Goal Outcome # 4 Goal not met   Physical Therapy Treatment Plan   Physical Therapy Treatment Plan Continue Current Treatment Plan   Anticipated Discharge Equipment and Recommendations   DC Equipment Recommendations Unable to determine at this time   Discharge Recommendations Recommend post-acute placement for additional physical therapy services prior to discharge home   Interdisciplinary Plan of Care Collaboration   IDT Collaboration with  Nursing   Patient Position at End of Therapy Seated;Chair Alarm On;Call Light within Reach;Tray Table within Reach   Collaboration Comments RN updated   Session Information   Date / Session Number  3/25 -2 (2/4, 3/30)

## 2025-03-25 NOTE — DISCHARGE SUMMARY
Discharge Summary    CHIEF COMPLAINT ON ADMISSION  Chief Complaint   Patient presents with    Fall    Hip Pain     Left        Reason for Admission  ems    Admission Date  3/22/2025     CODE STATUS  DNAR/DNI    HPI & HOSPITAL COURSE  This is an 84-year-old female with past medical history of atrial fibrillation on Eliquis, hypertension, dyslipidemia, CKD 3A, and glaucoma who was admitted on 3/22 after sustaining a ground-level fall.     X-ray imaging revealed left femoral intertrochanteric fracture.  Orthopedic surgery consulted, patient underwent intramedullary device placement on 3/23.  Weightbearing as tolerated.  Patient has follow-up with orthopedic surgery in about 2 weeks for postoperative wound check and to remove staples.    Hyponatremia - likely secondary to diurectics.  Held patient's Lasix and Aldactone. Monitor sodium.     Patient noted to have orthostatic hypotension after working with physical therapy, had a short episode of disorientation and loss of consciousness, resolved abruptly.  Patient back to her baseline. HELD LOSARTAN.     Patient has known history of atrial fibrillation, heart rate fluctuating . Resumed her metoprolol with good rate control.     Therefore, she is discharged in good and stable condition to skilled nursing facility.    The patient met 2-midnight criteria for an inpatient stay at the time of discharge.      FOLLOW UP ITEMS POST DISCHARGE  PCP  Ortho    DISCHARGE DIAGNOSES  Principal Problem:    Femur fracture (HCC) (POA: Unknown)  Active Problems:    Hyponatremia (POA: Unknown)    Orthostatic hypotension (POA: Unknown)    Persistent atrial fibrillation (HCC) (POA: Yes)    Dyslipidemia, goal LDL below 70 (POA: Yes)    Stage 3a chronic kidney disease (POA: Yes)    Encounter for preoperative assessment (POA: Unknown)    ACP (advance care planning) (POA: Unknown)  Resolved Problems:    Hip fracture requiring operative repair, right, closed, initial encounter (HCC) (POA:  Yes)      FOLLOW UP  Future Appointments   Date Time Provider Department Center   4/11/2025 10:00 AM Checo Tesfaye M.D. [address]   6/5/2025 11:00 AM Shen Washington M.D. [facility] None     Checo Tesfaye M.D.  [address] [facility] [address]  [place] [id number] [phone]    Follow up      Lane Galdamez M.D.  [address] [facility]  [place] [id number] [phone]    Follow up        MEDICATIONS ON DISCHARGE     Medication List        START taking these medications        Instructions   oxyCODONE immediate-release 5 MG Tabs  Commonly known as: Roxicodone   Take 1 Tablet by mouth every 6 hours as needed for Severe Pain for up to 3 days.  Dose: 5 mg     senna-docusate 8.6-50 MG Tabs  Commonly known as: Pericolace Or Senokot S   Take 2 Tablets by mouth 2 times a day.  Dose: 2 Tablet            CONTINUE taking these medications        Instructions   apixaban 2.5mg Tabs  Commonly known as: Eliquis   Take 1 Tablet by mouth 2 times a day.  Dose: 2.5 mg     Aspirin Low Dose 81 MG EC tablet  Generic drug: aspirin   TAKE 1 TABLET BY MOUTH EVERY DAY  Dose: 81 mg     ezetimibe 10 MG Tabs  Commonly known as: Zetia   Take 1 Tablet by mouth every Monday, Wednesday, and Friday.  Dose: 10 mg     famotidine 20 MG Tabs  Commonly known as: Pepcid   TAKE 1 TABLET BY MOUTH EVERY DAY  Dose: 20 mg     IRON PO   Take 1 Tablet by mouth every day.  Dose: 1 Tablet     latanoprost 0.005 % Soln  Commonly known as: Xalatan   Administer 1 Drop into both eyes at bedtime. Instill 1 drop into both eyes every night at bedtime  Dose: 1 Drop     losartan 100 MG Tabs  Commonly known as: Cozaar   Take 1 Tablet by mouth every day.  Dose: 100 mg     metoprolol  MG Tb24  Commonly known as: Toprol XL   Take 1 Tablet by mouth 2 times a day.  Dose: 100 mg            STOP taking these medications      digoxin 125 MCG Tabs  Commonly known as: Lanoxin     furosemide 20 MG Tabs  Commonly known as: Lasix     spironolactone 25 MG Tabs  Commonly known  as: Aldactone              Allergies  Allergies   Allergen Reactions    Atorvastatin      myalgias    Simvastatin      myalgias       DIET  Orders Placed This Encounter   Procedures    Diet Order Diet: Regular; Fluid modifications: (optional): 1500 ml Fluid Restriction     Standing Status:   Standing     Number of Occurrences:   1     Diet::   Regular [1]     Fluid modifications: (optional):   1500 ml Fluid Restriction [9]       ACTIVITY  As tolerated and directed by skilled nursing.  Weight bearing as tolerated    LINES, DRAINS, AND WOUNDS  This is an automated list. Peripheral IVs will be removed prior to discharge.  Peripheral IV 03/22/25 20 G Left Forearm (Active)   Site Assessment Clean;Dry;Intact 03/24/25 2015   Dressing Type Transparent 03/24/25 2015   Line Status Saline locked;Flushed 03/24/25 2015   Dressing Status Clean;Dry;Intact 03/24/25 2015   Dressing Intervention N/A 03/23/25 0835   NEXT Primary Tubing Change  03/29/25 03/23/25 0835   Infiltration Grading ([facility]) 0 03/24/25 2015   Phlebitis Scale ([facility] Only) 0 03/24/25 2015       Wound 03/23/25 Incision Closed Incision Leg Lateral Left (Active)   Site Assessment MATTHEW;Other (Comment) 03/24/25 1136   Periwound Assessment MATTHEW 03/24/25 1136   Margins MATTHEW 03/24/25 1136   Closure None;MATTHEW 03/24/25 1136   Drainage Amount MATTHEW 03/24/25 1136   Treatments Offloading 03/24/25 1136   Offloading/DME Other (comment) 03/23/25 1336   Dressing Status Clean;Dry;Intact 03/24/25 2015   Dressing Changed Observed 03/24/25 2015   Dressing Options Dry Gauze;Transparent Film 03/24/25 1136       Peripheral IV 03/22/25 20 G Left Forearm (Active)   Site Assessment Clean;Dry;Intact 03/24/25 2015   Dressing Type Transparent 03/24/25 2015   Line Status Saline locked;Flushed 03/24/25 2015   Dressing Status Clean;Dry;Intact 03/24/25 2015   Dressing Intervention N/A 03/23/25 0835   NEXT Primary Tubing Change  03/29/25 03/23/25 0835   Infiltration Grading ([facility]) 0 03/24/25  2015   Phlebitis Scale ([facility] Only) 0 03/24/25 2015               MENTAL STATUS ON TRANSFER             CONSULTATIONS  Orthopedic surgery    PROCEDURES  intramedullary device placement on 3/23.     LABORATORY  Lab Results   Component Value Date    SODIUM 129 (L) 03/25/2025    POTASSIUM 5.2 03/25/2025    CHLORIDE 96 03/25/2025    CO2 24 03/25/2025    GLUCOSE 107 (H) 03/25/2025    BUN 31 (H) 03/25/2025    CREATININE 1.19 03/25/2025    CREATININE 0.99 04/11/2011    GLOMRATE 56 (L) 03/09/2011        Lab Results   Component Value Date    WBC 8.4 03/25/2025    HEMOGLOBIN 8.2 (L) 03/25/2025    HEMATOCRIT 23.8 (L) 03/25/2025    PLATELETCT 120 (L) 03/25/2025      DX-PORTABLE FLUOROSCOPY < 1 HOUR Reason For Exam: Main OR   Final Result      Portable fluoroscopy utilized for 49 seconds.         INTERPRETING LOCATION: [address], [place], [address]      DX-HIP-UNILATERAL-W/O PELVIS-2/3 VIEWS LEFT   Final Result      Digitized intraoperative radiograph is submitted for review. This examination is not for diagnostic purpose but for guidance during a surgical procedure. Please see the patient's chart for full procedural details.         INTERPRETING LOCATION: [address], [place], [address]      DX-FEMUR-2+ LEFT   Final Result      Impacted left femoral intratrochanteric fracture.      DX-PELVIS-1 OR 2 VIEWS   Final Result      Impacted left femoral intratrochanteric fracture.      DX-CHEST-LIMITED (1 VIEW)   Final Result         No acute cardiopulmonary abnormalities are identified.           Total time of the discharge process exceeds 45 minutes.

## 2025-03-26 VITALS
RESPIRATION RATE: 16 BRPM | HEIGHT: 62 IN | OXYGEN SATURATION: 97 % | WEIGHT: 115 LBS | BODY MASS INDEX: 21.16 KG/M2 | SYSTOLIC BLOOD PRESSURE: 103 MMHG | HEART RATE: 84 BPM | TEMPERATURE: 97.7 F | DIASTOLIC BLOOD PRESSURE: 46 MMHG

## 2025-03-26 LAB
ANION GAP SERPL CALC-SCNC: 9 MMOL/L (ref 7–16)
BUN SERPL-MCNC: 28 MG/DL (ref 8–22)
CALCIUM SERPL-MCNC: 8.7 MG/DL (ref 8.5–10.5)
CHLORIDE SERPL-SCNC: 101 MMOL/L (ref 96–112)
CO2 SERPL-SCNC: 22 MMOL/L (ref 20–33)
CREAT SERPL-MCNC: 0.83 MG/DL (ref 0.5–1.4)
ERYTHROCYTE [DISTWIDTH] IN BLOOD BY AUTOMATED COUNT: 48.8 FL (ref 35.9–50)
GFR SERPLBLD CREATININE-BSD FMLA CKD-EPI: 70 ML/MIN/1.73 M 2
GLUCOSE SERPL-MCNC: 104 MG/DL (ref 65–99)
HCT VFR BLD AUTO: 22.8 % (ref 37–47)
HGB BLD-MCNC: 7.8 G/DL (ref 12–16)
MAGNESIUM SERPL-MCNC: 1.8 MG/DL (ref 1.5–2.5)
MCH RBC QN AUTO: 32.4 PG (ref 27–33)
MCHC RBC AUTO-ENTMCNC: 34.2 G/DL (ref 32.2–35.5)
MCV RBC AUTO: 94.6 FL (ref 81.4–97.8)
PHOSPHATE SERPL-MCNC: 2.2 MG/DL (ref 2.5–4.5)
PLATELET # BLD AUTO: 123 K/UL (ref 164–446)
PMV BLD AUTO: 8.8 FL (ref 9–12.9)
POTASSIUM SERPL-SCNC: 4.5 MMOL/L (ref 3.6–5.5)
RBC # BLD AUTO: 2.41 M/UL (ref 4.2–5.4)
SODIUM SERPL-SCNC: 132 MMOL/L (ref 135–145)
WBC # BLD AUTO: 8 K/UL (ref 4.8–10.8)

## 2025-03-26 PROCEDURE — A9270 NON-COVERED ITEM OR SERVICE: HCPCS | Performed by: HOSPITALIST

## 2025-03-26 PROCEDURE — 700102 HCHG RX REV CODE 250 W/ 637 OVERRIDE(OP): Performed by: HOSPITALIST

## 2025-03-26 PROCEDURE — 700102 HCHG RX REV CODE 250 W/ 637 OVERRIDE(OP): Performed by: GENERAL PRACTICE

## 2025-03-26 PROCEDURE — 97116 GAIT TRAINING THERAPY: CPT | Mod: CQ

## 2025-03-26 PROCEDURE — 97530 THERAPEUTIC ACTIVITIES: CPT | Mod: CQ

## 2025-03-26 PROCEDURE — 85027 COMPLETE CBC AUTOMATED: CPT

## 2025-03-26 PROCEDURE — 83735 ASSAY OF MAGNESIUM: CPT

## 2025-03-26 PROCEDURE — A9270 NON-COVERED ITEM OR SERVICE: HCPCS | Performed by: GENERAL PRACTICE

## 2025-03-26 PROCEDURE — 99239 HOSP IP/OBS DSCHRG MGMT >30: CPT | Performed by: HOSPITALIST

## 2025-03-26 PROCEDURE — 700102 HCHG RX REV CODE 250 W/ 637 OVERRIDE(OP): Performed by: STUDENT IN AN ORGANIZED HEALTH CARE EDUCATION/TRAINING PROGRAM

## 2025-03-26 PROCEDURE — 84100 ASSAY OF PHOSPHORUS: CPT

## 2025-03-26 PROCEDURE — A9270 NON-COVERED ITEM OR SERVICE: HCPCS | Performed by: STUDENT IN AN ORGANIZED HEALTH CARE EDUCATION/TRAINING PROGRAM

## 2025-03-26 PROCEDURE — A9270 NON-COVERED ITEM OR SERVICE: HCPCS | Performed by: ORTHOPAEDIC SURGERY

## 2025-03-26 PROCEDURE — 80048 BASIC METABOLIC PNL TOTAL CA: CPT

## 2025-03-26 PROCEDURE — 700102 HCHG RX REV CODE 250 W/ 637 OVERRIDE(OP): Performed by: ORTHOPAEDIC SURGERY

## 2025-03-26 PROCEDURE — 36415 COLL VENOUS BLD VENIPUNCTURE: CPT

## 2025-03-26 RX ORDER — LOSARTAN POTASSIUM 25 MG/1
25 TABLET ORAL DAILY
Status: SHIPPED
Start: 2025-03-26 | End: 2026-04-30

## 2025-03-26 RX ADMIN — ACETAMINOPHEN 650 MG: 325 TABLET ORAL at 13:22

## 2025-03-26 RX ADMIN — METOPROLOL SUCCINATE 100 MG: 50 TABLET, FILM COATED, EXTENDED RELEASE ORAL at 04:57

## 2025-03-26 RX ADMIN — ACETAMINOPHEN 650 MG: 325 TABLET ORAL at 04:56

## 2025-03-26 RX ADMIN — EZETIMIBE 10 MG: 10 TABLET ORAL at 04:57

## 2025-03-26 RX ADMIN — DIBASIC SODIUM PHOSPHATE, MONOBASIC POTASSIUM PHOSPHATE AND MONOBASIC SODIUM PHOSPHATE 500 MG: 852; 155; 130 TABLET ORAL at 13:22

## 2025-03-26 RX ADMIN — DIBASIC SODIUM PHOSPHATE, MONOBASIC POTASSIUM PHOSPHATE AND MONOBASIC SODIUM PHOSPHATE 500 MG: 852; 155; 130 TABLET ORAL at 09:44

## 2025-03-26 RX ADMIN — FAMOTIDINE 20 MG: 20 TABLET, FILM COATED ORAL at 04:56

## 2025-03-26 RX ADMIN — APIXABAN 2.5 MG: 5 TABLET, FILM COATED ORAL at 04:56

## 2025-03-26 RX ADMIN — ASPIRIN 81 MG: 81 TABLET, COATED ORAL at 04:57

## 2025-03-26 ASSESSMENT — GAIT ASSESSMENTS
DISTANCE (FEET): 145
GAIT LEVEL OF ASSIST: STANDBY ASSIST
DEVIATION: ANTALGIC;BRADYKINETIC;SHUFFLED GAIT
ASSISTIVE DEVICE: FRONT WHEEL WALKER

## 2025-03-26 ASSESSMENT — COGNITIVE AND FUNCTIONAL STATUS - GENERAL
WALKING IN HOSPITAL ROOM: A LITTLE
SUGGESTED CMS G CODE MODIFIER MOBILITY: CK
STANDING UP FROM CHAIR USING ARMS: A LITTLE
MOBILITY SCORE: 18
CLIMB 3 TO 5 STEPS WITH RAILING: A LITTLE
TURNING FROM BACK TO SIDE WHILE IN FLAT BAD: A LITTLE
MOVING TO AND FROM BED TO CHAIR: A LITTLE
MOVING FROM LYING ON BACK TO SITTING ON SIDE OF FLAT BED: A LITTLE

## 2025-03-26 ASSESSMENT — PAIN DESCRIPTION - PAIN TYPE: TYPE: ACUTE PAIN;SURGICAL PAIN

## 2025-03-26 NOTE — PROGRESS NOTES
1544 Called report to [facility], report given to Vu ANDERSON GMT transporting patient with appropriate paperwork, POLST  and personal belongings.

## 2025-03-26 NOTE — DISCHARGE PLANNING
Case Management Discharge Planning    Admission Date: 3/22/2025  GMLOS: 4.4  ALOS: 4    6-Clicks ADL Score: 15  6-Clicks Mobility Score: 18  PT and/or OT Eval ordered: Yes  Post-acute Referrals Ordered: Yes  Post-acute Choice Obtained: Yes  Has referral(s) been sent to post-acute provider:  Yes      Anticipated Discharge Dispo: Discharge Disposition: D/T to SNF with Medicare cert in anticipation of skilled care (03)    DME Needed: No    Action(s) Taken: Per MD, patient is medically cleared to transfer to SNF.  RNCM met with patient at the bedside to discuss.  Patient requested this RNCM call her daughter to discuss.  RNCM called patient's daughter Conchita.  Per Conchita, she is very upset about the rehab denial and would like to know the reasons why rehab was denied.  RNCM let Conchita know that this RNCM would reach out to [facility] TCN to discuss.  RNCM discussed alternate DC plans such as going home with home health vs SNF.  Per Conchita, she feels her mother will need post acute placement prior to going home.  Conchita will be in the patient's room in the next 1-2 hours and will review SNF choices.      1145  Per [facility]--patient/daughter will need to call [facility] customer service to discuss rehab denial.  RNCM met with patient/daughter at the bedside to update and provide phone number to [facility] customer service.  RNCM obtained SNF choice [facility]1 [facility].  #2 [facility].    RNCM asked DPA to follow up with [facility] and [facility] regarding bed availability.      1245  Per [facility], [facility] does not have a bed available, but [facility] can accept the patient at 1600. RNCM updated bedside RN, charge RN and MD.  RNCM met with patient and daughter at the bedside.  They are agreeable to DC to [facility] at 1600.  Cobra signed.  IMM signed.  RNCM sent order to [facility] to request transportation.  [facility]# [id number].  [facility] provided auth for SNF.     1248  Transport confirmed for 1600 to [facility] via T WC. Cobra packet with face sheets x2, chart notes, DC  summary, DNR/DNI order, and hard rx for Oxy in CM office.  Phone number to call report provided to bedside RN.        Escalations Completed: None    Medically Clear: Yes    Next Steps: RN CM to continue to follow for DC planning      Barriers to Discharge: Pending Placement      Care Transition Team Assessment    Information Source  Orientation Level: Oriented X4  Information Given By: Patient  Who is responsible for making decisions for patient? : Patient    Readmission Evaluation  Is this a readmission?: No    Elopement Risk  Legal Hold: No  Ambulatory or Self Mobile in Wheelchair: Yes  Disoriented: No  Psychiatric Symptoms: None  History of Wandering: No  Elopement this Admit: No  Vocalizing Wanting to Leave: No  Displays Behaviors, Body Language Wanting to Leave: No-Not at Risk for Elopement  Elopement Risk: Not at Risk for Elopement    Interdisciplinary Discharge Planning  Lives with - Patient's Self Care Capacity: Alone and Able to Care For Self  Patient or legal guardian wants to designate a caregiver: Yes  Caregiver name: Conchita  Caregiver contact info: [phone]  Support Systems: Children  Housing / Facility: [address] [facility]  Name of Care Facility: [facility]  Prior Services: Other (Comments), Meals on Wheels (intermittent assist with I-ADL from daughter)    Discharge Preparedness  What is your plan after discharge?: Skilled nursing facility  What are your discharge supports?: Child  Prior Functional Level: Ambulatory, Uses Walker  Difficulity with ADLs: Walking  Difficulity with IADLs: Driving    Functional Assesment  Prior Functional Level: Ambulatory, Uses Walker    Finances  Financial Barriers to Discharge: No  Prescription Coverage: Yes    Vision / Hearing Impairment  Vision Impairment : Yes  Right Eye Vision: Impaired, Wears Glasses  Left Eye Vision: Impaired, Wears Glasses  Hearing Impairment : Yes  Hearing Impairment: Both Ears, Hearing Device Not Available         [facility] Directive  [facility]  Directive?: POLST    Domestic Abuse  Possible Abuse/Neglect Reported to:: Not Applicable    Psychological Assessment  History of Substance Abuse: None  History of Psychiatric Problems: No  Non-compliant with Treatment: No  Newly Diagnosed Illness: Yes    Discharge Risks or Barriers  Discharge risks or barriers?: Complex medical needs  Patient risk factors: Complex medical needs    Anticipated Discharge Information  Discharge Disposition: D/T to SNF with Medicare cert in anticipation of skilled care (03)

## 2025-03-26 NOTE — THERAPY
"Physical Therapy   Daily Treatment     Patient Name: Ivory Rowan  Age:  84 y.o., Sex:  female  Medical Record #: [id number]  Today's Date: 3/26/2025     Precautions  Precautions: Fall Risk;Weight Bearing As Tolerated Left Lower Extremity    Assessment    The pt found seated in the chair willing to participate w/PT. While asking questions regarding prior living environment noticed some word finding difficulties, unclear if this is her PLOF or if new. The pt could not give the year or any year for that matter, per nrsg she was orientated x 4. Did have nrsg come assess the pt when her dtr entered into the room, angry stating that \"she always wants to blame everyone else.\" The dtr was verbally aggressive toward her mother and upset about the rehab denial from insurance.   Functionally the pt is making improvements w/mobility, still not at a level to d/c home alone.   PT conts to recommend post acute placement.     Plan    Treatment Plan Status: Continue Current Treatment Plan  Type of Treatment: Bed Mobility, Equipment, Family / Caregiver Training, Gait Training, Manual Therapy, Neuro Re-Education / Balance, Self Care / Home Evaluation, Stair Training, Therapeutic Activities, Therapeutic Exercise  Treatment Frequency: 4 Times per Week  Treatment Duration: Until Therapy Goals Met    DC Equipment Recommendations: Unable to determine at this time  Discharge Recommendations: Recommend post-acute placement for additional physical therapy services prior to discharge home    Objective       03/26/25 1118   Time In/Time Out   Therapy Start Time 1050   Therapy End Time 1118   Total Therapy Time 28   Charge Group   PT Gait Training (Units) 1   PT Therapeutic Activities (Units) 1   Total Time Spent   PT Gait Training Time Spent (Mins) 13   PT Therapeutic Activities Time Spent (Mins) 15   PT Total Time Spent (Calculated) 28   Precautions   Precautions Fall Risk;Weight Bearing As Tolerated Left Lower Extremity   Pain 0 - 10 Group " "  Location Hip   Location Orientation Left   Pain Rating Scale (NPRS) 1   Therapist Pain Assessment During Activity;Nurse Notified   Other Treatments   Other Treatments Provided Pt struggling w/word finding, could not give the year. The pt commented that she frustrated that she \"can't talk\". Did have nrsg come to assess the pt. During the pt's assessment the pt's dtr came into the room angry accusing the pt of making everything about her. The pt upset about the insurance denial to rehab. Did cont to voice concerns to nrsg regarding her word finding struggles.   Balance   Sitting Balance (Static) Fair   Sitting Balance (Dynamic) Fair   Standing Balance (Static) Fair -   Standing Balance (Dynamic) Fair -   Weight Shift Sitting Fair   Weight Shift Standing Fair   Comments w/FWW   Bed Mobility    Comments Did not assess, pt found seated in the chair and wanted to return to the chair.   Gait Analysis   Gait Level Of Assist Standby Assist   Assistive Device Front Wheel Walker   Distance (Feet) 145   # of Times Distance was Traveled 1   Deviation Antalgic;Bradykinetic;Shuffled Gait   Weight Bearing Status WBAT Lft LE   Skilled Intervention Verbal Cuing   Comments Pt's gait slow w/FWW w/no unsteadiness. Did c/o Lft hip discomfort toward the end of her session. No dizziness.   Functional Mobility   Sit to Stand Contact Guard Assist  (from chair height->FWW)   Bed, Chair, Wheelchair Transfer Contact Guard Assist   Toilet Transfers Contact Guard Assist   Skilled Intervention Verbal Cuing   Comments STS and transfers w/FWW   6 Clicks Assessment - How much HELP from from another person do you currently need... (If the patient hasn't done an activity recently, how much help from another person do you think he/she would need if he/she tried?)   Turning from your back to your side while in a flat bed without using bedrails? 3   Moving from lying on your back to sitting on the side of a flat bed without using bedrails? 3   Moving to " and from a bed to a chair (including a wheelchair)? 3   Standing up from a chair using your arms (e.g., wheelchair, or bedside chair)? 3   Walking in hospital room? 3   Climbing 3-5 steps with a railing? 3   6 clicks Mobility Score 18   Short Term Goals    Short Term Goal # 1 Pt will perform supine < > sit SPV with bed flat, no rail in 6 visits in order to set up for upright mobility   Goal Outcome # 1 goal not met   Short Term Goal # 2 Pt will perform sit < > stand SPV in 6 visits in order to prepare for ambulation   Goal Outcome # 2 Goal not met   Short Term Goal # 3 Pt will ambulate 150' SPV /c FWW in 6 visits in order to return home safely   Goal Outcome # 3 Goal not met   Short Term Goal # 4 Pt will ascend/ descend 1 step SPVin 6 visits in order to access her home   Goal Outcome # 4 Goal not met   Education Group   Gait Training Patient Response Patient;Acceptance;Explanation;Action Demonstration   Physical Therapy Treatment Plan   Physical Therapy Treatment Plan Continue Current Treatment Plan   Anticipated Discharge Equipment and Recommendations   DC Equipment Recommendations Unable to determine at this time   Discharge Recommendations Recommend post-acute placement for additional physical therapy services prior to discharge home   Interdisciplinary Plan of Care Collaboration   IDT Collaboration with  Nursing   Patient Position at End of Therapy Seated;Chair Alarm On;Call Light within Reach;Tray Table within Reach;Phone within Reach;Family / Friend in Room   Collaboration Comments Nrsg notifed and did assess the pt's speech   Session Information   Date / Session Number  3/26--3 (3/4, 3/30) PTA/1   Supervising Physical Therapist (PTA Treatments Only)   Supervising Physical Therapist Enrique Loera

## 2025-03-26 NOTE — DISCHARGE PLANNING
DC Transport Scheduled    Transport Company Scheduled:  [facility]    Scheduled Date: 3/26/2025  Scheduled Time: 1600    Transport Type: Wheelchair  Destination:   [facility]   Destination address: [address] [place], [place] [address] , [place] [address]    Notified care team of scheduled transport via Voalte.     If there are any changes needed to the DC transportation scheduled, please contact [facility] at ext. [phone] between the hours of 2020-8668. If outside those hours, contact the ED Case Manager at ext. [phone].

## 2025-03-26 NOTE — PROGRESS NOTES
1100: Physical therapy asked this nurse to come bedside and assess patient. Patient having more difficulty finding her words. While therapist and nurse with patient, patients daughter cam in to room and became very defensive to what the patient was talking about and stating the patient always blames everyone else. Patient worked with therapist while nurse talked daughter.     1130: Care companion called nurse apprentice concerned that lady in patients room is swearing at patient. This nurse went to check on situation.     Provider updated

## 2025-03-26 NOTE — DISCHARGE PLANNING
1218  Agency/Facility Name: [facility]   Spoke To: Irene  Outcome: DPA called regarding bed availability. There is a bed available and requested a 1600 transportation time.

## 2025-03-26 NOTE — CARE PLAN
The patient is Stable - Low risk of patient condition declining or worsening    Shift Goals  Clinical Goals: safety, comfort, mobility  Patient Goals: rest  Family Goals: MATTHEW    Progress made toward(s) clinical / shift goals:    Problem: Mobility  Goal: Patient's capacity to carry out activities will improve  Outcome: Progressing  Flowsheets (Taken 3/25/2025 1131)  Mobility:   Encouraged mobilization per interdisciplinary team recommendations   Monitored for signs of activity intolerance   Provided assistive devices   Collaborated with PT/OT   Administered pain management to allow progressive mobilization   Provided rest periods between activities     Problem: Self Care  Goal: Patient will have the ability to perform ADLs independently or with assistance (bathe, groom, dress, toilet and feed)  Outcome: Progressing     Problem: Infection - Standard  Goal: Patient will remain free from infection  Outcome: Progressing  Flowsheets (Taken 3/25/2025 1131)  Standard Infection Interventions:   Assessed for signs and symptoms of infection   Instructed patient/family on signs and symptoms of infection   Provided education on proper hand hygiene and infection prevention measures   Assessed for removal IV, central lines, intra-arterial or urinary catheters   Implemented standard precautions     Problem: Wound/ / Incision Healing  Goal: Patient's wound/surgical incision will decrease in size and heals properly  Outcome: Progressing  Note: Dressing is CDI       Patient is not progressing towards the following goals:

## 2025-03-26 NOTE — DISCHARGE PLANNING
TCN following. [facility] chart review completed. Noted per concurrent nurse at [facility], formal denial now in place, although in chart status appears pending, but per concurrent is finalized.  Discharge considerations are HH vs. SNF depending on patient/daughter choice.  SNF authorization has been requested to [facility] team; patient has multiple accepting at this time.      *Updated*  Noted Status changed from pending to denied this AM.  Patient/daughter would need to call [facility] customer service, as now that formal denial is in place, customer service would be able to assist with reason for denial.       Noted [facility] has denied patient. Patient remains medically cleared with d/c order.  TCN requested SNF authorization from [facility] team. [facility] SNF authorization in chart.

## 2025-03-26 NOTE — DISCHARGE SUMMARY
Discharge Summary    CHIEF COMPLAINT ON ADMISSION  Chief Complaint   Patient presents with    Fall    Hip Pain     Left        Reason for Admission  ems    Admission Date  3/22/2025     CODE STATUS  DNAR/DNI    HPI & HOSPITAL COURSE    This is an 84-year-old female with past medical history of atrial fibrillation on Eliquis, hypertension, dyslipidemia, CKD 3A, and glaucoma who was admitted on 3/22 after sustaining a ground-level fall.     X-ray imaging revealed left femoral intertrochanteric fracture.  Orthopedic surgery consulted, patient underwent intramedullary device placement on 3/23.  Weightbearing as tolerated.  Patient has follow-up with orthopedic surgery in about 2 weeks for postoperative wound check and to remove staples.    Hyponatremia - likely secondary to diurectics.  Held patient's Lasix and Aldactone. Monitor sodium.     Patient noted to have orthostatic hypotension after working with physical therapy, had a short episode of disorientation and loss of consciousness, resolved abruptly.  Patient back to her baseline. HELD LOSARTAN.     Patient has known history of atrial fibrillation, heart rate fluctuating . Resumed her metoprolol with good rate control.     Therefore, she is discharged in good and stable condition to skilled nursing facility.    The patient met 2-midnight criteria for an inpatient stay at the time of discharge.      FOLLOW UP ITEMS POST DISCHARGE    Patient was previously on as needed Lasix and spironolactone dose were held secondary to hyponatremia.  She is currently euvolemic monitor volume status and resume diuretics accordingly  Losartan dose will be resumed at a lower dose of 25 mg monitor blood pressure and adjust accordingly  Follow-up with orthopedics and primary care  Monitor chemistry panel and CBC    DISCHARGE DIAGNOSES  Principal Problem:    Femur fracture (HCC) (POA: Unknown)  Active Problems:    Dyslipidemia, goal LDL below 70 (POA: Yes)    Stage 3a chronic  kidney disease (POA: Yes)    Persistent atrial fibrillation (HCC) (POA: Yes)    Encounter for preoperative assessment (POA: Unknown)    ACP (advance care planning) (POA: Unknown)    Hyponatremia (POA: Unknown)    Orthostatic hypotension (POA: Unknown)  Resolved Problems:    Hip fracture requiring operative repair, right, closed, initial encounter (HCC) (POA: Yes)      FOLLOW UP  Future Appointments   Date Time Provider Department Center   4/11/2025 10:00 AM Checo Tesfaye M.D. 75[facility]   6/5/2025 11:00 AM Shen Washington M.D. CAR[facility] None     Checo Tesfaye M.D.  [address]  [place] [address]  [phone]    Follow up      Lane Galdamez M.D.  [address]  [place] [id number] [phone]    Follow up        MEDICATIONS ON DISCHARGE     Medication List        START taking these medications        Instructions   oxyCODONE immediate-release 5 MG Tabs  Commonly known as: Roxicodone   Take 1 Tablet by mouth every 6 hours as needed for Severe Pain for up to 3 days.  Dose: 5 mg     senna-docusate 8.6-50 MG Tabs  Commonly known as: Pericolace Or Senokot S   Take 2 Tablets by mouth 2 times a day.  Dose: 2 Tablet            CHANGE how you take these medications        Instructions   losartan 25 MG Tabs  What changed:   medication strength  how much to take  additional instructions  Commonly known as: Cozaar   Take 1 Tablet by mouth every day. Hold for SBP<100  Dose: 25 mg            CONTINUE taking these medications        Instructions   apixaban 2.5mg Tabs  Commonly known as: Eliquis   Take 1 Tablet by mouth 2 times a day.  Dose: 2.5 mg     Aspirin Low Dose 81 MG EC tablet  Generic drug: aspirin   TAKE 1 TABLET BY MOUTH EVERY DAY  Dose: 81 mg     ezetimibe 10 MG Tabs  Commonly known as: Zetia   Take 1 Tablet by mouth every Monday, Wednesday, and Friday.  Dose: 10 mg     famotidine 20 MG Tabs  Commonly known as: Pepcid   TAKE 1 TABLET BY MOUTH EVERY DAY  Dose: 20 mg     IRON PO   Take 1  Tablet by mouth every day.  Dose: 1 Tablet     latanoprost 0.005 % Soln  Commonly known as: Xalatan   Administer 1 Drop into both eyes at bedtime. Instill 1 drop into both eyes every night at bedtime  Dose: 1 Drop     metoprolol  MG Tb24  Commonly known as: Toprol XL   Take 1 Tablet by mouth 2 times a day.  Dose: 100 mg            STOP taking these medications      digoxin 125 MCG Tabs  Commonly known as: Lanoxin     furosemide 20 MG Tabs  Commonly known as: Lasix     spironolactone 25 MG Tabs  Commonly known as: Aldactone              Allergies  Allergies   Allergen Reactions    Atorvastatin      myalgias    Simvastatin      myalgias       DIET  Orders Placed This Encounter   Procedures    Diet Order Diet: Regular; Fluid modifications: (optional): 1500 ml Fluid Restriction     Standing Status:   Standing     Number of Occurrences:   1     Diet::   Regular [1]     Fluid modifications: (optional):   1500 ml Fluid Restriction [9]       ACTIVITY  As tolerated.  Weight bearing as tolerated    LINES, DRAINS, AND WOUNDS  This is an automated list. Peripheral IVs will be removed prior to discharge.       Wound 03/23/25 Incision Closed Incision Leg Lateral Left (Active)   Site Assessment MATTHEW 03/26/25 0830   Periwound Assessment MATTHEW 03/26/25 0830   Margins MATTHEW 03/26/25 0830   Closure MATTHEW 03/26/25 0830   Drainage Amount Scant 03/26/25 0830   Drainage Description Serosanguineous 03/26/25 0830   Treatments Offloading 03/26/25 0830   Offloading/DME Other (comment) 03/26/25 0830   Dressing Status Clean;Dry;Intact;New drainage 03/26/25 0830   Dressing Changed Observed 03/26/25 0830   Dressing Options Dry Gauze;Transparent Film 03/26/25 0830                  MENTAL STATUS ON TRANSFER             CONSULTATIONS  Orthopedics    PROCEDURES  3/23/2025 10:46 AM INSERTION, INTRAMEDULLARY JEWELS, FEMUR, PROXIMAL    Lane Galdamez M.D.                 DATE OF OPERATION: 3/23/2025     PREOPERATIVE DIAGNOSIS: left intertrochanteric femur  fracture     POSTOPERATIVE DIAGNOSIS: Same     PROCEDURE PERFORMED: Surgical treatment of left intertrochanteric femur fracture with intramedullary device     SURGEON: Lane Galdamez M.D.           LABORATORY  Lab Results   Component Value Date    SODIUM 132 (L) 03/26/2025    POTASSIUM 4.5 03/26/2025    CHLORIDE 101 03/26/2025    CO2 22 03/26/2025    GLUCOSE 104 (H) 03/26/2025    BUN 28 (H) 03/26/2025    CREATININE 0.83 03/26/2025    CREATININE 0.99 04/11/2011    GLOMRATE 56 (L) 03/09/2011        Lab Results   Component Value Date    WBC 8.0 03/26/2025    HEMOGLOBIN 7.8 (L) 03/26/2025    HEMATOCRIT 22.8 (L) 03/26/2025    PLATELETCT 123 (L) 03/26/2025        Total time of the discharge process exceeds 35 minutes.

## 2025-03-26 NOTE — DISCHARGE PLANNING
ATTN: Case Management  RE: Referral for Home Health    Reason for referral denial: Per RN supervisor, patient not safe to dc home.              Unfortunately, we are not able to accept this referral for the reason listed above. If further clarity is needed, our Transitional Care Specialists are available to discuss any barriers to service at [phone].      We look forward to collaborating with you in the future,  [facility] Team

## 2025-03-27 NOTE — PROGRESS NOTES
"      Orthopaedic Progress Note    Interval changes:  Patient doing well   L hip dressings are CDI  Cleared for DC to SNF by ortho pending medicine clearance    ROS - Patient denies any new issues.  Pain well controlled.    /46   Pulse 84   Temp 36.5 °C (97.7 °F) (Temporal)   Resp 16   Ht 1.575 m (5' 2\")   Wt 52.2 kg (115 lb)   SpO2 97%     Patient seen and examined  No acute distress  Breathing non labored  RRR  L hip dressings CDI, DNVI, moves all toes, cap refill <2 sec.     Recent Labs     03/25/25  0552 03/26/25  0354   WBC 8.4 8.0   RBC 2.57* 2.41*   HEMOGLOBIN 8.2* 7.8*   HEMATOCRIT 23.8* 22.8*   MCV 92.6 94.6   MCH 31.9 32.4   MCHC 34.5 34.2   RDW 47.6 48.8   PLATELETCT 120* 123*   MPV 8.7* 8.8*       Active Hospital Problems    Diagnosis     Dyslipidemia, goal LDL below 70 [E78.5]      Priority: Medium    Orthostatic hypotension [I95.1]     Hyponatremia [E87.1]     Femur fracture (HCC) [S72.90XA]     Encounter for preoperative assessment [Z01.818]     ACP (advance care planning) [Z71.89]     Persistent atrial fibrillation (HCC) [I48.19]     Stage 3a chronic kidney disease [N18.31]        Assessment/Plan:  Patient doing well   L hip dressings are CDI  Cleared for DC to home by ortho pending medicine clearance  POD#3 S/P Surgical treatment of left intertrochanteric femur fracture with intramedullary device   Wt bearing status - WBAT  Wound care/Drains - Dressings to be changed every other day by nursing. Or PRN for saturation    Future Procedures - none planned    Sutures/Staples out- 14-21 days post operatively. Removal by ortho mid levels only.  PT/OT-initiated  Antibiotics: Perioperative completed  DVT Prophylaxis- TEDS/SCDs/Foot pumps  Brandt-not needed per ortho  Case Coordination for Discharge Planning - Disposition per therapy recs.    "

## 2025-03-28 ENCOUNTER — DOCUMENTATION (OUTPATIENT)
Dept: HEALTH INFORMATION MANAGEMENT | Facility: OTHER | Age: 85
End: 2025-03-28
Payer: MEDICARE

## 2025-04-03 DIAGNOSIS — I10 HTN (HYPERTENSION), MALIGNANT: ICD-10-CM

## 2025-04-03 RX ORDER — LOSARTAN POTASSIUM 100 MG/1
100 TABLET ORAL DAILY
Qty: 100 TABLET | Refills: 4 | OUTPATIENT
Start: 2025-04-03

## 2025-04-04 ENCOUNTER — APPOINTMENT (OUTPATIENT)
Dept: RADIOLOGY | Facility: MEDICAL CENTER | Age: 85
End: 2025-04-04
Attending: EMERGENCY MEDICINE
Payer: MEDICARE

## 2025-04-04 ENCOUNTER — HOSPITAL ENCOUNTER (EMERGENCY)
Facility: MEDICAL CENTER | Age: 85
End: 2025-04-04
Attending: EMERGENCY MEDICINE
Payer: MEDICARE

## 2025-04-04 VITALS
SYSTOLIC BLOOD PRESSURE: 161 MMHG | BODY MASS INDEX: 21.16 KG/M2 | HEART RATE: 88 BPM | RESPIRATION RATE: 18 BRPM | OXYGEN SATURATION: 97 % | DIASTOLIC BLOOD PRESSURE: 72 MMHG | TEMPERATURE: 97.4 F | WEIGHT: 115 LBS | HEIGHT: 62 IN

## 2025-04-04 DIAGNOSIS — S80.12XA TRAUMATIC ECCHYMOSIS OF LEFT LOWER LEG, INITIAL ENCOUNTER: ICD-10-CM

## 2025-04-04 DIAGNOSIS — M79.89 LEG SWELLING: ICD-10-CM

## 2025-04-04 PROCEDURE — 93971 EXTREMITY STUDY: CPT | Mod: 26,LT | Performed by: INTERNAL MEDICINE

## 2025-04-04 PROCEDURE — 93971 EXTREMITY STUDY: CPT | Mod: LT

## 2025-04-04 PROCEDURE — 99284 EMERGENCY DEPT VISIT MOD MDM: CPT

## 2025-04-04 ASSESSMENT — FIBROSIS 4 INDEX: FIB4 SCORE: 4.45

## 2025-04-04 NOTE — DISCHARGE PLANNING
SW asked to arranged transportation of Pt back to [facility] after ER visit.  SW called [facility] and arranged for a 6 pm-630 pm ([phone]).    COBRA and transfer packet prepared and will be given to ER RN.

## 2025-04-04 NOTE — ED PROVIDER NOTES
ED Provider Note    CHIEF COMPLAINT  Chief Complaint   Patient presents with    Leg Swelling     BIBA from [facility]. Patient had surgery on left hip a week ago. Left leg presents with edema, ecchymosis, and pain. Patient states facility is concerned about a potential blood clot        EXTERNAL RECORDS REVIEWED  Inpatient Notes discharge summary reviewed she was discharged on the 26th the last month.  She has a history of A-fib on Eliquis who sustained a ground-level fall in the 22nd of last month.  She had a left femoral intertrochanteric fracture.    HPI/ROS  LIMITATION TO HISTORY   Select: : None  OUTSIDE HISTORIAN(S):  none    Ivory Rowan is a 84 y.o. female who presents for evaluation of left leg swelling.  Patient has a history of a recent left hip fracture and has a history of A-fib and is on Eliquis.  She was sent here for increased leg swelling and bruising.    PAST MEDICAL HISTORY   has a past medical history of Acute on chronic heart failure with preserved ejection fraction (HCC) (08/13/2021), Age-related osteoporosis without current pathological fracture (01/17/2024), Atrial fibrillation ([facility]), Basal cell carcinoma of skin of nose, CATARACT, CHF (congestive heart failure) ([facility]), Colon polyp, Dental disorder, Dyslipidemia, Glaucoma, right eye, Heart burn, Hemorrhagic disorder ([facility]), History of cataract removal with insertion of prosthetic lens (04/09/2024), Hypertension, IBS (irritable bowel syndrome), Indigestion, MEDICAL HOME (10/23/2012), Mitral valve regurgitation, Osteopenia (06/2009), Perennial allergic rhinitis with seasonal variation, Persistent atrial fibrillation ([facility]), Psychiatric problem, Type 2 diabetes mellitus, controlled ([facility]), Valvular heart disease, and Vertigo.    SURGICAL HISTORY   has a past surgical history that includes breast biopsy; recovery (7/8/2016); us-needle core bx-breast panel; appendectomy; cholecystectomy; colonoscopy (8/2009); and open fix inter/subtroch  "fx,implnt (Left, 3/23/2025).    FAMILY HISTORY  Family History   Problem Relation Age of Onset    Diabetes Mother     Hypertension Mother     Stroke Mother     Cancer Father         lung/larynx    Cancer Sister         \"female\"    Genetic Disorder Sister         osteoporosis    Cancer Paternal Aunt         breast    Cancer Maternal Aunt     Heart Disease Brother         CABG x 3       SOCIAL HISTORY  Social History     Tobacco Use    Smoking status: Former     Current packs/day: 0.00     Average packs/day: 0.5 packs/day for 40.0 years (20.0 ttl pk-yrs)     Types: Cigarettes     Start date: 3/1/1956     Quit date: 3/1/1996     Years since quittin.1    Smokeless tobacco: Never   Vaping Use    Vaping status: Never Used   Substance and Sexual Activity    Alcohol use: Not Currently     Alcohol/week: 0.0 oz     Comment: now rare    Drug use: No    Sexual activity: Not Currently     Partners: Male       CURRENT MEDICATIONS  Home Medications    **Home medications have not yet been reviewed for this encounter**       Audit from Redirected Encounters    **Home medications have not yet been reviewed for this encounter**         ALLERGIES  Allergies   Allergen Reactions    Atorvastatin      myalgias    Simvastatin      myalgias       PHYSICAL EXAM  VITAL SIGNS: BP (!) 149/67   Pulse 79   Temp 36.3 °C (97.4 °F) (Temporal)   Resp 16   Ht 1.575 m (5' 2\")   Wt 52.2 kg (115 lb)   LMP 1991   SpO2 100%   BMI 21.03 kg/m²    Constitutional: Alert in no apparent distress. Well apearing  HENT: Normocephalic, Atraumatic, Bilateral external ears normal. Nose normal.   Eyes:  Conjunctiva normal, non-icteric.   Lungs: Non-labored respirations  Skin: Warm, Dry, No erythema, No rash.   Neurologic: Alert, Grossly non-focal.   Psychiatric: Affect normal, Judgment normal, Mood normal, Appears appropriate and not intoxicated.   Extremities: Left lower extremity with diffuse cysts and moderate swelling        RADIOLOGY/PROCEDURES "   I have independently interpreted the diagnostic imaging associated with this visit and am waiting the final reading from the radiologist.   My preliminary interpretation is as follows: Negative for DVT    Radiologist interpretation:  US-EXTREMITY VENOUS LOWER UNILAT LEFT    (Results Pending)       COURSE & MEDICAL DECISION MAKING    ASSESSMENT, COURSE AND PLAN  Care Narrative: This is an 84-year-old female that presents with left leg swelling and ecchymosis.  Patient is on Eliquis for A-fib.  She was sent for concern of possible DVT.  Ultrasounds performed and preliminary report from the technologist is negative.  Therefore she will be sent back to skilled nursing facility.      DISPOSITION AND DISCUSSIONS  I have discussed management of the patient with the following physicians and SEBASTIAN's:  none    Discussion of management with other [facility] or appropriate source(s): None     Escalation of care considered, and ultimately not performed:diagnostic imaging    Barriers to care at this time, including but not limited to:  none .     Decision tools and prescription drugs considered including, but not limited to:  none .     The patient will return for new or worsening symptoms and is stable at the time of discharge. Patient was given return precautions. Patient and/or family member verbalizes understanding and will comply.    DISPOSITION:  Patient will be discharged home in stable condition.    FOLLOW UP:  Checo Tesfaye M.D.  [address] [place] [id number]  [phone]          [facility], Emergency Dept  [address] [facility] [address]  [phone]    Return to the emergency department for worsening pain or other concerns.      OUTPATIENT MEDICATIONS:  New Prescriptions    No medications on file         FINAL DIAGNOSIS  1. Leg swelling    2. Traumatic ecchymosis of left lower leg, initial encounter         Electronically signed by: Julissa Garcia M.D., 4/4/2025 2:47 PM

## 2025-04-04 NOTE — DISCHARGE PLANNING
TCN following. HTH/SCP chart review completed. Note pt currently in ED 2' to Leg Swelling. Note per triage note, BIBA from [facility]. Patient had surgery on left hip a week ago. Left leg presents with edema, ecchymosis, and pain. Patient states facility is concerned about a potential blood clot.    At this time anticipating that pt will dc to back to [facility] (either directly from ED or after admission to [facility] if warranted). No SCP auth is needed for return to [facility] directly from ED.     Note per review, pt with recent admission to [facility] and was dc'd to [facility] on 3/26 for rehabilitation after fall with left femoral intertrochanteric fracture s/p intramedullary device placement. Note during prior admission, SCP declined auth for IRF and had authorized for SNF.     TCN will monitor     If patient admits to [facility], TCN will continue to monitor and assist with transitional care needs as indicated (ie SCP auth as needed for return to SNF, should pt be admitted). Please reach out to TCN via VOALTE if post acute transitional care needs are warranted for dc planning.      Update: pt dc'd from ED back to [facility] on 4/4 @5:55PM

## 2025-04-04 NOTE — ED TRIAGE NOTES
Chief Complaint   Patient presents with    Leg Swelling     BIBA from [facility]. Patient had surgery on left hip a week ago. Left leg presents with edema, ecchymosis, and pain. Patient states facility is concerned about a potential blood clot      CMS intact. + thinners. Hx of CHF and taking lasix.

## 2025-04-05 NOTE — ED NOTES
Discharge instructions provided to pt and reviewed with patient. Patient to follow with PCP as needed. Discussed to return to ER if symptoms worsen. Patient verbalized understanding. Pt declined to sign DC paperwork but gave verbal consent. Pt discharged back to [place] with [facility], pt agreeable to DC plan.

## 2025-04-11 ENCOUNTER — APPOINTMENT (OUTPATIENT)
Dept: MEDICAL GROUP | Facility: MEDICAL CENTER | Age: 85
End: 2025-04-11
Payer: MEDICARE

## 2025-04-14 ENCOUNTER — HOSPITAL ENCOUNTER (EMERGENCY)
Facility: MEDICAL CENTER | Age: 85
End: 2025-04-14
Attending: EMERGENCY MEDICINE
Payer: MEDICARE

## 2025-04-14 VITALS
WEIGHT: 115 LBS | SYSTOLIC BLOOD PRESSURE: 159 MMHG | RESPIRATION RATE: 18 BRPM | BODY MASS INDEX: 21.71 KG/M2 | DIASTOLIC BLOOD PRESSURE: 79 MMHG | OXYGEN SATURATION: 92 % | TEMPERATURE: 97.6 F | HEART RATE: 86 BPM | HEIGHT: 61 IN

## 2025-04-14 DIAGNOSIS — K64.4 EXTERNAL HEMORRHOIDS: ICD-10-CM

## 2025-04-14 LAB
ABO GROUP BLD: NORMAL
ALBUMIN SERPL BCP-MCNC: 4 G/DL (ref 3.2–4.9)
ALBUMIN/GLOB SERPL: 1.9 G/DL
ALP SERPL-CCNC: 128 U/L (ref 30–99)
ALT SERPL-CCNC: 21 U/L (ref 2–50)
ANION GAP SERPL CALC-SCNC: 11 MMOL/L (ref 7–16)
ANISOCYTOSIS BLD QL SMEAR: ABNORMAL
APTT PPP: 28.2 SEC (ref 24.7–36)
AST SERPL-CCNC: 38 U/L (ref 12–45)
BASOPHILS # BLD AUTO: 0.3 % (ref 0–1.8)
BASOPHILS # BLD: 0.02 K/UL (ref 0–0.12)
BILIRUB SERPL-MCNC: 2.8 MG/DL (ref 0.1–1.5)
BLD GP AB SCN SERPL QL: NORMAL
BUN SERPL-MCNC: 33 MG/DL (ref 8–22)
BURR CELLS BLD QL SMEAR: NORMAL
CALCIUM ALBUM COR SERPL-MCNC: 9.6 MG/DL (ref 8.5–10.5)
CALCIUM SERPL-MCNC: 9.6 MG/DL (ref 8.5–10.5)
CHLORIDE SERPL-SCNC: 106 MMOL/L (ref 96–112)
CO2 SERPL-SCNC: 20 MMOL/L (ref 20–33)
COMMENT 1642: NORMAL
CREAT SERPL-MCNC: 1.24 MG/DL (ref 0.5–1.4)
EOSINOPHIL # BLD AUTO: 0.02 K/UL (ref 0–0.51)
EOSINOPHIL NFR BLD: 0.3 % (ref 0–6.9)
ERYTHROCYTE [DISTWIDTH] IN BLOOD BY AUTOMATED COUNT: 77.2 FL (ref 35.9–50)
GFR SERPLBLD CREATININE-BSD FMLA CKD-EPI: 43 ML/MIN/1.73 M 2
GLOBULIN SER CALC-MCNC: 2.1 G/DL (ref 1.9–3.5)
GLUCOSE SERPL-MCNC: 121 MG/DL (ref 65–99)
HCT VFR BLD AUTO: 41.3 % (ref 37–47)
HGB BLD-MCNC: 13.1 G/DL (ref 12–16)
IMM GRANULOCYTES # BLD AUTO: 0.03 K/UL (ref 0–0.11)
IMM GRANULOCYTES NFR BLD AUTO: 0.4 % (ref 0–0.9)
INR PPP: 1.46 (ref 0.87–1.13)
LIPASE SERPL-CCNC: 50 U/L (ref 11–82)
LYMPHOCYTES # BLD AUTO: 0.49 K/UL (ref 1–4.8)
LYMPHOCYTES NFR BLD: 7.2 % (ref 22–41)
MACROCYTES BLD QL SMEAR: ABNORMAL
MCH RBC QN AUTO: 34.2 PG (ref 27–33)
MCHC RBC AUTO-ENTMCNC: 31.7 G/DL (ref 32.2–35.5)
MCV RBC AUTO: 107.8 FL (ref 81.4–97.8)
MONOCYTES # BLD AUTO: 0.67 K/UL (ref 0–0.85)
MONOCYTES NFR BLD AUTO: 9.8 % (ref 0–13.4)
MORPHOLOGY BLD-IMP: NORMAL
NEUTROPHILS # BLD AUTO: 5.58 K/UL (ref 1.82–7.42)
NEUTROPHILS NFR BLD: 82 % (ref 44–72)
NRBC # BLD AUTO: 0 K/UL
NRBC BLD-RTO: 0 /100 WBC (ref 0–0.2)
OVALOCYTES BLD QL SMEAR: NORMAL
PLATELET # BLD AUTO: 174 K/UL (ref 164–446)
PLATELET BLD QL SMEAR: NORMAL
PMV BLD AUTO: 9.1 FL (ref 9–12.9)
POIKILOCYTOSIS BLD QL SMEAR: NORMAL
POTASSIUM SERPL-SCNC: 4.9 MMOL/L (ref 3.6–5.5)
PROT SERPL-MCNC: 6.1 G/DL (ref 6–8.2)
PROTHROMBIN TIME: 17.8 SEC (ref 12–14.6)
RBC # BLD AUTO: 3.83 M/UL (ref 4.2–5.4)
RBC BLD AUTO: PRESENT
RH BLD: NORMAL
SODIUM SERPL-SCNC: 137 MMOL/L (ref 135–145)
WBC # BLD AUTO: 6.8 K/UL (ref 4.8–10.8)

## 2025-04-14 PROCEDURE — A9270 NON-COVERED ITEM OR SERVICE: HCPCS | Performed by: EMERGENCY MEDICINE

## 2025-04-14 PROCEDURE — 85610 PROTHROMBIN TIME: CPT

## 2025-04-14 PROCEDURE — 86900 BLOOD TYPING SEROLOGIC ABO: CPT

## 2025-04-14 PROCEDURE — 80053 COMPREHEN METABOLIC PANEL: CPT

## 2025-04-14 PROCEDURE — 86850 RBC ANTIBODY SCREEN: CPT

## 2025-04-14 PROCEDURE — 85730 THROMBOPLASTIN TIME PARTIAL: CPT

## 2025-04-14 PROCEDURE — 85025 COMPLETE CBC W/AUTO DIFF WBC: CPT

## 2025-04-14 PROCEDURE — 99285 EMERGENCY DEPT VISIT HI MDM: CPT

## 2025-04-14 PROCEDURE — 36415 COLL VENOUS BLD VENIPUNCTURE: CPT

## 2025-04-14 PROCEDURE — 86901 BLOOD TYPING SEROLOGIC RH(D): CPT

## 2025-04-14 PROCEDURE — 700102 HCHG RX REV CODE 250 W/ 637 OVERRIDE(OP): Performed by: EMERGENCY MEDICINE

## 2025-04-14 PROCEDURE — 83690 ASSAY OF LIPASE: CPT

## 2025-04-14 RX ORDER — HYDROCORTISONE ACETATE 25 MG/1
25 SUPPOSITORY RECTAL EVERY 12 HOURS
Qty: 14 SUPPOSITORY | Refills: 0 | Status: SHIPPED | OUTPATIENT
Start: 2025-04-14 | End: 2025-04-21

## 2025-04-14 RX ORDER — DICYCLOMINE HCL 20 MG
20 TABLET ORAL EVERY 6 HOURS
Qty: 120 TABLET | Refills: 0 | Status: SHIPPED | OUTPATIENT
Start: 2025-04-14

## 2025-04-14 RX ORDER — HYDROCORTISONE ACETATE 25 MG/1
25 SUPPOSITORY RECTAL ONCE
Status: COMPLETED | OUTPATIENT
Start: 2025-04-14 | End: 2025-04-14

## 2025-04-14 RX ADMIN — HYDROCORTISONE ACETATE 25 MG: 25 SUPPOSITORY RECTAL at 10:14

## 2025-04-14 ASSESSMENT — LIFESTYLE VARIABLES: DO YOU DRINK ALCOHOL: NO

## 2025-04-14 ASSESSMENT — FIBROSIS 4 INDEX: FIB4 SCORE: 4.45

## 2025-04-14 NOTE — ED PROVIDER NOTES
ED Provider Note    CHIEF COMPLAINT  Chief Complaint   Patient presents with    Bloody Stools     Pt reports BRB blood in stool x2 days. Hx of hemorrhoids that facility reports improved with treatment.     Abdominal Pain     Constant dull/ache in abdomen.       EXTERNAL RECORDS REVIEWED  Inpatient Notes admitted for femur fracture which was repaired by orthopedics.  Patient sent to rehab facility following this    [facility]      Ivory Rowan is a 84 y.o. female who presents with bright red blood per rectum for the last 2 days.  Blood is mixed in stool.  Patient is from a nursing facility.  Patient has a history of atrial fibrillation and is on Eliquis for this but this was held as he was having bleeding in her stool.  Patient states that she has had blood in her stool for the last few days.  She has a longstanding history of hemorrhoids.  Patient reports that she has had some loose stool and some blood in it over the last few days.  Blood is mixed in.  Blood is bright red.  She denies any black stool.  She has longstanding history of chronic abdominal pain but denies any worsening of pain.  She denies any lack of appetite or associated vomiting.  Patient denies any fevers or chills.  Patient reports defecation is painful.    PAST MEDICAL HISTORY   has a past medical history of Acute on chronic heart failure with preserved ejection fraction (HCC) (08/13/2021), Age-related osteoporosis without current pathological fracture (01/17/2024), Atrial fibrillation ([facility]), Basal cell carcinoma of skin of nose, CATARACT, CHF (congestive heart failure) ([facility]), Colon polyp, Dental disorder, Dyslipidemia, Glaucoma, right eye, Heart burn, Hemorrhagic disorder ([facility]), History of cataract removal with insertion of prosthetic lens (04/09/2024), Hypertension, IBS (irritable bowel syndrome), Indigestion, MEDICAL HOME (10/23/2012), Mitral valve regurgitation, Osteopenia (06/2009), Perennial allergic rhinitis with seasonal variation,  "Persistent atrial fibrillation (HCC), Psychiatric problem, Type 2 diabetes mellitus, controlled (HCC), Valvular heart disease, and Vertigo.    SURGICAL HISTORY   has a past surgical history that includes breast biopsy; recovery (2016); us-needle core bx-breast panel; appendectomy; cholecystectomy; colonoscopy (2009); and open fix inter/subtroch fx,implnt (Left, 3/23/2025).    FAMILY HISTORY  Family History   Problem Relation Age of Onset    Diabetes Mother     Hypertension Mother     Stroke Mother     Cancer Father         lung/larynx    Cancer Sister         \"female\"    Genetic Disorder Sister         osteoporosis    Cancer Paternal Aunt         breast    Cancer Maternal Aunt     Heart Disease Brother         CABG x 3       SOCIAL HISTORY  Social History     Tobacco Use    Smoking status: Former     Current packs/day: 0.00     Average packs/day: 0.5 packs/day for 40.0 years (20.0 ttl pk-yrs)     Types: Cigarettes     Start date: 3/1/1956     Quit date: 3/1/1996     Years since quittin.1    Smokeless tobacco: Never   Vaping Use    Vaping status: Never Used   Substance and Sexual Activity    Alcohol use: Not Currently     Alcohol/week: 0.0 oz     Comment: now rare    Drug use: No    Sexual activity: Not Currently     Partners: Male       CURRENT MEDICATIONS  Home Medications       Reviewed by Rukhsana Mcgarry R.N. (Registered Nurse) on 25 at 0844  Med List Status: Not Addressed     Medication Last Dose Status   apixaban (ELIQUIS) 2.5mg Tab  Active   aspirin (ASPIRIN LOW DOSE) 81 MG EC tablet  Active   ezetimibe (ZETIA) 10 MG Tab  Active   famotidine (PEPCID) 20 MG Tab  Active   Ferrous Sulfate (IRON PO)  Active   latanoprost (XALATAN) 0.005 % Solution  Active   losartan (COZAAR) 25 MG Tab  Active   metoprolol SR (TOPROL XL) 100 MG TABLET SR 24 HR  Active   senna-docusate (PERICOLACE OR SENOKOT S) 8.6-50 MG Tab  Active                    ALLERGIES  Allergies   Allergen Reactions    Atorvastatin      " "myalgias    Simvastatin      myalgias       PHYSICAL EXAM  VITAL SIGNS: BP (!) 155/66   Pulse 100   Temp 36.2 °C (97.1 °F) (Temporal)   Resp 18   Ht 1.549 m (5' 1\")   Wt 52.2 kg (115 lb)   LMP 05/01/1991   SpO2 90%   BMI 21.73 kg/m²    Physical Exam  Constitutional:       Appearance: She is well-developed.   HENT:      Head: Normocephalic and atraumatic.   Eyes:      Pupils: Pupils are equal, round, and reactive to light.   Cardiovascular:      Rate and Rhythm: Normal rate and regular rhythm.   Pulmonary:      Effort: Pulmonary effort is normal. No accessory muscle usage or respiratory distress.      Breath sounds: Normal breath sounds.   Abdominal:      General: Bowel sounds are normal.      Palpations: Abdomen is soft. There is no mass.      Tenderness: There is no abdominal tenderness.   Genitourinary:     Comments: Multiple hemorrhoids, minimal active bleeding.  Skin surrounding rectum is very raw.  There is no tenderness of the portion or fluctuant mass or induration.  Musculoskeletal:         General: Normal range of motion.   Skin:     General: Skin is warm.      Capillary Refill: Capillary refill takes less than 2 seconds.   Neurological:      General: No focal deficit present.      Mental Status: She is alert.   Psychiatric:         Mood and Affect: Mood normal. Mood is not anxious.           EKG/LABS  Results for orders placed or performed during the hospital encounter of 04/14/25   COD (ADULT)    Collection Time: 04/14/25  8:40 AM   Result Value Ref Range    ABO Grouping Only O     Rh Grouping Only POS     Antibody Screen-Cod NEG    CBC WITH DIFFERENTIAL    Collection Time: 04/14/25  8:40 AM   Result Value Ref Range    WBC 6.8 4.8 - 10.8 K/uL    RBC 3.83 (L) 4.20 - 5.40 M/uL    Hemoglobin 13.1 12.0 - 16.0 g/dL    Hematocrit 41.3 37.0 - 47.0 %    .8 (H) 81.4 - 97.8 fL    MCH 34.2 (H) 27.0 - 33.0 pg    MCHC 31.7 (L) 32.2 - 35.5 g/dL    RDW 77.2 (H) 35.9 - 50.0 fL    Platelet Count 174 164 - " 446 K/uL    MPV 9.1 9.0 - 12.9 fL    Neutrophils-Polys 82.00 (H) 44.00 - 72.00 %    Lymphocytes 7.20 (L) 22.00 - 41.00 %    Monocytes 9.80 0.00 - 13.40 %    Eosinophils 0.30 0.00 - 6.90 %    Basophils 0.30 0.00 - 1.80 %    Immature Granulocytes 0.40 0.00 - 0.90 %    Nucleated RBC 0.00 0.00 - 0.20 /100 WBC    Neutrophils (Absolute) 5.58 1.82 - 7.42 K/uL    Lymphs (Absolute) 0.49 (L) 1.00 - 4.80 K/uL    Monos (Absolute) 0.67 0.00 - 0.85 K/uL    Eos (Absolute) 0.02 0.00 - 0.51 K/uL    Baso (Absolute) 0.02 0.00 - 0.12 K/uL    Immature Granulocytes (abs) 0.03 0.00 - 0.11 K/uL    NRBC (Absolute) 0.00 K/uL    Anisocytosis 1+     Macrocytosis 1+    COMP METABOLIC PANEL    Collection Time: 04/14/25  8:40 AM   Result Value Ref Range    Sodium 137 135 - 145 mmol/L    Potassium 4.9 3.6 - 5.5 mmol/L    Chloride 106 96 - 112 mmol/L    Co2 20 20 - 33 mmol/L    Anion Gap 11.0 7.0 - 16.0    Glucose 121 (H) 65 - 99 mg/dL    Bun 33 (H) 8 - 22 mg/dL    Creatinine 1.24 0.50 - 1.40 mg/dL    Calcium 9.6 8.5 - 10.5 mg/dL    Correct Calcium 9.6 8.5 - 10.5 mg/dL    AST(SGOT) 38 12 - 45 U/L    ALT(SGPT) 21 2 - 50 U/L    Alkaline Phosphatase 128 (H) 30 - 99 U/L    Total Bilirubin 2.8 (H) 0.1 - 1.5 mg/dL    Albumin 4.0 3.2 - 4.9 g/dL    Total Protein 6.1 6.0 - 8.2 g/dL    Globulin 2.1 1.9 - 3.5 g/dL    A-G Ratio 1.9 g/dL   LIPASE    Collection Time: 04/14/25  8:40 AM   Result Value Ref Range    Lipase 50 11 - 82 U/L   PROTHROMBIN TIME    Collection Time: 04/14/25  8:40 AM   Result Value Ref Range    PT 17.8 (H) 12.0 - 14.6 sec    INR 1.46 (H) 0.87 - 1.13   APTT    Collection Time: 04/14/25  8:40 AM   Result Value Ref Range    APTT 28.2 24.7 - 36.0 sec   ESTIMATED GFR    Collection Time: 04/14/25  8:40 AM   Result Value Ref Range    GFR (CKD-EPI) 43 (A) >60 mL/min/1.73 m 2   PLATELET ESTIMATE    Collection Time: 04/14/25  8:40 AM   Result Value Ref Range    Plt Estimation Normal    MORPHOLOGY    Collection Time: 04/14/25  8:40 AM   Result Value  Ref Range    RBC Morphology Present     Poikilocytosis 1+     Ovalocytes 1+     Echinocytes 1+    PERIPHERAL SMEAR REVIEW    Collection Time: 04/14/25  8:40 AM   Result Value Ref Range    Peripheral Smear Review see below    DIFFERENTIAL COMMENT    Collection Time: 04/14/25  8:40 AM   Result Value Ref Range    Comments-Diff see below      *Note: Due to a large number of results and/or encounters for the requested time period, some results have not been displayed. A complete set of results can be found in Results Review.       I have independently interpreted this EKG      COURSE & MEDICAL DECISION MAKING    ASSESSMENT, COURSE AND PLAN  Care Narrative: Patient here with presentation that appears most consistent with likely bleeding hemorrhoids.  There are multiple hemorrhoids, but no obvious mass, there is no obvious evidence of infection at this point.  She has obvious cause of the bleeding here, we did check basic labs for further risk stratification to ensure the patient was not having any severe bleeding.  Her abdominal exam is entirely benign, I am unable to reproduce any tenderness on exam, patient has no associated guarding or rebound.  My suspicion of surgical process here is low.  Patient's basic labs are all very reassuring.  No significant concerning anemia.  Exam is consistent with multiple hemorrhoids at this point.  Follow-up with surgery if symptoms or not resolving.  Return to the emergency department if symptoms are worsening.  Patient given hydrocortisone suppositories.  Return precautions reviewed.              DISPOSITION AND DISCUSSIONS      Escalation of care considered, and ultimately not performed: Diagnostic imaging was deferred, suspicion of surgical process very low.        FINAL DIAGNOSIS  1. External hemorrhoids  Referral to General Surgery    hydrocortisone (ANUSOL-HC) 25 MG Suppos    dicyclomine (BENTYL) 20 MG Tab

## 2025-04-14 NOTE — ED TRIAGE NOTES
"Chief Complaint   Patient presents with    Bloody Stools     Pt reports BRB blood in stool x2 days. Hx of hemorrhoids that facility reports improved with treatment.     Abdominal Pain     Constant dull/ache in abdomen.     BP (!) 155/66   Pulse 100   Temp 36.2 °C (97.1 °F) (Temporal)   Resp 18   Ht 1.549 m (5' 1\")   Wt 52.2 kg (115 lb)   LMP 05/01/1991   SpO2 90%   BMI 21.73 kg/m²     Pt brought in by JEAN from [place] to [facility]. Pt in a gown and on monitor. Chart flagged for ERP to see.    Eliquis held for 2 days. Pt taking ASA.   "

## 2025-04-14 NOTE — DISCHARGE PLANNING
Transportation set up with [facility] for 1145 pick-up, they are providing w/c, no oxygen is required going to: [email or web address]  [facility]  [address] [place] [address] · [place] [address]  [phone]  Pt asked writer to call daughter Conchita and let her know the that she is being d/c and transferred back to [facility].  Trip # [id number]  $114.50

## 2025-04-14 NOTE — ED NOTES
Paperwork provided. Report called to Kasey. Pt taken by JOSÉ MANUEL back to [place] with all belongings.

## 2025-04-14 NOTE — DISCHARGE INSTRUCTIONS
Patient has external hemorrhoids.  Blood counts are normal.  Rest of the exam was very reassuring.  Start the Anusol, this should help decrease the bleeding and the bleeding should resolve over the next few days.  Follow-up with general surgery if hemorrhoids are not resolving.  Return to the emergency department if bleeding worsens significantly

## 2025-04-14 NOTE — DISCHARGE PLANNING
HTH/SCP chart review completed. Note pt currently in ED 2' to complaints of bloody stools and abdominal pain. Note patient was at [facility], prior to admission. Note per review, patient had previously dc'd from [facility] to [facility], on 3/26 for rehabilitation after fall with left femoral intertrochanteric fracture s/p intramedullary device placement. Per post acute TCN, plan was for patient discharge home from [facility], today.     Per review, patient noted to use a walker at baseline residing in a single story home. At this time anticipating that pt will dc to home with possible [facility] services or return to [facility], pending medical course/ rehabilitation need and physician/ patient/ family decision (either directly from ED or after admission to [facility] if warranted).     If patient does not warrant admission/ inpatient status to [facility] and is unable to functionally discharge home, as noted patient came from [facility], where patient was participating in skilled rehabilitation (although noted patient was an anticipated discharge from [facility], today). VOALTE sent to ANNA Vivar to provide update and offer TCN collaboration and support in discharge planning as appropriate.     If patient admits to [facility], TCN will continue to monitor and assist with transitional care needs as indicated. Please reach out to TCN via VOALTE if post acute transitional care needs are warranted for dc planning.  Thank you.       UPDATE 11:28AM - Please see CM note 4/14 noting patient anticipated discharge plan is return to [facility], today (1145 ). SCP Post acute TCN provided update.

## 2025-04-16 ENCOUNTER — HOSPITAL ENCOUNTER (OUTPATIENT)
Facility: MEDICAL CENTER | Age: 85
End: 2025-04-16
Attending: FAMILY MEDICINE
Payer: MEDICARE

## 2025-04-20 DIAGNOSIS — I70.0 AORTIC ATHEROSCLEROSIS (HCC): ICD-10-CM

## 2025-04-20 DIAGNOSIS — E78.5 DYSLIPIDEMIA, GOAL LDL BELOW 70: ICD-10-CM

## 2025-04-21 NOTE — TELEPHONE ENCOUNTER
Is the patient due for a refill? Yes    Was the patient seen the last 15 months? Yes    Date of last office visit: 10/01/2024    Does the patient have an upcoming appointment?  Yes   If yes, When? 6/5/2025    Provider to refill:TT     Does the patient have [facility] and need 100-day supply? (This applies to ALL medications) Yes, quantity updated to 100 days

## 2025-04-22 RX ORDER — EZETIMIBE 10 MG/1
10 TABLET ORAL
Qty: 100 TABLET | Refills: 1 | Status: SHIPPED | OUTPATIENT
Start: 2025-04-22

## 2025-05-01 DIAGNOSIS — I10 HTN (HYPERTENSION), MALIGNANT: ICD-10-CM

## 2025-05-01 RX ORDER — SPIRONOLACTONE 25 MG/1
12.5 TABLET ORAL DAILY
Qty: 45 TABLET | Refills: 4 | OUTPATIENT
Start: 2025-05-01

## 2025-05-01 NOTE — TELEPHONE ENCOUNTER
Rx refill not appropriate. Med discontinued per Dr. Deepak Julio on 3/22/25 during hospital visit.

## 2025-05-08 ENCOUNTER — APPOINTMENT (OUTPATIENT)
Dept: RADIOLOGY | Facility: MEDICAL CENTER | Age: 85
End: 2025-05-08
Attending: EMERGENCY MEDICINE
Payer: MEDICARE

## 2025-05-08 ENCOUNTER — HOSPITAL ENCOUNTER (EMERGENCY)
Facility: MEDICAL CENTER | Age: 85
End: 2025-05-08
Attending: EMERGENCY MEDICINE
Payer: MEDICARE

## 2025-05-08 VITALS
OXYGEN SATURATION: 91 % | DIASTOLIC BLOOD PRESSURE: 70 MMHG | TEMPERATURE: 98.4 F | BODY MASS INDEX: 21.03 KG/M2 | HEART RATE: 94 BPM | WEIGHT: 115 LBS | RESPIRATION RATE: 16 BRPM | SYSTOLIC BLOOD PRESSURE: 159 MMHG

## 2025-05-08 DIAGNOSIS — R58 ECCHYMOSIS: ICD-10-CM

## 2025-05-08 DIAGNOSIS — M79.89 LEG SWELLING: ICD-10-CM

## 2025-05-08 LAB — EKG IMPRESSION: NORMAL

## 2025-05-08 PROCEDURE — 99284 EMERGENCY DEPT VISIT MOD MDM: CPT

## 2025-05-08 PROCEDURE — 93005 ELECTROCARDIOGRAM TRACING: CPT | Mod: TC | Performed by: EMERGENCY MEDICINE

## 2025-05-08 PROCEDURE — 93971 EXTREMITY STUDY: CPT | Mod: LT

## 2025-05-08 ASSESSMENT — FIBROSIS 4 INDEX: FIB4 SCORE: 4

## 2025-05-08 NOTE — ED PROVIDER NOTES
ED Provider Note    CHIEF COMPLAINT  Chief Complaint   Patient presents with    Foot Pain     Sent from [facility] for discolored left foot.        EXTERNAL RECORDS REVIEWED  Outpatient orthopedic visit yesterday, Status post left hip nail, anxious about walking but having no pain  3/23/2025, OR note: Left intertrochanteric femur fracture treated with intramedullary device    [facility]    Ivory Rowan is a 85 y.o. female who presents for evaluation of atraumatic ecchymosis to the left lower leg.  She is a couple weeks status post hip fracture, has been at the skilled nursing facility since then.  Today they noticed some bruising involving the left lower leg and foot.  She has no pain.  Reports she has really not been ambulatory since the surgery.  She does have a history of atrial fibrillation and is anticoagulated.  Denies any more recent injuries.  No pain.  No history of DVT.  She has no other complaints.  Sent here to rule out DVT    PAST MEDICAL HISTORY   has a past medical history of Acute on chronic heart failure with preserved ejection fraction (HCC) (08/13/2021), Age-related osteoporosis without current pathological fracture (01/17/2024), Atrial fibrillation ([facility]), Basal cell carcinoma of skin of nose, CATARACT, CHF (congestive heart failure) ([facility]), Colon polyp, Dental disorder, Dyslipidemia, Glaucoma, right eye, Heart burn, Hemorrhagic disorder ([facility]), History of cataract removal with insertion of prosthetic lens (04/09/2024), Hypertension, IBS (irritable bowel syndrome), Indigestion, [facility]ICAL HOME (10/23/2012), Mitral valve regurgitation, Osteopenia (06/2009), Perennial allergic rhinitis with seasonal variation, Persistent atrial fibrillation ([facility]), Psychiatric problem, Type 2 diabetes mellitus, controlled ([facility]), Valvular heart disease, and Vertigo.    SURGICAL HISTORY   has a past surgical history that includes breast biopsy; recovery (7/8/2016); us-needle core bx-breast panel; appendectomy;  "cholecystectomy; colonoscopy (2009); and open fix inter/subtroch fx,implnt (Left, 3/23/2025).    FAMILY HISTORY  Family History   Problem Relation Age of Onset    Diabetes Mother     Hypertension Mother     Stroke Mother     Cancer Father         lung/larynx    Cancer Sister         \"female\"    Genetic Disorder Sister         osteoporosis    Cancer Paternal Aunt         breast    Cancer Maternal Aunt     Heart Disease Brother         CABG x 3       SOCIAL HISTORY  Social History     Tobacco Use    Smoking status: Former     Current packs/day: 0.00     Average packs/day: 0.5 packs/day for 40.0 years (20.0 ttl pk-yrs)     Types: Cigarettes     Start date: 3/1/1956     Quit date: 3/1/1996     Years since quittin.2    Smokeless tobacco: Never   Vaping Use    Vaping status: Never Used   Substance and Sexual Activity    Alcohol use: Not Currently     Alcohol/week: 0.0 oz     Comment: now rare    Drug use: No    Sexual activity: Not Currently     Partners: Male       CURRENT MEDICATIONS  Home Medications       Reviewed by Natasha Flores R.N. (Registered Nurse) on 25 at 1610  Med List Status: Partial     Medication Last Dose Status   apixaban (ELIQUIS) 2.5mg Tab  Active   aspirin (ASPIRIN LOW DOSE) 81 MG EC tablet  Active   dicyclomine (BENTYL) 20 MG Tab  Active   ezetimibe (ZETIA) 10 MG Tab  Active   famotidine (PEPCID) 20 MG Tab  Active   Ferrous Sulfate (IRON PO)  Active   latanoprost (XALATAN) 0.005 % Solution  Active   losartan (COZAAR) 25 MG Tab  Active   metoprolol SR (TOPROL XL) 100 MG TABLET SR 24 HR  Active   senna-docusate (PERICOLACE OR SENOKOT S) 8.6-50 MG Tab  Active                    ALLERGIES  Allergies   Allergen Reactions    Atorvastatin      myalgias    Simvastatin      myalgias       PHYSICAL EXAM  VITAL SIGNS: BP (!) 146/78   Pulse (!) 102   Temp 37.1 °C (98.8 °F) (Temporal)   Resp 12   Wt 52.2 kg (115 lb)   LMP 1991   SpO2 95%   BMI 21.03 kg/m²    Constitutional: Alert " in no apparent distress.  HENT: No signs of significant acute trauma.   Eyes: Conjunctiva normal, non-icteric.   Chest: Normal nonlabored respirations  Skin: No appreciable rash on the exposed skin  Musculoskeletal: Inspection of the left leg reveals some areas of ecchymosis involving the distal foot and lower leg.  No tenderness.  No asymmetric edema.  Normal dorsalis pedis and posterior tibial pulse, normal sensation of all toes.  No tenderness.  Neurologic: Alert, no obvious focal deficits noted.       EKG/LABS  Results for orders placed or performed during the hospital encounter of 25   EKG    Collection Time: 25  4:57 PM   Result Value Ref Range    Report       [facility] Emergency Dept[facility]    Test Date:  2025  Pt Name:    KYLE WISEMAN                  Department: ER  MRN:        [id number]                      Room:       [facility]  Gender:     Female                       Technician: [id number]  :        [id number]                   Requested By:ER TRIAGE PROTOCOL  Order #:    [id number]                    Reading MD: RAFAEL HARRIS MD    Measurements  Intervals                                Axis  Rate:       97                           P:          0  DC:         0                            QRS:        93  QRSD:       85                           T:          -6  QT:         345  QTc:        439    Interpretive Statements  Atrial fibrillation with a rate of 97, normal QRS interval, no T wave  inversions.  No acute ST segment changes. Multiple PVCs. My impression: No  acute ischemia  Electronically Signed On 2025 16:57:17 PDT by RAFAEL HARRIS MD        I have independently interpreted this EKG    RADIOLOGY/PROCEDURES   I have independently interpreted the diagnostic imaging associated with this visit and am waiting the final reading from the radiologist.   My preliminary interpretation is as follows: All veins are compressible, no DVT    Radiologist  interpretation:  US-EXTREMITY VENOUS LOWER UNILAT LEFT         Negative for DVT    COURSE & MEDICAL DECISION MAKING    ASSESSMENT, COURSE AND PLAN  Care Narrative: This is an 85-year-old female couple weeks status post left hip fracture with ORIF coming in with some atraumatic ecchymosis of the left leg, sent to exclude DVT.  She is not having pain.  No injuries.  No tenderness on exam.  She is anticoagulated.  Duplex study is obtained and is negative for DVT.  At this point, I think she is appropriate to be discharged to continue her rehab at this facility, return instructions provided.      FINAL DIAGNOSIS  1. Ecchymosis    2. Leg swelling         Electronically signed by: Tyshawn Calle M.D., 5/8/2025 4:33 PM

## 2025-05-08 NOTE — ED TRIAGE NOTES
Chief Complaint   Patient presents with    Foot Pain     Sent from [facility] skilled [facility] for discolored left foot.      Pt from lobby to G23

## 2025-05-09 NOTE — DISCHARGE PLANNING
CM met with pt to sign COBRA, called [employer] for w/c ride to return to [facility] provided with # for report, ride is [phone], trip # [id number].

## 2025-05-09 NOTE — DISCHARGE PLANNING
Daughter called to inquire about DC time back to [facility]. She also wanted a medical update. ER RN notified to call pt's daughter Conchita to update her on pt's medical condition.

## 2025-05-09 NOTE — ED NOTES
Pt discharged to [facility]. Pt was given follow up instructions. Pt verbalized understanding of all instructions for discharge and is transferred to [facility] with [facility]. Patient's daughter, Conchita, called and updated about plan of care and follow up instructions.

## 2025-05-30 ENCOUNTER — APPOINTMENT (OUTPATIENT)
Dept: MEDICAL GROUP | Facility: MEDICAL CENTER | Age: 85
End: 2025-05-30
Payer: MEDICARE

## 2025-06-06 DIAGNOSIS — Z95.5 STENTED CORONARY ARTERY: ICD-10-CM

## 2025-06-06 NOTE — TELEPHONE ENCOUNTER
Is the patient due for a refill? Yes    Was the patient seen the last 15 months? Yes    Date of last office visit: 10/01/2024    Does the patient have an upcoming appointment?  Yes   If yes, When? 9/24/2025    Provider to refill:TT    Does the patient have [facility] and need 100-day supply? (This applies to ALL medications) Yes, quantity updated to 100 days

## 2025-06-10 RX ORDER — ASPIRIN 81 MG/1
81 TABLET, COATED ORAL
Qty: 100 TABLET | Refills: 0 | Status: SHIPPED | OUTPATIENT
Start: 2025-06-10

## 2025-06-13 ENCOUNTER — DOCUMENTATION (OUTPATIENT)
Dept: HEALTH INFORMATION MANAGEMENT | Facility: OTHER | Age: 85
End: 2025-06-13
Payer: MEDICARE

## 2025-08-11 ENCOUNTER — APPOINTMENT (OUTPATIENT)
Dept: MEDICAL GROUP | Facility: MEDICAL CENTER | Age: 85
End: 2025-08-11
Payer: MEDICARE

## (undated) DEVICE — GLOVE BIOGEL SZ 7 SURGICAL PF LTX - (50PR/BX 4BX/CA)

## (undated) DEVICE — PACK MAJOR ORTHO - (2EA/CA)

## (undated) DEVICE — SET EXTENSION WITH 2 PORTS (48EA/CA) ***PART #2C8610 IS A SUBSTITUTE*****

## (undated) DEVICE — DRAPE IOBAN LARGE 2 INCISE FILM (5EA/CA)

## (undated) DEVICE — BAG SPONGE COUNT 10.25 X 32 - BLUE (250/CA)

## (undated) DEVICE — GOWN SURGEONS X-LARGE - DISP. (30/CA)

## (undated) DEVICE — TUBING CLEARLINK DUO-VENT - C-FLO (48EA/CA)

## (undated) DEVICE — GLOVE BIOGEL INDICATOR SZ 7.5 SURGICAL PF LTX - (50PR/BX 4BX/CA)

## (undated) DEVICE — GUIDE PIN CALIBRATED (5EA/PK) (4TX6=24)

## (undated) DEVICE — SUTURE 2-0 VICRYL PLUS CT-1 36 (36PK/BX)"

## (undated) DEVICE — SODIUM CHL IRRIGATION 0.9% 1000ML (12EA/CA)

## (undated) DEVICE — SENSOR OXIMETER ADULT SPO2 RD SET (20EA/BX)

## (undated) DEVICE — DRESSING TRANSPARENT FILM TEGADERM 4 X 4.75" (50EA/BX)"

## (undated) DEVICE — DRAPE 36X28IN RAD CARM BND BG - (25/CA)

## (undated) DEVICE — BIT DRILL LONG CALIBRATED 4.2MM X 330MM (4TX2=8)

## (undated) DEVICE — STAPLER 35MM SKIN WIDE ROTATING HEAD (6EA/BX)

## (undated) DEVICE — SUTURE GENERAL

## (undated) DEVICE — LACTATED RINGERS INJ 1000 ML - (14EA/CA 60CA/PF)

## (undated) DEVICE — SUCTION INSTRUMENT YANKAUER BULBOUS TIP W/O VENT (50EA/CA)

## (undated) DEVICE — GOWN WARMING STANDARD FLEX - (30/CA)

## (undated) DEVICE — CANISTER SUCTION 3000ML MECHANICAL FILTER AUTO SHUTOFF MEDI-VAC NONSTERILE LF DISP (40EA/CA)